# Patient Record
Sex: FEMALE | Race: WHITE | NOT HISPANIC OR LATINO | Employment: FULL TIME | URBAN - METROPOLITAN AREA
[De-identification: names, ages, dates, MRNs, and addresses within clinical notes are randomized per-mention and may not be internally consistent; named-entity substitution may affect disease eponyms.]

---

## 2018-01-26 ENCOUNTER — TRANSFERRED RECORDS (OUTPATIENT)
Dept: HEALTH INFORMATION MANAGEMENT | Facility: CLINIC | Age: 53
End: 2018-01-26

## 2018-12-12 ENCOUNTER — OFFICE VISIT (OUTPATIENT)
Dept: FAMILY MEDICINE | Facility: CLINIC | Age: 53
End: 2018-12-12
Payer: OTHER GOVERNMENT

## 2018-12-12 VITALS
OXYGEN SATURATION: 98 % | HEART RATE: 86 BPM | WEIGHT: 196.2 LBS | DIASTOLIC BLOOD PRESSURE: 82 MMHG | HEIGHT: 67 IN | SYSTOLIC BLOOD PRESSURE: 118 MMHG | TEMPERATURE: 98.5 F | BODY MASS INDEX: 30.79 KG/M2

## 2018-12-12 DIAGNOSIS — G25.81 RESTLESS LEGS SYNDROME: ICD-10-CM

## 2018-12-12 DIAGNOSIS — M54.41 ACUTE RIGHT-SIDED LOW BACK PAIN WITH RIGHT-SIDED SCIATICA: Primary | ICD-10-CM

## 2018-12-12 DIAGNOSIS — R60.0 LOWER EXTREMITY EDEMA: ICD-10-CM

## 2018-12-12 DIAGNOSIS — M85.89 OSTEOPENIA OF MULTIPLE SITES: ICD-10-CM

## 2018-12-12 PROCEDURE — 99203 OFFICE O/P NEW LOW 30 MIN: CPT | Performed by: FAMILY MEDICINE

## 2018-12-12 RX ORDER — TRAMADOL HYDROCHLORIDE 50 MG/1
50 TABLET ORAL EVERY 6 HOURS PRN
COMMUNITY
End: 2018-12-12

## 2018-12-12 RX ORDER — ROPINIROLE 0.5 MG/1
0.5 TABLET, FILM COATED ORAL 2 TIMES DAILY PRN
Qty: 180 TABLET | Refills: 5 | COMMUNITY
Start: 2018-12-12 | End: 2019-08-02

## 2018-12-12 RX ORDER — FUROSEMIDE 20 MG
20 TABLET ORAL DAILY PRN
Qty: 60 TABLET | Refills: 3 | COMMUNITY
Start: 2018-12-12 | End: 2020-07-07

## 2018-12-12 RX ORDER — TRAMADOL HYDROCHLORIDE 50 MG/1
50 TABLET ORAL EVERY 6 HOURS PRN
Qty: 120 TABLET | Refills: 1 | Status: SHIPPED | OUTPATIENT
Start: 2018-12-27 | End: 2019-03-07

## 2018-12-12 RX ORDER — FAMOTIDINE 20 MG
1 TABLET ORAL DAILY
COMMUNITY
End: 2021-10-29

## 2018-12-12 RX ORDER — ALENDRONATE SODIUM 35 MG/1
35 TABLET ORAL
COMMUNITY
End: 2019-08-02

## 2018-12-12 RX ORDER — CYCLOBENZAPRINE HCL 5 MG
5 TABLET ORAL 2 TIMES DAILY PRN
Qty: 40 TABLET | Refills: 1 | Status: SHIPPED | OUTPATIENT
Start: 2018-12-12 | End: 2019-02-13

## 2018-12-12 ASSESSMENT — MIFFLIN-ST. JEOR: SCORE: 1518.62
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
## 2018-12-12 NOTE — PATIENT INSTRUCTIONS
"Physical therapy will call you to schedule.  If this is not improving in 2-4 weeks they let me know and then I'll order MRI at that point.      Thank you for choosing [facility].  You may be receiving a survey in the mail from [employer] regarding your visit today.  Please take a few minutes to complete and return the survey to let us know how we are doing.      If you have questions or concerns, please contact us via [employer] or you can contact your care team at [phone].    Our Clinic hours are:  Monday 6:40 am  to 7:00 pm  Tuesday -Friday 6:40 am to 5:00 pm    The [place] outpatient lab hours are:  Monday - Friday 6:10 am to 4:45 pm  Saturdays 7:00 am to 11:00 am  Appointments are required, call [phone]    If you have clinical questions after hours or would like to schedule an appointment,  call the clinic at [phone].  Patient Education       * Sciatica    Sciatica (\"Lumbar Radiculopathy\") causes a pain that spreads from the lower back down into the buttock, hip and leg. Sometimes leg pain can occur without any back pain. Sciatica is due to irritation or pressure on a spinal nerve as it comes out of the spinal canal. This is most often due to pressure on the nerve from a nearby spinal disk (the cartilage cushion between each spinal bone). Other causes include spinal stenosis (narrowing of the spinal canal) and spasm of the piriformis muscle (a muscle in the buttocks that the sciatic nerve passes through).  Sciatica may begin after a sudden twisting/bending force (such as in a car accident), or sometimes after a simple awkward movement. In either case, muscle spasm is commonly present and can add to the pain.  The diagnosis of sciatica is made from the symptoms and physical exam. Unless you had a physical injury (such as a car accident or fall), X-rays are usually not ordered for the initial evaluation of sciatica because the nerves and disks cannot be seen on an X-ray. Most sciatica (80-90%) " gets better with time.  What can I do about my low back pain?  There are three main things you can do to ease low back pain and help it go away.    Stay active! Use positions, movements and exercises. Too much rest can make your symptoms worse.    Use heat or cold packs.    Take medicine as directed.  Using positions, movements and exercises  Research tells us that moving your joints and muscles can help you recover from back pain. Such activity should be simple and gentle.  Use walking to help relieve your discomfort. Try taking a short walk every 3 to 4 hours during the day. Walk for a few minutes inside your home or take longer walks outside, on a treadmill or at a mall. Slowly increase the amount of time you walk. Expect discomfort when you begin, but it should lessen as your back starts to recover.  Finding a position that is comfortable  When your back pain is new, you may find that certain positions will ease your pain. Gently try each of the following positions until you find one that eases your pain. Once you find a position of comfort, use it as often as you like while you recover. Return to your daily routine as soon as possible.     Lie on your back with your legs bent. You can do this by placing a pillow under your knees or lie on the floor and rest your lower legs on the seat of a chair.    Lie on your side with your knees bent and place a pillow between your knees.    Lie on your stomach over pillows.  Using heat or cold packs  Try cold packs or gentle heat to ease your pain. Use whichever gives you the most relief. Apply the cold pack or heat for 15 minutes at a time, as often as needed.  Taking medicine  If taking over-the-counter medicine:    Take ibuprofen (Advil, Motrin) 600 mg. three times a day as needed for pain.  OR    Take Aleve (naproxen sodium) 220 to 440 mg. two times a day as needed for pain.  If your doctor prescribed medicine, follow the instructions. Stop taking the medicine as soon  as you can.  When should I call my doctor?  Your back pain should improve over the first couple of weeks. As it improves, you should be able to return to your normal activities. But call your doctor if:    You have a sudden change in your ability to control? your bladder or bowels.    You begin to feel tingling in your groin or legs.    The pain spreads down your leg and into your foot.    Your toes, feet or leg muscles begin to feel weak.    You feel generally unwell or sick.    Your pain gets worse.    7270-4938 [employer]. [address] [place] [address]. All rights reserved. This information is not intended as a substitute for professional medical care. Always follow your healthcare professional's instructions.  This information has been modified by your health care provider with permission from the publisher.

## 2019-01-02 ENCOUNTER — HOSPITAL ENCOUNTER (OUTPATIENT)
Dept: PHYSICAL THERAPY | Facility: CLINIC | Age: 54
Setting detail: THERAPIES SERIES
End: 2019-01-02
Attending: FAMILY MEDICINE
Payer: OTHER GOVERNMENT

## 2019-01-02 DIAGNOSIS — M54.41 ACUTE RIGHT-SIDED LOW BACK PAIN WITH RIGHT-SIDED SCIATICA: ICD-10-CM

## 2019-01-02 PROCEDURE — 97014 ELECTRIC STIMULATION THERAPY: CPT | Mod: GP | Performed by: PHYSICAL THERAPIST

## 2019-01-02 PROCEDURE — 97162 PT EVAL MOD COMPLEX 30 MIN: CPT | Mod: GP | Performed by: PHYSICAL THERAPIST

## 2019-01-02 PROCEDURE — 97110 THERAPEUTIC EXERCISES: CPT | Mod: GP | Performed by: PHYSICAL THERAPIST
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
## 2019-01-10 ENCOUNTER — TELEPHONE (OUTPATIENT)
Dept: FAMILY MEDICINE | Facility: CLINIC | Age: 54
End: 2019-01-10

## 2019-01-10 DIAGNOSIS — Z12.31 ENCOUNTER FOR SCREENING MAMMOGRAM FOR BREAST CANCER: Primary | ICD-10-CM

## 2019-01-10 NOTE — TELEPHONE ENCOUNTER
Panel Management Review      Patient has the following on her problem list: None      Composite cancer screening  Chart review shows that this patient is due/due soon for the following Pap Smear, Mammogram and Colonoscopy  Summary:    Patient is due/failing the following:   COLONOSCOPY, MAMMOGRAM, PAP and PHYSICAL    Action needed:   Patient needs office visit for Physical/ Pap. and Patient needs referral/order: Mammogram and Colonoscopy.    Type of outreach:    Phone, left message for patient to call back.     Questions for provider review:    None                                                                                                                                    Stacy Woods MA       Chart routed to Care Team .

## 2019-01-11 NOTE — TELEPHONE ENCOUNTER
Pt is going to wait for her work schedule to schedule her physical. She will hold off on the colonoscopy but would like an order for a mammogram put in. Please let her know when she can schedule this test.

## 2019-01-17 NOTE — TELEPHONE ENCOUNTER
Patient returned the call, advised her the mammogram had been ordered and the diagnostics phone number given.

## 2019-02-13 ENCOUNTER — TELEPHONE (OUTPATIENT)
Dept: FAMILY MEDICINE | Facility: CLINIC | Age: 54
End: 2019-02-13

## 2019-02-13 DIAGNOSIS — M54.41 ACUTE RIGHT-SIDED LOW BACK PAIN WITH RIGHT-SIDED SCIATICA: Primary | ICD-10-CM

## 2019-02-13 DIAGNOSIS — F40.240 CLAUSTROPHOBIA: Primary | ICD-10-CM

## 2019-02-13 DIAGNOSIS — M54.41 ACUTE RIGHT-SIDED LOW BACK PAIN WITH RIGHT-SIDED SCIATICA: ICD-10-CM

## 2019-02-13 RX ORDER — DIAZEPAM 5 MG
5-10 TABLET ORAL
Qty: 2 TABLET | Refills: 0 | Status: SHIPPED | OUTPATIENT
Start: 2019-02-13 | End: 2019-05-15

## 2019-02-13 RX ORDER — CYCLOBENZAPRINE HCL 5 MG
TABLET ORAL
Qty: 40 TABLET | Refills: 0 | Status: SHIPPED | OUTPATIENT
Start: 2019-02-13 | End: 2019-03-07

## 2019-02-13 NOTE — TELEPHONE ENCOUNTER
Covering for PCP:  MRI ordered- she can schedule  Further recommendations pending results.  Thanks.    Sean Gabriel MD

## 2019-02-13 NOTE — TELEPHONE ENCOUNTER
Patient reports:  MRI scheduled 2/22/19  Imaging dept had asked patient to get a RX for MRI for claustrophobia, as they have no open MRI units available in the  system..    Routing to provider.    Benita STEWART Rn

## 2019-02-13 NOTE — TELEPHONE ENCOUNTER
Patient notified and understands plan.  Pharmacy also notified and state they will process the med. Sheyla WEAVER RN

## 2019-02-13 NOTE — TELEPHONE ENCOUNTER
Do not take Valium, Flexeril and Tramadol at the same time. I don't recommend taking Tramadol for 4-6 hrs after, or before Valium since elimination half life for Tramadol is about 5 hrs.     Marcia Laura APRN CNP

## 2019-02-13 NOTE — TELEPHONE ENCOUNTER
Dr. Chu,    Patient states back pain is getting worse.    Per LOV notes 12/12/2018: If this is not improving in 2-4 weeks they let me know and then I'll order MRI at that point.    Any other recommendations?    Thanks,  Sheyla WEAVER RN

## 2019-02-13 NOTE — TELEPHONE ENCOUNTER
Reason for call:  Patient reporting a symptom    Symptom or request: Lower back on the right manly in the middle her legs hurt so bad especially in the morning she has to hang on this to keep herself steady when she gets out of bed. The pain is unbearable, lasting through the day. Th patient stated she fell on the ice two weeks ago. Patient started PT on  1/2/19 only went the one time.  Patient can not afford the co-pays to go twice a week. So PT gave her a print out of exercises to do she does in the morning before getting out of bed. Patient it stating that her Ankylosing Spondylosis is what is getting worse.    Duration (how long have symptoms been present): has had this for years getting worse.    Have you been treated for this before? Yes      Phone Number patient can be reached at:  Home number on file [phone] (home)    Best Time:  any    Can we leave a detailed message on this number:  YES    Call taken on 2/13/2019 at 10:48 AM by Love Farnsworth

## 2019-02-13 NOTE — TELEPHONE ENCOUNTER
Marcia,    Can you clarify...  Patient had a refill today for Flexeril  She asked for medication for claustrophobia  Should she refrain from taking Flexeril before and after MRI since she is going to use Valium?  Pharmacy and patient wanted clarification    Routing to provider.    Benita STEWART Rn

## 2019-02-13 NOTE — TELEPHONE ENCOUNTER
Patient notified of prescription sent to pharmacy by covering provider  Patient verbalized understanding.    Benita STEWART Rn

## 2019-02-13 NOTE — TELEPHONE ENCOUNTER
Patient reports that she has 3 tablets of Flexeril left  LOV 12/12/19, patient to return in 3 months  Patient is requesting refill to get her to 3/12/19  Please advise    Routing to provider.    Benita STEWART Rn

## 2019-02-13 NOTE — TELEPHONE ENCOUNTER
Reason for Call:  Pharmacy called wanting to make sure that the DrNaheed Knows that she is on diazepam and tramadol pharmacy needs a call back to make a note for insurance purposes that the Doctor is aware.    Detailed comments: [phone]    Phone Number Patient can be reached at: Home number on file [phone] (home)    Best Time: any    Can we leave a detailed message on this number? YES    Call taken on 2/13/2019 at 2:14 PM by Love Farnsworth

## 2019-02-13 NOTE — TELEPHONE ENCOUNTER
Covering for PCP:    Signed prescription for Valium, take 1-2 tablets 30 minutes before MRI. No refills.    ROSALINDA Chakraborty CNP

## 2019-02-15 ENCOUNTER — HOSPITAL ENCOUNTER (OUTPATIENT)
Dept: MAMMOGRAPHY | Facility: CLINIC | Age: 54
Discharge: HOME | End: 2019-02-15
Attending: FAMILY MEDICINE | Admitting: FAMILY MEDICINE
Payer: OTHER GOVERNMENT

## 2019-02-15 DIAGNOSIS — Z12.31 ENCOUNTER FOR SCREENING MAMMOGRAM FOR BREAST CANCER: ICD-10-CM

## 2019-02-15 PROCEDURE — 77067 SCR MAMMO BI INCL CAD: CPT

## 2019-02-22 ENCOUNTER — HOSPITAL ENCOUNTER (OUTPATIENT)
Dept: MRI IMAGING | Facility: CLINIC | Age: 54
Discharge: HOME | End: 2019-02-22
Attending: INTERNAL MEDICINE | Admitting: INTERNAL MEDICINE
Payer: OTHER GOVERNMENT

## 2019-02-22 DIAGNOSIS — M54.41 ACUTE RIGHT-SIDED LOW BACK PAIN WITH RIGHT-SIDED SCIATICA: ICD-10-CM

## 2019-02-22 PROCEDURE — 72148 MRI LUMBAR SPINE W/O DYE: CPT

## 2019-02-25 ENCOUNTER — MYC MEDICAL ADVICE (OUTPATIENT)
Dept: FAMILY MEDICINE | Facility: CLINIC | Age: 54
End: 2019-02-25

## 2019-02-25 DIAGNOSIS — M54.41 BILATERAL LOW BACK PAIN WITH RIGHT-SIDED SCIATICA, UNSPECIFIED CHRONICITY: Primary | ICD-10-CM

## 2019-02-26 RX ORDER — CELECOXIB 100 MG/1
100 CAPSULE ORAL 2 TIMES DAILY
Qty: 60 CAPSULE | Refills: 3 | Status: SHIPPED | OUTPATIENT
Start: 2019-02-26 | End: 2019-11-13

## 2019-03-07 ENCOUNTER — MYC REFILL (OUTPATIENT)
Dept: FAMILY MEDICINE | Facility: CLINIC | Age: 54
End: 2019-03-07

## 2019-03-07 DIAGNOSIS — M54.41 ACUTE RIGHT-SIDED LOW BACK PAIN WITH RIGHT-SIDED SCIATICA: ICD-10-CM

## 2019-03-07 NOTE — TELEPHONE ENCOUNTER
Tramadol      Last Written Prescription Date:  12/27/18  Last Fill Quantity: 120,   # refills: 1  Last Office Visit: 12/12/18 with Kimberley  Future Office visit:       Routing refill request to provider for review/approval because:  Drug not on the [facility], [facility] or [facility] refill protocol or controlled substance

## 2019-03-08 RX ORDER — TRAMADOL HYDROCHLORIDE 50 MG/1
50 TABLET ORAL EVERY 6 HOURS PRN
Qty: 120 TABLET | Refills: 1 | Status: SHIPPED | OUTPATIENT
Start: 2019-03-08 | End: 2019-05-15

## 2019-03-08 NOTE — TELEPHONE ENCOUNTER
Refill printed.  I didn't realize that this is likely what she wanted refilled on her MyChart from 2/25/19.    Refill printed, will need clinic appt for next refill.    Thanks,  Quoc Chu MD

## 2019-03-09 ENCOUNTER — HEALTH MAINTENANCE LETTER (OUTPATIENT)
Age: 54
End: 2019-03-09

## 2019-03-13 ENCOUNTER — TRANSFERRED RECORDS (OUTPATIENT)
Dept: HEALTH INFORMATION MANAGEMENT | Facility: CLINIC | Age: 54
End: 2019-03-13

## 2019-03-14 ENCOUNTER — TELEPHONE (OUTPATIENT)
Dept: PALLIATIVE MEDICINE | Facility: CLINIC | Age: 54
End: 2019-03-14

## 2019-03-14 NOTE — TELEPHONE ENCOUNTER
Faxed 3-. Received 3-.    Incoming faxes from [facility] (Dr. Robbie Liz) for a right L5-S1 transforaminal YESSY for right lumbar radiculopathy.   [facility]'s phone: [phone]   [facility]'s fax: [phone]  Order lists site preference and billing info. Routing to scheduling coordinators.  Sofía Pageland  Patient Representative  [facility]

## 2019-03-14 NOTE — TELEPHONE ENCOUNTER
Pre-screening Questions for Radiology Injections:    Injection to be done at which interventional clinic site? [facility]    Instruct patient to arrive as directed prior to the scheduled appointment time:    [place] minutes before      [place]: 30 minutes before; if IV needed 1 hour before     Procedure ordered by Agusto    Procedure ordered? Lumbar Epidural Steroid Injection    What insurance would patient like us to bill for this procedure?       Worker's comp or MVA (motor vehicle accident) -Any injection DO NOT SCHEDULE and route to Tanisha Bryant.      [employer] insurance - For SI joint injections, DO NOT SCHEDULE and route Tanisha Hurtado.       [employer] - Any injection besides hip/shoulder/knee joint DO NOT SCHEDULE and route to Tanisha Hurtado. She will obtain PA and call pt back to schedule procedure or notify pt of denial.        [employer]-Route to Tanisha for review        IF SCHEDULING IN [place] AND NEEDS A PA, IT IS OKAY TO SCHEDULE. [place] HANDLES THEIR OWN PA'S AFTER THE PATIENT IS SCHEDULED. PLEASE SCHEDULE AT LEAST 1 WEEK OUT SO A PA CAN BE OBTAINED.      Any chance of pregnancy? NO   If YES, do NOT schedule and route to RN pool    Is an  needed? No     Patient has a drive home? (mandatory) YES: ok    Is patient taking any blood thinners (plavix, coumadin, jantoven, warfarin, heparin, pradaxa or dabigatran )? No   If hold needed, do NOT schedule, route to RN pool     Is patient taking any aspirin products (includes Excedrin and Fiorinal)? No     If more than 325mg/day do NOT schedule; route to RN pool     For CERVICAL procedures, hold all aspirin products for 6 days.     Tell pt that if aspirin product is not held for 6 days, the procedure WILL BE cancelled.      Does the patient have a bleeding or clotting disorder? No     If YES, okay to schedule AND route to RN nurse pool    For any patients with platelet count <100, must be forwarded to provider    Is patient diabetic?   No  If YES, have them bring their glucometer.    Does patient have an active infection or treated for one within the past week? No     Is patient currently taking any antibiotics?  No     For patients on chronic, preventative, or prophylactic antibiotics, procedures may be scheduled.     For patients on antibiotics for active or recent infection:    Deng Gregroy Burton, [place]-antibiotic course must have been completed for 4 days    Is patient currently taking any steroid medications? (i.e. Prednisone, Medrol)  No     For patients on steroid medications:    Deng Gregory Burton, [place]-steroid course must have been completed for 4 days    Reviewed with patient:  If you are started on any steroids or antibiotics between now and your appointment, you must contact us because the procedure may need to be cancelled.  Yes    Is patient actively being treated for cancer or immunocompromised? No  If YES, do NOT schedule and route to RN pool     Are you able to get on and off an exam table with minimal or no assistance? Yes  If NO, do NOT schedule and route to RN pool    Are you able to roll over and lay on your stomach with minimal or no assistance? Yes  If NO, do NOT schedule and route to RN pool     Any allergies to contrast dye, iodine, shellfish, or numbing and steroid medications? No  If YES, route to RN pool AND add allergy information to appointment notes    Allergies: Patient has no known allergies.      Has the patient had a flu shot or any other vaccinations within 7 days before or after the procedure.  No     Does patient have an MRI/CT?  YES: MRI  (SI joint, hip injections, lumbar sympathetic blocks, and stellate ganglion blocks do not require an MRI)    Was the MRI done w/in the last 3 years?  Yes    Was MRI done at [facility]? Yes      If not, where was it done? N/A       If MRI was not done at [facility], [facility] or [facility] do NOT schedule and route to nursing.  If pt has an  imaging disc, the injection may be scheduled but pt has to bring disc to appt. If they show up w/out disc the injection cannot be done    Reminders (please tell patient if applicable):       Instructed pt to arrive 30 minutes early for IV start if this is for a cervical procedure, ALL sympathetic (stellate ganglion, hypogastric, or lumbar sympathetic block) and all sedation procedures (RFA, spinal cord stimulation trials).  Not Applicable   -IVs are not routinely placed for Dr. Lou cervical cases   -Dr. Rubio: IVs for cervical ESIs and cervical TBDs (not CMBBs/facet inj)      If NPO for sedation, informed patient that it is okay to take medications with sips of water (except if they are to hold blood thinners).  Not Applicable   *DO take blood pressure medication if it is prescribed*      If this is for a cervical YESSY, informed patient that aspirin needs to be held for 6 days.   Not Applicable      For all patients not having spinal cord stimulator (SCS) trials or radiofrequency ablations (RFAs), informed patient:    IV sedation is not provided for this procedure.  If you feel that an oral anti-anxiety medication is needed, you can discuss this further with your referring provider or primary care provider.  The Pain Clinic provider will discuss specifics of what the procedure includes at your appointment.  Most procedures last 10-20 minutes.  We use numbing medications to help with any discomfort during the procedure.  Not Applicable      Do not schedule procedures requiring IV placement in the first appointment of the day or first appointment after lunch. Do NOT schedule at 0745, 0815 or 1245.       For patients 85 or older we recommend having an adult stay w/ them for the remainder of the day.       Does the patient have any questions?  NO  Christy Clement  [facility]

## 2019-03-29 ENCOUNTER — HOSPITAL ENCOUNTER (OUTPATIENT)
Dept: RADIOLOGY | Facility: CLINIC | Age: 54
Discharge: HOME | End: 2019-03-29
Attending: ANESTHESIOLOGY | Admitting: ANESTHESIOLOGY
Payer: OTHER GOVERNMENT

## 2019-03-29 ENCOUNTER — RADIOLOGY INJECTION OFFICE VISIT (OUTPATIENT)
Dept: PALLIATIVE MEDICINE | Facility: CLINIC | Age: 54
End: 2019-03-29
Payer: OTHER GOVERNMENT

## 2019-03-29 VITALS — RESPIRATION RATE: 16 BRPM | DIASTOLIC BLOOD PRESSURE: 89 MMHG | SYSTOLIC BLOOD PRESSURE: 139 MMHG | HEART RATE: 67 BPM

## 2019-03-29 DIAGNOSIS — M54.41 CHRONIC BILATERAL LOW BACK PAIN WITH RIGHT-SIDED SCIATICA: ICD-10-CM

## 2019-03-29 DIAGNOSIS — G89.29 CHRONIC BILATERAL LOW BACK PAIN WITH RIGHT-SIDED SCIATICA: ICD-10-CM

## 2019-03-29 DIAGNOSIS — M51.26 LUMBAR DISC HERNIATION: ICD-10-CM

## 2019-03-29 DIAGNOSIS — M51.369 DDD (DEGENERATIVE DISC DISEASE), LUMBAR: ICD-10-CM

## 2019-03-29 DIAGNOSIS — M54.16 LUMBAR RADICULOPATHY: Primary | ICD-10-CM

## 2019-03-29 PROCEDURE — 25000128 H RX IP 250 OP 636: Performed by: ANESTHESIOLOGY

## 2019-03-29 PROCEDURE — 64483 NJX AA&/STRD TFRM EPI L/S 1: CPT | Mod: RT | Performed by: ANESTHESIOLOGY

## 2019-03-29 PROCEDURE — 27210202 XR LUMBAR SACRAL TRANSFORAMINAL INJ RIGHT: Mod: TC

## 2019-03-29 RX ORDER — IOPAMIDOL 612 MG/ML
15 INJECTION, SOLUTION INTRATHECAL ONCE
Status: COMPLETED | OUTPATIENT
Start: 2019-03-29 | End: 2019-03-29

## 2019-03-29 RX ORDER — BUPIVACAINE HYDROCHLORIDE 5 MG/ML
10 INJECTION, SOLUTION PERINEURAL ONCE
Status: COMPLETED | OUTPATIENT
Start: 2019-03-29 | End: 2019-03-29

## 2019-03-29 RX ORDER — DEXAMETHASONE SODIUM PHOSPHATE 10 MG/ML
10 INJECTION, SOLUTION INTRAMUSCULAR; INTRAVENOUS ONCE
Status: COMPLETED | OUTPATIENT
Start: 2019-03-29 | End: 2019-03-29

## 2019-03-29 RX ORDER — METHYLPREDNISOLONE ACETATE 40 MG/ML
40 INJECTION, SUSPENSION INTRA-ARTICULAR; INTRALESIONAL; INTRAMUSCULAR; SOFT TISSUE ONCE
Status: COMPLETED | OUTPATIENT
Start: 2019-03-29 | End: 2019-03-29

## 2019-03-29 RX ORDER — LIDOCAINE HYDROCHLORIDE 10 MG/ML
5 INJECTION, SOLUTION EPIDURAL; INFILTRATION; INTRACAUDAL; PERINEURAL ONCE
Status: COMPLETED | OUTPATIENT
Start: 2019-03-29 | End: 2019-03-29

## 2019-03-29 RX ADMIN — LIDOCAINE HYDROCHLORIDE 1.5 ML: 10 INJECTION, SOLUTION EPIDURAL; INFILTRATION; INTRACAUDAL; PERINEURAL at 08:58

## 2019-03-29 RX ADMIN — METHYLPREDNISOLONE ACETATE 40 MG: 40 INJECTION, SUSPENSION INTRA-ARTICULAR; INTRALESIONAL; INTRAMUSCULAR; SOFT TISSUE at 08:58

## 2019-03-29 RX ADMIN — DEXAMETHASONE SODIUM PHOSPHATE 5 MG: 10 INJECTION, SOLUTION INTRAMUSCULAR; INTRAVENOUS at 08:58

## 2019-03-29 RX ADMIN — BUPIVACAINE HYDROCHLORIDE 1 ML: 5 INJECTION, SOLUTION EPIDURAL; INTRACAUDAL at 08:58

## 2019-03-29 RX ADMIN — IOPAMIDOL 1 ML: 612 INJECTION, SOLUTION INTRATHECAL at 08:58

## 2019-03-29 ASSESSMENT — PAIN SCALES - GENERAL
PAINLEVEL: SEVERE PAIN (6)
PAINLEVEL: EXTREME PAIN (9)

## 2019-03-29 NOTE — IP AVS SNAPSHOT
MRN:[id number]                      After Visit Summary   3/29/2019    Diana Salvador    MRN: [id number]           Visit Information        Provider Department      3/29/2019  8:45 AM BIN [facility]           Review of your medicines      UNREVIEWED medicines. Ask your doctor about these medicines       Dose / Directions   alendronate 35 MG tablet  Commonly known as:  FOSAMAX      Dose:  35 mg  Take 35 mg by mouth every 7 days  Refills:  0     celecoxib 100 MG capsule  Commonly known as:  celeBREX  Used for:  Bilateral low back pain with right-sided sciatica, unspecified chronicity      Dose:  100 mg  Take 1 capsule (100 mg) by mouth 2 times daily  Quantity:  60 capsule  Refills:  3     cyclobenzaprine 5 MG tablet  Commonly known as:  FLEXERIL  Used for:  Acute right-sided low back pain with right-sided sciatica      Dose:  5 mg  Take 1 tablet (5 mg) by mouth 3 times daily as needed for muscle spasms  Quantity:  40 tablet  Refills:  0     diazepam 5 MG tablet  Commonly known as:  VALIUM  Used for:  Claustrophobia      Dose:  5-10 mg  Take 1-2 tablets (5-10 mg) by mouth once as needed for anxiety  Quantity:  2 tablet  Refills:  0     furosemide 20 MG tablet  Commonly known as:  LASIX  Used for:  Lower extremity edema      Dose:  20 mg  Take 1 tablet (20 mg) by mouth daily as needed (swelling)  Quantity:  60 tablet  Refills:  3     rOPINIRole 0.5 MG tablet  Commonly known as:  REQUIP  Used for:  Restless legs syndrome      Dose:  0.5 mg  Take 1 tablet (0.5 mg) by mouth 2 times daily as needed (restless legs)  Quantity:  180 tablet  Refills:  5     traMADol 50 MG tablet  Commonly known as:  ULTRAM  Used for:  Acute right-sided low back pain with right-sided sciatica      Dose:  50 mg  Take 1 tablet (50 mg) by mouth every 6 hours as needed for severe pain  Quantity:  120 tablet  Refills:  1     UNKNOWN TO PATIENT  Indication:  ropinerole      2 times daily  Refills:  0      Vitamin D (Cholecalciferol) 1000 units Caps      Refills:  0              Protect others around you: Learn how to safely use, store and throw away your medicines at www.disposemymeds.org.       Follow-ups after your visit       Your next 10 appointments already scheduled    Mar 29, 2019  8:45 AM CDT  Radiology Injections with Flakito Ray MD  [facility] [place] ([facility]) [address]  [address]  [place] [facility] [place] [facility] [place] [id number] [phone]   Mar 29, 2019  8:45 AM CDT  XR LUMBAR EPIDURAL INJECTION with [place]  [facility] ([facility]) [address] [address] [phone]   How do I prepare for my exam? (Food and drink instructions) No Food and Drink Restrictions.  How do I prepare for my exam? (Other instructions) * If you take Coumadin (or any other blood thinners) you may need to stop taking them up to 5 days prior to the exam. Talk to your doctor before stopping. * If you take Plavix, Ticlid, Pletal or Persantine, please ask your doctor if you should stop these medicines. You may need extra tests on the morning of your scan. * If you take Xarelto (Rivaroxaban), you may need to stop taking it 24 hours before treatment. Talk to your doctor before stopping this medicine.  What should I wear: Wear comfortable clothes.  How long does the exam take: Injections take about 30 to 45 minutes. Most people spend up to 2 hours in the clinic or hospital.  What should I bring: Please bring any scans or X-rays taken at other hospitals, if similar tests were done. Also bring a list of your medicines, including vitamins, minerals and over-the-counter drugs. It is safest to leave personal items at home. You will need a  : Plan to have someone drive you home afterward.  What do I need to tell my doctor: Tell your doctor in advance: * If you are allergic to X-ray dye (contrast fluid). * If you may be  pregnant.  What should I do after the exam: You may have slight cramping during this exam. The cramps should go away afterward. You may have some spotting after the exam.  What is this test: A spinal shot is done in or near the spine. You may receive steroid medicine (to reduce swelling) or contrast fluid (dye that makes the area show up more clearly on X-rays). A nerve root shot is a shot into the nerve near the spine. It may reduce inflammation near the nerve root or spinal cord and reduce pain in the arm or leg.  Who should I call with questions: If you have any questions, please call the Imaging Department where you will have your exam. Directions, parking instructions, and other information is available on our website, [email or web address].      Care Instructions       Further instructions from your care team       [facility]   Procedure Discharge Instructions    Today you saw:    Dr. Flakito Ray     You had an:  Lumbar transforaminal Epidural steroid injection       Medications used:  Lidocaine   Bupivacaine   Dexamethasone Depo-medrol  IsoVue            Be cautious when walking. Numbness and/or weakness in the lower extremities may occur for up to 6-8 hours after the procedure due to effect of the local anesthetic    Do not drive for 6 hours. The effect of the local anesthetic could slow your reflexes.     You may resume your regular activities after 24 hours    Avoid strenuous activity for the first 24 hours    You may shower, however avoid swimming, tub baths or hot tubs for 24 hours following your procedure    You may have a mild to moderate increase in pain for several days following the injection.    It may take up to 14 days for the steroid medication to start working although you may feel the effect as early as a few days after the procedure.       You may use ice packs for 10-15 minutes, 3 to 4 times a day at the injection site for comfort    Do not use heat to painful  areas for 6 to 8 hours. This will give the local anesthetic time to wear off and prevent you from accidentally burning your skin.     You may use anti-inflammatory medications (such as Ibuprofen or Aleve or Advil) or Tylenol for pain control if necessary    If you were fasting, you may resume your normal diet and medications after the procedure    Possible side effects of steroids that you may experience include flushing, elevated blood pressure, increased appetite, mild headaches and restlessness.  All of these symptoms will get better with time.    If you experience any of the following, call the Pain Clinic during work hours at [phone] or the Provider Line after hours at [phone]:  -Fever over 100 degree F  -Swelling, bleeding, redness, drainage, warmth at the injection site  -Progressive weakness or numbness in your legs or arms  -Loss of bowel or bladder function  -Unusual headache that is not relieved by Tylenol or other pain reliever  -Unusual new onset of pain that is not improving        Additional Information About Your Visit       [facility] Information    [facility] gives you secure access to your electronic health record. If you see a primary care provider, you can also send messages to your care team and make appointments. If you have questions, please call your primary care clinic.  If you do not have a primary care provider, please call [phone] and they will assist you.       Care EveryWhere ID    This is your Care EveryWhere ID. This could be used by other organizations to access your [place] medical records  [id number]        Primary Care Provider Office Phone # Fax #    Quoc Chu [phone]      Equal Access to Services    [facility] : Jose Paiz, waaxda luqadaha, qaybta kaalmada sandy, du ramirez. So [facility] [phone].    ATENCIÓN: Si habla español, tiene a steele disposición servicios gratuitos de  asistencia lingüística. Harvey al [phone].    We comply with applicable federal civil rights laws and [place] laws. We do not discriminate on the basis of race, color, national origin, age, disability, sex, sexual orientation, or gender identity.           Thank you!    Thank you for choosing [facility] for your care. Our goal is always to provide you with excellent care. Hearing back from our patients is one way we can continue to improve our services. Please take a few minutes to complete the written survey that you may receive in the mail after you visit with us. Thank you!            Medication List      ASK your doctor about these medications          Morning Afternoon Evening Bedtime As Needed    alendronate 35 MG tablet  Also known as:  FOSAMAX  INSTRUCTIONS:  Take 35 mg by mouth every 7 days                     celecoxib 100 MG capsule  Also known as:  celeBREX  INSTRUCTIONS:  Take 1 capsule (100 mg) by mouth 2 times daily                     cyclobenzaprine 5 MG tablet  Also known as:  FLEXERIL  INSTRUCTIONS:  Take 1 tablet (5 mg) by mouth 3 times daily as needed for muscle spasms                     diazepam 5 MG tablet  Also known as:  VALIUM  INSTRUCTIONS:  Take 1-2 tablets (5-10 mg) by mouth once as needed for anxiety                     furosemide 20 MG tablet  Also known as:  LASIX  INSTRUCTIONS:  Take 1 tablet (20 mg) by mouth daily as needed (swelling)                     rOPINIRole 0.5 MG tablet  Also known as:  REQUIP  INSTRUCTIONS:  Take 1 tablet (0.5 mg) by mouth 2 times daily as needed (restless legs)  Doctor's comments:  Patient will call for fill.                     traMADol 50 MG tablet  Also known as:  ULTRAM  INSTRUCTIONS:  Take 1 tablet (50 mg) by mouth every 6 hours as needed for severe pain                     UNKNOWN TO PATIENT  INSTRUCTIONS:  2 times daily  Reason for med:  ropinerole                     Vitamin D (Cholecalciferol) 1000 units Caps

## 2019-03-29 NOTE — DISCHARGE INSTRUCTIONS
[facility]   Procedure Discharge Instructions    Today you saw:    Dr. Flakito Ray     You had an:  Lumbar transforaminal Epidural steroid injection       Medications used:  Lidocaine   Bupivacaine   Dexamethasone Depo-medrol  IsoVue            Be cautious when walking. Numbness and/or weakness in the lower extremities may occur for up to 6-8 hours after the procedure due to effect of the local anesthetic    Do not drive for 6 hours. The effect of the local anesthetic could slow your reflexes.     You may resume your regular activities after 24 hours    Avoid strenuous activity for the first 24 hours    You may shower, however avoid swimming, tub baths or hot tubs for 24 hours following your procedure    You may have a mild to moderate increase in pain for several days following the injection.    It may take up to 14 days for the steroid medication to start working although you may feel the effect as early as a few days after the procedure.       You may use ice packs for 10-15 minutes, 3 to 4 times a day at the injection site for comfort    Do not use heat to painful areas for 6 to 8 hours. This will give the local anesthetic time to wear off and prevent you from accidentally burning your skin.     You may use anti-inflammatory medications (such as Ibuprofen or Aleve or Advil) or Tylenol for pain control if necessary    If you were fasting, you may resume your normal diet and medications after the procedure    Possible side effects of steroids that you may experience include flushing, elevated blood pressure, increased appetite, mild headaches and restlessness.  All of these symptoms will get better with time.    If you experience any of the following, call the Pain Clinic during work hours at [phone] or the Provider Line after hours at [phone]:  -Fever over 100 degree F  -Swelling, bleeding, redness, drainage, warmth at the injection site  -Progressive weakness or numbness in  your legs or arms  -Loss of bowel or bladder function  -Unusual headache that is not relieved by Tylenol or other pain reliever  -Unusual new onset of pain that is not improving

## 2019-03-29 NOTE — PROGRESS NOTES
Pre procedure Diagnosis: lumbar radiculopathy, lumbar degenerative disc disease   Post procedure Diagnosis: Same  Procedure performed: lumbar transforaminal epidural steroid injection at L5-S1 on the right, (patient has a partially lumbarized S1 on the right) fluoroscopically guided, contrast controlled  Anesthesia: none  Complications: none  Operators: Flakito Ray MD     Indications:   Diana Salvador is a 53 year old female was sent by Dr. Robbie Liz for lumbar epidural steroid injection.  They have a history of chronic lower back pain with pain that travels into the buttock and down the back of the right thigh and anterolateral leg on the right.  Exam shows antalgic gait, lumbar tenderness, and positive SLR and they have tried conservative treatment including physical therapy, medications.    MRI was done on 2/22/19 which showed   FINDINGS: Sagittal images demonstrate normal posterior alignment. Five  lumbar-type vertebral bodies are presumed. There is some mild disc  space narrowing at L4-L5. Bone marrow signal intensity is normal. The  conus medullaris and cauda equina nerve roots are normal.     L1-L2: Normal.     L2-L3: There is a small left posterolateral disc protrusion causing  some mild to moderate left-sided foraminal stenosis. There is also  mild facet hypertrophy and some broad-based disc bulging but no  significant central canal stenosis.     L3-L4: Broad-based disc bulging with a small left posterolateral disc  protrusion is present along with some facet hypertrophy. There is mild  left-sided foraminal stenosis but no central canal stenosis.     L4-L5: Posterior osteophyte formation, broad-based disc bulge or disc  protrusion and moderate to severe facet and ligamentum flavum  hypertrophy is present causing moderate central canal stenosis,  moderate right-sided foraminal stenosis and mild to moderate  left-sided foraminal stenosis.     L5-S1: Mild to moderate facet hypertrophy is present.  There is no  stenosis. L5 appears partially sacralized.     Paraspinal soft tissues: Unremarkable as visualized.                                                                    IMPRESSION:  1. L4-L5 degenerative disc and facet disease with secondary moderate  central canal stenosis, moderate right-sided foraminal stenosis, and  mild to moderate left-sided foraminal stenosis.  2. Small left posterolateral L2-L3 disc protrusion with secondary mild  to moderate left-sided foraminal stenosis.  3. Small left posterolateral L3-L4 disc protrusion with secondary mild  left-sided foraminal stenosis.     ROE COHEN MD    Options/alternatives, benefits and risks were discussed with the patient including bleeding, infection, tissue trauma, numbness, weakness, paralysis, spinal cord injury, radiation exposure, headache and reaction to medications. Questions were answered to her satisfaction and she agrees to proceed. Voluntary informed consent was obtained and signed.     Vitals were reviewed: Yes  /87   Pulse 76   Resp 16   Allergies were reviewed:  Yes   Medications were reviewed:  Yes   Pre-procedure pain score: 9/10 RLE and 7/10 LBP    Procedure:  After getting informed consent, patient was brought into the procedure suite and was placed in a prone position on the procedure table.   A Pause for the Cause was performed.  Patient was prepped and draped in sterile fashion.     After identifying the right L5-S1 neuroforamen, the C-arm was rotated to a right lateral oblique angle.  A total of 1.5 ml of Lidocaine 1% was used to anesthetize the skin and the needle track at a skin entry site coaxial with the fluoroscopy beam, and overriding the superior aspect of the neuroforamen.  A 25 gauge 5 inch spinal needle was advanced under intermittent fluoroscopy until it entered the foramen superiorly.    The needle position was then inspected from anteroposterior and lateral views, and the needle adjusted appropriately.  A  total of 1 ml of Isovue-300 was injected, confirming appropriate position, with spread into the nerve root sheath and the epidural space, with no intravascular uptake. 14 ml was wasted    Then, after repeated negative aspiration, 2.5 ml of a combination of Depomedrol 40 mg, Decadron 5 mg, 0.5% bupivacaine 1 ml was injected and the needle was flushed with lidocaine and removed.    During the procedure, there was a paresthesia described as a pressure sensation with instillation of medication.  Hemostasis was achieved, the area was cleaned, and bandaids were placed when appropriate.  The patient tolerated the procedure well, and was taken to the recovery room.    Images were saved to PACS.    Post-procedure pain score: 1/10 RLE, 6/10 LBP  Follow-up includes:   -f/u phone call in one week  -f/u with referring provider    Flakito Ray MD  [facility]

## 2019-03-29 NOTE — IP AVS SNAPSHOT
[facility]  [address]  [facility] [address]  Phone:  [phone]  Fax:  [phone]                                    After Visit Summary   3/29/2019    Diana Salvador    MRN: [id number]           After Visit Summary Signature Page    I have received my discharge instructions, and my questions have been answered. I have discussed any challenges I see with this plan with the nurse or doctor.    ..........................................................................................................................................  Patient/Patient Representative Signature      ..........................................................................................................................................  Patient Representative Print Name and Relationship to Patient    ..................................................               ................................................  Date                                   Time    ..........................................................................................................................................  Reviewed by Signature/Title    ...................................................              ..............................................  Date                                               Time          22EPIC Rev 08/18

## 2019-04-05 ENCOUNTER — TELEPHONE (OUTPATIENT)
Dept: PALLIATIVE MEDICINE | Facility: CLINIC | Age: 54
End: 2019-04-05

## 2019-04-05 NOTE — TELEPHONE ENCOUNTER
Patient had a lumbar transforaminal epidural steroid injection at L5-S1 on the right on 3/29/19.  Called patient for an update.      Left message that we were calling for an update about how s/he was doing after the injection.  LM that if s/he has any problems or questions to call the clinic at [phone].

## 2019-04-12 NOTE — PROGRESS NOTES
Outpatient Physical Therapy Discharge Note     Patient: Diana Salvador  : 1965    Beginning/End Dates of Reporting Period:  19 to 19 when initially evaluated.  Discharge written on 2019.     Referring Provider: Quoc Chu Diagnosis: Acute R sided LBP w/ Sciatica      Client Self Report: P R buttock down R leg posteriorly to calf. Weakness in R leg. P Scale: 3-10/10.     Objective Measurements:  Objective Measure: Alma   Details: 6    Objective Measure: CELY    Details: 38  Objective Measure: LB ROM   Details: All movements 50% w/ LBP reproduced w/ R>L SBing, EXt, Leg P reproduced w/ Flexion.  TALIA increased LBP, RFIS increased leg pain.     Objective Measure: MMT:   Details: R Hip Flex 4, R Knee Flex 5-, R Knee Ext 4+, R DF 4+,      Outcome Measures (most recent score):  Alma STarT Sub-Score (Q5-9): 3  Alma STarT Total Score (all 9): 6  Oswestry Score (%): 38 %    Goals:  Goal Identifier 1   Goal Description STG: Pt will be able to lift her granddaughter to play, etc.    Target Date 19   Date Met      Progress:     Goal Identifier 2   Goal Description STG: Pt will be able to sleep at least 5-6 hours/night    Target Date 19   Date Met      Progress:     Goal Identifier 3   Goal Description STG: pt will be able to stand for more than 30' to do HH chores, cooking, etc.    Target Date 19   Date Met      Progress:     Goal Identifier 4   Goal Description STG: Pt will note no pain w/ descending stairs.    Target Date 19   Date Met      Progress:     Goal Identifier 5   Goal Description LTG: Pt will be independent w/HEP and self cares to manage LBP.    Target Date 02/15/19   Date Met      Progress:     Progress Toward Goals:   Not assessed this period.    Plan:  Discharge from therapy.    Discharge:    Reason for Discharge: Patient chooses to discontinue therapy.  Patient has failed to schedule further appointments.    Equipment Issued:      Discharge Plan:  Patient to continue home program.  Pt to follow up w/MD as appropriate.   Sammie Garcias, PT, [facility] (#[id number])  [facility]           [phone]  Fax          [phone]  Appt #      [phone]

## 2019-04-12 NOTE — ADDENDUM NOTE
Encounter addended by: Sammie Garcias, PT on: 4/12/2019 9:47 AM   Actions taken: Sign clinical note, Episode resolved

## 2019-05-15 ENCOUNTER — OFFICE VISIT (OUTPATIENT)
Dept: FAMILY MEDICINE | Facility: CLINIC | Age: 54
End: 2019-05-15
Payer: OTHER GOVERNMENT

## 2019-05-15 VITALS
HEART RATE: 88 BPM | TEMPERATURE: 98.6 F | BODY MASS INDEX: 29.76 KG/M2 | OXYGEN SATURATION: 98 % | HEIGHT: 67 IN | DIASTOLIC BLOOD PRESSURE: 84 MMHG | WEIGHT: 189.6 LBS | SYSTOLIC BLOOD PRESSURE: 140 MMHG | RESPIRATION RATE: 16 BRPM

## 2019-05-15 DIAGNOSIS — M54.41 ACUTE RIGHT-SIDED LOW BACK PAIN WITH RIGHT-SIDED SCIATICA: Primary | ICD-10-CM

## 2019-05-15 PROCEDURE — 99214 OFFICE O/P EST MOD 30 MIN: CPT | Performed by: FAMILY MEDICINE

## 2019-05-15 RX ORDER — CYCLOBENZAPRINE HCL 5 MG
5 TABLET ORAL 3 TIMES DAILY PRN
Qty: 40 TABLET | Refills: 1 | Status: SHIPPED | OUTPATIENT
Start: 2019-05-15 | End: 2019-08-04

## 2019-05-15 RX ORDER — TRAMADOL HYDROCHLORIDE 50 MG/1
50 TABLET ORAL EVERY 6 HOURS PRN
Qty: 120 TABLET | Refills: 2 | Status: SHIPPED | OUTPATIENT
Start: 2019-05-15 | End: 2019-08-02

## 2019-05-15 ASSESSMENT — MIFFLIN-ST. JEOR: SCORE: 1479.71

## 2019-05-15 NOTE — PROGRESS NOTES
SUBJECTIVE:   Diana Salvador is a 54 year old female who presents to clinic today for the following   health issues:    Back Pain       Duration: x 7 years, worse in the last few months with this radiation down the leg.        Specific cause: none    Description:   Location of pain: low back right. Patient states when bends over she can hear a cracking noise.  Character of pain: sharp and constant  Pain radiation:radiates into the right buttocks, radiates into the right leg and radiates into the right foot  New numbness or weakness in legs, not attributed to pain:  YES- weakness.    Intensity: Currently 3/10, walking in to the room 8/10    History:   Pain interferes with job: YES- sometimes  History of back problems: previous degenerative joint disease of the lumbar spine and scoliosis.  Any previous MRI or X-rays: Yes--at [facility].  Date 3/2019  Sees a specialist for back pain:  Yes, did once for injection but did not have comprehensive appt.  Therapies tried without relief: Back injection 3/2019    Alleviating factors:   Improved by: home Physical therapy nightly, stretches, Flexeril (at night), and tramadol (able to use at work, no sedation and this is enough to keep functional at work), celebrex 100mg BID without much improvement noticed and IBP if needed    Precipitating factors:  Worsened by: Walking and getting out of bed, anything too long.        Accompanying Signs & Symptoms:  Risk of Fracture:  None  Risk of Cauda Equina:  None  Risk of Infection:  None  Risk of Cancer:  None  Risk of Ankylosing Spondylitis:  Onset at age <35, male, AND morning back stiffness. no        Additional history: as documented    Reviewed  and updated as needed this visit by clinical staff         Reviewed and updated as needed this visit by Provider         BP Readings from Last 3 Encounters:   05/15/19 140/84   03/29/19 139/89   12/12/18 118/82    Wt Readings from Last 3 Encounters:   05/15/19 86 kg (189 lb 9.6  "oz)   12/12/18 89 kg (196 lb 3.2 oz)                    ROS:  Constitutional, HEENT, cardiovascular, pulmonary, gi and gu systems are negative, except as otherwise noted.    OBJECTIVE:     /84   Pulse 88   Temp 98.6  F (37  C) (Tympanic)   Resp 16   Ht 1.689 m (5' 6.5\")   Wt 86 kg (189 lb 9.6 oz)   SpO2 98%   BMI 30.14 kg/m    Body mass index is 30.14 kg/m .  GENERAL: healthy, alert and no distress  PSYCH: mentation appears normal, affect normal/bright and tearful    Diagnostic Test Results:  none     ASSESSMENT/PLAN:       Diana was seen today for back pain.    Diagnoses and all orders for this visit:    Acute right-sided low back pain with right-sided sciatica: facet joint arthritis discussed today.  She did benefit from the numbing part of the most recent injection only for one day of relief, so may benefit from RFA/MBB.  -discussed voltaren, trial that.  Discussed TENS, but patient reacts to adhesive.  -continue tramadol to keep functional at work.  -flexeril is not as helpful, but refill for those rare times when back pain keep up at night.  -     PAIN MANAGEMENT REFERRAL  -     diclofenac (VOLTAREN) 1 % topical gel; Place 2 g onto the skin 4 times daily  -     traMADol (ULTRAM) 50 MG tablet; Take 1 tablet (50 mg) by mouth every 6 hours as needed for severe pain  -     cyclobenzaprine (FLEXERIL) 5 MG tablet; Take 1 tablet (5 mg) by mouth 3 times daily as needed for muscle spasms        Patient Instructions   Try adding the voltaren gel, I sent this to your pharmacy.  Apply to back up to 4 times a day.    Referral to pain management to see Dr. Estela Ray for comprehensive visit. Next step might be Radiofrequency ablation (RFA) or MBB (medial branch block) or other medications.    If flexeril is not doing anything then don't continue this.          Quoc Chu MD  [facility] [place]      "

## 2019-05-15 NOTE — PATIENT INSTRUCTIONS
Try adding the voltaren gel, I sent this to your pharmacy.  Apply to back up to 4 times a day.    Referral to pain management to see Dr. Estela Ray for comprehensive visit. Next step might be Radiofrequency ablation (RFA) or MBB (medial branch block) or other medications.    If flexeril is not doing anything then don't continue this.

## 2019-05-16 ENCOUNTER — TELEPHONE (OUTPATIENT)
Dept: PALLIATIVE MEDICINE | Facility: CLINIC | Age: 54
End: 2019-05-16

## 2019-08-02 ENCOUNTER — OFFICE VISIT (OUTPATIENT)
Dept: FAMILY MEDICINE | Facility: CLINIC | Age: 54
End: 2019-08-02
Payer: OTHER GOVERNMENT

## 2019-08-02 VITALS
DIASTOLIC BLOOD PRESSURE: 88 MMHG | OXYGEN SATURATION: 98 % | BODY MASS INDEX: 28.25 KG/M2 | WEIGHT: 180 LBS | SYSTOLIC BLOOD PRESSURE: 132 MMHG | HEART RATE: 85 BPM | RESPIRATION RATE: 18 BRPM | TEMPERATURE: 98.9 F | HEIGHT: 67 IN

## 2019-08-02 DIAGNOSIS — M81.0 OSTEOPOROSIS, UNSPECIFIED OSTEOPOROSIS TYPE, UNSPECIFIED PATHOLOGICAL FRACTURE PRESENCE: ICD-10-CM

## 2019-08-02 DIAGNOSIS — Z23 NEED FOR VACCINATION: ICD-10-CM

## 2019-08-02 DIAGNOSIS — M62.81 MUSCLE WEAKNESS OF RIGHT UPPER EXTREMITY: ICD-10-CM

## 2019-08-02 DIAGNOSIS — M25.511 CHRONIC RIGHT SHOULDER PAIN: Primary | ICD-10-CM

## 2019-08-02 DIAGNOSIS — M54.41 ACUTE RIGHT-SIDED LOW BACK PAIN WITH RIGHT-SIDED SCIATICA: ICD-10-CM

## 2019-08-02 DIAGNOSIS — Z12.11 SPECIAL SCREENING FOR MALIGNANT NEOPLASMS, COLON: ICD-10-CM

## 2019-08-02 DIAGNOSIS — G89.29 CHRONIC RIGHT SHOULDER PAIN: Primary | ICD-10-CM

## 2019-08-02 DIAGNOSIS — G25.81 RESTLESS LEGS SYNDROME: ICD-10-CM

## 2019-08-02 PROCEDURE — 90714 TD VACC NO PRESV 7 YRS+ IM: CPT | Performed by: NURSE PRACTITIONER

## 2019-08-02 PROCEDURE — 99214 OFFICE O/P EST MOD 30 MIN: CPT | Mod: 25 | Performed by: NURSE PRACTITIONER

## 2019-08-02 PROCEDURE — 90471 IMMUNIZATION ADMIN: CPT | Performed by: NURSE PRACTITIONER

## 2019-08-02 RX ORDER — TRAMADOL HYDROCHLORIDE 50 MG/1
50 TABLET ORAL EVERY 6 HOURS PRN
Qty: 112 TABLET | Refills: 0 | Status: SHIPPED | OUTPATIENT
Start: 2019-08-07 | End: 2019-08-02

## 2019-08-02 RX ORDER — ALENDRONATE SODIUM 35 MG/1
35 TABLET ORAL
Qty: 4 TABLET | Refills: 5 | Status: SHIPPED | OUTPATIENT
Start: 2019-08-02 | End: 2020-04-15

## 2019-08-02 RX ORDER — ROPINIROLE 0.5 MG/1
0.5 TABLET, FILM COATED ORAL 2 TIMES DAILY PRN
Qty: 180 TABLET | Refills: 5 | Status: SHIPPED | OUTPATIENT
Start: 2019-08-02 | End: 2020-07-07

## 2019-08-02 RX ORDER — TRAMADOL HYDROCHLORIDE 50 MG/1
50 TABLET ORAL EVERY 6 HOURS PRN
Qty: 120 TABLET | Refills: 0 | Status: SHIPPED | OUTPATIENT
Start: 2019-08-07 | End: 2019-08-05

## 2019-08-02 ASSESSMENT — MIFFLIN-ST. JEOR: SCORE: 1436.16

## 2019-08-02 NOTE — PATIENT INSTRUCTIONS
Schedule shoulder MRI    The [place] legislature changed the law about how doctors and pharmacies prescribe and fill opiate pain medications. We were notified about this law change the end of June. Starting July 1, no prescription for an opiate or narcotic pain reliever may be initially dispensed more than 30 days after the date on which the prescription was issued.  This means if I see you on June 1, and I write for a prescription to be filled Aug 1, this will not be filled by pharmacy.    Here is an updated [facility] policy  1. At the current visit, we will write for a 28 day supply of the medicine.  2. We will write for a 2nd prescription for 30 day supply.  You HAVE to fill this 2nd prescription within 30 days of when it was written, or it is void  3. Return to clinic prior to next fill - you will need clinic visit every 2 months, instead of every 3 months.      The [place] State Legislature is the one who passed the law.  This law did not come from [facility], [facility], [facility] or any other medical organization.  We are required to follow the law.

## 2019-08-02 NOTE — PROGRESS NOTES
"Subjective     Diana Salvador is a 54 year old female who presents to clinic today for the following health issues:    Chief Complaint   Patient presents with     Musculoskeletal Problem     Refill Request     tramadol, requip, fosamax, voltaren gel        HPI   Musculoskeletal problem/pain      Duration: Since April    Description  Location: right shoulder     Intensity:  Moderate to severe     Accompanying signs and symptoms: radiation of pain down her arm to her hands and up her neck. R arm weakness, unable to raise her R arm above 45 degree due to pain and weakness     History  Previous similar problem: no   Previous evaluation:  Had surgery on her right shoulder 12 years ago     Precipitating or alleviating factors:  Trauma or overuse: NO   Aggravating factors include: overuse, lifting her arm up     Therapies tried and outcome: Celebrex, tramadol, voltaren gel       Reviewed and updated as needed this visit by Provider         Review of Systems   ROS COMP: Constitutional, HEENT, cardiovascular, pulmonary, gi and gu systems are negative, except as otherwise noted.      Objective    /88 (BP Location: Left arm, Patient Position: Sitting, Cuff Size: Adult Regular)   Pulse 85   Temp 98.9  F (37.2  C) (Tympanic)   Resp 18   Ht 1.689 m (5' 6.5\")   Wt 81.6 kg (180 lb)   SpO2 98%   BMI 28.62 kg/m    Body mass index is 28.62 kg/m .  Physical Exam   GENERAL: healthy, alert and no distress  MS: RIGHT SHOULDER  Inspection: no swelling, bruising, discoloration, or obvious deformity or asymmetry  Tender: anterior capsule, proximal humerus, supraspinatus , infraspinatus, upper trapezius muscle and rhomboids  Non-tender: SC joint, proximal-mid clavicle, mid-distal clavicle and AC joint  Range of Motion  Active:limited due to pain.  Passive: limited due to pain.  Strength: rotator cuff strength weakness  SKIN: no suspicious lesions or rashes  PSYCH: mentation appears normal, affect normal/bright    Diagnostic Test " Results:  Labs reviewed in Epic        Assessment & Plan     1. Chronic right shoulder pain  I did review [facility] medication monitoring program and there were no significant red flags.   -refill provided, follow up with PCP for future refills  - traMADol (ULTRAM) 50 MG tablet; Take 1 tablet (50 mg) by mouth every 6 hours as needed for severe pain Maximum 4 tablets per day  Dispense: 120 tablet; Refill: 0  - MR Shoulder Right w/o Contrast; Future    2. Muscle weakness of right upper extremity  -possible rotator cuff injury?  -MRI ordered, follow up with ortho  -states pain is well controlled with current medications     3. Need for vaccination    - TD PRSERV FREE >=7 YRS ADS IM [53473]  - 1st  Administration  [[id number]]    4. Special screening for malignant neoplasms, colon  -due for colonoscopy, this is ordered  - GASTROENTEROLOGY ADULT REF PROCEDURE ONLY    5. Acute right-sided low back pain with right-sided sciatica  - traMADol (ULTRAM) 50 MG tablet; Take 1 tablet (50 mg) by mouth every 6 hours as needed for severe pain Maximum 4 tablets per day  Dispense: 120 tablet; Refill: 0  - diclofenac (VOLTAREN) 1 % topical gel; Place 2 g onto the skin 4 times daily  Dispense: 100 g; Refill: 1    6. Osteoporosis, unspecified osteoporosis type, unspecified pathological fracture presence    - alendronate (FOSAMAX) 35 MG tablet; Take 1 tablet (35 mg) by mouth every 7 days  Dispense: 4 tablet; Refill: 5    7. Restless legs syndrome    - rOPINIRole (REQUIP) 0.5 MG tablet; Take 1 tablet (0.5 mg) by mouth 2 times daily as needed (restless legs)  Dispense: 180 tablet; Refill: 5    Work on weight loss  Regular exercise  See Patient Instructions    Return in about 2 months (around 10/2/2019), or with primary care provider, for Chronic Pain.    ROSALINDA Chakraborty [facility] [place]

## 2019-08-05 ENCOUNTER — APPOINTMENT (OUTPATIENT)
Dept: CT IMAGING | Facility: CLINIC | Age: 54
End: 2019-08-05
Attending: STUDENT IN AN ORGANIZED HEALTH CARE EDUCATION/TRAINING PROGRAM
Payer: OTHER GOVERNMENT

## 2019-08-05 ENCOUNTER — HOSPITAL ENCOUNTER (EMERGENCY)
Facility: CLINIC | Age: 54
Discharge: HOME | End: 2019-08-05
Attending: STUDENT IN AN ORGANIZED HEALTH CARE EDUCATION/TRAINING PROGRAM | Admitting: STUDENT IN AN ORGANIZED HEALTH CARE EDUCATION/TRAINING PROGRAM
Payer: OTHER GOVERNMENT

## 2019-08-05 VITALS
RESPIRATION RATE: 18 BRPM | HEART RATE: 71 BPM | WEIGHT: 180 LBS | BODY MASS INDEX: 27.28 KG/M2 | DIASTOLIC BLOOD PRESSURE: 89 MMHG | TEMPERATURE: 99 F | SYSTOLIC BLOOD PRESSURE: 127 MMHG | OXYGEN SATURATION: 97 % | HEIGHT: 68 IN

## 2019-08-05 DIAGNOSIS — R10.13 ABDOMINAL PAIN, EPIGASTRIC: ICD-10-CM

## 2019-08-05 LAB
ALBUMIN SERPL-MCNC: 3.8 G/DL (ref 3.4–5)
ALBUMIN UR-MCNC: NEGATIVE MG/DL
ALP SERPL-CCNC: 93 U/L (ref 40–150)
ALT SERPL W P-5'-P-CCNC: 14 U/L (ref 0–50)
ANION GAP SERPL CALCULATED.3IONS-SCNC: 4 MMOL/L (ref 3–14)
APPEARANCE UR: CLEAR
AST SERPL W P-5'-P-CCNC: 12 U/L (ref 0–45)
BASOPHILS # BLD AUTO: 0 10E9/L (ref 0–0.2)
BASOPHILS NFR BLD AUTO: 0.3 %
BILIRUB SERPL-MCNC: 0.4 MG/DL (ref 0.2–1.3)
BILIRUB UR QL STRIP: NEGATIVE
BUN SERPL-MCNC: 7 MG/DL (ref 7–30)
CALCIUM SERPL-MCNC: 9.4 MG/DL (ref 8.5–10.1)
CHLORIDE SERPL-SCNC: 104 MMOL/L (ref 94–109)
CO2 SERPL-SCNC: 33 MMOL/L (ref 20–32)
COLOR UR AUTO: NORMAL
CREAT SERPL-MCNC: 0.7 MG/DL (ref 0.52–1.04)
DIFFERENTIAL METHOD BLD: ABNORMAL
EOSINOPHIL # BLD AUTO: 0.1 10E9/L (ref 0–0.7)
EOSINOPHIL NFR BLD AUTO: 0.9 %
ERYTHROCYTE [DISTWIDTH] IN BLOOD BY AUTOMATED COUNT: 13.1 % (ref 10–15)
GFR SERPL CREATININE-BSD FRML MDRD: >90 ML/MIN/{1.73_M2}
GLUCOSE SERPL-MCNC: 98 MG/DL (ref 70–99)
GLUCOSE UR STRIP-MCNC: NEGATIVE MG/DL
HCG SERPL QL: NEGATIVE
HCT VFR BLD AUTO: 45.8 % (ref 35–47)
HGB BLD-MCNC: 14.3 G/DL (ref 11.7–15.7)
HGB UR QL STRIP: NEGATIVE
IMM GRANULOCYTES # BLD: 0 10E9/L (ref 0–0.4)
IMM GRANULOCYTES NFR BLD: 0.1 %
KETONES UR STRIP-MCNC: NEGATIVE MG/DL
LEUKOCYTE ESTERASE UR QL STRIP: NEGATIVE
LIPASE SERPL-CCNC: 64 U/L (ref 73–393)
LYMPHOCYTES # BLD AUTO: 2.7 10E9/L (ref 0.8–5.3)
LYMPHOCYTES NFR BLD AUTO: 31.1 %
MCH RBC QN AUTO: 28.9 PG (ref 26.5–33)
MCHC RBC AUTO-ENTMCNC: 31.2 G/DL (ref 31.5–36.5)
MCV RBC AUTO: 93 FL (ref 78–100)
MONOCYTES # BLD AUTO: 0.6 10E9/L (ref 0–1.3)
MONOCYTES NFR BLD AUTO: 6.3 %
NEUTROPHILS # BLD AUTO: 5.3 10E9/L (ref 1.6–8.3)
NEUTROPHILS NFR BLD AUTO: 61.3 %
NITRATE UR QL: NEGATIVE
NRBC # BLD AUTO: 0 10*3/UL
NRBC BLD AUTO-RTO: 0 /100
PH UR STRIP: 7 PH (ref 5–7)
PLATELET # BLD AUTO: 350 10E9/L (ref 150–450)
POTASSIUM SERPL-SCNC: 3.5 MMOL/L (ref 3.4–5.3)
PROT SERPL-MCNC: 7.7 G/DL (ref 6.8–8.8)
RBC # BLD AUTO: 4.95 10E12/L (ref 3.8–5.2)
SODIUM SERPL-SCNC: 141 MMOL/L (ref 133–144)
SOURCE: NORMAL
SP GR UR STRIP: 1 (ref 1–1.03)
UROBILINOGEN UR STRIP-MCNC: 0 MG/DL (ref 0–2)
WBC # BLD AUTO: 8.7 10E9/L (ref 4–11)

## 2019-08-05 PROCEDURE — 96375 TX/PRO/DX INJ NEW DRUG ADDON: CPT | Performed by: STUDENT IN AN ORGANIZED HEALTH CARE EDUCATION/TRAINING PROGRAM

## 2019-08-05 PROCEDURE — 84703 CHORIONIC GONADOTROPIN ASSAY: CPT | Performed by: STUDENT IN AN ORGANIZED HEALTH CARE EDUCATION/TRAINING PROGRAM

## 2019-08-05 PROCEDURE — 99285 EMERGENCY DEPT VISIT HI MDM: CPT | Mod: 25 | Performed by: STUDENT IN AN ORGANIZED HEALTH CARE EDUCATION/TRAINING PROGRAM

## 2019-08-05 PROCEDURE — 25800030 ZZH RX IP 258 OP 636: Performed by: STUDENT IN AN ORGANIZED HEALTH CARE EDUCATION/TRAINING PROGRAM

## 2019-08-05 PROCEDURE — 96361 HYDRATE IV INFUSION ADD-ON: CPT | Performed by: STUDENT IN AN ORGANIZED HEALTH CARE EDUCATION/TRAINING PROGRAM

## 2019-08-05 PROCEDURE — 96374 THER/PROPH/DIAG INJ IV PUSH: CPT | Mod: 59 | Performed by: STUDENT IN AN ORGANIZED HEALTH CARE EDUCATION/TRAINING PROGRAM

## 2019-08-05 PROCEDURE — 81003 URINALYSIS AUTO W/O SCOPE: CPT | Performed by: STUDENT IN AN ORGANIZED HEALTH CARE EDUCATION/TRAINING PROGRAM

## 2019-08-05 PROCEDURE — 25000132 ZZH RX MED GY IP 250 OP 250 PS 637: Performed by: STUDENT IN AN ORGANIZED HEALTH CARE EDUCATION/TRAINING PROGRAM

## 2019-08-05 PROCEDURE — 85025 COMPLETE CBC W/AUTO DIFF WBC: CPT | Performed by: STUDENT IN AN ORGANIZED HEALTH CARE EDUCATION/TRAINING PROGRAM

## 2019-08-05 PROCEDURE — 25000125 ZZHC RX 250: Performed by: STUDENT IN AN ORGANIZED HEALTH CARE EDUCATION/TRAINING PROGRAM

## 2019-08-05 PROCEDURE — 74177 CT ABD & PELVIS W/CONTRAST: CPT

## 2019-08-05 PROCEDURE — 25000128 H RX IP 250 OP 636: Performed by: STUDENT IN AN ORGANIZED HEALTH CARE EDUCATION/TRAINING PROGRAM

## 2019-08-05 PROCEDURE — 80053 COMPREHEN METABOLIC PANEL: CPT | Performed by: STUDENT IN AN ORGANIZED HEALTH CARE EDUCATION/TRAINING PROGRAM

## 2019-08-05 PROCEDURE — 83690 ASSAY OF LIPASE: CPT | Performed by: STUDENT IN AN ORGANIZED HEALTH CARE EDUCATION/TRAINING PROGRAM

## 2019-08-05 PROCEDURE — 93010 ELECTROCARDIOGRAM REPORT: CPT | Mod: Z6 | Performed by: STUDENT IN AN ORGANIZED HEALTH CARE EDUCATION/TRAINING PROGRAM

## 2019-08-05 PROCEDURE — 93005 ELECTROCARDIOGRAM TRACING: CPT | Performed by: STUDENT IN AN ORGANIZED HEALTH CARE EDUCATION/TRAINING PROGRAM

## 2019-08-05 RX ORDER — IOPAMIDOL 755 MG/ML
89 INJECTION, SOLUTION INTRAVASCULAR ONCE
Status: COMPLETED | OUTPATIENT
Start: 2019-08-05 | End: 2019-08-05

## 2019-08-05 RX ORDER — SUCRALFATE 1 G/1
1 TABLET ORAL 4 TIMES DAILY PRN
Qty: 30 TABLET | Refills: 0 | Status: SHIPPED | OUTPATIENT
Start: 2019-08-05 | End: 2019-11-13

## 2019-08-05 RX ORDER — TRAMADOL HYDROCHLORIDE 50 MG/1
50 TABLET ORAL EVERY 6 HOURS PRN
Qty: 10 TABLET | Refills: 0 | Status: SHIPPED | OUTPATIENT
Start: 2019-08-05 | End: 2019-11-13

## 2019-08-05 RX ORDER — KETOROLAC TROMETHAMINE 15 MG/ML
10 INJECTION, SOLUTION INTRAMUSCULAR; INTRAVENOUS ONCE
Status: COMPLETED | OUTPATIENT
Start: 2019-08-05 | End: 2019-08-05

## 2019-08-05 RX ORDER — ONDANSETRON 2 MG/ML
4 INJECTION INTRAMUSCULAR; INTRAVENOUS ONCE
Status: COMPLETED | OUTPATIENT
Start: 2019-08-05 | End: 2019-08-05

## 2019-08-05 RX ADMIN — ONDANSETRON 4 MG: 2 INJECTION INTRAMUSCULAR; INTRAVENOUS at 20:12

## 2019-08-05 RX ADMIN — LIDOCAINE HYDROCHLORIDE 30 ML: 20 SOLUTION ORAL; TOPICAL at 23:28

## 2019-08-05 RX ADMIN — KETOROLAC TROMETHAMINE 10 MG: 15 INJECTION, SOLUTION INTRAMUSCULAR; INTRAVENOUS at 20:08

## 2019-08-05 RX ADMIN — SODIUM CHLORIDE 62 ML: 9 INJECTION, SOLUTION INTRAVENOUS at 21:41

## 2019-08-05 RX ADMIN — IOPAMIDOL 89 ML: 755 INJECTION, SOLUTION INTRAVENOUS at 21:41

## 2019-08-05 RX ADMIN — SODIUM CHLORIDE, POTASSIUM CHLORIDE, SODIUM LACTATE AND CALCIUM CHLORIDE 1000 ML: 600; 310; 30; 20 INJECTION, SOLUTION INTRAVENOUS at 20:12

## 2019-08-05 ASSESSMENT — MIFFLIN-ST. JEOR: SCORE: 1459.97

## 2019-08-05 NOTE — ED AVS SNAPSHOT
[facility]  [address] [address]  Phone:  [phone]  Fax:  [phone]                                    Diana Salvador   MRN: [id number]    Department:  [facility]   Date of Visit:  8/5/2019           After Visit Summary Signature Page    I have received my discharge instructions, and my questions have been answered. I have discussed any challenges I see with this plan with the nurse or doctor.    ..........................................................................................................................................  Patient/Patient Representative Signature      ..........................................................................................................................................  Patient Representative Print Name and Relationship to Patient    ..................................................               ................................................  Date                                   Time    ..........................................................................................................................................  Reviewed by Signature/Title    ...................................................              ..............................................  Date                                               Time          22EPIC Rev 08/18

## 2019-08-06 NOTE — ED NOTES
Pt reports intermittent upper mid abdominal pain.  Hx of honey and gastric bypass.  Pt denies urinary or bowel difficulty.   Pt reports pain radiates through to back and is concerned about heart attack as pt lives at home alone.  Provider in room with pt at this time.  Pt does not feel like this is heartburn.   No relief with tums.

## 2019-08-06 NOTE — ED NOTES
Medication  Helped relieve pain in epigastric pain   Patient discharged home with medications and instructions

## 2019-08-06 NOTE — ED PROVIDER NOTES
"  History     Chief Complaint   Patient presents with     Abdominal Pain     epigastric 3 days/ radiates straight through to the back     HPI  Diana Salvador is a 54 year old female with past medical history which includes osteopenia and past surgical history including cholecystectomy and gastric bypass who presents for evaluation of 3 days of intermittent abdominal pain.  Patient describes severe achy epigastric pains radiating to her central back this past few days, but is unable to correlate with any particular activity or food ingestion as a trigger for her symptoms.  The duration of pain varies from minutes to hour and is associated with nausea and vomiting.  She denies fever, chills, chest pain, shortness of breath, lower abdominal pain, constipation or diarrhea.  Symptoms differ from history of \"acid reflux\" and she has attempted to take Tums without relief.    Allergies:  No Known Allergies    Problem List:    Patient Active Problem List    Diagnosis Date Noted     Osteopenia of multiple sites 2018     Priority: Medium     on fosamax 35mg daily since        Restless legs syndrome 2018     Priority: Medium     Lower extremity edema 2018     Priority: Medium     Sciatica of right side 2015     Priority: Medium     Overview:   Chronic. Takes Norco as needed.           Past Medical History:    No past medical history on file.    Past Surgical History:    Past Surgical History:   Procedure Laterality Date     ARTHROSCOPY SHOULDER ROTATOR CUFF REPAIR Right      ARTHROSCOPY SHOULDER ROTATOR CUFF REPAIR Left       SECTION       CHOLECYSTECTOMY       DAVINCI BYPASS GASTRIC ROBERT-EN-Y       HYSTERECTOMY SUPRACERVICAL       right ulnar nerve repositioning surgery         Family History:    Family History   Problem Relation Age of Onset     Heart Failure Mother        Social History:  Marital Status:   [5]  Social History     Tobacco Use     Smoking status: Former Smoker     " "Smokeless tobacco: Never Used   Substance Use Topics     Alcohol use: Yes     Comment: occ     Drug use: No        Medications:      alendronate (FOSAMAX) 35 MG tablet   cyclobenzaprine (FLEXERIL) 5 MG tablet   diclofenac (VOLTAREN) 1 % topical gel   omeprazole (PRILOSEC) 20 MG DR capsule   rOPINIRole (REQUIP) 0.5 MG tablet   sucralfate (CARAFATE) 1 GM tablet   traMADol (ULTRAM) 50 MG tablet   celecoxib (CELEBREX) 100 MG capsule   furosemide (LASIX) 20 MG tablet   Vitamin D, Cholecalciferol, 1000 units CAPS         Review of Systems  Constitutional:  Negative for fever or chills.  Cardiovascular:  Negative for chest pain.  Respiratory:  Negative for cough or shortness of breath.  Gastrointestinal: Positive for intermittent epigastric abdominal pain radiating to the central back.  Positive for nausea vomiting.  Negative for diarrhea or blood with bowel movements.  Genitourinary:  Negative for dysuria.  Musculoskeletal: Positive for central back pain radiating from abdomen.  Negative for recent fall or injury.    All others reviewed and are negative.      Physical Exam   BP: (!) 164/103  Pulse: 75  Heart Rate: 68  Temp: 99  F (37.2  C)  Resp: 18  Height: 172.7 cm (5' 8\")  Weight: 81.6 kg (180 lb)  SpO2: 100 %      Physical Exam  Constitutional:  Well developed, well nourished.  Appears nontoxic uncomfortable resting on the gurney.  HENT:  Normocephalic and atraumatic.  Symmetric in appearance.  Eyes:  Conjunctivae are normal.  Neck:  Neck supple.  Cardiovascular:  No cyanosis.  RRR.  No audible murmurs noted.   Respiratory:  Effort normal without sign of respiratory distress.  CTAB without diminished regions.   Gastrointestinal:  Soft nondistended abdomen.  Epigastric tenderness with guarding.  No rigidity or rebound tenderness.  Negative Chen's sign.  Negative McBurney's point.  No palpable pulsatile mass.   Musculoskeletal:  Moves extremities spontaneously.  Neurological:  Patient is alert.  Skin:  Skin is warm " and dry.  Psychiatric:  Normal mood and affect.      ED Course        Procedures                 EKG Interpretation:      Interpreted by: Scooby Rushing  Time reviewed: Upon completion    Symptoms at time of EKG: Asymptomatic   Rhythm: Sinus  Rate: Normal  Axis: Normal    Conduction: None atypical   ST Segments/ T Waves: No pathologic ST-elevations or T-wave abnormalities.  Q Waves: None  Comparison to prior: None available    Clinical Impression: No sign of ischemia         Critical Care time:  none               Results for orders placed or performed during the hospital encounter of 08/05/19 (from the past 24 hour(s))   CBC with platelets differential   Result Value Ref Range    WBC 8.7 4.0 - 11.0 10e9/L    RBC Count 4.95 3.8 - 5.2 10e12/L    Hemoglobin 14.3 11.7 - 15.7 g/dL    Hematocrit 45.8 35.0 - 47.0 %    MCV 93 78 - 100 fl    MCH 28.9 26.5 - 33.0 pg    MCHC 31.2 (L) 31.5 - 36.5 g/dL    RDW 13.1 10.0 - 15.0 %    Platelet Count 350 150 - 450 10e9/L    Diff Method Automated Method     % Neutrophils 61.3 %    % Lymphocytes 31.1 %    % Monocytes 6.3 %    % Eosinophils 0.9 %    % Basophils 0.3 %    % Immature Granulocytes 0.1 %    Nucleated RBCs 0 0 /100    Absolute Neutrophil 5.3 1.6 - 8.3 10e9/L    Absolute Lymphocytes 2.7 0.8 - 5.3 10e9/L    Absolute Monocytes 0.6 0.0 - 1.3 10e9/L    Absolute Eosinophils 0.1 0.0 - 0.7 10e9/L    Absolute Basophils 0.0 0.0 - 0.2 10e9/L    Abs Immature Granulocytes 0.0 0 - 0.4 10e9/L    Absolute Nucleated RBC 0.0    Comprehensive metabolic panel   Result Value Ref Range    Sodium 141 133 - 144 mmol/L    Potassium 3.5 3.4 - 5.3 mmol/L    Chloride 104 94 - 109 mmol/L    Carbon Dioxide 33 (H) 20 - 32 mmol/L    Anion Gap 4 3 - 14 mmol/L    Glucose 98 70 - 99 mg/dL    Urea Nitrogen 7 7 - 30 mg/dL    Creatinine 0.70 0.52 - 1.04 mg/dL    GFR Estimate >90 >60 mL/min/[1.73_m2]    GFR Estimate If Black >90 >60 mL/min/[1.73_m2]    Calcium 9.4 8.5 - 10.1 mg/dL    Bilirubin Total 0.4 0.2 - 1.3  mg/dL    Albumin 3.8 3.4 - 5.0 g/dL    Protein Total 7.7 6.8 - 8.8 g/dL    Alkaline Phosphatase 93 40 - 150 U/L    ALT 14 0 - 50 U/L    AST 12 0 - 45 U/L   Lipase   Result Value Ref Range    Lipase 64 (L) 73 - 393 U/L   HCG qualitative pregnancy (blood)   Result Value Ref Range    HCG Qualitative Serum Negative NEG^Negative   UA reflex to Microscopic   Result Value Ref Range    Color Urine Straw     Appearance Urine Clear     Glucose Urine Negative NEG^Negative mg/dL    Bilirubin Urine Negative NEG^Negative    Ketones Urine Negative NEG^Negative mg/dL    Specific Gravity Urine 1.005 1.003 - 1.035    Blood Urine Negative NEG^Negative    pH Urine 7.0 5.0 - 7.0 pH    Protein Albumin Urine Negative NEG^Negative mg/dL    Urobilinogen mg/dL 0.0 0.0 - 2.0 mg/dL    Nitrite Urine Negative NEG^Negative    Leukocyte Esterase Urine Negative NEG^Negative    Source Midstream Urine    CT Abdomen Pelvis w Contrast    Narrative    CT ABDOMEN PELVIS W CONTRAST 8/5/2019 9:54 PM     HISTORY: Epigastric abdominal pain radiating to back, PSH  cholecystectomy and gastric bypass    CONTRAST DOSE:  89mL Isovue-370    Radiation dose for this scan was reduced using automated exposure  control, adjustment of the mA and/or kV according to patient size, or  iterative reconstruction technique.    FINDINGS:  Cholecystectomy surgical clips and gastric sutures are  noted. The liver, spleen, adrenal glands, kidneys, and pancreas appear  within normal limits. There is no evidence of bowel obstruction. No  pericolonic inflammatory stranding. No free peritoneal fluid or air.  Pelvic contents appear within normal limits.      Impression    IMPRESSION: No CT evidence of an acute inflammatory process in the  abdomen or pelvis.    MIGEL ROY MD       Medications   lactated ringers BOLUS 1,000 mL (0 mLs Intravenous Stopped 8/5/19 2123)   ondansetron (ZOFRAN) injection 4 mg (4 mg Intravenous Given 8/5/19 2012)   ketorolac (TORADOL) injection 10 mg (10 mg  Intravenous Given 8/5/19 2008)   iopamidol (ISOVUE-370) solution 89 mL (89 mLs Intravenous Given 8/5/19 2141)   sodium chloride 0.9 % bag 500mL for CT scan flush use (62 mLs As instructed Given 8/5/19 2141)   iohexol (OMNIPAQUE) solution 50 mL (50 mLs Oral Given 8/5/19 2042)   lidocaine HCl (XYLOCAINE) 2 % 15 mL, alum & mag hydroxide-simethicone (MYLANTA ES/MAALOX  ES) 15 mL GI Cocktail (30 mLs Oral Given 8/5/19 2328)       Assessments & Plan (with Medical Decision Making)   Diana Salvador is a 54 year old female who presents to the department for evaluation of intermittent epigastric abdominal pain over the past few days.  Atypical presentation for etiology such as volvulus, SBO, mesenteric ischemia, pyelonephritis, urolithiasis, and inflammatory bowel disease.  EKG morphology without ischemia and troponin within reference range.  Afebrile and CBC without leukocytosis.  Lipase and hepatic enzymes also within reference range, low suspicion for common bile duct obstruction.  CT scan of abdomen/pelvis with oral and IV contrast was ordered due to her surgical history.  Imaging was independently reviewed and without sign of pancreatitis or colitis, radiologist reads no CT evidence of an acute inflammatory process.  Due to the intermittent nature of symptoms, could potentially be functional etiology or PUD.  Recommend PPI and Carafate while planning to schedule follow-up with general surgery clinic for reevaluation and consideration for further diagnostic testing such as endoscopy.          Disclaimer:  This note consists of symbols derived from keyboarding, dictation, and/or voice recognition software.  As a result, there may be errors in the script that have gone undetected.  Please consider this when interpreting information found in the chart.        I have reviewed the nursing notes.    I have reviewed the findings, diagnosis, plan and need for follow up with the patient.         Medication List      Started     omeprazole 20 MG DR capsule  Commonly known as:  priLOSEC  20 mg, Oral, DAILY     sucralfate 1 GM tablet  Commonly known as:  CARAFATE  1 g, Oral, 4 TIMES DAILY PRN        Modified    traMADol 50 MG tablet  Commonly known as:  ULTRAM  50 mg, Oral, EVERY 6 HOURS PRN  What changed:  additional instructions            Final diagnoses:   Abdominal pain, epigastric       8/5/2019   [facility] EM[facility]     Scooby Rushing DO  08/06/19 0048

## 2019-09-30 DIAGNOSIS — M54.41 ACUTE RIGHT-SIDED LOW BACK PAIN WITH RIGHT-SIDED SCIATICA: Primary | ICD-10-CM

## 2019-10-01 NOTE — TELEPHONE ENCOUNTER
Requested Prescriptions   Pending Prescriptions Disp Refills     traMADol (ULTRAM) 50 MG tablet [Pharmacy Med Name: TRAMADOL 50MG TABLETS] 120 tablet 0     Sig: TAKE 1 TABLET BY MOUTH EVERY 6 HOURS AS NEEDED FOR SEVERE PAIN. MAX OF 4 TABLETS PER DAY   Last Written Prescription Date:  8/8/19 ended  Last Fill Quantity: 10 tab,  # refills: 0   Last office visit: 8/2/2019 with prescribing provider:  MIRTA Laura   Future Office Visit:        There is no refill protocol information for this order

## 2019-10-02 RX ORDER — TRAMADOL HYDROCHLORIDE 50 MG/1
TABLET ORAL
Qty: 120 TABLET | Refills: 0 | OUTPATIENT
Start: 2019-10-02

## 2019-10-02 RX ORDER — TRAMADOL HYDROCHLORIDE 50 MG/1
50 TABLET ORAL EVERY 6 HOURS PRN
Qty: 10 TABLET | Refills: 0 | OUTPATIENT
Start: 2019-10-02

## 2019-10-02 NOTE — TELEPHONE ENCOUNTER
Message left for patient to check the pharmacy.  Needs appt for refill.  .Advised to call with any questions or concerns.  Juli HAHN RN

## 2019-10-02 NOTE — TELEPHONE ENCOUNTER
Routing refill request to provider for review/approval because:  Drug not on the [facility] refill protocol     Last prescribed by Marcia Laura for chronic right shoulder pain and acute right-sided low back pain with sciatica.    Destiny Kim RN

## 2019-10-02 NOTE — TELEPHONE ENCOUNTER
Dr. Chu managing Tramadol. I saw patient once for refill,  follow up with PCP for future refills.      ROSALINDA Chakraborty CNP

## 2019-11-04 NOTE — TELEPHONE ENCOUNTER
Requested Prescriptions   Pending Prescriptions Disp Refills     traMADol (ULTRAM) 50 MG tablet [Pharmacy Med Name: TRAMADOL 50MG TABLETS] 112 tablet 0     Sig: TAKE 1 TABLET BY MOUTH EVERY 6 HOURS AS NEEDED FOR SEVERE PAIN. MAX 4 TABLETS DAILY   Last Written Prescription Date:  8/8/19 ended  Last Fill Quantity: 10 tab,  # refills: 0   Last office visit: 8/2/2019 with prescribing provider:  MIRTA Laura   Future Office Visit:        There is no refill protocol information for this order

## 2019-11-05 RX ORDER — TRAMADOL HYDROCHLORIDE 50 MG/1
TABLET ORAL
Qty: 112 TABLET | Refills: 0 | OUTPATIENT
Start: 2019-11-05

## 2019-11-05 NOTE — TELEPHONE ENCOUNTER
Routing refill request to provider for review/approval because:  Drug not on the [facility] refill protocol   Last sent by   Scooby Rushing DO [facility]     LOV 08/02/19 with Marcia Laura and 05/15/19 with Dr. Chu.     VERO LuxN, RN

## 2019-11-05 NOTE — TELEPHONE ENCOUNTER
Patient is contacted and told of the need for an office visit for refill of controlled substance Tramadol. Juli HAHN RN

## 2019-11-06 ENCOUNTER — TELEPHONE (OUTPATIENT)
Dept: FAMILY MEDICINE | Facility: CLINIC | Age: 54
End: 2019-11-06

## 2019-11-06 NOTE — TELEPHONE ENCOUNTER
Panel Management Review      Patient has the following on her problem list: None      Composite cancer screening  Chart review shows that this patient is due/due soon for the following Pap Smear, Colonoscopy and Fecal Colorectal (FIT)  Summary:    Patient is due/failing the following:   COLONOSCOPY, FIT, PAP and PHYSICAL    Action needed:   Patient needs office visit for Physical with pap smear. and patient needs to schedule a colonoscopy.    Type of outreach:    Sent letter.    Questions for provider review:    None                                                                                                                                    Stacy Woods MA

## 2019-11-06 NOTE — LETTER
[facility]  [address]  [facility] [place] [place] [address]  Phone: [phone]  November 6, 2019      Diana Salvador  [address] [place] [id number]      Dear Diana,    We care about your health and have reviewed your health plan including your medical conditions, medications, and lab results.  Based on this review, it is recommended that you follow up regarding the following health topic(s):  -Colon Cancer Screening  -Cervical Cancer Screening  -Wellness (Physical) Visit     We recommend you take the following action(s):  -schedule a COLONOSCOPY to look for colon cancer (due every 10 years or 5 years in higher risk situations.)  Colonoscopies can prevent 90-95% of colon cancer deaths.  Problem lesions can be removed before they ever become cancer.  If you do not wish to do a colonoscopy or cannot afford to do one at this time, there is another option called a Fecal Immunochemical Occult Blood Test (FIT) a take home stool sample kit.  It does not replace the colonoscopy for colorectal cancer screening, but it can detect hidden bleeding in the lower colon.  It does need to be repeated every year and if a positive result is obtained, you would be referred for a colonoscopy.  If you have completed either one of these tests at another facility, please have the records sent to our clinic for our records.  -schedule a PAP SMEAR EXAM which is due.  Please disregard this reminder if you have had this exam elsewhere within the last year.  It would be helpful for us to have a copy of your recent pap smear report to update your records.     Please call us at the [facility] [phone] (or use [email or web address]) to address the above recommendations.     Thank you for trusting [facility] and we appreciate the opportunity to serve you.  We look forward to supporting your healthcare needs in the future.    Healthy Regards,    Your Health Care Team  [facility]

## 2019-11-13 ENCOUNTER — OFFICE VISIT (OUTPATIENT)
Dept: FAMILY MEDICINE | Facility: CLINIC | Age: 54
End: 2019-11-13
Payer: OTHER GOVERNMENT

## 2019-11-13 VITALS
DIASTOLIC BLOOD PRESSURE: 70 MMHG | SYSTOLIC BLOOD PRESSURE: 122 MMHG | TEMPERATURE: 98.5 F | OXYGEN SATURATION: 99 % | WEIGHT: 191 LBS | BODY MASS INDEX: 29.04 KG/M2 | HEART RATE: 80 BPM | RESPIRATION RATE: 16 BRPM

## 2019-11-13 DIAGNOSIS — M54.41 ACUTE RIGHT-SIDED LOW BACK PAIN WITH RIGHT-SIDED SCIATICA: Primary | ICD-10-CM

## 2019-11-13 DIAGNOSIS — F11.90 CHRONIC, CONTINUOUS USE OF OPIOIDS: ICD-10-CM

## 2019-11-13 PROCEDURE — 99213 OFFICE O/P EST LOW 20 MIN: CPT | Performed by: FAMILY MEDICINE

## 2019-11-13 RX ORDER — CYCLOBENZAPRINE HCL 5 MG
5 TABLET ORAL 2 TIMES DAILY PRN
Qty: 40 TABLET | Refills: 3 | Status: SHIPPED | OUTPATIENT
Start: 2019-11-13 | End: 2020-03-18

## 2019-11-13 RX ORDER — TRAMADOL HYDROCHLORIDE 50 MG/1
50 TABLET ORAL EVERY 6 HOURS PRN
Qty: 120 TABLET | Refills: 0 | Status: SHIPPED | OUTPATIENT
Start: 2019-12-11 | End: 2020-01-08

## 2019-11-13 RX ORDER — TRAMADOL HYDROCHLORIDE 50 MG/1
50 TABLET ORAL EVERY 6 HOURS PRN
Qty: 112 TABLET | Refills: 0 | Status: SHIPPED | OUTPATIENT
Start: 2019-11-13 | End: 2020-01-08

## 2019-11-13 NOTE — PATIENT INSTRUCTIONS
Tramadol fill today for 28 days #112  Fill the second prescription 12/11 or 12/12 for the 30 day prescription.    After December to again refer to pain clinic and plan MRI too.

## 2019-11-13 NOTE — LETTER
[facility]  11/13/19    Patient: Diana Salvador  YOB: 1965  Medical Record Number: [id number]                                                                  Opioid / Opioid Plus Controlled Substance Agreement    I understand that my care provider has prescribed an opioid (narcotic) controlled substance to help manage my condition(s). I am taking this medicine to help me function or work. I know this is strong medicine, and that it can cause serious side effects. Opioid medicine can be sedating, addicting and may cause a dependency on the drug. They can affect my ability to drive or think, and cause depression. They need to be taken exactly as prescribed. Combining opioids with certain medicines or chemicals (such as cocaine, sedatives and tranquilizers, sleeping pills, meth) can be dangerous or even fatal. Also, if I stop opioids suddenly, I may have severe withdrawal symptoms. Last, I understand that opioids do not work for all types of pain nor for all patients. If not helpful, I may be asked to stop them.        The risks, benefits, and side effects of these medicine(s) were explained to me. I agree that:    1. I will take part in other treatments as advised by my care team. This may be psychiatry or counseling, physical therapy, behavioral therapy, group treatment or a referral to a pain clinic. I will reduce or stop my medicine when my care team tells me to do so.  2. I will take my medicines as prescribed. I will not change the dose or schedule unless my care team tells me to. There will be no refills if I  run out early.   I may be contactedwithout warning and asked to complete a urine drug test or pill count at any time.   3. I will keep all my appointments, and understand this is part of the monitoring of opioids. My care team may require an office visit for EVERY opioid/controlled substance refill. If I miss appointments or don t follow instructions, my care team  may stop my medicine.  4. I will not ask other providers to prescribe controlled substances, and I will not accept controlled substances from other people. If I need another prescribed controlled substance for a new reason, I will tell my care team within 1 business day.  5. I will use one pharmacy to fill all of my controlled substance prescriptions, and it is up to me to make sure that I do not run out of my medicines on weekends or holidays. If my care team is willing to refill my opioid prescription without a visit, I must request refills only during office hours, refills may take up to 3 days to process, and it may take up to 5 to 7 days for my medicine to be mailed and ready at my pharmacy. Prescriptions will not be mailed anywhere except my pharmacy.        [id number]  Rev 12/18         Registration to scan to EHR                             Page 1 of 2               Controlled Substance Agreement Opioid        [facility]  11/13/19  Patient: Diana Salvador  YOB: 1965  Medical Record Number: [id number]                                                                  6. I am responsible for my prescriptions. If the medicine/prescription is lost or stolen, it will not be replaced. I also agree not to share controlled substance medicines with anyone.  7. I agree to not use ANY illegal or recreational drugs. This includes marijuana, cocaine, bath salts or other drugs. I agree not to use alcohol unless my care team says I may.          I agree to give urine samples whenever asked. If I don t give a urine sample, the care team may stop my medicine.    8. If I enroll in the [place] Medical Marijuana program, I will tell my care team. I will also sign an agreement to share my medical records with my care team.   9. I will bring in my list of medicines (or my medicine bottles) each time I come to the clinic.   10. I will tell my care team right away if I become pregnant or have a  new medical problem treated outside of my regular clinic.  11. I understand that this medicine can affect my thinking and judgment. It may be unsafe for me to drive, use machinery and do dangerous tasks. I will not do any of these things until I know how the medicine affects me. If my dose changes, I will wait to see how it affects me. I will contact my care team if I have concerns about medicine side effects.    I understand that if I do not follow any of the conditions above, my prescriptions or treatment may be stopped.      I agree that my provider, clinic care team, and pharmacy may work with any city, state or federal law enforcement agency that investigates the misuse, sale, or other diversion of my controlled medicine. I will allow my provider to discuss my care with or share a copy of this agreement with any other treating provider, pharmacy or emergency room where I receive care. I agree to give up (waive) any right of privacy or confidentiality with respect to these consents.     I have read this agreement and have asked questions about anything I did not understand.      ________________________________________________________________________  Patient signature - Date/Time -  Diana Salvador                                      ________________________________________________________________________  Witness signature                                                            ________________________________________________________________________  Provider signature - Quoc Chu MD      [id number]  Rev 12/18         Registration to scan to EHR                         Page 2 of 2                   Controlled Substance Agreement Opioid           Page 1 of 2  Opioid Pain Medicines (also known as Narcotics)  What You Need to Know    What are opioids?   Opioids are pain medicines that must be prescribed by a doctor.  They are also known as narcotics.    Examples are:     morphine (MS Contin,  Chasidy)    oxycodone (Oxycontin)    oxycodone and acetaminophen (Percocet)    hydrocodone and acetaminophen (Vicodin, Norco)     fentanyl patch (Duragesic)     hydromorphone (Dilaudid)     methadone     What do opioids do well?   Opioids are best for short-term pain after a surgery or injury. They also work well for cancer pain. Unlike other pain medicines, they do not cause liver or kidney failure or ulcers. They may help some people with long-lasting (chronic) pain.     What do opioids NOT do well?   Opioids never get rid of pain entirely, and they do not work well for most patients with chronic pain. Opioids do not reduce swelling, one of the causes of pain. They also don t work well for nerve pain.                           For informational purposes only.  Not to replace the advice of your care provider.  Copyright [facility]. All right reserved. [employer] [id number] 02/18.      Page 2 of 2    Risks and side effects   Talk to your doctor before you start or decide to keep taking one of these medicines. Side effects include:    Lowering your breathing rate enough to cause death    Overdose, including death, especially if taking higher than prescribed doses    Long-term opioid use    Worse depression symptoms; less pleasure in things you usually enjoy    Feeling tired or sluggish    Slower thoughts or cloudy thinking    Being more sensitive to pain over time; pain is harder to control    Trouble sleeping or restless sleep    Changes in hormone levels (for example, less testosterone)    Changes in sex drive or ability to have sex    Constipation    Unsafe driving    Itching and sweating    Feeling dizzy    Nausea, vomiting and dry mouth    What else should I know about opioids?  When someone takes opioids for too long or too often, they become dependent. This means that if you stop or reduce the medicine too quickly, you will have withdrawal symptoms.    Dependence is not the same as addiction.  Addiction is when people keep using a substance that harms their body, their mind or their relations with others. If you have a history of drug or alcohol abuse, taking opioids can cause a relapse.    Over time, opioids don t work as well. Most people will need higher and higher doses. The higher the dose, the more serious the side effects. We don t know the long-term effects of opioids.      Prescribed opioids aren't the best way to manage chronic pain    Other ways to manage pain include:      Ibuprofen or acetaminophen.  You should always try this first.      Treat health problems that may be causing pain.      acupuncture or massage, deep breathing, meditation, visual imagery, aromatherapy.      Use heat or ice at the pain site      Physical therapy and exercise      Stop smoking      See a counselor or therapist                                                  People who have used opioids for a long time may have a lower quality of life, worse depression, higher levels of pain and more visits to doctors.    Never share your opioids with others. Be sure to store opioids in a secure place, locked if possible.Young children can easily swallow them and overdose.     You can overdose on opioids.  Signs of overdose include decrease or loss of consciousness, slowed breathing, trouble waking and blue lips.  If someone is worried about overdose, they should call 911.    If you are at risk for overdose, you may get naloxone (Narcan, a medicine that reverses the effects of opioids.  If you overdose, a friend or family member can give you Narcan while waiting for the ambulance.  They need to know the signs of overdose and how to give Narcan.    While you're taking opioids:    Don't use alcohol or street drugs. Taking them together can cause death.    Don't take any of these medicines unless your doctor says its okay.  Taking these with opioids can cause death.    Benzodiazepines (such as lorazepam         or  diazepam)    Muscle relaxers (such as cyclobenzaprine)    sleeping pills    other opioids    Safe disposal of opioids  Find your area drug take-back program, your pharmacy mail-back program, buy a special disposal bag (such as Deterra) from your pharmacy or flush them down the toilet.  Use the guidelines at:  www.fda.gov/drugs/resourcesforyou

## 2019-11-13 NOTE — PROGRESS NOTES
Subjective     Diana Salvador is a 54 year old female who presents to clinic today for the following health issues:    HPI   Chronic Pain Follow-Up       Type / Location of Pain: Sciatic right side. Pain radiates all the way through the calf musle and sometimes the calf tightens up.  Analgesia/pain control:       Recent changes:  worse      Overall control: Tolerable with discomfort.   Activity level/function:      Daily activities:  Able to do moderate activities    Work:  Works full time  Adverse effects:  No  Adherance    Taking medication as directed?  Yes    Participating in other treatments: not right now. Patient did have injection about a year ago. The injection did not help at all.  Pain interferes with job: YES- sometimes Working at [email or web address] and this new job able to get up and move and hours are more flexible.  History of back problems: previous degenerative joint disease of the lumbar spine and scoliosis.  Any previous MRI or X-rays: Yes--at [facility].  Date 3/2019  Sees a specialist for back pain:  Yes, did once for injection but did not have comprehensive appt.  Therapies tried without relief: Back injection 3/2019    Alleviating factors:   Improved by: Voltaren gel very helpful, home Physical therapy nightly, stretches, Flexeril (at night), and tramadol 50mg 2-3 times a day (last Rx 8/5/19 for #120) (able to use at work, no sedation and this is enough to keep functional at work), celebrex 100mg BID without much improvement noticed and IBP if needed  At last job insurance didn't cover pain clinic and this network and now new job hoping insurance will cover pain clinic eligible as of Dec 1st.  Risk Factors:    Sleep:  Poor    Mood/anxiety:  controlled    Recent family or social stressors:  none noted    Other aggravating factors: prolonged standing and in the morning after she gets out of bed. Its hard after being stiff for a long time.  No flowsheet data found.  No flowsheet data  found.  Encounter-Level CSA:    There are no encounter-level csa.     Patient-Level CSA:    There are no patient-level csa.         How many servings of fruits and vegetables do you eat daily?  4 or more    On average, how many sweetened beverages do you drink each day (soda, juice, sweet tea, etc)?   5  How many days per week do you miss taking your medication? 1    What makes it hard for you to take your medications?  remembering to take        BP Readings from Last 3 Encounters:   11/13/19 122/70   08/05/19 127/89   08/02/19 132/88    Wt Readings from Last 3 Encounters:   11/13/19 86.6 kg (191 lb)   08/05/19 81.6 kg (180 lb)   08/02/19 81.6 kg (180 lb)                      Reviewed and updated as needed this visit by Provider         Review of Systems   ROS COMP: Constitutional, HEENT, cardiovascular, pulmonary, gi and gu systems are negative, except as otherwise noted.      Objective    /70   Pulse 80   Temp 98.5  F (36.9  C) (Tympanic)   Resp 16   Wt 86.6 kg (191 lb)   SpO2 99%   BMI 29.04 kg/m    Body mass index is 29.04 kg/m .  Physical Exam   GENERAL: healthy, alert and no distress  PSYCH: mentation appears normal, affect normal/bright    Diagnostic Test Results:  Labs reviewed in Epic    MR LUMBAR SPINE WITHOUT CONTRAST 2/22/2019 10:42 AM      HISTORY: More than six weeks of conservative therapy, persistent  symptoms. Acute right-sided low back pain with right-sided sciatica.     TECHNIQUE: Multiplanar multisequence images were obtained through the  cervical spine without contrast.     FINDINGS: Sagittal images demonstrate normal posterior alignment. Five  lumbar-type vertebral bodies are presumed. There is some mild disc  space narrowing at L4-L5. Bone marrow signal intensity is normal. The  conus medullaris and cauda equina nerve roots are normal.     L1-L2: Normal.     L2-L3: There is a small left posterolateral disc protrusion causing  some mild to moderate left-sided foraminal stenosis.  There is also  mild facet hypertrophy and some broad-based disc bulging but no  significant central canal stenosis.     L3-L4: Broad-based disc bulging with a small left posterolateral disc  protrusion is present along with some facet hypertrophy. There is mild  left-sided foraminal stenosis but no central canal stenosis.     L4-L5: Posterior osteophyte formation, broad-based disc bulge or disc  protrusion and moderate to severe facet and ligamentum flavum  hypertrophy is present causing moderate central canal stenosis,  moderate right-sided foraminal stenosis and mild to moderate  left-sided foraminal stenosis.     L5-S1: Mild to moderate facet hypertrophy is present. There is no  stenosis. L5 appears partially sacralized.     Paraspinal soft tissues: Unremarkable as visualized.                                                                      IMPRESSION:  1. L4-L5 degenerative disc and facet disease with secondary moderate  central canal stenosis, moderate right-sided foraminal stenosis, and  mild to moderate left-sided foraminal stenosis.  2. Small left posterolateral L2-L3 disc protrusion with secondary mild  to moderate left-sided foraminal stenosis.  3. Small left posterolateral L3-L4 disc protrusion with secondary mild  left-sided foraminal stenosis.     ROE COHEN MD    Assessment & Lety Ortiz was seen today for recheck medication.    Diagnoses and all orders for this visit:    Acute right-sided low back pain with right-sided sciatica: stable, refill  -     cyclobenzaprine (FLEXERIL) 5 MG tablet; Take 1 tablet (5 mg) by mouth 2 times daily as needed for muscle spasms  -     traMADol (ULTRAM) 50 MG tablet; Take 1 tablet (50 mg) by mouth every 6 hours as needed for severe pain #112 to last 28 days  -     diclofenac (VOLTAREN) 1 % topical gel; Place 2 g onto the skin 4 times daily  -     traMADol (ULTRAM) 50 MG tablet; Take 1 tablet (50 mg) by mouth every 6 hours as needed for severe pain #120 to last  "30 days (this will likely last her longer about 3 months total    Chronic, continuous use of opioids         BMI:   Estimated body mass index is 29.04 kg/m  as calculated from the following:    Height as of 8/5/19: 1.727 m (5' 8\").    Weight as of this encounter: 86.6 kg (191 lb).   Weight management plan: Discussed healthy diet and exercise guidelines        Patient Instructions   Tramadol fill today for 28 days #112  Fill the second prescription 12/11 or 12/12 for the 30 day prescription.    After December to again refer to pain clinic and plan MRI too.          Return in about 3 months (around 2/13/2020).    Quoc Chu MD  [facility]      "

## 2020-01-08 ENCOUNTER — TELEPHONE (OUTPATIENT)
Dept: FAMILY MEDICINE | Facility: CLINIC | Age: 55
End: 2020-01-08

## 2020-01-08 DIAGNOSIS — M54.41 ACUTE RIGHT-SIDED LOW BACK PAIN WITH RIGHT-SIDED SCIATICA: ICD-10-CM

## 2020-01-08 DIAGNOSIS — F11.90 CHRONIC, CONTINUOUS USE OF OPIOIDS: ICD-10-CM

## 2020-01-08 RX ORDER — TRAMADOL HYDROCHLORIDE 50 MG/1
TABLET ORAL
Qty: 120 TABLET | Refills: 0 | Status: SHIPPED | OUTPATIENT
Start: 2020-02-05 | End: 2020-03-18

## 2020-01-08 RX ORDER — TRAMADOL HYDROCHLORIDE 50 MG/1
50 TABLET ORAL EVERY 6 HOURS PRN
Qty: 112 TABLET | Refills: 0 | Status: SHIPPED | OUTPATIENT
Start: 2020-01-08 | End: 2020-03-18

## 2020-01-08 NOTE — TELEPHONE ENCOUNTER
I sent refill for 28 days then again to fill the next prescription in by 2/5/2020 for 30 days.  Schedule clinic appt beginning of March.  Thanks,  Quoc Chu MD

## 2020-01-08 NOTE — TELEPHONE ENCOUNTER
Routing refill request to provider for review/approval because:  Drug not on the [facility] refill protocol     OV notes 11-13-19:  Patient Instructions   Tramadol fill today for 28 days #112  Fill the second prescription 12/11 or 12/12 for the 30 day prescription.  After December to again refer to pain clinic and plan MRI too.  Return in about 3 months (around 2/13/2020).         Requested Prescriptions   Pending Prescriptions Disp Refills     traMADol (ULTRAM) 50 MG tablet [Pharmacy Med Name: TRAMADOL 50MG TABLETS] 112 tablet      Sig: TAKE 1 TABLET(50 MG) BY MOUTH EVERY 6 HOURS AS NEEDED FOR SEVERE PAIN       There is no refill protocol information for this order        Last Written Prescription Date:  12-11-19  Last Fill Quantity: 120,  # refills: 0   Last office visit: 11/13/2019 with prescribing provider:  Dr. Chu   Future Office Visit:

## 2020-01-09 NOTE — TELEPHONE ENCOUNTER
Central Prior Authorization Team   Phone: [phone]    Prior Authorization Not Needed per Insurance    Medication: tramadol  Insurance Company: [employer] [place] - Phone [phone] Fax [phone]  Expected CoPay:      Pharmacy Filling the Rx: [id number] - [place], [place] - [address] AT [address]  Pharmacy Notified: Yes  Patient Notified: Yes    Patient needed to fill initial 7 day supply. Next fill of #120 tablets will go through insurance.

## 2020-01-09 NOTE — TELEPHONE ENCOUNTER
Prior Authorization Retail Medication Request    Medication/Dose: tramadol  ICD code (if different than what is on RX):    Previously Tried and Failed:    Rationale:  Patient has used since 2018    Insurance Name:  [phone]  Insurance ID:  [id number]      Pharmacy Information (if different than what is on RX)  Name:    Phone:

## 2020-03-11 ENCOUNTER — HEALTH MAINTENANCE LETTER (OUTPATIENT)
Age: 55
End: 2020-03-11

## 2020-03-16 DIAGNOSIS — M54.41 ACUTE RIGHT-SIDED LOW BACK PAIN WITH RIGHT-SIDED SCIATICA: ICD-10-CM

## 2020-03-16 DIAGNOSIS — F11.90 CHRONIC, CONTINUOUS USE OF OPIOIDS: ICD-10-CM

## 2020-03-17 NOTE — TELEPHONE ENCOUNTER
Routing refill request to provider for review/approval because:  Drug not on the [facility] refill protocol     Jaja Baumann RN

## 2020-03-17 NOTE — TELEPHONE ENCOUNTER
Requested Prescriptions   Pending Prescriptions Disp Refills     traMADol (ULTRAM) 50 MG tablet [Pharmacy Med Name: TRAMADOL 50MG TABLETS] 112 tablet      Sig: TAKE 1 TABLET(50 MG) BY MOUTH EVERY 6 HOURS AS NEEDED FOR SEVERE PAIN       There is no refill protocol information for this order        Last Written Prescription Date:  2/5/20  Last Fill Quantity: 120,  # refills: 0   Last office visit: 11/13/2019 with prescribing provider:  Kimberley   Future Office Visit:

## 2020-03-18 RX ORDER — TRAMADOL HYDROCHLORIDE 50 MG/1
TABLET ORAL
Qty: 120 TABLET | Refills: 0 | Status: SHIPPED | OUTPATIENT
Start: 2020-04-15 | End: 2020-05-04

## 2020-03-18 RX ORDER — TRAMADOL HYDROCHLORIDE 50 MG/1
50 TABLET ORAL EVERY 6 HOURS PRN
Qty: 112 TABLET | Refills: 0 | Status: SHIPPED | OUTPATIENT
Start: 2020-03-18 | End: 2020-04-29

## 2020-03-18 NOTE — TELEPHONE ENCOUNTER
Sent refills for 28 days then 30 days to last 8 weeks.  Patient should be seen in clinic after COVID 19 passes in 3-4 months.  Thank you,  Quoc Chu MD

## 2020-04-03 ENCOUNTER — MYC MEDICAL ADVICE (OUTPATIENT)
Dept: FAMILY MEDICINE | Facility: CLINIC | Age: 55
End: 2020-04-03

## 2020-04-03 ENCOUNTER — OFFICE VISIT (OUTPATIENT)
Dept: FAMILY MEDICINE | Facility: CLINIC | Age: 55
End: 2020-04-03
Payer: COMMERCIAL

## 2020-04-03 ENCOUNTER — ANCILLARY PROCEDURE (OUTPATIENT)
Dept: GENERAL RADIOLOGY | Facility: CLINIC | Age: 55
End: 2020-04-03
Attending: INTERNAL MEDICINE
Payer: COMMERCIAL

## 2020-04-03 VITALS
OXYGEN SATURATION: 98 % | RESPIRATION RATE: 16 BRPM | WEIGHT: 197 LBS | SYSTOLIC BLOOD PRESSURE: 144 MMHG | BODY MASS INDEX: 30.92 KG/M2 | HEART RATE: 84 BPM | HEIGHT: 67 IN | TEMPERATURE: 98 F | DIASTOLIC BLOOD PRESSURE: 96 MMHG

## 2020-04-03 DIAGNOSIS — F41.8 SITUATIONAL ANXIETY: ICD-10-CM

## 2020-04-03 DIAGNOSIS — S92.514A CLOSED NONDISPLACED FRACTURE OF PROXIMAL PHALANX OF LESSER TOE OF RIGHT FOOT, INITIAL ENCOUNTER: Primary | ICD-10-CM

## 2020-04-03 PROCEDURE — 99214 OFFICE O/P EST MOD 30 MIN: CPT | Performed by: INTERNAL MEDICINE

## 2020-04-03 PROCEDURE — 73630 X-RAY EXAM OF FOOT: CPT | Mod: RT

## 2020-04-03 RX ORDER — LORAZEPAM 0.5 MG/1
0.5 TABLET ORAL DAILY PRN
Qty: 20 TABLET | Refills: 0 | Status: SHIPPED | OUTPATIENT
Start: 2020-04-03 | End: 2020-05-05

## 2020-04-03 ASSESSMENT — ANXIETY QUESTIONNAIRES
GAD7 TOTAL SCORE: 19
IF YOU CHECKED OFF ANY PROBLEMS ON THIS QUESTIONNAIRE, HOW DIFFICULT HAVE THESE PROBLEMS MADE IT FOR YOU TO DO YOUR WORK, TAKE CARE OF THINGS AT HOME, OR GET ALONG WITH OTHER PEOPLE: SOMEWHAT DIFFICULT
1. FEELING NERVOUS, ANXIOUS, OR ON EDGE: NEARLY EVERY DAY
6. BECOMING EASILY ANNOYED OR IRRITABLE: NEARLY EVERY DAY
3. WORRYING TOO MUCH ABOUT DIFFERENT THINGS: NEARLY EVERY DAY
5. BEING SO RESTLESS THAT IT IS HARD TO SIT STILL: SEVERAL DAYS
2. NOT BEING ABLE TO STOP OR CONTROL WORRYING: NEARLY EVERY DAY
7. FEELING AFRAID AS IF SOMETHING AWFUL MIGHT HAPPEN: NEARLY EVERY DAY

## 2020-04-03 ASSESSMENT — MIFFLIN-ST. JEOR: SCORE: 1513.28

## 2020-04-03 ASSESSMENT — PATIENT HEALTH QUESTIONNAIRE - PHQ9
5. POOR APPETITE OR OVEREATING: NEARLY EVERY DAY
SUM OF ALL RESPONSES TO PHQ QUESTIONS 1-9: 7

## 2020-04-03 NOTE — PROGRESS NOTES
Subjective     Diana Salvador is a 54 year old female who presents to clinic today for the following health issues:    HPI   Joint Pain    Onset: 20    Description:   Location: right lateral foot  Character: Sharp and Dull ache    Intensity: moderate- severe     Progression of Symptoms: worse    Accompanying Signs & Symptoms:  Other symptoms: numbness, weakness of foot, warmth, swelling, redness and discoloration of outside of foot   Swelling getting worse through the day    History:   Previous similar pain: no       Precipitating factors:   Trauma or overuse: YES, fell down a couple of stairs with immediate pain    Alleviating factors:  Improved by: heat, ice and Tramadol  Can't take many NSAIDs due to gastric bypass  Tylenol doesn't work for her    Therapies Tried and outcome: nothing helps      Situational anxiety:    She was previously on Ativan prn for anxiety when her   in  and Lexapro in .  She had weight gain on the Lexapro.  She's been on tramadol for many year.      SHADE-7 SCORE 4/3/2020   Total Score 19       PHQ 4/3/2020   PHQ-9 Total Score 7   Q9: Thoughts of better off dead/self-harm past 2 weeks Not at all           Patient Active Problem List   Diagnosis     Osteopenia of multiple sites     Restless legs syndrome     Lower extremity edema     Sciatica of right side     Chronic, continuous use of opioids     Past Surgical History:   Procedure Laterality Date     ARTHROSCOPY SHOULDER ROTATOR CUFF REPAIR Right      ARTHROSCOPY SHOULDER ROTATOR CUFF REPAIR Left       SECTION       CHOLECYSTECTOMY       DAVINCI BYPASS GASTRIC ROBERT-EN-Y       HYSTERECTOMY SUPRACERVICAL       right ulnar nerve repositioning surgery         Social History     Tobacco Use     Smoking status: Former Smoker     Smokeless tobacco: Never Used   Substance Use Topics     Alcohol use: Yes     Comment: occ     Family History   Problem Relation Age of Onset     Heart Failure Mother          Current  "Outpatient Medications   Medication Sig Dispense Refill     alendronate (FOSAMAX) 35 MG tablet Take 1 tablet (35 mg) by mouth every 7 days 4 tablet 5     cyclobenzaprine (FLEXERIL) 5 MG tablet TAKE 1 TABLET(5 MG) BY MOUTH TWICE DAILY AS NEEDED FOR MUSCLE SPASMS 40 tablet 3     diclofenac (VOLTAREN) 1 % topical gel Place 2 g onto the skin 4 times daily 100 g 3     furosemide (LASIX) 20 MG tablet Take 1 tablet (20 mg) by mouth daily as needed (swelling) 60 tablet 3     LORazepam (ATIVAN) 0.5 MG tablet Take 1 tablet (0.5 mg) by mouth daily as needed for anxiety 20 tablet 0     rOPINIRole (REQUIP) 0.5 MG tablet Take 1 tablet (0.5 mg) by mouth 2 times daily as needed (restless legs) 180 tablet 5     [START ON 4/15/2020] traMADol (ULTRAM) 50 MG tablet TAKE 1 TABLET(50 MG) BY MOUTH EVERY 6 HOURS AS NEEDED FOR SEVERE PAIN 120 tablet 0     traMADol (ULTRAM) 50 MG tablet Take 1 tablet (50 mg) by mouth every 6 hours as needed for severe pain 112 tablet 0     omeprazole (PRILOSEC) 20 MG DR capsule        Vitamin D, Cholecalciferol, 1000 units CAPS Take 1 capsule by mouth daily        No Known Allergies    Reviewed and updated as needed this visit by Provider         Review of Systems   ROS COMP: Constitutional, MSKsystems are negative, except as otherwise noted.      Objective    BP (!) 144/96   Pulse 84   Temp 98  F (36.7  C) (Tympanic)   Resp 16   Ht 1.689 m (5' 6.5\")   Wt 89.4 kg (197 lb)   SpO2 98%   BMI 31.32 kg/m    Body mass index is 31.32 kg/m .  Physical Exam   GENERAL: healthy and alert  MS: mild bruising in lateral foot and 5th toe with pain to palpation mostly along the 5th and 4th metatarsals and MCP joints, milder pain along 3rd metatarsal  PSYCH: mentation appears normal, tearful and anxious    Diagnostic Test Results:  Labs reviewed in Epic  Results for orders placed or performed in visit on 04/03/20 (from the past 24 hour(s))   XR Foot Right G/E 3 Views    Narrative    XR FOOT RT G/E 3 VW 4/3/2020 4:04 " PM    HISTORY: Fall down stairs. Lateral foot pain.    COMPARISON: None.      Impression    IMPRESSION: 3 views of the right foot show a transverse nondisplaced  and non angulated fracture involving the proximal metaphysis of the  fifth proximal phalanx.     HEBERT MCFADDEN MD           Assessment & Plan     1. Closed nondisplaced fracture of proximal phalanx of lesser toe of right foot, initial encounter    X-ray showed fracture of the 5th proximal phalanx of the right foot not involving the joint.  Advised treatment with buddy taping and firm soled shoe.  Post-op shoe was provided.  Home care discussed.  Probably okay to take an occasional NSAID for this but advised her to not take this often due to prior gastric bypass.      - XR Foot Right G/E 3 Views    2. Situational anxiety    She is very stressed with the current COVID-19 pandemic going on.  She would like to have a telephone visit with our clinic therapist, so she was given info for scheduling.  She has taken Lexapro (had weight gain) and lorazepam in the past for anxiety and she would like to restart lorazepam at this time.  Advised her of addictive potential and potential interaction with tramadol and not to take the two together.  She declined prescription for Narcan.  Advised her to use lorazepam sparingly; we do not want her to take this frequently long term.  If anxiety continues to be an ongoing issue, would recommend starting daily medication.     - LORazepam (ATIVAN) 0.5 MG tablet; Take 1 tablet (0.5 mg) by mouth daily as needed for anxiety  Dispense: 20 tablet; Refill: 0     Sean Gabriel MD  [facility]

## 2020-04-03 NOTE — TELEPHONE ENCOUNTER
Patient calls and asks if should be seen, foot is throbbing, swollen, discolored. Advised clinic appointment, scheduled today with Dr. Medel 4/3/20.

## 2020-04-03 NOTE — PATIENT INSTRUCTIONS
Patient Education     Closed Toe Fracture  Your toe is broken (fractured). This causes local pain, swelling, and sometimes bruising. This injury usually takes about 4 to 6 weeks to heal, but can sometimes take longer. Toe injuries are often treated by taping the injured toe to the next one (buddy taping). Or a hard shoe, splint or cast may be used. This protects the injured toe and holds it in position.  If the toenail has been severely injured, it may fall off in 1 to 2 weeks. It takes up to 12 months for a new toenail to grow back.  Home care  Follow these guidelines when caring for yourself at home:    You may be given a cast shoe to wear to keep your toe from moving. If not, you can use a sandal or any shoe that doesn t put pressure on the injured toe until the swelling and pain go away. If using a sandal, be careful not to strike your foot against anything. Another injury could make the fracture worse. If you were given crutches, don t put full weight on the injured foot until you can do so without pain, or as directed by your healthcare provider.    Keep your foot elevated to reduce pain and swelling. When sleeping, put a pillow under the injured leg. When sitting, support the injured leg so it is above your heart. This is very important during the first 2 days (48 hours).    Put an ice pack on the injured area. Do this for 20 minutes every 1 to 2 hours the first day for pain relief. You can make an ice pack by wrapping a plastic bag of ice cubes in a thin towel. As the ice melts, be careful that any cloth or paper tape doesn t get wet. Continue using the ice pack 3 to 4 times a day for the next 2 days. Then use the ice pack as needed to ease pain and swelling.    If buddy tape was used and it becomes wet or dirty, change it. You may replace it with paper, plastic, or cloth tape. Cloth tape and paper tapes must be kept dry.    You may use acetaminophen or ibuprofen to control pain, unless another pain  medicine was prescribed. If you have chronic liver or kidney disease, talk with your healthcare provider before using these medicines. Also talk with your provider if you ve had a stomach ulcer or gastrointestinal bleeding.    You may return to sports or physical education activities after 4 weeks when you can run without pain, or as directed by your healthcare provider.  Follow-up care  Follow up with your healthcare provider in 1 week, or as advised. This is to make sure the bone is healing the way it should.  X-rays may be taken. You will be told of any new findings that may affect your care.  When to seek medical advice  Call your healthcare provider right away if any of these occur:    Pain or swelling gets worse    The cast/splint cracks    The cast and padding get wet and stays wet more than 24 hours    Bad odor from the cast/splint or wound fluid stains the cast    Tightness or pressure under the cast/splint gets worse    Toe becomes cold, blue, numb, or tingly    You can t move the toe    Signs of infection: fever, redness, warmth, swelling, or drainage from the wound or cast    Fever of 100.4 F (38 C) or higher, or chills as directed by your healthcare provider  Date Last Reviewed: 2/1/2017 2000-2019 [employer]. [address] [place] [address], [place] [address]. All rights reserved. This information is not intended as a substitute for professional medical care. Always follow your healthcare professional's instructions.

## 2020-04-03 NOTE — PATIENT INSTRUCTIONS
Patient Education     Understanding Ankle Sprain    The ankle is the joint where the leg and foot meet. Bones are held in place by connective tissue called ligaments. When ankle ligaments are stretched to the point of pain and injury, it is called an ankle sprain. A sprain can tear the ligaments. These tears can be very small but still cause pain. Ankle sprains can be mild or severe.  What causes an ankle sprain?  A sprain may occur when you twist your ankle or bend it too far. This can happen when you stumble or fall. Things that can make an ankle sprain more likely include:    Having had an ankle sprain before    Playing sports that involve running and jumping. Or playing contact sports such as football or hockey.    Wearing shoes that don t support your feet and ankles well    Having ankles with poor strength and flexibility  Symptoms of an ankle sprain  Symptoms may include:    Pain or soreness in the ankle    Swelling    Redness or bruising    Not being able to walk or put weight on the affected foot    Reduced range of motion in the ankle    A popping or tearing feeling at the time the sprain occurs    An abnormal or dislocated look to the ankle    Instability or too much range of motion in the ankle  Treatment for an ankle sprain  Treatment focuses on reducing pain and swelling, and avoiding further injury. Treatments may include:    Resting the ankle. Avoid putting weight on it. This may mean using crutches until the sprain heals.    Prescription or over-the-counter pain medicines. These help reduce swelling and pain.    Cold packs. These help reduce pain and swelling.    Raising your ankle above your heart. This helps reduce swelling.    Wrapping the ankle with an elastic bandage or ankle brace. This helps reduce swelling and gives some support to the ankle. In rare cases, you may need a cast or boot.    Stretching and other exercises. These improve flexibility and strength.    Heat packs. These may be  recommended before doing ankle exercises.  Possible complications of an ankle sprain  An ankle that has been weakened by a sprain can be more likely to have repeated sprains afterward. Doing exercises to strengthen your ankle and improve balance can reduce your risk for repeated sprains. Other possible complications are long-term (chronic) pain or an ankle that remains unstable.  When to call your healthcare provider  Call your healthcare provider right away if you have any of these:    Fever of 100.4 F (38 C) or higher, or as directed    Pain, numbness, discoloration, or coldness in the foot or toes    Pain that gets worse    Symptoms that don t get better, or get worse    New symptoms   Date Last Reviewed: 3/10/2016    6398-0550 [employer]. [address] [place] [address], [place] [address]. All rights reserved. This information is not intended as a substitute for professional medical care. Always follow your healthcare professional's instructions.

## 2020-04-04 ASSESSMENT — ANXIETY QUESTIONNAIRES: GAD7 TOTAL SCORE: 19

## 2020-04-08 PROBLEM — F41.8 SITUATIONAL ANXIETY: Status: ACTIVE | Noted: 2020-04-08

## 2020-04-29 ENCOUNTER — VIRTUAL VISIT (OUTPATIENT)
Dept: FAMILY MEDICINE | Facility: CLINIC | Age: 55
End: 2020-04-29
Payer: COMMERCIAL

## 2020-04-29 DIAGNOSIS — R23.9 SKIN CHANGE: Primary | ICD-10-CM

## 2020-04-29 PROCEDURE — 99213 OFFICE O/P EST LOW 20 MIN: CPT | Mod: 95 | Performed by: FAMILY MEDICINE

## 2020-04-29 NOTE — PROGRESS NOTES
"Diana Salvador is a 54 year old female who is being evaluated via a billable video visit.      The patient has been notified of following:     \"This video visit will be conducted via a call between you and your physician/provider. We have found that certain health care needs can be provided without the need for an in-person physical exam.  This service lets us provide the care you need with a video conversation.  If a prescription is necessary we can send it directly to your pharmacy.  If lab work is needed we can place an order for that and you can then stop by our lab to have the test done at a later time.    Video visits are billed at different rates depending on your insurance coverage.  Please reach out to your insurance provider with any questions.    If during the course of the call the physician/provider feels a video visit is not appropriate, you will not be charged for this service.\"    Patient has given verbal consent for Video visit? Yes    How would you like to obtain your AVS? Darshan    Patient would like the video invitation sent by: Text to cell phone: [phone]    Will anyone else be joining your video visit? No      Subjective     Diana Salvador is a 54 year old female who presents to clinic today for the following health issues:    HPI  Concern - Spot on face  Onset: x 3 months ago    Description:   Spot is located on left cheek bone under eye. Patient states it started out like a freckles and now it is about the size of a quarter. Patient states the spot also changes color. It is not raised or feel any different from the rest of her skin on her face. No itching and no pain.     As a kid did not use sun screen  Maternal grandfather and grandmother had skin cancer.    Has had some blistering sunburns in the past.    Video Start Time: 7:42 AM            Reviewed and updated as needed this visit by Provider         Review of Systems   ROS COMP: CONSTITUTIONAL: NEGATIVE for fever, chills, change " "in weight  ENT/MOUTH: NEGATIVE for ear, mouth and throat problems  RESP: NEGATIVE for significant cough or SOB  CV: NEGATIVE for chest pain, palpitations or peripheral edema      Objective    There were no vitals taken for this visit.  Estimated body mass index is 31.32 kg/m  as calculated from the following:    Height as of 4/3/20: 1.689 m (5' 6.5\").    Weight as of 4/3/20: 89.4 kg (197 lb).  Physical Exam     GENERAL: healthy, alert and no distress  EYES: Eyes grossly normal to inspection, conjunctivae and sclerae normal  RESP: no audible wheeze, cough, or visible cyanosis.  No visible retractions or increased work of breathing.  Able to speak fully in complete sentences.  SKIN: Left side of face under cheek bone is a 2cm area of darker tan and pink skin, not raised, no roughness or flaking. Coloration is not symmetric but shape is round, no bleeding or ulceration.  NEURO: Cranial nerves grossly intact, mentation intact and speech normal  PSYCH: mentation appears normal, affect normal/bright, judgement and insight intact, normal speech and appearance well-groomed      Diagnostic Test Results:  Labs reviewed in Epic        Assessment & Plan     Diana was seen today for light/dark spots.    Diagnoses and all orders for this visit:    Skin change: rule out skin cancer, possible lentigo, but rather quick time of onset and enlarged quickly, so does need to see derm for closer look to rule out skin cancer.  Not AK or SK.  -     DERMATOLOGY REFERRAL           Patient Instructions   Schedule with dermatology to rule out skin cancer.      No follow-ups on file.    Quoc Chu MD  [facility]      Video-Visit Details    Type of service:  Video Visit    Video End Time:7:50 AM    Originating Location (pt. Location): Home    Distant Location (provider location):  [facility]     Mode of Communication:  Video Conference via [employer]    No follow-ups on file.       Quoc Chu" MD

## 2020-05-03 DIAGNOSIS — F41.8 SITUATIONAL ANXIETY: ICD-10-CM

## 2020-05-03 DIAGNOSIS — F11.90 CHRONIC, CONTINUOUS USE OF OPIOIDS: ICD-10-CM

## 2020-05-03 DIAGNOSIS — M54.41 ACUTE RIGHT-SIDED LOW BACK PAIN WITH RIGHT-SIDED SCIATICA: ICD-10-CM

## 2020-05-04 RX ORDER — TRAMADOL HYDROCHLORIDE 50 MG/1
TABLET ORAL
Qty: 120 TABLET | Refills: 0 | Status: SHIPPED | OUTPATIENT
Start: 2020-05-04 | End: 2020-06-01

## 2020-05-04 NOTE — TELEPHONE ENCOUNTER
Routing refill request to provider for review/approval because:  Drug not on the [facility] refill protocol     Rosio TREVIZO RN, BSN

## 2020-05-05 RX ORDER — LORAZEPAM 0.5 MG/1
0.5 TABLET ORAL DAILY PRN
Qty: 20 TABLET | Refills: 0 | Status: SHIPPED | OUTPATIENT
Start: 2020-05-05 | End: 2020-06-30

## 2020-05-11 ENCOUNTER — OFFICE VISIT (OUTPATIENT)
Dept: DERMATOLOGY | Facility: CLINIC | Age: 55
End: 2020-05-11
Attending: FAMILY MEDICINE
Payer: COMMERCIAL

## 2020-05-11 ENCOUNTER — TELEPHONE (OUTPATIENT)
Dept: DERMATOLOGY | Facility: CLINIC | Age: 55
End: 2020-05-11

## 2020-05-11 VITALS — DIASTOLIC BLOOD PRESSURE: 90 MMHG | OXYGEN SATURATION: 100 % | HEART RATE: 78 BPM | SYSTOLIC BLOOD PRESSURE: 130 MMHG

## 2020-05-11 DIAGNOSIS — D18.01 ANGIOMA OF SKIN: ICD-10-CM

## 2020-05-11 DIAGNOSIS — L57.0 AK (ACTINIC KERATOSIS): ICD-10-CM

## 2020-05-11 DIAGNOSIS — L81.4 LENTIGO: Primary | ICD-10-CM

## 2020-05-11 DIAGNOSIS — L82.1 SEBORRHEIC KERATOSIS: ICD-10-CM

## 2020-05-11 PROCEDURE — 11102 TANGNTL BX SKIN SINGLE LES: CPT | Performed by: DERMATOLOGY

## 2020-05-11 PROCEDURE — 99203 OFFICE O/P NEW LOW 30 MIN: CPT | Mod: 25 | Performed by: DERMATOLOGY

## 2020-05-11 PROCEDURE — 88331 PATH CONSLTJ SURG 1 BLK 1SPC: CPT | Performed by: DERMATOLOGY

## 2020-05-11 NOTE — TELEPHONE ENCOUNTER
----- Message from Jayy Catsellano MD sent at 5/11/2020 11:21 AM CDT -----  L cheek actinic keratosis cryo

## 2020-05-11 NOTE — PROGRESS NOTES
Diana Salvador , a 55 year old year old female patient, I was asked to see by Dr. Chu for spot on left cheek.  Patient states this has been present for a while.  Patient reports the following symptoms:  growing .  Patient reports the following previous treatments none.  Patient reports the following modifying factors none.  Associated symptoms: none.  She also notes spot on right temple.  .  Patient has no other skin complaints today.  Remainder of the HPI, Meds, PMH, Allergies, FH, and SH was reviewed in chart.    History reviewed. No pertinent past medical history.    Past Surgical History:   Procedure Laterality Date     ARTHROSCOPY SHOULDER ROTATOR CUFF REPAIR Right      ARTHROSCOPY SHOULDER ROTATOR CUFF REPAIR Left       SECTION       CHOLECYSTECTOMY       DAVINCI BYPASS GASTRIC ROBERT-EN-Y       HYSTERECTOMY SUPRACERVICAL       right ulnar nerve repositioning surgery          Family History   Problem Relation Age of Onset     Heart Failure Mother        Social History     Socioeconomic History     Marital status:      Spouse name: Not on file     Number of children: Not on file     Years of education: Not on file     Highest education level: Not on file   Occupational History     Not on file   Social Needs     Financial resource strain: Not on file     Food insecurity     Worry: Not on file     Inability: Not on file     Transportation needs     Medical: Not on file     Non-medical: Not on file   Tobacco Use     Smoking status: Former Smoker     Smokeless tobacco: Never Used   Substance and Sexual Activity     Alcohol use: Yes     Comment: occ     Drug use: No     Sexual activity: Yes     Partners: Male   Lifestyle     Physical activity     Days per week: Not on file     Minutes per session: Not on file     Stress: Not on file   Relationships     Social connections     Talks on phone: Not on file     Gets together: Not on file     Attends [employer] service: Not on file     Active member of  club or organization: Not on file     Attends meetings of clubs or organizations: Not on file     Relationship status: Not on file     Intimate partner violence     Fear of current or ex partner: Not on file     Emotionally abused: Not on file     Physically abused: Not on file     Forced sexual activity: Not on file   Other Topics Concern     Not on file   Social History Narrative     Not on file       Outpatient Encounter Medications as of 5/11/2020   Medication Sig Dispense Refill     alendronate (FOSAMAX) 35 MG tablet TAKE 1 TABLET BY MOUTH EVERY 7 DAYS 4 tablet 5     cyclobenzaprine (FLEXERIL) 5 MG tablet TAKE 1 TABLET(5 MG) BY MOUTH TWICE DAILY AS NEEDED FOR MUSCLE SPASMS 40 tablet 3     diclofenac (VOLTAREN) 1 % topical gel Place 2 g onto the skin 4 times daily 100 g 3     furosemide (LASIX) 20 MG tablet Take 1 tablet (20 mg) by mouth daily as needed (swelling) 60 tablet 3     LORazepam (ATIVAN) 0.5 MG tablet Take 1 tablet (0.5 mg) by mouth daily as needed for anxiety (needs appt for further refills) 20 tablet 0     omeprazole (PRILOSEC) 20 MG DR capsule As needed       rOPINIRole (REQUIP) 0.5 MG tablet Take 1 tablet (0.5 mg) by mouth 2 times daily as needed (restless legs) 180 tablet 5     traMADol (ULTRAM) 50 MG tablet TAKE 1 TABLET(50 MG) BY MOUTH EVERY 6 HOURS AS NEEDED FOR SEVERE PAIN 120 tablet 0     Vitamin D, Cholecalciferol, 1000 units CAPS Take 1 capsule by mouth daily        VITAMIN K PO Take by mouth as needed Takes Vitamin K when she takes the Lasix       No facility-administered encounter medications on file as of 5/11/2020.              Review Of Systems  Skin: As above  Eyes: negative  Ears/Nose/Throat: negative  Respiratory: No shortness of breath, dyspnea on exertion, cough, or hemoptysis  Cardiovascular: negative  Gastrointestinal: negative  Genitourinary: negative  Musculoskeletal: negative  Neurologic: negative  Psychiatric: negative  Hematologic/Lymphatic/Immunologic:  negative  Endocrine: negative      O:   NAD, WDWN, Alert & Oriented, Mood & Affect wnl, Vitals stable   Here today alone   BP (!) 130/90   Pulse 78   SpO2 100%    General appearance chadwick ii   Vitals stable   Alert, oriented and in no acute distress         L mid cheek 2.5cm red scaly plaque   R temple stuck on papule      Stuck on papules and brown macules on face     Red papules on neck      The remainder of expanded problem focused exam was normal; the following areas were examined:  scalp/hair, conjunctiva/lids, face, neck, lips hands       Eyes: Conjunctivae/lids:Normal     ENT: Lips, buccal mucosa, tongue: normal    MSK:Normal    Cardiovascular: peripheral edema none    Pulm: Breathing Normal    Neuro/Psych: Orientation:Normal; Mood/Affect:Normal      MICRO:   L cheek:There is hyperkeratosis and focal parakeratosis of the epidermis, overlying atypical keratinocytes,  by areas of orthokeratosis, there are scattered basal atypical keratinocytes: with varying degrees of overlying loss of maturation, hyperchromatism, pleomorphism, increased and abnormal mitoses, dyskeratosis.  The dermis shows a variable superficial perivascular and lichenoid chronic inflammatory infiltrate.   A/P:  1. L cheek r/o basal cell carcinoma   TANGENTIAL BIOPSY IN HOUSE:  After consent, anesthesia with LEC and prep, tangential excision performed and dx above confirmed with frozen section histology.  No complications and routine wound care.      I have personally reviewed all specimens and/or slides and used them with my medical judgement to determine or confirm the final diagnosis.     Patient told result actinic keratosis and keratosis .    2. Seborrheic keratosis, lentigo, angioma   BENIGN LESIONS DISCUSSED WITH PATIENT:  I discussed the specifics of tumor, prognosis, and genetics of benign lesions.  I explained that treatment of these lesions would be purely cosmetic and not medically neccessary.  I discussed with patient  different removal options including excision, cautery and /or laser.      Nature and genetics of benign skin lesions dicussed with patient.  Signs and Symptoms of skin cancer discussed with patient.  Patient encouraged to perform monthly skin exams.  UV precautions reviewed with patient.    Skin care regimen reviewed with patient: Eliminate harsh soaps, i.e. Dial, zest, irsih spring; Mild soaps such as Cetaphil or Dove sensitive skin, avoid hot or cold showers, aggressive use of emollients including vanicream, cetaphil or cerave discussed with patient.    Risks of non-melanoma skin cancer discussed with patient   Return to clinic for actinic keratosis

## 2020-05-11 NOTE — PATIENT INSTRUCTIONS
Wound Care Instructions     FOR SUPERFICIAL WOUNDS     [facility] [phone]    [facility] [phone]                       AFTER 24 HOURS YOU SHOULD REMOVE THE BANDAGE AND BEGIN DAILY DRESSING CHANGES AS FOLLOWS:     1) Remove Dressing.     2) Clean and dry the area with tap water using a Q-tip or sterile gauze pad.     3) Apply Vaseline, Aquaphor, Polysporin ointment or Bacitracin ointment over entire wound.  Do NOT use Neosporin ointment.     4) Cover the wound with a band-aid, or a sterile non-stick gauze pad and micropore paper tape      REPEAT THESE INSTRUCTIONS AT LEAST ONCE A DAY UNTIL THE WOUND HAS COMPLETELY HEALED.    It is an old wives tale that a wound heals better when it is exposed to air and allowed to dry out. The wound will heal faster with a better cosmetic result if it is kept moist with ointment and covered with a bandage.    **Do not let the wound dry out.**      Supplies Needed:      *Cotton tipped applicators (Q-tips)    *Polysporin Ointment or Bacitracin Ointment (NOT NEOSPORIN)    *Band-aids or non-stick gauze pads and micropore paper tape.      PATIENT INFORMATION:    During the healing process you will notice a number of changes. All wounds develop a small halo of redness surrounding the wound.  This means healing is occurring. Severe itching with extensive redness usually indicates sensitivity to the ointment or bandage tape used to dress the wound.  You should call our office if this develops.      Swelling  and/or discoloration around your surgical site is common, particularly when performed around the eye.    All wounds normally drain.  The larger the wound the more drainage there will be.  After 7-10 days, you will notice the wound beginning to shrink and new skin will begin to grow.  The wound is healed when you can see skin has formed over the entire area.  A healed wound has a healthy, shiny look to the surface and is red to dark pink in color  to normalize.  Wounds may take approximately 4-6 weeks to heal.  Larger wounds may take 6-8 weeks.  After the wound is healed you may discontinue dressing changes.    You may experience a sensation of tightness as your wound heals. This is normal and will gradually subside.    Your healed wound may be sensitive to temperature changes. This sensitivity improves with time, but if you re having a lot of discomfort, try to avoid temperature extremes.    Patients frequently experience itching after their wound appears to have healed because of the continue healing under the skin.  Plain Vaseline will help relieve the itching.        POSSIBLE COMPLICATIONS    BLEEDIN. Leave the bandage in place.  2. Use tightly rolled up gauze or a cloth to apply direct pressure over the bandage for 30  minutes.  3. Reapply pressure for an additional 30 minutes if necessary  4. Use additional gauze and tape to maintain pressure once the bleeding has stopped.

## 2020-05-11 NOTE — LETTER
2020         RE: Diana Salvador  [address] [place] [address]        Dear Colleague,    Thank you for referring your patient, Diana Salvador, to the [facility]. Please see a copy of my visit note below.    Diana Salvador , a 55 year old year old female patient, I was asked to see by Dr. Chu for spot on left cheek.  Patient states this has been present for a while.  Patient reports the following symptoms:  growing .  Patient reports the following previous treatments none.  Patient reports the following modifying factors none.  Associated symptoms: none.  She also notes spot on right temple.  .  Patient has no other skin complaints today.  Remainder of the HPI, Meds, PMH, Allergies, FH, and SH was reviewed in chart.    History reviewed. No pertinent past medical history.    Past Surgical History:   Procedure Laterality Date     ARTHROSCOPY SHOULDER ROTATOR CUFF REPAIR Right      ARTHROSCOPY SHOULDER ROTATOR CUFF REPAIR Left       SECTION       CHOLECYSTECTOMY       DAVINCI BYPASS GASTRIC ROBERT-EN-Y       HYSTERECTOMY SUPRACERVICAL       right ulnar nerve repositioning surgery          Family History   Problem Relation Age of Onset     Heart Failure Mother        Social History     Socioeconomic History     Marital status:      Spouse name: Not on file     Number of children: Not on file     Years of education: Not on file     Highest education level: Not on file   Occupational History     Not on file   Social Needs     Financial resource strain: Not on file     Food insecurity     Worry: Not on file     Inability: Not on file     Transportation needs     Medical: Not on file     Non-medical: Not on file   Tobacco Use     Smoking status: Former Smoker     Smokeless tobacco: Never Used   Substance and Sexual Activity     Alcohol use: Yes     Comment: occ     Drug use: No     Sexual activity: Yes     Partners: Male   Lifestyle     Physical activity     Days per week: Not  on file     Minutes per session: Not on file     Stress: Not on file   Relationships     Social connections     Talks on phone: Not on file     Gets together: Not on file     Attends Orthodoxy service: Not on file     Active member of club or organization: Not on file     Attends meetings of clubs or organizations: Not on file     Relationship status: Not on file     Intimate partner violence     Fear of current or ex partner: Not on file     Emotionally abused: Not on file     Physically abused: Not on file     Forced sexual activity: Not on file   Other Topics Concern     Not on file   Social History Narrative     Not on file       Outpatient Encounter Medications as of 5/11/2020   Medication Sig Dispense Refill     alendronate (FOSAMAX) 35 MG tablet TAKE 1 TABLET BY MOUTH EVERY 7 DAYS 4 tablet 5     cyclobenzaprine (FLEXERIL) 5 MG tablet TAKE 1 TABLET(5 MG) BY MOUTH TWICE DAILY AS NEEDED FOR MUSCLE SPASMS 40 tablet 3     diclofenac (VOLTAREN) 1 % topical gel Place 2 g onto the skin 4 times daily 100 g 3     furosemide (LASIX) 20 MG tablet Take 1 tablet (20 mg) by mouth daily as needed (swelling) 60 tablet 3     LORazepam (ATIVAN) 0.5 MG tablet Take 1 tablet (0.5 mg) by mouth daily as needed for anxiety (needs appt for further refills) 20 tablet 0     omeprazole (PRILOSEC) 20 MG DR capsule As needed       rOPINIRole (REQUIP) 0.5 MG tablet Take 1 tablet (0.5 mg) by mouth 2 times daily as needed (restless legs) 180 tablet 5     traMADol (ULTRAM) 50 MG tablet TAKE 1 TABLET(50 MG) BY MOUTH EVERY 6 HOURS AS NEEDED FOR SEVERE PAIN 120 tablet 0     Vitamin D, Cholecalciferol, 1000 units CAPS Take 1 capsule by mouth daily        VITAMIN K PO Take by mouth as needed Takes Vitamin K when she takes the Lasix       No facility-administered encounter medications on file as of 5/11/2020.              Review Of Systems  Skin: As above  Eyes: negative  Ears/Nose/Throat: negative  Respiratory: No shortness of breath, dyspnea on  exertion, cough, or hemoptysis  Cardiovascular: negative  Gastrointestinal: negative  Genitourinary: negative  Musculoskeletal: negative  Neurologic: negative  Psychiatric: negative  Hematologic/Lymphatic/Immunologic: negative  Endocrine: negative      O:   NAD, WDWN, Alert & Oriented, Mood & Affect wnl, Vitals stable   Here today alone   BP (!) 130/90   Pulse 78   SpO2 100%    General appearance chadwick ii   Vitals stable   Alert, oriented and in no acute distress         L mid cheek 2.5cm red scaly plaque   R temple stuck on papule      Stuck on papules and brown macules on face     Red papules on neck      The remainder of expanded problem focused exam was normal; the following areas were examined:  scalp/hair, conjunctiva/lids, face, neck, lips hands       Eyes: Conjunctivae/lids:Normal     ENT: Lips, buccal mucosa, tongue: normal    MSK:Normal    Cardiovascular: peripheral edema none    Pulm: Breathing Normal    Neuro/Psych: Orientation:Normal; Mood/Affect:Normal      MICRO:   L cheek:There is hyperkeratosis and focal parakeratosis of the epidermis, overlying atypical keratinocytes,  by areas of orthokeratosis, there are scattered basal atypical keratinocytes: with varying degrees of overlying loss of maturation, hyperchromatism, pleomorphism, increased and abnormal mitoses, dyskeratosis.  The dermis shows a variable superficial perivascular and lichenoid chronic inflammatory infiltrate.   A/P:  1. L cheek r/o basal cell carcinoma   TANGENTIAL BIOPSY IN HOUSE:  After consent, anesthesia with LEC and prep, tangential excision performed and dx above confirmed with frozen section histology.  No complications and routine wound care.      I have personally reviewed all specimens and/or slides and used them with my medical judgement to determine or confirm the final diagnosis.     Patient told result actinic keratosis and keratosis .    2. Seborrheic keratosis, lentigo, angioma   BENIGN LESIONS DISCUSSED WITH  PATIENT:  I discussed the specifics of tumor, prognosis, and genetics of benign lesions.  I explained that treatment of these lesions would be purely cosmetic and not medically neccessary.  I discussed with patient different removal options including excision, cautery and /or laser.      Nature and genetics of benign skin lesions dicussed with patient.  Signs and Symptoms of skin cancer discussed with patient.  Patient encouraged to perform monthly skin exams.  UV precautions reviewed with patient.    Skin care regimen reviewed with patient: Eliminate harsh soaps, i.e. Dial, zest, irsih spring; Mild soaps such as Cetaphil or Dove sensitive skin, avoid hot or cold showers, aggressive use of emollients including vanicream, cetaphil or cerave discussed with patient.    Risks of non-melanoma skin cancer discussed with patient   Return to clinic for actinic keratosis     Again, thank you for allowing me to participate in the care of your patient.        Sincerely,        Jayy Castellano MD

## 2020-05-11 NOTE — TELEPHONE ENCOUNTER
Patient notified. Patient verbalized understanding. Scheduled for follow up. Olinda Chamberlain RN

## 2020-05-18 ENCOUNTER — OFFICE VISIT (OUTPATIENT)
Dept: DERMATOLOGY | Facility: CLINIC | Age: 55
End: 2020-05-18
Payer: COMMERCIAL

## 2020-05-18 DIAGNOSIS — L57.0 AK (ACTINIC KERATOSIS): Primary | ICD-10-CM

## 2020-05-18 PROCEDURE — 17000 DESTRUCT PREMALG LESION: CPT | Performed by: DERMATOLOGY

## 2020-05-18 NOTE — PROGRESS NOTES
Diana Salvador is a 55 year old year old female patient here today for evaluation and managment of actinic keratosis on left cheek. Patient has no other skin complaints today.  Remainder of the HPI, Meds, PMH, Allergies, FH, and SH was reviewed in chart.    No past medical history on file.    Past Surgical History:   Procedure Laterality Date     ARTHROSCOPY SHOULDER ROTATOR CUFF REPAIR Right      ARTHROSCOPY SHOULDER ROTATOR CUFF REPAIR Left       SECTION       CHOLECYSTECTOMY       DAVINCI BYPASS GASTRIC ROBERT-EN-Y       HYSTERECTOMY SUPRACERVICAL       right ulnar nerve repositioning surgery          Family History   Problem Relation Age of Onset     Heart Failure Mother        Social History     Socioeconomic History     Marital status:      Spouse name: Not on file     Number of children: Not on file     Years of education: Not on file     Highest education level: Not on file   Occupational History     Not on file   Social Needs     Financial resource strain: Not on file     Food insecurity     Worry: Not on file     Inability: Not on file     Transportation needs     Medical: Not on file     Non-medical: Not on file   Tobacco Use     Smoking status: Former Smoker     Smokeless tobacco: Never Used   Substance and Sexual Activity     Alcohol use: Yes     Comment: occ     Drug use: No     Sexual activity: Yes     Partners: Male   Lifestyle     Physical activity     Days per week: Not on file     Minutes per session: Not on file     Stress: Not on file   Relationships     Social connections     Talks on phone: Not on file     Gets together: Not on file     Attends Judaism service: Not on file     Active member of club or organization: Not on file     Attends meetings of clubs or organizations: Not on file     Relationship status: Not on file     Intimate partner violence     Fear of current or ex partner: Not on file     Emotionally abused: Not on file     Physically abused: Not on file      Forced sexual activity: Not on file   Other Topics Concern     Not on file   Social History Narrative     Not on file       Outpatient Encounter Medications as of 5/18/2020   Medication Sig Dispense Refill     alendronate (FOSAMAX) 35 MG tablet TAKE 1 TABLET BY MOUTH EVERY 7 DAYS 4 tablet 5     cyclobenzaprine (FLEXERIL) 5 MG tablet TAKE 1 TABLET(5 MG) BY MOUTH TWICE DAILY AS NEEDED FOR MUSCLE SPASMS 40 tablet 3     diclofenac (VOLTAREN) 1 % topical gel Place 2 g onto the skin 4 times daily 100 g 3     furosemide (LASIX) 20 MG tablet Take 1 tablet (20 mg) by mouth daily as needed (swelling) 60 tablet 3     LORazepam (ATIVAN) 0.5 MG tablet Take 1 tablet (0.5 mg) by mouth daily as needed for anxiety (needs appt for further refills) 20 tablet 0     omeprazole (PRILOSEC) 20 MG DR capsule As needed       rOPINIRole (REQUIP) 0.5 MG tablet Take 1 tablet (0.5 mg) by mouth 2 times daily as needed (restless legs) 180 tablet 5     traMADol (ULTRAM) 50 MG tablet TAKE 1 TABLET(50 MG) BY MOUTH EVERY 6 HOURS AS NEEDED FOR SEVERE PAIN 120 tablet 0     Vitamin D, Cholecalciferol, 1000 units CAPS Take 1 capsule by mouth daily        VITAMIN K PO Take by mouth as needed Takes Vitamin K when she takes the Lasix       No facility-administered encounter medications on file as of 5/18/2020.              Review Of Systems  Skin: As above  Eyes: negative  Ears/Nose/Throat: negative  Respiratory: No shortness of breath, dyspnea on exertion, cough, or hemoptysis  Cardiovascular: negative  Gastrointestinal: negative  Genitourinary: negative  Musculoskeletal: negative  Neurologic: negative  Psychiatric: negative  Hematologic/Lymphatic/Immunologic: negative  Endocrine: negative      O:   NAD, WDWN, Alert & Oriented, Mood & Affect wnl, Vitals stable   Here today alone   There were no vitals taken for this visit.   General appearance normal   Vitals stable   Alert, oriented and in no acute distress     L cheek gritty plaque          A/P:  1.  Actinic keratosis   Pathophysiology discussed with pateint   Efudex and cryo discussed with patient   LN2:  Treated with LN2 for 5s for 1-2 cycles. Warned risks of blistering, pain, pigment change, scarring, and incomplete resolution.  Advised patient to return if lesions do not completely resolve.  Wound care sheet given.  UV precautions reviewed with patient.  Return to clinic 6 months

## 2020-05-18 NOTE — PATIENT INSTRUCTIONS
WOUND CARE INSTRUCTIONS  FOR CRYOSURGERY        This area treated with liquid nitrogen should form a blister (areas treated may or may not blister-skin may just turn dark and slough off). You DO NOT need to bandage the area UNLESS a blister forms and breaks (which may be a few days). When the blister breaks, begin daily dressing changes as follows:    1) Clean and dry the area with tap water using clean Q-tip or sterile gauze pad.    2) Apply Aquafor, Vaseline, Polysporin ointment or Bacitracin ointment over entire wound.  Do NOT use Neosporin ointment.    3) Cover the wound with a band-aid or sterile non-stick gauze pad and micropore paper tape.      REPEAT THESE INSTRUCTIONS AT LEAST ONCE A DAY UNTIL THE WOUND HAS COMPLETELY HEALED.      It is an old wives tale that a wound heals better when it is exposed to air and allowed to dry out. The wound will heal faster with a better cosmetic result if it is kept moist with ointment and covered with a bandage.  Do not let the wound dry out.      IMPORTANT INFORMATION ON REVERSE SIDE    Supplies Needed:     *Cotton tipped applicators (Q-tips)   *Polysporin ointment or Bacitracin ointment (NOT NEOSPORIN)   *Band-aids, or non stick gauze pads and micropore paper tape        PATIENT INFORMATION    During the healing process you will notice a number of changes. All wounds develop a small halo of redness surrounding the wound.  This means healing is occurring. Severe itching with extensive redness usually indicates sensitivity to the ointment or bandage tape used to dress the wound.  You should call our office if this develops.      Swelling and/or discoloration around your surgical site is common, particularly when performed around the eye.    All wounds normally drain.  The larger the wound the more drainage there will be.  After 7-10 days, you will notice the wound beginning to shrink and new skin will begin to grow.  The wound is healed when you can see skin has  formed over the entire area.  A healed wound has a healthy, shiny look to the surface and is red to dark pink in color to normalize.  Wounds may take approximately 4-6 weeks to heal.  Larger wounds may take 6-8 weeks.  After the wound is healed you may discontinue dressing changes.    You may experience a sensation of tightness as your wound heals. This is normal and will gradually subside.    Your healed wound may be sensitive to temperature changes. This sensitivity improves with time, but if you re having a lot of discomfort, try to avoid temperature extremes.    Patients frequently experience itching after their wound appears to have healed because of the continue healing under the skin.  Plain Vaseline will help relieve the itching.

## 2020-05-18 NOTE — LETTER
2020         RE: Diana Salvador  [address] [place] [id number]        Dear Colleague,    Thank you for referring your patient, Diana Salvador, to the [facility]. Please see a copy of my visit note below.    Diana Salvador is a 55 year old year old female patient here today for evaluation and managment of actinic keratosis on left cheek. Patient has no other skin complaints today.  Remainder of the HPI, Meds, PMH, Allergies, FH, and SH was reviewed in chart.    No past medical history on file.    Past Surgical History:   Procedure Laterality Date     ARTHROSCOPY SHOULDER ROTATOR CUFF REPAIR Right      ARTHROSCOPY SHOULDER ROTATOR CUFF REPAIR Left       SECTION       CHOLECYSTECTOMY       DAVINCI BYPASS GASTRIC ROBERT-EN-Y       HYSTERECTOMY SUPRACERVICAL       right ulnar nerve repositioning surgery          Family History   Problem Relation Age of Onset     Heart Failure Mother        Social History     Socioeconomic History     Marital status:      Spouse name: Not on file     Number of children: Not on file     Years of education: Not on file     Highest education level: Not on file   Occupational History     Not on file   Social Needs     Financial resource strain: Not on file     Food insecurity     Worry: Not on file     Inability: Not on file     Transportation needs     Medical: Not on file     Non-medical: Not on file   Tobacco Use     Smoking status: Former Smoker     Smokeless tobacco: Never Used   Substance and Sexual Activity     Alcohol use: Yes     Comment: occ     Drug use: No     Sexual activity: Yes     Partners: Male   Lifestyle     Physical activity     Days per week: Not on file     Minutes per session: Not on file     Stress: Not on file   Relationships     Social connections     Talks on phone: Not on file     Gets together: Not on file     Attends Sikhism service: Not on file     Active member of club or organization: Not on file     Attends  meetings of clubs or organizations: Not on file     Relationship status: Not on file     Intimate partner violence     Fear of current or ex partner: Not on file     Emotionally abused: Not on file     Physically abused: Not on file     Forced sexual activity: Not on file   Other Topics Concern     Not on file   Social History Narrative     Not on file       Outpatient Encounter Medications as of 5/18/2020   Medication Sig Dispense Refill     alendronate (FOSAMAX) 35 MG tablet TAKE 1 TABLET BY MOUTH EVERY 7 DAYS 4 tablet 5     cyclobenzaprine (FLEXERIL) 5 MG tablet TAKE 1 TABLET(5 MG) BY MOUTH TWICE DAILY AS NEEDED FOR MUSCLE SPASMS 40 tablet 3     diclofenac (VOLTAREN) 1 % topical gel Place 2 g onto the skin 4 times daily 100 g 3     furosemide (LASIX) 20 MG tablet Take 1 tablet (20 mg) by mouth daily as needed (swelling) 60 tablet 3     LORazepam (ATIVAN) 0.5 MG tablet Take 1 tablet (0.5 mg) by mouth daily as needed for anxiety (needs appt for further refills) 20 tablet 0     omeprazole (PRILOSEC) 20 MG DR capsule As needed       rOPINIRole (REQUIP) 0.5 MG tablet Take 1 tablet (0.5 mg) by mouth 2 times daily as needed (restless legs) 180 tablet 5     traMADol (ULTRAM) 50 MG tablet TAKE 1 TABLET(50 MG) BY MOUTH EVERY 6 HOURS AS NEEDED FOR SEVERE PAIN 120 tablet 0     Vitamin D, Cholecalciferol, 1000 units CAPS Take 1 capsule by mouth daily        VITAMIN K PO Take by mouth as needed Takes Vitamin K when she takes the Lasix       No facility-administered encounter medications on file as of 5/18/2020.              Review Of Systems  Skin: As above  Eyes: negative  Ears/Nose/Throat: negative  Respiratory: No shortness of breath, dyspnea on exertion, cough, or hemoptysis  Cardiovascular: negative  Gastrointestinal: negative  Genitourinary: negative  Musculoskeletal: negative  Neurologic: negative  Psychiatric: negative  Hematologic/Lymphatic/Immunologic: negative  Endocrine: negative      O:   NAD, WDWN, Alert &  Oriented, Mood & Affect wnl, Vitals stable   Here today alone   There were no vitals taken for this visit.   General appearance normal   Vitals stable   Alert, oriented and in no acute distress     L cheek gritty plaque          A/P:  1. Actinic keratosis   Pathophysiology discussed with pateint   Efudex and cryo discussed with patient   LN2:  Treated with LN2 for 5s for 1-2 cycles. Warned risks of blistering, pain, pigment change, scarring, and incomplete resolution.  Advised patient to return if lesions do not completely resolve.  Wound care sheet given.  UV precautions reviewed with patient.  Return to clinic 6 months      Again, thank you for allowing me to participate in the care of your patient.        Sincerely,        Jayy Castellano MD

## 2020-06-08 ENCOUNTER — E-VISIT (OUTPATIENT)
Dept: FAMILY MEDICINE | Facility: CLINIC | Age: 55
End: 2020-06-08
Payer: COMMERCIAL

## 2020-06-08 DIAGNOSIS — G47.00 INSOMNIA, UNSPECIFIED TYPE: Primary | ICD-10-CM

## 2020-06-08 PROCEDURE — 99421 OL DIG E/M SVC 5-10 MIN: CPT | Performed by: FAMILY MEDICINE

## 2020-06-08 NOTE — PATIENT INSTRUCTIONS
Patient Education     Treating Insomnia     Learning to relax before bedtime can improve your sleep.   Good sleeping habits are a key part of treatment. If needed, some medicines may help you sleep better at first. Making healthy lifestyle changes and learning to relax can improve your sleep. Treating insomnia takes commitment. But trust that your efforts will pay off. Be sure to talk with your healthcare provider before taking any medicine.  Healthy lifestyle  Your lifestyle affects your health and your sleep. Here are some healthy habits:    Keep a regular sleep schedule. Go to bed and get up at the same time each day.    Exercise regularly. It may help you reduce stress. Avoid strenuous exercise for 2 to 4 hours before bedtime.    Avoid or limit naps, especially in the late afternoon.    Use your bed only for sleep and sex.    Don t spend too much time in bed trying to fall asleep. If you can t fall asleep, get up and do something (no electronics) until you become tired and drowsy.    Avoid or limit caffeine and nicotine for up to 6 hours before bedtime. They can keep you awake at night.     Also avoid alcohol for at least 4 to 6 hours before bedtime. It may help you fall asleep at first. But you will have more awakenings during the night. And your sleep will not be restful.  Before bedtime  To sleep better every night, try these tips:    Have a bedtime routine to let your body and mind know when it s time to sleep.    Think of going to bed as relaxing and enjoyable. Sleep will come sooner.    If your worries don t let you sleep, write them down in a diary. Then close it, and go to bed.    Make sure the room is not too hot or too cold. If it s not dark enough, an eye mask can help. If it s noisy, try using earplugs.    Don't eat a large meal just before bedtime.    Remove noises, bright lights, TVs, cell phones, and computers from your sleeping environment.    Use a comfortable mattress and pillow.  Learn to  relax  Stress, anxiety, and body tension may keep you awake at night. To unwind before bedtime, try a warm bath, meditation, or yoga. Also try the following:    Deep breathing. Sit or lie back in a chair. Take a slow, deep breath. Hold it for 5 counts. Then breathe out slowly through your mouth. Keep doing this until you feel relaxed.    Progressive muscle relaxation. Tense and then relax the muscles in your body as you breathe deeply. Start with your feet and work up your body to your neck and face.  Cognitive Behavioral Therapy (CBT)  CBT is the most effective treatment for long-term insomnia. It tries to address the underlying causes of your sleep problems, including your habits and how you think about sleep.  Individual Therapy  Duglas Aguilar PsyD, RADHA  Insomnia   [facility]: [phone]    [facility]: [phone]  [facility]  Visit www.Man.org or call [phone] to find a clinic close to you.  Suggested resources  The resources below may help you relax. This list is for information only. [facility] is not responsible for the quality of services or the actions of any person or organization. There may be a fee to use some of these resources.  Insomnia treatment books  Julia Mind by Rochelle Molina and Comfort Grover (2013)  Overcoming Insomnia by Miki Pimentel and Rochelle Molina (2008)  Quiet Your Mind and Get to Sleep: Solutions to Insomnia for Those with Depression, Anxiety or Chronic Pain by Rochelle Molina and Comfort Grover (2009)  No More Sleepless Nights by Boris Sarabia and Rosy Stark (1996)  Say Julia to Insomnia by Valentín Pollock (2009)  The Insomnia Workbook by Mary Orantes and Rodrigo Iglesias (2009)  The Insomnia Answer by Gerald Brenner and Johnathan Alston (2006)  Stress management and relaxation books  The Relaxation and Stress Reduction Workbook by Aline Borja,  Pearl Fang and Baldemar Liriano (2008)  Stress Management Workbook: Techniques and Self-Assessment Procedures by Danae Khoury and David Xiao (1997)  A Mindfulness-Based Stress Reduction Workbook by Jose A Kingsley and Christine Neff (2010)  The Complete Stress Management Workbook by Pacheco Marques, Sawyer Jeffrey and Darius Mendez (1996)  Online programs  www.SHUTi.me (pronounced shut eye). There is a fee for this program.   www.sleepIO.com (pronounced sleep ee oh). There is a fee for this program.  Other online resources  Deep breathing and progressive muscle relaxation (PMR):    http://www.Sandag  Meditation:    www.EyeVerifyranICVRx    www.SnaptguidedMetaplacemeditationMetaplacesite.com  Guided imagery:    http://www.Sandag    http://[email or web address].[email or web address]  (click on  Resource Library,  then choose  Meditation, Relaxation, Guided Imagery )  Apps for your mobile device:    Autogenic Training Progressive Muscle Relaxation by [employer] Shelli    Calm: Meditation and Sleep Stories by Calm.com, Inc.    MePIN / [employer] Timer - Meditation Leroy by "[employer]ob[employer] Inc.".  This is not a prescription and these resources are optional. You must pay for any costs when using these resources. Please ask your insurance carrier if you can be reimbursed for these resources. If so, you are responsible for sending the needed details to your insurance carrier. These resources may also be tax deductible as medical expenses. Check with your .  Date Last Reviewed: 5/18/2018  Modifications clinically reviewed by Duglas Aguilar PsyD, RADHA, [facility], Director of the Insomnia and Behavioral Sleep Health Program, [facility].    3655-7380 [employer]. [address] [place] [address], [place] [address]. All rights reserved. This information is not intended as a substitute for professional medical care. Always follow your healthcare professional's instructions. This information has been modified by  your health care provider with permission from the publisher.

## 2020-06-29 DIAGNOSIS — F41.8 SITUATIONAL ANXIETY: ICD-10-CM

## 2020-06-30 RX ORDER — LORAZEPAM 0.5 MG/1
TABLET ORAL
Qty: 20 TABLET | Refills: 0 | Status: SHIPPED | OUTPATIENT
Start: 2020-06-30 | End: 2020-07-07

## 2020-06-30 NOTE — TELEPHONE ENCOUNTER
I sent a month.  Please have patient schedule appt phone or video or clinic when needing next refill.  Thank you,  Quoc Chu MD

## 2020-07-07 ENCOUNTER — VIRTUAL VISIT (OUTPATIENT)
Dept: FAMILY MEDICINE | Facility: CLINIC | Age: 55
End: 2020-07-07
Payer: COMMERCIAL

## 2020-07-07 DIAGNOSIS — G25.81 RESTLESS LEGS SYNDROME: ICD-10-CM

## 2020-07-07 DIAGNOSIS — B00.1 COLD SORE: Primary | ICD-10-CM

## 2020-07-07 DIAGNOSIS — F11.90 CHRONIC, CONTINUOUS USE OF OPIOIDS: ICD-10-CM

## 2020-07-07 DIAGNOSIS — M54.41 ACUTE RIGHT-SIDED LOW BACK PAIN WITH RIGHT-SIDED SCIATICA: ICD-10-CM

## 2020-07-07 DIAGNOSIS — F41.8 SITUATIONAL ANXIETY: ICD-10-CM

## 2020-07-07 DIAGNOSIS — R60.0 LOWER EXTREMITY EDEMA: ICD-10-CM

## 2020-07-07 PROCEDURE — 99214 OFFICE O/P EST MOD 30 MIN: CPT | Mod: 95 | Performed by: FAMILY MEDICINE

## 2020-07-07 RX ORDER — VALACYCLOVIR HYDROCHLORIDE 1 G/1
2000 TABLET, FILM COATED ORAL 2 TIMES DAILY
Qty: 16 TABLET | Refills: 3 | Status: SHIPPED | OUTPATIENT
Start: 2020-07-07 | End: 2020-10-15

## 2020-07-07 RX ORDER — LORAZEPAM 0.5 MG/1
0.5 TABLET ORAL
Qty: 20 TABLET | Refills: 3 | Status: SHIPPED | OUTPATIENT
Start: 2020-07-28 | End: 2020-10-16

## 2020-07-07 RX ORDER — TRAMADOL HYDROCHLORIDE 50 MG/1
TABLET ORAL
Qty: 120 TABLET | Refills: 2 | Status: SHIPPED | OUTPATIENT
Start: 2020-07-28 | End: 2020-10-16

## 2020-07-07 RX ORDER — CYCLOBENZAPRINE HCL 5 MG
5 TABLET ORAL 2 TIMES DAILY PRN
Qty: 40 TABLET | Refills: 3 | Status: SHIPPED | OUTPATIENT
Start: 2020-07-07 | End: 2020-10-16

## 2020-07-07 RX ORDER — ROPINIROLE 0.5 MG/1
0.5 TABLET, FILM COATED ORAL 2 TIMES DAILY PRN
Qty: 180 TABLET | Refills: 5 | Status: SHIPPED | OUTPATIENT
Start: 2020-07-07 | End: 2021-07-16

## 2020-07-07 RX ORDER — POTASSIUM CHLORIDE 1500 MG/1
20 TABLET, EXTENDED RELEASE ORAL
Status: ON HOLD | COMMUNITY
Start: 2020-07-07 | End: 2022-06-28

## 2020-07-07 RX ORDER — FUROSEMIDE 20 MG
20 TABLET ORAL DAILY PRN
Qty: 60 TABLET | Refills: 3 | Status: SHIPPED | OUTPATIENT
Start: 2020-07-07 | End: 2021-02-23

## 2020-07-07 ASSESSMENT — ANXIETY QUESTIONNAIRES
IF YOU CHECKED OFF ANY PROBLEMS ON THIS QUESTIONNAIRE, HOW DIFFICULT HAVE THESE PROBLEMS MADE IT FOR YOU TO DO YOUR WORK, TAKE CARE OF THINGS AT HOME, OR GET ALONG WITH OTHER PEOPLE: SOMEWHAT DIFFICULT
6. BECOMING EASILY ANNOYED OR IRRITABLE: SEVERAL DAYS
3. WORRYING TOO MUCH ABOUT DIFFERENT THINGS: MORE THAN HALF THE DAYS
GAD7 TOTAL SCORE: 15
7. FEELING AFRAID AS IF SOMETHING AWFUL MIGHT HAPPEN: MORE THAN HALF THE DAYS
5. BEING SO RESTLESS THAT IT IS HARD TO SIT STILL: SEVERAL DAYS
1. FEELING NERVOUS, ANXIOUS, OR ON EDGE: NEARLY EVERY DAY
2. NOT BEING ABLE TO STOP OR CONTROL WORRYING: NEARLY EVERY DAY

## 2020-07-07 ASSESSMENT — PATIENT HEALTH QUESTIONNAIRE - PHQ9
5. POOR APPETITE OR OVEREATING: NEARLY EVERY DAY
SUM OF ALL RESPONSES TO PHQ QUESTIONS 1-9: 11

## 2020-07-07 NOTE — PROGRESS NOTES
"Diana Salvador is a 55 year old female who is being evaluated via a billable telephone visit.      The patient has been notified of following:     \"This telephone visit will be conducted via a call between you and your physician/provider. We have found that certain health care needs can be provided without the need for a physical exam.  This service lets us provide the care you need with a short phone conversation.  If a prescription is necessary we can send it directly to your pharmacy.  If lab work is needed we can place an order for that and you can then stop by our lab to have the test done at a later time.    Telephone visits are billed at different rates depending on your insurance coverage. During this emergency period, for some insurers they may be billed the same as an in-person visit.  Please reach out to your insurance provider with any questions.    If during the course of the call the physician/provider feels a telephone visit is not appropriate, you will not be charged for this service.\"    Patient has given verbal consent for Telephone visit?  Yes    What phone number would you like to be contacted at? [phone]    How would you like to obtain your AVS? Danielehart    Subjective     Diana Salvador is a 55 year old female who presents via phone visit today for the following health issues:    HPI  Anxiety Follow-Up    How are you doing with your anxiety since your last visit?  Stable    Are you having other symptoms that might be associated with anxiety? No    Have you had a significant life event? OTHER: Family     Are you feeling depressed? No- just stressed    Do you have any concerns with your use of alcohol or other drugs? No     Insomnia:Lorazepam if needed before bedtime if mind won't shut off especially during the work week, not on weekends.     Did switch routine, turning off screen 1 hr before bed and then reading before bed.    Walking more.    Social History     Tobacco Use     Smoking " status: Former Smoker     Smokeless tobacco: Never Used   Substance Use Topics     Alcohol use: Yes     Comment: occ     Drug use: No     SHADE-7 SCORE 4/3/2020   Total Score 19     PHQ 4/3/2020   PHQ-9 Total Score 7   Q9: Thoughts of better off dead/self-harm past 2 weeks Not at all     Last PHQ-9 7/7/2020   1.  Little interest or pleasure in doing things 1   2.  Feeling down, depressed, or hopeless 0   3.  Trouble falling or staying asleep, or sleeping too much 3   4.  Feeling tired or having little energy 3   5.  Poor appetite or overeating 1   6.  Feeling bad about yourself 0   7.  Trouble concentrating 3   8.  Moving slowly or restless 0   Q9: Thoughts of better off dead/self-harm past 2 weeks 0   PHQ-9 Total Score 11   Difficulty at work, home, or with people Somewhat difficult     SHADE-7  7/7/2020   1. Feeling nervous, anxious, or on edge 3   2. Not being able to stop or control worrying 3   3. Worrying too much about different things 2   4. Trouble relaxing 3   5. Being so restless that it is hard to sit still 1   6. Becoming easily annoyed or irritable 1   7. Feeling afraid, as if something awful might happen 2   SHADE-7 Total Score 15   If you checked any problems, how difficult have they made it for you to do your work, take care of things at home, or get along with other people? Somewhat difficult       Chronic Pain Follow-Up    Where in your body do you have pain? Sciatic right side. Patient is slightly getting worse but patient states she uses her Voltaren gel and Tramadol and try's to stay active. Inactivity seems to be worse for her pain.  How has your pain affected your ability to work? Works full time  Which of these pain treatments have you tried since your last clinic visit? Other: Patient states she did a guided injection but that did not work.  How well are you sleeping? Improving  How has your mood been since your last visit? About the same  Have you had a significant life event? Other: Family  mental health and other son will be living with her.  Other aggravating factors: prolonged standing and laying down.  Taking medication as directed? 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  History of back problems: previous degenerative joint disease of the lumbar spine and scoliosis.  Any previous MRI or X-rays: Yes--at [facility].  Date 3/2019  Sees a specialist for back pain:  Yes, did once for injection but did not have comprehensive appt.  Therapies tried without relief: Back injection 3/2019    Alleviating factors:   Improved by: Voltaren gel very helpful, home Physical therapy nightly, stretches, Flexeril (at night), and tramadol 50mg 3-4 times a day (last Rx 6/1/2020 for #120) (able to use at work, no sedation and this is enough to keep functional at work), celebrex 100mg BID without much improvement noticed and IBP if needed  At last job insurance didn't cover pain clinic and this network and now new job hoping insurance will cover pain clinic eligible as of Dec 1st.  Risk Factors:    Sleep:  Poor    Mood/anxiety:  controlled    Recent family or social stressors:  none noted    Other aggravating factors: prolonged standing and in the morning after she gets out of bed. Its hard after being stiff for a long time.    PHQ-9 SCORE 4/3/2020   PHQ-9 Total Score 7     SHADE-7 SCORE 4/3/2020   Total Score 19     No flowsheet data found.  Encounter-Level CSA:    There are no encounter-level csa.     Patient-Level CSA:    Controlled Substance Agreement - Opioid - Scan on 11/17/2019  3:57 PM         Medication Followup of Lasix    Taking Medication as prescribed: yes    Side Effects:  None  Medication Helping Symptoms:  Yes      How many servings of fruits and vegetables do you eat daily?  2-3    On average, how many sweetened beverages do you drink each day (Examples: soda, juice, sweet tea, etc.  Do NOT  count diet or artificially sweetened beverages)?   1-2    How many days per week do you exercise enough to make your heart beat faster? 4    How many minutes a day do you exercise enough to make your heart beat faster? 20 - 29    How many days per week do you miss taking your medication? 0         Cold sore: in the past former doctor would sent acyclovir to take at first sign of cold sore and now just starting to feel a cold sore coming on.    BP Readings from Last 3 Encounters:   05/11/20 (!) 130/90   04/03/20 (!) 144/96   11/13/19 122/70    Wt Readings from Last 3 Encounters:   04/03/20 89.4 kg (197 lb)   11/13/19 86.6 kg (191 lb)   08/05/19 81.6 kg (180 lb)                    Reviewed and updated as needed this visit by Provider         Review of Systems   Constitutional, HEENT, cardiovascular, pulmonary, gi and gu systems are negative, except as otherwise noted.       Objective   Reported vitals:  There were no vitals taken for this visit.   healthy, alert and no distress  PSYCH: Alert and oriented times 3; coherent speech, normal   rate and volume, able to articulate logical thoughts, able   to abstract reason, no tangential thoughts, no hallucinations   or delusions  Her affect is normal  RESP: No cough, no audible wheezing, able to talk in full sentences  Remainder of exam unable to be completed due to telephone visits    Diagnostic Test Results:  Labs reviewed in Epic        Assessment/Plan:  1. Acute right-sided low back pain with right-sided sciatica  Stable, refill  - cyclobenzaprine (FLEXERIL) 5 MG tablet; Take 1 tablet (5 mg) by mouth 2 times daily as needed for muscle spasms  Dispense: 40 tablet; Refill: 3  - traMADol (ULTRAM) 50 MG tablet; TAKE 1 TABLET(50 MG) BY MOUTH EVERY 6 HOURS AS NEEDED FOR SEVERE PAIN  Dispense: 120 tablet; Refill: 2    2. Lower extremity edema  Stable, refill  - furosemide (LASIX) 20 MG tablet; Take 1 tablet (20 mg) by mouth daily as needed (swelling)  Dispense: 60 tablet;  Refill: 3    3. Situational anxiety  Stable, using mostly at night on work days, has changed some habits too, encouraged CBT again.  - LORazepam (ATIVAN) 0.5 MG tablet; Take 1 tablet (0.5 mg) by mouth nightly as needed for anxiety  Dispense: 20 tablet; Refill: 3    4. Chronic, continuous use of opioids  Stable, refill  - traMADol (ULTRAM) 50 MG tablet; TAKE 1 TABLET(50 MG) BY MOUTH EVERY 6 HOURS AS NEEDED FOR SEVERE PAIN  Dispense: 120 tablet; Refill: 2    5. Restless legs syndrome  Stable refill  - rOPINIRole (REQUIP) 0.5 MG tablet; Take 1 tablet (0.5 mg) by mouth 2 times daily as needed (restless legs)  Dispense: 180 tablet; Refill: 5    6. Cold sore  Send prescription to take at first sign of cold sore  - valACYclovir (VALTREX) 1000 mg tablet; Take 2 tablets (2,000 mg) by mouth 2 times daily for 1 day Repeat as needed for cold sore.  Dispense: 16 tablet; Refill: 3    No follow-ups on file.      Phone call duration:  16 minutes    Quoc Chu MD

## 2020-07-08 ASSESSMENT — ANXIETY QUESTIONNAIRES: GAD7 TOTAL SCORE: 15

## 2020-08-19 ENCOUNTER — TELEPHONE (OUTPATIENT)
Dept: FAMILY MEDICINE | Facility: CLINIC | Age: 55
End: 2020-08-19

## 2020-08-19 NOTE — TELEPHONE ENCOUNTER
Panel Management Review      Patient has the following on her problem list: None      Composite cancer screening  Chart review shows that this patient is due/due soon for the following Pap Smear and Colonoscopy  Summary:    Patient is due/failing the following:   COLONOSCOPY, PAP and PHYSICAL    Action needed:   Patient needs office visit for Physical with Pap smear. and Patient needs referral/order: Colonoscopy    Type of outreach:    None, routed to provider for review.    Questions for provider review:    It says in patients chart that she had a Hysterectomy Supracervical. Does patient still need a Pap smear?                                                                                                                                    Stacy Woods MA       Chart routed to Provider .

## 2020-08-19 NOTE — TELEPHONE ENCOUNTER
Yes, she does need a pap.   Supracervical hysterectomy means they left took out things above the cervix, so the cervix is still left.  Thank you,  Quoc Chu MD

## 2020-09-01 NOTE — TELEPHONE ENCOUNTER
Doutor Recomenda message has not been viewed.  Patient called and appt scheduled for 9/15/20 for physical.

## 2020-09-11 NOTE — PATIENT INSTRUCTIONS
Orthopedics will call you to schedule, for appt to determine the cause of the arm numbness and plan treatment.  Keep up the shoulder exercises.    Preventive Health Recommendations  Female Ages 50 - 64    Yearly exam: See your health care provider every year in order to  o Review health changes.   o Discuss preventive care.    o Review your medicines if your doctor has prescribed any.      Get a Pap test every three years (unless you have an abnormal result and your provider advises testing more often).    If you get Pap tests with HPV test, you only need to test every 5 years, unless you have an abnormal result.     You do not need a Pap test if your uterus was removed (hysterectomy) and you have not had cancer.    You should be tested each year for STDs (sexually transmitted diseases) if you're at risk.     Have a mammogram every 1 to 2 years.    Have a colonoscopy at age 50, or have a yearly FIT test (stool test). These exams screen for colon cancer.      Have a cholesterol test every 5 years, or more often if advised.    Have a diabetes test (fasting glucose) every three years. If you are at risk for diabetes, you should have this test more often.     If you are at risk for osteoporosis (brittle bone disease), think about having a bone density scan (DEXA).    Shots: Get a flu shot each year. Get a tetanus shot every 10 years.    Nutrition:     Eat at least 5 servings of fruits and vegetables each day.    Eat whole-grain bread, whole-wheat pasta and brown rice instead of white grains and rice.    Get adequate Calcium and Vitamin D.     Lifestyle    Exercise at least 150 minutes a week (30 minutes a day, 5 days a week). This will help you control your weight and prevent disease.    Limit alcohol to one drink per day.    No smoking.     Wear sunscreen to prevent skin cancer.     See your dentist every six months for an exam and cleaning.    See your eye doctor every 1 to 2 years.

## 2020-09-11 NOTE — PROGRESS NOTES
SUBJECTIVE:   CC: Diana Salvador is an 55 year old woman who presents for preventive health visit.      Healthy Habits:    Do you get at least three servings of calcium containing foods daily (dairy, green leafy vegetables, etc.)? no    Amount of exercise or daily activities, outside of work: 3 day(s) per week    Problems taking medications regularly No    Medication side effects: No    Have you had an eye exam in the past two years? yes    Do you see a dentist twice per year? no    Do you have sleep apnea, excessive snoring or daytime drowsiness? Yes- daytime drowsiness. Patient does not sleep good a night.    2 months of right hand thumb, pointer, middle finger numbness up the entire arm to the back of the neck with numb feeling and weakness with the numbness, worse in the morning, happens throughout the day, can feel it coming on and will shake it to make it fully come on.  Shoulder since April 2019 decreased RANGE OF MOTION.  Surgery 10 years ago with the arthroscopic clean out.      Today's PHQ-2 Score:   PHQ-2 ( 1999 Pf[employer]) 4/3/2020 8/2/2019   Q1: Little interest or pleasure in doing things 0 0   Q2: Feeling down, depressed or hopeless 0 0   PHQ-2 Score 0 0       Abuse: Current or Past(Physical, Sexual or Emotional)- No  Do you feel safe in your environment? Yes    Have you ever done Advance Care Planning? (For example, a Health Directive, POLST, or a discussion with a medical provider or your loved ones about your wishes): No, advance care planning information given to patient to review.  Advanced care planning was discussed at today's visit.    Social History     Tobacco Use     Smoking status: Former Smoker     Smokeless tobacco: Never Used   Substance Use Topics     Alcohol use: Yes     Comment: occ     If you drink alcohol do you typically have >3 drinks per day or >7 drinks per week? No                     Reviewed orders with patient.  Reviewed health maintenance and updated orders accordingly -  "Yes  BP Readings from Last 3 Encounters:   09/15/20 132/84   05/11/20 (!) 130/90   04/03/20 (!) 144/96    Wt Readings from Last 3 Encounters:   09/15/20 92.4 kg (203 lb 9.6 oz)   04/03/20 89.4 kg (197 lb)   11/13/19 86.6 kg (191 lb)                    Mammogram Screening: Patient over age 50, mutual decision to screen reflected in health maintenance.    Pertinent mammograms are reviewed under the imaging tab.  History of abnormal Pap smear: NO - age 30-65 PAP every 5 years with negative HPV co-testing recommended     Reviewed and updated as needed this visit by clinical staff         Reviewed and updated as needed this visit by Provider            ROS:  CONSTITUTIONAL: NEGATIVE for fever, chills, change in weight  INTEGUMENTARY/SKIN: NEGATIVE for worrisome rashes, moles or lesions  EYES: NEGATIVE for vision changes or irritation  ENT: NEGATIVE for ear, mouth and throat problems  RESP: NEGATIVE for significant cough or SOB  BREAST: NEGATIVE for masses, tenderness or discharge  CV: NEGATIVE for chest pain, palpitations or peripheral edema  GI: NEGATIVE for nausea, abdominal pain, heartburn, or change in bowel habits  : leaking with stress and urgency. Often having increasing urgency. NEGATIVE for unusual urinary or vaginal symptoms. No vaginal bleeding.  MUSCULOSKELETAL: NEGATIVE for significant arthralgias or myalgia  NEURO: NEGATIVE for weakness, dizziness or paresthesias  PSYCHIATRIC: NEGATIVE for changes in mood or affect     OBJECTIVE:   /84   Pulse 88   Temp 98.8  F (37.1  C) (Tympanic)   Resp 16   Ht 1.695 m (5' 6.75\")   Wt 92.4 kg (203 lb 9.6 oz)   SpO2 98%   BMI 32.13 kg/m    EXAM:  GENERAL APPEARANCE: healthy, alert and no distress  EYES: Eyes grossly normal to inspection, PERRL and conjunctivae and sclerae normal  HENT: ear canals and TM's normal, nose and mouth without ulcers or lesions, oropharynx clear and oral mucous membranes moist  NECK: no adenopathy, no asymmetry, masses, or scars and " thyroid normal to palpation  RESP: lungs clear to auscultation - no rales, rhonchi or wheezes  BREAST: normal without masses, tenderness or nipple discharge and no palpable axillary masses or adenopathy  CV: regular rate and rhythm, normal S1 S2, no S3 or S4, no murmur, click or rub, no peripheral edema and peripheral pulses strong  ABDOMEN: soft, nontender, no hepatosplenomegaly, no masses and bowel sounds normal   (female): normal female external genitalia, normal urethral meatus, vaginal mucosal atrophy noted, normal cervix without masses or abnormal discharge  MS: Right shoulder abduction to 90 degrees only.  Normal current strength in hand. no musculoskeletal defects are noted and gait is age appropriate without ataxia  SKIN: no suspicious lesions or rashes  NEURO: Normal strength and tone, sensory exam grossly normal, mentation intact and speech normal  PSYCH: mentation appears normal and affect normal/bright    Diagnostic Test Results:  Labs reviewed in Epic    ASSESSMENT/PLAN:   Diana was seen today for physical.    Diagnoses and all orders for this visit:    Routine general medical examination at a health care facility    Numbness and tingling of right arm: likely radial neuropathy from the shoulder issues.  -     Orthopedic & Spine  Referral; Future    Cervical cancer screening  -     Pap imaged thin layer screen with HPV - recommended age 30 - 65  -     HPV High Risk Types DNA Cervical    Colon cancer screening  -     Fecal colorectal cancer screen (FIT); Future    Acute right-sided low back pain with right-sided sciatica:   -     diclofenac (VOLTAREN) 1 % topical gel; Place 2 g onto the skin 4 times daily    History of Orestes-en-Y gastric bypass: check labs, has been years.  -     Comprehensive metabolic panel (BMP + Alb, Alk Phos, ALT, AST, Total. Bili, TP); Future  -     Vitamin B12; Future  -     Vitamin D Deficiency; Future  -     Hemoglobin A1c; Future  -     Ferritin; Future  -     Lipid  "panel reflex to direct LDL Fasting; Future  -     Parathyroid Hormone Intact; Future  -     Vitamin B1 plasma; Future  -     Vitamin A; Future  -     Zinc; Future  -     Copper level; Future  -     Hemoglobin; Future  -     Iron and iron binding capacity; Future    Other orders  -     C RIV4 (FLUBLOK) VACCINE RECOMBINANT DNA PRSRV ANTIBIO FREE, IM [[id number]]        COUNSELING:   Reviewed preventive health counseling, as reflected in patient instructions       Regular exercise       Healthy diet/nutrition       Vision screening    Estimated body mass index is 31.32 kg/m  as calculated from the following:    Height as of 4/3/20: 1.689 m (5' 6.5\").    Weight as of 4/3/20: 89.4 kg (197 lb).    Weight management plan: Discussed healthy diet and exercise guidelines    She reports that she has quit smoking. She has never used smokeless tobacco.      Counseling Resources:  ATP IV Guidelines  Pooled Cohorts Equation Calculator  Breast Cancer Risk Calculator  BRCA-Related Cancer Risk Assessment: FHS-7 Tool  FRAX Risk Assessment  ICSI Preventive Guidelines  Dietary Guidelines for Americans, 2010  USDA's MyPlate  ASA Prophylaxis  Lung CA Screening    Quoc Chu MD  [facility]  "

## 2020-09-15 ENCOUNTER — OFFICE VISIT (OUTPATIENT)
Dept: FAMILY MEDICINE | Facility: CLINIC | Age: 55
End: 2020-09-15
Payer: COMMERCIAL

## 2020-09-15 VITALS
HEART RATE: 88 BPM | HEIGHT: 67 IN | SYSTOLIC BLOOD PRESSURE: 132 MMHG | TEMPERATURE: 98.8 F | RESPIRATION RATE: 16 BRPM | OXYGEN SATURATION: 98 % | DIASTOLIC BLOOD PRESSURE: 84 MMHG | BODY MASS INDEX: 31.96 KG/M2 | WEIGHT: 203.6 LBS

## 2020-09-15 DIAGNOSIS — Z12.11 COLON CANCER SCREENING: ICD-10-CM

## 2020-09-15 DIAGNOSIS — M54.41 ACUTE RIGHT-SIDED LOW BACK PAIN WITH RIGHT-SIDED SCIATICA: ICD-10-CM

## 2020-09-15 DIAGNOSIS — Z00.00 ROUTINE GENERAL MEDICAL EXAMINATION AT A HEALTH CARE FACILITY: Primary | ICD-10-CM

## 2020-09-15 DIAGNOSIS — R20.2 NUMBNESS AND TINGLING OF RIGHT ARM: ICD-10-CM

## 2020-09-15 DIAGNOSIS — Z12.4 CERVICAL CANCER SCREENING: ICD-10-CM

## 2020-09-15 DIAGNOSIS — R20.0 NUMBNESS AND TINGLING OF RIGHT ARM: ICD-10-CM

## 2020-09-15 DIAGNOSIS — Z98.84 HISTORY OF ROUX-EN-Y GASTRIC BYPASS: ICD-10-CM

## 2020-09-15 PROCEDURE — 99396 PREV VISIT EST AGE 40-64: CPT | Mod: 25 | Performed by: FAMILY MEDICINE

## 2020-09-15 PROCEDURE — G0145 SCR C/V CYTO,THINLAYER,RESCR: HCPCS | Performed by: FAMILY MEDICINE

## 2020-09-15 PROCEDURE — 90471 IMMUNIZATION ADMIN: CPT | Performed by: FAMILY MEDICINE

## 2020-09-15 PROCEDURE — 90682 RIV4 VACC RECOMBINANT DNA IM: CPT | Performed by: FAMILY MEDICINE

## 2020-09-15 PROCEDURE — 87624 HPV HI-RISK TYP POOLED RSLT: CPT | Performed by: FAMILY MEDICINE

## 2020-09-15 PROCEDURE — 99213 OFFICE O/P EST LOW 20 MIN: CPT | Mod: 25 | Performed by: FAMILY MEDICINE

## 2020-09-15 ASSESSMENT — MIFFLIN-ST. JEOR: SCORE: 1542.18

## 2020-09-18 ENCOUNTER — OFFICE VISIT (OUTPATIENT)
Dept: ORTHOPEDICS | Facility: CLINIC | Age: 55
End: 2020-09-18
Payer: COMMERCIAL

## 2020-09-18 ENCOUNTER — ANCILLARY PROCEDURE (OUTPATIENT)
Dept: GENERAL RADIOLOGY | Facility: CLINIC | Age: 55
End: 2020-09-18
Attending: PEDIATRICS
Payer: COMMERCIAL

## 2020-09-18 VITALS
HEIGHT: 66 IN | DIASTOLIC BLOOD PRESSURE: 78 MMHG | BODY MASS INDEX: 33.27 KG/M2 | SYSTOLIC BLOOD PRESSURE: 124 MMHG | WEIGHT: 207 LBS

## 2020-09-18 DIAGNOSIS — M54.2 NECK PAIN: ICD-10-CM

## 2020-09-18 DIAGNOSIS — R20.0 NUMBNESS AND TINGLING OF RIGHT ARM: ICD-10-CM

## 2020-09-18 DIAGNOSIS — M54.12 CERVICAL RADICULOPATHY: ICD-10-CM

## 2020-09-18 DIAGNOSIS — R20.2 NUMBNESS AND TINGLING OF RIGHT ARM: ICD-10-CM

## 2020-09-18 DIAGNOSIS — R20.0 NUMBNESS AND TINGLING OF LEFT UPPER EXTREMITY: ICD-10-CM

## 2020-09-18 DIAGNOSIS — R20.2 NUMBNESS AND TINGLING OF LEFT UPPER EXTREMITY: ICD-10-CM

## 2020-09-18 LAB
COPATH REPORT: NORMAL
PAP: NORMAL

## 2020-09-18 PROCEDURE — 99244 OFF/OP CNSLTJ NEW/EST MOD 40: CPT | Performed by: PEDIATRICS

## 2020-09-18 PROCEDURE — 72040 X-RAY EXAM NECK SPINE 2-3 VW: CPT

## 2020-09-18 ASSESSMENT — MIFFLIN-ST. JEOR: SCORE: 1545.7

## 2020-09-18 NOTE — PROGRESS NOTES
Sports Medicine Clinic Visit    PCP: Quoc Chu    Diana KIET Salvador is a 55 year old female who is seen  in consultation at the request of  Quoc Chu M.D. presenting with cervical pain and numbness and tingling in both arms (right worse than left).    Injury: She reports bilateral arm numbness and tingling for 2 months - right is worse. She reports chronic neck pain, minimal radiating pain though.  Has had pain for a year - worse in the last 7-8 weeks.  - History of right sided ulnar neuropathy s/p surgery    Location of Pain: bilateral arms and hands  Duration of Pain: 1 year(s)  Rating of Pain at worst: 8/10  Rating of Pain Currently: 6/10  Symptoms are better with: Other medications: Volteran Gel and Rest  Symptoms are worse with: sleeping, dressing, and computer work  Additional Features:   Positive: paresthesias, numbness and weakness   Negative: swelling, bruising, popping, grinding, catching, locking and instability  Other evaluation and/or treatments so far consists of: Nothing  Prior History of related problems: bilateral shoulder surgery 9-10 years    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    Patient's current problem list, past medical and surgical history, and family history were reviewed.    Patient Active Problem List   Diagnosis     Osteopenia of multiple sites     Restless legs syndrome     Lower extremity edema     Sciatica of right side     Chronic, continuous use of opioids     Situational anxiety     No past medical history on file.  Past Surgical History:   Procedure Laterality Date     ARTHROSCOPY SHOULDER ROTATOR CUFF REPAIR Right      ARTHROSCOPY SHOULDER ROTATOR CUFF REPAIR Left       SECTION       CHOLECYSTECTOMY    "    DAVINCI BYPASS GASTRIC ROBERT-EN-Y  2001     HYSTERECTOMY SUPRACERVICAL       right ulnar nerve repositioning surgery       Family History   Problem Relation Age of Onset     Heart Failure Mother        Objective  /78   Ht 1.676 m (5' 6\")   Wt 93.9 kg (207 lb)   BMI 33.41 kg/m      GENERAL APPEARANCE: healthy, alert and no distress   GAIT: NORMAL  SKIN: no suspicious lesions or rashes  HEENT: Sclera clear, anicteric  CV: good peripheral pulses  RESP: Breathing not labored  NEURO: Normal strength and tone, mentation intact and speech normal  PSYCH:  mentation appears normal and affect normal/bright    Cervical Spine Exam    Inspection:       No visible deformity        normal lordotic curvature maintained    Posture:      rounded shoulders and upper back    Tender:      midline       paraspinal muscles    Non-Tender:      remainder of cervical spine area    Range of Motion:       Full active and passive ROM forward flexion, extension, lateral rotation, lateral flexion.    Painful Motions:       Extension - pain in neck only    Strength:     C4 (shoulder shrug)  symmetric 5/5       C5 (shoulder abduction) symmetric 5/5       C6 (elbow flexion) symmetric 5/5       C7 (elbow extension) symmetric 5/5       C8 (finger abduction, thumb flexion) symmetric 5/5    Reflexes:      C5 (biceps) symmetric normal       C6 (supinator) symmetric normal       C7 (triceps) symmetric normal    Sensation:     grossly intact througout bilateral upper extremities    Special Tests:      neg (-) Spurling    Skin:     well perfused       capillary refill brisk    Lymphatics:      no edema noted in the upper extremities     Bilateral Wrist and Hand exam    Inspection:       No swelling, bruising or deformity bilateral    Tender:       Mild volar pain    Non Tender:       Remainder of the Wrist and Hand bilateral    ROM:       Full and symmetric active and passive range of motion of the forearm, wrist and digits " bilateral    Strength:       5/5 strength in the muscles of the hand, wrist and forearm bilateral    Special Tests:        Negative (-) Tinel's test bilateral and       positive (+) Phalen's test bilateral    Neurovascular:       2+ radial pulses bilaterally with brisk capillary refill and      normal sensation to light touch in the radial, median and ulnar nerve distributions    Radiology  I ordered, visualized and reviewed these images with the patient  Xr Cervical Spine 2/3 Vws  Result Date: 9/18/2020  XR CERVICAL SPINE TWO-THREE VIEWS  9/18/2020 4:08 PM HISTORY: Numbness and tingling of right arm. Cervical radiculopathy. COMPARISON: None.   IMPRESSION: Straightening of the normal cervical lordosis which may be positional. Anterior-posterior alignment of the spine is within normal limits. No loss of vertebral body height or prevertebral soft tissue swelling. Mild degenerative endplate changes and loss of intervertebral disc space in the cervical spine, particularly at C4-C5, C5-C6, and C7-T1. BUD BAXTER MD    Assessment:  1. Numbness and tingling of right arm    2. Neck pain    3. Numbness and tingling of left upper extremity      We discussed these other possible diagnosis: cervical radiculopathy, carpal tunnel    Plan:  - Today's Plan of Care:  Referral for EMG  Rehab: Physical Therapy: [facility] - [phone]    -We also discussed other future treatment options:  Cervical MRI    Follow Up: In clinic with Dr. Latham after EMG (wait at least 1-2 days)    Concerning signs and symptoms were reviewed.  The patient expressed understanding of this management plan and all questions were answered at this time.    Thanks for the opportunity to participate in the care of this patient, I will keep you updated on their progress.    CC: Quoc Latham MD CA  Primary Care Sports Medicine  [facility]

## 2020-09-18 NOTE — RESULT ENCOUNTER NOTE
These results were discussed during office visit.    Samra Latham MD, CAQ  Primary Care Sports Medicine  [facility]

## 2020-09-18 NOTE — LETTER
9/18/2020         RE: Diana Salvador  [address] [place] [id number]        Dear Colleague,    Thank you for referring your patient, Diana Salvador, to the [facility]. Please see a copy of my visit note below.    Sports Medicine Clinic Visit    PCP: Quoc Chu    Diana Salvador is a 55 year old female who is seen  in consultation at the request of  Quoc Chu M.D. presenting with cervical pain and numbness and tingling in both arms (right worse than left).    Injury: She reports bilateral arm numbness and tingling for 2 months - right is worse. She reports chronic neck pain, minimal radiating pain though.  Has had pain for a year - worse in the last 7-8 weeks.  - History of right sided ulnar neuropathy s/p surgery    Location of Pain: bilateral arms and hands  Duration of Pain: 1 year(s)  Rating of Pain at worst: 8/10  Rating of Pain Currently: 6/10  Symptoms are better with: Other medications: Volteran Gel and Rest  Symptoms are worse with: sleeping, dressing, and computer work  Additional Features:   Positive: paresthesias, numbness and weakness   Negative: swelling, bruising, popping, grinding, catching, locking and instability  Other evaluation and/or treatments so far consists of: Nothing  Prior History of related problems: bilateral shoulder surgery 9-10 years    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    Patient's current problem list, past medical and surgical history, and family history were reviewed.    Patient Active Problem List   Diagnosis     Osteopenia of multiple sites     Restless legs syndrome     Lower extremity edema     Sciatica of right side     Chronic, continuous use of opioids      "Situational anxiety     No past medical history on file.  Past Surgical History:   Procedure Laterality Date     ARTHROSCOPY SHOULDER ROTATOR CUFF REPAIR Right      ARTHROSCOPY SHOULDER ROTATOR CUFF REPAIR Left       SECTION       CHOLECYSTECTOMY       DAVINCI BYPASS GASTRIC ROBERT-EN-Y       HYSTERECTOMY SUPRACERVICAL       right ulnar nerve repositioning surgery       Family History   Problem Relation Age of Onset     Heart Failure Mother        Objective  /78   Ht 1.676 m (5' 6\")   Wt 93.9 kg (207 lb)   BMI 33.41 kg/m      GENERAL APPEARANCE: healthy, alert and no distress   GAIT: NORMAL  SKIN: no suspicious lesions or rashes  HEENT: Sclera clear, anicteric  CV: good peripheral pulses  RESP: Breathing not labored  NEURO: Normal strength and tone, mentation intact and speech normal  PSYCH:  mentation appears normal and affect normal/bright    Cervical Spine Exam    Inspection:       No visible deformity        normal lordotic curvature maintained    Posture:      rounded shoulders and upper back    Tender:      midline       paraspinal muscles    Non-Tender:      remainder of cervical spine area    Range of Motion:       Full active and passive ROM forward flexion, extension, lateral rotation, lateral flexion.    Painful Motions:       Extension - pain in neck only    Strength:     C4 (shoulder shrug)  symmetric 5/5       C5 (shoulder abduction) symmetric 5/5       C6 (elbow flexion) symmetric 5/5       C7 (elbow extension) symmetric 5/5       C8 (finger abduction, thumb flexion) symmetric 5/5    Reflexes:      C5 (biceps) symmetric normal       C6 (supinator) symmetric normal       C7 (triceps) symmetric normal    Sensation:     grossly intact througout bilateral upper extremities    Special Tests:      neg (-) Spurling    Skin:     well perfused       capillary refill brisk    Lymphatics:      no edema noted in the upper extremities     Bilateral Wrist and Hand exam    Inspection:       No " swelling, bruising or deformity bilateral    Tender:       Mild volar pain    Non Tender:       Remainder of the Wrist and Hand bilateral    ROM:       Full and symmetric active and passive range of motion of the forearm, wrist and digits bilateral    Strength:       5/5 strength in the muscles of the hand, wrist and forearm bilateral    Special Tests:        Negative (-) Tinel's test bilateral and       positive (+) Phalen's test bilateral    Neurovascular:       2+ radial pulses bilaterally with brisk capillary refill and      normal sensation to light touch in the radial, median and ulnar nerve distributions    Radiology  I ordered, visualized and reviewed these images with the patient  Xr Cervical Spine 2/3 Vws  Result Date: 9/18/2020  XR CERVICAL SPINE TWO-THREE VIEWS  9/18/2020 4:08 PM HISTORY: Numbness and tingling of right arm. Cervical radiculopathy. COMPARISON: None.   IMPRESSION: Straightening of the normal cervical lordosis which may be positional. Anterior-posterior alignment of the spine is within normal limits. No loss of vertebral body height or prevertebral soft tissue swelling. Mild degenerative endplate changes and loss of intervertebral disc space in the cervical spine, particularly at C4-C5, C5-C6, and C7-T1. BUD BAXTER MD    Assessment:  1. Numbness and tingling of right arm    2. Neck pain    3. Numbness and tingling of left upper extremity      We discussed these other possible diagnosis: cervical radiculopathy, carpal tunnel    Plan:  - Today's Plan of Care:  Referral for EMG  Rehab: Physical Therapy: [facility] - [phone]    -We also discussed other future treatment options:  Cervical MRI    Follow Up: In clinic with Dr. Latham after EMG (wait at least 1-2 days)    Concerning signs and symptoms were reviewed.  The patient expressed understanding of this management plan and all questions were answered at this time.    Thanks for the opportunity to participate in the care  of this patient, I will keep you updated on their progress.    CC: Quoc Latham MD CA  Primary Care Sports Medicine  [facility]    Again, thank you for allowing me to participate in the care of your patient.        Sincerely,        Samra Latham MD

## 2020-09-18 NOTE — PATIENT INSTRUCTIONS
We discussed these other possible diagnosis: cervical radiculopathy, carpal tunnel    Plan:  - Today's Plan of Care:  Referral for EMG  [phone]    -We also discussed other future treatment options:  Cervical MRI    Follow Up: In clinic with Dr. Latham after EMG (wait at least 1-2 days)    If you have any further questions for your physician or physician s care team you can call [phone] and use option 3 to leave a voice message. Calls received during business hours will be returned same day.

## 2020-09-22 ENCOUNTER — PATIENT OUTREACH (OUTPATIENT)
Dept: FAMILY MEDICINE | Facility: CLINIC | Age: 55
End: 2020-09-22

## 2020-09-22 DIAGNOSIS — R87.810 CERVICAL HIGH RISK HPV (HUMAN PAPILLOMAVIRUS) TEST POSITIVE: ICD-10-CM

## 2020-09-22 LAB
FINAL DIAGNOSIS: ABNORMAL
HPV HR 12 DNA CVX QL NAA+PROBE: POSITIVE
HPV16 DNA SPEC QL NAA+PROBE: NEGATIVE
HPV18 DNA SPEC QL NAA+PROBE: NEGATIVE
SPECIMEN DESCRIPTION: ABNORMAL
SPECIMEN SOURCE CVX/VAG CYTO: ABNORMAL

## 2020-09-28 ENCOUNTER — HOSPITAL ENCOUNTER (OUTPATIENT)
Dept: PHYSICAL THERAPY | Facility: CLINIC | Age: 55
Setting detail: THERAPIES SERIES
End: 2020-09-28
Attending: PEDIATRICS
Payer: COMMERCIAL

## 2020-09-28 DIAGNOSIS — R20.0 NUMBNESS AND TINGLING OF RIGHT ARM: ICD-10-CM

## 2020-09-28 DIAGNOSIS — R20.2 NUMBNESS AND TINGLING OF RIGHT ARM: ICD-10-CM

## 2020-09-28 DIAGNOSIS — M54.2 NECK PAIN: ICD-10-CM

## 2020-09-28 DIAGNOSIS — R20.0 NUMBNESS AND TINGLING OF LEFT UPPER EXTREMITY: ICD-10-CM

## 2020-09-28 DIAGNOSIS — R20.2 NUMBNESS AND TINGLING OF LEFT UPPER EXTREMITY: ICD-10-CM

## 2020-09-28 PROCEDURE — 97110 THERAPEUTIC EXERCISES: CPT | Mod: GP | Performed by: PHYSICAL THERAPIST

## 2020-09-28 PROCEDURE — 97161 PT EVAL LOW COMPLEX 20 MIN: CPT | Mod: GP | Performed by: PHYSICAL THERAPIST

## 2020-09-28 PROCEDURE — 97162 PT EVAL MOD COMPLEX 30 MIN: CPT | Mod: GP | Performed by: PHYSICAL THERAPIST

## 2020-09-29 NOTE — PROGRESS NOTES
"   09/28/20 1500   General Information   Type of Visit Initial OP Ortho PT Evaluation   Start of Care Date 09/28/20   Referring Physician Dr. Latham   Patient/Family Goals Statement pain free; figure out the problem, be able to sleep better   Orders Evaluate and Treat   Date of Order 09/18/20   Medical Diagnosis Numbness and tingling of right arm; neck pain; numbness and tingling of left upper extremity   Surgical/Medical history reviewed Yes   Precautions/Limitations no known precautions/limitations   General Information Comments right ulnar nerve repositioning; bilateral GH SAD   Body Part(s)   Body Part(s) Cervical Spine   Presentation and Etiology   Pertinent history of current problem (include personal factors and/or comorbidities that impact the POC) Pt reports: she wakes up and the right arm is asleep, into the first 3 digits.  When she moves certain ways the left arm feels symptoms into the first 3 fingers.     Impairments A. Pain;D. Decreased ROM;E. Decreased flexibility;F. Decreased strength and endurance;J. Burning;K. Numbness;L. Tingling   Functional Limitations perform activities of daily living;perform desired leisure / sports activities;perform required work activities   Symptom Location polo digits numb 1-3; at times electrical \"shock\" down anterior arm and forearm; mild left lateral C4-5 neck pain   How/Where did it occur From insidious onset   Onset date of current episode/exacerbation 07/29/20   Chronicity New   Pain rating (0-10 point scale) Best (/10);Worst (/10)   Best (/10) 3   Worst (/10) 10   Pain quality A. Sharp;B. Dull;D. Burning;E. Shooting   Frequency of pain/symptoms A. Constant   Pain/symptoms are: The same all the time   Pain/symptoms exacerbated by C. Lifting;D. Carrying;E. Rest;G. Certain positions;H. Overhead reach;J. ADL;K. Home tasks;L. Work tasks   Pain/symptoms eased by E. Changing positions;I. OTC medication(s)   Progression of symptoms since onset: Unchanged   Current / " "Previous Interventions   Diagnostic Tests: X-ray   X-ray Results Results   X-ray results Mild degenerative endplate changes and loss of intervertebral disc space in the cervical spine, particularly at C4-C5, C5-C6, and C7-T1.   Prior Level of Function   Prior Level of Function-Mobility Independent   Prior Level of Function-ADLs Independent   Current Level of Function   Current Community Support Family/friend caregiver   Patient role/employment history A. Employed   Employment Comments desk work, mousing, writing (right handed)   Living environment [place]   Fall Risk Screen   Fall screen completed by PT   Have you fallen 2 or more times in the past year? No   Have you fallen and had an injury in the past year? No   Is patient a fall risk? No   Abuse Screen (yes response referral indicated)   Feels Unsafe at Home or Work/School no   Feels Threatened by Someone no   Does Anyone Try to Keep You From Having Contact with Others or Doing Things Outside Your Home? no   Physical Signs of Abuse Present no   Cervical Spine   Cervical Flexion ROM 50 deg left levator scap tightness   Cervical Extension ROM 30 deg   Cervical Right Side Bending ROM 10 deg   Cervical Left Side Bending ROM 15 deg   Cervical Right Rotation ROM 70 deg   Cervical Left Rotation ROM 75 deg \"feels easier\"   Cervical/Thoracic/Shoulder ROM Comments right wdkbkcy=075 with pain; left llujtql=292 deg with pain; right ER=45; left ER=65 deg   Shoulder ER (C5, C6) Strength 4/5 right lateral shoulder pain   Shoulder/Wrist/Hand Strength Comments Myotomes 5/5   Cervical Distraction Test (+) pain reduction   UE Neural Tension UE Neural Tension Tests   Thoracic Outlet Syndrome TOS Tests   ULTT I (Median and Anterior Interosseous) Positive   Kevinon's (scalene outlet) (-)   Damaris's (costoclavicular outlet) (+)   Rich's (pec minor outlet) (+)   Planned Therapy Interventions   Planned Therapy Interventions ADL retraining;balance training;gait training;joint " mobilization;manual therapy;motor coordination training;neuromuscular re-education;ROM;strengthening;stretching   Planned Modality Interventions   Planned Modality Interventions Cryotherapy;Traction   Clinical Impression   Criteria for Skilled Therapeutic Interventions Met yes, treatment indicated   PT Diagnosis Thoracic outlet syndrome with median nerve tension; underlying cervical DDD.  Additionally right GH ROM loss and rotator cuff weakness   Influenced by the following impairments pain; weakness; ROM loss; impaired posture; UE weakness   Functional limitations due to impairments difficulty with ADL's, driving, sleeping   Clinical Presentation Stable/Uncomplicated   Clinical Presentation Rationale (+) motivation; previous success with PT  (-) chronicity of sx; multiple body parts   Clinical Decision Making (Complexity) Moderate complexity   Therapy Frequency 1 time/week   Predicted Duration of Therapy Intervention (days/wks) 10 weeks   Risk & Benefits of therapy have been explained Yes   Patient, Family & other staff in agreement with plan of care Yes   Clinical Impression Comments Aide arrives today with multiple diagnosis resulting in pain and impaired sleep; she will benefit from skilled PT to address the above impairments and functional limitations.   Education Assessment   Preferred Learning Style Listening;Demonstration;Pictures/video   Barriers to Learning No barriers   ORTHO GOALS   PT Ortho Eval Goals 1;2;3   Ortho Goal 1   Goal Description Patient will have polo cervical AROM rotation >=60 deg for safe driving.   Target Date 11/23/20   Ortho Goal 2   Goal Description Patient will be able to sleep for >=6 hour without waking due to arm pain.   Target Date 11/23/20   Ortho Goal 3   Goal Description Patient will be independent with her HEP to reduce future occurrence of pain and disability.   Target Date 10/19/20   Total Evaluation Time   PT Rekha, Moderate Complexity Minutes (46756) 25       Tatum Gould  PT, DTP, SCS  Doctor of Physical Therapy #[id number]  [facility]  [phone]  [email or web address]

## 2020-10-05 ENCOUNTER — HOSPITAL ENCOUNTER (OUTPATIENT)
Dept: PHYSICAL THERAPY | Facility: CLINIC | Age: 55
Setting detail: THERAPIES SERIES
End: 2020-10-05
Attending: PEDIATRICS
Payer: COMMERCIAL

## 2020-10-05 PROCEDURE — 97110 THERAPEUTIC EXERCISES: CPT | Mod: GP | Performed by: PHYSICAL THERAPIST

## 2020-10-05 PROCEDURE — 97140 MANUAL THERAPY 1/> REGIONS: CPT | Mod: GP | Performed by: PHYSICAL THERAPIST

## 2020-10-12 ENCOUNTER — HOSPITAL ENCOUNTER (OUTPATIENT)
Dept: PHYSICAL THERAPY | Facility: CLINIC | Age: 55
Setting detail: THERAPIES SERIES
End: 2020-10-12
Attending: PEDIATRICS
Payer: COMMERCIAL

## 2020-10-12 PROCEDURE — 97140 MANUAL THERAPY 1/> REGIONS: CPT | Mod: GP | Performed by: PHYSICAL THERAPIST

## 2020-10-12 PROCEDURE — 97110 THERAPEUTIC EXERCISES: CPT | Mod: GP | Performed by: PHYSICAL THERAPIST

## 2020-10-13 ENCOUNTER — TRANSFERRED RECORDS (OUTPATIENT)
Dept: HEALTH INFORMATION MANAGEMENT | Facility: CLINIC | Age: 55
End: 2020-10-13

## 2020-10-14 ENCOUNTER — TELEPHONE (OUTPATIENT)
Dept: ORTHOPEDICS | Facility: CLINIC | Age: 55
End: 2020-10-14

## 2020-10-14 NOTE — TELEPHONE ENCOUNTER
Please call patient with EMG results:    In Summary:  - Mild right median neuropathy at the wrist, no left median neuropathy    I Recommend:  - Clinic follow up visit to discuss results, current symptoms after a course of physical therapy and next steps in treatment    Samra Latham MD

## 2020-10-14 NOTE — TELEPHONE ENCOUNTER
Discuss EMG results and recommendations. Patient scheduled a follow up appointment on 10/23. She had no further questions at this time    Bill Thomas ATC, LAT

## 2020-10-15 DIAGNOSIS — F11.90 CHRONIC, CONTINUOUS USE OF OPIOIDS: ICD-10-CM

## 2020-10-15 DIAGNOSIS — M54.41 ACUTE RIGHT-SIDED LOW BACK PAIN WITH RIGHT-SIDED SCIATICA: ICD-10-CM

## 2020-10-15 NOTE — TELEPHONE ENCOUNTER
Tramadol      Last Written Prescription Date:  7/28/20  Last Fill Quantity: 120,   # refills: 2  Last Office Visit: 9/15/20  Future Office visit:    Next 5 appointments (look out 90 days)    Oct 23, 2020  2:20 PM  Return Visit with Samra Latham MD  [facility] ([facility]) [address] [address]  [phone]           Routing refill request to provider for review/approval because:  Drug not on the [facility], [facility] or [facility] refill protocol or controlled substance

## 2020-10-16 ENCOUNTER — MYC REFILL (OUTPATIENT)
Dept: FAMILY MEDICINE | Facility: CLINIC | Age: 55
End: 2020-10-16

## 2020-10-16 ENCOUNTER — MYC MEDICAL ADVICE (OUTPATIENT)
Dept: FAMILY MEDICINE | Facility: CLINIC | Age: 55
End: 2020-10-16

## 2020-10-16 DIAGNOSIS — B00.1 COLD SORE: ICD-10-CM

## 2020-10-16 DIAGNOSIS — R60.0 LOWER EXTREMITY EDEMA: ICD-10-CM

## 2020-10-16 DIAGNOSIS — F11.90 CHRONIC, CONTINUOUS USE OF OPIOIDS: ICD-10-CM

## 2020-10-16 DIAGNOSIS — F41.8 SITUATIONAL ANXIETY: ICD-10-CM

## 2020-10-16 DIAGNOSIS — M54.41 ACUTE RIGHT-SIDED LOW BACK PAIN WITH RIGHT-SIDED SCIATICA: ICD-10-CM

## 2020-10-16 RX ORDER — CYCLOBENZAPRINE HCL 5 MG
5 TABLET ORAL 2 TIMES DAILY PRN
Qty: 40 TABLET | Refills: 0 | Status: SHIPPED | OUTPATIENT
Start: 2020-10-16 | End: 2020-10-21

## 2020-10-16 RX ORDER — LORAZEPAM 0.5 MG/1
0.5 TABLET ORAL
Qty: 20 TABLET | Refills: 0 | Status: SHIPPED | OUTPATIENT
Start: 2020-10-16 | End: 2020-11-06

## 2020-10-16 RX ORDER — TRAMADOL HYDROCHLORIDE 50 MG/1
TABLET ORAL
Qty: 120 TABLET | OUTPATIENT
Start: 2020-10-16

## 2020-10-16 RX ORDER — FUROSEMIDE 20 MG
20 TABLET ORAL DAILY PRN
Qty: 60 TABLET | Refills: 3 | Status: CANCELLED | OUTPATIENT
Start: 2020-10-16

## 2020-10-16 RX ORDER — TRAMADOL HYDROCHLORIDE 50 MG/1
TABLET ORAL
Qty: 120 TABLET | Refills: 0 | Status: SHIPPED | OUTPATIENT
Start: 2020-10-16 | End: 2020-11-10

## 2020-10-16 RX ORDER — VALACYCLOVIR HYDROCHLORIDE 1 G/1
2000 TABLET, FILM COATED ORAL 2 TIMES DAILY
Qty: 16 TABLET | Refills: 3 | Status: CANCELLED | OUTPATIENT
Start: 2020-10-16

## 2020-10-16 NOTE — TELEPHONE ENCOUNTER
Routing refill request to provider for review/approval because:  Drug not on the [facility] [place]       Requested Prescriptions   Pending Prescriptions Disp Refills     cyclobenzaprine (FLEXERIL) 5 MG tablet 40 tablet 3     Sig: Take 1 tablet (5 mg) by mouth 2 times daily as needed for muscle spasms       There is no refill protocol information for this order              LORazepam (ATIVAN) 0.5 MG tablet 20 tablet 3     Sig: Take 1 tablet (0.5 mg) by mouth nightly as needed for anxiety       There is no refill protocol information for this order        traMADol (ULTRAM) 50 MG tablet 120 tablet 2     Sig: TAKE 1 TABLET(50 MG) BY MOUTH EVERY 6 HOURS AS NEEDED FOR SEVERE PAIN       There is no refill protocol information for this order        Nay STEWART RN BSN

## 2020-10-19 ENCOUNTER — TELEPHONE (OUTPATIENT)
Dept: FAMILY MEDICINE | Facility: CLINIC | Age: 55
End: 2020-10-19

## 2020-10-19 ENCOUNTER — HOSPITAL ENCOUNTER (OUTPATIENT)
Dept: PHYSICAL THERAPY | Facility: CLINIC | Age: 55
Setting detail: THERAPIES SERIES
End: 2020-10-19
Attending: PEDIATRICS
Payer: COMMERCIAL

## 2020-10-19 PROCEDURE — 97140 MANUAL THERAPY 1/> REGIONS: CPT | Mod: GP | Performed by: PHYSICAL THERAPIST

## 2020-10-19 PROCEDURE — 97110 THERAPEUTIC EXERCISES: CPT | Mod: GP | Performed by: PHYSICAL THERAPIST

## 2020-10-21 DIAGNOSIS — M54.41 ACUTE RIGHT-SIDED LOW BACK PAIN WITH RIGHT-SIDED SCIATICA: ICD-10-CM

## 2020-10-21 RX ORDER — CYCLOBENZAPRINE HCL 5 MG
TABLET ORAL
Qty: 40 TABLET | Refills: 5 | Status: SHIPPED | OUTPATIENT
Start: 2020-10-21 | End: 2021-02-23

## 2020-10-21 NOTE — TELEPHONE ENCOUNTER
Dr. Chu,    Routing refill request to provider for review/approval because:  Drug not on the [facility] refill protocol Juli HAHN RN

## 2020-10-21 NOTE — PROGRESS NOTES
Sports Medicine Clinic Visit - Interim History October 21, 2020    PCP: Quoc Chu    Diana KIET Salvador is a 55 year old female who is seen in f/u up for    Numbness and tingling of right arm  Neck pain  Numbness and tingling of left upper extremity.    Since last visit on 9/18/20, patient has had an EMG and would like to go over it. Has not see any improvement, symptoms have stayed the same. Noted that she has been doing the exercises she was given from PT. Some shoulder pain as well.    Initial History 9/18/2020 She reports bilateral arm numbness and tingling for 2 months - right is worse. She reports chronic neck pain, minimal radiating pain though.  Has had pain for a year - worse in the last 7-8 weeks.  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    Patient's current problem list, past medical and surgical history, and family history were reviewed.    Patient Active Problem List   Diagnosis     Osteopenia of multiple sites     Restless legs syndrome     Lower extremity edema     Sciatica of right side     Chronic, continuous use of opioids     Situational anxiety     Cervical high risk HPV (human papillomavirus) test positive     Past Medical History:   Diagnosis Date     Cervical high risk HPV (human papillomavirus) test positive 03/15/2020    See problem list     Past Surgical History:   Procedure Laterality Date      "ARTHROSCOPY SHOULDER ROTATOR CUFF REPAIR Right      ARTHROSCOPY SHOULDER ROTATOR CUFF REPAIR Left       SECTION       CHOLECYSTECTOMY       DAVINCI BYPASS GASTRIC ROBERT-EN-Y       HYSTERECTOMY SUPRACERVICAL       right ulnar nerve repositioning surgery       Family History   Problem Relation Age of Onset     Heart Failure Mother        Objective  /86   Ht 1.676 m (5' 6\")   Wt 93.9 kg (207 lb)   BMI 33.41 kg/m      GENERAL APPEARANCE: healthy, alert and no distress   GAIT: NORMAL  SKIN: no suspicious lesions or rashes  HEENT: Sclera clear, anicteric  CV: good peripheral pulses  RESP: Breathing not labored  NEURO: Normal strength and tone, mentation intact and speech normal  PSYCH:  mentation appears normal and affect normal/bright     Cervical Spine Exam     Inspection:       No visible deformity        normal lordotic curvature maintained     Posture:      rounded shoulders and upper back     Tender:      midline       paraspinal muscles     Non-Tender:      remainder of cervical spine area     Range of Motion:       Full active and passive ROM forward flexion, extension, lateral rotation, lateral flexion.     Painful Motions:       Extension - pain in neck only     Strength:     C4 (shoulder shrug)  symmetric 5/5       C5 (shoulder abduction) symmetric 5/5       C6 (elbow flexion) symmetric 5/5       C7 (elbow extension) symmetric 5/5       C8 (finger abduction, thumb flexion) symmetric 5/5     Reflexes:      C5 (biceps) symmetric normal       C6 (supinator) symmetric normal       C7 (triceps) symmetric normal     Sensation:     grossly intact througout bilateral upper extremities     Special Tests:      neg (-) Spurling     Skin:     well perfused       capillary refill brisk     Lymphatics:      no edema noted in the upper extremities     Bilateral Shoulder exam    Inspection and Posture:       normal    Skin:        no visible deformities    Tender:        subacromial space right    Non " Tender:       remainder of shoulder bilateral    ROM:        Full active and passive ROM with flexion, extension, abduction, internal and external rotation right       asymmetric scapular motion    Painful motions:       end range flexion and elevation right    Strength:        abduction 5/5 bilateral       flexion 5/5 bilateral       internal rotation 5/5 bilateral       external rotation 5/5 bilateral    Impingement testing:       neg (-) Neer right       positive (+) Frances right       positive (+) O'evelia right    Sensation:        normal sensation over shoulder and upper extremity      Bilateral Wrist and Hand exam     Inspection:       No swelling, bruising or deformity bilateral     Tender:       Mild volar pain     Non Tender:       Remainder of the Wrist and Hand bilateral     ROM:       Full and symmetric active and passive range of motion of the forearm, wrist and digits bilateral     Strength:       5/5 strength in the muscles of the hand, wrist and forearm bilateral     Special Tests:        Negative (-) Tinel's test bilateral and       positive (+) Phalen's test bilateral     Neurovascular:       2+ radial pulses bilaterally with brisk capillary refill and      normal sensation to light touch in the radial, median and ulnar nerve distributions       Radiology  I ordered, visualized and reviewed these images with the patient  EMG: mild median neuropathy at the wrist    Assessment:  1. Numbness and tingling of right arm    2. Neck pain    3. Numbness and tingling of left upper extremity    4. Chronic right shoulder pain    5. Right carpal tunnel syndrome      We discussed these other possible diagnosis: rotator cuff tear or tendinosis, cervical radiculopathy  Symptoms possibly multi-factorial.  Discussed next step of imaging.  Discussed options for treatment of carpal tunnel.  Follow up after imaging to discuss results.  Continue physical therapy.    Plan:  - Today's Plan of Care:  MRI of the Cervical  Spine -Call [phone] to schedule MRI  - also obtain Shoulder MRI    -We also discussed other future treatment options:  Wrist - Hand Therapy, Bracing, Injection, Surgical Referral    Follow Up: In clinic with Dr. Latham after MRI (wait at least 1-2 days)    Concerning signs and symptoms were reviewed.  The patient expressed understanding of this management plan and all questions were answered at this time.    Samra Latham MD CA  Primary Care Sports Medicine  [facility]

## 2020-10-21 NOTE — TELEPHONE ENCOUNTER
Requested Prescriptions   Pending Prescriptions Disp Refills     cyclobenzaprine (FLEXERIL) 5 MG tablet [Pharmacy Med Name: CYCLOBENZAPRINE 5MG TABLETS] 40 tablet 0     Sig: TAKE 1 TABLET(5 MG) BY MOUTH TWICE DAILY AS NEEDED FOR MUSCLE SPASMS       There is no refill protocol information for this order

## 2020-10-23 ENCOUNTER — OFFICE VISIT (OUTPATIENT)
Dept: ORTHOPEDICS | Facility: CLINIC | Age: 55
End: 2020-10-23
Payer: COMMERCIAL

## 2020-10-23 VITALS
WEIGHT: 207 LBS | SYSTOLIC BLOOD PRESSURE: 132 MMHG | DIASTOLIC BLOOD PRESSURE: 86 MMHG | HEIGHT: 66 IN | BODY MASS INDEX: 33.27 KG/M2

## 2020-10-23 DIAGNOSIS — G89.29 CHRONIC RIGHT SHOULDER PAIN: ICD-10-CM

## 2020-10-23 DIAGNOSIS — G56.01 RIGHT CARPAL TUNNEL SYNDROME: ICD-10-CM

## 2020-10-23 DIAGNOSIS — R20.0 NUMBNESS AND TINGLING OF LEFT UPPER EXTREMITY: ICD-10-CM

## 2020-10-23 DIAGNOSIS — R20.2 NUMBNESS AND TINGLING OF RIGHT ARM: Primary | ICD-10-CM

## 2020-10-23 DIAGNOSIS — R20.0 NUMBNESS AND TINGLING OF RIGHT ARM: Primary | ICD-10-CM

## 2020-10-23 DIAGNOSIS — R20.2 NUMBNESS AND TINGLING OF LEFT UPPER EXTREMITY: ICD-10-CM

## 2020-10-23 DIAGNOSIS — M54.2 NECK PAIN: ICD-10-CM

## 2020-10-23 DIAGNOSIS — M25.511 CHRONIC RIGHT SHOULDER PAIN: ICD-10-CM

## 2020-10-23 PROCEDURE — 99214 OFFICE O/P EST MOD 30 MIN: CPT | Performed by: PEDIATRICS

## 2020-10-23 ASSESSMENT — MIFFLIN-ST. JEOR: SCORE: 1545.7

## 2020-10-23 NOTE — LETTER
10/23/2020         RE: Diana Salvador  [address] [place] [id number]        Dear Colleague,    Thank you for referring your patient, Diana Salvador, to the [facility] [place]. Please see a copy of my visit note below.    Sports Medicine Clinic Visit - Interim History October 21, 2020    PCP: Quoc Chu    Diana Salvador is a 55 year old female who is seen in f/u up for    Numbness and tingling of right arm  Neck pain  Numbness and tingling of left upper extremity.    Since last visit on 9/18/20, patient has had an EMG and would like to go over it. Has not see any improvement, symptoms have stayed the same. Noted that she has been doing the exercises she was given from PT. Some shoulder pain as well.    Initial History 9/18/2020 She reports bilateral arm numbness and tingling for 2 months - right is worse. She reports chronic neck pain, minimal radiating pain though.  Has had pain for a year - worse in the last 7-8 weeks.  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    Patient's current problem list, past medical and surgical history, and family history were reviewed.    Patient Active Problem List   Diagnosis     Osteopenia of multiple sites     Restless legs syndrome     Lower extremity edema     Sciatica of right side     Chronic, continuous use of opioids     Situational anxiety      "Cervical high risk HPV (human papillomavirus) test positive     Past Medical History:   Diagnosis Date     Cervical high risk HPV (human papillomavirus) test positive 03/15/2020    See problem list     Past Surgical History:   Procedure Laterality Date     ARTHROSCOPY SHOULDER ROTATOR CUFF REPAIR Right      ARTHROSCOPY SHOULDER ROTATOR CUFF REPAIR Left       SECTION       CHOLECYSTECTOMY       DAVINCI BYPASS GASTRIC ROBERT-EN-Y       HYSTERECTOMY SUPRACERVICAL       right ulnar nerve repositioning surgery       Family History   Problem Relation Age of Onset     Heart Failure Mother        Objective  /86   Ht 1.676 m (5' 6\")   Wt 93.9 kg (207 lb)   BMI 33.41 kg/m      GENERAL APPEARANCE: healthy, alert and no distress   GAIT: NORMAL  SKIN: no suspicious lesions or rashes  HEENT: Sclera clear, anicteric  CV: good peripheral pulses  RESP: Breathing not labored  NEURO: Normal strength and tone, mentation intact and speech normal  PSYCH:  mentation appears normal and affect normal/bright     Cervical Spine Exam     Inspection:       No visible deformity        normal lordotic curvature maintained     Posture:      rounded shoulders and upper back     Tender:      midline       paraspinal muscles     Non-Tender:      remainder of cervical spine area     Range of Motion:       Full active and passive ROM forward flexion, extension, lateral rotation, lateral flexion.     Painful Motions:       Extension - pain in neck only     Strength:     C4 (shoulder shrug)  symmetric 5/5       C5 (shoulder abduction) symmetric 5/5       C6 (elbow flexion) symmetric 5/5       C7 (elbow extension) symmetric 5/5       C8 (finger abduction, thumb flexion) symmetric 5/5     Reflexes:      C5 (biceps) symmetric normal       C6 (supinator) symmetric normal       C7 (triceps) symmetric normal     Sensation:     grossly intact througout bilateral upper extremities     Special Tests:      neg (-) Spurling     Skin:     well " perfused       capillary refill brisk     Lymphatics:      no edema noted in the upper extremities     Bilateral Shoulder exam    Inspection and Posture:       normal    Skin:        no visible deformities    Tender:        subacromial space right    Non Tender:       remainder of shoulder bilateral    ROM:        Full active and passive ROM with flexion, extension, abduction, internal and external rotation right       asymmetric scapular motion    Painful motions:       end range flexion and elevation right    Strength:        abduction 5/5 bilateral       flexion 5/5 bilateral       internal rotation 5/5 bilateral       external rotation 5/5 bilateral    Impingement testing:       neg (-) Neer right       positive (+) Frances right       positive (+) O'evelia right    Sensation:        normal sensation over shoulder and upper extremity      Bilateral Wrist and Hand exam     Inspection:       No swelling, bruising or deformity bilateral     Tender:       Mild volar pain     Non Tender:       Remainder of the Wrist and Hand bilateral     ROM:       Full and symmetric active and passive range of motion of the forearm, wrist and digits bilateral     Strength:       5/5 strength in the muscles of the hand, wrist and forearm bilateral     Special Tests:        Negative (-) Tinel's test bilateral and       positive (+) Phalen's test bilateral     Neurovascular:       2+ radial pulses bilaterally with brisk capillary refill and      normal sensation to light touch in the radial, median and ulnar nerve distributions       Radiology  I ordered, visualized and reviewed these images with the patient  EMG: mild median neuropathy at the wrist    Assessment:  1. Numbness and tingling of right arm    2. Neck pain    3. Numbness and tingling of left upper extremity    4. Chronic right shoulder pain    5. Right carpal tunnel syndrome      We discussed these other possible diagnosis: rotator cuff tear or tendinosis, cervical  radiculopathy  Symptoms possibly multi-factorial.  Discussed next step of imaging.  Discussed options for treatment of carpal tunnel.  Follow up after imaging to discuss results.  Continue physical therapy.    Plan:  - Today's Plan of Care:  MRI of the Cervical Spine -Call [phone] to schedule MRI  - also obtain Shoulder MRI    -We also discussed other future treatment options:  Wrist - Hand Therapy, Bracing, Injection, Surgical Referral    Follow Up: In clinic with Dr. Latham after MRI (wait at least 1-2 days)    Concerning signs and symptoms were reviewed.  The patient expressed understanding of this management plan and all questions were answered at this time.    Samra Latham MD CAQ  Primary Care Sports Medicine  [facility]      Again, thank you for allowing me to participate in the care of your patient.        Sincerely,        Samra Latham MD

## 2020-10-23 NOTE — PATIENT INSTRUCTIONS
We discussed these other possible diagnosis: rotator cuff tear or tendinosis, cervical radiculopathy    Plan:  - Today's Plan of Care:  MRI of the Cervical Spine -Call [phone] to schedule MRI  - also obtain Shoulder MRI    -We also discussed other future treatment options:  Wrist - Hand Therapy, Bracing, Injection, Surgical Referral    Follow Up: In clinic with Dr. Latham after MRI (wait at least 1-2 days)    If you have any further questions for your physician or physician s care team you can call [phone] and use option 3 to leave a voice message. Calls received during business hours will be returned same day.

## 2020-11-01 DIAGNOSIS — F41.8 SITUATIONAL ANXIETY: ICD-10-CM

## 2020-11-02 NOTE — TELEPHONE ENCOUNTER
Requested Prescriptions   Pending Prescriptions Disp Refills     LORazepam (ATIVAN) 0.5 MG tablet [Pharmacy Med Name: LORAZEPAM 0.5MG TABLETS] 20 tablet      Sig: TAKE 1 TABLET(0.5 MG) BY MOUTH EVERY NIGHT AS NEEDED FOR ANXIETY       There is no refill protocol information for this order

## 2020-11-06 ENCOUNTER — MYC REFILL (OUTPATIENT)
Dept: FAMILY MEDICINE | Facility: CLINIC | Age: 55
End: 2020-11-06

## 2020-11-06 DIAGNOSIS — M54.41 ACUTE RIGHT-SIDED LOW BACK PAIN WITH RIGHT-SIDED SCIATICA: ICD-10-CM

## 2020-11-06 RX ORDER — LORAZEPAM 0.5 MG/1
TABLET ORAL
Qty: 20 TABLET | Refills: 3 | Status: SHIPPED | OUTPATIENT
Start: 2020-11-06 | End: 2021-02-23

## 2020-11-10 ENCOUNTER — MYC REFILL (OUTPATIENT)
Dept: FAMILY MEDICINE | Facility: CLINIC | Age: 55
End: 2020-11-10

## 2020-11-10 DIAGNOSIS — M54.41 ACUTE RIGHT-SIDED LOW BACK PAIN WITH RIGHT-SIDED SCIATICA: ICD-10-CM

## 2020-11-10 DIAGNOSIS — F11.90 CHRONIC, CONTINUOUS USE OF OPIOIDS: ICD-10-CM

## 2020-11-11 ENCOUNTER — HOSPITAL ENCOUNTER (OUTPATIENT)
Dept: MRI IMAGING | Facility: CLINIC | Age: 55
Discharge: HOME | End: 2020-11-11
Attending: PEDIATRICS | Admitting: PEDIATRICS
Payer: COMMERCIAL

## 2020-11-11 DIAGNOSIS — R20.2 NUMBNESS AND TINGLING OF LEFT UPPER EXTREMITY: ICD-10-CM

## 2020-11-11 DIAGNOSIS — R20.2 NUMBNESS AND TINGLING OF RIGHT ARM: ICD-10-CM

## 2020-11-11 DIAGNOSIS — R20.0 NUMBNESS AND TINGLING OF LEFT UPPER EXTREMITY: ICD-10-CM

## 2020-11-11 DIAGNOSIS — R20.0 NUMBNESS AND TINGLING OF RIGHT ARM: ICD-10-CM

## 2020-11-11 DIAGNOSIS — M54.2 NECK PAIN: ICD-10-CM

## 2020-11-11 PROCEDURE — 72141 MRI NECK SPINE W/O DYE: CPT

## 2020-11-12 ENCOUNTER — MYC REFILL (OUTPATIENT)
Dept: FAMILY MEDICINE | Facility: CLINIC | Age: 55
End: 2020-11-12

## 2020-11-12 ENCOUNTER — MYC MEDICAL ADVICE (OUTPATIENT)
Dept: ORTHOPEDICS | Facility: CLINIC | Age: 55
End: 2020-11-12

## 2020-11-12 DIAGNOSIS — F11.90 CHRONIC, CONTINUOUS USE OF OPIOIDS: ICD-10-CM

## 2020-11-12 DIAGNOSIS — R20.2 NUMBNESS AND TINGLING OF RIGHT ARM: ICD-10-CM

## 2020-11-12 DIAGNOSIS — M54.41 ACUTE RIGHT-SIDED LOW BACK PAIN WITH RIGHT-SIDED SCIATICA: ICD-10-CM

## 2020-11-12 DIAGNOSIS — M54.2 NECK PAIN: Primary | ICD-10-CM

## 2020-11-12 DIAGNOSIS — R20.0 NUMBNESS AND TINGLING OF RIGHT ARM: ICD-10-CM

## 2020-11-12 RX ORDER — TRAMADOL HYDROCHLORIDE 50 MG/1
TABLET ORAL
Qty: 120 TABLET | Refills: 0 | Status: SHIPPED | OUTPATIENT
Start: 2020-11-12 | End: 2021-01-03

## 2020-11-12 RX ORDER — TRAMADOL HYDROCHLORIDE 50 MG/1
TABLET ORAL
Qty: 120 TABLET | Refills: 0 | Status: CANCELLED | OUTPATIENT
Start: 2020-11-12

## 2020-11-12 NOTE — TELEPHONE ENCOUNTER
Patient is going to discuss medications with her PCP and based on what she learns from her PCP she will follow up with Dr Latham to discuss adding the oral steroid or gabapentin.    Bill Thomas, JANAK, LAT

## 2020-11-12 NOTE — TELEPHONE ENCOUNTER
Would probably recommend follow up with PCP to discuss medication management prior to Spine appointment.  Could consider short oral steroid burst or gabapentin, however, gabapentin can react with other pain medications so would need to discuss current medications.  Samra Latham MD

## 2020-11-16 RX ORDER — TRAMADOL HYDROCHLORIDE 50 MG/1
TABLET ORAL
Qty: 120 TABLET | Refills: 0 | OUTPATIENT
Start: 2020-11-16

## 2020-11-20 ENCOUNTER — TELEPHONE (OUTPATIENT)
Dept: FAMILY MEDICINE | Facility: CLINIC | Age: 55
End: 2020-11-20

## 2020-11-20 ENCOUNTER — E-VISIT (OUTPATIENT)
Dept: FAMILY MEDICINE | Facility: CLINIC | Age: 55
End: 2020-11-20
Payer: COMMERCIAL

## 2020-11-20 DIAGNOSIS — Z20.822 SUSPECTED COVID-19 VIRUS INFECTION: Primary | ICD-10-CM

## 2020-11-20 PROCEDURE — 99421 OL DIG E/M SVC 5-10 MIN: CPT | Performed by: FAMILY MEDICINE

## 2020-11-20 NOTE — TELEPHONE ENCOUNTER
Reason for call:  Same Day Appointment   Requested Provider: Sivakumar Chu    PCP: @PCPANNE MARIE@    Reason for visit: Intense ear pain    Duration of symptoms: Since 2 am today    Have you been treated for this in the past? No    Additional comments: Patient is hoping to be worked into the schedule of Dr. Chu of possible today 11/20/20 as soon as possible.       Phone number to reach patient:  Home number on file [phone] (home)    Best Time:  Asap    Can we leave a detailed message on this number?  YES    Travel screening: Not Applicable

## 2020-11-20 NOTE — TELEPHONE ENCOUNTER
S-(situation): left ear pain, and white patch of back of throat    B-(background): started Tuesday with sore throat, this morning at 2 am the ear pain.    A-(assessment): left ear pain, feels clogged up and can not hear out of it, temp is 97.6, has yellow productive cough, overall feels tired due to ear pain keeping her up, has sore throat, is taking tylenol but not helping. Patient works from home.      R-(recommendations): No appointments today, will send for second triage advise as per protocol patient should be seen. Dr. Chu patient would like to see you, can you work in?    ANGELLA Garrison

## 2020-11-20 NOTE — TELEPHONE ENCOUNTER
Provider E-Visit time total (minutes): 6    Modified [facility] Criteria for Strep  One point for each  History of fever  Tonsillar exudate  Tender anterior cervical adenopathy  Absence of cough  Age <15     Age >44 subtract 1 point   Guidelines for management  -1, 0 or 1 points - No antibiotic or throat culture necessary (Risk of strep. infection <10%)   2 or 3 points - Should receive a throat culture and treat with an antibiotic if culture is positive (Risk of strep. infection 32% if 3 criteria, 15% if 2)   4 or 5 points - Treat empirically with an antibiotic (Risk of strep. infection 56%)

## 2020-11-20 NOTE — TELEPHONE ENCOUNTER
Not me, unless something opens up.  OK to see other clinician for same day appt.  Could do [facility] or virtual Evisit for illness. With symptoms she does need COVID testing maybe strep too depending on her symptoms. Ears often get backed up from cold viruses, not usually infected this soon after symptoms start. So doesn't need to be seen in clinic.  Thank you,  Quoc Chu MD

## 2020-11-20 NOTE — PATIENT INSTRUCTIONS
Dear Diana Salvador,    Your symptoms show that you may have coronavirus (COVID-19). This illness can cause fever, cough and trouble breathing. Many people get a mild case and get better on their own. Some people can get very sick.    Will I be tested for COVID-19?  We would like to test you for Covid-19 virus. I have placed orders for this test.   To schedule: go to your [facility] home page and scroll down to the section that says  You have an appointment that needs to be scheduled  and click the large green button that says  Schedule Now  and follow the steps to find the next available openings.    If you are unable to complete these [facility] scheduling steps, please call [phone] to schedule your testing.     When it's time for your COVID test:  Stay at least 6 feet away from others. (If someone will drive you to your test, stay in the backseat, as far away from the  as you can.)  Cover your mouth and nose with a mask, tissue or washcloth.  Go straight to the testing site. Don't make any stops on the way there or back.    Starting now:     Do not go to work. Follow your usual processes for taking time away from work.  o If you receive a negative COVID-19 test result and were NOT exposed to someone with a known positive COVID-19 test, you can return to work once you're free of fever for 24 hours without fever-reducing medication and your symptoms are improving or resolved.  o If you receive a positive COVID-19 test result, return to work should be at least 10 days from symptom onset (20 days if people have immune compromise) and people should be fever-free for 24 hours without medications, and the respiratory symptoms should be improved significantly before returning to work or school  o If you were exposed to someone who has tested positive for COVID-19, you can return to work 14 days after your last contact with the positive individual, provided you do not have symptoms at all during that  "time.    During this time, don't leave the house except for testing or medical care.  o Stay in your own room, even for meals. Use your own bathroom if you can.  o Stay away from others in your home. No hugging, kissing or shaking hands. No visitors.  o Don't go to work, school or anywhere else.    Clean \"high touch\" surfaces often (doorknobs, counters, handles, etc.). Use a household cleaning spray or wipes. You'll find a full list of  on the EPA website: www.epa.gov/pesticide-registration/list-n-disinfectants-use-against-sars-cov-2.    Cover your mouth and nose with a mask, tissue or washcloth to avoid spreading germs.    Wash your hands and face often. Use soap and water.    People in these groups are at risk for severe illness due to COVID-19:  o People 65 years and older  o People who live in a nursing home or long-term care facility  o People with chronic disease (lung, heart, cancer, diabetes, kidney, liver, immunologic)  o People who have a weakened immune system, including those who:  - Are in cancer treatment  - Take medicine that weakens the immune system, such as corticosteroids  - Had a bone marrow or organ transplant  - Have an immune deficiency  - Have poorly controlled HIV or AIDS  - Are obese (body mass index of 40 or higher)  - Smoke regularly      Caregivers should wear gloves while washing dishes, handling laundry and cleaning bedrooms and bathrooms.    Use caution when washing and drying laundry: Don't shake dirty laundry, and use the warmest water setting that you can.    For more tips, go to www.cdc.gov/coronavirus/2019-ncov/downloads/10Things.pdf.    Sign up for [employer]. We know it's scary to hear that you might have COVID-19. We want to track your symptoms to make sure you're okay over the next 2 weeks. Please look for an email from [employer]--this is a free, online program that we'll use to keep in touch. To sign up, follow the link in the email you will receive. Learn more " at [email or web address]    How can I take care of myself?    Get lots of rest. Drink extra fluids (unless a doctor has told you not to)    Take Tylenol (acetaminophen) for fever or pain. If you have liver or kidney problems, ask your family doctor if it's okay to take Tylenol.  Adults can take either:    650 mg (two 325 mg pills) every 4 to 6 hours, or     1,000 mg (two 500 mg pills) every 8 hours as needed.    Note: Don't take more than 3,000 mg in one day. Acetaminophen is found in many medicines (both prescribed and over-the-counter medicines). Read all labels to be sure you don't take too much.  For children, check the Tylenol bottle for the right dose. The dose is based on the child's age or weight.    If you have other health problems (like cancer, heart failure, an organ transplant or severe kidney disease): Call your specialty clinic if you don't feel better in the next 2 days.    Know when to call 911. Emergency warning signs include:  Trouble breathing or shortness of breath  Pain or pressure in the chest that doesn't go away  Feeling confused like you haven't felt before, or not being able to wake up  Bluish-colored lips or face    Where can I get more information?    [facility] - About COVID-19: [email or web address]  CDC - What to Do If You're Sick: www.cdc.gov/coronavirus/2019-ncov/about/steps-when-sick.html

## 2020-11-21 ENCOUNTER — AMBULATORY - HEALTHEAST (OUTPATIENT)
Dept: FAMILY MEDICINE | Facility: CLINIC | Age: 55
End: 2020-11-21

## 2020-11-21 DIAGNOSIS — Z20.822 SUSPECTED COVID-19 VIRUS INFECTION: ICD-10-CM

## 2020-11-23 ENCOUNTER — COMMUNICATION - HEALTHEAST (OUTPATIENT)
Dept: SCHEDULING | Facility: CLINIC | Age: 55
End: 2020-11-23

## 2020-11-25 ENCOUNTER — MYC MEDICAL ADVICE (OUTPATIENT)
Dept: FAMILY MEDICINE | Facility: CLINIC | Age: 55
End: 2020-11-25

## 2021-01-06 PROBLEM — F11.90 CHRONIC, CONTINUOUS USE OF OPIOIDS: Status: ACTIVE | Noted: 2019-11-13

## 2021-02-02 ENCOUNTER — TELEPHONE (OUTPATIENT)
Dept: FAMILY MEDICINE | Facility: CLINIC | Age: 56
End: 2021-02-02

## 2021-02-02 DIAGNOSIS — F11.90 CHRONIC, CONTINUOUS USE OF OPIOIDS: ICD-10-CM

## 2021-02-02 DIAGNOSIS — M54.41 ACUTE RIGHT-SIDED LOW BACK PAIN WITH RIGHT-SIDED SCIATICA: ICD-10-CM

## 2021-02-03 NOTE — TELEPHONE ENCOUNTER
Requested Prescriptions   Pending Prescriptions Disp Refills     traMADol (ULTRAM) 50 MG tablet [Pharmacy Med Name: TRAMADOL 50MG TABLETS] 120 tablet      Sig: TAKE 1 TABLET(50 MG) BY MOUTH EVERY 6 HOURS AS NEEDED FOR SEVERE PAIN       There is no refill protocol information for this order

## 2021-02-04 RX ORDER — TRAMADOL HYDROCHLORIDE 50 MG/1
TABLET ORAL
Qty: 120 TABLET | Refills: 0 | Status: SHIPPED | OUTPATIENT
Start: 2021-02-04 | End: 2021-02-23

## 2021-02-04 NOTE — TELEPHONE ENCOUNTER
I'll send one refill.  Notify patient she will need a phone appt or clinic appt.  Thank you,  Quoc Chu MD

## 2021-02-23 ENCOUNTER — VIRTUAL VISIT (OUTPATIENT)
Dept: FAMILY MEDICINE | Facility: CLINIC | Age: 56
End: 2021-02-23
Payer: COMMERCIAL

## 2021-02-23 DIAGNOSIS — M81.0 OSTEOPOROSIS, UNSPECIFIED OSTEOPOROSIS TYPE, UNSPECIFIED PATHOLOGICAL FRACTURE PRESENCE: ICD-10-CM

## 2021-02-23 DIAGNOSIS — M54.41 ACUTE RIGHT-SIDED LOW BACK PAIN WITH RIGHT-SIDED SCIATICA: ICD-10-CM

## 2021-02-23 DIAGNOSIS — F41.8 SITUATIONAL ANXIETY: ICD-10-CM

## 2021-02-23 DIAGNOSIS — R60.0 LOWER EXTREMITY EDEMA: ICD-10-CM

## 2021-02-23 DIAGNOSIS — F11.90 CHRONIC, CONTINUOUS USE OF OPIOIDS: ICD-10-CM

## 2021-02-23 PROCEDURE — 99214 OFFICE O/P EST MOD 30 MIN: CPT | Mod: 95 | Performed by: FAMILY MEDICINE

## 2021-02-23 RX ORDER — TRAMADOL HYDROCHLORIDE 50 MG/1
50 TABLET ORAL EVERY 6 HOURS PRN
Qty: 120 TABLET | Refills: 0 | Status: SHIPPED | OUTPATIENT
Start: 2021-04-30 | End: 2021-05-18

## 2021-02-23 RX ORDER — TRAMADOL HYDROCHLORIDE 50 MG/1
50 TABLET ORAL EVERY 6 HOURS PRN
Qty: 120 TABLET | Refills: 0 | Status: SHIPPED | OUTPATIENT
Start: 2021-03-31 | End: 2021-09-08

## 2021-02-23 RX ORDER — CYCLOBENZAPRINE HCL 5 MG
5 TABLET ORAL 2 TIMES DAILY PRN
Qty: 120 TABLET | Refills: 3 | Status: SHIPPED | OUTPATIENT
Start: 2021-02-23 | End: 2021-09-08

## 2021-02-23 RX ORDER — FUROSEMIDE 20 MG
20 TABLET ORAL DAILY PRN
Qty: 60 TABLET | Refills: 5 | Status: SHIPPED | OUTPATIENT
Start: 2021-02-23 | End: 2021-09-08

## 2021-02-23 RX ORDER — LORAZEPAM 0.5 MG/1
TABLET ORAL
Qty: 20 TABLET | Refills: 1 | Status: SHIPPED | OUTPATIENT
Start: 2021-02-23 | End: 2021-04-23

## 2021-02-23 RX ORDER — ALENDRONATE SODIUM 35 MG/1
35 TABLET ORAL
Qty: 12 TABLET | Refills: 3 | Status: SHIPPED | OUTPATIENT
Start: 2021-02-23 | End: 2022-02-14

## 2021-02-23 RX ORDER — TRAMADOL HYDROCHLORIDE 50 MG/1
50 TABLET ORAL EVERY 6 HOURS PRN
Qty: 120 TABLET | Refills: 0 | Status: SHIPPED | OUTPATIENT
Start: 2021-03-01 | End: 2021-09-08

## 2021-02-23 ASSESSMENT — ANXIETY QUESTIONNAIRES
6. BECOMING EASILY ANNOYED OR IRRITABLE: NOT AT ALL
5. BEING SO RESTLESS THAT IT IS HARD TO SIT STILL: NOT AT ALL
3. WORRYING TOO MUCH ABOUT DIFFERENT THINGS: MORE THAN HALF THE DAYS
7. FEELING AFRAID AS IF SOMETHING AWFUL MIGHT HAPPEN: MORE THAN HALF THE DAYS
1. FEELING NERVOUS, ANXIOUS, OR ON EDGE: SEVERAL DAYS
IF YOU CHECKED OFF ANY PROBLEMS ON THIS QUESTIONNAIRE, HOW DIFFICULT HAVE THESE PROBLEMS MADE IT FOR YOU TO DO YOUR WORK, TAKE CARE OF THINGS AT HOME, OR GET ALONG WITH OTHER PEOPLE: SOMEWHAT DIFFICULT
GAD7 TOTAL SCORE: 11
2. NOT BEING ABLE TO STOP OR CONTROL WORRYING: NEARLY EVERY DAY

## 2021-02-23 ASSESSMENT — PATIENT HEALTH QUESTIONNAIRE - PHQ9
SUM OF ALL RESPONSES TO PHQ QUESTIONS 1-9: 14
5. POOR APPETITE OR OVEREATING: NEARLY EVERY DAY

## 2021-02-23 NOTE — PATIENT INSTRUCTIONS
You will call Dr. Liz's office for next appt.    Continue tramadol 4 times a day and rare use of motrin.    Continue rare occasional use of ativan.    Melatonin 5-10mg max.    Thank you for your questions about the COVID Vaccine!  It is very important that when your time comes that you should be ready and get your vaccine.    Please visit our webpage for the most up to date information on vaccine availability through  [employer].  [email or web address]    Why should I get a COVID-19 vaccine?  While we have made some progress in the fight against COVID-19 with public health measures like masking and social distancing, widespread vaccination is the only way that we can stop the pandemic. Not only does getting the vaccine protect you against COVID-19, it also reduces the chances that you will spread it to others, including your family and friends    Can I get COVID-19 from the vaccine? Does it use a live virus?  No, it is not possible to get COVID-19 from the vaccine. The Pfizer and Moderna vaccines use only genetic material from the virus while other vaccines being studied use inactivated versions of the virus. None of these can cause COVID-19.     Is it safe to receive a COVID-19 vaccine? How effective are they?  Two COVID-19 vaccines produced by Pfizer/Heysan and Moderna are both safe and more than 94 percent effective, according to the results of two large clinical trials. Both the Pfizer/BioNTech and Moderna vaccines have already received U.S. Food and Drug Administration (FDA) emergency use authorization, allowing them to be used in the [place].    How do the Pfizer/BioNTech and Moderna vaccines work and how many doses do I need?  The Pfizer/BioNTech and Moderna vaccines do not use live or weakened versions of the coronavirus causing COVID-19. Instead, these vaccines have genetic material called mRNA or  messenger RNA  that is taken from the virus. Both vaccines come  in two doses. Once you receive both doses of the vaccine, it will likely take several weeks for your body to develop immunity.    What are the side effects?  During the clinical trials, only mild to moderate flu-like side effects which are part of the immune response were reported, including a headache, fatigue, chills, fever, and muscle and joint soreness. Side effects are more likely to occur after the second dose. Most of these symptoms ended three days after the vaccine, or earlier.     If I had COVID-19 should I get the vaccine?  Yes. People who have tested positive for COVID-19 produce antibodies that offer some protection against the virus, but we don t know enough yet about antibody protection and how long it may last, so we recommend that everyone get the vaccine.      Do I have to continue wearing a mask after I get the vaccine?  Yes. Everyone should wear face masks, wash their hands frequently, practice social distancing, and take other safety steps until more people have received the vaccine, the number of COVID-19 cases nationwide is no longer at pandemic levels, and we understand more about how long these vaccines will protect us.
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
## 2021-02-24 ASSESSMENT — ANXIETY QUESTIONNAIRES: GAD7 TOTAL SCORE: 11

## 2021-03-24 DIAGNOSIS — R60.0 LOWER EXTREMITY EDEMA: ICD-10-CM

## 2021-03-24 RX ORDER — FUROSEMIDE 20 MG
TABLET ORAL
Qty: 60 TABLET | Refills: 5 | OUTPATIENT
Start: 2021-03-24

## 2021-04-07 ENCOUNTER — TRANSFERRED RECORDS (OUTPATIENT)
Dept: HEALTH INFORMATION MANAGEMENT | Facility: CLINIC | Age: 56
End: 2021-04-07

## 2021-04-23 DIAGNOSIS — F41.8 SITUATIONAL ANXIETY: ICD-10-CM

## 2021-04-23 RX ORDER — LORAZEPAM 0.5 MG/1
TABLET ORAL
Qty: 20 TABLET | Refills: 5 | Status: SHIPPED | OUTPATIENT
Start: 2021-04-23 | End: 2021-09-02

## 2021-04-25 ENCOUNTER — HEALTH MAINTENANCE LETTER (OUTPATIENT)
Age: 56
End: 2021-04-25

## 2021-05-03 ENCOUNTER — HOSPITAL ENCOUNTER (OUTPATIENT)
Dept: MRI IMAGING | Facility: CLINIC | Age: 56
Discharge: HOME | End: 2021-05-03
Attending: ORTHOPAEDIC SURGERY | Admitting: ORTHOPAEDIC SURGERY
Payer: COMMERCIAL

## 2021-05-03 DIAGNOSIS — M54.16 RIGHT LUMBAR RADICULOPATHY: ICD-10-CM

## 2021-05-03 PROCEDURE — 72148 MRI LUMBAR SPINE W/O DYE: CPT

## 2021-05-20 NOTE — PROGRESS NOTES
Outpatient Physical Therapy Discharge Note     Patient: Diana Salvador  : 1965    Beginning/End Dates of Reporting Period:  10/19/20 to 2021    Referring Provider: Dr. Latham    Therapy Diagnosis: cervical pain with right UE dermatomal sx     Client Self Report: EMG revealed some median neuropathy, some carpal tunnel syndrome.  Can tell PT is helping; next appt with Dr. Latham is Friday.  Hoping no cortisone injection.    Objective Measurements:  Objective Measure: posture  Details: improved - less forward shoulder, less thoracic kyphosis  Objective Measure: MMT  Details: right GH ER = 4/5 with lateral shoulder pain; left=5/5 pain free  Objective Measure: Median N tension test  Details: + polo             Goals:  Goal Identifier     Goal Description Patient will have polo cervical AROM rotation >=60 deg for safe driving.   Target Date 20   Date Met      Progress:     Goal Identifier     Goal Description Patient will be able to sleep for >=6 hour without waking due to arm pain.   Target Date 20   Date Met      Progress:     Goal Identifier     Goal Description Patient will be independent with her HEP to reduce future occurrence of pain and disability.   Target Date 10/19/20   Date Met      Progress:       Progress Toward Goals:   Progress this reporting period: Pt attended 4 PT sessions to address her neck pain and right UE symptoms.  She made fair progress and followed up with MD to further address EMG findings      Plan:  Discharge from therapy.    Discharge:    Reason for Discharge: Patient has failed to schedule further appointments.    Equipment Issued: theraband    Discharge Plan: Patient to continue home program.      Tatum Gould, PT, DTP, SCS  Doctor of Physical Therapy #[id number]  [facility]  [phone]  [email or web address]

## 2021-07-12 DIAGNOSIS — G25.81 RESTLESS LEGS SYNDROME: ICD-10-CM

## 2021-07-15 ENCOUNTER — MYC REFILL (OUTPATIENT)
Dept: FAMILY MEDICINE | Facility: CLINIC | Age: 56
End: 2021-07-15

## 2021-07-15 DIAGNOSIS — M54.41 ACUTE RIGHT-SIDED LOW BACK PAIN WITH RIGHT-SIDED SCIATICA: ICD-10-CM

## 2021-07-15 DIAGNOSIS — F11.90 CHRONIC, CONTINUOUS USE OF OPIOIDS: ICD-10-CM

## 2021-07-15 NOTE — TELEPHONE ENCOUNTER
"Requested Prescriptions   Pending Prescriptions Disp Refills     rOPINIRole (REQUIP) 0.5 MG tablet [Pharmacy Med Name: ROPINIROLE 0.5MG TABLETS] 180 tablet 5     Sig: TAKE 1 TABLET BY MOUTH TWICE DAILY AS NEEDED FOR RESTLESS LEGS       Antiparkinson's Agents Protocol Failed - 7/12/2021  3:45 AM        Failed - CBC on record in past 12 months     Recent Labs   Lab Test 08/05/19 2003   WBC 8.7   RBC 4.95   HGB 14.3   HCT 45.8                    Failed - ALT on record in past 12 months         Recent Labs   Lab Test 08/05/19 2003   ALT 14             Failed - Serum Creatinine on file in past 12 months     Recent Labs   Lab Test 08/05/19 2003   CR 0.70       Ok to refill medication if creatinine is low          Passed - Blood pressure under 140/90 in past 12 months     BP Readings from Last 3 Encounters:   10/23/20 132/86   09/18/20 124/78   09/15/20 132/84                 Passed - Medication is active on med list        Passed - Patient is age 18 or older        Passed - No active pregnancy on record        Passed - No positive pregnancy test in the past 12 months        Passed - Recent (6 mo) or future (30 days) visit within the authorizing provider's specialty     Patient had office visit in the last 6 months or has a visit in the next 30 days with authorizing provider or within the authorizing provider's specialty.  See \"Patient Info\" tab in inbasket, or \"Choose Columns\" in Meds & Orders section of the refill encounter.                 "

## 2021-07-16 RX ORDER — TRAMADOL HYDROCHLORIDE 50 MG/1
50 TABLET ORAL EVERY 6 HOURS PRN
Qty: 120 TABLET | Refills: 0 | Status: SHIPPED | OUTPATIENT
Start: 2021-07-16 | End: 2021-08-13

## 2021-07-16 RX ORDER — ROPINIROLE 0.5 MG/1
TABLET, FILM COATED ORAL
Qty: 180 TABLET | Refills: 0 | Status: SHIPPED | OUTPATIENT
Start: 2021-07-16 | End: 2021-09-08

## 2021-07-16 NOTE — TELEPHONE ENCOUNTER
Routing refill request to provider for review/approval because: Drug not on the [facility] refill protocol, CSA espired  Last Virtual Visit 2/23/21: Return in about 3 months (around 5/23/2021).    Rosio TREVIZO RN, BSN

## 2021-08-13 DIAGNOSIS — M54.41 ACUTE RIGHT-SIDED LOW BACK PAIN WITH RIGHT-SIDED SCIATICA: ICD-10-CM

## 2021-08-13 DIAGNOSIS — F11.90 CHRONIC, CONTINUOUS USE OF OPIOIDS: ICD-10-CM

## 2021-08-13 RX ORDER — TRAMADOL HYDROCHLORIDE 50 MG/1
TABLET ORAL
Qty: 120 TABLET | Refills: 0 | Status: SHIPPED | OUTPATIENT
Start: 2021-08-13 | End: 2021-09-08

## 2021-08-13 NOTE — TELEPHONE ENCOUNTER
Routing refill request to provider for review/approval because:  Drug not on the [facility] refill protocol       Hanna Contreras RN

## 2021-08-17 ENCOUNTER — TELEPHONE (OUTPATIENT)
Dept: FAMILY MEDICINE | Facility: CLINIC | Age: 56
End: 2021-08-17

## 2021-08-17 NOTE — TELEPHONE ENCOUNTER
Dr. Chu,    Routing refill request to provider for review/approval because:  Drug not on the [facility] refill protocol Tramadol.     Looks like this was just refilled on 8/13/21. duplicate request. Juli HAHN RN

## 2021-09-01 DIAGNOSIS — F41.8 SITUATIONAL ANXIETY: ICD-10-CM

## 2021-09-02 RX ORDER — LORAZEPAM 0.5 MG/1
TABLET ORAL
Qty: 20 TABLET | Refills: 0 | Status: SHIPPED | OUTPATIENT
Start: 2021-09-02 | End: 2021-09-08

## 2021-09-02 NOTE — TELEPHONE ENCOUNTER
Routing refill request to provider for review/approval because:  Drug not on the [facility] refill protocol     Carine Ward RN

## 2021-09-07 NOTE — PROGRESS NOTES
SUBJECTIVE:   CC: Diana Salvador is an 56 year old woman who presents for preventive health visit.     Patient has been advised of split billing requirements and indicates understanding: Yes  Healthy Habits:     Getting at least 3 servings of Calcium per day:  NO    Bi-annual eye exam:  Yes    Dental care twice a year:  NO    Sleep apnea or symptoms of sleep apnea:  Daytime drowsiness    Diet:  Regular (no restrictions)    Frequency of exercise:  2-3 days/week    Duration of exercise:  30-45 minutes    Taking medications regularly:  No    Barriers to taking medications:  Problems remembering to take them    Medication side effects:  None    PHQ-2 Total Score: 2    Additional concerns today:  Yes    Insomnia    Onset: ongoing getting worse. Seems to be worse since she is back in the office working.    Description:   Time to fall asleep (sleep latency): 30 minutes  Middle of night awakening:  YES- can fall asleep somewhat fast but is waking up through the night  Early morning awakening:  YES    Progression of Symptoms:  worsening    Accompanying Signs & Symptoms:  Daytime sleepiness/napping: YES- tries not to nap.  Excessive snoring/apnea: no  Restless legs: YES  Frequent urination: no  Chronic pain:  YES    History:  Prior Insomnia: YES    Precipitating factors:   New stressful situation: YES  Caffeine intake: YES- one cup of coffee a day and one soda a day.  OTC decongestants: no  Any new medications: no. Has had dental work so she has been on antibiotics off and on.    Alleviating factors:  Self medicating (alcohol, etc.):  no    Therapies Tried and outcome: Melatonin extended release- did not seem to help.      Chronic Pain Follow-Up    Where in your body do you have pain? Low back, right side sciatica  How has your pain affected your ability to work? Pain does not limit ability to work   What type of work do you or did you do?   Which of these pain treatments have you tried since your last clinic visit?  Activity or exercise, Heat, Massage and Other: Voltaren gel and Tramadol  How well are you sleeping? Poor  How has your mood been since your last visit? Slightly worse  Have you had a significant life event? Other: lots of life events  Other aggravating factors: prolonged sitting, prolonged standing and or any prolonged position.  Taking medication as directed? Yes  Tramadol 1 pill 50mg every 6-8 hours.    PHQ-9 SCORE 4/3/2020 2020 2021   PHQ-9 Total Score 7 11 14     SHADE-7 SCORE 4/3/2020 2020 2021   Total Score 19 15 11     No flowsheet data found.  Encounter-Level CSA:    There are no encounter-level csa.     Patient-Level CSA:    Controlled Substance Agreement - Opioid - Scan on 2019  3:57 PM         Today's PHQ-2 Score:   PHQ-2 (  [employer]) 2021   Q1: Little interest or pleasure in doing things 1   Q2: Feeling down, depressed or hopeless 1   PHQ-2 Score 2   Q1: Little interest or pleasure in doing things Several days   Q2: Feeling down, depressed or hopeless Several days   PHQ-2 Score 2     Anxiety and Depression: worsening, lots of hard circumstances.  PHQ 2021   PHQ-9 Total Score 13   Q9: Thoughts of better off dead/self-harm past 2 weeks Not at all     SHADE-7 SCORE 2020   Total Score 15 11 20           Abuse: Current or Past (Physical, Sexual or Emotional) - No  Do you feel safe in your environment? Yes      Social History     Tobacco Use     Smoking status: Former Smoker     Start date: 1982     Quit date: 1984     Years since quittin.7     Smokeless tobacco: Never Used   Substance Use Topics     Alcohol use: Yes     Comment: occ     If you drink alcohol do you typically have >3 drinks per day or >7 drinks per week? No    No flowsheet data found.No flowsheet data found.    Reviewed orders with patient.  Reviewed health maintenance and updated orders accordingly - Yes  BP Readings from Last 3 Encounters:   21 126/80   10/23/20 132/86    09/18/20 124/78    Wt Readings from Last 3 Encounters:   09/08/21 91.5 kg (201 lb 12.8 oz)   10/23/20 93.9 kg (207 lb)   09/18/20 93.9 kg (207 lb)                    Breast Cancer Screening:  Any new diagnosis of family breast, ovarian, or bowel cancer? No    FHS-7: No flowsheet data found.    Mammogram Screening: Recommended mammography every 1-2 years with patient discussion and risk factor consideration  Pertinent mammograms are reviewed under the imaging tab.    History of abnormal Pap smear:   Last 3 Pap and HPV Results:   PAP / HPV Latest Ref Rng & Units 9/15/2020   PAP (Historical) - NIL   HPV16 NEG:Negative Negative   HPV18 NEG:Negative Negative   HRHPV NEG:Negative Positive(A)     PAP / HPV Latest Ref Rng & Units 9/15/2020   PAP (Historical) - NIL   HPV16 NEG:Negative Negative   HPV18 NEG:Negative Negative   HRHPV NEG:Negative Positive(A)     Reviewed and updated as needed this visit by clinical staff                 Reviewed and updated as needed this visit by Provider                    Review of Systems   Constitutional: Negative for chills and fever.   HENT: Negative for congestion, ear pain, hearing loss and sore throat.    Eyes: Negative for pain and visual disturbance.   Respiratory: Negative for cough and shortness of breath.    Cardiovascular: Positive for peripheral edema. Negative for chest pain and palpitations.   Gastrointestinal: Positive for heartburn. Negative for abdominal pain, constipation, diarrhea, hematochezia and nausea.   Breasts:  Negative for tenderness, breast mass and discharge.   Genitourinary: Negative for dysuria, frequency, genital sores, hematuria, pelvic pain, urgency, vaginal bleeding and vaginal discharge.   Musculoskeletal: Positive for arthralgias, joint swelling and myalgias.   Skin: Negative for rash.   Neurological: Negative for dizziness, weakness, headaches and paresthesias.   Psychiatric/Behavioral: Positive for mood changes. The patient is nervous/anxious.   "    CONSTITUTIONAL: NEGATIVE for fever, chills, change in weight  INTEGUMENTARY/SKIN: NEGATIVE for worrisome rashes, moles or lesions  EYES: NEGATIVE for vision changes or irritation  ENT: NEGATIVE for ear, mouth and throat problems  RESP: NEGATIVE for significant cough or SOB  BREAST: NEGATIVE for masses, tenderness or discharge  CV: NEGATIVE for chest pain, palpitations or peripheral edema  GI: NEGATIVE for nausea, abdominal pain, heartburn, or change in bowel habits  : NEGATIVE for unusual urinary or vaginal symptoms. No vaginal bleeding.  MUSCULOSKELETAL: NEGATIVE for significant arthralgias or myalgia  NEURO: NEGATIVE for weakness, dizziness or paresthesias  PSYCHIATRIC: NEGATIVE for changes in mood or affect      OBJECTIVE:   /80   Pulse 87   Temp 98.5  F (36.9  C) (Tympanic)   Resp 16   Ht 1.715 m (5' 7.5\")   Wt 91.5 kg (201 lb 12.8 oz)   SpO2 99%   BMI 31.14 kg/m    Physical Exam  GENERAL APPEARANCE: healthy, alert and no distress  EYES: Eyes grossly normal to inspection, PERRL and conjunctivae and sclerae normal  HENT: ear canals and TM's normal, nose and mouth without ulcers or lesions, oropharynx clear and oral mucous membranes moist  NECK: no adenopathy, no asymmetry, masses, or scars and thyroid normal to palpation  RESP: lungs clear to auscultation - no rales, rhonchi or wheezes  BREAST: normal without masses, tenderness or nipple discharge and no palpable axillary masses or adenopathy  CV: regular rate and rhythm, normal S1 S2, no S3 or S4, no murmur, click or rub, no peripheral edema and peripheral pulses strong  ABDOMEN: soft, nontender, no hepatosplenomegaly, no masses and bowel sounds normal   (female): normal female external genitalia, normal urethral meatus, vaginal mucosal atrophy noted, normal partial cervix without masses or abnormal discharge  MS: no musculoskeletal defects are noted and gait is age appropriate without ataxia  SKIN: no suspicious lesions or rashes  NEURO: " Normal strength and tone, sensory exam grossly normal, mentation intact and speech normal  PSYCH: mentation appears normal and affect normal/bright    Diagnostic Test Results:  Labs reviewed in Epic    ASSESSMENT/PLAN:   Diana was seen today for physical and imm/inj.    Diagnoses and all orders for this visit:    Routine general medical examination at a health care facility  -     PAP screen with HPV - recommended age 30 - 65 years    Osteoporosis, unspecified osteoporosis type, unspecified pathological fracture presence  Trouble taking foxamax 35mg weekly, having jaw surgery, so plan to stay off this medication for a few months with healing after surgery.    Acute right-sided low back pain with right-sided sciatica; stable, refill  -     cyclobenzaprine (FLEXERIL) 5 MG tablet; Take 1 tablet (5 mg) by mouth 2 times daily as needed for muscle spasms  -     traMADol (ULTRAM) 50 MG tablet; Take 1 tablet (50 mg) by mouth every 6 hours as needed for severe pain #120 for one month  -     traMADol (ULTRAM) 50 MG tablet; Take 1 tablet (50 mg) by mouth every 6 hours as needed for severe pain  -     traMADol (ULTRAM) 50 MG tablet; Take 1 tablet (50 mg) by mouth every 6 hours as needed for severe pain    Situational anxiety: worsening  Plan to start daily preventative, wellbutrin  -discussed risks, benefits, and side effects of this new medication. Patient understands and is in agreement.  Refilled ativan, but discussed this is not the best long term solution.  Plan therapy.  -     LORazepam (ATIVAN) 0.5 MG tablet; Take 1 tablet (0.5 mg) by mouth daily as needed for anxiety  -     buPROPion (WELLBUTRIN XL) 150 MG 24 hr tablet; Take 1 tablet (150 mg) by mouth every morning    Restless legs syndrome: stable, refill  -     rOPINIRole (REQUIP) 1 MG tablet; Take 1 tablet (1 mg) by mouth At Bedtime    Chronic, continuous use of opioids: due to gastric bypass can't take NSAIDs, ok to use tylenol if needed as sub for tramadol or in  addition too.  -     traMADol (ULTRAM) 50 MG tablet; Take 1 tablet (50 mg) by mouth every 6 hours as needed for severe pain  -     traMADol (ULTRAM) 50 MG tablet; Take 1 tablet (50 mg) by mouth every 6 hours as needed for severe pain  -     traMADol (ULTRAM) 50 MG tablet; Take 1 tablet (50 mg) by mouth every 6 hours as needed for severe pain    Cold sore: stable, refill-     valACYclovir (VALTREX) 1000 mg tablet; Take 1 tablet (1,000 mg) by mouth 2 times daily Repeat as needed for cold sore.    Lower extremity edema  -     furosemide (LASIX) 20 MG tablet; Take 1 tablet (20 mg) by mouth daily as needed (swelling)    Insomnia, unspecified type: worsening, plan to trial doxepin  -     doxepin (SILENOR) 3 MG tablet; Take 1 tablet (3 mg) by mouth nightly as needed for sleep    Colon cancer screening  -     Fecal colorectal cancer screen (FIT); Future    Cervical high risk HPV (human papillomavirus) test positive  -     PAP screen with HPV - recommended age 30 - 65 years    History of Orestes-en-Y gastric bypass: plan labs.  -     Comprehensive metabolic panel (BMP + Alb, Alk Phos, ALT, AST, Total. Bili, TP); Future  -     Lipid panel reflex to direct LDL Non-fasting; Future  -     Hemoglobin; Future  -     Iron and iron binding capacity; Future  -     Ferritin; Future  -     Vitamin A; Future  -     Vitamin D Deficiency; Future  -     Vitamin B12; Future  -     Vitamin B1 whole blood; Future  -     Copper level; Future  -     Zinc; Future  -     Comprehensive metabolic panel (BMP + Alb, Alk Phos, ALT, AST, Total. Bili, TP)  -     Lipid panel reflex to direct LDL Non-fasting  -     Hemoglobin  -     Iron and iron binding capacity  -     Ferritin  -     Vitamin A  -     Vitamin D Deficiency  -     Vitamin B12  -     Vitamin B1 whole blood  -     Copper level  -     Zinc    Need for prophylactic vaccination and inoculation against influenza  -     INFLUENZA QUAD, RECOMBINANT, P-FREE (RIV4) (FLUBLOK)        Patient has been  "advised of split billing requirements and indicates understanding: Yes  COUNSELING:  Reviewed preventive health counseling, as reflected in patient instructions       Regular exercise       Healthy diet/nutrition       Vision screening    Estimated body mass index is 31.14 kg/m  as calculated from the following:    Height as of this encounter: 1.715 m (5' 7.5\").    Weight as of this encounter: 91.5 kg (201 lb 12.8 oz).    Weight management plan: Discussed healthy diet and exercise guidelines    She reports that she quit smoking about 37 years ago. She started smoking about 39 years ago. She has never used smokeless tobacco.      Counseling Resources:  ATP IV Guidelines  Pooled Cohorts Equation Calculator  Breast Cancer Risk Calculator  BRCA-Related Cancer Risk Assessment: FHS-7 Tool  FRAX Risk Assessment  ICSI Preventive Guidelines  Dietary Guidelines for Americans, 2010  USDA's MyPlate  ASA Prophylaxis  Lung CA Screening    Quoc Chu MD  [facility]  "

## 2021-09-07 NOTE — PATIENT INSTRUCTIONS
1. Flu shot  2. FIT test  3. To lab    Start wellbutrin 150mg XL one pill morning for mood.  I would highly recommend therapy  We recheck in 4 weeks.    Prescription for sleep called doxepin/silenor we'll see if covered by insurance, let me know if too high cost and we'll try something else like very low dose trazodone.    Preventive Health Recommendations  Female Ages 50 - 64    Yearly exam: See your health care provider every year in order to  o Review health changes.   o Discuss preventive care.    o Review your medicines if your doctor has prescribed any.      Get a Pap test every three years (unless you have an abnormal result and your provider advises testing more often).    If you get Pap tests with HPV test, you only need to test every 5 years, unless you have an abnormal result.     You do not need a Pap test if your uterus was removed (hysterectomy) and you have not had cancer.    You should be tested each year for STDs (sexually transmitted diseases) if you're at risk.     Have a mammogram every 1 to 2 years.    Have a colonoscopy at age 50, or have a yearly FIT test (stool test). These exams screen for colon cancer.      Have a cholesterol test every 5 years, or more often if advised.    Have a diabetes test (fasting glucose) every three years. If you are at risk for diabetes, you should have this test more often.     If you are at risk for osteoporosis (brittle bone disease), think about having a bone density scan (DEXA).    Shots: Get a flu shot each year. Get a tetanus shot every 10 years.    Nutrition:     Eat at least 5 servings of fruits and vegetables each day.    Eat whole-grain bread, whole-wheat pasta and brown rice instead of white grains and rice.    Get adequate Calcium and Vitamin D.     Lifestyle    Exercise at least 150 minutes a week (30 minutes a day, 5 days a week). This will help you control your weight and prevent disease.    Limit alcohol to one drink per day.    No smoking.      Wear sunscreen to prevent skin cancer.     See your dentist every six months for an exam and cleaning.    See your eye doctor every 1 to 2 years.    Schedule therapy with Elvira Ozuna at the  or through the schedule line [phone].  First visit is 60 minutes. Check with insurance about coverage.  She sees people short term for therapy and then refers you on to a permanent therapist if needed.      Patient Education     Treating Insomnia     Learning to relax before bedtime can improve your sleep.   Good sleeping habits are a key part of treatment. If needed, some medicines may help you sleep better at first. Making healthy lifestyle changes and learning to relax can improve your sleep. Treating insomnia takes commitment. But trust that your efforts will pay off. Be sure to talk with your healthcare provider before taking any medicine.  Healthy lifestyle  Your lifestyle affects your health and your sleep. Here are some healthy habits:    Keep a regular sleep schedule. Go to bed and get up at the same time each day.    Exercise regularly. It may help you reduce stress. Avoid strenuous exercise for 2 to 4 hours before bedtime.    Avoid or limit naps, especially in the late afternoon.    Use your bed only for sleep and sex.    Don t spend too much time in bed trying to fall asleep. If you can t fall asleep, get up and do something (no electronics) until you become tired and drowsy.    Avoid or limit caffeine and nicotine for up to 6 hours before bedtime. They can keep you awake at night.     Also avoid alcohol for at least 4 to 6 hours before bedtime. It may help you fall asleep at first. But you will have more awakenings during the night. And your sleep will not be restful.  Before bedtime  To sleep better every night, try these tips:    Have a bedtime routine to let your body and mind know when it s time to sleep.    Think of going to bed as relaxing and enjoyable. Sleep will come sooner.    If your  worries don t let you sleep, write them down in a diary. Then close it, and go to bed.    Make sure the room is not too hot or too cold. If it s not dark enough, an eye mask can help. If it s noisy, try using earplugs.    Don't eat a large meal just before bedtime.    Remove noises, bright lights, TVs, cell phones, and computers from your sleeping environment.    Use a comfortable mattress and pillow.  Learn to relax  Stress, anxiety, and body tension may keep you awake at night. To unwind before bedtime, try a warm bath, meditation, or yoga. Also try the following:    Deep breathing. Sit or lie back in a chair. Take a slow, deep breath. Hold it for 5 counts. Then breathe out slowly through your mouth. Keep doing this until you feel relaxed.    Progressive muscle relaxation. Tense and then relax the muscles in your body as you breathe deeply. Start with your feet and work up your body to your neck and face.  Cognitive Behavioral Therapy (CBT)  CBT is the most effective treatment for long-term insomnia. It tries to address the underlying causes of your sleep problems, including your habits and how you think about sleep.  Individual Therapy  Duglas Aguilar PsyD, RADHA  Insomnia   [facility]    [facility]: [phone]    [facility]: [phone]  [facility]  Visit [email or web address] or call [phone] to find a clinic close to you.  Suggested resources  The resources below may help you relax. This list is for information only. [facility] is not responsible for the quality of services or the actions of any person or organization. There may be a fee to use some of these resources.  Insomnia treatment books  Julia Mind by Rochelle Molina and Comfort Grover (2013)  Overcoming Insomnia by Miki Pimentel and Rochelle Molina (2008)  Quiet Your Mind and Get to Sleep: Solutions to Insomnia for Those with Depression, Anxiety or  Chronic Pain by Rochelle Molina and Comfort Grover (2009)  No More Sleepless Nights by Boris Sarabia and Rosy Stark (1996)  Say Julia to Insomnia by Valentín Pollock (2009)  The Insomnia Workbook by Mary Orantes and Rodrigo Iglesias (2009)  The Insomnia Answer by Gerald Brenner and Johnathan Alston (2006)  Stress management and relaxation books  The Relaxation and Stress Reduction Workbook by Aline Borja, Pearl Fang and Baldemar Liriano (2008)  Stress Management Workbook: Techniques and Self-Assessment Procedures by Danae Khoury and David Xiao (1997)  A Mindfulness-Based Stress Reduction Workbook by Jose A Kingsley and Christine Neff (2010)  The Complete Stress Management Workbook by Pacheco Marques, Sawyer Jeffrey and Darius Mendez (1996)  Online programs  www.SHUTi.me (pronounced shut eye). There is a fee for this program.   www.sleepIO.com (pronounced sleep ee oh). There is a fee for this program.  Other online resources  Deep breathing and progressive muscle relaxation (PMR):    http://www.[employer] Sports.ADOMIC (formerly YieldMetrics)  Meditation:    www.[employer].ADOMIC (formerly [employer]ield[employer])    www.Fixed - Parking TicketsguidedXAPPmediameditationXAPPmediasite.com  Guided imagery:    http://www.Big Health    http://[employer].ADOMIC (formerly [employer])  (click on  Resource Library,  then choose  Meditation, Relaxation, Guided Imagery )  Apps for your mobile device:    Autogenic Training Progressive Muscle Relaxation by [employer]    Calm: Meditation and Sleep Stories by [employer]    Insight Timer - Meditation Leroy by [employer]  This is not a prescription and these resources are optional. You must pay for any costs when using these resources. Please ask your insurance carrier if you can be reimbursed for these resources. If so, you are responsible for sending the needed details to your insurance carrier. These resources may also be tax deductible as medical expenses. Check with your .  Date Last Reviewed: 5/18/2018  Clinically reviewed by  Duglas Aguilar PsyD, RADHA, [facility], Director of the Insomnia and Behavioral Sleep Health Program, [facility].  For informational purposes only. Not to replace the advice of your health care provider.  Copyright   2018 [facility]. All rights reserved.

## 2021-09-08 ENCOUNTER — OFFICE VISIT (OUTPATIENT)
Dept: FAMILY MEDICINE | Facility: CLINIC | Age: 56
End: 2021-09-08
Payer: COMMERCIAL

## 2021-09-08 VITALS
SYSTOLIC BLOOD PRESSURE: 126 MMHG | RESPIRATION RATE: 16 BRPM | BODY MASS INDEX: 30.58 KG/M2 | HEIGHT: 68 IN | WEIGHT: 201.8 LBS | TEMPERATURE: 98.5 F | DIASTOLIC BLOOD PRESSURE: 80 MMHG | OXYGEN SATURATION: 99 % | HEART RATE: 87 BPM

## 2021-09-08 DIAGNOSIS — R87.810 CERVICAL HIGH RISK HPV (HUMAN PAPILLOMAVIRUS) TEST POSITIVE: ICD-10-CM

## 2021-09-08 DIAGNOSIS — F11.90 CHRONIC, CONTINUOUS USE OF OPIOIDS: ICD-10-CM

## 2021-09-08 DIAGNOSIS — B00.1 COLD SORE: ICD-10-CM

## 2021-09-08 DIAGNOSIS — M54.41 ACUTE RIGHT-SIDED LOW BACK PAIN WITH RIGHT-SIDED SCIATICA: ICD-10-CM

## 2021-09-08 DIAGNOSIS — E55.9 VITAMIN D DEFICIENCY: ICD-10-CM

## 2021-09-08 DIAGNOSIS — G25.81 RESTLESS LEGS SYNDROME: ICD-10-CM

## 2021-09-08 DIAGNOSIS — Z00.00 ROUTINE GENERAL MEDICAL EXAMINATION AT A HEALTH CARE FACILITY: Primary | ICD-10-CM

## 2021-09-08 DIAGNOSIS — F41.8 SITUATIONAL ANXIETY: ICD-10-CM

## 2021-09-08 DIAGNOSIS — Z23 NEED FOR PROPHYLACTIC VACCINATION AND INOCULATION AGAINST INFLUENZA: ICD-10-CM

## 2021-09-08 DIAGNOSIS — Z98.84 HISTORY OF ROUX-EN-Y GASTRIC BYPASS: ICD-10-CM

## 2021-09-08 DIAGNOSIS — R60.0 LOWER EXTREMITY EDEMA: ICD-10-CM

## 2021-09-08 DIAGNOSIS — Z12.11 COLON CANCER SCREENING: ICD-10-CM

## 2021-09-08 DIAGNOSIS — M81.0 OSTEOPOROSIS, UNSPECIFIED OSTEOPOROSIS TYPE, UNSPECIFIED PATHOLOGICAL FRACTURE PRESENCE: ICD-10-CM

## 2021-09-08 DIAGNOSIS — G47.00 INSOMNIA, UNSPECIFIED TYPE: ICD-10-CM

## 2021-09-08 LAB
ALBUMIN SERPL-MCNC: 3.4 G/DL (ref 3.4–5)
ALP SERPL-CCNC: 94 U/L (ref 40–150)
ALT SERPL W P-5'-P-CCNC: 15 U/L (ref 0–50)
ANION GAP SERPL CALCULATED.3IONS-SCNC: 5 MMOL/L (ref 3–14)
AST SERPL W P-5'-P-CCNC: 15 U/L (ref 0–45)
BILIRUB SERPL-MCNC: 0.3 MG/DL (ref 0.2–1.3)
BUN SERPL-MCNC: 7 MG/DL (ref 7–30)
CALCIUM SERPL-MCNC: 8.3 MG/DL (ref 8.5–10.1)
CHLORIDE BLD-SCNC: 109 MMOL/L (ref 94–109)
CHOLEST SERPL-MCNC: 211 MG/DL
CO2 SERPL-SCNC: 28 MMOL/L (ref 20–32)
CREAT SERPL-MCNC: 0.69 MG/DL (ref 0.52–1.04)
DEPRECATED CALCIDIOL+CALCIFEROL SERPL-MC: 22 UG/L (ref 20–75)
FERRITIN SERPL-MCNC: 12 NG/ML (ref 8–252)
GFR SERPL CREATININE-BSD FRML MDRD: >90 ML/MIN/1.73M2
GLUCOSE BLD-MCNC: 103 MG/DL (ref 70–99)
HDLC SERPL-MCNC: 61 MG/DL
HGB BLD-MCNC: 13.7 G/DL (ref 11.7–15.7)
IRON SATN MFR SERPL: 14 % (ref 15–46)
IRON SERPL-MCNC: 49 UG/DL (ref 35–180)
LDLC SERPL CALC-MCNC: 117 MG/DL
NONHDLC SERPL-MCNC: 150 MG/DL
POTASSIUM BLD-SCNC: 3.9 MMOL/L (ref 3.4–5.3)
PROT SERPL-MCNC: 7.2 G/DL (ref 6.8–8.8)
SODIUM SERPL-SCNC: 142 MMOL/L (ref 133–144)
TIBC SERPL-MCNC: 349 UG/DL (ref 240–430)
TRIGL SERPL-MCNC: 163 MG/DL
VIT B12 SERPL-MCNC: 184 PG/ML (ref 193–986)

## 2021-09-08 PROCEDURE — 99396 PREV VISIT EST AGE 40-64: CPT | Mod: 25 | Performed by: FAMILY MEDICINE

## 2021-09-08 PROCEDURE — 82728 ASSAY OF FERRITIN: CPT | Performed by: FAMILY MEDICINE

## 2021-09-08 PROCEDURE — 90471 IMMUNIZATION ADMIN: CPT | Performed by: FAMILY MEDICINE

## 2021-09-08 PROCEDURE — 36415 COLL VENOUS BLD VENIPUNCTURE: CPT | Performed by: FAMILY MEDICINE

## 2021-09-08 PROCEDURE — G0145 SCR C/V CYTO,THINLAYER,RESCR: HCPCS | Performed by: FAMILY MEDICINE

## 2021-09-08 PROCEDURE — 99213 OFFICE O/P EST LOW 20 MIN: CPT | Mod: 25 | Performed by: FAMILY MEDICINE

## 2021-09-08 PROCEDURE — 80061 LIPID PANEL: CPT | Performed by: FAMILY MEDICINE

## 2021-09-08 PROCEDURE — 82525 ASSAY OF COPPER: CPT | Mod: 90 | Performed by: FAMILY MEDICINE

## 2021-09-08 PROCEDURE — 80053 COMPREHEN METABOLIC PANEL: CPT | Performed by: FAMILY MEDICINE

## 2021-09-08 PROCEDURE — 84630 ASSAY OF ZINC: CPT | Mod: 90 | Performed by: FAMILY MEDICINE

## 2021-09-08 PROCEDURE — 83550 IRON BINDING TEST: CPT | Performed by: FAMILY MEDICINE

## 2021-09-08 PROCEDURE — 84425 ASSAY OF VITAMIN B-1: CPT | Mod: 90 | Performed by: FAMILY MEDICINE

## 2021-09-08 PROCEDURE — 87624 HPV HI-RISK TYP POOLED RSLT: CPT | Performed by: FAMILY MEDICINE

## 2021-09-08 PROCEDURE — 82607 VITAMIN B-12: CPT | Performed by: FAMILY MEDICINE

## 2021-09-08 PROCEDURE — 82306 VITAMIN D 25 HYDROXY: CPT | Performed by: FAMILY MEDICINE

## 2021-09-08 PROCEDURE — 84590 ASSAY OF VITAMIN A: CPT | Mod: 90 | Performed by: FAMILY MEDICINE

## 2021-09-08 PROCEDURE — 90682 RIV4 VACC RECOMBINANT DNA IM: CPT | Performed by: FAMILY MEDICINE

## 2021-09-08 PROCEDURE — 99000 SPECIMEN HANDLING OFFICE-LAB: CPT | Performed by: FAMILY MEDICINE

## 2021-09-08 PROCEDURE — 85018 HEMOGLOBIN: CPT | Performed by: FAMILY MEDICINE

## 2021-09-08 RX ORDER — ALENDRONATE SODIUM 35 MG/1
35 TABLET ORAL
Qty: 12 TABLET | Refills: 3 | Status: CANCELLED | OUTPATIENT
Start: 2021-09-08

## 2021-09-08 RX ORDER — DOXEPIN 3 MG/1
3 TABLET, FILM COATED ORAL
Qty: 30 TABLET | Refills: 1 | Status: SHIPPED | OUTPATIENT
Start: 2021-09-08 | End: 2021-11-01

## 2021-09-08 RX ORDER — TRAMADOL HYDROCHLORIDE 50 MG/1
50 TABLET ORAL EVERY 6 HOURS PRN
Qty: 120 TABLET | Refills: 0 | Status: SHIPPED | OUTPATIENT
Start: 2021-11-07 | End: 2022-01-26

## 2021-09-08 RX ORDER — BUPROPION HYDROCHLORIDE 150 MG/1
150 TABLET ORAL EVERY MORNING
Qty: 30 TABLET | Refills: 1 | Status: SHIPPED | OUTPATIENT
Start: 2021-09-08 | End: 2021-11-01

## 2021-09-08 RX ORDER — ROPINIROLE 1 MG/1
1 TABLET, FILM COATED ORAL AT BEDTIME
Qty: 90 TABLET | Refills: 3 | Status: SHIPPED | OUTPATIENT
Start: 2021-09-08 | End: 2022-06-28

## 2021-09-08 RX ORDER — LORAZEPAM 0.5 MG/1
0.5 TABLET ORAL DAILY PRN
Qty: 30 TABLET | Refills: 1 | Status: SHIPPED | OUTPATIENT
Start: 2021-09-08 | End: 2022-01-26

## 2021-09-08 RX ORDER — TRAMADOL HYDROCHLORIDE 50 MG/1
50 TABLET ORAL EVERY 6 HOURS PRN
Qty: 120 TABLET | Refills: 0 | Status: SHIPPED | OUTPATIENT
Start: 2021-10-07 | End: 2022-01-11

## 2021-09-08 RX ORDER — CYCLOBENZAPRINE HCL 5 MG
5 TABLET ORAL 2 TIMES DAILY PRN
Qty: 120 TABLET | Refills: 3 | Status: SHIPPED | OUTPATIENT
Start: 2021-09-08 | End: 2021-11-01

## 2021-09-08 RX ORDER — TRAMADOL HYDROCHLORIDE 50 MG/1
50 TABLET ORAL EVERY 6 HOURS PRN
Qty: 120 TABLET | Refills: 0 | Status: SHIPPED | OUTPATIENT
Start: 2021-09-08 | End: 2021-12-06

## 2021-09-08 RX ORDER — FUROSEMIDE 20 MG
20 TABLET ORAL DAILY PRN
Qty: 60 TABLET | Refills: 5 | Status: SHIPPED | OUTPATIENT
Start: 2021-09-08 | End: 2022-12-28

## 2021-09-08 RX ORDER — VALACYCLOVIR HYDROCHLORIDE 1 G/1
1000 TABLET, FILM COATED ORAL 2 TIMES DAILY
Qty: 16 TABLET | Refills: 3 | Status: SHIPPED | OUTPATIENT
Start: 2021-09-08 | End: 2022-04-28

## 2021-09-08 ASSESSMENT — PATIENT HEALTH QUESTIONNAIRE - PHQ9
5. POOR APPETITE OR OVEREATING: NEARLY EVERY DAY
SUM OF ALL RESPONSES TO PHQ QUESTIONS 1-9: 13

## 2021-09-08 ASSESSMENT — ANXIETY QUESTIONNAIRES
6. BECOMING EASILY ANNOYED OR IRRITABLE: NEARLY EVERY DAY
2. NOT BEING ABLE TO STOP OR CONTROL WORRYING: NEARLY EVERY DAY
3. WORRYING TOO MUCH ABOUT DIFFERENT THINGS: NEARLY EVERY DAY
GAD7 TOTAL SCORE: 20
IF YOU CHECKED OFF ANY PROBLEMS ON THIS QUESTIONNAIRE, HOW DIFFICULT HAVE THESE PROBLEMS MADE IT FOR YOU TO DO YOUR WORK, TAKE CARE OF THINGS AT HOME, OR GET ALONG WITH OTHER PEOPLE: EXTREMELY DIFFICULT
1. FEELING NERVOUS, ANXIOUS, OR ON EDGE: NEARLY EVERY DAY
5. BEING SO RESTLESS THAT IT IS HARD TO SIT STILL: MORE THAN HALF THE DAYS
7. FEELING AFRAID AS IF SOMETHING AWFUL MIGHT HAPPEN: NEARLY EVERY DAY

## 2021-09-08 ASSESSMENT — ENCOUNTER SYMPTOMS
WEAKNESS: 0
HEADACHES: 0
CHILLS: 0
NAUSEA: 0
MYALGIAS: 1
SORE THROAT: 0
DIARRHEA: 0
BREAST MASS: 0
DIZZINESS: 0
HEMATURIA: 0
PARESTHESIAS: 0
JOINT SWELLING: 1
FREQUENCY: 0
ARTHRALGIAS: 1
SHORTNESS OF BREATH: 0
HEARTBURN: 1
COUGH: 0
HEMATOCHEZIA: 0
EYE PAIN: 0
FEVER: 0
CONSTIPATION: 0
NERVOUS/ANXIOUS: 1
ABDOMINAL PAIN: 0
PALPITATIONS: 0
DYSURIA: 0

## 2021-09-08 ASSESSMENT — MIFFLIN-ST. JEOR: SCORE: 1540.92

## 2021-09-08 NOTE — LETTER
September 9, 2021      Diana Salvador  [address] [place] [id number]        Dear ,    We are writing to inform you of your test results.    Hemoglobin is normal. Iron and ferritin (iron stores) all normal.   B12 is low, so you do need the daily b12 supplement or the monthly B12 injections to keep this up again, let me know which.   Normal electrolyte, liver, and kidney tests.   Glucose was 103.   Calcium was just slightly.   Cholesterol was just a little high, not too high to need medication.   To improve your cholesterol exercise 30 minutes per day five days a week, increase eating fresh or frozen fruits and vegetables at least 5 per day, increase eating lean meats like fish, and avoid fried foods.     Resulted Orders   Comprehensive metabolic panel (BMP + Alb, Alk Phos, ALT, AST, Total. Bili, TP)   Result Value Ref Range    Sodium 142 133 - 144 mmol/L    Potassium 3.9 3.4 - 5.3 mmol/L    Chloride 109 94 - 109 mmol/L    Carbon Dioxide (CO2) 28 20 - 32 mmol/L    Anion Gap 5 3 - 14 mmol/L    Urea Nitrogen 7 7 - 30 mg/dL    Creatinine 0.69 0.52 - 1.04 mg/dL    Calcium 8.3 (L) 8.5 - 10.1 mg/dL    Glucose 103 (H) 70 - 99 mg/dL    Alkaline Phosphatase 94 40 - 150 U/L    AST 15 0 - 45 U/L    ALT 15 0 - 50 U/L    Protein Total 7.2 6.8 - 8.8 g/dL    Albumin 3.4 3.4 - 5.0 g/dL    Bilirubin Total 0.3 0.2 - 1.3 mg/dL    GFR Estimate >90 >60 mL/min/1.73m2      Comment:      As of July 11, 2021, eGFR is calculated by the CKD-EPI creatinine equation, without race adjustment. eGFR can be influenced by muscle mass, exercise, and diet. The reported eGFR is an estimation only and is only applicable if the renal function is stable.   Lipid panel reflex to direct LDL Non-fasting   Result Value Ref Range    Cholesterol 211 (H) <200 mg/dL      Comment:      Age 0-19 years  Desirable: <170 mg/dL  Borderline high:  170-199 mg/dl  High:            >199 mg/dl    Age 20 years and older  Desirable: <200 mg/dL    Triglycerides  163 (H) <150 mg/dL      Comment:      0-9 years:  Normal:    Less than 75 mg/dL  Borderline high:  75-99 mg/dL  High:             Greater than or equal to 100 mg/dL    0-19 years:  Normal:    Less than 90 mg/dL  Borderline high:   mg/dL  High:             Greater than or equal to 130 mg/dL    20 years and older:  Normal:    Less than 150 mg/dL  Borderline high:  150-199 mg/dL  High:             200-499 mg/dL  Very high:   Greater than or equal to 500 mg/dL    Direct Measure HDL 61 >=50 mg/dL      Comment:      0-19 years:       Greater than or equal to 45 mg/dL   Low: Less than 40 mg/dL   Borderline low: 40-44 mg/dL     20 years and older:   Female: Greater than or equal to 50 mg/dL   Male:   Greater than or equal to 40 mg/dL         LDL Cholesterol Calculated 117 (H) <=100 mg/dL      Comment:      Age 0-19 years:  Desirable: 0-110 mg/dL   Borderline high: 110-129 mg/dL   High: >= 130 mg/dL    Age 20 years and older:  Desirable: <100mg/dL  Above desirable: 100-129 mg/dL   Borderline high: 130-159 mg/dL   High: 160-189 mg/dL   Very high: >= 190 mg/dL    Non HDL Cholesterol 150 (H) <130 mg/dL      Comment:      0-19 years:  Desirable:          Less than 120 mg/dL  Borderline high:   120-144 mg/dL  High:                   Greater than or equal to 145 mg/dL    20 years and older:  Desirable:          130 mg/dL  Above Desirable: 130-159 mg/dL  Borderline high:   160-189 mg/dL  High:               190-219 mg/dL  Very high:     Greater than or equal to 220 mg/dL   Hemoglobin   Result Value Ref Range    Hemoglobin 13.7 11.7 - 15.7 g/dL   Iron and iron binding capacity   Result Value Ref Range    Iron 49 35 - 180 ug/dL    Iron Binding Capacity 349 240 - 430 ug/dL    Iron Sat Index 14 (L) 15 - 46 %   Ferritin   Result Value Ref Range    Ferritin 12 8 - 252 ng/mL   Vitamin B12   Result Value Ref Range    Vitamin B12 184 (L) 193 - 986 pg/mL       If you have any questions or concerns, please call the clinic at the number  listed above.       Sincerely,      Quoc Chu MD

## 2021-09-09 ASSESSMENT — ANXIETY QUESTIONNAIRES: GAD7 TOTAL SCORE: 20

## 2021-09-10 LAB
COPPER SERPL-MCNC: 125.9 UG/DL
ZINC SERPL-MCNC: 67.1 UG/DL

## 2021-09-10 RX ORDER — CHOLECALCIFEROL (VITAMIN D3) 50 MCG
1 TABLET ORAL DAILY
Qty: 90 TABLET | Refills: 3 | Status: SHIPPED | OUTPATIENT
Start: 2021-09-10 | End: 2022-11-30

## 2021-09-11 LAB
ANNOTATION COMMENT IMP: NORMAL
RETINYL PALMITATE SERPL-MCNC: <0.02 MG/L
VIT A SERPL-MCNC: 0.36 MG/L
VIT B1 PYROPHOSHATE BLD-SCNC: 137 NMOL/L

## 2021-09-12 ENCOUNTER — APPOINTMENT (OUTPATIENT)
Dept: URGENT CARE | Facility: CLINIC | Age: 56
End: 2021-09-12
Payer: OTHER GOVERNMENT

## 2021-09-13 LAB
BKR LAB AP GYN ADEQUACY: NORMAL
BKR LAB AP GYN INTERPRETATION: NORMAL
BKR LAB AP HPV REFLEX: NORMAL
BKR LAB AP LMP: NORMAL
BKR LAB AP PREVIOUS ABNL DX: NORMAL
BKR LAB AP PREVIOUS ABNORMAL: NORMAL
PATH REPORT.COMMENTS IMP SPEC: NORMAL
PATH REPORT.RELEVANT HX SPEC: NORMAL

## 2021-09-15 ENCOUNTER — PATIENT OUTREACH (OUTPATIENT)
Dept: FAMILY MEDICINE | Facility: CLINIC | Age: 56
End: 2021-09-15

## 2021-09-15 LAB
HUMAN PAPILLOMA VIRUS 16 DNA: NEGATIVE
HUMAN PAPILLOMA VIRUS 18 DNA: NEGATIVE
HUMAN PAPILLOMA VIRUS FINAL DIAGNOSIS: NORMAL
HUMAN PAPILLOMA VIRUS OTHER HR: NEGATIVE

## 2021-10-12 DIAGNOSIS — M54.41 ACUTE RIGHT-SIDED LOW BACK PAIN WITH RIGHT-SIDED SCIATICA: ICD-10-CM

## 2021-10-12 RX ORDER — CYCLOBENZAPRINE HCL 5 MG
TABLET ORAL
Qty: 120 TABLET | Refills: 3 | OUTPATIENT
Start: 2021-10-12

## 2021-10-16 ENCOUNTER — VIRTUAL VISIT (OUTPATIENT)
Dept: URGENT CARE | Facility: CLINIC | Age: 56
End: 2021-10-16
Payer: COMMERCIAL

## 2021-10-16 DIAGNOSIS — Z20.822 EXPOSURE TO 2019 NOVEL CORONAVIRUS: Primary | ICD-10-CM

## 2021-10-16 PROCEDURE — 99212 OFFICE O/P EST SF 10 MIN: CPT | Mod: 95 | Performed by: PHYSICIAN ASSISTANT

## 2021-10-16 NOTE — PROGRESS NOTES
Aide is a 56 year old who is being evaluated via a billable video visit.      Video Start Time: 8:14 AM    Assessment & Plan     Exposure to 2019 novel coronavirus  - Asymptomatic COVID-19 Virus (Coronavirus) by PCR; Future      Treasure Glover PA-C  Virtual Urgent Care  [facility] VIRTUAL URGENT CARE    Subjective   Aide is a 56 year old who presents for the following health issues : covid exposure    HPI - Patient was exposed to Covid through her son. Last exposure was 6 days ago. Her work is requesting she get tested before return to in person work. She is not having any symptoms.     Review of Systems   Constitutional, HEENT, cardiovascular, pulmonary, gi and gu systems are negative, except as otherwise noted.      Objective           Vitals:  No vitals were obtained today due to virtual visit.    Physical Exam   GENERAL: Healthy, alert and no distress  EYES: Eyes grossly normal to inspection.  No discharge or erythema, or obvious scleral/conjunctival abnormalities.  RESP: No audible wheeze, cough, or visible cyanosis.  No visible retractions or increased work of breathing.    SKIN: Visible skin clear. No significant rash, abnormal pigmentation or lesions.  NEURO: Cranial nerves grossly intact.  Mentation and speech appropriate for age.  PSYCH: Mentation appears normal, affect normal/bright, judgement and insight intact, normal speech and appearance well-groomed.      Video-Visit Details    Type of service:  Video Visit    Video End Time:8:16 AM    Originating Location (pt. Location): Home    Distant Location (provider location):  [facility] URGENT CARE     Platform used for Video Visit: [employer]

## 2021-10-17 ENCOUNTER — LAB (OUTPATIENT)
Dept: FAMILY MEDICINE | Facility: CLINIC | Age: 56
End: 2021-10-17
Attending: FAMILY MEDICINE
Payer: COMMERCIAL

## 2021-10-17 DIAGNOSIS — Z20.822 SUSPECTED COVID-19 VIRUS INFECTION: ICD-10-CM

## 2021-10-17 PROCEDURE — U0003 INFECTIOUS AGENT DETECTION BY NUCLEIC ACID (DNA OR RNA); SEVERE ACUTE RESPIRATORY SYNDROME CORONAVIRUS 2 (SARS-COV-2) (CORONAVIRUS DISEASE [COVID-19]), AMPLIFIED PROBE TECHNIQUE, MAKING USE OF HIGH THROUGHPUT TECHNOLOGIES AS DESCRIBED BY CMS-2020-01-R: HCPCS

## 2021-10-17 PROCEDURE — U0005 INFEC AGEN DETEC AMPLI PROBE: HCPCS

## 2021-10-17 PROCEDURE — 99207 PR NO CHARGE LOS: CPT

## 2021-10-18 LAB — SARS-COV-2 RNA RESP QL NAA+PROBE: NEGATIVE

## 2021-10-24 ENCOUNTER — HOSPITAL ENCOUNTER (EMERGENCY)
Facility: CLINIC | Age: 56
Discharge: HOME | End: 2021-10-24
Attending: EMERGENCY MEDICINE | Admitting: EMERGENCY MEDICINE
Payer: COMMERCIAL

## 2021-10-24 ENCOUNTER — APPOINTMENT (OUTPATIENT)
Dept: CT IMAGING | Facility: CLINIC | Age: 56
End: 2021-10-24
Attending: EMERGENCY MEDICINE
Payer: COMMERCIAL

## 2021-10-24 ENCOUNTER — APPOINTMENT (OUTPATIENT)
Dept: GENERAL RADIOLOGY | Facility: CLINIC | Age: 56
End: 2021-10-24
Attending: EMERGENCY MEDICINE
Payer: COMMERCIAL

## 2021-10-24 VITALS
TEMPERATURE: 98 F | RESPIRATION RATE: 18 BRPM | BODY MASS INDEX: 30.86 KG/M2 | SYSTOLIC BLOOD PRESSURE: 142 MMHG | OXYGEN SATURATION: 96 % | WEIGHT: 200 LBS | DIASTOLIC BLOOD PRESSURE: 80 MMHG | HEART RATE: 82 BPM

## 2021-10-24 DIAGNOSIS — M25.561 ACUTE PAIN OF RIGHT KNEE: ICD-10-CM

## 2021-10-24 DIAGNOSIS — S02.5XXA CLOSED FRACTURE OF TOOTH, INITIAL ENCOUNTER: ICD-10-CM
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
## 2021-10-24 DIAGNOSIS — S00.81XA ABRASION OF FACE, INITIAL ENCOUNTER: ICD-10-CM

## 2021-10-24 DIAGNOSIS — M25.532 LEFT WRIST PAIN: ICD-10-CM

## 2021-10-24 PROCEDURE — 250N000011 HC RX IP 250 OP 636: Performed by: EMERGENCY MEDICINE

## 2021-10-24 PROCEDURE — 96372 THER/PROPH/DIAG INJ SC/IM: CPT | Performed by: EMERGENCY MEDICINE

## 2021-10-24 PROCEDURE — 70486 CT MAXILLOFACIAL W/O DYE: CPT

## 2021-10-24 PROCEDURE — 99285 EMERGENCY DEPT VISIT HI MDM: CPT | Mod: 25 | Performed by: EMERGENCY MEDICINE

## 2021-10-24 PROCEDURE — 99285 EMERGENCY DEPT VISIT HI MDM: CPT | Performed by: EMERGENCY MEDICINE

## 2021-10-24 PROCEDURE — 73562 X-RAY EXAM OF KNEE 3: CPT | Mod: RT

## 2021-10-24 PROCEDURE — 73110 X-RAY EXAM OF WRIST: CPT | Mod: LT

## 2021-10-24 RX ORDER — OXYCODONE HYDROCHLORIDE 5 MG/1
5 TABLET ORAL EVERY 6 HOURS PRN
Qty: 6 TABLET | Refills: 0 | Status: SHIPPED | OUTPATIENT
Start: 2021-10-24 | End: 2021-10-25

## 2021-10-24 RX ADMIN — HYDROMORPHONE HYDROCHLORIDE 2 MG: 1 INJECTION, SOLUTION INTRAMUSCULAR; INTRAVENOUS; SUBCUTANEOUS at 21:25

## 2021-10-24 ASSESSMENT — ENCOUNTER SYMPTOMS
FEVER: 0
ABDOMINAL PAIN: 0
SHORTNESS OF BREATH: 0
WOUND: 1

## 2021-10-25 ENCOUNTER — E-VISIT (OUTPATIENT)
Dept: URGENT CARE | Facility: CLINIC | Age: 56
End: 2021-10-25
Payer: COMMERCIAL

## 2021-10-25 ENCOUNTER — TELEPHONE (OUTPATIENT)
Dept: FAMILY MEDICINE | Facility: CLINIC | Age: 56
End: 2021-10-25
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
## 2021-10-25 DIAGNOSIS — S02.5XXD CLOSED FRACTURE OF TOOTH WITH ROUTINE HEALING, SUBSEQUENT ENCOUNTER: Primary | ICD-10-CM

## 2021-10-25 PROCEDURE — 99421 OL DIG E/M SVC 5-10 MIN: CPT

## 2021-10-25 RX ORDER — OXYCODONE HYDROCHLORIDE 5 MG/1
5 TABLET ORAL EVERY 6 HOURS PRN
Qty: 28 TABLET | Refills: 0 | Status: SHIPPED | OUTPATIENT
Start: 2021-10-25 | End: 2021-10-29

## 2021-10-25 NOTE — PATIENT INSTRUCTIONS
Thank you for choosing us for your care. I have placed an order for a prescription so that you can start treatment. View your full visit summary for details by clicking on the link below. Your pharmacist will able to address any questions you may have about the medication.     If you're not feeling better within 5-7 days, please schedule an appointment.  You can schedule an appointment right here in [facility], or call [phone]  If the visit is for the same symptoms as your eVisit, we'll refund the cost of your eVisit if seen within seven days.

## 2021-10-25 NOTE — TELEPHONE ENCOUNTER
Pt was seen in ED yesterday.  She tripped at [employer] last night and knocked out his front teeth.  Advised e-visit to address.    Destiny Kim RN

## 2021-10-25 NOTE — ED PROVIDER NOTES
History     Chief Complaint   Patient presents with     Fall     HPI  Diana Salvador is a 56 year old female who is presenting to the emergency department with significant other for concerns regarding fall onto face.  [place] [employer] [place], and subsequently did not see a tubular sandbag in the street.  Tripped over this, falling flat onto her face, landing onto an outstretched bilateral arms.  Injured the left wrist, right knee, in addition to face.  Injury occurred approximately 2 hours prior to ED arrival.  No loss of consciousness.  Not on any blood thinning medications.  Patient concerned about multiple teeth which are now knocked out.  Multiple abrasions to the lower face.  Also with abrasions to the right knee.  Difficulty walking secondary to pain.  Also with pain of the left wrist, moderate to severe in nature.  No chest pain.  No bony injury elsewhere.    Allergies:  No Known Allergies    Problem List:    Patient Active Problem List    Diagnosis Date Noted     Situational anxiety 04/08/2020     Priority: Medium     Cervical high risk HPV (human papillomavirus) test positive 03/15/2020     Priority: Medium     3/14/13 NIL pap.  9/15/20 NIL pap, + HR HPV (not 16 or 18). Plan cotest in 1 year due by 9/15/21.  9/8/21 NIL Pap, Neg HPV. Plan cotest in 1 year.            Chronic, continuous use of opioids 11/13/2019     Priority: Medium     Patient is followed by Quoc Chu MD for ongoing prescription of pain medication.  All refills should be approved by this provider only at face-to-face appointments - not by phone request.    Medication(s): Tramadol 50mg.   Maximum quantity per month: 120  Clinic visit frequency required: Q 3 months      Controlled substance agreement:  Encounter-Level CSA:    There are no encounter-level csa.     Patient-Level CSA:    There are no patient-level csa.       Pain Clinic evaluation in the past: Yes       Date/Location:  Had seen pain clinic in  [place] many years ago.    DIRE Total Score(s):  No flowsheet data found.    Last [facility] website verification:  done on 2021   [email or web address]         Osteopenia of multiple sites 2018     Priority: Medium     on fosamax 35mg daily since        Restless legs syndrome 2018     Priority: Medium     Lower extremity edema 2018     Priority: Medium     Sciatica of right side 2015     Priority: Medium     Overview:   Chronic. Takes Norco as needed.           Past Medical History:    Past Medical History:   Diagnosis Date     Cervical high risk HPV (human papillomavirus) test positive 03/15/2020       Past Surgical History:    Past Surgical History:   Procedure Laterality Date     ARTHROSCOPY SHOULDER ROTATOR CUFF REPAIR Right      ARTHROSCOPY SHOULDER ROTATOR CUFF REPAIR Left       SECTION       CHOLECYSTECTOMY       DAVINCI BYPASS GASTRIC ROBERT-EN-Y       HYSTERECTOMY SUPRACERVICAL       right ulnar nerve repositioning surgery         Family History:    Family History   Problem Relation Age of Onset     Heart Failure Mother        Social History:  Marital Status:   [5]  Social History     Tobacco Use     Smoking status: Former Smoker     Start date: 1982     Quit date: 1984     Years since quittin.8     Smokeless tobacco: Never Used   Vaping Use     Vaping Use: Never used   Substance Use Topics     Alcohol use: Yes     Comment: occ     Drug use: No        Medications:    oxyCODONE (ROXICODONE) 5 MG tablet  alendronate (FOSAMAX) 35 MG tablet  buPROPion (WELLBUTRIN XL) 150 MG 24 hr tablet  cyclobenzaprine (FLEXERIL) 5 MG tablet  diclofenac (VOLTAREN) 1 % topical gel  doxepin (SILENOR) 3 MG tablet  furosemide (LASIX) 20 MG tablet  LORazepam (ATIVAN) 0.5 MG tablet  omeprazole (PRILOSEC) 20 MG DR capsule  potassium chloride ER (K-TAB) 20 MEQ CR tablet  rOPINIRole (REQUIP) 1 MG tablet  traMADol (ULTRAM) 50 MG tablet  traMADol (ULTRAM) 50 MG  tablet  [START ON 11/7/2021] traMADol (ULTRAM) 50 MG tablet  valACYclovir (VALTREX) 1000 mg tablet  Vitamin D, Cholecalciferol, 1000 units CAPS  vitamin D3 (CHOLECALCIFEROL) 50 mcg (2000 units) tablet          Review of Systems   Constitutional: Negative for fever.   Respiratory: Negative for shortness of breath.    Cardiovascular: Negative for chest pain.   Gastrointestinal: Negative for abdominal pain.   Musculoskeletal:        See HPI   Skin: Positive for wound.   All other systems reviewed and are negative.      Physical Exam   BP: (!) 152/85  Pulse: 85  Temp: 98  F (36.7  C)  Resp: 18  Weight: 90.7 kg (200 lb)  SpO2: 96 %      Physical Exam  BP (!) 152/85   Pulse 85   Temp 98  F (36.7  C)   Resp 18   Wt 90.7 kg (200 lb)   SpO2 96%   BMI 30.86 kg/m    General: alert, interactive, in no apparent distress  Head: Abrasions to the chin, in addition to upper lip.  Also multiple teeth fractures which are present of the oropharynx, especially of teeth 4 through 13.  Nose: no rhinorrhea or epistaxis  Ears: no external auditory canal discharge or bleeding.    Eyes: Sclera nonicteric. Conjunctiva noninjected.    Neck: supple, no palp LAD  Lungs: CTAB  CV: RRR, S1/S2; peripheral pulses palpable and symmetric  Abdomen: soft, nt, nd, no guarding or rebound. Positive bowel sounds  Extremities: Diffuse tenderness of the left wrist.  Also with abrasion to the right knee/patella.  Diffuse tenderness of the right knee.  Normal distal pulses in all extremities.  Normal distal capillary refill in all extremities.  Skin: no rash or diaphoresis  Neuro: sensation intact to light touch in UE and LEs bilaterally;       ED Course        Procedures              Critical Care time:  none               Results for orders placed or performed during the hospital encounter of 10/24/21 (from the past 24 hour(s))   CT Facial Bones without Contrast    Narrative    EXAM: CT FACIAL BONES WITHOUT CONTRAST  LOCATION: [facility]  DATE/TIME: 10/24/2021 9:35 PM    INDICATION: Facial trauma; fell flat on face on road, multiple dental fractures; maxillary and mandibular pain.  COMPARISON: None.  TECHNIQUE: Routine CT Maxillofacial without IV contrast. Multiplanar reformats. Dose reduction techniques were used.     FINDINGS:  OSSEOUS STRUCTURES/SOFT TISSUES: Mild soft tissue swelling in the midline perimandibular soft tissues and inferior to the mandibular symphysis. Mild soft tissue swelling in the supraorbital soft tissues bilaterally. No soft tissue hematoma. No underlying   acute fracture. No acute maxillofacial or mandibular fracture or malalignment elsewhere. Multiple chipped maxillary and mandibular teeth. No periapical abscess. The temporomandibular joints remain normally aligned. The bony orbits are intact. Rightward   deviation of the intact bony nasal septum with small right nasal septal bone spur.    ORBITAL CONTENTS: No acute abnormality.    SINUSES: Mild mucosal thickening in the inferior right maxillary sinus.    VISUALIZED INTRACRANIAL CONTENTS: No acute abnormality.         Impression    IMPRESSION:   1.  Mild soft tissue swelling in the midline perimandibular soft tissues and inferior to the mandibular symphysis, as well as in the bilateral supraorbital soft tissues. No underlying acute fracture at any site.  2.  No acute maxillofacial or mandibular fracture or malalignment elsewhere.  3.  Multiple chipped maxillary and mandibular teeth.  4.  The temporomandibular joints remain normally aligned.     XR Wrist Left G/E 3 Views    Narrative    EXAM: XR WRIST LEFT G/E 3 VIEWS  LOCATION: [facility]  DATE/TIME: 10/24/2021 9:43 PM    INDICATION: fall with radial pain  COMPARISON: None.      Impression    IMPRESSION: No fracture or dislocation. Moderate degenerative changes at the first CMC joint.   XR Knee Right 3 Views    Narrative    EXAM: XR KNEE RIGHT 3 VIEWS  LOCATION: [facility]  DATE/TIME: 10/24/2021 9:46 PM    INDICATION: fall with pain.  COMPARISON: None.      Impression    IMPRESSION: Normal joint spaces and alignment. No fracture or joint effusion.       Medications   HYDROmorphone (DILAUDID) injection 2 mg (2 mg Intramuscular Given 10/24/21 2125)       Assessments & Plan (with Medical Decision Making)  56 year old female presenting to the emergency department with concerns regarding abrasions to the right knee, in addition to the face.  Also with severe face pain, left wrist, in addition to right knee pain.  Patient tripped over tubular sandbag approximately 2 hours prior to ED arrival.  Injured the above locations.  CT of the face will be performed, in addition to x-rays of the left wrist, and right knee.  X-ray images personally reviewed in addition to radiology interpretation, and show no evidence of acute fracture of the left wrist, or right knee.    Patient given 2 mg intramuscular Dilaudid.  Wounds cleansed during ED course.    Patient will be discharged home.  Recommended follow-up with emergent dentist tomorrow.  Patient reassured of the findings. Recommended Ace wrap for the left wrist. Weightbearing as tolerated of the right lower extremity. Bacitracin can be applied to the wounds.  6 tablets oxycodone prescribed for pain. Patient does take tramadol for her back pain at home. Instructed otherwise on Tylenol and ibuprofen.     I have reviewed the nursing notes.    I have reviewed the findings, diagnosis, plan and need for follow up with the patient.       New Prescriptions    OXYCODONE (ROXICODONE) 5 MG TABLET    Take 1 tablet (5 mg) by mouth every 6 hours as needed for severe pain       Final diagnoses:   Fall, initial encounter   Abrasion of face, initial encounter   Closed fracture of tooth, initial encounter   Acute pain of right knee   Left wrist pain       10/24/2021   [facility] EMERGENCY DEPT     Isac, Aditya Montes MD  10/24/21  [id number]     [place]

## 2021-10-25 NOTE — DISCHARGE INSTRUCTIONS
Follow up with dentist.   Follow up in clinic as needed.   Be seen if new/worsening symptoms.

## 2021-10-25 NOTE — TELEPHONE ENCOUNTER
Reason for Call:  Medication or medication refill:oxycodone  Was given pills in ER   Fell and knocked her front teeth out. Is going to be needing more pill please    Do you use a [facility]?  Name of the pharmacy and phone number for the current request:  [facility]    Name of the medication requested: OXYCODONE     Can we leave a detailed message on this number? YES    Phone number patient can be reached at: Home number on file [phone] (home)    Best Time:any    Call taken on 10/25/2021 at 9:04 AM by Hayley Browning

## 2021-10-25 NOTE — ED TRIAGE NOTES
Left wrist, right leg, and facial pain.  Patient has abrasions on face and loss of teeth.  Patient tripped over a sandbag and fell forward.  No loss of consciousness.  Patient not on blood thinners.

## 2021-10-26 ENCOUNTER — PATIENT OUTREACH (OUTPATIENT)
Dept: FAMILY MEDICINE | Facility: CLINIC | Age: 56
End: 2021-10-26

## 2021-10-27 NOTE — TELEPHONE ENCOUNTER
"  ED for acute condition Discharge Protocol    \"Hi, my name is Juli Sparks RN, a registered nurse, and I am calling from [facility].  I am calling to follow up and see how things are going for you after your recent emergency visit.\"    Tell me how you are doing now that you are home?\" doing better.  Seeing a trauma dentist.      Discharge Instructions    \"Let's review your discharge instructions.  What is/are the follow-up recommendations?  Pt. Response: if not better in 5-7 days    \"Has an appointment with your primary care provider been scheduled?\"  Needs for left hand and wrist pain    Medications    \"Tell me what changed about your medicines when you discharged?\" pain meds and antibiotic for teeth        \"What questions do you have about your medications?\"   None        Call Summary    \"What questions or concerns do you have about your recent visit and your follow-up care?\"     none    \"If you have questions or things don't continue to improve, we encourage you contact us through the main clinic number (give number).  Even if the clinic is not open, triage nurses are available 24/7 to help you.     We would like you to know that our clinic has extended hours (provide information).  We also have urgent care (provide details on closest location and hours/contact info)\"    \"Thank you for your time and take care!\"                "

## 2021-10-29 ENCOUNTER — OFFICE VISIT (OUTPATIENT)
Dept: FAMILY MEDICINE | Facility: CLINIC | Age: 56
End: 2021-10-29
Payer: COMMERCIAL

## 2021-10-29 ENCOUNTER — ANCILLARY PROCEDURE (OUTPATIENT)
Dept: GENERAL RADIOLOGY | Facility: CLINIC | Age: 56
End: 2021-10-29
Attending: NURSE PRACTITIONER
Payer: COMMERCIAL

## 2021-10-29 VITALS
DIASTOLIC BLOOD PRESSURE: 78 MMHG | RESPIRATION RATE: 16 BRPM | OXYGEN SATURATION: 100 % | BODY MASS INDEX: 30.98 KG/M2 | WEIGHT: 204.4 LBS | HEIGHT: 68 IN | HEART RATE: 82 BPM | SYSTOLIC BLOOD PRESSURE: 128 MMHG | TEMPERATURE: 98.6 F

## 2021-10-29 DIAGNOSIS — S69.92XA WRIST INJURY, LEFT, INITIAL ENCOUNTER: Primary | ICD-10-CM
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
## 2021-10-29 DIAGNOSIS — S69.92XA WRIST INJURY, LEFT, INITIAL ENCOUNTER: ICD-10-CM

## 2021-10-29 DIAGNOSIS — S02.5XXD CLOSED FRACTURE OF TOOTH WITH ROUTINE HEALING, SUBSEQUENT ENCOUNTER: ICD-10-CM

## 2021-10-29 PROCEDURE — 73110 X-RAY EXAM OF WRIST: CPT | Mod: LT | Performed by: RADIOLOGY

## 2021-10-29 PROCEDURE — 99214 OFFICE O/P EST MOD 30 MIN: CPT | Performed by: NURSE PRACTITIONER

## 2021-10-29 RX ORDER — AMOXICILLIN 500 MG/1
500 CAPSULE ORAL 4 TIMES DAILY
COMMUNITY
Start: 2021-07-28 | End: 2022-01-26

## 2021-10-29 RX ORDER — OXYCODONE HYDROCHLORIDE 5 MG/1
5 TABLET ORAL EVERY 6 HOURS PRN
Qty: 24 TABLET | Refills: 0 | Status: SHIPPED | OUTPATIENT
Start: 2021-10-29 | End: 2022-01-26

## 2021-10-29 ASSESSMENT — MIFFLIN-ST. JEOR: SCORE: 1552.71

## 2021-10-29 ASSESSMENT — PAIN SCALES - GENERAL: PAINLEVEL: EXTREME PAIN (8)

## 2021-10-29 NOTE — PATIENT INSTRUCTIONS
This refill should be expected to last for 6 days.  I would recommend start weaning down on Oxycodone over the next 3-4 days to taking every 8 hours.    Follow-up with Dr. Chu to recheck wrist and dental symptoms next week and for follow up on pain medication.    Lakeshia Can, JUAN        Patient Education     Muscle Strain in the Extremities  A muscle strain is a stretching and tearing of muscle fibers. This causes pain, especially when you move that muscle. There may also be some swelling and bruising.  Home care    Keep the hurt area raised above heart level to reduce pain and swelling. This is especially important during the first 48 hours.    Apply an ice pack over the injured area for 15 to 20 minutes every 3 to 6 hours. You should do this for the first 24 to 48 hours. You can make an ice pack by filling a plastic bag that seals at the top with ice cubes and then wrapping it with a thin towel. Be careful not to injure your skin with the ice treatments. Ice should never be applied directly to skin. Continue the use of ice packs for relief of pain and swelling as needed. After 48 hours, apply heat (warm shower or warm bath) for 15 to 20 minutes several times a day, or alternate ice and heat.    You may use over-the-counter pain medicine to control pain, unless another medicine was prescribed. If you have chronic liver or kidney disease or ever had a stomach ulcer or gastrointestinal bleeding, talk with your healthcare provider before using these medicines.    For leg strains: If crutches have been recommended, don t put full weight on the hurt leg until you can do so without pain. You can return to sports when you are able to hop and run on the injured leg without pain.  Follow-up care  Follow up with your healthcare provider, or as advised.  When to seek medical advice  Call your healthcare provider right away if any of these occur:    The toes of the injured leg become swollen, cold, blue, numb, or  tingly    Pain or swelling increases  Jose Alejandro last reviewed this educational content on 5/1/2018 2000-2021 [employer]. All rights reserved. This information is not intended as a substitute for professional medical care. Always follow your healthcare professional's instructions.

## 2021-10-29 NOTE — PROGRESS NOTES
Assessment & Plan     Wrist injury, left, initial encounter  Rechecked xray - negative.  ? Scaphoid fracture - monitor this.  Follow-up with Ortho.  Splint given today due to pain.  Oxycodone refilled - discussed weaning.  The risks, benefits and treatment options of prescribed medications or other treatments have been discussed with the patient. The patient verbalized their understanding and should call or follow up if no improvement or if they develop further problems.    - XR Wrist Left G/E 3 Views  - oxyCODONE (ROXICODONE) 5 MG tablet  Dispense: 24 tablet; Refill: 0  - Orthopedic  Referral    Closed fracture of tooth with routine healing, subsequent encounter   Follow-up with Dentist - on Amoxicillin for infection.    - oxyCODONE (ROXICODONE) 5 MG tablet  Dispense: 24 tablet; Refill: 0    Fall, subsequent encounter     - oxyCODONE (ROXICODONE) 5 MG tablet  Dispense: 24 tablet; Refill: 0       follow up with PCP next week for recheck and medications refill if needed.     See Patient Instructions    Return in about 1 week (around 11/5/2021) for Recheck symptoms, Medication Follow up.    Lakeshia Can NP  [facility] FORTINO Noble is a 56 year old who presents for the following health issues  accompanied by her granddaughter.    HPI       Hospital Follow-up Visit:    Hospital/Nursing Home/IP Rehab Facility: [facility]  Date of Admission: 10/24/2021  Date of Discharge: 10/24/2021  Reason(s) for Admission:     Fall    Abrasion of face    Closed fracture of tooth    Acute pain of right knee    Left wrist pain      Was your hospitalization related to COVID-19? No   Problems taking medications regularly:  None  Medication changes since discharge: trauma dentist prescribed amoxicillin  Problems adhering to non-medication therapy:  None    Summary of hospitalization:  [facility] discharge summary reviewed  Diagnostic Tests/Treatments  "reviewed.  Follow up needed: none  Other Healthcare Providers Involved in Patient s Care:         None  Update since discharge: fluctuating course.  Post Discharge Medication Reconciliation: discharge medications reconciled, continue medications without change.  Plan of care communicated with patient     On 10/24 -  Tripped over this, falling flat onto her face, landing onto an outstretched bilateral arms.  Injured the left wrist, right knee, in addition to face  Presented to ED same day due to multiple injuries. This occurred while out downtown on the way to concert [place] [employer].  CT done in ED and xrays all negative. Ace wrap given and advised follow up with PCP and dentists.  Has seen dentist and given nerve block and to follow up for further dental repair and tooth removal.       Taking Oxycodone 5 mg every 6 hours as needed for pain.  Reports helps with pain.  Last dose was at bedtime at 10 pm.        Review of Systems   Constitutional, HEENT, cardiovascular, pulmonary, GI, , musculoskeletal, neuro, skin, endocrine and psych systems are negative, except as otherwise noted.      Objective    /78 (BP Location: Right arm, Patient Position: Sitting, Cuff Size: Adult Large)   Pulse 82   Temp 98.6  F (37  C) (Tympanic)   Resp 16   Ht 1.715 m (5' 7.5\")   Wt 92.7 kg (204 lb 6.4 oz)   LMP  (LMP Unknown)   SpO2 100%   Breastfeeding No   BMI 31.54 kg/m    Body mass index is 31.54 kg/m .  Physical Exam   GENERAL: alert and no distress  HENT: normal cephalic/atraumatic, ear canals and TM's normal, oral mucous membranes moist  Abrasions to the chin, in addition to upper lip.  Also multiple teeth fractures which are present of the oropharynx  MS: Left wrist with diffuse tenderness but > radial distal area on palpation.  Bruising present radial.  Limited ROM throughout left wrist due to pain/swelling.  + CMS.  Abrasions right knee, ROM passive normal.  + pp bilateral. Normal sensory exam UE and " LE.  SKIN: no suspicious lesions or rashes  NEURO: Normal strength and tone, mentation intact and speech normal  PSYCH: mentation appears normal, affect normal/bright    10/24/2021 Left wrist xray.  EXAM: XR WRIST LEFT G/E 3 VIEWS  LOCATION: [facility]  DATE/TIME: 10/24/2021 9:43 PM     INDICATION: fall with radial pain  COMPARISON: None.                                                                      IMPRESSION: No fracture or dislocation. Moderate degenerative changes at the first CMC joint.  10/24/2021:  CT facial bones:    IMPRESSION:   1.  Mild soft tissue swelling in the midline perimandibular soft tissues and inferior to the mandibular symphysis, as well as in the bilateral supraorbital soft tissues. No underlying acute fracture at any site.  2.  No acute maxillofacial or mandibular fracture or malalignment elsewhere.  3.  Multiple chipped maxillary and mandibular teeth.  4.  The temporomandibular joints remain normally aligned.

## 2021-11-05 ENCOUNTER — OFFICE VISIT (OUTPATIENT)
Dept: ORTHOPEDICS | Facility: CLINIC | Age: 56
End: 2021-11-05
Payer: COMMERCIAL

## 2021-11-05 ENCOUNTER — ANCILLARY PROCEDURE (OUTPATIENT)
Dept: GENERAL RADIOLOGY | Facility: CLINIC | Age: 56
End: 2021-11-05
Attending: PEDIATRICS
Payer: COMMERCIAL

## 2021-11-05 VITALS
HEIGHT: 67 IN | BODY MASS INDEX: 32.02 KG/M2 | SYSTOLIC BLOOD PRESSURE: 139 MMHG | DIASTOLIC BLOOD PRESSURE: 107 MMHG | WEIGHT: 204 LBS

## 2021-11-05 DIAGNOSIS — S69.92XA WRIST INJURY, LEFT, INITIAL ENCOUNTER: ICD-10-CM

## 2021-11-05 DIAGNOSIS — M18.12 ARTHRITIS OF CARPOMETACARPAL (CMC) JOINT OF LEFT THUMB: Primary | ICD-10-CM

## 2021-11-05 PROCEDURE — 73110 X-RAY EXAM OF WRIST: CPT | Mod: LT | Performed by: RADIOLOGY

## 2021-11-05 PROCEDURE — 99203 OFFICE O/P NEW LOW 30 MIN: CPT | Performed by: PEDIATRICS

## 2021-11-05 ASSESSMENT — MIFFLIN-ST. JEOR: SCORE: 1542.97

## 2021-11-05 NOTE — PROGRESS NOTES
ASSESSMENT & PLAN    Diana was seen today for pain.    Diagnoses and all orders for this visit:    Arthritis of carpometacarpal (CMC) joint of left thumb    Wrist injury, left, initial encounter  -     Orthopedic  Referral  -     XR Wrist Left G/E 3 Views; Future      This issue is acute and Unchanged.    We discussed these other possible diagnosis: CMC arthritis, concern for scaphoid fracture  Discussed splinting and close follow up v. MRI imaging. Will follow closely next week with repeat x-rays and exam.    Plan:  - Today's Plan of Care:  Thumb spica brace  Continue with relative rest and activity modification, Ice, Compression, and Elevation.  Can apply ice 10-15 minutes 3-4 times per day as needed. OTC medications as needed.    -We also discussed other future treatment options:  MRI left wrist    Follow Up: 1 week with repeat x-rays    Concerning signs and symptoms were reviewed.  The patient expressed understanding of this management plan and all questions were answered at this time.    Samra Latham MD [facility]  Sports Medicine Physician  [facility]      -----  Chief Complaint   Patient presents with     Left Wrist - Pain       SUBJECTIVE  Diana Salvador is a/an 56 year old female who is seen in consultation at the request of  Lakeshia Can N.P. for evaluation of left wrist injury.     The patient is seen by themselves.    Onset: 1.5 week(s) ago. Patient describes injury as falling   Location of Pain: left wrist   Worsened by: using the hand   Better with: bracing helps a lot, icing, ibuprofen  Treatments tried: rest/activity avoidance, ice, ibuprofen, other medications: Oxycodone/Acetaminophen (Percocet), previous imaging (xray 10/29/21) and casting/splinting/bracing  Associated symptoms: swelling, tingling, weakness of wrist and feeling of instability    Orthopedic/Surgical history: YES - patient fell over a construction barrier and fell on concrete sidewalk  Social  "History/Occupation: working at a desk     No family history pertinent to patient's problem today.    REVIEW OF SYSTEMS:  Review of Systems  Skin: no bruising, no swelling  Musculoskeletal: as above  Neurologic: no numbness, paresthesias  Remainder of review of systems is negative including constitutional, CV, pulmonary, GI, except as noted in HPI or medical history.    OBJECTIVE:  BP (!) 139/107   Ht 1.702 m (5' 7\")   Wt 92.5 kg (204 lb)   LMP  (LMP Unknown)   BMI 31.95 kg/m     General: healthy, alert and in no distress  HEENT: no scleral icterus or conjunctival erythema  Skin: no suspicious lesions or rash. No jaundice.  CV: distal perfusion intact  Resp: normal respiratory effort without conversational dyspnea   Psych: normal mood and affect  Gait: normal steady gait with appropriate coordination and balance  Neuro: Normal light sensory exam of upper extremity    Bilateral Wrist and Hand exam  Inspection:       Swelling and bruising left radial wrist    Tender:       CMC joint left thumb, anatomic snuff box    Non Tender:       Remainder of the Wrist and Hand left    ROM:       Decreased active and passive ROM of the wrist with flexion and extension left    Strength:       Slightly decreased strength left wrist    Neurovascular:       2+ radial pulses bilaterally with brisk capillary refill and      normal sensation to light touch in the radial, median and ulnar nerve distributions    RADIOLOGY:  I independently ordered, visualized and reviewed these images with the patient  4 XR views of left wrist reviewed: no acute bony abnormality, mild CMC joint degenerative change  - will follow official read    Review of the result(s) of each unique test - XR       "

## 2021-11-05 NOTE — LETTER
11/5/2021         RE: Diana Salvador  [address] [place] [id number]        Dear Colleague,    Thank you for referring your patient, Diana Salvador, to the [facility] [place]. Please see a copy of my visit note below.    ASSESSMENT & PLAN    Diana was seen today for pain.    Diagnoses and all orders for this visit:    Arthritis of carpometacarpal (CMC) joint of left thumb    Wrist injury, left, initial encounter  -     Orthopedic  Referral  -     XR Wrist Left G/E 3 Views; Future      This issue is acute and Unchanged.    We discussed these other possible diagnosis: CMC arthritis, concern for scaphoid fracture  Discussed splinting and close follow up v. MRI imaging. Will follow closely next week with repeat x-rays and exam.    Plan:  - Today's Plan of Care:  Thumb spica brace  Continue with relative rest and activity modification, Ice, Compression, and Elevation.  Can apply ice 10-15 minutes 3-4 times per day as needed. OTC medications as needed.    -We also discussed other future treatment options:  MRI left wrist    Follow Up: 1 week with repeat x-rays    Concerning signs and symptoms were reviewed.  The patient expressed understanding of this management plan and all questions were answered at this time.    Samra Latham MD [facility]  Sports Medicine Physician  [facility]      -----  Chief Complaint   Patient presents with     Left Wrist - Pain       SUBJECTIVE  Diana Salvador is a/an 56 year old female who is seen in consultation at the request of  Lakeshia Can N.P. for evaluation of left wrist injury.     The patient is seen by themselves.    Onset: 1.5 week(s) ago. Patient describes injury as falling   Location of Pain: left wrist   Worsened by: using the hand   Better with: bracing helps a lot, icing, ibuprofen  Treatments tried: rest/activity avoidance, ice, ibuprofen, other medications: Oxycodone/Acetaminophen (Percocet), previous imaging  "(xray 10/29/21) and casting/splinting/bracing  Associated symptoms: swelling, tingling, weakness of wrist and feeling of instability    Orthopedic/Surgical history: YES - patient fell over a construction barrier and fell on concrete sidewalk  Social History/Occupation: working at a desk     No family history pertinent to patient's problem today.    REVIEW OF SYSTEMS:  Review of Systems  Skin: no bruising, no swelling  Musculoskeletal: as above  Neurologic: no numbness, paresthesias  Remainder of review of systems is negative including constitutional, CV, pulmonary, GI, except as noted in HPI or medical history.    OBJECTIVE:  BP (!) 139/107   Ht 1.702 m (5' 7\")   Wt 92.5 kg (204 lb)   LMP  (LMP Unknown)   BMI 31.95 kg/m     General: healthy, alert and in no distress  HEENT: no scleral icterus or conjunctival erythema  Skin: no suspicious lesions or rash. No jaundice.  CV: distal perfusion intact  Resp: normal respiratory effort without conversational dyspnea   Psych: normal mood and affect  Gait: normal steady gait with appropriate coordination and balance  Neuro: Normal light sensory exam of upper extremity    Bilateral Wrist and Hand exam  Inspection:       Swelling and bruising left radial wrist    Tender:       CMC joint left thumb, anatomic snuff box    Non Tender:       Remainder of the Wrist and Hand left    ROM:       Decreased active and passive ROM of the wrist with flexion and extension left    Strength:       Slightly decreased strength left wrist    Neurovascular:       2+ radial pulses bilaterally with brisk capillary refill and      normal sensation to light touch in the radial, median and ulnar nerve distributions    RADIOLOGY:  I independently ordered, visualized and reviewed these images with the patient  4 XR views of left wrist reviewed: no acute bony abnormality, mild CMC joint degenerative change  - will follow official read    Review of the result(s) of each unique test - XR     "       Again, thank you for allowing me to participate in the care of your patient.        Sincerely,        Samra Latham MD

## 2021-11-05 NOTE — PATIENT INSTRUCTIONS
We discussed these other possible diagnosis: CMC arthritis, concern for scaphoid fracture    Plan:  - Today's Plan of Care:  Thumb spica brace  Continue with relative rest and activity modification, Ice, Compression, and Elevation.  Can apply ice 10-15 minutes 3-4 times per day as needed. OTC medications as needed.    -We also discussed other future treatment options:  MRI left wrist    Follow Up: 1 week with repeat x-rays    If you have any further questions for your physician or physician s care team you can call [phone] and use option 3 to leave a voice message. Calls received during business hours will be returned same day.

## 2021-12-01 ENCOUNTER — TELEPHONE (OUTPATIENT)
Dept: FAMILY MEDICINE | Facility: CLINIC | Age: 56
End: 2021-12-01
Payer: OTHER GOVERNMENT

## 2021-12-01 NOTE — LETTER
[facility]  [address] [facility] [address]  Phone: [phone]  December 1, 2021      iDana Salvador  [address] [place] [id number]      Dear Diana,    We care about your health and have reviewed your health plan including your medical conditions, medications, and lab results.  Based on this review, it is recommended that you follow up regarding the following health topic(s):  -Breast Cancer Screening  -Colon Cancer Screening    We recommend you take the following action(s):  -schedule a MAMMOGRAM which is due. Please disregard this reminder if you have had this exam elsewhere within the last 1-2 years please let us know so we can update your records.  -schedule a COLONOSCOPY to look for colon cancer (due every 10 years or 5 years in higher risk situations.)  Colonoscopies can prevent 90-95% of colon cancer deaths.  Problem lesions can be removed before they ever become cancer.  If you do not wish to do a colonoscopy or cannot afford to do one at this time, there is another option called a Fecal Immunochemical Occult Blood Test (FIT) a take home stool sample kit.  It does not replace the colonoscopy for colorectal cancer screening, but it can detect hidden bleeding in the lower colon.  It does need to be repeated every year and if a positive result is obtained, you would be referred for a colonoscopy.  If you have completed either one of these tests at another facility, please have the records sent to our clinic for our records.  - To schedule Mammogram please call [phone].  - Please check expiration date on fit kit.    Please call us at the [facility] [phone] (or use [employer]) to address the above recommendations.     Thank you for trusting [facility] and we appreciate the opportunity to serve you.  We look forward to supporting your healthcare needs in the future.    Healthy Regards,    Your Health Care Team  ROSEMARIE  [facility]

## 2021-12-01 NOTE — TELEPHONE ENCOUNTER
Panel Management Review      Patient has the following on her problem list: None      Composite cancer screening  Chart review shows that this patient is due/due soon for the following Mammogram and Fecal Colorectal (FIT)  Summary:    Patient is due/failing the following:   FIT and MAMMOGRAM    Action needed:   Patient needs to schedule a mammogram. Patient needs to complete Fit test that was given in September 2021.     Type of outreach:    Sent letter.    Questions for provider review:    None                                                                                                                                    Stacy Woods MA

## 2021-12-06 ENCOUNTER — MYC REFILL (OUTPATIENT)
Dept: FAMILY MEDICINE | Facility: CLINIC | Age: 56
End: 2021-12-06
Payer: OTHER GOVERNMENT

## 2021-12-06 DIAGNOSIS — M54.41 ACUTE RIGHT-SIDED LOW BACK PAIN WITH RIGHT-SIDED SCIATICA: ICD-10-CM

## 2021-12-06 DIAGNOSIS — F11.90 CHRONIC, CONTINUOUS USE OF OPIOIDS: ICD-10-CM

## 2021-12-07 RX ORDER — TRAMADOL HYDROCHLORIDE 50 MG/1
50 TABLET ORAL EVERY 6 HOURS PRN
Qty: 120 TABLET | Refills: 0 | Status: SHIPPED | OUTPATIENT
Start: 2021-12-07 | End: 2022-01-04

## 2021-12-07 NOTE — TELEPHONE ENCOUNTER
Sent 30 days.  Notify patient will need virtual or clinic appt for refills. Schedule this now please as appt are booking out a month.  Thank you,  Quoc Chu MD

## 2022-01-03 DIAGNOSIS — F41.8 SITUATIONAL ANXIETY: ICD-10-CM

## 2022-01-03 DIAGNOSIS — M54.41 ACUTE RIGHT-SIDED LOW BACK PAIN WITH RIGHT-SIDED SCIATICA: ICD-10-CM

## 2022-01-03 DIAGNOSIS — F11.90 CHRONIC, CONTINUOUS USE OF OPIOIDS: ICD-10-CM

## 2022-01-04 ENCOUNTER — MYC REFILL (OUTPATIENT)
Dept: FAMILY MEDICINE | Facility: CLINIC | Age: 57
End: 2022-01-04
Payer: OTHER GOVERNMENT

## 2022-01-04 DIAGNOSIS — M54.41 ACUTE RIGHT-SIDED LOW BACK PAIN WITH RIGHT-SIDED SCIATICA: ICD-10-CM

## 2022-01-04 DIAGNOSIS — F11.90 CHRONIC, CONTINUOUS USE OF OPIOIDS: ICD-10-CM

## 2022-01-04 RX ORDER — LORAZEPAM 0.5 MG/1
TABLET ORAL
Qty: 30 TABLET | OUTPATIENT
Start: 2022-01-04

## 2022-01-04 RX ORDER — TRAMADOL HYDROCHLORIDE 50 MG/1
50 TABLET ORAL EVERY 6 HOURS PRN
Qty: 120 TABLET | Refills: 0 | OUTPATIENT
Start: 2022-01-04

## 2022-01-04 RX ORDER — TRAMADOL HYDROCHLORIDE 50 MG/1
TABLET ORAL
Qty: 120 TABLET | OUTPATIENT
Start: 2022-01-04

## 2022-01-04 NOTE — TELEPHONE ENCOUNTER
Pending Prescriptions:                       Disp   Refills    LORazepam (ATIVAN) 0.5 MG tablet [Pharmac*30 tab*             Sig: TAKE 1 TABLET(0.5 MG) BY MOUTH DAILY AS NEEDED           FOR ANXIETY    traMADol (ULTRAM) 50 MG tablet [Pharmacy *120 ta*             Sig: TAKE 1 TABLET(50 MG) BY MOUTH EVERY 6 HOURS AS           NEEDED FOR SEVERE PAIN    Routing refill request to provider for review/approval because:  Drug not on the [facility] refill protocol     Niles Langston RN

## 2022-01-04 NOTE — TELEPHONE ENCOUNTER
Pending Prescriptions:                       Disp   Refills    traMADol (ULTRAM) 50 MG tablet            120 ta*0            Sig: Take 1 tablet (50 mg) by mouth every 6 hours as           needed for severe pain    Routing refill request to provider for review/approval because:  Drug not on the [facility] refill protocol     Niles Langston RN

## 2022-01-05 RX ORDER — TRAMADOL HYDROCHLORIDE 50 MG/1
50 TABLET ORAL EVERY 6 HOURS PRN
Qty: 120 TABLET | Refills: 0 | Status: SHIPPED | OUTPATIENT
Start: 2022-01-05 | End: 2022-01-26

## 2022-01-09 DIAGNOSIS — M54.41 ACUTE RIGHT-SIDED LOW BACK PAIN WITH RIGHT-SIDED SCIATICA: ICD-10-CM

## 2022-01-09 DIAGNOSIS — F11.90 CHRONIC, CONTINUOUS USE OF OPIOIDS: ICD-10-CM

## 2022-01-11 RX ORDER — TRAMADOL HYDROCHLORIDE 50 MG/1
TABLET ORAL
Qty: 120 TABLET | Refills: 0 | Status: SHIPPED | OUTPATIENT
Start: 2022-02-04 | End: 2022-01-26

## 2022-01-26 ENCOUNTER — VIRTUAL VISIT (OUTPATIENT)
Dept: FAMILY MEDICINE | Facility: CLINIC | Age: 57
End: 2022-01-26
Payer: COMMERCIAL

## 2022-01-26 DIAGNOSIS — F11.90 CHRONIC, CONTINUOUS USE OF OPIOIDS: ICD-10-CM

## 2022-01-26 DIAGNOSIS — N63.10 LUMP OF RIGHT BREAST: ICD-10-CM

## 2022-01-26 DIAGNOSIS — F40.10 SOCIAL ANXIETY DISORDER: ICD-10-CM

## 2022-01-26 DIAGNOSIS — F41.8 SITUATIONAL ANXIETY: Primary | ICD-10-CM

## 2022-01-26 DIAGNOSIS — E53.8 VITAMIN B12 DEFICIENCY (NON ANEMIC): ICD-10-CM

## 2022-01-26 DIAGNOSIS — M54.41 ACUTE RIGHT-SIDED LOW BACK PAIN WITH RIGHT-SIDED SCIATICA: ICD-10-CM

## 2022-01-26 DIAGNOSIS — K08.89 PAIN, DENTAL: ICD-10-CM

## 2022-01-26 PROCEDURE — 99214 OFFICE O/P EST MOD 30 MIN: CPT | Mod: 95 | Performed by: FAMILY MEDICINE

## 2022-01-26 RX ORDER — TRAMADOL HYDROCHLORIDE 50 MG/1
50 TABLET ORAL EVERY 6 HOURS PRN
Qty: 120 TABLET | Refills: 0 | Status: SHIPPED | OUTPATIENT
Start: 2022-04-06 | End: 2022-05-04

## 2022-01-26 RX ORDER — LORAZEPAM 0.5 MG/1
0.5 TABLET ORAL DAILY PRN
Qty: 30 TABLET | Refills: 2 | Status: SHIPPED | OUTPATIENT
Start: 2022-01-26 | End: 2022-03-22

## 2022-01-26 RX ORDER — TRAMADOL HYDROCHLORIDE 50 MG/1
50 TABLET ORAL EVERY 6 HOURS PRN
Qty: 120 TABLET | Refills: 0 | Status: SHIPPED | OUTPATIENT
Start: 2022-02-07 | End: 2022-03-03

## 2022-01-26 RX ORDER — CYANOCOBALAMIN 1000 UG/ML
1 INJECTION, SOLUTION INTRAMUSCULAR; SUBCUTANEOUS
Qty: 3 ML | Refills: 3 | Status: SHIPPED | OUTPATIENT
Start: 2022-01-26 | End: 2023-05-19

## 2022-01-26 RX ORDER — TRAMADOL HYDROCHLORIDE 50 MG/1
50 TABLET ORAL EVERY 6 HOURS PRN
Qty: 120 TABLET | Refills: 0 | Status: SHIPPED | OUTPATIENT
Start: 2022-03-09 | End: 2022-03-22

## 2022-01-26 ASSESSMENT — PATIENT HEALTH QUESTIONNAIRE - PHQ9
5. POOR APPETITE OR OVEREATING: NEARLY EVERY DAY
SUM OF ALL RESPONSES TO PHQ QUESTIONS 1-9: 17

## 2022-01-26 ASSESSMENT — ANXIETY QUESTIONNAIRES
2. NOT BEING ABLE TO STOP OR CONTROL WORRYING: NEARLY EVERY DAY
1. FEELING NERVOUS, ANXIOUS, OR ON EDGE: NEARLY EVERY DAY
5. BEING SO RESTLESS THAT IT IS HARD TO SIT STILL: NEARLY EVERY DAY
3. WORRYING TOO MUCH ABOUT DIFFERENT THINGS: NOT AT ALL
7. FEELING AFRAID AS IF SOMETHING AWFUL MIGHT HAPPEN: NEARLY EVERY DAY
6. BECOMING EASILY ANNOYED OR IRRITABLE: NOT AT ALL
IF YOU CHECKED OFF ANY PROBLEMS ON THIS QUESTIONNAIRE, HOW DIFFICULT HAVE THESE PROBLEMS MADE IT FOR YOU TO DO YOUR WORK, TAKE CARE OF THINGS AT HOME, OR GET ALONG WITH OTHER PEOPLE: VERY DIFFICULT
GAD7 TOTAL SCORE: 15

## 2022-01-26 NOTE — PATIENT INSTRUCTIONS
You get to schedule the mammogram diagnostic and ultrasound [phone]    I sent refill for 3 months for the tramadol 50mg up to 4 times a day.     Vitmain B12 injections, if you want to give it to yourself administer in the big thigh muscle or if someone else gives it to you it can be in the deltoid muscle.

## 2022-01-26 NOTE — PROGRESS NOTES
Aide is a 56 year old who is being evaluated via a billable video visit.      How would you like to obtain your AVS? MyChart  If the video visit is dropped, the invitation should be resent by: Text to cell phone: [phone]  Will anyone else be joining your video visit? No    Video Start Time: 5:11 PM    Assessment & Plan     Situational anxiety  Worsened with injury and breaking teeth and dental visits and cost  Plan therapy, ativan if needed and for dental work.  - LORazepam (ATIVAN) 0.5 MG tablet; Take 1 tablet (0.5 mg) by mouth daily as needed for anxiety  - Adult Mental Health  Referral; Future    Acute right-sided low back pain with right-sided sciatica  Stable, refill for 3 months  - traMADol (ULTRAM) 50 MG tablet; Take 1 tablet (50 mg) by mouth every 6 hours as needed for severe pain  - traMADol (ULTRAM) 50 MG tablet; Take 1 tablet (50 mg) by mouth every 6 hours as needed for severe pain  - traMADol (ULTRAM) 50 MG tablet; Take 1 tablet (50 mg) by mouth every 6 hours as needed for severe pain    Chronic, continuous use of opioids  - traMADol (ULTRAM) 50 MG tablet; Take 1 tablet (50 mg) by mouth every 6 hours as needed for severe pain  - traMADol (ULTRAM) 50 MG tablet; Take 1 tablet (50 mg) by mouth every 6 hours as needed for severe pain  - traMADol (ULTRAM) 50 MG tablet; Take 1 tablet (50 mg) by mouth every 6 hours as needed for severe pain    Lump of right breast  New issues, plan diagnostic mammo and US.  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  - cyanocobalamin (CYANOCOBALAMIN) 1000 MCG/ML injection; Inject 1 mL (1,000 mcg) into the muscle every 30 days    Pain, dental  Use tramadol and tylenol.  Ok to get pain medications after surgery from dentist.  Stop fosamax for dental surgery/bone graft.    Social anxiety disorder  Not well controlled  - Adult Mental Health   Referral; Future       Depression Screening Follow Up    PHQ 1/26/2022   PHQ-9 Total Score 17   Q9: Thoughts of better off dead/self-harm past 2 weeks Not at all     Last PHQ-9 1/26/2022   1.  Little interest or pleasure in doing things 3   2.  Feeling down, depressed, or hopeless 3   3.  Trouble falling or staying asleep, or sleeping too much 3   4.  Feeling tired or having little energy 1   5.  Poor appetite or overeating 1   6.  Feeling bad about yourself 3   7.  Trouble concentrating 3   8.  Moving slowly or restless 0   Q9: Thoughts of better off dead/self-harm past 2 weeks 0   PHQ-9 Total Score 17   Difficulty at work, home, or with people Very difficult       Follow Up Actions Taken  Crisis resource information provided in After Visit Summary  Mental Health Referral placed     Patient Instructions   You get to schedule the mammogram diagnostic and ultrasound [phone]    I sent refill for 3 months for the tramadol 50mg up to 4 times a day.     Vitmain B12 injections, if you want to give it to yourself administer in the big thigh muscle or if someone else gives it to you it can be in the deltoid muscle.          Return in about 3 months (around 4/26/2022).    Quoc Chu MD  [facility]    Stephie Noble is a 56 year old who presents for the following health issues     HPI     Anxiety Follow-Up    How are you doing with your anxiety since your last visit? Stable, but high dental anxiety    Are you having other symptoms that might be associated with anxiety? No    Have you had a significant life event? OTHER: Dental work and in pain. Wednesday     Are you feeling depressed? Yes:  mild    Do you have any concerns with your use of alcohol or other drugs? No  Lorazepam 0.5mg at bedtime if can't turn off mind about every night, out for a month  Therapy: none  Social History     Tobacco Use     Smoking status: Former Smoker     Start date: 1/1/1982     Quit date: 1/1/1984      Years since quittin.0     Smokeless tobacco: Never Used   Vaping Use     Vaping Use: Never used   Substance Use Topics     Alcohol use: Yes     Comment: occ     Drug use: No     SHADE-7 SCORE 2020   Total Score 15 11 20     PHQ 2020   PHQ-9 Total Score 11 14 13   Q9: Thoughts of better off dead/self-harm past 2 weeks Not at all Not at all Not at all     Last PHQ-9 2022   1.  Little interest or pleasure in doing things 3   2.  Feeling down, depressed, or hopeless 3   3.  Trouble falling or staying asleep, or sleeping too much 3   4.  Feeling tired or having little energy 1   5.  Poor appetite or overeating 1   6.  Feeling bad about yourself 3   7.  Trouble concentrating 3   8.  Moving slowly or restless 0   Q9: Thoughts of better off dead/self-harm past 2 weeks 0   PHQ-9 Total Score 17   Difficulty at work, home, or with people Very difficult     SHADE-7  2022   1. Feeling nervous, anxious, or on edge 3   2. Not being able to stop or control worrying 3   3. Worrying too much about different things 0   4. Trouble relaxing 3   5. Being so restless that it is hard to sit still 3   6. Becoming easily annoyed or irritable 0   7. Feeling afraid, as if something awful might happen 3   SHADE-7 Total Score 15   If you checked any problems, how difficult have they made it for you to do your work, take care of things at home, or get along with other people? Very difficult       Chronic/Recurring Back Pain Follow Up      Where is your back pain located? (Select all that apply) low back bilateral    How would you describe your back pain?  sharp and shooting    Where does your back pain spread? the right buttock, the right  thigh and the right  knee    Since your last clinic visit for back pain, how has your pain changed? always present, but gets better and worse    Does your back pain interfere with your job? YES- sometimes    Since your last visit, have you tried any new  treatment? Yes -  invertable table.  IBP rarely due to history gastric bypass      Review of Systems   Constitutional, HEENT, cardiovascular, pulmonary, gi and gu systems are negative, except as otherwise noted.      Objective           Vitals:  No vitals were obtained today due to virtual visit.    Physical Exam   GENERAL: Healthy, alert and no distress  EYES: Eyes grossly normal to inspection.  No discharge or erythema, or obvious scleral/conjunctival abnormalities.  EENT: multiple broken teeth  RESP: No audible wheeze, cough, or visible cyanosis.  No visible retractions or increased work of breathing.    SKIN: Visible skin clear. No significant rash, abnormal pigmentation or lesions.  NEURO: Cranial nerves grossly intact.  Mentation and speech appropriate for age.  PSYCH: very tearful. Mentation appears normal, affect anxious, judgement and insight intact, normal speech with broken teeth and appearance well-groomed.            Video-Visit Details    Type of service:  Video Visit    Video End Time:5:36 PM    Originating Location (pt. Location): Home    Distant Location (provider location):  [facility]     Platform used for Video Visit: [employer]

## 2022-01-27 ASSESSMENT — ANXIETY QUESTIONNAIRES: GAD7 TOTAL SCORE: 15

## 2022-02-18 ENCOUNTER — HOSPITAL ENCOUNTER (OUTPATIENT)
Dept: ULTRASOUND IMAGING | Facility: CLINIC | Age: 57
End: 2022-02-18
Attending: FAMILY MEDICINE
Payer: COMMERCIAL

## 2022-02-18 ENCOUNTER — HOSPITAL ENCOUNTER (OUTPATIENT)
Dept: MAMMOGRAPHY | Facility: CLINIC | Age: 57
End: 2022-02-18
Attending: FAMILY MEDICINE
Payer: COMMERCIAL

## 2022-02-18 DIAGNOSIS — N63.10 LUMP OF RIGHT BREAST: ICD-10-CM

## 2022-02-18 DIAGNOSIS — N63.14 LUMP IN LOWER INNER QUADRANT OF RIGHT BREAST: ICD-10-CM

## 2022-02-18 PROCEDURE — 76642 ULTRASOUND BREAST LIMITED: CPT | Mod: RT

## 2022-02-18 PROCEDURE — 77062 BREAST TOMOSYNTHESIS BI: CPT

## 2022-02-21 ENCOUNTER — MYC MEDICAL ADVICE (OUTPATIENT)
Dept: FAMILY MEDICINE | Facility: CLINIC | Age: 57
End: 2022-02-21
Payer: OTHER GOVERNMENT

## 2022-02-21 DIAGNOSIS — G89.18 ACUTE POST-OPERATIVE PAIN: Primary | ICD-10-CM

## 2022-02-22 ENCOUNTER — HOSPITAL ENCOUNTER (OUTPATIENT)
Dept: MAMMOGRAPHY | Facility: CLINIC | Age: 57
End: 2022-02-22
Attending: FAMILY MEDICINE
Payer: COMMERCIAL

## 2022-02-22 ENCOUNTER — HOSPITAL ENCOUNTER (OUTPATIENT)
Dept: ULTRASOUND IMAGING | Facility: CLINIC | Age: 57
End: 2022-02-22
Attending: FAMILY MEDICINE
Payer: COMMERCIAL

## 2022-02-22 VITALS — DIASTOLIC BLOOD PRESSURE: 81 MMHG | SYSTOLIC BLOOD PRESSURE: 122 MMHG

## 2022-02-22 DIAGNOSIS — N63.14 LUMP IN LOWER INNER QUADRANT OF RIGHT BREAST: ICD-10-CM

## 2022-02-22 PROCEDURE — 88377 M/PHMTRC ALYS ISHQUANT/SEMIQ: CPT | Mod: 26 | Performed by: MEDICAL GENETICS

## 2022-02-22 PROCEDURE — 999N000065 MA POST PROCEDURE RIGHT

## 2022-02-22 PROCEDURE — 88305 TISSUE EXAM BY PATHOLOGIST: CPT | Mod: 26 | Performed by: PATHOLOGY

## 2022-02-22 PROCEDURE — 88342 IMHCHEM/IMCYTCHM 1ST ANTB: CPT | Mod: 26 | Performed by: PATHOLOGY

## 2022-02-22 PROCEDURE — 250N000009 HC RX 250: Performed by: RADIOLOGY

## 2022-02-22 PROCEDURE — 88305 TISSUE EXAM BY PATHOLOGIST: CPT | Mod: TC | Performed by: FAMILY MEDICINE

## 2022-02-22 PROCEDURE — 88377 M/PHMTRC ALYS ISHQUANT/SEMIQ: CPT | Performed by: FAMILY MEDICINE

## 2022-02-22 PROCEDURE — 88360 TUMOR IMMUNOHISTOCHEM/MANUAL: CPT | Mod: 26 | Performed by: PATHOLOGY

## 2022-02-22 PROCEDURE — 88341 IMHCHEM/IMCYTCHM EA ADD ANTB: CPT | Mod: 26 | Performed by: PATHOLOGY

## 2022-02-22 PROCEDURE — 272N000431 US BREAST BIOPSY CORE NEEDLE RIGHT

## 2022-02-22 RX ADMIN — LIDOCAINE HYDROCHLORIDE 4 ML: 10 INJECTION, SOLUTION EPIDURAL; INFILTRATION; INTRACAUDAL; PERINEURAL at 09:51

## 2022-02-22 NOTE — DISCHARGE INSTRUCTIONS
Breast Biopsy Care Instructions      Site Care:    You may have some discomfort in the breast and may see some bruising at the needle site.    You will be given an ice pack which should be kept in place over the dressing until bedtime tonight.Always keep a gauze pad between your skin and the ice pack.    Wearing your bra continuously for 24 hours post biopsy will give optimal support to the biopsy site.    Activity:       You may go back to your normal routine.     No heavy lifting for 24 hours.    Diet and Medications:       You may go back to your regular diet.     Tylenol is the best choice to relieve your discomfort, the first 24 hours. If you have no bleeding on the first day, Ibuprofen (such as Advil, or Motrin), may be taken on the second day after for pain.     Do not take aspirin for 72 hours following your biopsy.    Questions:     If you have any questions about your procedure performed in Ultrasound, you may contact us at [phone].If you have any questions about your procedure performed in Mammography, you may contact us at [phone].    Results:       The pathology report on your biopsy is usually available within 72 hours. The Radiologist or your personal physician will call you with the results.     You will receive information about any further follow up from your physician.    Call your doctor if you have:       More than slight bleeding or significant pain, or experience swelling of the breast.     If your physician is unavailable, please call the Nurse Advice Line at [phone], or [facility] Emergency Department at [phone].      Patient:______________________________  Time:_________  Date:_____________    Instructor:____________________________   Time:_________  Date:_____________

## 2022-02-23 RX ORDER — HYDROCODONE BITARTRATE AND ACETAMINOPHEN 5; 325 MG/1; MG/1
1-2 TABLET ORAL EVERY 6 HOURS PRN
Qty: 28 TABLET | Refills: 0 | Status: SHIPPED | OUTPATIENT
Start: 2022-02-23 | End: 2022-02-28

## 2022-02-24 ENCOUNTER — PATIENT OUTREACH (OUTPATIENT)
Dept: ONCOLOGY | Facility: CLINIC | Age: 57
End: 2022-02-24
Payer: OTHER GOVERNMENT

## 2022-02-24 DIAGNOSIS — Z17.1 MALIGNANT NEOPLASM OF UPPER-INNER QUADRANT OF RIGHT BREAST IN FEMALE, ESTROGEN RECEPTOR NEGATIVE (H): Primary | ICD-10-CM

## 2022-02-24 DIAGNOSIS — C50.211 MALIGNANT NEOPLASM OF UPPER-INNER QUADRANT OF RIGHT BREAST IN FEMALE, ESTROGEN RECEPTOR NEGATIVE (H): Primary | ICD-10-CM

## 2022-02-24 LAB
PATH REPORT.COMMENTS IMP SPEC: ABNORMAL
PATH REPORT.COMMENTS IMP SPEC: YES
PATH REPORT.FINAL DX SPEC: ABNORMAL
PATH REPORT.GROSS SPEC: ABNORMAL
PATH REPORT.MICROSCOPIC SPEC OTHER STN: ABNORMAL
PATH REPORT.RELEVANT HX SPEC: ABNORMAL
PATHOLOGY SYNOPTIC REPORT: ABNORMAL
PHOTO IMAGE: ABNORMAL

## 2022-02-24 NOTE — PROGRESS NOTES
New Patient Oncology Nurse Navigator Note     Referring provider: Quoc Chu MD      Referring Clinic/Organization: in  BREAST IMAGING     Referred to (specialty:) Medical Oncology and Cancer Surgery     Date Referral Received: February 24, 2022     Evaluation for:  Breast cancer     Clinical History (per Nurse review of records provided):      Aide presented with a palpable abnormality in the right breast and underwent a bilateral diagnostic mammogram and right breast ultrasound on 2/18/22.  In the right breast at the site of the palpable abnormality there is a focal mass with some associated microcalcifications. On ultrasound, at the site of the palpable abnormality in the right breast 2:00 position approximately 6 cm from the nipple there is a bilobed hypoechoic mass with some shadowing and some internal blood flow as well as some echogenic foci likely representing calcifications. This measures 2.4 x 1.1 x 1.6 cm. Tissue diagnosis is recommended. No right axillary adenopathy.    2/22/22 - Right breast, lower inner quadrant, core biopsy  -Invasive ductal carcinoma, Anderson grade 3  -Negative for estrogen and progesterone receptor  -HER2 by FISH pending     Records Location: See Bookmarked material     DID SHE HAVE ANY ADDITIONAL BREAST IMAGING  OTHER THAN 2022, 2019, 2011 OUTSIDE OUR SYSTEM

## 2022-02-24 NOTE — PROGRESS NOTES
Malignant Path:  Pathology report reviewed with our breast radiologist Dr. Antonio Ribeiro, who confirmed the recent breast imaging is concordant with the final surgical pathology results below.    I phoned Ms. Diana Salvador, confirmed her full name, date of birth, and notified patient of Ultrasound Guided Right Breast Biopsy results showing Invasive Ductal Carcinoma.  Patient was about to go in on a dental procedure, so I informed patient I will reach out to her again on 2/28/22 to discuss the results.    Patient states no problems with biopsy site.  Recommended follow up is Surgical/Medical Oncology.  I will place order for referral to Surgical/Medical Oncology to [facility] and a Cancer Care Team  will be reaching out to patient to assist in getting her scheduled for consults.    Questions were answered and she has my phone number if she has further questions.  Patient verbalized understanding and agrees with the plan of care.  Ordering provider- Quoc Chu M.D. has been notified of the results, recommendations for follow up, and scheduled surgical consultation.  I will forward this note, along with the pathology results.   Татьяна Arellano RN, BSN  Breast Care Nurse Coordinator  [facility] [place] [place]  [phone]        Diana Salvador [id number]  F, 1965  Surgical Pathology Report (Final result) [id number]  Authorizing Provider: Quoc Chu MD Ordering Provider: Quoc Chu MD  Ordering Location: [facility]  Imaging  Collected: 02/22/2022 10:20 AM  Pathologist: Annabel Perry MD Received: 02/22/2022 10:55 AM  .  Specimens  A Breast, Right  .  .  Final Diagnosis  Right breast, lower inner quadrant, core biopsy  -Invasive ductal carcinoma, Philip grade 3  -Negative for estrogen and progesterone receptor (see biomarker report below)  Electronically signed by Annabel Perry MD  on 2/24/2022 at 11:53 AM

## 2022-02-25 LAB — INTERPRETATION: NORMAL

## 2022-02-25 NOTE — PROGRESS NOTES
Telephoned and spoke with Aide to assist in scheduling cancer surgery and medical oncology consultations for her new diagnosis of breast cancer.      She just had oral surgery yesterday.  This was for repair after a fall in October where her front teeth were damaged. Per CT of facial bones on 10/24/21, there were multiple chipped maxillary and mandibular teeth.  Aide was quite tearful about this and the cancer diagnosis.  Emotional support and encouragement offered with therapeutic listening.     She is very anxious about the time she might need off from her work.  She asked appropriate questions about what to expect and these were answered to the best of writer's ability and scope.   She is aware appointments are on hold for her to see medical oncology and cancer surgery in [place] late next week.  The roles of each provider were offered.  We agreed to speak again on Monday when she has had time to recover a bit more from oral surgery.  Contact information provided and calls welcomed. Radha Graham RN

## 2022-02-28 ENCOUNTER — TELEPHONE (OUTPATIENT)
Dept: MAMMOGRAPHY | Facility: CLINIC | Age: 57
End: 2022-02-28
Payer: OTHER GOVERNMENT

## 2022-02-28 ENCOUNTER — MYC MEDICAL ADVICE (OUTPATIENT)
Dept: FAMILY MEDICINE | Facility: CLINIC | Age: 57
End: 2022-02-28
Payer: OTHER GOVERNMENT

## 2022-02-28 NOTE — TELEPHONE ENCOUNTER
Call placed to patient this morning to discuss her new diagnosis of Right Breast Invasive Ductal Carcinoma.  We discussed the pathology results in detail and I explained what the next steps are.      Informed patient that referrals for Surgical Oncology and Medical Oncology have been placed, and our Cancer Care Nurse Liason- Radha Graham has placed holds on appointments, but patient needs to call back this morning to confirm these appointments will work for her.    Patient verbalized understanding of this, states she has Radha's phone number and will call Radha back right after she hangs up with me.    I answered patients questions completely to her satisfaction and gave patient my contact information and advised she call me back with any questions or concerns.  Patient verbalizes understanding and agrees with the plan of care.  Татьяна Arellano RN BSN  Breast Nurse Care Coordinator  [facility] [place]  [facility] [place]  [phone]

## 2022-02-28 NOTE — PROGRESS NOTES
RECORDS STATUS - BREAST    RECORDS REQUESTED FROM: EPIC   DATE REQUESTED: 3/4/2022   NOTES DETAILS STATUS   OFFICE NOTE from referring provider Complete Epic  Quoc Chu MD   OFFICE NOTE from surgeon Complete See Breast Biopsy in EPIC   OFFICE NOTE from radiation oncologist     DISCHARGE REPORT from the ER     OPERATIVE REPORT Complete See Breast Biopsy in EPIC   MEDICATION LIST Complete [facility]   CLINICAL TRIAL TREATMENTS TO DATE     LABS     REQUEST BLOCKS FOR ALL BREAST CANCER PTS     PATHOLOGY REPORTS  (Tissue diagnosis, Stage, ER/MN percentage positive and intensity of staining, HER2 IHC, FISH, and all biopsies from breast and any distant metastasis)                 Complete 2/22/2022   Right breast, lower inner quadrant, core biopsy  -Invasive ductal carcinoma, [place] grade 3  -Negative for estrogen and progesterone receptor (see biomarker report below)   GENONOMIC TESTING     TYPE:   (Next Generation Sequencing, including Foundation One testing, and Oncotype score) Complete Her 2 Linda Fish 2/22/2022    IMAGING (NEED IMAGES & REPORT)     CT SCANS     MRI     MAMMO Complete 2/22/2022 more in PACS   ULTRASOUND Complete US Breast Biopsy 2/22/2022    US Breast Right 2/18/2022   PET     BONE SCAN     BRAIN MRI       Action    Action Taken 2/28/2022 11:35AM ÁLVARO     I called pt Aide - all records are internal.

## 2022-02-28 NOTE — PROGRESS NOTES
As agreed upon Friday, telephoned and spoke with Aide to follow up on scheduling medical oncology and cancer surgery consultations.  Call transferred to New Patient Scheduling for completion.

## 2022-03-03 ENCOUNTER — OFFICE VISIT (OUTPATIENT)
Dept: SURGERY | Facility: CLINIC | Age: 57
End: 2022-03-03
Attending: FAMILY MEDICINE
Payer: COMMERCIAL

## 2022-03-03 VITALS
SYSTOLIC BLOOD PRESSURE: 130 MMHG | RESPIRATION RATE: 18 BRPM | OXYGEN SATURATION: 98 % | DIASTOLIC BLOOD PRESSURE: 85 MMHG | HEART RATE: 83 BPM

## 2022-03-03 DIAGNOSIS — Z17.1 MALIGNANT NEOPLASM OF UPPER-INNER QUADRANT OF RIGHT BREAST IN FEMALE, ESTROGEN RECEPTOR NEGATIVE (H): ICD-10-CM

## 2022-03-03 DIAGNOSIS — C50.211 MALIGNANT NEOPLASM OF UPPER-INNER QUADRANT OF RIGHT BREAST IN FEMALE, ESTROGEN RECEPTOR NEGATIVE (H): ICD-10-CM

## 2022-03-03 LAB
INTERPRETATION: NORMAL
INTERPRETATION: NORMAL
SIGNIFICANT RESULTS: NORMAL
SIGNIFICANT RESULTS: NORMAL
SPECIMEN DESCRIPTION: NORMAL
SPECIMEN DESCRIPTION: NORMAL
TEST DETAILS, MDL: NORMAL
TEST DETAILS, MDL: NORMAL

## 2022-03-03 PROCEDURE — 36415 COLL VENOUS BLD VENIPUNCTURE: CPT | Performed by: SURGERY

## 2022-03-03 PROCEDURE — 99204 OFFICE O/P NEW MOD 45 MIN: CPT | Performed by: SURGERY

## 2022-03-03 NOTE — PROGRESS NOTES
Assessment:    Diana Salvador is seen in consultation for right breast cancer, at the request of Quoc Chu MD.    Diana Salvador is a 56 year old female with Right breast invasive ductal carcinoma, grade 3, ER -, [place] -, [id number] - measuring 2.4 cm at the 2:00 and 6 cm from the nipple.    Plan:  Lumpectomy, mastectomy, bilateral mastectomies, sentinel lymph node biopsy with possible axillary node dissection and reconstructive options have been offered and discussed at length.  I feel the cosmetic outcome with lumpectomy would be acceptable.    Patient has a T2 tumor, triple negative.  We discussed and she made informed consent to proceed with genetic testing.      She is seeing oncology tomorrow.  Given size and triple negative, likely neoadjuvant chemotherapy is recommended.      Discussed port placement in brief.  Discussed surgical interventions.  She desires bilateral mastectomy tentatively.  We discussed options of reconstruction and she wishes to have consultation with plastic surgery.      Will follow-up after oncology visit.    HPI:  Diana Salvador is a 56 year old female with a Right breast mass.     Duration:  1 month  Location:  upper inner quadrant    Change in size:  No     Skin rashes, dimpling or nipple changes:  none  Nipple discharge:  none  Breast pain:   No  Perform self breast exams: Yes  Previous breast biopsies: No  Previous cyst aspiration: No  Previous other breast surgery: No    Hormonal history:  Menarche age 12, Nancy-menopausal, No HRT, Yes fertility treatment--Provera with second child.  2 children, 1st at age 19.    Family History:  Family history of breast cancer: Yes - Maternal aunt  Family history of ovarian cancer:  No  Family history of colon cancer: Yes - Maternal grandmother  Family history of prostate cancer: No    Imaging:   All imaging studies reviewed by me.    Recent Results (from the past 744 hour(s))   MA Diagnostic Bilateral w/Reymundo    Narrative    MAMMOGRAM  DIAGNOSTIC BILATERAL WITH TOMOSYNTHESIS;  ULTRASOUND BREAST RIGHT LIMITED 1-3 QUADRANTS   2/18/2022 12:43 PM    HISTORY:  Palpable abnormality in the right breast.    COMPARISON:  2/15/2019 and 10/2/2014.    BREAST DENSITY: Scattered fibroglandular densities.    FINDINGS:    Bilateral diagnostic mammogram:  Right breast: At the site of the palpable abnormality there is a focal  mass with some associated microcalcifications. Ultrasound will be  obtained.  Left breast: No mammographic evidence for malignancy.    Right breast ultrasound: At the site of the palpable abnormality in  the right breast 2:00 position approximately 6 cm from the nipple  there is a bilobed hypoechoic mass with some shadowing and some  internal blood flow as well as some echogenic foci likely representing  calcifications. This measures 2.4 x 1.1 x 1.6 cm. Tissue diagnosis is  recommended. No right axillary adenopathy.      Impression    IMPRESSION:  1. The palpable abnormality in the right breast corresponds to a  bilobed hypoechoic mass. Recommend core biopsy.  2. No mammographic evidence for malignancy in the left breast.    BI-RADS CATEGORY: 4 - Suspicious.    RECOMMENDED FOLLOW-UP: Core biopsy of the right breast.     ANGELLA HUERTA MD         SYSTEM ID:  [id number]   US Breast Right Limited 1-3 Quadrants    Narrative    MAMMOGRAM DIAGNOSTIC BILATERAL WITH TOMOSYNTHESIS;  ULTRASOUND BREAST RIGHT LIMITED 1-3 QUADRANTS   2/18/2022 12:43 PM    HISTORY:  Palpable abnormality in the right breast.    COMPARISON:  2/15/2019 and 10/2/2014.    BREAST DENSITY: Scattered fibroglandular densities.    FINDINGS:    Bilateral diagnostic mammogram:  Right breast: At the site of the palpable abnormality there is a focal  mass with some associated microcalcifications. Ultrasound will be  obtained.  Left breast: No mammographic evidence for malignancy.    Right breast ultrasound: At the site of the palpable abnormality in  the right breast 2:00 position  approximately 6 cm from the nipple  there is a bilobed hypoechoic mass with some shadowing and some  internal blood flow as well as some echogenic foci likely representing  calcifications. This measures 2.4 x 1.1 x 1.6 cm. Tissue diagnosis is  recommended. No right axillary adenopathy.      Impression    IMPRESSION:  1. The palpable abnormality in the right breast corresponds to a  bilobed hypoechoic mass. Recommend core biopsy.  2. No mammographic evidence for malignancy in the left breast.    BI-RADS CATEGORY: 4 - Suspicious.    RECOMMENDED FOLLOW-UP: Core biopsy of the right breast.     ANGELLA HUERTA MD         SYSTEM ID:  [id number]   MA Post Procedure Right    Addendum: 2/24/2022    SAMMY MARIE  ACCESSION #[id number], [id number]    ADDENDUM:     Pathology: Malignant. Invasive ductal carcinoma. Please see pathology  report for details.    Radiology: Pathology is concordant with imaging.    CHANDLER GAUTHIER M.D.  Date of Addendum: 2/24/2022    CHANDLER GAUTHIER MD         SYSTEM ID:  [id number]      Narrative    ULTRASOUND-GUIDED RIGHT BREAST CORE BIOPSY;   CLIP PLACEMENT;   POSTPROCEDURE DIGITAL MAMMOGRAM RIGHT BREAST February 22, 2022 10:43  AM    INDICATION FOR PROCEDURE: Irregular hypoechoic mass in the right  breast.    PROCEDURE: Approximately 5 mL lidocaine without epinephrine was  infiltrated for local anesthetic and a 13-gauge trocar was introduced  via lateral approach. The needle tip was placed adjacent to the  lesion. A series of four samples were obtained with a 14-gauge  core-cutting needle. A clip was then deployed to lia the lesion.  There was less than 5 cc of blood loss.    Postbiopsy unilateral digital mammogram of the right breast showed the  clip to be at the expected biopsy site. The patient tolerated the  procedure without difficulty and there was no significant pain or  immediate complication at the end of the procedure.       Impression    IMPRESSION: Successful right breast  ultrasound-guided core biopsy and  clip placement.  Final pathology is pending.      ANGELLA HUERTA MD         SYSTEM ID:  [id number]   US Breast Biopsy Core Needle Right    Addendum: 2/24/2022    SAMMY MARIE  ACCESSION #[id number], [id number]    ADDENDUM:     Pathology: Malignant. Invasive ductal carcinoma. Please see pathology  report for details.    Radiology: Pathology is concordant with imaging.    CHANDLER GAUTHIER M.D.  Date of Addendum: 2/24/2022    CHANDLER GAUTHIER MD         SYSTEM ID:  [id number]      Narrative    ULTRASOUND-GUIDED RIGHT BREAST CORE BIOPSY;   CLIP PLACEMENT;   POSTPROCEDURE DIGITAL MAMMOGRAM RIGHT BREAST February 22, 2022 10:43  AM    INDICATION FOR PROCEDURE: Irregular hypoechoic mass in the right  breast.    PROCEDURE: Approximately 5 mL lidocaine without epinephrine was  infiltrated for local anesthetic and a 13-gauge trocar was introduced  via lateral approach. The needle tip was placed adjacent to the  lesion. A series of four samples were obtained with a 14-gauge  core-cutting needle. A clip was then deployed to lia the lesion.  There was less than 5 cc of blood loss.    Postbiopsy unilateral digital mammogram of the right breast showed the  clip to be at the expected biopsy site. The patient tolerated the  procedure without difficulty and there was no significant pain or  immediate complication at the end of the procedure.       Impression    IMPRESSION: Successful right breast ultrasound-guided core biopsy and  clip placement.  Final pathology is pending.      ANGELLA HUERTA MD         SYSTEM ID:  [id number]       Percutaneous core needle biopsy:   Final Diagnosis   Right breast, lower inner quadrant, core biopsy  -Invasive ductal carcinoma, [place] grade 3  -Negative for estrogen and progesterone receptor (see biomarker report below)     Past Medical History:   has a past medical history of Cervical high risk HPV (human papillomavirus) test positive (03/15/2020).    Past  Surgical History:  Past Surgical History:   Procedure Laterality Date     ARTHROSCOPY SHOULDER ROTATOR CUFF REPAIR Right      ARTHROSCOPY SHOULDER ROTATOR CUFF REPAIR Left       SECTION       CHOLECYSTECTOMY       DAVINCI BYPASS GASTRIC ROBERT-EN-Y       HYSTERECTOMY SUPRACERVICAL       right ulnar nerve repositioning surgery          Social History:  Social History     Socioeconomic History     Marital status:      Spouse name: Not on file     Number of children: Not on file     Years of education: Not on file     Highest education level: Not on file   Occupational History     Not on file   Tobacco Use     Smoking status: Former Smoker     Start date: 1982     Quit date: 1984     Years since quittin.1     Smokeless tobacco: Never Used   Vaping Use     Vaping Use: Never used   Substance and Sexual Activity     Alcohol use: Yes     Comment: occ     Drug use: No     Sexual activity: Yes     Partners: Male   Other Topics Concern     Not on file   Social History Narrative     Not on file     Social Determinants of Health     Financial Resource Strain: Not on file   Food Insecurity: Not on file   Transportation Needs: Not on file   Physical Activity: Not on file   Stress: Not on file   Social Connections: Not on file   Intimate Partner Violence: Not on file   Housing Stability: Not on file        ROS:  The 10 point review of systems is negative other than noted in the HPI and above.    PE:  Vitals: /85 (BP Location: Right arm, Patient Position: Chair, Cuff Size: Adult Large)   Pulse 83   Resp 18   LMP  (LMP Unknown)   SpO2 98%   General appearance: well-nourished, sitting comfortably, no apparent distress  HEENT:  Head normocephalic and atraumatic, pupils equal and round, conjunctivae clear, mucous membranes moist, external ears and nose normal  Neck: Supple without thyromegaly or lymphadenopathy  Lungs: Respirations unlabored  Lymphatic: No cervical, or supraclavicular  lymphadenopathy  Extremities: Without edema  Musculoskeletal:  Normal station and gait  Neurologic: alert, speech is clear, moves all extremities with good strength  Psychiatric: Mood and affect are appropriate  Skin: Without lesions or rashes  Breast: Examined with Dianne Mckee MA   Symmetrical with no skin or nipple changes.     Contour is normal.   Parenchyma is soft.   Masses- right, upper inner quadrant - 2 cm diameter   Ecchymosis- none   Incisional scar- none    Lymph:       No supraclavicular/infraclavicular adenopathy.   Axillary adenopathy: none      This note was created using voice recognition software. Undetected word substitutions or other errors may have occurred.     Time spent with the patient with greater that 50% of the time in discussion was 55 minutes.     Baldemar Marina, DO      Please route or send letter to:  Primary Care Provider (PCP)

## 2022-03-03 NOTE — NURSING NOTE
"Chief Complaint   Patient presents with     Consult     Right breast cancer        Initial /85 (BP Location: Right arm, Patient Position: Chair, Cuff Size: Adult Large)   Pulse 83   Resp 18   LMP  (LMP Unknown)   SpO2 98%  Estimated body mass index is 31.95 kg/m  as calculated from the following:    Height as of 11/5/21: 1.702 m (5' 7\").    Weight as of 11/5/21: 92.5 kg (204 lb).  BP completed using cuff size: large   Medications and allergies reviewed.      Dianne HOUSTON CMA     "

## 2022-03-03 NOTE — LETTER
3/3/2022         RE: Diana Salvador  [address] [place] [id number]        Dear Colleague,    Thank you for referring your patient, Diana Salvador, to the [facility]. Please see a copy of my visit note below.    Assessment:    Diana Salvador is seen in consultation for right breast cancer, at the request of Quoc Chu MD.    Diana Salvador is a 56 year old female with Right breast invasive ductal carcinoma, grade 3, ER -, [place] -, [id number] - measuring 2.4 cm at the 2:00 and 6 cm from the nipple.    Plan:  Lumpectomy, mastectomy, bilateral mastectomies, sentinel lymph node biopsy with possible axillary node dissection and reconstructive options have been offered and discussed at length.  I feel the cosmetic outcome with lumpectomy would be acceptable.    Patient has a T2 tumor, triple negative.  We discussed and she made informed consent to proceed with genetic testing.      She is seeing oncology tomorrow.  Given size and triple negative, likely neoadjuvant chemotherapy is recommended.      Discussed port placement in brief.  Discussed surgical interventions.  She desires bilateral mastectomy tentatively.  We discussed options of reconstruction and she wishes to have consultation with plastic surgery.      Will follow-up after oncology visit.    HPI:  Diana Salvador is a 56 year old female with a Right breast mass.     Duration:  1 month  Location:  upper inner quadrant    Change in size:  No     Skin rashes, dimpling or nipple changes:  none  Nipple discharge:  none  Breast pain:   No  Perform self breast exams: Yes  Previous breast biopsies: No  Previous cyst aspiration: No  Previous other breast surgery: No    Hormonal history:  Menarche age 12, Nancy-menopausal, No HRT, Yes fertility treatment--Provera with second child.  2 children, 1st at age 19.    Family History:  Family history of breast cancer: Yes - Maternal aunt  Family history of ovarian cancer:  No  Family history of  colon cancer: Yes - Maternal grandmother  Family history of prostate cancer: No    Imaging:   All imaging studies reviewed by me.    Recent Results (from the past 744 hour(s))   MA Diagnostic Bilateral w/Reymundo    Narrative    MAMMOGRAM DIAGNOSTIC BILATERAL WITH TOMOSYNTHESIS;  ULTRASOUND BREAST RIGHT LIMITED 1-3 QUADRANTS   2/18/2022 12:43 PM    HISTORY:  Palpable abnormality in the right breast.    COMPARISON:  2/15/2019 and 10/2/2014.    BREAST DENSITY: Scattered fibroglandular densities.    FINDINGS:    Bilateral diagnostic mammogram:  Right breast: At the site of the palpable abnormality there is a focal  mass with some associated microcalcifications. Ultrasound will be  obtained.  Left breast: No mammographic evidence for malignancy.    Right breast ultrasound: At the site of the palpable abnormality in  the right breast 2:00 position approximately 6 cm from the nipple  there is a bilobed hypoechoic mass with some shadowing and some  internal blood flow as well as some echogenic foci likely representing  calcifications. This measures 2.4 x 1.1 x 1.6 cm. Tissue diagnosis is  recommended. No right axillary adenopathy.      Impression    IMPRESSION:  1. The palpable abnormality in the right breast corresponds to a  bilobed hypoechoic mass. Recommend core biopsy.  2. No mammographic evidence for malignancy in the left breast.    BI-RADS CATEGORY: 4 - Suspicious.    RECOMMENDED FOLLOW-UP: Core biopsy of the right breast.     ANGELLA HUERTA MD         SYSTEM ID:  [id number]   US Breast Right Limited 1-3 Quadrants    Narrative    MAMMOGRAM DIAGNOSTIC BILATERAL WITH TOMOSYNTHESIS;  ULTRASOUND BREAST RIGHT LIMITED 1-3 QUADRANTS   2/18/2022 12:43 PM    HISTORY:  Palpable abnormality in the right breast.    COMPARISON:  2/15/2019 and 10/2/2014.    BREAST DENSITY: Scattered fibroglandular densities.    FINDINGS:    Bilateral diagnostic mammogram:  Right breast: At the site of the palpable abnormality there is a  focal  mass with some associated microcalcifications. Ultrasound will be  obtained.  Left breast: No mammographic evidence for malignancy.    Right breast ultrasound: At the site of the palpable abnormality in  the right breast 2:00 position approximately 6 cm from the nipple  there is a bilobed hypoechoic mass with some shadowing and some  internal blood flow as well as some echogenic foci likely representing  calcifications. This measures 2.4 x 1.1 x 1.6 cm. Tissue diagnosis is  recommended. No right axillary adenopathy.      Impression    IMPRESSION:  1. The palpable abnormality in the right breast corresponds to a  bilobed hypoechoic mass. Recommend core biopsy.  2. No mammographic evidence for malignancy in the left breast.    BI-RADS CATEGORY: 4 - Suspicious.    RECOMMENDED FOLLOW-UP: Core biopsy of the right breast.     ANGELLA HUERTA MD         SYSTEM ID:  [id number]   MA Post Procedure Right    Addendum: 2/24/2022    SAMMY MARIE  ACCESSION #[id number], [id number]    ADDENDUM:     Pathology: Malignant. Invasive ductal carcinoma. Please see pathology  report for details.    Radiology: Pathology is concordant with imaging.    CHANDLER GAUTHIER M.D.  Date of Addendum: 2/24/2022    CHANDLER GAUTHIER MD         SYSTEM ID:  [id number]      Narrative    ULTRASOUND-GUIDED RIGHT BREAST CORE BIOPSY;   CLIP PLACEMENT;   POSTPROCEDURE DIGITAL MAMMOGRAM RIGHT BREAST February 22, 2022 10:43  AM    INDICATION FOR PROCEDURE: Irregular hypoechoic mass in the right  breast.    PROCEDURE: Approximately 5 mL lidocaine without epinephrine was  infiltrated for local anesthetic and a 13-gauge trocar was introduced  via lateral approach. The needle tip was placed adjacent to the  lesion. A series of four samples were obtained with a 14-gauge  core-cutting needle. A clip was then deployed to lia the lesion.  There was less than 5 cc of blood loss.    Postbiopsy unilateral digital mammogram of the right breast showed the  clip to be  at the expected biopsy site. The patient tolerated the  procedure without difficulty and there was no significant pain or  immediate complication at the end of the procedure.       Impression    IMPRESSION: Successful right breast ultrasound-guided core biopsy and  clip placement.  Final pathology is pending.      ANGELLA HUERTA MD         SYSTEM ID:  [id number]   US Breast Biopsy Core Needle Right    Addendum: 2/24/2022    SAMMY MARIE  ACCESSION #[id number], [id number]    ADDENDUM:     Pathology: Malignant. Invasive ductal carcinoma. Please see pathology  report for details.    Radiology: Pathology is concordant with imaging.    CHANDLER GAUTHIER M.D.  Date of Addendum: 2/24/2022    CHANDLER GAUTHIER MD         SYSTEM ID:  [id number]      Narrative    ULTRASOUND-GUIDED RIGHT BREAST CORE BIOPSY;   CLIP PLACEMENT;   POSTPROCEDURE DIGITAL MAMMOGRAM RIGHT BREAST February 22, 2022 10:43  AM    INDICATION FOR PROCEDURE: Irregular hypoechoic mass in the right  breast.    PROCEDURE: Approximately 5 mL lidocaine without epinephrine was  infiltrated for local anesthetic and a 13-gauge trocar was introduced  via lateral approach. The needle tip was placed adjacent to the  lesion. A series of four samples were obtained with a 14-gauge  core-cutting needle. A clip was then deployed to lia the lesion.  There was less than 5 cc of blood loss.    Postbiopsy unilateral digital mammogram of the right breast showed the  clip to be at the expected biopsy site. The patient tolerated the  procedure without difficulty and there was no significant pain or  immediate complication at the end of the procedure.       Impression    IMPRESSION: Successful right breast ultrasound-guided core biopsy and  clip placement.  Final pathology is pending.      ANGELLA HUERTA MD         SYSTEM ID:  [id number]       Percutaneous core needle biopsy:   Final Diagnosis   Right breast, lower inner quadrant, core biopsy  -Invasive ductal carcinoma,  [place] grade 3  -Negative for estrogen and progesterone receptor (see biomarker report below)     Past Medical History:   has a past medical history of Cervical high risk HPV (human papillomavirus) test positive (03/15/2020).    Past Surgical History:  Past Surgical History:   Procedure Laterality Date     ARTHROSCOPY SHOULDER ROTATOR CUFF REPAIR Right      ARTHROSCOPY SHOULDER ROTATOR CUFF REPAIR Left       SECTION       CHOLECYSTECTOMY       DAVINCI BYPASS GASTRIC ROBERT-EN-Y       HYSTERECTOMY SUPRACERVICAL       right ulnar nerve repositioning surgery          Social History:  Social History     Socioeconomic History     Marital status:      Spouse name: Not on file     Number of children: Not on file     Years of education: Not on file     Highest education level: Not on file   Occupational History     Not on file   Tobacco Use     Smoking status: Former Smoker     Start date: 1982     Quit date: 1984     Years since quittin.1     Smokeless tobacco: Never Used   Vaping Use     Vaping Use: Never used   Substance and Sexual Activity     Alcohol use: Yes     Comment: occ     Drug use: No     Sexual activity: Yes     Partners: Male   Other Topics Concern     Not on file   Social History Narrative     Not on file     Social Determinants of Health     Financial Resource Strain: Not on file   Food Insecurity: Not on file   Transportation Needs: Not on file   Physical Activity: Not on file   Stress: Not on file   Social Connections: Not on file   Intimate Partner Violence: Not on file   Housing Stability: Not on file        ROS:  The 10 point review of systems is negative other than noted in the HPI and above.    PE:  Vitals: /85 (BP Location: Right arm, Patient Position: Chair, Cuff Size: Adult Large)   Pulse 83   Resp 18   LMP  (LMP Unknown)   SpO2 98%   General appearance: well-nourished, sitting comfortably, no apparent distress  HEENT:  Head normocephalic and atraumatic,  pupils equal and round, conjunctivae clear, mucous membranes moist, external ears and nose normal  Neck: Supple without thyromegaly or lymphadenopathy  Lungs: Respirations unlabored  Lymphatic: No cervical, or supraclavicular lymphadenopathy  Extremities: Without edema  Musculoskeletal:  Normal station and gait  Neurologic: alert, speech is clear, moves all extremities with good strength  Psychiatric: Mood and affect are appropriate  Skin: Without lesions or rashes  Breast: Examined with Dianne Mckee MA   Symmetrical with no skin or nipple changes.     Contour is normal.   Parenchyma is soft.   Masses- right, upper inner quadrant - 2 cm diameter   Ecchymosis- none   Incisional scar- none    Lymph:       No supraclavicular/infraclavicular adenopathy.   Axillary adenopathy: none      This note was created using voice recognition software. Undetected word substitutions or other errors may have occurred.     Time spent with the patient with greater that 50% of the time in discussion was 55 minutes.     Baldemar Marina, DO      Please route or send letter to:  Primary Care Provider (PCP)                Again, thank you for allowing me to participate in the care of your patient.        Sincerely,        Baldemar Marina, DO

## 2022-03-03 NOTE — PATIENT INSTRUCTIONS
Per physician instructions.    If you have questions or concerns on any instructions given to you by your provider today or if you need to schedule an appointment, you can reach us at [phone].  Listen to the menu for the Specialty Clinic option.      Thank you!

## 2022-03-03 NOTE — H&P (VIEW-ONLY)
Assessment:    Diana Salvador is seen in consultation for right breast cancer, at the request of Quoc Chu MD.    Diana Salvador is a 56 year old female with Right breast invasive ductal carcinoma, grade 3, ER -, VA -, Gve4sin - measuring 2.4 cm at the 2:00 and 6 cm from the nipple.    Plan:  Lumpectomy, mastectomy, bilateral mastectomies, sentinel lymph node biopsy with possible axillary node dissection and reconstructive options have been offered and discussed at length.  I feel the cosmetic outcome with lumpectomy would be acceptable.    Patient has a T2 tumor, triple negative.  We discussed and she made informed consent to proceed with genetic testing.      She is seeing oncology tomorrow.  Given size and triple negative, likely neoadjuvant chemotherapy is recommended.      Discussed port placement in brief.  Discussed surgical interventions.  She desires bilateral mastectomy tentatively.  We discussed options of reconstruction and she wishes to have consultation with plastic surgery.      Will follow-up after oncology visit.    HPI:  Diana Salvador is a 56 year old female with a Right breast mass.     Duration:  1 month  Location:  upper inner quadrant    Change in size:  No     Skin rashes, dimpling or nipple changes:  none  Nipple discharge:  none  Breast pain:   No  Perform self breast exams: Yes  Previous breast biopsies: No  Previous cyst aspiration: No  Previous other breast surgery: No    Hormonal history:  Menarche age 12, Nancy-menopausal, No HRT, Yes fertility treatment--Provera with second child.  2 children, 1st at age 19.    Family History:  Family history of breast cancer: Yes - Maternal aunt  Family history of ovarian cancer:  No  Family history of colon cancer: Yes - Maternal grandmother  Family history of prostate cancer: No    Imaging:   All imaging studies reviewed by me.    Recent Results (from the past 744 hour(s))   MA Diagnostic Bilateral w/Reymundo    Narrative    MAMMOGRAM  DIAGNOSTIC BILATERAL WITH TOMOSYNTHESIS;  ULTRASOUND BREAST RIGHT LIMITED 1-3 QUADRANTS   2/18/2022 12:43 PM    HISTORY:  Palpable abnormality in the right breast.    COMPARISON:  2/15/2019 and 10/2/2014.    BREAST DENSITY: Scattered fibroglandular densities.    FINDINGS:    Bilateral diagnostic mammogram:  Right breast: At the site of the palpable abnormality there is a focal  mass with some associated microcalcifications. Ultrasound will be  obtained.  Left breast: No mammographic evidence for malignancy.    Right breast ultrasound: At the site of the palpable abnormality in  the right breast 2:00 position approximately 6 cm from the nipple  there is a bilobed hypoechoic mass with some shadowing and some  internal blood flow as well as some echogenic foci likely representing  calcifications. This measures 2.4 x 1.1 x 1.6 cm. Tissue diagnosis is  recommended. No right axillary adenopathy.      Impression    IMPRESSION:  1. The palpable abnormality in the right breast corresponds to a  bilobed hypoechoic mass. Recommend core biopsy.  2. No mammographic evidence for malignancy in the left breast.    BI-RADS CATEGORY: 4 - Suspicious.    RECOMMENDED FOLLOW-UP: Core biopsy of the right breast.     ANGELLA HUERTA MD         SYSTEM ID:  [id number]   US Breast Right Limited 1-3 Quadrants    Narrative    MAMMOGRAM DIAGNOSTIC BILATERAL WITH TOMOSYNTHESIS;  ULTRASOUND BREAST RIGHT LIMITED 1-3 QUADRANTS   2/18/2022 12:43 PM    HISTORY:  Palpable abnormality in the right breast.    COMPARISON:  2/15/2019 and 10/2/2014.    BREAST DENSITY: Scattered fibroglandular densities.    FINDINGS:    Bilateral diagnostic mammogram:  Right breast: At the site of the palpable abnormality there is a focal  mass with some associated microcalcifications. Ultrasound will be  obtained.  Left breast: No mammographic evidence for malignancy.    Right breast ultrasound: At the site of the palpable abnormality in  the right breast 2:00 position  approximately 6 cm from the nipple  there is a bilobed hypoechoic mass with some shadowing and some  internal blood flow as well as some echogenic foci likely representing  calcifications. This measures 2.4 x 1.1 x 1.6 cm. Tissue diagnosis is  recommended. No right axillary adenopathy.      Impression    IMPRESSION:  1. The palpable abnormality in the right breast corresponds to a  bilobed hypoechoic mass. Recommend core biopsy.  2. No mammographic evidence for malignancy in the left breast.    BI-RADS CATEGORY: 4 - Suspicious.    RECOMMENDED FOLLOW-UP: Core biopsy of the right breast.     ANGELLA HUERTA MD         SYSTEM ID:  [id number]   MA Post Procedure Right    Addendum: 2/24/2022    SAMMY MARIE  ACCESSION #[id number], [id number]    ADDENDUM:     Pathology: Malignant. Invasive ductal carcinoma. Please see pathology  report for details.    Radiology: Pathology is concordant with imaging.    CHANDLER GAUTHIER M.D.  Date of Addendum: 2/24/2022    CHANDLER GAUTHIER MD         SYSTEM ID:  [id number]      Narrative    ULTRASOUND-GUIDED RIGHT BREAST CORE BIOPSY;   CLIP PLACEMENT;   POSTPROCEDURE DIGITAL MAMMOGRAM RIGHT BREAST February 22, 2022 10:43  AM    INDICATION FOR PROCEDURE: Irregular hypoechoic mass in the right  breast.    PROCEDURE: Approximately 5 mL lidocaine without epinephrine was  infiltrated for local anesthetic and a 13-gauge trocar was introduced  via lateral approach. The needle tip was placed adjacent to the  lesion. A series of four samples were obtained with a 14-gauge  core-cutting needle. A clip was then deployed to lia the lesion.  There was less than 5 cc of blood loss.    Postbiopsy unilateral digital mammogram of the right breast showed the  clip to be at the expected biopsy site. The patient tolerated the  procedure without difficulty and there was no significant pain or  immediate complication at the end of the procedure.       Impression    IMPRESSION: Successful right breast  ultrasound-guided core biopsy and  clip placement.  Final pathology is pending.      ANGELLA HUERTA MD         SYSTEM ID:  [id number]   US Breast Biopsy Core Needle Right    Addendum: 2/24/2022    SAMMY MARIE  ACCESSION #[id number], [id number]    ADDENDUM:     Pathology: Malignant. Invasive ductal carcinoma. Please see pathology  report for details.    Radiology: Pathology is concordant with imaging.    CHANDLER GAUTHIER M.D.  Date of Addendum: 2/24/2022    CHANDLER GAUTHIER MD         SYSTEM ID:  [id number]      Narrative    ULTRASOUND-GUIDED RIGHT BREAST CORE BIOPSY;   CLIP PLACEMENT;   POSTPROCEDURE DIGITAL MAMMOGRAM RIGHT BREAST February 22, 2022 10:43  AM    INDICATION FOR PROCEDURE: Irregular hypoechoic mass in the right  breast.    PROCEDURE: Approximately 5 mL lidocaine without epinephrine was  infiltrated for local anesthetic and a 13-gauge trocar was introduced  via lateral approach. The needle tip was placed adjacent to the  lesion. A series of four samples were obtained with a 14-gauge  core-cutting needle. A clip was then deployed to lia the lesion.  There was less than 5 cc of blood loss.    Postbiopsy unilateral digital mammogram of the right breast showed the  clip to be at the expected biopsy site. The patient tolerated the  procedure without difficulty and there was no significant pain or  immediate complication at the end of the procedure.       Impression    IMPRESSION: Successful right breast ultrasound-guided core biopsy and  clip placement.  Final pathology is pending.      ANGELLA HUERTA MD         SYSTEM ID:  [id number]       Percutaneous core needle biopsy:   Final Diagnosis   Right breast, lower inner quadrant, core biopsy  -Invasive ductal carcinoma, Philip grade 3  -Negative for estrogen and progesterone receptor (see biomarker report below)     Past Medical History:   has a past medical history of Cervical high risk HPV (human papillomavirus) test positive (03/15/2020).    Past  Surgical History:  Past Surgical History:   Procedure Laterality Date     ARTHROSCOPY SHOULDER ROTATOR CUFF REPAIR Right      ARTHROSCOPY SHOULDER ROTATOR CUFF REPAIR Left       SECTION       CHOLECYSTECTOMY       DAVINCI BYPASS GASTRIC ROBERT-EN-Y       HYSTERECTOMY SUPRACERVICAL       right ulnar nerve repositioning surgery          Social History:  Social History     Socioeconomic History     Marital status:      Spouse name: Not on file     Number of children: Not on file     Years of education: Not on file     Highest education level: Not on file   Occupational History     Not on file   Tobacco Use     Smoking status: Former Smoker     Start date: 1982     Quit date: 1984     Years since quittin.1     Smokeless tobacco: Never Used   Vaping Use     Vaping Use: Never used   Substance and Sexual Activity     Alcohol use: Yes     Comment: occ     Drug use: No     Sexual activity: Yes     Partners: Male   Other Topics Concern     Not on file   Social History Narrative     Not on file     Social Determinants of Health     Financial Resource Strain: Not on file   Food Insecurity: Not on file   Transportation Needs: Not on file   Physical Activity: Not on file   Stress: Not on file   Social Connections: Not on file   Intimate Partner Violence: Not on file   Housing Stability: Not on file        ROS:  The 10 point review of systems is negative other than noted in the HPI and above.    PE:  Vitals: /85 (BP Location: Right arm, Patient Position: Chair, Cuff Size: Adult Large)   Pulse 83   Resp 18   LMP  (LMP Unknown)   SpO2 98%   General appearance: well-nourished, sitting comfortably, no apparent distress  HEENT:  Head normocephalic and atraumatic, pupils equal and round, conjunctivae clear, mucous membranes moist, external ears and nose normal  Neck: Supple without thyromegaly or lymphadenopathy  Lungs: Respirations unlabored  Lymphatic: No cervical, or supraclavicular  lymphadenopathy  Extremities: Without edema  Musculoskeletal:  Normal station and gait  Neurologic: alert, speech is clear, moves all extremities with good strength  Psychiatric: Mood and affect are appropriate  Skin: Without lesions or rashes  Breast: Examined with Dianne Mckee MA   Symmetrical with no skin or nipple changes.     Contour is normal.   Parenchyma is soft.   Masses- right, upper inner quadrant - 2 cm diameter   Ecchymosis- none   Incisional scar- none    Lymph:       No supraclavicular/infraclavicular adenopathy.   Axillary adenopathy: none      This note was created using voice recognition software. Undetected word substitutions or other errors may have occurred.     Time spent with the patient with greater that 50% of the time in discussion was 55 minutes.     Baldemar Marina, DO      Please route or send letter to:  Primary Care Provider (PCP)

## 2022-03-04 ENCOUNTER — ONCOLOGY VISIT (OUTPATIENT)
Dept: ONCOLOGY | Facility: CLINIC | Age: 57
End: 2022-03-04
Attending: INTERNAL MEDICINE
Payer: COMMERCIAL

## 2022-03-04 ENCOUNTER — PRE VISIT (OUTPATIENT)
Dept: ONCOLOGY | Facility: CLINIC | Age: 57
End: 2022-03-04

## 2022-03-04 VITALS
HEIGHT: 68 IN | TEMPERATURE: 98.4 F | WEIGHT: 216 LBS | OXYGEN SATURATION: 97 % | DIASTOLIC BLOOD PRESSURE: 81 MMHG | RESPIRATION RATE: 12 BRPM | BODY MASS INDEX: 32.74 KG/M2 | HEART RATE: 75 BPM | SYSTOLIC BLOOD PRESSURE: 126 MMHG

## 2022-03-04 DIAGNOSIS — T45.1X5A CHEMOTHERAPY-INDUCED NEUTROPENIA (H): ICD-10-CM

## 2022-03-04 DIAGNOSIS — D70.1 CHEMOTHERAPY-INDUCED NEUTROPENIA (H): ICD-10-CM

## 2022-03-04 DIAGNOSIS — Z17.1 MALIGNANT NEOPLASM OF RIGHT BREAST IN FEMALE, ESTROGEN RECEPTOR NEGATIVE, UNSPECIFIED SITE OF BREAST (H): Primary | ICD-10-CM

## 2022-03-04 DIAGNOSIS — C50.911 MALIGNANT NEOPLASM OF RIGHT BREAST IN FEMALE, ESTROGEN RECEPTOR NEGATIVE, UNSPECIFIED SITE OF BREAST (H): Primary | ICD-10-CM

## 2022-03-04 DIAGNOSIS — Z45.2 EXHAUSTED VASCULAR ACCESS: Primary | ICD-10-CM

## 2022-03-04 DIAGNOSIS — Z11.59 ENCOUNTER FOR SCREENING FOR OTHER VIRAL DISEASES: Primary | ICD-10-CM

## 2022-03-04 DIAGNOSIS — C50.211 MALIGNANT NEOPLASM OF UPPER-INNER QUADRANT OF RIGHT BREAST IN FEMALE, ESTROGEN RECEPTOR NEGATIVE (H): ICD-10-CM

## 2022-03-04 DIAGNOSIS — Z17.1 MALIGNANT NEOPLASM OF UPPER-INNER QUADRANT OF RIGHT BREAST IN FEMALE, ESTROGEN RECEPTOR NEGATIVE (H): ICD-10-CM

## 2022-03-04 DIAGNOSIS — Z51.11 ENCOUNTER FOR ANTINEOPLASTIC CHEMOTHERAPY: ICD-10-CM

## 2022-03-04 PROCEDURE — G0463 HOSPITAL OUTPT CLINIC VISIT: HCPCS

## 2022-03-04 PROCEDURE — 99205 OFFICE O/P NEW HI 60 MIN: CPT | Performed by: INTERNAL MEDICINE

## 2022-03-04 RX ORDER — DIPHENHYDRAMINE HYDROCHLORIDE 50 MG/ML
50 INJECTION INTRAMUSCULAR; INTRAVENOUS
Status: CANCELLED
Start: 2022-03-29

## 2022-03-04 RX ORDER — METHYLPREDNISOLONE SODIUM SUCCINATE 125 MG/2ML
125 INJECTION, POWDER, LYOPHILIZED, FOR SOLUTION INTRAMUSCULAR; INTRAVENOUS
Status: CANCELLED
Start: 2022-03-16

## 2022-03-04 RX ORDER — EPINEPHRINE 1 MG/ML
0.3 INJECTION, SOLUTION, CONCENTRATE INTRAVENOUS EVERY 5 MIN PRN
Status: CANCELLED | OUTPATIENT
Start: 2022-03-07

## 2022-03-04 RX ORDER — DIPHENHYDRAMINE HCL 25 MG
50 CAPSULE ORAL
Status: CANCELLED
Start: 2022-03-29

## 2022-03-04 RX ORDER — LORAZEPAM 2 MG/ML
0.5 INJECTION INTRAMUSCULAR EVERY 4 HOURS PRN
Status: CANCELLED | OUTPATIENT
Start: 2022-03-16

## 2022-03-04 RX ORDER — ALBUTEROL SULFATE 90 UG/1
1-2 AEROSOL, METERED RESPIRATORY (INHALATION)
Status: CANCELLED
Start: 2022-03-07

## 2022-03-04 RX ORDER — METHYLPREDNISOLONE SODIUM SUCCINATE 125 MG/2ML
125 INJECTION, POWDER, LYOPHILIZED, FOR SOLUTION INTRAMUSCULAR; INTRAVENOUS
Status: CANCELLED
Start: 2022-03-29

## 2022-03-04 RX ORDER — HEPARIN SODIUM,PORCINE 10 UNIT/ML
5 VIAL (ML) INTRAVENOUS
Status: CANCELLED | OUTPATIENT
Start: 2022-03-16

## 2022-03-04 RX ORDER — DIPHENHYDRAMINE HYDROCHLORIDE 50 MG/ML
50 INJECTION INTRAMUSCULAR; INTRAVENOUS
Status: CANCELLED
Start: 2022-03-16

## 2022-03-04 RX ORDER — ALBUTEROL SULFATE 0.83 MG/ML
2.5 SOLUTION RESPIRATORY (INHALATION)
Status: CANCELLED | OUTPATIENT
Start: 2022-03-16

## 2022-03-04 RX ORDER — EPINEPHRINE 1 MG/ML
0.3 INJECTION, SOLUTION, CONCENTRATE INTRAVENOUS EVERY 5 MIN PRN
Status: CANCELLED | OUTPATIENT
Start: 2022-03-29

## 2022-03-04 RX ORDER — HEPARIN SODIUM (PORCINE) LOCK FLUSH IV SOLN 100 UNIT/ML 100 UNIT/ML
5 SOLUTION INTRAVENOUS
Status: CANCELLED | OUTPATIENT
Start: 2022-03-16

## 2022-03-04 RX ORDER — NALOXONE HYDROCHLORIDE 0.4 MG/ML
0.2 INJECTION, SOLUTION INTRAMUSCULAR; INTRAVENOUS; SUBCUTANEOUS
Status: CANCELLED | OUTPATIENT
Start: 2022-03-16

## 2022-03-04 RX ORDER — EPINEPHRINE 1 MG/ML
0.3 INJECTION, SOLUTION, CONCENTRATE INTRAVENOUS EVERY 5 MIN PRN
Status: CANCELLED | OUTPATIENT
Start: 2022-03-16

## 2022-03-04 RX ORDER — HEPARIN SODIUM (PORCINE) LOCK FLUSH IV SOLN 100 UNIT/ML 100 UNIT/ML
5 SOLUTION INTRAVENOUS
Status: CANCELLED | OUTPATIENT
Start: 2022-03-29

## 2022-03-04 RX ORDER — HEPARIN SODIUM (PORCINE) LOCK FLUSH IV SOLN 100 UNIT/ML 100 UNIT/ML
5 SOLUTION INTRAVENOUS
Status: CANCELLED | OUTPATIENT
Start: 2022-03-07

## 2022-03-04 RX ORDER — DIPHENHYDRAMINE HCL 25 MG
50 CAPSULE ORAL ONCE
Status: CANCELLED
Start: 2022-03-16

## 2022-03-04 RX ORDER — METHYLPREDNISOLONE SODIUM SUCCINATE 125 MG/2ML
125 INJECTION, POWDER, LYOPHILIZED, FOR SOLUTION INTRAMUSCULAR; INTRAVENOUS
Status: CANCELLED
Start: 2022-03-07

## 2022-03-04 RX ORDER — LORAZEPAM 2 MG/ML
0.5 INJECTION INTRAMUSCULAR EVERY 4 HOURS PRN
Status: CANCELLED | OUTPATIENT
Start: 2022-03-07

## 2022-03-04 RX ORDER — MEPERIDINE HYDROCHLORIDE 25 MG/ML
25 INJECTION INTRAMUSCULAR; INTRAVENOUS; SUBCUTANEOUS EVERY 30 MIN PRN
Status: CANCELLED | OUTPATIENT
Start: 2022-03-07

## 2022-03-04 RX ORDER — HEPARIN SODIUM,PORCINE 10 UNIT/ML
5 VIAL (ML) INTRAVENOUS
Status: CANCELLED | OUTPATIENT
Start: 2022-03-07

## 2022-03-04 RX ORDER — DIPHENHYDRAMINE HCL 25 MG
50 CAPSULE ORAL ONCE
Status: CANCELLED
Start: 2022-03-07

## 2022-03-04 RX ORDER — ALBUTEROL SULFATE 90 UG/1
1-2 AEROSOL, METERED RESPIRATORY (INHALATION)
Status: CANCELLED
Start: 2022-03-16

## 2022-03-04 RX ORDER — DIPHENHYDRAMINE HYDROCHLORIDE 50 MG/ML
50 INJECTION INTRAMUSCULAR; INTRAVENOUS
Status: CANCELLED
Start: 2022-03-07

## 2022-03-04 RX ORDER — ALBUTEROL SULFATE 0.83 MG/ML
2.5 SOLUTION RESPIRATORY (INHALATION)
Status: CANCELLED | OUTPATIENT
Start: 2022-03-07

## 2022-03-04 RX ORDER — LORAZEPAM 2 MG/ML
0.5 INJECTION INTRAMUSCULAR EVERY 4 HOURS PRN
Status: CANCELLED | OUTPATIENT
Start: 2022-03-29

## 2022-03-04 RX ORDER — NALOXONE HYDROCHLORIDE 0.4 MG/ML
0.2 INJECTION, SOLUTION INTRAMUSCULAR; INTRAVENOUS; SUBCUTANEOUS
Status: CANCELLED | OUTPATIENT
Start: 2022-03-29

## 2022-03-04 RX ORDER — ALBUTEROL SULFATE 90 UG/1
1-2 AEROSOL, METERED RESPIRATORY (INHALATION)
Status: CANCELLED
Start: 2022-03-29

## 2022-03-04 RX ORDER — MEPERIDINE HYDROCHLORIDE 25 MG/ML
25 INJECTION INTRAMUSCULAR; INTRAVENOUS; SUBCUTANEOUS EVERY 30 MIN PRN
Status: CANCELLED | OUTPATIENT
Start: 2022-03-29

## 2022-03-04 RX ORDER — MEPERIDINE HYDROCHLORIDE 25 MG/ML
25 INJECTION INTRAMUSCULAR; INTRAVENOUS; SUBCUTANEOUS EVERY 30 MIN PRN
Status: CANCELLED | OUTPATIENT
Start: 2022-03-16

## 2022-03-04 RX ORDER — ALBUTEROL SULFATE 0.83 MG/ML
2.5 SOLUTION RESPIRATORY (INHALATION)
Status: CANCELLED | OUTPATIENT
Start: 2022-03-29

## 2022-03-04 RX ORDER — HEPARIN SODIUM,PORCINE 10 UNIT/ML
5 VIAL (ML) INTRAVENOUS
Status: CANCELLED | OUTPATIENT
Start: 2022-03-29

## 2022-03-04 RX ORDER — NALOXONE HYDROCHLORIDE 0.4 MG/ML
0.2 INJECTION, SOLUTION INTRAMUSCULAR; INTRAVENOUS; SUBCUTANEOUS
Status: CANCELLED | OUTPATIENT
Start: 2022-03-07

## 2022-03-04 ASSESSMENT — PAIN SCALES - GENERAL: PAINLEVEL: NO PAIN (0)

## 2022-03-04 NOTE — LETTER
"    3/4/2022         RE: Diana Salvador  [address] [place] [id number]        Dear Colleague,    Thank you for referring your patient, Diana Salvador, to the [facility]. Please see a copy of my visit note below.    Oncology Rooming Note    March 4, 2022 10:13 AM   Diana Salvador is a 56 year old female who presents for:    Chief Complaint   Patient presents with     Oncology Clinic Visit      New - Malignant neoplasm of upper-inner quadrant of right breast in female, estrogen receptor negative - Provider visit only     Initial Vitals: /81 (BP Location: Right arm, Patient Position: Sitting, Cuff Size: Adult Regular)   Pulse 75   Temp 98.4  F (36.9  C) (Oral)   Resp 12   Ht 1.715 m (5' 7.5\")   Wt 98 kg (216 lb)   LMP  (LMP Unknown)   SpO2 97%   BMI 33.33 kg/m   Estimated body mass index is 33.33 kg/m  as calculated from the following:    Height as of this encounter: 1.715 m (5' 7.5\").    Weight as of this encounter: 98 kg (216 lb). Body surface area is 2.16 meters squared.  No Pain (0) Comment: Data Unavailable   No LMP recorded (lmp unknown). Patient has had a hysterectomy.  Allergies reviewed: Yes  Medications reviewed: Yes    Medications: Medication refills not needed today.  Pharmacy name entered into [employer]:    [facility] #[id number] - [address] AT [address]    Clinical concerns: None       Emy Hollingsworth, [facility] Hematology and Oncology Outpatient Consult Note    Patient: Diana Salvador  MRN: [id number]  Date of Service: 3/4/2022      Reason for Visit    I was consulted by Dr. Marina regarding newly diagnosed right-sided breast cancer.      Assessment/Plan    Cancer Staging  Malignant neoplasm of right breast in female, estrogen receptor negative, unspecified site of breast (H)  Staging form: Breast, AJCC 8th Edition  - Clinical stage from 3/4/2022: " Stage IIB (cT2, cN0, cM0, G3, ER-, MN-, HER2-) - Signed by Ricardo Diamond MD on 3/4/2022    I recommend the following treatment for your breast cancer based on the KEYNOTE-522 trial (Shobha et al., 2020):     Pembrolizumab 200 mg D1 + Paclitaxel 80 mg/m  D1, 8, 15 + Carboplatin AUC=5 D1 q21 Days x 12 Weeks, Followed by Pembrolizumab 200 mg + Doxorubicin + Cyclophosphamide q21 Days x 12 Weeks, Followed by Surgery    Cycles 1 through 4: A cycle is every 21 days:    Pembrolizumab 200 mg flat dose IV once on day 1 of cycles 1 through 4  Paclitaxel 80 mg/m  IV once daily on days 1, 8, and 15 of cycles 1 through 4    Carboplatin AUC=5 IV once on day 1 of cycles 1 through 4  Filgrastim 5 mcg/kg subcutaneously once daily on days 16, 17, and 18 of cycles 1 through 4    Cycles 5 through 8: A cycle is every 21 days:  Pembrolizumab 200 mg flat dose IV once on day 1 of cycles 5 through 8  Doxorubicin 60 mg/m  IV once on day 1 of cycles 5 through 8  Cyclophosphamide 600 mg/m  IV once on day 1 of cycles 5 through 8  Pegfilgrastim 6 mg flat dose subcutaneously once on day 2 of cycles 5 through 8    Doxorubicin can cause myocardial damage, including acute left ventricular failure. Risk of cardiomyopathy is generally proportional to cumulative exposure the drug.  Therefore, you will need an echocardiogram completed before the doxorubicin portion of treatment and as needed afterwards     Serious immune-mediated adverse events can occur with pembrolizumab. Please report any new or unusual symptoms which may be signs of an immune-mediated adverse reaction management.  See chemotherapy teaching materials for more information.    We will plan to start the above regimen the week of March 14, 2022 if you can have your chemotherapy port placed before then.  My staff will inform your surgeon Dr. Marina's office about the need for a chemotherapy port to be placed.      I have medically cleared you today for Port-A-Cath  placement.      History  Ms. Diana Salvador is a 56 year old lady who self palpated a right breast lump and has been diagnosed with right-sided stage IIb, T2, N0, M0 triple negative breast cancer.  Her breast ultrasound report has been reviewed by me which revealed the self palpated right breast lump and normal right axillary lymph nodes.  She has not palpated any lumps or bumps anywhere else. She has met with Dr. Marina from the surgical department whose notes I have reviewed and whom I have spoken to to discuss this patient's case in detail and wishes to proceed bilateral mastectomies with him.  Dr. Marina has kindly referred her to see me for neoadjuvant systemic treatment prior to bilateral mastectomies.  I have reviewed her breast biopsy results which are summarized above, specifically ER, ND staining is negative and HER-2/roxanne is negative by FISH.  Her last CMP has been reviewed which was drawn 5 months ago and was normal except for a borderline low serum calcium level of 8.3 and borderline elevated serum glucose of 103.      Review of systems.  No fever or night sweats.  + Right breast lump lumps, no other lumps or bumps anywhere.  No unusual headaches or eyesight issues.  No dizziness.  No chest pain. No shortness of breath. No cough.  No abdominal pain. No nausea or vomiting.  No problems passing urine.  No numbness or tingling in hands or feet.        Past History  Past Medical History:   Diagnosis Date     Cervical high risk HPV (human papillomavirus) test positive 03/15/2020    See problem list     Past Surgical History:   Procedure Laterality Date     ARTHROSCOPY SHOULDER ROTATOR CUFF REPAIR Right      ARTHROSCOPY SHOULDER ROTATOR CUFF REPAIR Left       SECTION       CHOLECYSTECTOMY       DAVINCI BYPASS GASTRIC ROBERT-EN-Y       HYSTERECTOMY SUPRACERVICAL       right ulnar nerve repositioning surgery       Family History   Problem Relation Age of Onset     Heart Failure Mother      Social  History     Socioeconomic History     Marital status:      Spouse name: None     Number of children: None     Years of education: None     Highest education level: None   Occupational History     None   Tobacco Use     Smoking status: Former Smoker     Start date: 1982     Quit date: 1984     Years since quittin.1     Smokeless tobacco: Never Used   Vaping Use     Vaping Use: Never used   Substance and Sexual Activity     Alcohol use: Yes     Comment: occ     Drug use: No     Sexual activity: Yes     Partners: Male   Other Topics Concern     None   Social History Narrative     None     Social Determinants of Health     Financial Resource Strain: Not on file   Food Insecurity: Not on file   Transportation Needs: Not on file   Physical Activity: Not on file   Stress: Not on file   Social Connections: Not on file   Intimate Partner Violence: Not on file   Housing Stability: Not on file       Allergies    No Known Allergies    Current Outpatient Medications   Medication     alendronate (FOSAMAX) 35 MG tablet     buPROPion (WELLBUTRIN XL) 150 MG 24 hr tablet     cyanocobalamin (CYANOCOBALAMIN) 1000 MCG/ML injection     cyclobenzaprine (FLEXERIL) 5 MG tablet     diclofenac (VOLTAREN) 1 % topical gel     doxepin (SILENOR) 3 MG tablet     furosemide (LASIX) 20 MG tablet     LORazepam (ATIVAN) 0.5 MG tablet     omeprazole (PRILOSEC) 20 MG DR capsule     potassium chloride ER (K-TAB) 20 MEQ CR tablet     rOPINIRole (REQUIP) 1 MG tablet     [START ON 3/9/2022] traMADol (ULTRAM) 50 MG tablet     [START ON 2022] traMADol (ULTRAM) 50 MG tablet     valACYclovir (VALTREX) 1000 mg tablet     vitamin D3 (CHOLECALCIFEROL) 50 mcg (2000 units) tablet     No current facility-administered medications for this visit.         Physical Exam    ECOG Performance Status: 0    GENERAL: Alert and oriented to time place and person. Seated comfortably. In no distress.    EYES: No icterus.    COR: RRR.    LUNGS: Clear to  auscultation bilaterally      Imaging Results    MA Post Procedure Right    Addendum Date: 2/24/2022    SAMMY MARIE ACCESSION #[id number], [id number] ADDENDUM: Pathology: Malignant. Invasive ductal carcinoma. Please see pathology report for details. Radiology: Pathology is concordant with imaging. CHANDLER GAUTHIER M.D.  Date of Addendum: 2/24/2022 CHANDLER GAUTHIER MD   SYSTEM ID:  [id number]    Result Date: 2/24/2022  ULTRASOUND-GUIDED RIGHT BREAST CORE BIOPSY; CLIP PLACEMENT; POSTPROCEDURE DIGITAL MAMMOGRAM RIGHT BREAST February 22, 2022 10:43 AM INDICATION FOR PROCEDURE: Irregular hypoechoic mass in the right breast. PROCEDURE: Approximately 5 mL lidocaine without epinephrine was infiltrated for local anesthetic and a 13-gauge trocar was introduced via lateral approach. The needle tip was placed adjacent to the lesion. A series of four samples were obtained with a 14-gauge core-cutting needle. A clip was then deployed to lia the lesion. There was less than 5 cc of blood loss. Postbiopsy unilateral digital mammogram of the right breast showed the clip to be at the expected biopsy site. The patient tolerated the procedure without difficulty and there was no significant pain or immediate complication at the end of the procedure.     IMPRESSION: Successful right breast ultrasound-guided core biopsy and clip placement.  Final pathology is pending.  ANGELLA HUERTA MD   SYSTEM ID:  [id number]    US Breast Right Limited 1-3 Quadrants    Result Date: 2/18/2022  MAMMOGRAM DIAGNOSTIC BILATERAL WITH TOMOSYNTHESIS; ULTRASOUND BREAST RIGHT LIMITED 1-3 QUADRANTS   2/18/2022 12:43 PM HISTORY:  Palpable abnormality in the right breast. COMPARISON:  2/15/2019 and 10/2/2014. BREAST DENSITY: Scattered fibroglandular densities. FINDINGS:  Bilateral diagnostic mammogram: Right breast: At the site of the palpable abnormality there is a focal mass with some associated microcalcifications. Ultrasound will be obtained. Left breast: No  mammographic evidence for malignancy. Right breast ultrasound: At the site of the palpable abnormality in the right breast 2:00 position approximately 6 cm from the nipple there is a bilobed hypoechoic mass with some shadowing and some internal blood flow as well as some echogenic foci likely representing calcifications. This measures 2.4 x 1.1 x 1.6 cm. Tissue diagnosis is recommended. No right axillary adenopathy.     IMPRESSION: 1. The palpable abnormality in the right breast corresponds to a bilobed hypoechoic mass. Recommend core biopsy. 2. No mammographic evidence for malignancy in the left breast. BI-RADS CATEGORY: 4 - Suspicious. RECOMMENDED FOLLOW-UP: Core biopsy of the right breast. ANGELLA HUERTA MD   SYSTEM ID:  [id number]    US Breast Biopsy Core Needle Right    Addendum Date: 2/24/2022    SAMMY MARIE ACCESSION #[id number], [id number] ADDENDUM: Pathology: Malignant. Invasive ductal carcinoma. Please see pathology report for details. Radiology: Pathology is concordant with imaging. CHANDLER GAUTHIER M.D.  Date of Addendum: 2/24/2022 CHANDLER GAUTHIER MD   SYSTEM ID:  [id number]    Result Date: 2/24/2022  ULTRASOUND-GUIDED RIGHT BREAST CORE BIOPSY; CLIP PLACEMENT; POSTPROCEDURE DIGITAL MAMMOGRAM RIGHT BREAST February 22, 2022 10:43 AM INDICATION FOR PROCEDURE: Irregular hypoechoic mass in the right breast. PROCEDURE: Approximately 5 mL lidocaine without epinephrine was infiltrated for local anesthetic and a 13-gauge trocar was introduced via lateral approach. The needle tip was placed adjacent to the lesion. A series of four samples were obtained with a 14-gauge core-cutting needle. A clip was then deployed to lia the lesion. There was less than 5 cc of blood loss. Postbiopsy unilateral digital mammogram of the right breast showed the clip to be at the expected biopsy site. The patient tolerated the procedure without difficulty and there was no significant pain or immediate complication at the end of the  procedure.     IMPRESSION: Successful right breast ultrasound-guided core biopsy and clip placement.  Final pathology is pending.  ANGELLA HUERTA MD   SYSTEM ID:  [id number]    MA Diagnostic Bilateral w/Reymundo    Result Date: 2/18/2022  MAMMOGRAM DIAGNOSTIC BILATERAL WITH TOMOSYNTHESIS; ULTRASOUND BREAST RIGHT LIMITED 1-3 QUADRANTS   2/18/2022 12:43 PM HISTORY:  Palpable abnormality in the right breast. COMPARISON:  2/15/2019 and 10/2/2014. BREAST DENSITY: Scattered fibroglandular densities. FINDINGS:  Bilateral diagnostic mammogram: Right breast: At the site of the palpable abnormality there is a focal mass with some associated microcalcifications. Ultrasound will be obtained. Left breast: No mammographic evidence for malignancy. Right breast ultrasound: At the site of the palpable abnormality in the right breast 2:00 position approximately 6 cm from the nipple there is a bilobed hypoechoic mass with some shadowing and some internal blood flow as well as some echogenic foci likely representing calcifications. This measures 2.4 x 1.1 x 1.6 cm. Tissue diagnosis is recommended. No right axillary adenopathy.     IMPRESSION: 1. The palpable abnormality in the right breast corresponds to a bilobed hypoechoic mass. Recommend core biopsy. 2. No mammographic evidence for malignancy in the left breast. BI-RADS CATEGORY: 4 - Suspicious. RECOMMENDED FOLLOW-UP: Core biopsy of the right breast. ANGELLA HUERTA MD   SYSTEM ID:  [id number]    I spent a total of 46 minutes on the care of this patient on the day of service including face to face time and the remainder in chart review, care coordination, and documentation on the day of service.    Signed by: Ricardo Diamond MD      Patient given new patient chemotherapy booklet and also information on Carboplatin, Taxol, Keytruda, Doxorubicin and cytoxan. Will do teach when gets closer to Chemo start day. Claire Blair RN on 3/4/2022 at 11:56 AM        Again, thank you for  allowing me to participate in the care of your patient.        Sincerely,        Ricardo Diamond MD

## 2022-03-04 NOTE — PROGRESS NOTES
Patient given new patient chemotherapy booklet and also information on Carboplatin, Taxol, Keytruda, Doxorubicin and cytoxan. Will do teach when gets closer to Chemo start day. Claire Blair RN on 3/4/2022 at 11:56 AM

## 2022-03-04 NOTE — PROGRESS NOTES
"Oncology Rooming Note    March 4, 2022 10:13 AM   Diana aSlvador is a 56 year old female who presents for:    Chief Complaint   Patient presents with     Oncology Clinic Visit      New - Malignant neoplasm of upper-inner quadrant of right breast in female, estrogen receptor negative - Provider visit only     Initial Vitals: /81 (BP Location: Right arm, Patient Position: Sitting, Cuff Size: Adult Regular)   Pulse 75   Temp 98.4  F (36.9  C) (Oral)   Resp 12   Ht 1.715 m (5' 7.5\")   Wt 98 kg (216 lb)   LMP  (LMP Unknown)   SpO2 97%   BMI 33.33 kg/m   Estimated body mass index is 33.33 kg/m  as calculated from the following:    Height as of this encounter: 1.715 m (5' 7.5\").    Weight as of this encounter: 98 kg (216 lb). Body surface area is 2.16 meters squared.  No Pain (0) Comment: Data Unavailable   No LMP recorded (lmp unknown). Patient has had a hysterectomy.  Allergies reviewed: Yes  Medications reviewed: Yes    Medications: Medication refills not needed today.  Pharmacy name entered into [employer] [place]:    [facility] #[id number] - [place], [place] - [address] [facility] AT [address]    Clinical concerns: None       Emy Hollingsworth CMA            "

## 2022-03-04 NOTE — PROGRESS NOTES
[facility] Hematology and Oncology Outpatient Consult Note    Patient: Diana Salvador  MRN: [id number]  Date of Service: 3/4/2022      Reason for Visit    I was consulted by Dr. Marina regarding newly diagnosed right-sided breast cancer.      Assessment/Plan    Cancer Staging  Malignant neoplasm of right breast in female, estrogen receptor negative, unspecified site of breast (H)  Staging form: Breast, AJCC 8th Edition  - Clinical stage from 3/4/2022: Stage IIB (cT2, cN0, cM0, G3, ER-, MT-, HER2-) - Signed by Ricardo Diamond MD on 3/4/2022    I recommend the following treatment for your breast cancer based on the KEYNOTE-522 trial (Shobha et al., 2020):     Pembrolizumab 200 mg D1 + Paclitaxel 80 mg/m  D1, 8, 15 + Carboplatin AUC=5 D1 q21 Days x 12 Weeks, Followed by Pembrolizumab 200 mg + Doxorubicin + Cyclophosphamide q21 Days x 12 Weeks, Followed by Surgery    Cycles 1 through 4: A cycle is every 21 days:    Pembrolizumab 200 mg flat dose IV once on day 1 of cycles 1 through 4  Paclitaxel 80 mg/m  IV once daily on days 1, 8, and 15 of cycles 1 through 4    Carboplatin AUC=5 IV once on day 1 of cycles 1 through 4  Filgrastim 5 mcg/kg subcutaneously once daily on days 16, 17, and 18 of cycles 1 through 4    Cycles 5 through 8: A cycle is every 21 days:  Pembrolizumab 200 mg flat dose IV once on day 1 of cycles 5 through 8  Doxorubicin 60 mg/m  IV once on day 1 of cycles 5 through 8  Cyclophosphamide 600 mg/m  IV once on day 1 of cycles 5 through 8  Pegfilgrastim 6 mg flat dose subcutaneously once on day 2 of cycles 5 through 8    Doxorubicin can cause myocardial damage, including acute left ventricular failure. Risk of cardiomyopathy is generally proportional to cumulative exposure the drug.  Therefore, you will need an echocardiogram completed before the doxorubicin portion of treatment and as needed afterwards     Serious immune-mediated adverse events can occur with pembrolizumab. Please report any new  or unusual symptoms which may be signs of an immune-mediated adverse reaction management.  See chemotherapy teaching materials for more information.    We will plan to start the above regimen the week of March 14, 2022 if you can have your chemotherapy port placed before then.  My staff will inform your surgeon Dr. Marina's office about the need for a chemotherapy port to be placed.      I have medically cleared you today for Port-A-Cath placement.      History  Ms. Diana Salvador is a 56 year old lady who self palpated a right breast lump and has been diagnosed with right-sided stage IIb, T2, N0, M0 triple negative breast cancer.  Her breast ultrasound report has been reviewed by me which revealed the self palpated right breast lump and normal right axillary lymph nodes.  She has not palpated any lumps or bumps anywhere else. She has met with Dr. Marina from the surgical department whose notes I have reviewed and whom I have spoken to to discuss this patient's case in detail and wishes to proceed bilateral mastectomies with him.  Dr. Marina has kindly referred her to see me for neoadjuvant systemic treatment prior to bilateral mastectomies.  I have reviewed her breast biopsy results which are summarized above, specifically ER, CA staining is negative and HER-2/roxanne is negative by FISH.  Her last CMP has been reviewed which was drawn 5 months ago and was normal except for a borderline low serum calcium level of 8.3 and borderline elevated serum glucose of 103.      Review of systems.  No fever or night sweats.  + Right breast lump lumps, no other lumps or bumps anywhere.  No unusual headaches or eyesight issues.  No dizziness.  No chest pain. No shortness of breath. No cough.  No abdominal pain. No nausea or vomiting.  No problems passing urine.  No numbness or tingling in hands or feet.        Past History  Past Medical History:   Diagnosis Date     Cervical high risk HPV (human papillomavirus) test positive  03/15/2020    See problem list     Past Surgical History:   Procedure Laterality Date     ARTHROSCOPY SHOULDER ROTATOR CUFF REPAIR Right      ARTHROSCOPY SHOULDER ROTATOR CUFF REPAIR Left       SECTION       CHOLECYSTECTOMY       DAVINCI BYPASS GASTRIC ROBERT-EN-Y  2001     HYSTERECTOMY SUPRACERVICAL       right ulnar nerve repositioning surgery       Family History   Problem Relation Age of Onset     Heart Failure Mother      Social History     Socioeconomic History     Marital status:      Spouse name: None     Number of children: None     Years of education: None     Highest education level: None   Occupational History     None   Tobacco Use     Smoking status: Former Smoker     Start date: 1982     Quit date: 1984     Years since quittin.1     Smokeless tobacco: Never Used   Vaping Use     Vaping Use: Never used   Substance and Sexual Activity     Alcohol use: Yes     Comment: occ     Drug use: No     Sexual activity: Yes     Partners: Male   Other Topics Concern     None   Social History Narrative     None     Social Determinants of Health     Financial Resource Strain: Not on file   Food Insecurity: Not on file   Transportation Needs: Not on file   Physical Activity: Not on file   Stress: Not on file   Social Connections: Not on file   Intimate Partner Violence: Not on file   Housing Stability: Not on file       Allergies    No Known Allergies    Current Outpatient Medications   Medication     alendronate (FOSAMAX) 35 MG tablet     buPROPion (WELLBUTRIN XL) 150 MG 24 hr tablet     cyanocobalamin (CYANOCOBALAMIN) 1000 MCG/ML injection     cyclobenzaprine (FLEXERIL) 5 MG tablet     diclofenac (VOLTAREN) 1 % topical gel     doxepin (SILENOR) 3 MG tablet     furosemide (LASIX) 20 MG tablet     LORazepam (ATIVAN) 0.5 MG tablet     omeprazole (PRILOSEC) 20 MG DR capsule     potassium chloride ER (K-TAB) 20 MEQ CR tablet     rOPINIRole (REQUIP) 1 MG tablet     [START ON 3/9/2022] traMADol  (ULTRAM) 50 MG tablet     [START ON 4/6/2022] traMADol (ULTRAM) 50 MG tablet     valACYclovir (VALTREX) 1000 mg tablet     vitamin D3 (CHOLECALCIFEROL) 50 mcg (2000 units) tablet     No current facility-administered medications for this visit.         Physical Exam    ECOG Performance Status: 0    GENERAL: Alert and oriented to time place and person. Seated comfortably. In no distress.    EYES: No icterus.    COR: RRR.    LUNGS: Clear to auscultation bilaterally      Imaging Results    MA Post Procedure Right    Addendum Date: 2/24/2022    SAMMY MARIE ACCESSION #[id number], [id number] ADDENDUM: Pathology: Malignant. Invasive ductal carcinoma. Please see pathology report for details. Radiology: Pathology is concordant with imaging. CHANDLER GAUTHIER M.D.  Date of Addendum: 2/24/2022 CHANDLER GAUTHIER MD   SYSTEM ID:  [id number]    Result Date: 2/24/2022  ULTRASOUND-GUIDED RIGHT BREAST CORE BIOPSY; CLIP PLACEMENT; POSTPROCEDURE DIGITAL MAMMOGRAM RIGHT BREAST February 22, 2022 10:43 AM INDICATION FOR PROCEDURE: Irregular hypoechoic mass in the right breast. PROCEDURE: Approximately 5 mL lidocaine without epinephrine was infiltrated for local anesthetic and a 13-gauge trocar was introduced via lateral approach. The needle tip was placed adjacent to the lesion. A series of four samples were obtained with a 14-gauge core-cutting needle. A clip was then deployed to lia the lesion. There was less than 5 cc of blood loss. Postbiopsy unilateral digital mammogram of the right breast showed the clip to be at the expected biopsy site. The patient tolerated the procedure without difficulty and there was no significant pain or immediate complication at the end of the procedure.     IMPRESSION: Successful right breast ultrasound-guided core biopsy and clip placement.  Final pathology is pending.  ANGELLA HUERTA MD   SYSTEM ID:  [id number]    US Breast Right Limited 1-3 Quadrants    Result Date: 2/18/2022  MAMMOGRAM DIAGNOSTIC  BILATERAL WITH TOMOSYNTHESIS; ULTRASOUND BREAST RIGHT LIMITED 1-3 QUADRANTS   2/18/2022 12:43 PM HISTORY:  Palpable abnormality in the right breast. COMPARISON:  2/15/2019 and 10/2/2014. BREAST DENSITY: Scattered fibroglandular densities. FINDINGS:  Bilateral diagnostic mammogram: Right breast: At the site of the palpable abnormality there is a focal mass with some associated microcalcifications. Ultrasound will be obtained. Left breast: No mammographic evidence for malignancy. Right breast ultrasound: At the site of the palpable abnormality in the right breast 2:00 position approximately 6 cm from the nipple there is a bilobed hypoechoic mass with some shadowing and some internal blood flow as well as some echogenic foci likely representing calcifications. This measures 2.4 x 1.1 x 1.6 cm. Tissue diagnosis is recommended. No right axillary adenopathy.     IMPRESSION: 1. The palpable abnormality in the right breast corresponds to a bilobed hypoechoic mass. Recommend core biopsy. 2. No mammographic evidence for malignancy in the left breast. BI-RADS CATEGORY: 4 - Suspicious. RECOMMENDED FOLLOW-UP: Core biopsy of the right breast. ANGELLA HUERTA MD   SYSTEM ID:  [id number]    US Breast Biopsy Core Needle Right    Addendum Date: 2/24/2022    SAMMY MARIE ACCESSION #[id number], [id number] ADDENDUM: Pathology: Malignant. Invasive ductal carcinoma. Please see pathology report for details. Radiology: Pathology is concordant with imaging. CHANDLER GAUTHIER M.D.  Date of Addendum: 2/24/2022 CHANDLER GAUTHIER MD   SYSTEM ID:  [id number]    Result Date: 2/24/2022  ULTRASOUND-GUIDED RIGHT BREAST CORE BIOPSY; CLIP PLACEMENT; POSTPROCEDURE DIGITAL MAMMOGRAM RIGHT BREAST February 22, 2022 10:43 AM INDICATION FOR PROCEDURE: Irregular hypoechoic mass in the right breast. PROCEDURE: Approximately 5 mL lidocaine without epinephrine was infiltrated for local anesthetic and a 13-gauge trocar was introduced via lateral approach. The  needle tip was placed adjacent to the lesion. A series of four samples were obtained with a 14-gauge core-cutting needle. A clip was then deployed to lia the lesion. There was less than 5 cc of blood loss. Postbiopsy unilateral digital mammogram of the right breast showed the clip to be at the expected biopsy site. The patient tolerated the procedure without difficulty and there was no significant pain or immediate complication at the end of the procedure.     IMPRESSION: Successful right breast ultrasound-guided core biopsy and clip placement.  Final pathology is pending.  ANGELLA HUERTA MD   SYSTEM ID:  [id number]    MA Diagnostic Bilateral w/Reymundo    Result Date: 2/18/2022  MAMMOGRAM DIAGNOSTIC BILATERAL WITH TOMOSYNTHESIS; ULTRASOUND BREAST RIGHT LIMITED 1-3 QUADRANTS   2/18/2022 12:43 PM HISTORY:  Palpable abnormality in the right breast. COMPARISON:  2/15/2019 and 10/2/2014. BREAST DENSITY: Scattered fibroglandular densities. FINDINGS:  Bilateral diagnostic mammogram: Right breast: At the site of the palpable abnormality there is a focal mass with some associated microcalcifications. Ultrasound will be obtained. Left breast: No mammographic evidence for malignancy. Right breast ultrasound: At the site of the palpable abnormality in the right breast 2:00 position approximately 6 cm from the nipple there is a bilobed hypoechoic mass with some shadowing and some internal blood flow as well as some echogenic foci likely representing calcifications. This measures 2.4 x 1.1 x 1.6 cm. Tissue diagnosis is recommended. No right axillary adenopathy.     IMPRESSION: 1. The palpable abnormality in the right breast corresponds to a bilobed hypoechoic mass. Recommend core biopsy. 2. No mammographic evidence for malignancy in the left breast. BI-RADS CATEGORY: 4 - Suspicious. RECOMMENDED FOLLOW-UP: Core biopsy of the right breast. ANGELLA HURETA MD   SYSTEM ID:  [id number]    I spent a total of 46 minutes on the care  of this patient on the day of service including face to face time and the remainder in chart review, care coordination, and documentation on the day of service.    Signed by: Ricardo Diamond MD

## 2022-03-04 NOTE — H&P (VIEW-ONLY)
[facility] Hematology and Oncology Outpatient Consult Note    Patient: Diana Salvador  MRN: [id number]  Date of Service: 3/4/2022      Reason for Visit    I was consulted by Dr. Marina regarding newly diagnosed right-sided breast cancer.      Assessment/Plan    Cancer Staging  Malignant neoplasm of right breast in female, estrogen receptor negative, unspecified site of breast (H)  Staging form: Breast, AJCC 8th Edition  - Clinical stage from 3/4/2022: Stage IIB (cT2, cN0, cM0, G3, ER-, CO-, HER2-) - Signed by Ricardo Diamond MD on 3/4/2022    I recommend the following treatment for your breast cancer based on the KEYNOTE-522 trial (Shobha et al., 2020):     Pembrolizumab 200 mg D1 + Paclitaxel 80 mg/m  D1, 8, 15 + Carboplatin AUC=5 D1 q21 Days x 12 Weeks, Followed by Pembrolizumab 200 mg + Doxorubicin + Cyclophosphamide q21 Days x 12 Weeks, Followed by Surgery    Cycles 1 through 4: A cycle is every 21 days:    Pembrolizumab 200 mg flat dose IV once on day 1 of cycles 1 through 4  Paclitaxel 80 mg/m  IV once daily on days 1, 8, and 15 of cycles 1 through 4    Carboplatin AUC=5 IV once on day 1 of cycles 1 through 4  Filgrastim 5 mcg/kg subcutaneously once daily on days 16, 17, and 18 of cycles 1 through 4    Cycles 5 through 8: A cycle is every 21 days:  Pembrolizumab 200 mg flat dose IV once on day 1 of cycles 5 through 8  Doxorubicin 60 mg/m  IV once on day 1 of cycles 5 through 8  Cyclophosphamide 600 mg/m  IV once on day 1 of cycles 5 through 8  Pegfilgrastim 6 mg flat dose subcutaneously once on day 2 of cycles 5 through 8    Doxorubicin can cause myocardial damage, including acute left ventricular failure. Risk of cardiomyopathy is generally proportional to cumulative exposure the drug.  Therefore, you will need an echocardiogram completed before the doxorubicin portion of treatment and as needed afterwards     Serious immune-mediated adverse events can occur with pembrolizumab. Please report any new  or unusual symptoms which may be signs of an immune-mediated adverse reaction management.  See chemotherapy teaching materials for more information.    We will plan to start the above regimen the week of March 14, 2022 if you can have your chemotherapy port placed before then.  My staff will inform your surgeon Dr. Marina's office about the need for a chemotherapy port to be placed.      I have medically cleared you today for Port-A-Cath placement.      History  Ms. Diana Salvador is a 56 year old lady who self palpated a right breast lump and has been diagnosed with right-sided stage IIb, T2, N0, M0 triple negative breast cancer.  Her breast ultrasound report has been reviewed by me which revealed the self palpated right breast lump and normal right axillary lymph nodes.  She has not palpated any lumps or bumps anywhere else. She has met with Dr. Marina from the surgical department whose notes I have reviewed and whom I have spoken to to discuss this patient's case in detail and wishes to proceed bilateral mastectomies with him.  Dr. Marina has kindly referred her to see me for neoadjuvant systemic treatment prior to bilateral mastectomies.  I have reviewed her breast biopsy results which are summarized above, specifically ER, NM staining is negative and HER-2/roxanne is negative by FISH.  Her last CMP has been reviewed which was drawn 5 months ago and was normal except for a borderline low serum calcium level of 8.3 and borderline elevated serum glucose of 103.      Review of systems.  No fever or night sweats.  + Right breast lump lumps, no other lumps or bumps anywhere.  No unusual headaches or eyesight issues.  No dizziness.  No chest pain. No shortness of breath. No cough.  No abdominal pain. No nausea or vomiting.  No problems passing urine.  No numbness or tingling in hands or feet.        Past History  Past Medical History:   Diagnosis Date     Cervical high risk HPV (human papillomavirus) test positive  03/15/2020    See problem list     Past Surgical History:   Procedure Laterality Date     ARTHROSCOPY SHOULDER ROTATOR CUFF REPAIR Right      ARTHROSCOPY SHOULDER ROTATOR CUFF REPAIR Left       SECTION       CHOLECYSTECTOMY       DAVINCI BYPASS GASTRIC ROBERT-EN-Y  2001     HYSTERECTOMY SUPRACERVICAL       right ulnar nerve repositioning surgery       Family History   Problem Relation Age of Onset     Heart Failure Mother      Social History     Socioeconomic History     Marital status:      Spouse name: None     Number of children: None     Years of education: None     Highest education level: None   Occupational History     None   Tobacco Use     Smoking status: Former Smoker     Start date: 1982     Quit date: 1984     Years since quittin.1     Smokeless tobacco: Never Used   Vaping Use     Vaping Use: Never used   Substance and Sexual Activity     Alcohol use: Yes     Comment: occ     Drug use: No     Sexual activity: Yes     Partners: Male   Other Topics Concern     None   Social History Narrative     None     Social Determinants of Health     Financial Resource Strain: Not on file   Food Insecurity: Not on file   Transportation Needs: Not on file   Physical Activity: Not on file   Stress: Not on file   Social Connections: Not on file   Intimate Partner Violence: Not on file   Housing Stability: Not on file       Allergies    No Known Allergies    Current Outpatient Medications   Medication     alendronate (FOSAMAX) 35 MG tablet     buPROPion (WELLBUTRIN XL) 150 MG 24 hr tablet     cyanocobalamin (CYANOCOBALAMIN) 1000 MCG/ML injection     cyclobenzaprine (FLEXERIL) 5 MG tablet     diclofenac (VOLTAREN) 1 % topical gel     doxepin (SILENOR) 3 MG tablet     furosemide (LASIX) 20 MG tablet     LORazepam (ATIVAN) 0.5 MG tablet     omeprazole (PRILOSEC) 20 MG DR capsule     potassium chloride ER (K-TAB) 20 MEQ CR tablet     rOPINIRole (REQUIP) 1 MG tablet     [START ON 3/9/2022] traMADol  (ULTRAM) 50 MG tablet     [START ON 4/6/2022] traMADol (ULTRAM) 50 MG tablet     valACYclovir (VALTREX) 1000 mg tablet     vitamin D3 (CHOLECALCIFEROL) 50 mcg (2000 units) tablet     No current facility-administered medications for this visit.         Physical Exam    ECOG Performance Status: 0    GENERAL: Alert and oriented to time place and person. Seated comfortably. In no distress.    EYES: No icterus.    COR: RRR.    LUNGS: Clear to auscultation bilaterally      Imaging Results    MA Post Procedure Right    Addendum Date: 2/24/2022    SAMMY MARIE ACCESSION #[id number], [id number] ADDENDUM: Pathology: Malignant. Invasive ductal carcinoma. Please see pathology report for details. Radiology: Pathology is concordant with imaging. CHANDLER GAUTHIER M.D.  Date of Addendum: 2/24/2022 CHANDLER GAUTHIER MD   SYSTEM ID:  [id number]    Result Date: 2/24/2022  ULTRASOUND-GUIDED RIGHT BREAST CORE BIOPSY; CLIP PLACEMENT; POSTPROCEDURE DIGITAL MAMMOGRAM RIGHT BREAST February 22, 2022 10:43 AM INDICATION FOR PROCEDURE: Irregular hypoechoic mass in the right breast. PROCEDURE: Approximately 5 mL lidocaine without epinephrine was infiltrated for local anesthetic and a 13-gauge trocar was introduced via lateral approach. The needle tip was placed adjacent to the lesion. A series of four samples were obtained with a 14-gauge core-cutting needle. A clip was then deployed to lia the lesion. There was less than 5 cc of blood loss. Postbiopsy unilateral digital mammogram of the right breast showed the clip to be at the expected biopsy site. The patient tolerated the procedure without difficulty and there was no significant pain or immediate complication at the end of the procedure.     IMPRESSION: Successful right breast ultrasound-guided core biopsy and clip placement.  Final pathology is pending.  ANGELLA HUERTA MD   SYSTEM ID:  [id number]    US Breast Right Limited 1-3 Quadrants    Result Date: 2/18/2022  MAMMOGRAM DIAGNOSTIC  BILATERAL WITH TOMOSYNTHESIS; ULTRASOUND BREAST RIGHT LIMITED 1-3 QUADRANTS   2/18/2022 12:43 PM HISTORY:  Palpable abnormality in the right breast. COMPARISON:  2/15/2019 and 10/2/2014. BREAST DENSITY: Scattered fibroglandular densities. FINDINGS:  Bilateral diagnostic mammogram: Right breast: At the site of the palpable abnormality there is a focal mass with some associated microcalcifications. Ultrasound will be obtained. Left breast: No mammographic evidence for malignancy. Right breast ultrasound: At the site of the palpable abnormality in the right breast 2:00 position approximately 6 cm from the nipple there is a bilobed hypoechoic mass with some shadowing and some internal blood flow as well as some echogenic foci likely representing calcifications. This measures 2.4 x 1.1 x 1.6 cm. Tissue diagnosis is recommended. No right axillary adenopathy.     IMPRESSION: 1. The palpable abnormality in the right breast corresponds to a bilobed hypoechoic mass. Recommend core biopsy. 2. No mammographic evidence for malignancy in the left breast. BI-RADS CATEGORY: 4 - Suspicious. RECOMMENDED FOLLOW-UP: Core biopsy of the right breast. ANGELLA HUERTA MD   SYSTEM ID:  [id number]    US Breast Biopsy Core Needle Right    Addendum Date: 2/24/2022    SAMMY MARIE ACCESSION #[id number], [id number] ADDENDUM: Pathology: Malignant. Invasive ductal carcinoma. Please see pathology report for details. Radiology: Pathology is concordant with imaging. CHANDLER GAUTHIER M.D.  Date of Addendum: 2/24/2022 CHANDLER GAUTHIER MD   SYSTEM ID:  [id number]    Result Date: 2/24/2022  ULTRASOUND-GUIDED RIGHT BREAST CORE BIOPSY; CLIP PLACEMENT; POSTPROCEDURE DIGITAL MAMMOGRAM RIGHT BREAST February 22, 2022 10:43 AM INDICATION FOR PROCEDURE: Irregular hypoechoic mass in the right breast. PROCEDURE: Approximately 5 mL lidocaine without epinephrine was infiltrated for local anesthetic and a 13-gauge trocar was introduced via lateral approach. The  needle tip was placed adjacent to the lesion. A series of four samples were obtained with a 14-gauge core-cutting needle. A clip was then deployed to lia the lesion. There was less than 5 cc of blood loss. Postbiopsy unilateral digital mammogram of the right breast showed the clip to be at the expected biopsy site. The patient tolerated the procedure without difficulty and there was no significant pain or immediate complication at the end of the procedure.     IMPRESSION: Successful right breast ultrasound-guided core biopsy and clip placement.  Final pathology is pending.  ANGELLA HUERTA MD   SYSTEM ID:  [id number]    MA Diagnostic Bilateral w/Reymundo    Result Date: 2/18/2022  MAMMOGRAM DIAGNOSTIC BILATERAL WITH TOMOSYNTHESIS; ULTRASOUND BREAST RIGHT LIMITED 1-3 QUADRANTS   2/18/2022 12:43 PM HISTORY:  Palpable abnormality in the right breast. COMPARISON:  2/15/2019 and 10/2/2014. BREAST DENSITY: Scattered fibroglandular densities. FINDINGS:  Bilateral diagnostic mammogram: Right breast: At the site of the palpable abnormality there is a focal mass with some associated microcalcifications. Ultrasound will be obtained. Left breast: No mammographic evidence for malignancy. Right breast ultrasound: At the site of the palpable abnormality in the right breast 2:00 position approximately 6 cm from the nipple there is a bilobed hypoechoic mass with some shadowing and some internal blood flow as well as some echogenic foci likely representing calcifications. This measures 2.4 x 1.1 x 1.6 cm. Tissue diagnosis is recommended. No right axillary adenopathy.     IMPRESSION: 1. The palpable abnormality in the right breast corresponds to a bilobed hypoechoic mass. Recommend core biopsy. 2. No mammographic evidence for malignancy in the left breast. BI-RADS CATEGORY: 4 - Suspicious. RECOMMENDED FOLLOW-UP: Core biopsy of the right breast. ANGELLA HUERTA MD   SYSTEM ID:  [id number]    I spent a total of 46 minutes on the care  of this patient on the day of service including face to face time and the remainder in chart review, care coordination, and documentation on the day of service.    Signed by: Ricardo Diamond MD

## 2022-03-04 NOTE — PATIENT INSTRUCTIONS
I recommend the following treatment for your breast cancer based on the KEYNOTE-522 trial (Shobha et al., 2020):     Pembrolizumab 200 mg D1 + Paclitaxel 80 mg/m  D1, 8, 15 + Carboplatin AUC=5 D1 q21 Days x 12 Weeks, Followed by Pembrolizumab 200 mg + Doxorubicin + Cyclophosphamide q21 Days x 12 Weeks, Followed by Surgery    Cycles 1 through 4: A cycle is every 21 days:    Pembrolizumab 200 mg flat dose IV once on day 1 of cycles 1 through 4  Paclitaxel 80 mg/m  IV once daily on days 1, 8, and 15 of cycles 1 through 4    Carboplatin AUC=5 IV once on day 1 of cycles 1 through 4  Filgrastim 5 mcg/kg subcutaneously once daily on days 16, 17, and 18 of cycles 1 through 4    Cycles 5 through 8: A cycle is every 21 days:  Pembrolizumab 200 mg flat dose IV once on day 1 of cycles 5 through 8  Doxorubicin 60 mg/m  IV once on day 1 of cycles 5 through 8  Cyclophosphamide 600 mg/m  IV once on day 1 of cycles 5 through 8  Pegfilgrastim 6 mg flat dose subcutaneously once on day 2 of cycles 5 through 8    Doxorubicin can cause myocardial damage, including acute left ventricular failure. Risk of cardiomyopathy is generally proportional to cumulative exposure the drug.  Therefore, you will need an echocardiogram completed before the doxorubicin portion of treatment and as needed afterwards     Serious immune-mediated adverse events can occur with pembrolizumab. Please report any new or unusual symptoms which may be signs of an immune-mediated adverse reaction management.  See chemotherapy teaching materials for more information.    We will plan to start the above regimen the week of March 14, 2022 if you can have your chemotherapy port placed before then.  My staff will inform your surgeon Dr. Marina's office about the need for a chemotherapy port to be placed.      I have medically cleared you today for Port-A-Cath placement.

## 2022-03-06 ENCOUNTER — ANESTHESIA EVENT (OUTPATIENT)
Dept: SURGERY | Facility: CLINIC | Age: 57
End: 2022-03-06
Payer: COMMERCIAL

## 2022-03-07 ENCOUNTER — LAB (OUTPATIENT)
Dept: LAB | Facility: CLINIC | Age: 57
End: 2022-03-07
Attending: SURGERY
Payer: COMMERCIAL

## 2022-03-07 DIAGNOSIS — Z11.59 ENCOUNTER FOR SCREENING FOR OTHER VIRAL DISEASES: ICD-10-CM

## 2022-03-07 PROCEDURE — U0003 INFECTIOUS AGENT DETECTION BY NUCLEIC ACID (DNA OR RNA); SEVERE ACUTE RESPIRATORY SYNDROME CORONAVIRUS 2 (SARS-COV-2) (CORONAVIRUS DISEASE [COVID-19]), AMPLIFIED PROBE TECHNIQUE, MAKING USE OF HIGH THROUGHPUT TECHNOLOGIES AS DESCRIBED BY CMS-2020-01-R: HCPCS

## 2022-03-07 PROCEDURE — U0005 INFEC AGEN DETEC AMPLI PROBE: HCPCS

## 2022-03-08 ENCOUNTER — HOME INFUSION (PRE-WILLOW HOME INFUSION) (OUTPATIENT)
Dept: PHARMACY | Facility: CLINIC | Age: 57
End: 2022-03-08

## 2022-03-08 LAB — SARS-COV-2 RNA RESP QL NAA+PROBE: NEGATIVE

## 2022-03-09 ENCOUNTER — HOSPITAL ENCOUNTER (OUTPATIENT)
Facility: CLINIC | Age: 57
Discharge: HOME | End: 2022-03-09
Attending: SURGERY | Admitting: SURGERY
Payer: COMMERCIAL

## 2022-03-09 ENCOUNTER — ANESTHESIA (OUTPATIENT)
Dept: SURGERY | Facility: CLINIC | Age: 57
End: 2022-03-09
Payer: COMMERCIAL

## 2022-03-09 ENCOUNTER — APPOINTMENT (OUTPATIENT)
Dept: GENERAL RADIOLOGY | Facility: CLINIC | Age: 57
End: 2022-03-09
Attending: SURGERY
Payer: COMMERCIAL

## 2022-03-09 VITALS
OXYGEN SATURATION: 97 % | SYSTOLIC BLOOD PRESSURE: 107 MMHG | RESPIRATION RATE: 14 BRPM | BODY MASS INDEX: 32.74 KG/M2 | HEIGHT: 68 IN | TEMPERATURE: 98.6 F | DIASTOLIC BLOOD PRESSURE: 80 MMHG | HEART RATE: 63 BPM | WEIGHT: 216 LBS

## 2022-03-09 PROCEDURE — 250N000011 HC RX IP 250 OP 636: Performed by: SURGERY

## 2022-03-09 PROCEDURE — 710N000012 HC RECOVERY PHASE 2, PER MINUTE: Performed by: SURGERY

## 2022-03-09 PROCEDURE — C1788 PORT, INDWELLING, IMP: HCPCS | Performed by: SURGERY

## 2022-03-09 PROCEDURE — 250N000011 HC RX IP 250 OP 636: Performed by: NURSE ANESTHETIST, CERTIFIED REGISTERED

## 2022-03-09 PROCEDURE — 250N000009 HC RX 250: Performed by: NURSE ANESTHETIST, CERTIFIED REGISTERED

## 2022-03-09 PROCEDURE — 250N000013 HC RX MED GY IP 250 OP 250 PS 637: Performed by: NURSE ANESTHETIST, CERTIFIED REGISTERED

## 2022-03-09 PROCEDURE — 36561 INSERT TUNNELED CV CATH: CPT | Performed by: SURGERY

## 2022-03-09 PROCEDURE — 360N000082 HC SURGERY LEVEL 2 W/ FLUORO, PER MIN: Performed by: SURGERY

## 2022-03-09 PROCEDURE — 258N000003 HC RX IP 258 OP 636: Performed by: NURSE ANESTHETIST, CERTIFIED REGISTERED

## 2022-03-09 PROCEDURE — 999N000141 HC STATISTIC PRE-PROCEDURE NURSING ASSESSMENT: Performed by: SURGERY

## 2022-03-09 PROCEDURE — 272N000001 HC OR GENERAL SUPPLY STERILE: Performed by: SURGERY

## 2022-03-09 PROCEDURE — 271N000001 HC OR GENERAL SUPPLY NON-STERILE: Performed by: SURGERY

## 2022-03-09 PROCEDURE — C1894 INTRO/SHEATH, NON-LASER: HCPCS | Performed by: SURGERY

## 2022-03-09 PROCEDURE — 250N000009 HC RX 250: Performed by: SURGERY

## 2022-03-09 PROCEDURE — 77001 FLUOROGUIDE FOR VEIN DEVICE: CPT | Mod: 26 | Performed by: SURGERY

## 2022-03-09 PROCEDURE — 370N000017 HC ANESTHESIA TECHNICAL FEE, PER MIN: Performed by: SURGERY

## 2022-03-09 PROCEDURE — 999N000179 XR SURGERY CARM FLUORO LESS THAN 5 MIN W STILLS: Mod: TC

## 2022-03-09 PROCEDURE — 999N000065 XR CHEST PORT 1 VIEW

## 2022-03-09 DEVICE — CATH PORT MRI POWERPORT 8FR SL 1808000: Type: IMPLANTABLE DEVICE | Site: CHEST | Status: FUNCTIONAL

## 2022-03-09 RX ORDER — LIDOCAINE HYDROCHLORIDE 10 MG/ML
INJECTION, SOLUTION INFILTRATION; PERINEURAL PRN
Status: DISCONTINUED | OUTPATIENT
Start: 2022-03-09 | End: 2022-03-09

## 2022-03-09 RX ORDER — NALOXONE HYDROCHLORIDE 0.4 MG/ML
0.4 INJECTION, SOLUTION INTRAMUSCULAR; INTRAVENOUS; SUBCUTANEOUS
Status: DISCONTINUED | OUTPATIENT
Start: 2022-03-09 | End: 2022-03-09 | Stop reason: HOSPADM

## 2022-03-09 RX ORDER — FENTANYL CITRATE 50 UG/ML
50 INJECTION, SOLUTION INTRAMUSCULAR; INTRAVENOUS EVERY 5 MIN PRN
Status: DISCONTINUED | OUTPATIENT
Start: 2022-03-09 | End: 2022-03-09 | Stop reason: HOSPADM

## 2022-03-09 RX ORDER — DEXAMETHASONE SODIUM PHOSPHATE 4 MG/ML
INJECTION, SOLUTION INTRA-ARTICULAR; INTRALESIONAL; INTRAMUSCULAR; INTRAVENOUS; SOFT TISSUE PRN
Status: DISCONTINUED | OUTPATIENT
Start: 2022-03-09 | End: 2022-03-09

## 2022-03-09 RX ORDER — NALOXONE HYDROCHLORIDE 0.4 MG/ML
0.2 INJECTION, SOLUTION INTRAMUSCULAR; INTRAVENOUS; SUBCUTANEOUS
Status: DISCONTINUED | OUTPATIENT
Start: 2022-03-09 | End: 2022-03-09 | Stop reason: HOSPADM

## 2022-03-09 RX ORDER — PROPOFOL 10 MG/ML
INJECTION, EMULSION INTRAVENOUS CONTINUOUS PRN
Status: DISCONTINUED | OUTPATIENT
Start: 2022-03-09 | End: 2022-03-09

## 2022-03-09 RX ORDER — HEPARIN SODIUM 1000 [USP'U]/ML
INJECTION, SOLUTION INTRAVENOUS; SUBCUTANEOUS PRN
Status: DISCONTINUED | OUTPATIENT
Start: 2022-03-09 | End: 2022-03-09 | Stop reason: HOSPADM

## 2022-03-09 RX ORDER — SODIUM CHLORIDE, SODIUM LACTATE, POTASSIUM CHLORIDE, CALCIUM CHLORIDE 600; 310; 30; 20 MG/100ML; MG/100ML; MG/100ML; MG/100ML
INJECTION, SOLUTION INTRAVENOUS CONTINUOUS
Status: DISCONTINUED | OUTPATIENT
Start: 2022-03-09 | End: 2022-03-09 | Stop reason: HOSPADM

## 2022-03-09 RX ORDER — KETOROLAC TROMETHAMINE 30 MG/ML
INJECTION, SOLUTION INTRAMUSCULAR; INTRAVENOUS PRN
Status: DISCONTINUED | OUTPATIENT
Start: 2022-03-09 | End: 2022-03-09

## 2022-03-09 RX ORDER — ONDANSETRON 4 MG/1
4 TABLET, ORALLY DISINTEGRATING ORAL EVERY 30 MIN PRN
Status: DISCONTINUED | OUTPATIENT
Start: 2022-03-09 | End: 2022-03-09 | Stop reason: HOSPADM

## 2022-03-09 RX ORDER — HYDROMORPHONE HCL IN WATER/PF 6 MG/30 ML
0.4 PATIENT CONTROLLED ANALGESIA SYRINGE INTRAVENOUS EVERY 5 MIN PRN
Status: DISCONTINUED | OUTPATIENT
Start: 2022-03-09 | End: 2022-03-09 | Stop reason: HOSPADM

## 2022-03-09 RX ORDER — OXYCODONE HYDROCHLORIDE 5 MG/1
5 TABLET ORAL
Status: CANCELLED | OUTPATIENT
Start: 2022-03-09

## 2022-03-09 RX ORDER — GABAPENTIN 300 MG/1
300 CAPSULE ORAL
Status: COMPLETED | OUTPATIENT
Start: 2022-03-09 | End: 2022-03-09

## 2022-03-09 RX ORDER — ACETAMINOPHEN 325 MG/1
975 TABLET ORAL ONCE
Status: COMPLETED | OUTPATIENT
Start: 2022-03-09 | End: 2022-03-09

## 2022-03-09 RX ORDER — LIDOCAINE HYDROCHLORIDE AND EPINEPHRINE 10; 10 MG/ML; UG/ML
INJECTION, SOLUTION INFILTRATION; PERINEURAL PRN
Status: DISCONTINUED | OUTPATIENT
Start: 2022-03-09 | End: 2022-03-09 | Stop reason: HOSPADM

## 2022-03-09 RX ORDER — MEPERIDINE HYDROCHLORIDE 25 MG/ML
12.5 INJECTION INTRAMUSCULAR; INTRAVENOUS; SUBCUTANEOUS
Status: DISCONTINUED | OUTPATIENT
Start: 2022-03-09 | End: 2022-03-09 | Stop reason: HOSPADM

## 2022-03-09 RX ORDER — FENTANYL CITRATE 50 UG/ML
25 INJECTION, SOLUTION INTRAMUSCULAR; INTRAVENOUS
Status: DISCONTINUED | OUTPATIENT
Start: 2022-03-09 | End: 2022-03-09 | Stop reason: HOSPADM

## 2022-03-09 RX ORDER — ONDANSETRON 2 MG/ML
4 INJECTION INTRAMUSCULAR; INTRAVENOUS EVERY 30 MIN PRN
Status: DISCONTINUED | OUTPATIENT
Start: 2022-03-09 | End: 2022-03-09 | Stop reason: HOSPADM

## 2022-03-09 RX ORDER — ONDANSETRON 2 MG/ML
INJECTION INTRAMUSCULAR; INTRAVENOUS PRN
Status: DISCONTINUED | OUTPATIENT
Start: 2022-03-09 | End: 2022-03-09

## 2022-03-09 RX ORDER — BUPIVACAINE HYDROCHLORIDE 5 MG/ML
INJECTION, SOLUTION PERINEURAL PRN
Status: DISCONTINUED | OUTPATIENT
Start: 2022-03-09 | End: 2022-03-09 | Stop reason: HOSPADM

## 2022-03-09 RX ORDER — FENTANYL CITRATE 50 UG/ML
INJECTION, SOLUTION INTRAMUSCULAR; INTRAVENOUS PRN
Status: DISCONTINUED | OUTPATIENT
Start: 2022-03-09 | End: 2022-03-09

## 2022-03-09 RX ORDER — OXYCODONE HYDROCHLORIDE 5 MG/1
5 TABLET ORAL EVERY 4 HOURS PRN
Status: DISCONTINUED | OUTPATIENT
Start: 2022-03-09 | End: 2022-03-09 | Stop reason: HOSPADM

## 2022-03-09 RX ORDER — CEFAZOLIN SODIUM 2 G/100ML
2 INJECTION, SOLUTION INTRAVENOUS SEE ADMIN INSTRUCTIONS
Status: DISCONTINUED | OUTPATIENT
Start: 2022-03-09 | End: 2022-03-09 | Stop reason: HOSPADM

## 2022-03-09 RX ORDER — KETAMINE HYDROCHLORIDE 10 MG/ML
INJECTION, SOLUTION INTRAMUSCULAR; INTRAVENOUS PRN
Status: DISCONTINUED | OUTPATIENT
Start: 2022-03-09 | End: 2022-03-09

## 2022-03-09 RX ORDER — LIDOCAINE 40 MG/G
CREAM TOPICAL
Status: DISCONTINUED | OUTPATIENT
Start: 2022-03-09 | End: 2022-03-09 | Stop reason: HOSPADM

## 2022-03-09 RX ORDER — CEFAZOLIN SODIUM 2 G/100ML
2 INJECTION, SOLUTION INTRAVENOUS
Status: COMPLETED | OUTPATIENT
Start: 2022-03-09 | End: 2022-03-09

## 2022-03-09 RX ADMIN — MIDAZOLAM 2 MG: 1 INJECTION INTRAMUSCULAR; INTRAVENOUS at 13:56

## 2022-03-09 RX ADMIN — DEXAMETHASONE SODIUM PHOSPHATE 10 MG: 4 INJECTION, SOLUTION INTRA-ARTICULAR; INTRALESIONAL; INTRAMUSCULAR; INTRAVENOUS; SOFT TISSUE at 14:00

## 2022-03-09 RX ADMIN — ACETAMINOPHEN 975 MG: 325 TABLET, FILM COATED ORAL at 13:17

## 2022-03-09 RX ADMIN — LIDOCAINE HYDROCHLORIDE 50 MG: 10 INJECTION, SOLUTION INFILTRATION; PERINEURAL at 14:00

## 2022-03-09 RX ADMIN — KETAMINE HYDROCHLORIDE 20 MG: 10 INJECTION INTRAMUSCULAR; INTRAVENOUS at 14:17

## 2022-03-09 RX ADMIN — KETOROLAC TROMETHAMINE 30 MG: 30 INJECTION, SOLUTION INTRAMUSCULAR at 14:32

## 2022-03-09 RX ADMIN — KETAMINE HYDROCHLORIDE 20 MG: 10 INJECTION INTRAMUSCULAR; INTRAVENOUS at 14:00

## 2022-03-09 RX ADMIN — GABAPENTIN 300 MG: 300 CAPSULE ORAL at 13:17

## 2022-03-09 RX ADMIN — PROPOFOL 100 MCG/KG/MIN: 10 INJECTION, EMULSION INTRAVENOUS at 14:00

## 2022-03-09 RX ADMIN — LIDOCAINE HYDROCHLORIDE 0.1 ML: 10 INJECTION, SOLUTION EPIDURAL; INFILTRATION; INTRACAUDAL; PERINEURAL at 13:19

## 2022-03-09 RX ADMIN — FENTANYL CITRATE 100 MCG: 50 INJECTION, SOLUTION INTRAMUSCULAR; INTRAVENOUS at 14:00

## 2022-03-09 RX ADMIN — CEFAZOLIN SODIUM 2 G: 2 INJECTION, SOLUTION INTRAVENOUS at 13:54

## 2022-03-09 RX ADMIN — SODIUM CHLORIDE, POTASSIUM CHLORIDE, SODIUM LACTATE AND CALCIUM CHLORIDE: 600; 310; 30; 20 INJECTION, SOLUTION INTRAVENOUS at 13:19

## 2022-03-09 RX ADMIN — ONDANSETRON 4 MG: 2 INJECTION INTRAMUSCULAR; INTRAVENOUS at 14:24

## 2022-03-09 RX ADMIN — FENTANYL CITRATE 50 MCG: 50 INJECTION INTRAMUSCULAR; INTRAVENOUS at 14:58

## 2022-03-09 ASSESSMENT — LIFESTYLE VARIABLES: TOBACCO_USE: 1

## 2022-03-09 NOTE — DISCHARGE INSTRUCTIONS
Same Day Surgery Discharge Instructions  Special Precautions After Surgery - Adult    1. It is not unusual to feel lightheaded or faint, up to 24 hours after surgery or while taking pain medication.  If you have these symptoms; sit for a few minutes before standing and have someone assist you when getting up.  2. You should rest and relax for the next 24 hours and must have someone stay with you for at least 24 hours after your discharge.  3. DO NOT DRIVE any vehicle or operate mechanical equipment for 24 hours following the end of your surgery.  DO NOT DRIVE while taking narcotic pain medications that have been prescribed by your physician.  If you had a limb operated on, you must be able to use it fully to drive.  4. DO NOT drink alcoholic beverages for 24 hours following surgery or while taking prescription pain medication.  5. Drink clear liquids (apple juice, ginger ale, broth, 7-Up, etc.).  Progress to your regular diet as you feel able.  6. Any questions call your physician and do not make important decisions for 24 hours.    ACTIVITY  ? Up as tolerated if you are tired please rest.     INCISIONAL CARE  ? Follow physicians orders.         Medications:  ? Follow the instructions on the bottle.           __________________________________________________________________________________________________________________________________  IMPORTANT NUMBERS:    [facility] [place]:  [phone], [phone]  Same Day Surgery:  [phone], Monday - Friday until 8:30 p.m.   Nurse Advice Line: [phone]  Surgery Specialty Clinic:  [phone]             Discharge for Port Placement  Routine Postoperative Care  1) You may shower now  2)  Extra Strength Tylenol and over the counter Motrin (if you can take these medications) should be sufficient for your painis sufficient.   3) Eat a balanced diet and try to maintain good nutrition. Also drink plenty of fluids. Some of the pain medications can  cause constipation, and drinking plenty of water will help prevent this.  4) If you do get constipated, you may need a stool softener or laxative. We or your pharmacist can help you with this.  5) You may resume light activity as you feel up to it. Avoid any heavy lifting (heavier than 10 pounds), or strenuous activity (including exercise) for one week.    Incision Sites  1) After 24 hours, you may shower.  The glue will follow-off on its own  2)            After 24-48 hours you may shower, cleaning wounds with soapy water. After showering, pat the wound dry with a clean towel. Do not apply any creams or ointments                   to the incision.   3)            You should call with any signs that the wound may be infected. Signs of infection include: the skin around the wound becoming redder, swollen or feeling hot; the                 wound has green or yellow drainage or smells bad, you have fever over 101oF or increasing pain.       Follow-up  You do not require routine follow-up for your procedure.  You must have your port accessed at least once a month to be flushed.  If there are any issues with your port or you are finished with use and would like it removed, please call the office to schedule an appointment.    Remember that healing from surgery, even if that surgery seems small, takes time. If you have any questions about your procedure, your follow-up instructions or the plan of care, do not hesitate to call.  For urgent calls weekends and holidays or prior to 8 am or after 4:30 pm weekdays, call the [facility] Office ([phone]) will be forwarded to a nurse and if necessary Dr. Marina or the on call general surgeon.

## 2022-03-09 NOTE — ANESTHESIA CARE TRANSFER NOTE
Patient: Diana Salvador    Procedure: Procedure(s):  INSERTION, VASCULAR ACCESS PORT       Diagnosis: Exhausted vascular access [Z45.2]  Diagnosis Additional Information: No value filed.    Anesthesia Type:   No value filed.     Note:            Independent Airway: airway patency satisfactory and stable  Dentition: dentition unchanged  Vital Signs Stable: post-procedure vital signs reviewed and stable  Report to RN Given: handoff report given  Patient transferred to: Phase II    Handoff Report: Identifed the Patient, Identified the Reponsible Provider, Reviewed the pertinent medical history, Discussed the surgical course, Reviewed Intra-OP anesthesia mangement and issues during anesthesia, Set expectations for post-procedure period and Allowed opportunity for questions and acknowledgement of understanding      Vitals:  Vitals Value Taken Time   /80 03/09/22 1500   Temp     Pulse 63 03/09/22 1500   Resp 14 03/09/22 1500   SpO2 96 % 03/09/22 1514   Vitals shown include unvalidated device data.    Electronically Signed By: ROSALINDA Owen CRNA  March 9, 2022  5:34 PM

## 2022-03-09 NOTE — OP NOTE
Procedure Date: 03/09/22     PREOPERATIVE DIAGNOSIS: Breast cancer  POSTOPERATIVE DIAGNOSIS: Same    PROCEDURE:    1. Placement of Left Subclavian Infusion port (8 Portuguese Bard Power-Port)  2. Fluoroscopic guidance and confirmation of port placement.    SURGEON: Baldemar Marina DO    ESTIMATED BLOOD LOSS: 3 mL    INDICATIONS:Ms. Diana Salvador is a 56 year old white female who presents with a history of breast cancer. The patient is in need of an infusion port for chemotherapy access, and a discussion was held with the patient regarding indications, risks, benefits, and alternatives (including, but not limited to, risks of bleeding, infection, pneumothorax, need for revision, thrombosis, stenosis, port malposition/malfunction, cardiac arrhythmia, and the rare risk of gas embolism). She understood the information given, questions were addressed, and she wishes to proceed.     DESCRIPTION OF PROCEDURE: The patient was brought to the operating room and placed supine on the operating room table. Sedative agents were administered by the anesthesia service. The bilateral chest and neck were then prepped and draped in the usual sterile fashion. The patient was placed in Trendelenburg position. Attempt was made at right subclavian which was unsuccessful.  The left subclavian vein was percutaneously accessed after a single pass of the introducer needle. A guidewire was advanced under direct fluoroscopic guidance, and the trajectory was confirmed toward the superior vena cava/heart. Additional local anesthesia was used, and a cutdown was created below the entry point of the guidewire, and the incision was deepened to create an appropriate pocket to allow the port reservoir to comfortably reside. An introducer sheath was then passed over the guidewire however was unable to pass the first rib.  The wire and sheath were removed.  The subclavian was accessed laterally after the first pass, the guidewire passed easily, the sheath  was passed without difficulty. The infusion port catheter was then passed until the tip the catheter was in the region of the cavo-atrial junction via fluoroscopy (to a depth of 20cm). The sheath was torn away in the standard fashion.     The catheter was tunneled to the port site, manicured to an appropriate length, and secured to the infusion port reservoir and the locking collar affixed. The port was easily flushed with a heparin solution, after assuring ease of aspiration. The infusion port itself was then secured to the tissue in the base of the pocket using interrupted 0-Ethibond suture. The final catheter position was again assessed under fluoroscopy and appeared satisfactory, with no abnormal trajectory or kinks noted, and with the tip of the catheter again noted at the cavo-atrial junction. The pocket was irrigated. Hemostasis was assured. An instrument count was conducted, and included laparotomy pads, needles and sponges, and was found to be correct. The subcutaneous tissue was closed with interrupted 3-0 Vicryl. Skin edges were re-approximated using 4-0 Monocryl, and the wound was cleansed and dried prior to application of sterile glue.     The patient tolerated the procedure well. She was then transferred to the recovery room in stable condition was allowed to recover and was seated upright for a post-procedure chest x-ray.     Baldemar Marina DO on 3/9/2022 at 2:51 PM

## 2022-03-09 NOTE — ANESTHESIA CARE TRANSFER NOTE
Patient: Diana Salvador    Procedure: Procedure(s):  INSERTION, VASCULAR ACCESS PORT       Diagnosis: Exhausted vascular access [Z45.2]  Diagnosis Additional Information: No value filed.    Anesthesia Type:   No value filed.     Note:      Level of Consciousness: awake      Independent Airway: airway patency satisfactory and stable  Dentition: dentition unchanged  Vital Signs Stable: post-procedure vital signs reviewed and stable  Report to RN Given: handoff report given  Patient transferred to: Phase II    Handoff Report: Identifed the Patient, Identified the Reponsible Provider, Reviewed the pertinent medical history, Discussed the surgical course, Reviewed Intra-OP anesthesia mangement and issues during anesthesia, Set expectations for post-procedure period and Allowed opportunity for questions and acknowledgement of understanding      Vitals:  Vitals Value Taken Time   BP     Temp     Pulse     Resp     SpO2         Electronically Signed By: ROSALINDA Owen CRNA  March 9, 2022  2:48 PM

## 2022-03-09 NOTE — ANESTHESIA POSTPROCEDURE EVALUATION
Patient: Diana Salvador    Procedure: Procedure(s):  INSERTION, VASCULAR ACCESS PORT       Anesthesia Type:  No value filed.    Note:  Disposition: Outpatient   Postop Pain Control: Uneventful            Sign Out: Well controlled pain   PONV: No   Neuro/Psych: Uneventful            Sign Out: Acceptable/Baseline neuro status   Airway/Respiratory: Uneventful            Sign Out: Acceptable/Baseline resp. status   CV/Hemodynamics: Uneventful            Sign Out: Acceptable CV status; No obvious hypovolemia; No obvious fluid overload   Other NRE: NONE   DID A NON-ROUTINE EVENT OCCUR? No           Last vitals:  Vitals Value Taken Time   /80 03/09/22 1500   Temp     Pulse 63 03/09/22 1500   Resp 14 03/09/22 1500   SpO2 96 % 03/09/22 1514   Vitals shown include unvalidated device data.    Electronically Signed By: ROSALINDA Owen CRNA  March 9, 2022  5:36 PM

## 2022-03-09 NOTE — ANESTHESIA PREPROCEDURE EVALUATION
Anesthesia Pre-Procedure Evaluation    Patient: Diana Salvador   MRN: [id number] : 1965        Procedure : Procedure(s):  INSERTION, VASCULAR ACCESS PORT          Past Medical History:   Diagnosis Date     Cervical high risk HPV (human papillomavirus) test positive 03/15/2020    See problem list      Past Surgical History:   Procedure Laterality Date     ARTHROSCOPY SHOULDER ROTATOR CUFF REPAIR Right      ARTHROSCOPY SHOULDER ROTATOR CUFF REPAIR Left       SECTION       CHOLECYSTECTOMY       DAVINCI BYPASS GASTRIC ROBERT-EN-Y       HYSTERECTOMY SUPRACERVICAL       right ulnar nerve repositioning surgery        No Known Allergies   Social History     Tobacco Use     Smoking status: Former Smoker     Start date: 1982     Quit date: 1984     Years since quittin.2     Smokeless tobacco: Never Used   Substance Use Topics     Alcohol use: Yes     Comment: occ      Wt Readings from Last 1 Encounters:   22 98 kg (216 lb)        Anesthesia Evaluation   Pt has had prior anesthetic. Type: General, MAC and Regional.        ROS/MED HX  ENT/Pulmonary:     (+) tobacco use, Past use,     Neurologic:  - neg neurologic ROS     Cardiovascular:  - neg cardiovascular ROS     METS/Exercise Tolerance:     Hematologic:  - neg hematologic  ROS     Musculoskeletal:  - neg musculoskeletal ROS     GI/Hepatic:  - neg GI/hepatic ROS     Renal/Genitourinary:  - neg Renal ROS     Endo:  - neg endo ROS     Psychiatric/Substance Use:  - neg psychiatric ROS     Infectious Disease:  - neg infectious disease ROS     Malignancy:   (+) Malignancy, History of Breast.Breast CA Active status post.        Other:               OUTSIDE LABS:  CBC:   Lab Results   Component Value Date    WBC 8.7 2019    HGB 13.7 2021    HGB 14.3 2019    HCT 45.8 2019     2019     BMP:   Lab Results   Component Value Date     2021     2019    POTASSIUM 3.9 2021    POTASSIUM  3.5 08/05/2019    CHLORIDE 109 09/08/2021    CHLORIDE 104 08/05/2019    CO2 28 09/08/2021    CO2 33 (H) 08/05/2019    BUN 7 09/08/2021    BUN 7 08/05/2019    CR 0.69 09/08/2021    CR 0.70 08/05/2019     (H) 09/08/2021    GLC 98 08/05/2019     COAGS: No results found for: PTT, INR, FIBR  POC:   Lab Results   Component Value Date    HCGS Negative 08/05/2019     HEPATIC:   Lab Results   Component Value Date    ALBUMIN 3.4 09/08/2021    PROTTOTAL 7.2 09/08/2021    ALT 15 09/08/2021    AST 15 09/08/2021    ALKPHOS 94 09/08/2021    BILITOTAL 0.3 09/08/2021     OTHER:   Lab Results   Component Value Date    ANURADHA 8.3 (L) 09/08/2021    LIPASE 64 (L) 08/05/2019       Anesthesia Plan    ASA Status:  2      Anesthesia Type: MAC.              Consents    Anesthesia Plan(s) and associated risks, benefits, and realistic alternatives discussed. Questions answered and patient/representative(s) expressed understanding.     - Discussed: Risks, Benefits and Alternatives for the PROCEDURE were discussed     - Discussed with:  Patient         Postoperative Care    Pain management: IV analgesics, Oral pain medications.   PONV prophylaxis: Ondansetron (or other 5HT-3), Dexamethasone or Solumedrol     Comments:                ROSALINDA Owen CRNA

## 2022-03-09 NOTE — PROGRESS NOTES
SPIRITUAL HEALTH SERVICES  [facility]    Referral Source: Pt request    - I provided spiritual support and prayer for Aide and partner, Tutu, who was with her.  Aide was tearful and expressed how difficult all of this is for her, getting a port placed and having to face chemotherapy in the near future.    Plan:  will remain available for any ongoing support needs during LOS.      Dread Dickson M.A., [facility]  Lead   [facility]  Office: [phone]

## 2022-03-09 NOTE — INTERVAL H&P NOTE
I have reviewed the surgical (or preoperative) H&P that is linked to this encounter, and examined the patient. There are no significant changes    Patient requires port placement for chemotherapy.      Risks discussed include but are not limited to: bleeding, infection, thrombosis, stenosis, port malfunction or malposition, air embolism, pneumothorax, hemothorax, or cardiac arrythmia.

## 2022-03-10 LAB — INTERPRETATION: NORMAL

## 2022-03-11 ENCOUNTER — PATIENT OUTREACH (OUTPATIENT)
Dept: ONCOLOGY | Facility: CLINIC | Age: 57
End: 2022-03-11
Payer: OTHER GOVERNMENT

## 2022-03-11 DIAGNOSIS — T45.1X5A CHEMOTHERAPY-INDUCED NEUTROPENIA (H): ICD-10-CM

## 2022-03-11 DIAGNOSIS — D70.1 CHEMOTHERAPY-INDUCED NEUTROPENIA (H): ICD-10-CM

## 2022-03-11 DIAGNOSIS — Z17.1 MALIGNANT NEOPLASM OF RIGHT BREAST IN FEMALE, ESTROGEN RECEPTOR NEGATIVE, UNSPECIFIED SITE OF BREAST (H): ICD-10-CM

## 2022-03-11 DIAGNOSIS — C50.911 MALIGNANT NEOPLASM OF RIGHT BREAST IN FEMALE, ESTROGEN RECEPTOR NEGATIVE, UNSPECIFIED SITE OF BREAST (H): ICD-10-CM

## 2022-03-11 DIAGNOSIS — Z51.11 ENCOUNTER FOR ANTINEOPLASTIC CHEMOTHERAPY: Primary | ICD-10-CM

## 2022-03-11 RX ORDER — PROCHLORPERAZINE MALEATE 10 MG
10 TABLET ORAL EVERY 6 HOURS PRN
Qty: 30 TABLET | Refills: 2 | Status: SHIPPED | OUTPATIENT
Start: 2022-03-11 | End: 2022-06-08

## 2022-03-11 RX ORDER — GRANISETRON HYDROCHLORIDE 1 MG/1
1 TABLET, FILM COATED ORAL EVERY 12 HOURS PRN
Qty: 14 TABLET | Refills: 11 | Status: SHIPPED | OUTPATIENT
Start: 2022-03-11 | End: 2022-03-18

## 2022-03-11 NOTE — PROGRESS NOTES
Attempted to complete teach today - pt is driving back to [place] right now and would like to do this on MOnday after 10am.     PRN meds released.     Will call back.     Marilee Esquivel RN on 3/11/2022 at 2:50 PM

## 2022-03-11 NOTE — PROVIDER NOTIFICATION
Post op call for port placement completed.  Patient states she has some bruising at the site without sings of hematoma and soreness.  Patient asked if she was wearing a bra and she was not.  Advised Patient that she should wear a bra to add in support and avoid the pulling on the port pocket during the healing process.  Went over signs and symptoms of normal bruising and hematoma and when to call MD.

## 2022-03-14 ENCOUNTER — TELEPHONE (OUTPATIENT)
Dept: ONCOLOGY | Facility: CLINIC | Age: 57
End: 2022-03-14
Payer: OTHER GOVERNMENT

## 2022-03-14 RX ORDER — LIDOCAINE/PRILOCAINE 2.5 %-2.5%
CREAM (GRAM) TOPICAL
Qty: 30 G | Refills: 0 | Status: SHIPPED | OUTPATIENT
Start: 2022-03-14 | End: 2022-06-29

## 2022-03-14 NOTE — PROGRESS NOTES
"Chemotherapy Education    Patient is a 56 year old on the phone today for chemotherapy education, accompanied by self.  Pt has a cancer diagnosis of   Encounter Diagnoses   Name Primary?     Encounter for antineoplastic chemotherapy Yes     Chemotherapy-induced neutropenia (H)      Malignant neoplasm of right breast in female, estrogen receptor negative, unspecified site of breast (H)      .  Their Oncologist is Dr. Diamond, and PCP is Quoc Chu  .    Reviewed the following with the patient and their support person:    Treatment Goal: Curative    Regimen: Pembro/Taxol/Carbo    Duration: x 8 Cycles and rationale for strict adherence, specific supportive medication Compazine and side effects (including long-term and short-term) and management; skin changes/hand-foot syndrome, anemia, neutropenia, thrombocytopenia, diarrhea/constipation, hair loss syndrome, memory changes/ \"chemobrain\", mouth sores, taste changes, neuropathy, fatigue, infertility, myelosuppression, and risk of extravasation or infiltration.    Infection prevention and monitoring of lab values, what lab tests and what changes of these values meant, along with the possibility of hydration or blood product transfusion, or the need to defer or hold treatment.      General Chemotherapy Information, including ways it is excreted from the body and cleaning and containment of vomitus or other bodily fluid, use of the bathroom, sexual health and intimacy, what to do if needing to miss a treatment, when to call a provider and the need for staff to wear protective equipment.    Oral Chemotherapy No  If Yes:  Add handling and waste.      No      Importance of Central line care (port) or IV site care.    Written Information: written information including the \"My Cancer Guidebook\" including \"Getting Ready for Chemotherapy: What to Expect, Before, During, and After your Treatment\" booklet, specific drug information guides printed from [employer], " information on when to contact the provider, various programs offered at [facility], and our business card with contact information given; Oncology Clinic, RN Case Manager, and the after-hours Nurse Advise Line.  No barriers to learning identified. Patient and family verbalized understanding of all written and verbal information. All questions answered to patients satisfaction.   General Orientation to the Medical Oncology department, Infusion Services department, Huc/scheduling, bathrooms and usual flow of the treatment day provided as well as introduction to the Infusion nurses.    Bottle Pump Infuser: N/A    Other Concerns: Emla cream RX sent to pharmacy. Pt had questions abtou b12 - sent to MD.  Comments: follow up 03.22.22 with Marilu    Pt instructed to call with further questions or concerns.  Patient states understanding and is in agreement with this plan.      Marilee Esquivel RN, BSN, OCN, CBCN  Oncology/Hematology   Breast Cancer Navigator  [facility]  [phone]

## 2022-03-14 NOTE — TELEPHONE ENCOUNTER
Prior Authorization Retail Medication Request    Medication/Dose: Lidocaine-Prilocaine 2.5-2.5% cream  ICD code (if different than what is on RX):    Previously Tried and Failed:    Rationale:      Insurance Name:  [facility]   Insurance ID:  [id number]       Pharmacy Information (if different than what is on RX)  Name:    Phone:      Thanks,   Nikki Levine  Certified Pharmacy Technician  [facility]  [phone]

## 2022-03-15 ENCOUNTER — LAB (OUTPATIENT)
Dept: INFUSION THERAPY | Facility: CLINIC | Age: 57
End: 2022-03-15
Attending: INTERNAL MEDICINE
Payer: COMMERCIAL

## 2022-03-15 ENCOUNTER — OFFICE VISIT (OUTPATIENT)
Dept: SPIRITUAL SERVICES | Facility: CLINIC | Age: 57
End: 2022-03-15

## 2022-03-15 ENCOUNTER — APPOINTMENT (OUTPATIENT)
Dept: LAB | Facility: CLINIC | Age: 57
End: 2022-03-15
Payer: COMMERCIAL

## 2022-03-15 ENCOUNTER — OFFICE VISIT (OUTPATIENT)
Dept: INFUSION THERAPY | Facility: CLINIC | Age: 57
End: 2022-03-15

## 2022-03-15 VITALS
HEART RATE: 75 BPM | RESPIRATION RATE: 18 BRPM | BODY MASS INDEX: 32.84 KG/M2 | SYSTOLIC BLOOD PRESSURE: 152 MMHG | TEMPERATURE: 98.5 F | WEIGHT: 212.8 LBS | DIASTOLIC BLOOD PRESSURE: 88 MMHG

## 2022-03-15 VITALS
TEMPERATURE: 98.8 F | DIASTOLIC BLOOD PRESSURE: 80 MMHG | HEART RATE: 58 BPM | SYSTOLIC BLOOD PRESSURE: 123 MMHG | RESPIRATION RATE: 18 BRPM

## 2022-03-15 DIAGNOSIS — T45.1X5A CHEMOTHERAPY-INDUCED NEUTROPENIA (H): ICD-10-CM

## 2022-03-15 DIAGNOSIS — D70.1 CHEMOTHERAPY-INDUCED NEUTROPENIA (H): ICD-10-CM

## 2022-03-15 DIAGNOSIS — Z17.1 MALIGNANT NEOPLASM OF RIGHT BREAST IN FEMALE, ESTROGEN RECEPTOR NEGATIVE, UNSPECIFIED SITE OF BREAST (H): ICD-10-CM

## 2022-03-15 DIAGNOSIS — C50.911 MALIGNANT NEOPLASM OF RIGHT BREAST IN FEMALE, ESTROGEN RECEPTOR NEGATIVE, UNSPECIFIED SITE OF BREAST (H): ICD-10-CM

## 2022-03-15 DIAGNOSIS — Z51.11 ENCOUNTER FOR ANTINEOPLASTIC CHEMOTHERAPY: Primary | ICD-10-CM

## 2022-03-15 LAB
ALBUMIN SERPL-MCNC: 3.3 G/DL (ref 3.4–5)
ALP SERPL-CCNC: 84 U/L (ref 40–150)
ALT SERPL W P-5'-P-CCNC: 16 U/L (ref 0–50)
ANION GAP SERPL CALCULATED.3IONS-SCNC: 4 MMOL/L (ref 3–14)
AST SERPL W P-5'-P-CCNC: 9 U/L (ref 0–45)
BASOPHILS # BLD AUTO: 0 10E3/UL (ref 0–0.2)
BASOPHILS NFR BLD AUTO: 1 %
BILIRUB SERPL-MCNC: 0.4 MG/DL (ref 0.2–1.3)
BUN SERPL-MCNC: 11 MG/DL (ref 7–30)
CALCIUM SERPL-MCNC: 8.8 MG/DL (ref 8.5–10.1)
CANCER AG27-29 SERPL-ACNC: <5 U/ML (ref 0–39)
CEA SERPL-MCNC: 3.4 UG/L (ref 0–2.5)
CHLORIDE BLD-SCNC: 110 MMOL/L (ref 94–109)
CO2 SERPL-SCNC: 28 MMOL/L (ref 20–32)
CREAT SERPL-MCNC: 0.64 MG/DL (ref 0.52–1.04)
EOSINOPHIL # BLD AUTO: 0.2 10E3/UL (ref 0–0.7)
EOSINOPHIL NFR BLD AUTO: 3 %
ERYTHROCYTE [DISTWIDTH] IN BLOOD BY AUTOMATED COUNT: 14.2 % (ref 10–15)
GFR SERPL CREATININE-BSD FRML MDRD: >90 ML/MIN/1.73M2
GLUCOSE BLD-MCNC: 111 MG/DL (ref 70–99)
HCT VFR BLD AUTO: 40.9 % (ref 35–47)
HGB BLD-MCNC: 12.9 G/DL (ref 11.7–15.7)
IMM GRANULOCYTES # BLD: 0 10E3/UL
IMM GRANULOCYTES NFR BLD: 0 %
LYMPHOCYTES # BLD AUTO: 2.7 10E3/UL (ref 0.8–5.3)
LYMPHOCYTES NFR BLD AUTO: 43 %
MCH RBC QN AUTO: 28.4 PG (ref 26.5–33)
MCHC RBC AUTO-ENTMCNC: 31.5 G/DL (ref 31.5–36.5)
MCV RBC AUTO: 90 FL (ref 78–100)
MONOCYTES # BLD AUTO: 0.5 10E3/UL (ref 0–1.3)
MONOCYTES NFR BLD AUTO: 7 %
NEUTROPHILS # BLD AUTO: 3 10E3/UL (ref 1.6–8.3)
NEUTROPHILS NFR BLD AUTO: 46 %
NRBC # BLD AUTO: 0 10E3/UL
NRBC BLD AUTO-RTO: 0 /100
PLATELET # BLD AUTO: 359 10E3/UL (ref 150–450)
POTASSIUM BLD-SCNC: 3.5 MMOL/L (ref 3.4–5.3)
PROT SERPL-MCNC: 6.9 G/DL (ref 6.8–8.8)
RBC # BLD AUTO: 4.55 10E6/UL (ref 3.8–5.2)
SODIUM SERPL-SCNC: 142 MMOL/L (ref 133–144)
TSH SERPL DL<=0.005 MIU/L-ACNC: 0.64 MU/L (ref 0.4–4)
WBC # BLD AUTO: 6.3 10E3/UL (ref 4–11)

## 2022-03-15 PROCEDURE — 250N000011 HC RX IP 250 OP 636: Performed by: INTERNAL MEDICINE

## 2022-03-15 PROCEDURE — 80053 COMPREHEN METABOLIC PANEL: CPT | Performed by: INTERNAL MEDICINE

## 2022-03-15 PROCEDURE — 82378 CARCINOEMBRYONIC ANTIGEN: CPT | Performed by: INTERNAL MEDICINE

## 2022-03-15 PROCEDURE — 96413 CHEMO IV INFUSION 1 HR: CPT

## 2022-03-15 PROCEDURE — 86300 IMMUNOASSAY TUMOR CA 15-3: CPT | Performed by: INTERNAL MEDICINE

## 2022-03-15 PROCEDURE — 85025 COMPLETE CBC W/AUTO DIFF WBC: CPT | Performed by: INTERNAL MEDICINE

## 2022-03-15 PROCEDURE — 36415 COLL VENOUS BLD VENIPUNCTURE: CPT | Performed by: INTERNAL MEDICINE

## 2022-03-15 PROCEDURE — 96367 TX/PROPH/DG ADDL SEQ IV INF: CPT

## 2022-03-15 PROCEDURE — 258N000003 HC RX IP 258 OP 636: Performed by: INTERNAL MEDICINE

## 2022-03-15 PROCEDURE — 84443 ASSAY THYROID STIM HORMONE: CPT | Performed by: INTERNAL MEDICINE

## 2022-03-15 PROCEDURE — 96417 CHEMO IV INFUS EACH ADDL SEQ: CPT

## 2022-03-15 PROCEDURE — 96375 TX/PRO/DX INJ NEW DRUG ADDON: CPT

## 2022-03-15 RX ORDER — HEPARIN SODIUM (PORCINE) LOCK FLUSH IV SOLN 100 UNIT/ML 100 UNIT/ML
5 SOLUTION INTRAVENOUS
Status: DISCONTINUED | OUTPATIENT
Start: 2022-03-15 | End: 2022-03-15 | Stop reason: HOSPADM

## 2022-03-15 RX ADMIN — DEXAMETHASONE SODIUM PHOSPHATE: 10 INJECTION, SOLUTION INTRAMUSCULAR; INTRAVENOUS at 10:58

## 2022-03-15 RX ADMIN — DIPHENHYDRAMINE HYDROCHLORIDE 50 MG: 50 INJECTION, SOLUTION INTRAMUSCULAR; INTRAVENOUS at 11:39

## 2022-03-15 RX ADMIN — SODIUM CHLORIDE 250 ML: 9 INJECTION, SOLUTION INTRAVENOUS at 10:30

## 2022-03-15 RX ADMIN — PACLITAXEL 173 MG: 6 INJECTION, SOLUTION INTRAVENOUS at 12:24

## 2022-03-15 RX ADMIN — Medication 5 ML: at 14:04

## 2022-03-15 RX ADMIN — FAMOTIDINE 20 MG: 20 INJECTION, SOLUTION INTRAVENOUS at 10:28

## 2022-03-15 RX ADMIN — SODIUM CHLORIDE 200 MG: 9 INJECTION, SOLUTION INTRAVENOUS at 11:53

## 2022-03-15 RX ADMIN — CARBOPLATIN 600 MG: 10 INJECTION, SOLUTION INTRAVENOUS at 13:29

## 2022-03-15 RX ADMIN — SODIUM CHLORIDE 150 MG: 9 INJECTION, SOLUTION INTRAVENOUS at 11:15

## 2022-03-15 NOTE — PROGRESS NOTES
"SPIRITUAL HEALTH SERVICES Progress Note  [place] [facility]    Introductory visit with Dianaarti Salvador to offer ongoing emotional/spiritual support during cancer care.    Illness Narrative: Recent diagnosis of breast cancer    Distress: Aide stated that honestly she was scared but at the same time she has alana and a great support system.    Coping: Aide's father flew in from [place] yesterday to stay with her for the next two weeks as she goes through this first round of chemotherapy.  She also has a son and a daughter who live here with two grandchildren (ages 6 and 8 mos)    Meaning-Making: Aide's family, plus her job with [employer], were all mentioned in this initial visit.  Aide values prayer and commented how she was on the prayer list at the [facility] - \"it doesn't get any higher than that\" was her comment.  She welcomed prayer today which I provided.    Plan: I will continue to provide support during Aide's times of chemo as requested and schedules allow.    Dread Dickson M.A., [facility]  Staff   [facility]  Office [phone]  Cell [phone]  Pager [phone]    "

## 2022-03-15 NOTE — PROGRESS NOTES
First time accessing patient's port today. Her family picked up EMLA cream and patient requested that be used prior to accessing. EMLA cream applied, and labs drawn peripherally by .  Farida Romero RN

## 2022-03-15 NOTE — PROGRESS NOTES
Infusion Nursing Note:  Diana Salvador presents today for Keytruda/Taxol/Carbo C1D1.    Patient seen by provider today: No   present during visit today: Not Applicable.    Note: Per , insurance has approved chemo; however, still waiting on neulasta.  First time getting chemotherapy today. Oriented to infusion center. Chemotherapy teaching, side effects, and schedule reviewed with patient. General and individual chemotherapy side effects reviewed with patient including fatigue, immunosuppression, alopecia, nausea/vomiting, and constipation/diarrhea. Pt instructed to call care coordinator, triage (or MD on call if after hours/weekends) with chills/temp >=100.5, questions/concerns. Pt stated understanding of plan.    Intravenous Access:  Implanted Port.    Treatment Conditions:  Lab Results   Component Value Date    HGB 12.9 03/15/2022    WBC 6.3 03/15/2022    ANEU 5.3 08/05/2019    ANEUTAUTO 3.0 03/15/2022     03/15/2022      Lab Results   Component Value Date     03/15/2022    POTASSIUM 3.5 03/15/2022    CR 0.64 03/15/2022    ANURADHA 8.8 03/15/2022    BILITOTAL 0.4 03/15/2022    ALBUMIN 3.3 (L) 03/15/2022    ALT 16 03/15/2022    AST 9 03/15/2022   Results reviewed, labs MET treatment parameters, ok to proceed with treatment.    Post Infusion Assessment:  Patient tolerated infusion without incident.  Blood return noted pre and post infusion.  Site patent and intact, free from redness, edema or discomfort.  No evidence of extravasations.  Access discontinued per protocol.     Discharge Plan:   Discharge instructions reviewed with: Patient.  Patient and/or family verbalized understanding of discharge instructions and all questions answered.  AVS to patient via [employer].  Patient will return 3/22/2022 for next appointment.   Patient discharged in stable condition accompanied by: self.  Departure Mode: Ambulatory.    Farida Romero RN

## 2022-03-16 ENCOUNTER — PATIENT OUTREACH (OUTPATIENT)
Dept: ONCOLOGY | Facility: CLINIC | Age: 57
End: 2022-03-16
Payer: OTHER GOVERNMENT

## 2022-03-16 DIAGNOSIS — K21.9 ESOPHAGEAL REFLUX: Primary | ICD-10-CM

## 2022-03-16 RX ORDER — SUCRALFATE 1 G/1
1 TABLET ORAL 4 TIMES DAILY PRN
Qty: 30 TABLET | Refills: 0 | Status: SHIPPED | OUTPATIENT
Start: 2022-03-16 | End: 2022-05-23

## 2022-03-16 NOTE — PROGRESS NOTES
This patient started chemo yesterday. Patient called, She is doing ok, except for having some abdominal discomfort and acid reflux. She has some mild nausea but is relieved with Compazine. No diarrhea or constipation. She states she takes over the counter Zantac but is not relieving symptoms. She states back in 2019 she was given carafate in the ER for this and took this today with relief of symptoms but only has 2 tablets left and would like a refill. Refill request for carafate approved by Dr. Diamond. Script sent to patients pharmacy. Claire Blair RN on 3/16/2022 at 12:19 PM

## 2022-03-22 ENCOUNTER — ONCOLOGY VISIT (OUTPATIENT)
Dept: ONCOLOGY | Facility: CLINIC | Age: 57
End: 2022-03-22
Attending: NURSE PRACTITIONER
Payer: COMMERCIAL

## 2022-03-22 ENCOUNTER — LAB (OUTPATIENT)
Dept: INFUSION THERAPY | Facility: CLINIC | Age: 57
End: 2022-03-22
Attending: INTERNAL MEDICINE
Payer: COMMERCIAL

## 2022-03-22 ENCOUNTER — PATIENT OUTREACH (OUTPATIENT)
Dept: ONCOLOGY | Facility: CLINIC | Age: 57
End: 2022-03-22

## 2022-03-22 VITALS
HEART RATE: 77 BPM | WEIGHT: 210.9 LBS | HEIGHT: 67 IN | SYSTOLIC BLOOD PRESSURE: 132 MMHG | TEMPERATURE: 98.6 F | DIASTOLIC BLOOD PRESSURE: 74 MMHG | BODY MASS INDEX: 33.1 KG/M2 | RESPIRATION RATE: 18 BRPM | OXYGEN SATURATION: 99 %

## 2022-03-22 VITALS — DIASTOLIC BLOOD PRESSURE: 89 MMHG | HEART RATE: 76 BPM | SYSTOLIC BLOOD PRESSURE: 143 MMHG

## 2022-03-22 DIAGNOSIS — C50.911 MALIGNANT NEOPLASM OF RIGHT BREAST IN FEMALE, ESTROGEN RECEPTOR NEGATIVE, UNSPECIFIED SITE OF BREAST (H): ICD-10-CM

## 2022-03-22 DIAGNOSIS — K59.00 CONSTIPATION, UNSPECIFIED CONSTIPATION TYPE: ICD-10-CM

## 2022-03-22 DIAGNOSIS — Z51.11 ENCOUNTER FOR ANTINEOPLASTIC CHEMOTHERAPY: Primary | ICD-10-CM

## 2022-03-22 DIAGNOSIS — Z17.1 MALIGNANT NEOPLASM OF RIGHT BREAST IN FEMALE, ESTROGEN RECEPTOR NEGATIVE, UNSPECIFIED SITE OF BREAST (H): ICD-10-CM

## 2022-03-22 DIAGNOSIS — F41.9 ANXIETY: ICD-10-CM

## 2022-03-22 DIAGNOSIS — D70.1 CHEMOTHERAPY-INDUCED NEUTROPENIA (H): ICD-10-CM

## 2022-03-22 DIAGNOSIS — Z17.1 MALIGNANT NEOPLASM OF RIGHT BREAST IN FEMALE, ESTROGEN RECEPTOR NEGATIVE, UNSPECIFIED SITE OF BREAST (H): Primary | ICD-10-CM

## 2022-03-22 DIAGNOSIS — C50.911 MALIGNANT NEOPLASM OF RIGHT BREAST IN FEMALE, ESTROGEN RECEPTOR NEGATIVE, UNSPECIFIED SITE OF BREAST (H): Primary | ICD-10-CM

## 2022-03-22 DIAGNOSIS — T45.1X5A CHEMOTHERAPY-INDUCED NEUTROPENIA (H): ICD-10-CM

## 2022-03-22 DIAGNOSIS — F41.8 SITUATIONAL ANXIETY: ICD-10-CM

## 2022-03-22 LAB
BASOPHILS # BLD AUTO: 0 10E3/UL (ref 0–0.2)
BASOPHILS NFR BLD AUTO: 1 %
CREAT SERPL-MCNC: 0.62 MG/DL (ref 0.52–1.04)
EOSINOPHIL # BLD AUTO: 0.2 10E3/UL (ref 0–0.7)
EOSINOPHIL NFR BLD AUTO: 4 %
ERYTHROCYTE [DISTWIDTH] IN BLOOD BY AUTOMATED COUNT: 14 % (ref 10–15)
GFR SERPL CREATININE-BSD FRML MDRD: >90 ML/MIN/1.73M2
HCT VFR BLD AUTO: 37.8 % (ref 35–47)
HGB BLD-MCNC: 12 G/DL (ref 11.7–15.7)
IMM GRANULOCYTES # BLD: 0 10E3/UL
IMM GRANULOCYTES NFR BLD: 0 %
LYMPHOCYTES # BLD AUTO: 2.2 10E3/UL (ref 0.8–5.3)
LYMPHOCYTES NFR BLD AUTO: 39 %
MCH RBC QN AUTO: 28.7 PG (ref 26.5–33)
MCHC RBC AUTO-ENTMCNC: 31.7 G/DL (ref 31.5–36.5)
MCV RBC AUTO: 90 FL (ref 78–100)
MONOCYTES # BLD AUTO: 0.3 10E3/UL (ref 0–1.3)
MONOCYTES NFR BLD AUTO: 6 %
NEUTROPHILS # BLD AUTO: 2.8 10E3/UL (ref 1.6–8.3)
NEUTROPHILS NFR BLD AUTO: 50 %
NRBC # BLD AUTO: 0 10E3/UL
NRBC BLD AUTO-RTO: 0 /100
PLATELET # BLD AUTO: 289 10E3/UL (ref 150–450)
RBC # BLD AUTO: 4.18 10E6/UL (ref 3.8–5.2)
WBC # BLD AUTO: 5.5 10E3/UL (ref 4–11)

## 2022-03-22 PROCEDURE — 258N000003 HC RX IP 258 OP 636: Performed by: INTERNAL MEDICINE

## 2022-03-22 PROCEDURE — 96413 CHEMO IV INFUSION 1 HR: CPT

## 2022-03-22 PROCEDURE — 85004 AUTOMATED DIFF WBC COUNT: CPT | Performed by: INTERNAL MEDICINE

## 2022-03-22 PROCEDURE — 96375 TX/PRO/DX INJ NEW DRUG ADDON: CPT

## 2022-03-22 PROCEDURE — 250N000011 HC RX IP 250 OP 636: Performed by: INTERNAL MEDICINE

## 2022-03-22 PROCEDURE — G0463 HOSPITAL OUTPT CLINIC VISIT: HCPCS | Mod: 25

## 2022-03-22 PROCEDURE — 82565 ASSAY OF CREATININE: CPT | Performed by: INTERNAL MEDICINE

## 2022-03-22 PROCEDURE — 99215 OFFICE O/P EST HI 40 MIN: CPT | Performed by: NURSE PRACTITIONER

## 2022-03-22 RX ORDER — HEPARIN SODIUM (PORCINE) LOCK FLUSH IV SOLN 100 UNIT/ML 100 UNIT/ML
5 SOLUTION INTRAVENOUS
Status: DISCONTINUED | OUTPATIENT
Start: 2022-03-22 | End: 2022-03-22 | Stop reason: HOSPADM

## 2022-03-22 RX ORDER — LORAZEPAM 0.5 MG/1
.5-1 TABLET ORAL EVERY 8 HOURS PRN
Qty: 60 TABLET | Refills: 3 | Status: SHIPPED | OUTPATIENT
Start: 2022-03-22 | End: 2022-09-23

## 2022-03-22 RX ORDER — GRANISETRON HYDROCHLORIDE 1 MG/1
1 TABLET, FILM COATED ORAL EVERY 12 HOURS PRN
COMMUNITY
End: 2022-04-27

## 2022-03-22 RX ORDER — CITALOPRAM HYDROBROMIDE 20 MG/1
20 TABLET ORAL DAILY
Qty: 30 TABLET | Refills: 1 | Status: SHIPPED | OUTPATIENT
Start: 2022-03-22 | End: 2022-05-23

## 2022-03-22 RX ADMIN — FAMOTIDINE 20 MG: 20 INJECTION, SOLUTION INTRAVENOUS at 12:08

## 2022-03-22 RX ADMIN — Medication 5 ML: at 14:23

## 2022-03-22 RX ADMIN — DEXAMETHASONE SODIUM PHOSPHATE: 10 INJECTION, SOLUTION INTRAMUSCULAR; INTRAVENOUS at 12:34

## 2022-03-22 RX ADMIN — PACLITAXEL 173 MG: 6 INJECTION, SOLUTION INTRAVENOUS at 12:51

## 2022-03-22 RX ADMIN — SODIUM CHLORIDE 250 ML: 9 INJECTION, SOLUTION INTRAVENOUS at 12:07

## 2022-03-22 ASSESSMENT — PAIN SCALES - GENERAL: PAINLEVEL: NO PAIN (0)

## 2022-03-22 NOTE — LETTER
3/22/2022         RE: Diana Salvador  [address] [place] [address]        Dear Colleague,    Thank you for referring your patient, Diana Salvador, to the [facility] [place]. Please see a copy of my visit note below.    [facility] Hematology and Oncology Outpatient Progress Note ([place])    Patient: Diana Salvador  MRN: [id number]  Date of Service: 03/22/2022        Reason for Visit    1. Stage IIB triple negative breast cancer  2. On neoadjuvant chemo    Primary Oncologist: Dr. Diamond    Assessment/Plan  1.   Stage IIB triple negative breast cancer  Aide is tolerating her first cycle of pembrolizumab, carboplatin and taxol fair. She had a lot of fatigue and nausea for 24 hr on day 2-3 last week, improved now. Going into weekly Taxol alone days 8 and 15, this should be a bit more tolerable.     Labwork adequate.    Breast mass is clinically palpable to follow response to treatment.    Plan:  -Proceed with day 8 taxol today  -Return for day 15 taxol next week  -Neupogen is ordered for days 16-18 each cycle. She is planning [facility] to self-inject at home. Once this is verified, she can cancel the infusion appts to recieve in clinic. She's self-administered subcutaneous injections in the past so feels comfortable, but will ask nursing to provide education on this.  -Return in 2 weeks ahead of cycle 2 with Dr. Diamond  -Overall plan is 4 cycles of this, followed by 4 cycles of AC + Pembro; then bilateral mastectomies with Dr. Marina    2.  Constipation  Due to antiemetics, less active and changes in diet on chemo.     Plan:   -Senna-S 1-2 once to twice daily  -Mirilax as needed, in addition    3.  Left nare sore    Plan:  -Vaseline or antibiotic ointment a few times/day    4.  Anxiety, secondary to cancer diagnosis  Did not like Wellbutrin (weight gain) and stopped this. Is using lorazepam 0.5 mg once daily, on average but feels she needs more  frequently. Has good support network with family, but is feeling overwhelmed and anxious with her diagnosis.     Plan:  -Start Celexa 20 mg daily; may take 6-8 weeks for full effect. Avoid/minimize use of tramadol on this (drug:drug interaction) - she uses tramadol infrequently for chronic pain.  -Lorazepam 0.5 - 1 mg up to TID, as needed. This will be for short-term use until Celexa helping.   -Referral for Oncology psychotherapy placed.     5.  Health maintenance  Covid booster (shot 3) due. Recommend she get this, she will time the immunization late next week.   ______________________________________________________________________________    History of Present Illness/ Interval History    Ms. Diana Salvador  is a 56 year old who recently started neoadjvuant treatment with pembrolizumab, carboplatin and taxol (weekly) last week. Here ahead of cycle 1, day 8 taxol.     The day after treatment, she had significant fatigue (slept almost 24 hrs straight) and nausea with dry heaves lasting 24-48 hrs. Managed with antiemetics (both compazine and Kytril). Carafate for stomach upset worked well.  Hydrated well. Eating/drinking small amts (hx gastric bypass).   Constipation, last BM 3 days ago.   Bilateral breast rash after a hot shower last week, resolved. No recurrent rashes. Sore in left nare, bled x 1 yesterday.  No neuropathy. No dyspnea.    No fevers.    Dealing with a lot of anxiety with diagnosis. Taking ativan 0.5 mg daily, not adequate. Was prescribed Wellbutrin for depression/anxiety, stopped after 1-2 mths without benefit and felt she was gaining weight on it.    ECOG Performance    0      Oncology History/Treatment  Diagnosis/Stage:   2/2022: Stage IIB (cT2-cN0-cM0) right breast cancer (triple negative)  -self-palpated right breast lump  -diagnostic mammo + breast US: 2.4 cm R breast (2:00, lower-inner quad) lump and no axillary nodes detected  -breast biopsy: invasive ductal carcinoma, grade 3. ER neg, [place]  neg, HER2 neg via FISH  -CA 27.29 WNL (<5)    Treatment:  3/15/2022 - present: Neoadjuvant chemo (based off KEYNOTE-522) Pembrolizumab 200 mg D1 q 21 days + Carboplatin AUC=5 D1 q21 Days + weekly Paclitaxel 80 mg/m  D1, 8, 15  x 12 Weeks (with Filgrastim 5 mcg/kg subcutaneously once daily on days 16, 17, and 18 of cycles 1 through 4)  -->4 cycles planned; followed by 4 cycles (cycles 5-8) pembrolizumab + doxorubicin + cyclophosphomide every 21 days (with Neulasta support)     Planning bilateral mastectomies with Dr. Marina following neoadjuvant treatment      Physical Exam    GENERAL: Alert and oriented to time place and person. Seated comfortably. Alone. Tearful at times.  HEAD: Atraumatic and normocephalic. No alopecia.  EYES: MELISSA, EOMI. No erythema. No icterus.  ORAL CAVITY: Moist. No mucosal lesion or tonsillar enlargement.  NOSE: left internal nare, crusted sore.   BREASTS: Right breast lump (2:00, near nipple) 2.5 cm palpated.  LYMPH NODES: No palpable supraclavicular, cervical, axillary lymphadenopathy.  CHEST: clear to auscultation bilaterally. Resonant to percussion throughout bilaterally. Symmetrical breath movements bilaterally.  CVS: S1 and S2 are heard. Regular rate and rhythm. No murmur or gallop or rub heard.  ABDOMEN: Soft. Not tender. Not distended. No palpable hepatomegaly or splenomegaly. No other mass palpable. Bowel sounds present.  EXTREMITIES: Warm. No peripheral edema.  SKIN: no rash, or bruising or purpura.   NEURO: No gross deficit noted. Non-antalgic gait.      Lab Results    Recent Results (from the past 168 hour(s))   Comprehensive metabolic panel   Result Value Ref Range    Sodium 142 133 - 144 mmol/L    Potassium 3.5 3.4 - 5.3 mmol/L    Chloride 110 (H) 94 - 109 mmol/L    Carbon Dioxide (CO2) 28 20 - 32 mmol/L    Anion Gap 4 3 - 14 mmol/L    Urea Nitrogen 11 7 - 30 mg/dL    Creatinine 0.64 0.52 - 1.04 mg/dL    Calcium 8.8 8.5 - 10.1 mg/dL    Glucose 111 (H) 70 - 99 mg/dL     Alkaline Phosphatase 84 40 - 150 U/L    AST 9 0 - 45 U/L    ALT 16 0 - 50 U/L    Protein Total 6.9 6.8 - 8.8 g/dL    Albumin 3.3 (L) 3.4 - 5.0 g/dL    Bilirubin Total 0.4 0.2 - 1.3 mg/dL    GFR Estimate >90 >60 mL/min/1.73m2   CA 27.29 Breast tumor marker   Result Value Ref Range    CA 27-29 <5 0 - 39 U/mL   CEA   Result Value Ref Range    CEA 3.4 (H) 0.0 - 2.5 ug/L   TSH with free T4 reflex   Result Value Ref Range    TSH 0.64 0.40 - 4.00 mU/L   CBC with platelets and differential   Result Value Ref Range    WBC Count 6.3 4.0 - 11.0 10e3/uL    RBC Count 4.55 3.80 - 5.20 10e6/uL    Hemoglobin 12.9 11.7 - 15.7 g/dL    Hematocrit 40.9 35.0 - 47.0 %    MCV 90 78 - 100 fL    MCH 28.4 26.5 - 33.0 pg    MCHC 31.5 31.5 - 36.5 g/dL    RDW 14.2 10.0 - 15.0 %    Platelet Count 359 150 - 450 10e3/uL    % Neutrophils 46 %    % Lymphocytes 43 %    % Monocytes 7 %    % Eosinophils 3 %    % Basophils 1 %    % Immature Granulocytes 0 %    NRBCs per 100 WBC 0 <1 /100    Absolute Neutrophils 3.0 1.6 - 8.3 10e3/uL    Absolute Lymphocytes 2.7 0.8 - 5.3 10e3/uL    Absolute Monocytes 0.5 0.0 - 1.3 10e3/uL    Absolute Eosinophils 0.2 0.0 - 0.7 10e3/uL    Absolute Basophils 0.0 0.0 - 0.2 10e3/uL    Absolute Immature Granulocytes 0.0 <=0.4 10e3/uL    Absolute NRBCs 0.0 10e3/uL       Imaging    MA Post Procedure Right    Addendum Date: 2/24/2022    SAMMY MARIE ACCESSION #[id number], [id number] ADDENDUM: Pathology: Malignant. Invasive ductal carcinoma. Please see pathology report for details. Radiology: Pathology is concordant with imaging. CHANDLER GAUTHIER M.D.  Date of Addendum: 2/24/2022 CHANDLER GAUTHIER MD   SYSTEM ID:  [id number]    Result Date: 2/24/2022  ULTRASOUND-GUIDED RIGHT BREAST CORE BIOPSY; CLIP PLACEMENT; POSTPROCEDURE DIGITAL MAMMOGRAM RIGHT BREAST February 22, 2022 10:43 AM INDICATION FOR PROCEDURE: Irregular hypoechoic mass in the right breast. PROCEDURE: Approximately 5 mL lidocaine without epinephrine was infiltrated for  local anesthetic and a 13-gauge trocar was introduced via lateral approach. The needle tip was placed adjacent to the lesion. A series of four samples were obtained with a 14-gauge core-cutting needle. A clip was then deployed to lia the lesion. There was less than 5 cc of blood loss. Postbiopsy unilateral digital mammogram of the right breast showed the clip to be at the expected biopsy site. The patient tolerated the procedure without difficulty and there was no significant pain or immediate complication at the end of the procedure.     IMPRESSION: Successful right breast ultrasound-guided core biopsy and clip placement.  Final pathology is pending.  ANGELLA HUERTA MD   SYSTEM ID:  [id number]    US Breast Biopsy Core Needle Right    Addendum Date: 2/24/2022    SAMMY MARIE ACCESSION #[id number], [id number] ADDENDUM: Pathology: Malignant. Invasive ductal carcinoma. Please see pathology report for details. Radiology: Pathology is concordant with imaging. CHANDLER GAUTHIER M.D.  Date of Addendum: 2/24/2022 CHANDLER GAUTHIER MD   SYSTEM ID:  [id number]    Result Date: 2/24/2022  ULTRASOUND-GUIDED RIGHT BREAST CORE BIOPSY; CLIP PLACEMENT; POSTPROCEDURE DIGITAL MAMMOGRAM RIGHT BREAST February 22, 2022 10:43 AM INDICATION FOR PROCEDURE: Irregular hypoechoic mass in the right breast. PROCEDURE: Approximately 5 mL lidocaine without epinephrine was infiltrated for local anesthetic and a 13-gauge trocar was introduced via lateral approach. The needle tip was placed adjacent to the lesion. A series of four samples were obtained with a 14-gauge core-cutting needle. A clip was then deployed to lia the lesion. There was less than 5 cc of blood loss. Postbiopsy unilateral digital mammogram of the right breast showed the clip to be at the expected biopsy site. The patient tolerated the procedure without difficulty and there was no significant pain or immediate complication at the end of the procedure.     IMPRESSION: Successful  right breast ultrasound-guided core biopsy and clip placement.  Final pathology is pending.  ANGELLA HUERTA MD   SYSTEM ID:  [id number]    XR Chest Port 1 View    Result Date: 3/9/2022  XR CHEST PORT 1 VIEW 3/9/2022 3:05 PM HISTORY: Status post left subclavian port placement. COMPARISON: None.     IMPRESSION: Port catheter with the tip at the cavoatrial junction. No pneumothorax or pleural effusion. Minimal left basilar streaky atelectasis. The right lung is clear. The heart size is normal. PIETER NG MD   SYSTEM ID:  [id number]    XR Surgery RADHA Fluoro L/T 5 Min w Stills    Result Date: 3/9/2022  This exam was marked as non-reportable because it will not be read by a radiologist or a [place] non-radiologist provider.       Billing  Total time: 50 min; including review of EMR, reports, diagnostics and ordering/coordination of care    Signed by: aMrilu Vergara NP        Again, thank you for allowing me to participate in the care of your patient.        Sincerely,        Marilu Vergara NP

## 2022-03-22 NOTE — PROGRESS NOTES
[facility] Hematology and Oncology Outpatient Progress Note ([place])    Patient: Diana Salvador  MRN: [id number]  Date of Service: 03/22/2022        Reason for Visit    1. Stage IIB triple negative breast cancer  2. On neoadjuvant chemo    Primary Oncologist: Dr. Diamond    Assessment/Plan  1.   Stage IIB triple negative breast cancer  Aide is tolerating her first cycle of pembrolizumab, carboplatin and taxol fair. She had a lot of fatigue and nausea for 24 hr on day 2-3 last week, improved now. Going into weekly Taxol alone days 8 and 15, this should be a bit more tolerable.     Labwork adequate.    Breast mass is clinically palpable to follow response to treatment.    Plan:  -Proceed with day 8 taxol today  -Return for day 15 taxol next week  -Neupogen is ordered for days 16-18 each cycle. She is planning [facility] to self-inject at home. Once this is verified, she can cancel the infusion appts to recieve in clinic. She's self-administered subcutaneous injections in the past so feels comfortable, but will ask nursing to provide education on this.  -Return in 2 weeks ahead of cycle 2 with Dr. Diamond  -Overall plan is 4 cycles of this, followed by 4 cycles of AC + Pembro; then bilateral mastectomies with Dr. Marina    2.  Constipation  Due to antiemetics, less active and changes in diet on chemo.     Plan:   -Senna-S 1-2 once to twice daily  -Mirilax as needed, in addition    3.  Left nare sore    Plan:  -Vaseline or antibiotic ointment a few times/day    4.  Anxiety, secondary to cancer diagnosis  Did not like Wellbutrin (weight gain) and stopped this. Is using lorazepam 0.5 mg once daily, on average but feels she needs more frequently. Has good support network with family, but is feeling overwhelmed and anxious with her diagnosis.     Plan:  -Start Celexa 20 mg daily; may take 6-8 weeks for full effect. Avoid/minimize use of tramadol on this (drug:drug interaction) - she  uses tramadol infrequently for chronic pain.  -Lorazepam 0.5 - 1 mg up to TID, as needed. This will be for short-term use until Celexa helping.   -Referral for Oncology psychotherapy placed.     5.  Health maintenance  Covid booster (shot 3) due. Recommend she get this, she will time the immunization late next week.   ______________________________________________________________________________    History of Present Illness/ Interval History    Ms. Diana Salvador  is a 56 year old who recently started neoadjvuant treatment with pembrolizumab, carboplatin and taxol (weekly) last week. Here ahead of cycle 1, day 8 taxol.     The day after treatment, she had significant fatigue (slept almost 24 hrs straight) and nausea with dry heaves lasting 24-48 hrs. Managed with antiemetics (both compazine and Kytril). Carafate for stomach upset worked well.  Hydrated well. Eating/drinking small amts (hx gastric bypass).   Constipation, last BM 3 days ago.   Bilateral breast rash after a hot shower last week, resolved. No recurrent rashes. Sore in left nare, bled x 1 yesterday.  No neuropathy. No dyspnea.    No fevers.    Dealing with a lot of anxiety with diagnosis. Taking ativan 0.5 mg daily, not adequate. Was prescribed Wellbutrin for depression/anxiety, stopped after 1-2 mths without benefit and felt she was gaining weight on it.    ECOG Performance    0      Oncology History/Treatment  Diagnosis/Stage:   2/2022: Stage IIB (cT2-cN0-cM0) right breast cancer (triple negative)  -self-palpated right breast lump  -diagnostic mammo + breast US: 2.4 cm R breast (2:00, lower-inner quad) lump and no axillary nodes detected  -breast biopsy: invasive ductal carcinoma, grade 3. ER neg, [place] neg, HER2 neg via FISH  -CA 27.29 WNL (<5)    Treatment:  3/15/2022 - present: Neoadjuvant chemo (based off KEYNOTE-522) Pembrolizumab 200 mg D1 q 21 days + Carboplatin AUC=5 D1 q21 Days + weekly Paclitaxel 80 mg/m  D1, 8, 15  x 12 Weeks (with Filgrastim  5 mcg/kg subcutaneously once daily on days 16, 17, and 18 of cycles 1 through 4)  -->4 cycles planned; followed by 4 cycles (cycles 5-8) pembrolizumab + doxorubicin + cyclophosphomide every 21 days (with Neulasta support)     Planning bilateral mastectomies with Dr. Marina following neoadjuvant treatment      Physical Exam    GENERAL: Alert and oriented to time place and person. Seated comfortably. Alone. Tearful at times.  HEAD: Atraumatic and normocephalic. No alopecia.  EYES: MELISSA, EOMI. No erythema. No icterus.  ORAL CAVITY: Moist. No mucosal lesion or tonsillar enlargement.  NOSE: left internal nare, crusted sore.   BREASTS: Right breast lump (2:00, near nipple) 2.5 cm palpated.  LYMPH NODES: No palpable supraclavicular, cervical, axillary lymphadenopathy.  CHEST: clear to auscultation bilaterally. Resonant to percussion throughout bilaterally. Symmetrical breath movements bilaterally.  CVS: S1 and S2 are heard. Regular rate and rhythm. No murmur or gallop or rub heard.  ABDOMEN: Soft. Not tender. Not distended. No palpable hepatomegaly or splenomegaly. No other mass palpable. Bowel sounds present.  EXTREMITIES: Warm. No peripheral edema.  SKIN: no rash, or bruising or purpura.   NEURO: No gross deficit noted. Non-antalgic gait.      Lab Results    Recent Results (from the past 168 hour(s))   Comprehensive metabolic panel   Result Value Ref Range    Sodium 142 133 - 144 mmol/L    Potassium 3.5 3.4 - 5.3 mmol/L    Chloride 110 (H) 94 - 109 mmol/L    Carbon Dioxide (CO2) 28 20 - 32 mmol/L    Anion Gap 4 3 - 14 mmol/L    Urea Nitrogen 11 7 - 30 mg/dL    Creatinine 0.64 0.52 - 1.04 mg/dL    Calcium 8.8 8.5 - 10.1 mg/dL    Glucose 111 (H) 70 - 99 mg/dL    Alkaline Phosphatase 84 40 - 150 U/L    AST 9 0 - 45 U/L    ALT 16 0 - 50 U/L    Protein Total 6.9 6.8 - 8.8 g/dL    Albumin 3.3 (L) 3.4 - 5.0 g/dL    Bilirubin Total 0.4 0.2 - 1.3 mg/dL    GFR Estimate >90 >60 mL/min/1.73m2   CA 27.29 Breast tumor marker    Result Value Ref Range    CA 27-29 <5 0 - 39 U/mL   CEA   Result Value Ref Range    CEA 3.4 (H) 0.0 - 2.5 ug/L   TSH with free T4 reflex   Result Value Ref Range    TSH 0.64 0.40 - 4.00 mU/L   CBC with platelets and differential   Result Value Ref Range    WBC Count 6.3 4.0 - 11.0 10e3/uL    RBC Count 4.55 3.80 - 5.20 10e6/uL    Hemoglobin 12.9 11.7 - 15.7 g/dL    Hematocrit 40.9 35.0 - 47.0 %    MCV 90 78 - 100 fL    MCH 28.4 26.5 - 33.0 pg    MCHC 31.5 31.5 - 36.5 g/dL    RDW 14.2 10.0 - 15.0 %    Platelet Count 359 150 - 450 10e3/uL    % Neutrophils 46 %    % Lymphocytes 43 %    % Monocytes 7 %    % Eosinophils 3 %    % Basophils 1 %    % Immature Granulocytes 0 %    NRBCs per 100 WBC 0 <1 /100    Absolute Neutrophils 3.0 1.6 - 8.3 10e3/uL    Absolute Lymphocytes 2.7 0.8 - 5.3 10e3/uL    Absolute Monocytes 0.5 0.0 - 1.3 10e3/uL    Absolute Eosinophils 0.2 0.0 - 0.7 10e3/uL    Absolute Basophils 0.0 0.0 - 0.2 10e3/uL    Absolute Immature Granulocytes 0.0 <=0.4 10e3/uL    Absolute NRBCs 0.0 10e3/uL       Imaging    MA Post Procedure Right    Addendum Date: 2/24/2022    SAMMY MARIE ACCESSION #[id number], [id number] ADDENDUM: Pathology: Malignant. Invasive ductal carcinoma. Please see pathology report for details. Radiology: Pathology is concordant with imaging. CHANDLER GAUTHIER M.D.  Date of Addendum: 2/24/2022 CHANDLER GAUTHIER MD   SYSTEM ID:  [id number]    Result Date: 2/24/2022  ULTRASOUND-GUIDED RIGHT BREAST CORE BIOPSY; CLIP PLACEMENT; POSTPROCEDURE DIGITAL MAMMOGRAM RIGHT BREAST February 22, 2022 10:43 AM INDICATION FOR PROCEDURE: Irregular hypoechoic mass in the right breast. PROCEDURE: Approximately 5 mL lidocaine without epinephrine was infiltrated for local anesthetic and a 13-gauge trocar was introduced via lateral approach. The needle tip was placed adjacent to the lesion. A series of four samples were obtained with a 14-gauge core-cutting needle. A clip was then deployed to lia the lesion. There was  less than 5 cc of blood loss. Postbiopsy unilateral digital mammogram of the right breast showed the clip to be at the expected biopsy site. The patient tolerated the procedure without difficulty and there was no significant pain or immediate complication at the end of the procedure.     IMPRESSION: Successful right breast ultrasound-guided core biopsy and clip placement.  Final pathology is pending.  ANGELLA HUERTA MD   SYSTEM ID:  [id number]    US Breast Biopsy Core Needle Right    Addendum Date: 2/24/2022    SAMMY MARIE ACCESSION #[id number], [id number] ADDENDUM: Pathology: Malignant. Invasive ductal carcinoma. Please see pathology report for details. Radiology: Pathology is concordant with imaging. CHANDLER GAUTHIER M.D.  Date of Addendum: 2/24/2022 CHANDLER GAUTHIER MD   SYSTEM ID:  [id number]    Result Date: 2/24/2022  ULTRASOUND-GUIDED RIGHT BREAST CORE BIOPSY; CLIP PLACEMENT; POSTPROCEDURE DIGITAL MAMMOGRAM RIGHT BREAST February 22, 2022 10:43 AM INDICATION FOR PROCEDURE: Irregular hypoechoic mass in the right breast. PROCEDURE: Approximately 5 mL lidocaine without epinephrine was infiltrated for local anesthetic and a 13-gauge trocar was introduced via lateral approach. The needle tip was placed adjacent to the lesion. A series of four samples were obtained with a 14-gauge core-cutting needle. A clip was then deployed to lia the lesion. There was less than 5 cc of blood loss. Postbiopsy unilateral digital mammogram of the right breast showed the clip to be at the expected biopsy site. The patient tolerated the procedure without difficulty and there was no significant pain or immediate complication at the end of the procedure.     IMPRESSION: Successful right breast ultrasound-guided core biopsy and clip placement.  Final pathology is pending.  ANGELLA HUERTA MD   SYSTEM ID:  [id number]    XR Chest Port 1 View    Result Date: 3/9/2022  XR CHEST PORT 1 VIEW 3/9/2022 3:05 PM HISTORY: Status post left  subclavian port placement. COMPARISON: None.     IMPRESSION: Port catheter with the tip at the cavoatrial junction. No pneumothorax or pleural effusion. Minimal left basilar streaky atelectasis. The right lung is clear. The heart size is normal. PIETER NG MD   SYSTEM ID:  [id number]    XR Surgery RADHA Fluoro L/T 5 Min w Stills    Result Date: 3/9/2022  This exam was marked as non-reportable because it will not be read by a radiologist or a [place] non-radiologist provider.       Billing  Total time: 50 min; including review of EMR, reports, diagnostics and ordering/coordination of care    Signed by: Marilu Vergara NP

## 2022-03-22 NOTE — PATIENT INSTRUCTIONS
Constipation: senna-s 1-2 tabs once or twice daily. If no results, add Miralax as needed    Anxiety: Celexa daily (may take 6-8 weeks for full effect) - Rx sent. Can take 0.5-1 mg Ativan as needed up to 3 times/daily for short term.     Onc Psychotherapy referral    Pt expects [facility]upogen injections [facility] to give at home. Once she verifies this - we can cancel Neupogen appts here next week. Ensure she has teaching on administration  -will need days 16, 17, 18 each cycle (cycles 1-4)    Proceed with days 8 + 15 taxol today    Covid booster late next week    Keep appt with Dr. Diamond 4/6 before start of cycle 2

## 2022-03-22 NOTE — PROGRESS NOTES
Infusion Nursing Note:  Diana Salvador presents today for C1D8 Taxol.    Patient seen by provider today: Yes: Marilu Vergara NP   present during visit today: Not Applicable.    Note: N/A.      Intravenous Access:  Implanted Port.    Treatment Conditions:  Results reviewed, labs MET treatment parameters, ok to proceed with treatment.      Post Infusion Assessment:  Patient tolerated infusion without incident.  Blood return noted pre and post infusion.  Site patent and intact, free from redness, edema or discomfort.  No evidence of extravasations.  Access discontinued per protocol.       Discharge Plan:   Patient discharged in stable condition accompanied by: self.  Departure Mode: Ambulatory.  Pt returns in 1 week for next tx.      Leonila Dunlap RN

## 2022-03-23 ENCOUNTER — LAB (OUTPATIENT)
Dept: LAB | Facility: CLINIC | Age: 57
End: 2022-03-23
Payer: COMMERCIAL

## 2022-03-23 DIAGNOSIS — C50.211 MALIGNANT NEOPLASM OF UPPER-INNER QUADRANT OF RIGHT BREAST IN FEMALE, ESTROGEN RECEPTOR NEGATIVE (H): Primary | ICD-10-CM

## 2022-03-23 DIAGNOSIS — Z17.1 MALIGNANT NEOPLASM OF UPPER-INNER QUADRANT OF RIGHT BREAST IN FEMALE, ESTROGEN RECEPTOR NEGATIVE (H): Primary | ICD-10-CM

## 2022-03-23 PROCEDURE — 81162 BRCA1&2 GEN FULL SEQ DUP/DEL: CPT | Performed by: SURGERY

## 2022-03-23 PROCEDURE — G0452 MOLECULAR PATHOLOGY INTERPR: HCPCS | Mod: 26 | Performed by: PATHOLOGY

## 2022-03-24 ENCOUNTER — PATIENT OUTREACH (OUTPATIENT)
Dept: ONCOLOGY | Facility: CLINIC | Age: 57
End: 2022-03-24
Payer: OTHER GOVERNMENT

## 2022-03-24 NOTE — RESULT ENCOUNTER NOTE
Sent result message to patient via [employer] regarding negative BRCA results.Claire Blair RN on 3/24/2022 at 3:46 PM

## 2022-03-26 DIAGNOSIS — C50.211 MALIGNANT NEOPLASM OF UPPER-INNER QUADRANT OF RIGHT BREAST IN FEMALE, ESTROGEN RECEPTOR NEGATIVE (H): ICD-10-CM

## 2022-03-26 DIAGNOSIS — T45.1X5A CHEMOTHERAPY-INDUCED NEUTROPENIA (H): ICD-10-CM

## 2022-03-26 DIAGNOSIS — Z17.1 MALIGNANT NEOPLASM OF UPPER-INNER QUADRANT OF RIGHT BREAST IN FEMALE, ESTROGEN RECEPTOR NEGATIVE (H): ICD-10-CM

## 2022-03-26 DIAGNOSIS — D70.1 CHEMOTHERAPY-INDUCED NEUTROPENIA (H): ICD-10-CM

## 2022-03-26 DIAGNOSIS — Z51.11 ENCOUNTER FOR ANTINEOPLASTIC CHEMOTHERAPY: Primary | ICD-10-CM

## 2022-03-29 ENCOUNTER — INFUSION THERAPY VISIT (OUTPATIENT)
Dept: INFUSION THERAPY | Facility: CLINIC | Age: 57
End: 2022-03-29
Attending: INTERNAL MEDICINE
Payer: COMMERCIAL

## 2022-03-29 VITALS — DIASTOLIC BLOOD PRESSURE: 87 MMHG | SYSTOLIC BLOOD PRESSURE: 152 MMHG | HEART RATE: 76 BPM | RESPIRATION RATE: 16 BRPM

## 2022-03-29 VITALS
RESPIRATION RATE: 16 BRPM | WEIGHT: 210 LBS | SYSTOLIC BLOOD PRESSURE: 134 MMHG | TEMPERATURE: 97 F | HEART RATE: 84 BPM | DIASTOLIC BLOOD PRESSURE: 85 MMHG | BODY MASS INDEX: 32.89 KG/M2

## 2022-03-29 DIAGNOSIS — Z17.1 MALIGNANT NEOPLASM OF UPPER-INNER QUADRANT OF RIGHT BREAST IN FEMALE, ESTROGEN RECEPTOR NEGATIVE (H): ICD-10-CM

## 2022-03-29 DIAGNOSIS — T45.1X5A CHEMOTHERAPY-INDUCED NEUTROPENIA (H): ICD-10-CM

## 2022-03-29 DIAGNOSIS — Z51.11 ENCOUNTER FOR ANTINEOPLASTIC CHEMOTHERAPY: ICD-10-CM

## 2022-03-29 DIAGNOSIS — C50.211 MALIGNANT NEOPLASM OF UPPER-INNER QUADRANT OF RIGHT BREAST IN FEMALE, ESTROGEN RECEPTOR NEGATIVE (H): ICD-10-CM

## 2022-03-29 DIAGNOSIS — D70.1 CHEMOTHERAPY-INDUCED NEUTROPENIA (H): ICD-10-CM

## 2022-03-29 DIAGNOSIS — Z51.11 ENCOUNTER FOR ANTINEOPLASTIC CHEMOTHERAPY: Primary | ICD-10-CM

## 2022-03-29 DIAGNOSIS — D70.1 CHEMOTHERAPY-INDUCED NEUTROPENIA (H): Primary | ICD-10-CM

## 2022-03-29 DIAGNOSIS — T45.1X5A CHEMOTHERAPY-INDUCED NEUTROPENIA (H): Primary | ICD-10-CM

## 2022-03-29 LAB
BASOPHILS # BLD AUTO: 0 10E3/UL (ref 0–0.2)
BASOPHILS NFR BLD AUTO: 1 %
CREAT SERPL-MCNC: 0.56 MG/DL (ref 0.52–1.04)
EOSINOPHIL # BLD AUTO: 0.1 10E3/UL (ref 0–0.7)
EOSINOPHIL NFR BLD AUTO: 3 %
ERYTHROCYTE [DISTWIDTH] IN BLOOD BY AUTOMATED COUNT: 14.1 % (ref 10–15)
GFR SERPL CREATININE-BSD FRML MDRD: >90 ML/MIN/1.73M2
HCT VFR BLD AUTO: 38.4 % (ref 35–47)
HGB BLD-MCNC: 12.3 G/DL (ref 11.7–15.7)
IMM GRANULOCYTES # BLD: 0 10E3/UL
IMM GRANULOCYTES NFR BLD: 0 %
LYMPHOCYTES # BLD AUTO: 1.7 10E3/UL (ref 0.8–5.3)
LYMPHOCYTES NFR BLD AUTO: 47 %
MCH RBC QN AUTO: 28.8 PG (ref 26.5–33)
MCHC RBC AUTO-ENTMCNC: 32 G/DL (ref 31.5–36.5)
MCV RBC AUTO: 90 FL (ref 78–100)
MONOCYTES # BLD AUTO: 0.4 10E3/UL (ref 0–1.3)
MONOCYTES NFR BLD AUTO: 11 %
NEUTROPHILS # BLD AUTO: 1.3 10E3/UL (ref 1.6–8.3)
NEUTROPHILS NFR BLD AUTO: 38 %
NRBC # BLD AUTO: 0 10E3/UL
NRBC BLD AUTO-RTO: 0 /100
PLATELET # BLD AUTO: 314 10E3/UL (ref 150–450)
RBC # BLD AUTO: 4.27 10E6/UL (ref 3.8–5.2)
WBC # BLD AUTO: 3.5 10E3/UL (ref 4–11)

## 2022-03-29 PROCEDURE — 250N000011 HC RX IP 250 OP 636: Performed by: INTERNAL MEDICINE

## 2022-03-29 PROCEDURE — 85025 COMPLETE CBC W/AUTO DIFF WBC: CPT | Performed by: NURSE PRACTITIONER

## 2022-03-29 PROCEDURE — 96413 CHEMO IV INFUSION 1 HR: CPT

## 2022-03-29 PROCEDURE — 96375 TX/PRO/DX INJ NEW DRUG ADDON: CPT

## 2022-03-29 PROCEDURE — 258N000003 HC RX IP 258 OP 636: Performed by: INTERNAL MEDICINE

## 2022-03-29 PROCEDURE — 82565 ASSAY OF CREATININE: CPT | Performed by: NURSE PRACTITIONER

## 2022-03-29 RX ORDER — HEPARIN SODIUM (PORCINE) LOCK FLUSH IV SOLN 100 UNIT/ML 100 UNIT/ML
5 SOLUTION INTRAVENOUS
Status: DISCONTINUED | OUTPATIENT
Start: 2022-03-29 | End: 2022-03-29 | Stop reason: HOSPADM

## 2022-03-29 RX ADMIN — DEXAMETHASONE SODIUM PHOSPHATE: 10 INJECTION, SOLUTION INTRAMUSCULAR; INTRAVENOUS at 14:05

## 2022-03-29 RX ADMIN — FAMOTIDINE 20 MG: 20 INJECTION, SOLUTION INTRAVENOUS at 13:40

## 2022-03-29 RX ADMIN — SODIUM CHLORIDE 250 ML: 9 INJECTION, SOLUTION INTRAVENOUS at 13:40

## 2022-03-29 RX ADMIN — PACLITAXEL 173 MG: 6 INJECTION, SOLUTION INTRAVENOUS at 14:19

## 2022-03-29 RX ADMIN — Medication 5 ML: at 15:26

## 2022-03-29 NOTE — PROGRESS NOTES
Infusion Nursing Note:  Diana Salvador presents today for C1 D15 Taxol.    Patient seen by provider today: No   present during visit today: Not Applicable.    Note: Contacted Marilu Vergara NP for ANC 1.3 She's approved treatment today. RN clarified that pt would be doing Neupogen infection at home.  Pt reports having an itchy rash on her hands that is relieved with Eucerin cream. Pt was instructed to contact Dr. Diamond if rash worsens.    Intravenous Access:  Implanted Port.    Treatment Conditions:  Lab Results   Component Value Date    HGB 12.3 03/29/2022    WBC 3.5 (L) 03/29/2022    ANEU 5.3 08/05/2019    ANEUTAUTO 1.3 (L) 03/29/2022     03/29/2022      Results reviewed, labs did NOT meet treatment parameters: ANC 1.3. Marilu HERNANDEZ approved treatment today.      Post Infusion Assessment:  Patient tolerated infusion without incident.  Blood return noted pre and post infusion.  Site patent and intact, free from redness, edema or discomfort.  No evidence of extravasations.  Access discontinued per protocol.       Discharge Plan:   Discharge instructions reviewed with: Patient.  Patient and/or family verbalized understanding of discharge instructions and all questions answered.  Patient discharged in stable condition accompanied by: self.  Departure Mode: Ambulatory.      Elvira Reardon RN

## 2022-03-30 ENCOUNTER — INFUSION THERAPY VISIT (OUTPATIENT)
Dept: INFUSION THERAPY | Facility: CLINIC | Age: 57
End: 2022-03-30
Attending: INTERNAL MEDICINE
Payer: COMMERCIAL

## 2022-03-30 ENCOUNTER — VIRTUAL VISIT (OUTPATIENT)
Dept: ONCOLOGY | Facility: CLINIC | Age: 57
End: 2022-03-30
Attending: NURSE PRACTITIONER
Payer: COMMERCIAL

## 2022-03-30 ENCOUNTER — TELEPHONE (OUTPATIENT)
Dept: SURGERY | Facility: CLINIC | Age: 57
End: 2022-03-30

## 2022-03-30 VITALS — DIASTOLIC BLOOD PRESSURE: 85 MMHG | HEART RATE: 91 BPM | TEMPERATURE: 98.6 F | SYSTOLIC BLOOD PRESSURE: 131 MMHG

## 2022-03-30 DIAGNOSIS — C50.211 MALIGNANT NEOPLASM OF UPPER-INNER QUADRANT OF RIGHT BREAST IN FEMALE, ESTROGEN RECEPTOR NEGATIVE (H): ICD-10-CM

## 2022-03-30 DIAGNOSIS — T45.1X5A CHEMOTHERAPY-INDUCED NEUTROPENIA (H): ICD-10-CM

## 2022-03-30 DIAGNOSIS — F41.9 ANXIETY: ICD-10-CM

## 2022-03-30 DIAGNOSIS — Z17.1 MALIGNANT NEOPLASM OF UPPER-INNER QUADRANT OF RIGHT BREAST IN FEMALE, ESTROGEN RECEPTOR NEGATIVE (H): ICD-10-CM

## 2022-03-30 DIAGNOSIS — F43.22 ADJUSTMENT DISORDER WITH ANXIOUS MOOD: Primary | ICD-10-CM

## 2022-03-30 DIAGNOSIS — D70.1 CHEMOTHERAPY-INDUCED NEUTROPENIA (H): ICD-10-CM

## 2022-03-30 DIAGNOSIS — Z51.11 ENCOUNTER FOR ANTINEOPLASTIC CHEMOTHERAPY: Primary | ICD-10-CM

## 2022-03-30 PROCEDURE — 96372 THER/PROPH/DIAG INJ SC/IM: CPT | Performed by: INTERNAL MEDICINE

## 2022-03-30 PROCEDURE — 90832 PSYTX W PT 30 MINUTES: CPT | Mod: 95 | Performed by: PSYCHOLOGIST

## 2022-03-30 PROCEDURE — 250N000011 HC RX IP 250 OP 636: Performed by: INTERNAL MEDICINE

## 2022-03-30 RX ADMIN — FILGRASTIM 480 MCG: 480 INJECTION, SOLUTION INTRAVENOUS; SUBCUTANEOUS at 14:23

## 2022-03-30 NOTE — TELEPHONE ENCOUNTER
PREVISIT INFORMATION                                                    Diana Salvador scheduled for future visit at [facility] specialty clinics.    Patient is scheduled to see  on 04/01/2022  Reason for visit: consult breast reconstruction   Referring provider   Has patient seen previous specialist? No  Medical Records:  Available in chart.  Patient was previously seen at a [facility] or [facility] facility.    REVIEW                                                      New patient packet mailed to patient: No  Medication reconciliation complete: No      Current Outpatient Medications   Medication Sig Dispense Refill     alendronate (FOSAMAX) 35 MG tablet TAKE 1 TABLET(35 MG) BY MOUTH EVERY 7 DAYS 12 tablet 3     citalopram (CELEXA) 20 MG tablet Take 1 tablet (20 mg) by mouth daily 30 tablet 1     cyanocobalamin (CYANOCOBALAMIN) 1000 MCG/ML injection Inject 1 mL (1,000 mcg) into the muscle every 30 days 3 mL 3     cyclobenzaprine (FLEXERIL) 5 MG tablet TAKE 1 TABLET(5 MG) BY MOUTH TWICE DAILY AS NEEDED FOR MUSCLE SPASMS 40 tablet 3     diclofenac (VOLTAREN) 1 % topical gel Place 2 g onto the skin 4 times daily 100 g 3     doxepin (SILENOR) 3 MG tablet TAKE 1 TABLET(3 MG) BY MOUTH EVERY NIGHT AS NEEDED FOR SLEEP 90 tablet 0     filgrastim (NEUPOGEN) 300 MCG/ML injection Inject 1.6 mLs (480 mcg) Subcutaneous daily Give on days 16, 17, and 18. Start 24 hours after chemo. 4.8 mL 3     furosemide (LASIX) 20 MG tablet Take 1 tablet (20 mg) by mouth daily as needed (swelling) 60 tablet 5     granisetron (KYTRIL) 1 MG tablet Take 1 mg by mouth every 12 hours as needed for nausea       lidocaine-prilocaine (EMLA) 2.5-2.5 % external cream Apply thick layer to port site and cover with clear dressing or saran wrap 30-60 mins prior to appt. 30 g 0     LORazepam (ATIVAN) 0.5 MG tablet Take 1-2 tablets (0.5-1 mg) by mouth every 8 hours as needed for anxiety 60 tablet 3      omeprazole (PRILOSEC) 20 MG DR capsule As needed       potassium chloride ER (K-TAB) 20 MEQ CR tablet Take 20 mEq by mouth        prochlorperazine (COMPAZINE) 10 MG tablet Take 1 tablet (10 mg) by mouth every 6 hours as needed (Nausea/Vomiting) 30 tablet 2     rOPINIRole (REQUIP) 1 MG tablet Take 1 tablet (1 mg) by mouth At Bedtime 90 tablet 3     sucralfate (CARAFATE) 1 GM tablet Take 1 tablet (1 g) by mouth 4 times daily as needed (Abdominal discomfort) 30 tablet 0     [START ON 4/6/2022] traMADol (ULTRAM) 50 MG tablet Take 1 tablet (50 mg) by mouth every 6 hours as needed for severe pain 120 tablet 0     valACYclovir (VALTREX) 1000 mg tablet Take 1 tablet (1,000 mg) by mouth 2 times daily Repeat as needed for cold sore. 16 tablet 3     vitamin D3 (CHOLECALCIFEROL) 50 mcg (2000 units) tablet Take 1 tablet (50 mcg) by mouth daily 90 tablet 3       Allergies: Patient has no known allergies.    Breast resource links forwarded to pt via navabi..Rochelle Nichole RN      PLAN/FOLLOW-UP NEEDED                                                      Previsit review complete.  Patient will see provider at future scheduled appointment.     Patient Reminders Given:  Please, make sure you bring an updated list of your medications.   If you are having a procedure, please, present 15 minutes early.  If you need to cancel or reschedule,please call [phone].    Rochelle Nichole RN

## 2022-03-30 NOTE — PROGRESS NOTES
Infusion Nursing Note:  Diana Salvador presents today for Neupogen .    Patient seen by provider today: No   present during visit today: Not Applicable.    Note: With the next cycle patient is going to do Neupogen at home. Demonstrated today how to give a subcutaneous injection into abdomen. Pt states her son is an RN and he will give her the injections. She is familiar with injections as well as she gives herself B-12 injections.     Intravenous Access:  No Intravenous access/labs at this visit.    Treatment Conditions:  Not Applicable.      Post Infusion Assessment:  Patient tolerated injection without incident.  Site patent and intact, free from redness, edema or discomfort.     Discharge Plan:   Patient discharged in stable condition accompanied by: self.  Departure Mode: Ambulatory.      Johny Lovelace RN

## 2022-03-30 NOTE — PROGRESS NOTES
[facility] Oncology- Psychotherapy                                    Progress Note    Patient Name: Diana Salvador  Date: 3/30/22         Service Type: Individual      Session Start Time: 3:00  Session End Time: 3:30     Session Length: 30 minutes    Session #: 1    Attendees: Client attended alone    Service Modality:  Video Visit:      Provider verified identity through the following two step process.  Patient provided:  Patient photo    Telemedicine Visit: The patient's condition can be safely assessed and treated via synchronous audio and visual telemedicine encounter.      Reason for Telemedicine Visit: Services only offered telehealth    Originating Site (Patient Location): Patient's home    Distant Site (Provider Location): Provider Remote Setting- Home Office    Consent:  The patient/guardian has verbally consented to: the potential risks and benefits of telemedicine (video visit) versus in person care; bill my insurance or make self-payment for services provided; and responsibility for payment of non-covered services.     Patient would like the video invitation sent by:  My Chart    Mode of Communication:  Video Conference via [employer]    As the provider I attest to compliance with applicable laws and regulations related to telemedicine.    DATA  Interactive Complexity: No  Crisis: No      Current / Ongoing Stressors and Concerns:   Anxiety, worry, stress, feeling overwhelmed, loneliness, sadness, grief, cancer     Treatment Objective(s) Addressed in This Session:   identify coping strategies to more effectively address stressors     Intervention:   Motivational Interviewing: to help her focus on what she can control to reduce anxious thoughts       ASSESSMENT: Current Emotional / Mental Status (status of significant symptoms):   Risk status (Self / Other harm or suicidal ideation)   Patient denies current fears or concerns for personal safety.   Patient denies current or recent suicidal  ideation or behaviors.   Patient denies current or recent homicidal ideation or behaviors.   Patient denies current or recent self injurious behavior or ideation.   Patient denies other safety concerns.   Patient reports there has been no change in risk factors since their last session.     Patient reports there has been no change in protective factors since their last session.     Recommended that patient call 911 or go to the local ED should there be a change in any of these risk factors.     Appearance:   Appropriate    Eye Contact:   Good    Psychomotor Behavior: Normal    Attitude:   Cooperative    Orientation:   All   Speech    Rate / Production: Normal     Volume:  Normal    Mood:    Normal   Affect:    Appropriate    Thought Content:  Clear    Thought Form:  Coherent  Logical    Insight:    Good      Medication Review:   No current psychiatric medications prescribed     Medication Compliance:   NA     Changes in Health Issues:   None reported     Chemical Use Review:   Substance Use: Chemical use reviewed, no active concerns identified      Tobacco Use: No current tobacco use.      Diagnosis:  1. Adjustment disorder with anxious mood    2. Anxiety        PLAN: (Patient Tasks / Therapist Tasks / Other)  Cognitively focus on aspects of her life she can control to reduce anxious thinking    Mgaan Russ, PsyD                                                         ______________________________________________________________________    Individual Treatment Plan    Patient's Name: Diana Salvador  YOB: 1965    Date of Creation: 3/30/22  Date Treatment Plan Last Reviewed/Revised:     DSM5 Diagnoses: 300.09 (F41.8) Other Specified Anxiety Disorder   Psychosocial / Contextual Factors: cancer, worry, stress  Anticipated number of session for this episode of care: 5  Anticipation frequency of session: Every other week  Anticipated Duration of each session: 16-37 minutes  Treatment plan will be  reviewed in 90 days or when goals have been changed.       MeasurableTreatment Goal(s) related to diagnosis / functional impairment(s)  Goal 1: Patient will reduce the frequency of anxiety thinking    I will know I've met my goal when feel in more control of my anxiousness.        Magan Russ PsyD  March 30, 2022

## 2022-03-31 ENCOUNTER — INFUSION THERAPY VISIT (OUTPATIENT)
Dept: INFUSION THERAPY | Facility: CLINIC | Age: 57
End: 2022-03-31
Attending: INTERNAL MEDICINE
Payer: COMMERCIAL

## 2022-03-31 VITALS — HEART RATE: 72 BPM | DIASTOLIC BLOOD PRESSURE: 62 MMHG | SYSTOLIC BLOOD PRESSURE: 110 MMHG | RESPIRATION RATE: 16 BRPM

## 2022-03-31 DIAGNOSIS — D70.1 CHEMOTHERAPY-INDUCED NEUTROPENIA (H): ICD-10-CM

## 2022-03-31 DIAGNOSIS — T45.1X5A CHEMOTHERAPY-INDUCED NEUTROPENIA (H): ICD-10-CM

## 2022-03-31 DIAGNOSIS — Z51.11 ENCOUNTER FOR ANTINEOPLASTIC CHEMOTHERAPY: Primary | ICD-10-CM

## 2022-03-31 DIAGNOSIS — C50.211 MALIGNANT NEOPLASM OF UPPER-INNER QUADRANT OF RIGHT BREAST IN FEMALE, ESTROGEN RECEPTOR NEGATIVE (H): ICD-10-CM

## 2022-03-31 DIAGNOSIS — Z17.1 MALIGNANT NEOPLASM OF UPPER-INNER QUADRANT OF RIGHT BREAST IN FEMALE, ESTROGEN RECEPTOR NEGATIVE (H): ICD-10-CM

## 2022-03-31 PROCEDURE — 250N000011 HC RX IP 250 OP 636: Performed by: INTERNAL MEDICINE

## 2022-03-31 PROCEDURE — 96372 THER/PROPH/DIAG INJ SC/IM: CPT | Performed by: INTERNAL MEDICINE

## 2022-03-31 RX ADMIN — FILGRASTIM 480 MCG: 480 INJECTION, SOLUTION INTRAVENOUS; SUBCUTANEOUS at 13:51

## 2022-03-31 NOTE — PATIENT INSTRUCTIONS
Take Claritin (Over the counter) for Neupogen related bone pain starting today and for the next 3 days.

## 2022-03-31 NOTE — PROGRESS NOTES
Infusion Nursing Note:  Diana SANTA Elizabethluis presents today for Neupogen.    Patient seen by provider today: No   present during visit today: Not Applicable.    Note: Pt has an itchy rash on her hands and L side of abd. Pt encouraged to contact Dr. Diamond via [employer] and report her rash and attach a photo.       Intravenous Access:  N/A.    Treatment Conditions:  Not Applicable.      Post Infusion Assessment:  Patient tolerated injection without incident.       Discharge Plan:   Discharge instructions reviewed with: Patient.  Patient and/or family verbalized understanding of discharge instructions and all questions answered.  Copy of AVS reviewed with patient and/or family.   Patient discharged in stable condition accompanied by: self.  Departure Mode: Ambulatory.      Elvira Reardon RN

## 2022-04-01 ENCOUNTER — INFUSION THERAPY VISIT (OUTPATIENT)
Dept: INFUSION THERAPY | Facility: CLINIC | Age: 57
End: 2022-04-01
Attending: INTERNAL MEDICINE
Payer: COMMERCIAL

## 2022-04-01 ENCOUNTER — OFFICE VISIT (OUTPATIENT)
Dept: SURGERY | Facility: CLINIC | Age: 57
End: 2022-04-01
Payer: COMMERCIAL

## 2022-04-01 VITALS — TEMPERATURE: 99.9 F | DIASTOLIC BLOOD PRESSURE: 76 MMHG | SYSTOLIC BLOOD PRESSURE: 132 MMHG

## 2022-04-01 DIAGNOSIS — C50.211 MALIGNANT NEOPLASM OF UPPER-INNER QUADRANT OF RIGHT BREAST IN FEMALE, ESTROGEN RECEPTOR NEGATIVE (H): ICD-10-CM

## 2022-04-01 DIAGNOSIS — D70.1 CHEMOTHERAPY-INDUCED NEUTROPENIA (H): ICD-10-CM

## 2022-04-01 DIAGNOSIS — Z17.1 MALIGNANT NEOPLASM OF UPPER-INNER QUADRANT OF RIGHT BREAST IN FEMALE, ESTROGEN RECEPTOR NEGATIVE (H): ICD-10-CM

## 2022-04-01 DIAGNOSIS — T45.1X5A CHEMOTHERAPY-INDUCED NEUTROPENIA (H): ICD-10-CM

## 2022-04-01 DIAGNOSIS — Z51.11 ENCOUNTER FOR ANTINEOPLASTIC CHEMOTHERAPY: Primary | ICD-10-CM

## 2022-04-01 PROCEDURE — 250N000011 HC RX IP 250 OP 636: Performed by: INTERNAL MEDICINE

## 2022-04-01 PROCEDURE — 99204 OFFICE O/P NEW MOD 45 MIN: CPT | Performed by: PLASTIC SURGERY

## 2022-04-01 PROCEDURE — 96372 THER/PROPH/DIAG INJ SC/IM: CPT | Performed by: INTERNAL MEDICINE

## 2022-04-01 RX ORDER — LORATADINE 10 MG/1
10 TABLET ORAL DAILY
COMMUNITY
End: 2022-04-27

## 2022-04-01 RX ADMIN — FILGRASTIM 480 MCG: 480 INJECTION, SOLUTION INTRAVENOUS; SUBCUTANEOUS at 13:15

## 2022-04-01 NOTE — LETTER
2022         RE: Diana Salvador  [address] [place] [id number]        Dear Colleague,    Thank you for referring your patient, Diana Salvador, to the [facility]. Please see a copy of my visit note below.    CONSULT NOTE    REFERRING PROVIDER:  Baldemar Marina MD    PRESENTING COMPLAINT:  Consultation for breast reconstruction.    HISTORY OF PRESENTING COMPLAINT:  Ms. Salvador is 56 years old.  She has been diagnosed with right-sided breast cancer, and is undergoing a bilateral mastectomy and is interested in reconstruction.  She is undergoing neoadjuvant chemotherapy right now that will end sometime this summer.  The chance of radiation therapy is not zero, but low.  She wears a D cup bra and she would like to be around the same size.    PAST MEDICAL HISTORY:  Osteoarthritis, restless leg syndrome.    PAST SURGICAL HISTORY:  , laparoscopic cholecystectomy, vaginal hysterectomy and laparoscopic gastric bypass.    MEDICATIONS:  Ropinirole.    ALLERGIES:  Nil.    SOCIAL HISTORY:  Does not smoke, socially drinks.  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    REVIEW OF SYSTEMS:  Denies chest pain, shortness of breath, MI, CVA, DVT and PE.    PHYSICAL EXAMINATION:  Vital signs stable.  She is afebrile, in no obvious distress.  She is 5 feet 7 inches, 210 pounds, BMI 33 kg/m2.  On examination of her breasts, she has grade 2 on 3 ptosis.  She has laparoscopic scars on the abdomen and a  scar.  No hernias.  She has enough tissue to give her the size she wants.  No scars on the back.    ASSESSMENT AND PLAN:  Based on above findings, a diagnosis of right breast cancer, undergoing bilateral mastectomy, interested in breast reconstruction was made.  I had a jared detailed discussion with the patient in the presence of my nurse, Kim Bermudez, who was present from beginning to end.  Discussed with the patient and her  the concept behind breast reconstruction, the  elective nature of reconstruction, staged nature of reconstruction, implant-based versus autologous reconstruction, and the pros and cons of each.  I explained to the patient that the logistics of doing this would include potentially doing the first stage with an expander placement in [place].  The second stage, if she undergoes autologous reconstruction, which is the way she is leaning towards, would be done at the [place], and the third stage, symmetry enhancement and touchup surgeries would be done at [facility].  She understood the plan and agreed.  Overview of the surgeries were given to her.  Overall risks, benefits, and alternatives were given to her.  The plan is to see her back in the middle to end of summer to plan this accordingly.  She was happy with the visit.  I look forward to helping her out.  All questions answered.  She was happy with the visit.    Total time spent with chart review, the visit itself and post-visit paperwork was 45 minutes.    ROSEMARIE Nieto MD      cc:  Baldemar Marina, [facility]  [address] [place], [address]          Again, thank you for allowing me to participate in the care of your patient.        Sincerely,        ROSEMARIE Nieto MD

## 2022-04-01 NOTE — PROGRESS NOTES
Infusion Nursing Note:  Diana Salvador presents today for Neupogen injection.    Patient seen by provider today: No   present during visit today: Not Applicable.    Note: Patient had slight temperature today, 99.9. Advised her to continue to check her temperature at home and if she develops a fever of 100.4 or greater to call the nurse triage line. She verbalized understanding and will check her temperature at home.       Intravenous Access:  No Intravenous access/labs at this visit.    Treatment Conditions:  Not Applicable.      Post Infusion Assessment:  Patient tolerated injection without incident.  Site patent and intact, free from redness, edema or discomfort.  Access discontinued per protocol.       Discharge Plan:   Copy of AVS reviewed with patient and/or family.  Patient will return 4/5/22 for next appointment.  Patient discharged in stable condition accompanied by: self.  Departure Mode: Ambulatory.      Adrianna De Guzman RN

## 2022-04-01 NOTE — NURSING NOTE
Diana Salvador's goals for this visit include:   Chief Complaint   Patient presents with     Consult     Breast reconstruction       She requests these members of her care team be copied on today's visit information: yes    PCP: Quoc Chu    Referring Provider:  Baldemar Marina,   [address] [place] [address]    LMP  (LMP Unknown)     Do you need any medication refills at today's visit? No    Brea Bermudez LPN

## 2022-04-01 NOTE — PROGRESS NOTES
CONSULT NOTE    REFERRING PROVIDER:  Baldemar Marina MD    PRESENTING COMPLAINT:  Consultation for breast reconstruction.    HISTORY OF PRESENTING COMPLAINT:  Ms. Salvador is 56 years old.  She has been diagnosed with right-sided breast cancer, and is undergoing a bilateral mastectomy and is interested in reconstruction.  She is undergoing neoadjuvant chemotherapy right now that will end sometime this summer.  The chance of radiation therapy is not zero, but low.  She wears a D cup bra and she would like to be around the same size.    PAST MEDICAL HISTORY:  Osteoarthritis, restless leg syndrome.    PAST SURGICAL HISTORY:  , laparoscopic cholecystectomy, vaginal hysterectomy and laparoscopic gastric bypass.    MEDICATIONS:  Ropinirole.    ALLERGIES:  Nil.    SOCIAL HISTORY:  Does not smoke, socially drinks.  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    REVIEW OF SYSTEMS:  Denies chest pain, shortness of breath, MI, CVA, DVT and PE.    PHYSICAL EXAMINATION:  Vital signs stable.  She is afebrile, in no obvious distress.  She is 5 feet 7 inches, 210 pounds, BMI 33 kg/m2.  On examination of her breasts, she has grade 2 on 3 ptosis.  She has laparoscopic scars on the abdomen and a  scar.  No hernias.  She has enough tissue to give her the size she wants.  No scars on the back.    ASSESSMENT AND PLAN:  Based on above findings, a diagnosis of right breast cancer, undergoing bilateral mastectomy, interested in breast reconstruction was made.  I had a jared detailed discussion with the patient in the presence of my nurse, Kim Bermudez, who was present from beginning to end.  Discussed with the patient and her  the concept behind breast reconstruction, the elective nature of reconstruction, staged nature of reconstruction, implant-based versus autologous reconstruction, and the pros and cons of each.  I explained to the patient that the logistics of doing this would include potentially doing the first  stage with an expander placement in [place].  The second stage, if she undergoes autologous reconstruction, which is the way she is leaning towards, would be done at the [place], and the third stage, symmetry enhancement and touchup surgeries would be done at [facility].  She understood the plan and agreed.  Overview of the surgeries were given to her.  Overall risks, benefits, and alternatives were given to her.  The plan is to see her back in the middle to end of summer to plan this accordingly.  She was happy with the visit.  I look forward to helping her out.  All questions answered.  She was happy with the visit.    Total time spent with chart review, the visit itself and post-visit paperwork was 45 minutes.    ROSEMARIE Nieto MD      cc:  Baldemar Marina, [facility]  [address] [place], [place], [address]

## 2022-04-05 ENCOUNTER — LAB (OUTPATIENT)
Dept: INFUSION THERAPY | Facility: CLINIC | Age: 57
End: 2022-04-05
Attending: INTERNAL MEDICINE
Payer: COMMERCIAL

## 2022-04-05 DIAGNOSIS — T45.1X5A CHEMOTHERAPY-INDUCED NEUTROPENIA (H): ICD-10-CM

## 2022-04-05 DIAGNOSIS — Z51.11 ENCOUNTER FOR ANTINEOPLASTIC CHEMOTHERAPY: Primary | ICD-10-CM

## 2022-04-05 DIAGNOSIS — C50.211 MALIGNANT NEOPLASM OF UPPER-INNER QUADRANT OF RIGHT BREAST IN FEMALE, ESTROGEN RECEPTOR NEGATIVE (H): ICD-10-CM

## 2022-04-05 DIAGNOSIS — Z17.1 MALIGNANT NEOPLASM OF UPPER-INNER QUADRANT OF RIGHT BREAST IN FEMALE, ESTROGEN RECEPTOR NEGATIVE (H): ICD-10-CM

## 2022-04-05 DIAGNOSIS — D70.1 CHEMOTHERAPY-INDUCED NEUTROPENIA (H): ICD-10-CM

## 2022-04-05 LAB
ALBUMIN SERPL-MCNC: 2.8 G/DL (ref 3.4–5)
ALP SERPL-CCNC: 91 U/L (ref 40–150)
ALT SERPL W P-5'-P-CCNC: 19 U/L (ref 0–50)
ANION GAP SERPL CALCULATED.3IONS-SCNC: 6 MMOL/L (ref 3–14)
AST SERPL W P-5'-P-CCNC: 10 U/L (ref 0–45)
BASOPHILS # BLD MANUAL: 0.1 10E3/UL (ref 0–0.2)
BASOPHILS NFR BLD MANUAL: 1 %
BILIRUB SERPL-MCNC: 0.2 MG/DL (ref 0.2–1.3)
BUN SERPL-MCNC: 11 MG/DL (ref 7–30)
CALCIUM SERPL-MCNC: 8.6 MG/DL (ref 8.5–10.1)
CANCER AG27-29 SERPL-ACNC: 7 U/ML (ref 0–39)
CEA SERPL-MCNC: 3 UG/L (ref 0–2.5)
CHLORIDE BLD-SCNC: 107 MMOL/L (ref 94–109)
CO2 SERPL-SCNC: 29 MMOL/L (ref 20–32)
CREAT SERPL-MCNC: 0.66 MG/DL (ref 0.52–1.04)
EOSINOPHIL # BLD MANUAL: 0.1 10E3/UL (ref 0–0.7)
EOSINOPHIL NFR BLD MANUAL: 2 %
ERYTHROCYTE [DISTWIDTH] IN BLOOD BY AUTOMATED COUNT: 14.5 % (ref 10–15)
GFR SERPL CREATININE-BSD FRML MDRD: >90 ML/MIN/1.73M2
GLUCOSE BLD-MCNC: 93 MG/DL (ref 70–99)
HCT VFR BLD AUTO: 36.4 % (ref 35–47)
HGB BLD-MCNC: 11.4 G/DL (ref 11.7–15.7)
LYMPHOCYTES # BLD MANUAL: 2.5 10E3/UL (ref 0.8–5.3)
LYMPHOCYTES NFR BLD MANUAL: 45 %
MCH RBC QN AUTO: 28.7 PG (ref 26.5–33)
MCHC RBC AUTO-ENTMCNC: 31.3 G/DL (ref 31.5–36.5)
MCV RBC AUTO: 92 FL (ref 78–100)
METAMYELOCYTES # BLD MANUAL: 0.1 10E3/UL
METAMYELOCYTES NFR BLD MANUAL: 2 %
MONOCYTES # BLD MANUAL: 0.6 10E3/UL (ref 0–1.3)
MONOCYTES NFR BLD MANUAL: 11 %
MYELOCYTES # BLD MANUAL: 0.1 10E3/UL
MYELOCYTES NFR BLD MANUAL: 2 %
NEUTROPHILS # BLD MANUAL: 2 10E3/UL (ref 1.6–8.3)
NEUTROPHILS NFR BLD MANUAL: 37 %
PLAT MORPH BLD: ABNORMAL
PLATELET # BLD AUTO: 232 10E3/UL (ref 150–450)
POTASSIUM BLD-SCNC: 3.7 MMOL/L (ref 3.4–5.3)
PROT SERPL-MCNC: 6.2 G/DL (ref 6.8–8.8)
RBC # BLD AUTO: 3.97 10E6/UL (ref 3.8–5.2)
RBC MORPH BLD: ABNORMAL
SODIUM SERPL-SCNC: 142 MMOL/L (ref 133–144)
TSH SERPL DL<=0.005 MIU/L-ACNC: 0.74 MU/L (ref 0.4–4)
VARIANT LYMPHS BLD QL SMEAR: PRESENT
WBC # BLD AUTO: 5.5 10E3/UL (ref 4–11)

## 2022-04-05 PROCEDURE — 250N000011 HC RX IP 250 OP 636: Performed by: INTERNAL MEDICINE

## 2022-04-05 PROCEDURE — 85027 COMPLETE CBC AUTOMATED: CPT | Performed by: INTERNAL MEDICINE

## 2022-04-05 PROCEDURE — 86300 IMMUNOASSAY TUMOR CA 15-3: CPT | Performed by: INTERNAL MEDICINE

## 2022-04-05 PROCEDURE — 36591 DRAW BLOOD OFF VENOUS DEVICE: CPT

## 2022-04-05 PROCEDURE — 80053 COMPREHEN METABOLIC PANEL: CPT | Performed by: INTERNAL MEDICINE

## 2022-04-05 PROCEDURE — 84443 ASSAY THYROID STIM HORMONE: CPT | Performed by: INTERNAL MEDICINE

## 2022-04-05 PROCEDURE — 82378 CARCINOEMBRYONIC ANTIGEN: CPT | Performed by: INTERNAL MEDICINE

## 2022-04-05 RX ORDER — HEPARIN SODIUM (PORCINE) LOCK FLUSH IV SOLN 100 UNIT/ML 100 UNIT/ML
5 SOLUTION INTRAVENOUS ONCE
Status: COMPLETED | OUTPATIENT
Start: 2022-04-05 | End: 2022-04-05

## 2022-04-05 RX ADMIN — Medication 5 ML: at 11:47

## 2022-04-06 ENCOUNTER — ONCOLOGY VISIT (OUTPATIENT)
Dept: ONCOLOGY | Facility: CLINIC | Age: 57
End: 2022-04-06
Attending: INTERNAL MEDICINE
Payer: COMMERCIAL

## 2022-04-06 ENCOUNTER — INFUSION THERAPY VISIT (OUTPATIENT)
Dept: INFUSION THERAPY | Facility: CLINIC | Age: 57
End: 2022-04-06
Attending: INTERNAL MEDICINE
Payer: COMMERCIAL

## 2022-04-06 VITALS — DIASTOLIC BLOOD PRESSURE: 65 MMHG | HEART RATE: 73 BPM | SYSTOLIC BLOOD PRESSURE: 107 MMHG

## 2022-04-06 VITALS
TEMPERATURE: 97.4 F | HEART RATE: 73 BPM | BODY MASS INDEX: 33.2 KG/M2 | SYSTOLIC BLOOD PRESSURE: 109 MMHG | DIASTOLIC BLOOD PRESSURE: 73 MMHG | OXYGEN SATURATION: 99 % | RESPIRATION RATE: 12 BRPM | WEIGHT: 212 LBS

## 2022-04-06 DIAGNOSIS — T45.1X5A CHEMOTHERAPY-INDUCED NEUTROPENIA (H): ICD-10-CM

## 2022-04-06 DIAGNOSIS — D70.1 CHEMOTHERAPY-INDUCED NEUTROPENIA (H): ICD-10-CM

## 2022-04-06 DIAGNOSIS — T45.1X5A CHEMOTHERAPY-INDUCED NAUSEA: ICD-10-CM

## 2022-04-06 DIAGNOSIS — Z17.1 MALIGNANT NEOPLASM OF UPPER-INNER QUADRANT OF RIGHT BREAST IN FEMALE, ESTROGEN RECEPTOR NEGATIVE (H): ICD-10-CM

## 2022-04-06 DIAGNOSIS — C50.211 MALIGNANT NEOPLASM OF UPPER-INNER QUADRANT OF RIGHT BREAST IN FEMALE, ESTROGEN RECEPTOR NEGATIVE (H): Primary | ICD-10-CM

## 2022-04-06 DIAGNOSIS — R11.0 CHEMOTHERAPY-INDUCED NAUSEA: ICD-10-CM

## 2022-04-06 DIAGNOSIS — Z17.1 MALIGNANT NEOPLASM OF UPPER-INNER QUADRANT OF RIGHT BREAST IN FEMALE, ESTROGEN RECEPTOR NEGATIVE (H): Primary | ICD-10-CM

## 2022-04-06 DIAGNOSIS — Z51.11 ENCOUNTER FOR ANTINEOPLASTIC CHEMOTHERAPY: Primary | ICD-10-CM

## 2022-04-06 DIAGNOSIS — C50.211 MALIGNANT NEOPLASM OF UPPER-INNER QUADRANT OF RIGHT BREAST IN FEMALE, ESTROGEN RECEPTOR NEGATIVE (H): ICD-10-CM

## 2022-04-06 DIAGNOSIS — Z51.11 ENCOUNTER FOR ANTINEOPLASTIC CHEMOTHERAPY: ICD-10-CM

## 2022-04-06 PROCEDURE — 96413 CHEMO IV INFUSION 1 HR: CPT

## 2022-04-06 PROCEDURE — 96417 CHEMO IV INFUS EACH ADDL SEQ: CPT

## 2022-04-06 PROCEDURE — 99215 OFFICE O/P EST HI 40 MIN: CPT | Performed by: INTERNAL MEDICINE

## 2022-04-06 PROCEDURE — G0463 HOSPITAL OUTPT CLINIC VISIT: HCPCS | Mod: 25

## 2022-04-06 PROCEDURE — 258N000003 HC RX IP 258 OP 636: Performed by: INTERNAL MEDICINE

## 2022-04-06 PROCEDURE — 96375 TX/PRO/DX INJ NEW DRUG ADDON: CPT

## 2022-04-06 PROCEDURE — 250N000011 HC RX IP 250 OP 636: Performed by: INTERNAL MEDICINE

## 2022-04-06 PROCEDURE — 96367 TX/PROPH/DG ADDL SEQ IV INF: CPT

## 2022-04-06 RX ORDER — ALBUTEROL SULFATE 90 UG/1
1-2 AEROSOL, METERED RESPIRATORY (INHALATION)
Status: CANCELLED
Start: 2022-04-27

## 2022-04-06 RX ORDER — DIPHENHYDRAMINE HCL 25 MG
50 CAPSULE ORAL
Status: CANCELLED
Start: 2022-05-11

## 2022-04-06 RX ORDER — LORAZEPAM 2 MG/ML
0.5 INJECTION INTRAMUSCULAR EVERY 4 HOURS PRN
Status: CANCELLED | OUTPATIENT
Start: 2022-05-04

## 2022-04-06 RX ORDER — NALOXONE HYDROCHLORIDE 0.4 MG/ML
0.2 INJECTION, SOLUTION INTRAMUSCULAR; INTRAVENOUS; SUBCUTANEOUS
Status: CANCELLED | OUTPATIENT
Start: 2022-04-27

## 2022-04-06 RX ORDER — DIPHENHYDRAMINE HCL 25 MG
50 CAPSULE ORAL
Status: CANCELLED
Start: 2022-04-06

## 2022-04-06 RX ORDER — EPINEPHRINE 1 MG/ML
0.3 INJECTION, SOLUTION, CONCENTRATE INTRAVENOUS EVERY 5 MIN PRN
Status: CANCELLED | OUTPATIENT
Start: 2022-04-13

## 2022-04-06 RX ORDER — DIPHENHYDRAMINE HYDROCHLORIDE 50 MG/ML
50 INJECTION INTRAMUSCULAR; INTRAVENOUS
Status: CANCELLED
Start: 2022-04-13

## 2022-04-06 RX ORDER — ALBUTEROL SULFATE 0.83 MG/ML
2.5 SOLUTION RESPIRATORY (INHALATION)
Status: CANCELLED | OUTPATIENT
Start: 2022-04-20

## 2022-04-06 RX ORDER — ALBUTEROL SULFATE 90 UG/1
1-2 AEROSOL, METERED RESPIRATORY (INHALATION)
Status: CANCELLED
Start: 2022-05-11

## 2022-04-06 RX ORDER — NALOXONE HYDROCHLORIDE 0.4 MG/ML
0.2 INJECTION, SOLUTION INTRAMUSCULAR; INTRAVENOUS; SUBCUTANEOUS
Status: CANCELLED | OUTPATIENT
Start: 2022-05-04

## 2022-04-06 RX ORDER — LORAZEPAM 2 MG/ML
0.5 INJECTION INTRAMUSCULAR EVERY 4 HOURS PRN
Status: CANCELLED | OUTPATIENT
Start: 2022-04-06

## 2022-04-06 RX ORDER — METHYLPREDNISOLONE SODIUM SUCCINATE 125 MG/2ML
125 INJECTION, POWDER, LYOPHILIZED, FOR SOLUTION INTRAMUSCULAR; INTRAVENOUS
Status: CANCELLED
Start: 2022-04-20

## 2022-04-06 RX ORDER — OLANZAPINE 10 MG/1
10 TABLET ORAL AT BEDTIME
Qty: 7 TABLET | Refills: 5 | Status: SHIPPED | OUTPATIENT
Start: 2022-04-06 | End: 2022-06-08

## 2022-04-06 RX ORDER — LORAZEPAM 2 MG/ML
0.5 INJECTION INTRAMUSCULAR EVERY 4 HOURS PRN
Status: CANCELLED | OUTPATIENT
Start: 2022-04-13

## 2022-04-06 RX ORDER — EPINEPHRINE 1 MG/ML
0.3 INJECTION, SOLUTION, CONCENTRATE INTRAVENOUS EVERY 5 MIN PRN
Status: CANCELLED | OUTPATIENT
Start: 2022-04-06

## 2022-04-06 RX ORDER — HEPARIN SODIUM (PORCINE) LOCK FLUSH IV SOLN 100 UNIT/ML 100 UNIT/ML
5 SOLUTION INTRAVENOUS
Status: CANCELLED | OUTPATIENT
Start: 2022-05-11

## 2022-04-06 RX ORDER — DIPHENHYDRAMINE HYDROCHLORIDE 50 MG/ML
50 INJECTION INTRAMUSCULAR; INTRAVENOUS
Status: CANCELLED
Start: 2022-04-20

## 2022-04-06 RX ORDER — DIPHENHYDRAMINE HCL 25 MG
50 CAPSULE ORAL
Status: CANCELLED
Start: 2022-05-04

## 2022-04-06 RX ORDER — DIPHENHYDRAMINE HYDROCHLORIDE 50 MG/ML
50 INJECTION INTRAMUSCULAR; INTRAVENOUS
Status: CANCELLED
Start: 2022-04-06

## 2022-04-06 RX ORDER — LORAZEPAM 2 MG/ML
0.5 INJECTION INTRAMUSCULAR EVERY 4 HOURS PRN
Status: CANCELLED | OUTPATIENT
Start: 2022-04-27

## 2022-04-06 RX ORDER — LORAZEPAM 2 MG/ML
0.5 INJECTION INTRAMUSCULAR EVERY 4 HOURS PRN
Status: CANCELLED | OUTPATIENT
Start: 2022-04-20

## 2022-04-06 RX ORDER — DIPHENHYDRAMINE HCL 25 MG
50 CAPSULE ORAL
Status: CANCELLED
Start: 2022-04-20

## 2022-04-06 RX ORDER — ALBUTEROL SULFATE 0.83 MG/ML
2.5 SOLUTION RESPIRATORY (INHALATION)
Status: CANCELLED | OUTPATIENT
Start: 2022-04-13

## 2022-04-06 RX ORDER — ALBUTEROL SULFATE 0.83 MG/ML
2.5 SOLUTION RESPIRATORY (INHALATION)
Status: CANCELLED | OUTPATIENT
Start: 2022-05-04

## 2022-04-06 RX ORDER — METHYLPREDNISOLONE SODIUM SUCCINATE 125 MG/2ML
125 INJECTION, POWDER, LYOPHILIZED, FOR SOLUTION INTRAMUSCULAR; INTRAVENOUS
Status: CANCELLED
Start: 2022-04-13

## 2022-04-06 RX ORDER — METHYLPREDNISOLONE SODIUM SUCCINATE 125 MG/2ML
125 INJECTION, POWDER, LYOPHILIZED, FOR SOLUTION INTRAMUSCULAR; INTRAVENOUS
Status: CANCELLED
Start: 2022-04-06

## 2022-04-06 RX ORDER — MEPERIDINE HYDROCHLORIDE 25 MG/ML
25 INJECTION INTRAMUSCULAR; INTRAVENOUS; SUBCUTANEOUS EVERY 30 MIN PRN
Status: CANCELLED | OUTPATIENT
Start: 2022-05-04

## 2022-04-06 RX ORDER — MEPERIDINE HYDROCHLORIDE 25 MG/ML
25 INJECTION INTRAMUSCULAR; INTRAVENOUS; SUBCUTANEOUS EVERY 30 MIN PRN
Status: CANCELLED | OUTPATIENT
Start: 2022-04-13

## 2022-04-06 RX ORDER — NALOXONE HYDROCHLORIDE 0.4 MG/ML
0.2 INJECTION, SOLUTION INTRAMUSCULAR; INTRAVENOUS; SUBCUTANEOUS
Status: CANCELLED | OUTPATIENT
Start: 2022-04-06

## 2022-04-06 RX ORDER — DIPHENHYDRAMINE HCL 25 MG
50 CAPSULE ORAL
Status: CANCELLED
Start: 2022-04-27

## 2022-04-06 RX ORDER — ALBUTEROL SULFATE 0.83 MG/ML
2.5 SOLUTION RESPIRATORY (INHALATION)
Status: CANCELLED | OUTPATIENT
Start: 2022-05-11

## 2022-04-06 RX ORDER — METHYLPREDNISOLONE SODIUM SUCCINATE 125 MG/2ML
125 INJECTION, POWDER, LYOPHILIZED, FOR SOLUTION INTRAMUSCULAR; INTRAVENOUS
Status: CANCELLED
Start: 2022-05-04

## 2022-04-06 RX ORDER — NALOXONE HYDROCHLORIDE 0.4 MG/ML
0.2 INJECTION, SOLUTION INTRAMUSCULAR; INTRAVENOUS; SUBCUTANEOUS
Status: CANCELLED | OUTPATIENT
Start: 2022-04-13

## 2022-04-06 RX ORDER — DIPHENHYDRAMINE HYDROCHLORIDE 50 MG/ML
50 INJECTION INTRAMUSCULAR; INTRAVENOUS
Status: CANCELLED
Start: 2022-05-11

## 2022-04-06 RX ORDER — HEPARIN SODIUM (PORCINE) LOCK FLUSH IV SOLN 100 UNIT/ML 100 UNIT/ML
5 SOLUTION INTRAVENOUS
Status: CANCELLED | OUTPATIENT
Start: 2022-04-27

## 2022-04-06 RX ORDER — HEPARIN SODIUM (PORCINE) LOCK FLUSH IV SOLN 100 UNIT/ML 100 UNIT/ML
5 SOLUTION INTRAVENOUS
Status: CANCELLED | OUTPATIENT
Start: 2022-04-13

## 2022-04-06 RX ORDER — ALBUTEROL SULFATE 90 UG/1
1-2 AEROSOL, METERED RESPIRATORY (INHALATION)
Status: CANCELLED
Start: 2022-04-13

## 2022-04-06 RX ORDER — ALBUTEROL SULFATE 90 UG/1
1-2 AEROSOL, METERED RESPIRATORY (INHALATION)
Status: CANCELLED
Start: 2022-04-06

## 2022-04-06 RX ORDER — METHYLPREDNISOLONE SODIUM SUCCINATE 125 MG/2ML
125 INJECTION, POWDER, LYOPHILIZED, FOR SOLUTION INTRAMUSCULAR; INTRAVENOUS
Status: CANCELLED
Start: 2022-04-27

## 2022-04-06 RX ORDER — HEPARIN SODIUM (PORCINE) LOCK FLUSH IV SOLN 100 UNIT/ML 100 UNIT/ML
5 SOLUTION INTRAVENOUS
Status: CANCELLED | OUTPATIENT
Start: 2022-04-20

## 2022-04-06 RX ORDER — EPINEPHRINE 1 MG/ML
0.3 INJECTION, SOLUTION, CONCENTRATE INTRAVENOUS EVERY 5 MIN PRN
Status: CANCELLED | OUTPATIENT
Start: 2022-05-11

## 2022-04-06 RX ORDER — LORAZEPAM 2 MG/ML
0.5 INJECTION INTRAMUSCULAR EVERY 4 HOURS PRN
Status: CANCELLED | OUTPATIENT
Start: 2022-05-11

## 2022-04-06 RX ORDER — ALBUTEROL SULFATE 90 UG/1
1-2 AEROSOL, METERED RESPIRATORY (INHALATION)
Status: CANCELLED
Start: 2022-05-04

## 2022-04-06 RX ORDER — EPINEPHRINE 1 MG/ML
0.3 INJECTION, SOLUTION, CONCENTRATE INTRAVENOUS EVERY 5 MIN PRN
Status: CANCELLED | OUTPATIENT
Start: 2022-04-20

## 2022-04-06 RX ORDER — MEPERIDINE HYDROCHLORIDE 25 MG/ML
25 INJECTION INTRAMUSCULAR; INTRAVENOUS; SUBCUTANEOUS EVERY 30 MIN PRN
Status: CANCELLED | OUTPATIENT
Start: 2022-04-20

## 2022-04-06 RX ORDER — EPINEPHRINE 1 MG/ML
0.3 INJECTION, SOLUTION, CONCENTRATE INTRAVENOUS EVERY 5 MIN PRN
Status: CANCELLED | OUTPATIENT
Start: 2022-05-04

## 2022-04-06 RX ORDER — ALBUTEROL SULFATE 0.83 MG/ML
2.5 SOLUTION RESPIRATORY (INHALATION)
Status: CANCELLED | OUTPATIENT
Start: 2022-04-27

## 2022-04-06 RX ORDER — HEPARIN SODIUM (PORCINE) LOCK FLUSH IV SOLN 100 UNIT/ML 100 UNIT/ML
5 SOLUTION INTRAVENOUS
Status: DISCONTINUED | OUTPATIENT
Start: 2022-04-06 | End: 2022-04-06 | Stop reason: HOSPADM

## 2022-04-06 RX ORDER — MEPERIDINE HYDROCHLORIDE 25 MG/ML
25 INJECTION INTRAMUSCULAR; INTRAVENOUS; SUBCUTANEOUS EVERY 30 MIN PRN
Status: CANCELLED | OUTPATIENT
Start: 2022-04-27

## 2022-04-06 RX ORDER — HEPARIN SODIUM (PORCINE) LOCK FLUSH IV SOLN 100 UNIT/ML 100 UNIT/ML
5 SOLUTION INTRAVENOUS
Status: CANCELLED | OUTPATIENT
Start: 2022-05-04

## 2022-04-06 RX ORDER — NALOXONE HYDROCHLORIDE 0.4 MG/ML
0.2 INJECTION, SOLUTION INTRAMUSCULAR; INTRAVENOUS; SUBCUTANEOUS
Status: CANCELLED | OUTPATIENT
Start: 2022-05-11

## 2022-04-06 RX ORDER — DIPHENHYDRAMINE HCL 25 MG
50 CAPSULE ORAL
Status: CANCELLED
Start: 2022-04-13

## 2022-04-06 RX ORDER — MEPERIDINE HYDROCHLORIDE 25 MG/ML
25 INJECTION INTRAMUSCULAR; INTRAVENOUS; SUBCUTANEOUS EVERY 30 MIN PRN
Status: CANCELLED | OUTPATIENT
Start: 2022-05-11

## 2022-04-06 RX ORDER — DIPHENHYDRAMINE HYDROCHLORIDE 50 MG/ML
50 INJECTION INTRAMUSCULAR; INTRAVENOUS
Status: CANCELLED
Start: 2022-05-04

## 2022-04-06 RX ORDER — ALBUTEROL SULFATE 90 UG/1
1-2 AEROSOL, METERED RESPIRATORY (INHALATION)
Status: CANCELLED
Start: 2022-04-20

## 2022-04-06 RX ORDER — METHYLPREDNISOLONE SODIUM SUCCINATE 125 MG/2ML
125 INJECTION, POWDER, LYOPHILIZED, FOR SOLUTION INTRAMUSCULAR; INTRAVENOUS
Status: CANCELLED
Start: 2022-05-11

## 2022-04-06 RX ORDER — MEPERIDINE HYDROCHLORIDE 25 MG/ML
25 INJECTION INTRAMUSCULAR; INTRAVENOUS; SUBCUTANEOUS EVERY 30 MIN PRN
Status: CANCELLED | OUTPATIENT
Start: 2022-04-06

## 2022-04-06 RX ORDER — ALBUTEROL SULFATE 0.83 MG/ML
2.5 SOLUTION RESPIRATORY (INHALATION)
Status: CANCELLED | OUTPATIENT
Start: 2022-04-06

## 2022-04-06 RX ORDER — NALOXONE HYDROCHLORIDE 0.4 MG/ML
0.2 INJECTION, SOLUTION INTRAMUSCULAR; INTRAVENOUS; SUBCUTANEOUS
Status: CANCELLED | OUTPATIENT
Start: 2022-04-20

## 2022-04-06 RX ORDER — DIPHENHYDRAMINE HYDROCHLORIDE 50 MG/ML
50 INJECTION INTRAMUSCULAR; INTRAVENOUS
Status: CANCELLED
Start: 2022-04-27

## 2022-04-06 RX ORDER — EPINEPHRINE 1 MG/ML
0.3 INJECTION, SOLUTION, CONCENTRATE INTRAVENOUS EVERY 5 MIN PRN
Status: CANCELLED | OUTPATIENT
Start: 2022-04-27

## 2022-04-06 RX ADMIN — FOSAPREPITANT 150 MG: 150 INJECTION, POWDER, LYOPHILIZED, FOR SOLUTION INTRAVENOUS at 11:26

## 2022-04-06 RX ADMIN — SODIUM CHLORIDE 250 ML: 9 INJECTION, SOLUTION INTRAVENOUS at 10:42

## 2022-04-06 RX ADMIN — SODIUM CHLORIDE 200 MG: 9 INJECTION, SOLUTION INTRAVENOUS at 11:49

## 2022-04-06 RX ADMIN — FAMOTIDINE 20 MG: 20 INJECTION, SOLUTION INTRAVENOUS at 10:57

## 2022-04-06 RX ADMIN — Medication 5 ML: at 14:11

## 2022-04-06 RX ADMIN — CARBOPLATIN 600 MG: 10 INJECTION, SOLUTION INTRAVENOUS at 13:37

## 2022-04-06 RX ADMIN — DEXAMETHASONE SODIUM PHOSPHATE: 10 INJECTION, SOLUTION INTRAMUSCULAR; INTRAVENOUS at 10:42

## 2022-04-06 RX ADMIN — PACLITAXEL 173 MG: 6 INJECTION, SOLUTION INTRAVENOUS at 12:32

## 2022-04-06 ASSESSMENT — PAIN SCALES - GENERAL: PAINLEVEL: SEVERE PAIN (6)

## 2022-04-06 NOTE — PATIENT INSTRUCTIONS
I have recommended to continue chemotherapy as laboratory studies and overall clinical tolerance/toxicity profile are adequate for continued neoadjuvant chemotherapy.  We will commence cycle #2 today.  We will administer the following drugs in the infusion clinic today: Pembrolizumab 200 mg, paclitaxel 80 mg per metered squared, and carboplatin AUC 5.  Please self inject filgrastim as prescribed during your current chemotherapy cycle.    Please schedule your next appointment in our clinic with Marilu Vergara NP in 3 weeks.    Also schedule you next appointment for an office visit with me in 6 weeks.    I started you on olanzapine today to improve your control of chemotherapy-induced nausea.  You can take this medication for 7 days starting the night before he received chemotherapy with carboplatin and/or doxorubicin.    You may take 2 extra strength Tylenol 2-3 times daily for the management of your joint pain.  If you are unable to control your joint pain or any other symptoms please contact my office.    Today, we discussed your genetic testing which was completed recently and did not show any harmful BRCA1/2 mutations. Bilateral prophylactic mastectomies have been associated with an improvement in overall survival in patients with harmful BRCA 1/2 mutations but are not thought to be of any survival benefit in patients who are negative for those mutations.

## 2022-04-06 NOTE — LETTER
"    4/6/2022         RE: Diana Salvador  [address] [place] [id number]        Dear Colleague,    Thank you for referring your patient, Diana Salvador, to the [facility]. Please see a copy of my visit note below.    Oncology Rooming Note    April 6, 2022 9:37 AM   Diana Salvador is a 56 year old female who presents for:    Chief Complaint   Patient presents with     Oncology Clinic Visit     Malignant neoplasm of upper-inner quadrant of right breast in female, estrogen receptor negative - Provider and infusion     Initial Vitals: /73 (BP Location: Right arm, Patient Position: Left side, Cuff Size: Adult Regular)   Pulse 73   Temp 97.4  F (36.3  C) (Tympanic)   Resp 12   Wt 96.2 kg (212 lb)   LMP  (LMP Unknown)   SpO2 99%   BMI 33.20 kg/m   Estimated body mass index is 33.2 kg/m  as calculated from the following:    Height as of 3/22/22: 1.702 m (5' 7\").    Weight as of this encounter: 96.2 kg (212 lb). Body surface area is 2.13 meters squared.  Severe Pain (6) Comment: Data Unavailable   No LMP recorded (lmp unknown). Patient has had a hysterectomy.  Allergies reviewed: Yes  Medications reviewed: Yes    Medications: Medication refills not needed today.  Pharmacy name entered into [employer]:    [id number] - [place] [id number] [facility] AT [address] AND [facility]    Clinical concerns: Patient is having a lot of joint pain.      Emy Hollingsworth CMA              The patient is being seen for a toxicity assessment while neoadjuvant chemotherapy for breast cancer -high risk drug therapy requiring intensive monitoring for toxicity.  Her main side effect has been persistent nausea.  Fortunately, she has had no emesis.  She takes Kytril as needed once a day and Compazine as needed.  She has also developed diffuse polyarthropathy which is usually the worst today after chemotherapy including her weekly paclitaxel " infusions.  She had a rash shortly after receiving her first chemotherapy infusion which involved her trunk and went away on its own.  She has not had any fevers or chills.  1. Malignant neoplasm of upper-inner quadrant of right breast in female, estrogen receptor negative (triple-neg., germline test neg. for BRCA 1/2)    2. Encounter for antineoplastic chemotherapy    3. Chemotherapy-induced nausea      PHYSICAL EXAMINATION:    General: Todays' vital signs reviewed in the EMR.    ECOG PS is 1  HEENT: No scleral icterus  Cardiovascular: Cor RRR  Respiratory: Lungs clear to auscultation bilaterally  Skin: Left subclavian port site is clean without irritation or discharge.    ASSESSMENT/PLAN:    1. Malignant neoplasm of upper-inner quadrant of right breast in female, estrogen receptor negative (triple-neg., germline test neg. for BRCA 1/2)    2. Encounter for antineoplastic chemotherapy    3. Chemotherapy-induced nausea        Labs from yesterday reviewed (TSH, CBC, and CMP):    Lab on 04/05/2022   Component Date Value Ref Range Status     Sodium 04/05/2022 142  133 - 144 mmol/L Final     Potassium 04/05/2022 3.7  3.4 - 5.3 mmol/L Final     Chloride 04/05/2022 107  94 - 109 mmol/L Final     Carbon Dioxide (CO2) 04/05/2022 29  20 - 32 mmol/L Final     Anion Gap 04/05/2022 6  3 - 14 mmol/L Final     Urea Nitrogen 04/05/2022 11  7 - 30 mg/dL Final     Creatinine 04/05/2022 0.66  0.52 - 1.04 mg/dL Final     Calcium 04/05/2022 8.6  8.5 - 10.1 mg/dL Final     Glucose 04/05/2022 93  70 - 99 mg/dL Final     Alkaline Phosphatase 04/05/2022 91  40 - 150 U/L Final     AST 04/05/2022 10  0 - 45 U/L Final     ALT 04/05/2022 19  0 - 50 U/L Final     Protein Total 04/05/2022 6.2 (A) 6.8 - 8.8 g/dL Final     Albumin 04/05/2022 2.8 (A) 3.4 - 5.0 g/dL Final     Bilirubin Total 04/05/2022 0.2  0.2 - 1.3 mg/dL Final     GFR Estimate 04/05/2022 >90  >60 mL/min/1.73m2 Final    Effective December 21, 2021 eGFRcr in adults is calculated  using the 2021 CKD-EPI creatinine equation which includes age and gender (Issac et al., [facility], DOI: 10.1056/SUGFkb7321679)     CA 27-29 04/05/2022 7  0 - 39 U/mL Final     CEA 04/05/2022 3.0 (A) 0.0 - 2.5 ug/L Final    Smoking may cause CEA results to be elevated.     TSH 04/05/2022 0.74  0.40 - 4.00 mU/L Final     WBC Count 04/05/2022 5.5  4.0 - 11.0 10e3/uL Final     RBC Count 04/05/2022 3.97  3.80 - 5.20 10e6/uL Final     Hemoglobin 04/05/2022 11.4 (A) 11.7 - 15.7 g/dL Final     Hematocrit 04/05/2022 36.4  35.0 - 47.0 % Final     MCV 04/05/2022 92  78 - 100 fL Final     MCH 04/05/2022 28.7  26.5 - 33.0 pg Final     MCHC 04/05/2022 31.3 (A) 31.5 - 36.5 g/dL Final     RDW 04/05/2022 14.5  10.0 - 15.0 % Final     Platelet Count 04/05/2022 232  150 - 450 10e3/uL Final     % Neutrophils 04/05/2022 37  % Final     % Lymphocytes 04/05/2022 45  % Final     % Monocytes 04/05/2022 11  % Final     % Eosinophils 04/05/2022 2  % Final     % Basophils 04/05/2022 1  % Final     % Metamyelocytes 04/05/2022 2  % Final     % Myelocytes 04/05/2022 2  % Final     Absolute Neutrophils 04/05/2022 2.0  1.6 - 8.3 10e3/uL Final     Absolute Lymphocytes 04/05/2022 2.5  0.8 - 5.3 10e3/uL Final     Absolute Monocytes 04/05/2022 0.6  0.0 - 1.3 10e3/uL Final     Absolute Eosinophils 04/05/2022 0.1  0.0 - 0.7 10e3/uL Final     Absolute Basophils 04/05/2022 0.1  0.0 - 0.2 10e3/uL Final     Absolute Metamyelocytes 04/05/2022 0.1 (A) <=0.0 10e3/uL Final     Absolute Myelocytes 04/05/2022 0.1 (A) <=0.0 10e3/uL Final     RBC Morphology 04/05/2022 Confirmed RBC Indices   Final     Platelet Assessment 04/05/2022 Automated Count Confirmed. Platelet morphology is normal.  Automated Count Confirmed. Platelet morphology is normal. Final     Reactive Lymphocytes 04/05/2022 Present (A) None Seen Final     I have recommended to continue chemotherapy as laboratory studies and overall clinical tolerance/toxicity profile are adequate for continued neoadjuvant  chemotherapy.  We will commence cycle #2 today.  We will administer the following drugs in the infusion clinic today: Pembrolizumab 200 mg, paclitaxel 80 mg per metered squared, and carboplatin AUC 5.  Please self inject filgrastim as prescribed during your current chemotherapy cycle.    Please schedule your next appointment in our clinic with Marilu Vergara NP in 3 weeks.    Also schedule you next appointment for an office visit with me in 6 weeks.    I started you on olanzapine today to improve your control of chemotherapy-induced nausea.  You can take this medication for 7 days starting the night before he received chemotherapy with carboplatin and/or doxorubicin.    You may take 2 extra strength Tylenol 2-3 times daily for the management of your joint pain.  If you are unable to control your joint pain or any other symptoms please contact my office.    We discussed your genetic testing which was completed recently and did not show any harmful BRCA1/2 mutations.  Bilateral prophylactic mastectomies have been associated with an improvement in overall survival in patients with harmful BRCA 1/2 mutations but not thought to be of any survival benefit in patients who are negative for those mutations.      I spent a total of 42 minutes on the care of this patient on the day of service including face to face time and the remainder in chart review, care coordination, and documentation on the day of service.                  Again, thank you for allowing me to participate in the care of your patient.        Sincerely,        Ricardo Diamond MD

## 2022-04-06 NOTE — PROGRESS NOTES
The patient is being seen for a toxicity assessment while neoadjuvant chemotherapy for breast cancer -high risk drug therapy requiring intensive monitoring for toxicity.  Her main side effect has been persistent nausea.  Fortunately, she has had no emesis.  She takes Kytril as needed once a day and Compazine as needed.  She has also developed diffuse polyarthropathy which is usually the worst today after chemotherapy including her weekly paclitaxel infusions.  She had a rash shortly after receiving her first chemotherapy infusion which involved her trunk and went away on its own.  She has not had any fevers or chills.  1. Malignant neoplasm of upper-inner quadrant of right breast in female, estrogen receptor negative (triple-neg., germline test neg. for BRCA 1/2)    2. Encounter for antineoplastic chemotherapy    3. Chemotherapy-induced nausea      PHYSICAL EXAMINATION:    General: Todays' vital signs reviewed in the EMR.    ECOG PS is 1  HEENT: No scleral icterus  Cardiovascular: Cor RRR  Respiratory: Lungs clear to auscultation bilaterally  Skin: Left subclavian port site is clean without irritation or discharge.    ASSESSMENT/PLAN:    1. Malignant neoplasm of upper-inner quadrant of right breast in female, estrogen receptor negative (triple-neg., germline test neg. for BRCA 1/2)    2. Encounter for antineoplastic chemotherapy    3. Chemotherapy-induced nausea        Labs from yesterday reviewed (TSH, CBC, and CMP):    Lab on 04/05/2022   Component Date Value Ref Range Status     Sodium 04/05/2022 142  133 - 144 mmol/L Final     Potassium 04/05/2022 3.7  3.4 - 5.3 mmol/L Final     Chloride 04/05/2022 107  94 - 109 mmol/L Final     Carbon Dioxide (CO2) 04/05/2022 29  20 - 32 mmol/L Final     Anion Gap 04/05/2022 6  3 - 14 mmol/L Final     Urea Nitrogen 04/05/2022 11  7 - 30 mg/dL Final     Creatinine 04/05/2022 0.66  0.52 - 1.04 mg/dL Final     Calcium 04/05/2022 8.6  8.5 - 10.1 mg/dL Final     Glucose 04/05/2022  93  70 - 99 mg/dL Final     Alkaline Phosphatase 04/05/2022 91  40 - 150 U/L Final     AST 04/05/2022 10  0 - 45 U/L Final     ALT 04/05/2022 19  0 - 50 U/L Final     Protein Total 04/05/2022 6.2 (A) 6.8 - 8.8 g/dL Final     Albumin 04/05/2022 2.8 (A) 3.4 - 5.0 g/dL Final     Bilirubin Total 04/05/2022 0.2  0.2 - 1.3 mg/dL Final     GFR Estimate 04/05/2022 >90  >60 mL/min/1.73m2 Final    Effective December 21, 2021 eGFRcr in adults is calculated using the 2021 CKD-EPI creatinine equation which includes age and gender (Issac et al., [place], DOI: 10.1056/YQGYof2665954)     CA 27-29 04/05/2022 7  0 - 39 U/mL Final     CEA 04/05/2022 3.0 (A) 0.0 - 2.5 ug/L Final    Smoking may cause CEA results to be elevated.     TSH 04/05/2022 0.74  0.40 - 4.00 mU/L Final     WBC Count 04/05/2022 5.5  4.0 - 11.0 10e3/uL Final     RBC Count 04/05/2022 3.97  3.80 - 5.20 10e6/uL Final     Hemoglobin 04/05/2022 11.4 (A) 11.7 - 15.7 g/dL Final     Hematocrit 04/05/2022 36.4  35.0 - 47.0 % Final     MCV 04/05/2022 92  78 - 100 fL Final     MCH 04/05/2022 28.7  26.5 - 33.0 pg Final     MCHC 04/05/2022 31.3 (A) 31.5 - 36.5 g/dL Final     RDW 04/05/2022 14.5  10.0 - 15.0 % Final     Platelet Count 04/05/2022 232  150 - 450 10e3/uL Final     % Neutrophils 04/05/2022 37  % Final     % Lymphocytes 04/05/2022 45  % Final     % Monocytes 04/05/2022 11  % Final     % Eosinophils 04/05/2022 2  % Final     % Basophils 04/05/2022 1  % Final     % Metamyelocytes 04/05/2022 2  % Final     % Myelocytes 04/05/2022 2  % Final     Absolute Neutrophils 04/05/2022 2.0  1.6 - 8.3 10e3/uL Final     Absolute Lymphocytes 04/05/2022 2.5  0.8 - 5.3 10e3/uL Final     Absolute Monocytes 04/05/2022 0.6  0.0 - 1.3 10e3/uL Final     Absolute Eosinophils 04/05/2022 0.1  0.0 - 0.7 10e3/uL Final     Absolute Basophils 04/05/2022 0.1  0.0 - 0.2 10e3/uL Final     Absolute Metamyelocytes 04/05/2022 0.1 (A) <=0.0 10e3/uL Final     Absolute Myelocytes 04/05/2022 0.1 (A) <=0.0  10e3/uL Final     RBC Morphology 04/05/2022 Confirmed RBC Indices   Final     Platelet Assessment 04/05/2022 Automated Count Confirmed. Platelet morphology is normal.  Automated Count Confirmed. Platelet morphology is normal. Final     Reactive Lymphocytes 04/05/2022 Present (A) None Seen Final     I have recommended to continue chemotherapy as laboratory studies and overall clinical tolerance/toxicity profile are adequate for continued neoadjuvant chemotherapy.  We will commence cycle #2 today.  We will administer the following drugs in the infusion clinic today: Pembrolizumab 200 mg, paclitaxel 80 mg per metered squared, and carboplatin AUC 5.  Please self inject filgrastim as prescribed during your current chemotherapy cycle.    Please schedule your next appointment in our clinic with Marilu Vergara NP in 3 weeks.    Also schedule you next appointment for an office visit with me in 6 weeks.    I started you on olanzapine today to improve your control of chemotherapy-induced nausea.  You can take this medication for 7 days starting the night before he received chemotherapy with carboplatin and/or doxorubicin.    You may take 2 extra strength Tylenol 2-3 times daily for the management of your joint pain.  If you are unable to control your joint pain or any other symptoms please contact my office.    We discussed your genetic testing which was completed recently and did not show any harmful BRCA1/2 mutations.  Bilateral prophylactic mastectomies have been associated with an improvement in overall survival in patients with harmful BRCA 1/2 mutations but not thought to be of any survival benefit in patients who are negative for those mutations.      I spent a total of 42 minutes on the care of this patient on the day of service including face to face time and the remainder in chart review, care coordination, and documentation on the day of service.

## 2022-04-06 NOTE — PROGRESS NOTES
"Oncology Rooming Note    April 6, 2022 9:37 AM   Diana Salvador is a 56 year old female who presents for:    Chief Complaint   Patient presents with     Oncology Clinic Visit     Malignant neoplasm of upper-inner quadrant of right breast in female, estrogen receptor negative - Provider and infusion     Initial Vitals: /73 (BP Location: Right arm, Patient Position: Left side, Cuff Size: Adult Regular)   Pulse 73   Temp 97.4  F (36.3  C) (Tympanic)   Resp 12   Wt 96.2 kg (212 lb)   LMP  (LMP Unknown)   SpO2 99%   BMI 33.20 kg/m   Estimated body mass index is 33.2 kg/m  as calculated from the following:    Height as of 3/22/22: 1.702 m (5' 7\").    Weight as of this encounter: 96.2 kg (212 lb). Body surface area is 2.13 meters squared.  Severe Pain (6) Comment: Data Unavailable   No LMP recorded (lmp unknown). Patient has had a hysterectomy.  Allergies reviewed: Yes  Medications reviewed: Yes    Medications: Medication refills not needed today.  Pharmacy name entered into [employer]:    [facility] #[id number] - [place], [place] - [address] AT [address]    Clinical concerns: Patient is having a lot of joint pain.      Emy Hollingsworth CMA            "

## 2022-04-06 NOTE — PROGRESS NOTES
Infusion Nursing Note:  Diana KIET Elizabethluis presents today for C2D1 Keytruda, Taxol, Carboplatin.    Patient seen by provider today: Yes: Dr. Diamond   present during visit today: Not Applicable.    Note: N/A.      Intravenous Access:  Implanted Port.    Treatment Conditions:  Results reviewed, labs MET treatment parameters, ok to proceed with treatment.      Post Infusion Assessment:  Patient tolerated infusion without incident.  Blood return noted pre and post infusion.  Site patent and intact, free from redness, edema or discomfort.  No evidence of extravasations.  Access discontinued per protocol.       Discharge Plan:   Patient discharged in stable condition accompanied by: self.  Departure Mode: Ambulatory.      Leonila Dunlap RN

## 2022-04-13 ENCOUNTER — LAB (OUTPATIENT)
Dept: INFUSION THERAPY | Facility: CLINIC | Age: 57
End: 2022-04-13
Attending: INTERNAL MEDICINE
Payer: COMMERCIAL

## 2022-04-13 VITALS — DIASTOLIC BLOOD PRESSURE: 77 MMHG | SYSTOLIC BLOOD PRESSURE: 117 MMHG | HEART RATE: 66 BPM | TEMPERATURE: 98.5 F

## 2022-04-13 DIAGNOSIS — Z51.11 ENCOUNTER FOR ANTINEOPLASTIC CHEMOTHERAPY: Primary | ICD-10-CM

## 2022-04-13 DIAGNOSIS — T45.1X5A CHEMOTHERAPY-INDUCED NEUTROPENIA (H): ICD-10-CM

## 2022-04-13 DIAGNOSIS — D70.1 CHEMOTHERAPY-INDUCED NEUTROPENIA (H): ICD-10-CM

## 2022-04-13 DIAGNOSIS — Z17.1 MALIGNANT NEOPLASM OF UPPER-INNER QUADRANT OF RIGHT BREAST IN FEMALE, ESTROGEN RECEPTOR NEGATIVE (H): ICD-10-CM

## 2022-04-13 DIAGNOSIS — Z51.11 ENCOUNTER FOR ANTINEOPLASTIC CHEMOTHERAPY: ICD-10-CM

## 2022-04-13 DIAGNOSIS — C50.211 MALIGNANT NEOPLASM OF UPPER-INNER QUADRANT OF RIGHT BREAST IN FEMALE, ESTROGEN RECEPTOR NEGATIVE (H): ICD-10-CM

## 2022-04-13 LAB
BASOPHILS # BLD AUTO: 0 10E3/UL (ref 0–0.2)
BASOPHILS NFR BLD AUTO: 1 %
CREAT SERPL-MCNC: 0.51 MG/DL (ref 0.52–1.04)
EOSINOPHIL # BLD AUTO: 0.1 10E3/UL (ref 0–0.7)
EOSINOPHIL NFR BLD AUTO: 2 %
ERYTHROCYTE [DISTWIDTH] IN BLOOD BY AUTOMATED COUNT: 15.1 % (ref 10–15)
GFR SERPL CREATININE-BSD FRML MDRD: >90 ML/MIN/1.73M2
HCT VFR BLD AUTO: 35.3 % (ref 35–47)
HGB BLD-MCNC: 11.3 G/DL (ref 11.7–15.7)
IMM GRANULOCYTES # BLD: 0 10E3/UL
IMM GRANULOCYTES NFR BLD: 0 %
LYMPHOCYTES # BLD AUTO: 1.7 10E3/UL (ref 0.8–5.3)
LYMPHOCYTES NFR BLD AUTO: 31 %
MCH RBC QN AUTO: 28.9 PG (ref 26.5–33)
MCHC RBC AUTO-ENTMCNC: 32 G/DL (ref 31.5–36.5)
MCV RBC AUTO: 90 FL (ref 78–100)
MONOCYTES # BLD AUTO: 0.3 10E3/UL (ref 0–1.3)
MONOCYTES NFR BLD AUTO: 6 %
NEUTROPHILS # BLD AUTO: 3.3 10E3/UL (ref 1.6–8.3)
NEUTROPHILS NFR BLD AUTO: 60 %
NRBC # BLD AUTO: 0 10E3/UL
NRBC BLD AUTO-RTO: 0 /100
PLATELET # BLD AUTO: 256 10E3/UL (ref 150–450)
RBC # BLD AUTO: 3.91 10E6/UL (ref 3.8–5.2)
WBC # BLD AUTO: 5.4 10E3/UL (ref 4–11)

## 2022-04-13 PROCEDURE — 85025 COMPLETE CBC W/AUTO DIFF WBC: CPT | Performed by: INTERNAL MEDICINE

## 2022-04-13 PROCEDURE — 250N000011 HC RX IP 250 OP 636: Performed by: INTERNAL MEDICINE

## 2022-04-13 PROCEDURE — 258N000003 HC RX IP 258 OP 636: Performed by: INTERNAL MEDICINE

## 2022-04-13 PROCEDURE — 82565 ASSAY OF CREATININE: CPT | Performed by: INTERNAL MEDICINE

## 2022-04-13 PROCEDURE — 96413 CHEMO IV INFUSION 1 HR: CPT

## 2022-04-13 PROCEDURE — 96375 TX/PRO/DX INJ NEW DRUG ADDON: CPT

## 2022-04-13 RX ORDER — HEPARIN SODIUM (PORCINE) LOCK FLUSH IV SOLN 100 UNIT/ML 100 UNIT/ML
5 SOLUTION INTRAVENOUS
Status: DISCONTINUED | OUTPATIENT
Start: 2022-04-13 | End: 2022-04-13 | Stop reason: HOSPADM

## 2022-04-13 RX ADMIN — PACLITAXEL 173 MG: 6 INJECTION, SOLUTION INTRAVENOUS at 13:49

## 2022-04-13 RX ADMIN — DEXAMETHASONE SODIUM PHOSPHATE: 10 INJECTION, SOLUTION INTRAMUSCULAR; INTRAVENOUS at 13:38

## 2022-04-13 RX ADMIN — Medication 5 ML: at 15:04

## 2022-04-13 RX ADMIN — SODIUM CHLORIDE 250 ML: 9 INJECTION, SOLUTION INTRAVENOUS at 13:38

## 2022-04-13 NOTE — PROGRESS NOTES
Infusion Nursing Note:  Diana Salvador presents today for C2D8 Taxol.    Patient seen by provider today: No   present during visit today: Not Applicable.    Note: pt received only Zofran/Decadron as pre-meds today.      Intravenous Access:  Implanted Port.    Treatment Conditions:  Results reviewed, labs MET treatment parameters, ok to proceed with treatment.      Post Infusion Assessment:  Patient tolerated infusion without incident.  Blood return noted pre and post infusion.  Site patent and intact, free from redness, edema or discomfort.  No evidence of extravasations.  Access discontinued per protocol.       Discharge Plan:   Patient discharged in stable condition accompanied by: self.  Departure Mode: Ambulatory.  Pt scheduled for next tx 4/19/22.      Leonila Dunlap RN

## 2022-04-19 ENCOUNTER — LAB (OUTPATIENT)
Dept: INFUSION THERAPY | Facility: CLINIC | Age: 57
End: 2022-04-19
Attending: INTERNAL MEDICINE
Payer: COMMERCIAL

## 2022-04-19 ENCOUNTER — ONCOLOGY VISIT (OUTPATIENT)
Dept: ONCOLOGY | Facility: CLINIC | Age: 57
End: 2022-04-19
Attending: INTERNAL MEDICINE
Payer: COMMERCIAL

## 2022-04-19 VITALS — BODY MASS INDEX: 32.48 KG/M2 | WEIGHT: 207.4 LBS

## 2022-04-19 VITALS — HEART RATE: 76 BPM | DIASTOLIC BLOOD PRESSURE: 86 MMHG | TEMPERATURE: 98.8 F | SYSTOLIC BLOOD PRESSURE: 130 MMHG

## 2022-04-19 DIAGNOSIS — D70.1 CHEMOTHERAPY-INDUCED NEUTROPENIA (H): ICD-10-CM

## 2022-04-19 DIAGNOSIS — Z17.1 MALIGNANT NEOPLASM OF UPPER-INNER QUADRANT OF RIGHT BREAST IN FEMALE, ESTROGEN RECEPTOR NEGATIVE (H): ICD-10-CM

## 2022-04-19 DIAGNOSIS — C50.211 MALIGNANT NEOPLASM OF UPPER-INNER QUADRANT OF RIGHT BREAST IN FEMALE, ESTROGEN RECEPTOR NEGATIVE (H): ICD-10-CM

## 2022-04-19 DIAGNOSIS — T45.1X5A CHEMOTHERAPY-INDUCED NEUTROPENIA (H): Primary | ICD-10-CM

## 2022-04-19 DIAGNOSIS — R21 RASH: Primary | ICD-10-CM

## 2022-04-19 DIAGNOSIS — M25.50 ARTHRALGIA, UNSPECIFIED JOINT: ICD-10-CM

## 2022-04-19 DIAGNOSIS — Z51.11 ENCOUNTER FOR ANTINEOPLASTIC CHEMOTHERAPY: ICD-10-CM

## 2022-04-19 DIAGNOSIS — Z51.11 ENCOUNTER FOR ANTINEOPLASTIC CHEMOTHERAPY: Primary | ICD-10-CM

## 2022-04-19 DIAGNOSIS — T45.1X5A CHEMOTHERAPY-INDUCED NEUTROPENIA (H): ICD-10-CM

## 2022-04-19 DIAGNOSIS — D70.1 CHEMOTHERAPY-INDUCED NEUTROPENIA (H): Primary | ICD-10-CM

## 2022-04-19 LAB
BASOPHILS # BLD AUTO: 0 10E3/UL (ref 0–0.2)
BASOPHILS NFR BLD AUTO: 1 %
CREAT SERPL-MCNC: 0.57 MG/DL (ref 0.52–1.04)
EOSINOPHIL # BLD AUTO: 0 10E3/UL (ref 0–0.7)
EOSINOPHIL NFR BLD AUTO: 1 %
ERYTHROCYTE [DISTWIDTH] IN BLOOD BY AUTOMATED COUNT: 15.9 % (ref 10–15)
GFR SERPL CREATININE-BSD FRML MDRD: >90 ML/MIN/1.73M2
HCT VFR BLD AUTO: 36.3 % (ref 35–47)
HGB BLD-MCNC: 11.5 G/DL (ref 11.7–15.7)
IMM GRANULOCYTES # BLD: 0 10E3/UL
IMM GRANULOCYTES NFR BLD: 0 %
LYMPHOCYTES # BLD AUTO: 1.7 10E3/UL (ref 0.8–5.3)
LYMPHOCYTES NFR BLD AUTO: 57 %
MCH RBC QN AUTO: 29.2 PG (ref 26.5–33)
MCHC RBC AUTO-ENTMCNC: 31.7 G/DL (ref 31.5–36.5)
MCV RBC AUTO: 92 FL (ref 78–100)
MONOCYTES # BLD AUTO: 0.3 10E3/UL (ref 0–1.3)
MONOCYTES NFR BLD AUTO: 10 %
NEUTROPHILS # BLD AUTO: 0.9 10E3/UL (ref 1.6–8.3)
NEUTROPHILS NFR BLD AUTO: 31 %
NRBC # BLD AUTO: 0 10E3/UL
NRBC BLD AUTO-RTO: 0 /100
PLATELET # BLD AUTO: 361 10E3/UL (ref 150–450)
RBC # BLD AUTO: 3.94 10E6/UL (ref 3.8–5.2)
WBC # BLD AUTO: 3 10E3/UL (ref 4–11)

## 2022-04-19 PROCEDURE — 85004 AUTOMATED DIFF WBC COUNT: CPT | Performed by: INTERNAL MEDICINE

## 2022-04-19 PROCEDURE — 96367 TX/PROPH/DG ADDL SEQ IV INF: CPT

## 2022-04-19 PROCEDURE — 82565 ASSAY OF CREATININE: CPT | Performed by: INTERNAL MEDICINE

## 2022-04-19 PROCEDURE — 258N000003 HC RX IP 258 OP 636: Performed by: INTERNAL MEDICINE

## 2022-04-19 PROCEDURE — 96375 TX/PRO/DX INJ NEW DRUG ADDON: CPT

## 2022-04-19 PROCEDURE — 250N000011 HC RX IP 250 OP 636: Performed by: INTERNAL MEDICINE

## 2022-04-19 PROCEDURE — 99213 OFFICE O/P EST LOW 20 MIN: CPT | Performed by: NURSE PRACTITIONER

## 2022-04-19 PROCEDURE — G0463 HOSPITAL OUTPT CLINIC VISIT: HCPCS | Mod: 25

## 2022-04-19 PROCEDURE — 96413 CHEMO IV INFUSION 1 HR: CPT

## 2022-04-19 RX ORDER — TRIAMCINOLONE ACETONIDE 1 MG/G
CREAM TOPICAL 2 TIMES DAILY
Qty: 30 G | Refills: 1 | Status: SHIPPED | OUTPATIENT
Start: 2022-04-19 | End: 2022-05-23

## 2022-04-19 RX ORDER — HEPARIN SODIUM (PORCINE) LOCK FLUSH IV SOLN 100 UNIT/ML 100 UNIT/ML
5 SOLUTION INTRAVENOUS
Status: DISCONTINUED | OUTPATIENT
Start: 2022-04-19 | End: 2022-04-19 | Stop reason: HOSPADM

## 2022-04-19 RX ORDER — DIPHENHYDRAMINE HCL 25 MG
50 CAPSULE ORAL ONCE
Status: CANCELLED
Start: 2022-04-19

## 2022-04-19 RX ORDER — HYDROCODONE BITARTRATE AND ACETAMINOPHEN 5; 325 MG/1; MG/1
1 TABLET ORAL EVERY 6 HOURS PRN
Qty: 30 TABLET | Refills: 0 | Status: SHIPPED | OUTPATIENT
Start: 2022-04-19 | End: 2022-04-22

## 2022-04-19 RX ADMIN — DIPHENHYDRAMINE HYDROCHLORIDE 50 MG: 50 INJECTION INTRAMUSCULAR; INTRAVENOUS at 12:18

## 2022-04-19 RX ADMIN — PACLITAXEL 173 MG: 6 INJECTION, SOLUTION INTRAVENOUS at 12:51

## 2022-04-19 RX ADMIN — FAMOTIDINE 20 MG: 20 INJECTION, SOLUTION INTRAVENOUS at 12:06

## 2022-04-19 RX ADMIN — DEXAMETHASONE SODIUM PHOSPHATE: 10 INJECTION, SOLUTION INTRAMUSCULAR; INTRAVENOUS at 12:33

## 2022-04-19 RX ADMIN — SODIUM CHLORIDE 250 ML: 9 INJECTION, SOLUTION INTRAVENOUS at 12:04

## 2022-04-19 RX ADMIN — Medication 5 ML: at 14:00

## 2022-04-19 NOTE — RESULT ENCOUNTER NOTE
Please schedule the patient for a phone visit with Marilu Vergara NP today or tomorrow re.: neutropenia.

## 2022-04-19 NOTE — LETTER
4/19/2022         RE: Diana Salvador  [address] [place] [id number]        Dear Colleague,    Thank you for referring your patient, Diana Salvador, to the [facility] [place]. Please see a copy of my visit note below.    [facility] Hematology and Oncology Outpatient Progress Note ([place])    Patient: Diana Salvador  MRN: [id number]  Date of Service: 03/22/2022        Reason for Visit    1. Hand rash  2. Generalized arthralgias  3. Stage IIB triple negative breast cancer  4. On neoadjuvant chemo    Primary Oncologist: Dr. Diamond    Assessment/Plan  1.   Stage IIB triple negative breast cancer; on neoadjuvant chemo + immunotherapy  2.   Chemo-induced neutropenia (ANC 0.9), asymptomatic  3.   Chemo vs immunotherapy dermatitis  Aide is tolerating her second cycle of pembrolizumab, carboplatin and taxol fair.   ANC 0.9 today going into day 15 Taxol.     Pruritic dermatitis on dorsal hand/thumbs R>L this past week. Prior rash on trunk resolved. Appears somewhat vesicular, but given it's distrubution, bilateral presentation and non-painful it does not appear to be shingles. More likely side effect of either Pembrolizumab or Taxol.    Plan:  -Proceed with day 15 today. Will premed with Benadryl, Pepcid and Dex each week with Taxol in case related.   -She will take Neupogen days 16-18 as planned (has this at home)  -Start Claritin daily for rash and Taxol +/- Neupogen-induced arthralgias  -Triamcinolone 0.1% to affected areas BID until improved, Rx sent  -Call clinic if rash progresses and we may need to start oral steroids, but trying to avoid as this could theoretically interfere with immunotherapy effect  -Return in 1 week, ahead of cycle 3 for reassessment.  -Overall plan is 4 cycles of this, followed by 4 cycles of AC + Pembro; then bilateral mastectomies with Dr. Marina    4.   Diffuse polyarthralgias, treatment-induced  Started after initiation of this regimen, worse few days  after each chemo infusion (including weekly taxol alone). Tylenol and ibuprofen scheduled is not helping and inhibiting her from sleep.   This could be immunotherapy, Taxol and/or Neupogen related (more likely immunotherapy +/- Taxol since it occurs throughout cycle, not just around her Neupogen doses.    Plan:  -Claritin daily through rest of treatment for Taxol and/or Neupogen-induced pain   -Hydrocodone 5 mg/acetaminophen 325 mg tabs 1-2 as needed for severe pain, particularly before bed  -Acetaminophen +/- ibuprofen during day. Reviewed not to exceed 3000 mg acetaminophen/24 hrs when combining    5.   Chemo-induced nausea  She's on olanzapine x 7 days starting night before day 1 of each cycle (before carboplatin and/or doxorubicin when this is started in future)    6.  Anxiety, secondary to cancer diagnosis  Did not like Wellbutrin (weight gain) and stopped this. Is using lorazepam 0.5 mg once daily, on average but feels she needs more frequently. Has good support network with family, but is feeling overwhelmed and anxious with her diagnosis.   Started Celexa 20 mg daily a month ago; may take 6-8 weeks for full effect. Avoid/minimize use of tramadol on this (drug:drug interaction) - she uses tramadol infrequently for chronic pain.  -Lorazepam 0.5 - 1 mg up to TID, as needed. This will be for short-term use until Celexa helping.  -Referral for Oncology psychotherapy.      ______________________________________________________________________________    History of Present Illness/ Interval History    Ms. Diana Salvador  is a 56 year old on neoadjvuant treatment with pembrolizumab, carboplatin and taxol (weekly) . Was evaluated in Infusion today when presenting for cycle 2, day 15 taxol.     She has had a 4 day onset of intensely pruritic, non-painful raised rash on bilateral dorsal thumbs/hand R>L. She had a rash on trunk a few weeks ago that resolved.     She's struggled with diffuse, intense polyarthralgia (hips  especially, but also knees and shoulders) since starting treatment. Seems to flare after each dose, including weekly Taxol. Is only taking Claritin days 16-18 with her Neupogen injections. Taking Tylenol and Ibuprofen alternating/scheduled without adequate relief, especially overnight.     No fevers.     ECOG Performance    0      Oncology History/Treatment  Diagnosis/Stage:   2/2022: Stage IIB (cT2-cN0-cM0) right breast cancer (triple negative)  -self-palpated right breast lump  -diagnostic mammo + breast US: 2.4 cm R breast (2:00, lower-inner quad) lump and no axillary nodes detected  -breast biopsy: invasive ductal carcinoma, grade 3. ER neg, PA neg, HER2 neg via FISH  -CA 27.29 WNL (<5)    Treatment:  3/15/2022 - present: Neoadjuvant chemo (based off KEYNOTE-522) Pembrolizumab 200 mg D1 q 21 days + Carboplatin AUC=5 D1 q21 Days + weekly Paclitaxel 80 mg/m  D1, 8, 15  x 12 Weeks (with Filgrastim 5 mcg/kg subcutaneously once daily on days 16, 17, and 18 of cycles 1 through 4)  -->4 cycles planned; followed by 4 cycles (cycles 5-8) pembrolizumab + doxorubicin + cyclophosphomide every 21 days (with Neulasta support)     Planning bilateral mastectomies with Dr. Marina following neoadjuvant treatment      Physical Exam    GENERAL: Alert and oriented to time place and person. Seated comfortably in Infusion chair.  Alone.   HEAD: Atraumatic and normocephalic. Alopecia.  EYES: MELISSA, EOMI. No erythema. No icterus.  BREASTS: Not assessed today. Reference exam 3/22/22.  LYMPH NODES: Not assessed today.  CHEST: clear to auscultation bilaterally. Resonant to percussion throughout bilaterally. Symmetrical breath movements bilaterally.  CVS: S1 and S2 are heard. Regular rate and rhythm. No murmur or gallop or rub heard.  ABDOMEN: Soft. Not tender. Not distended. No palpable hepatomegaly or splenomegaly. No other mass palpable. Bowel sounds present.  EXTREMITIES: Warm. No peripheral edema.  SKIN: Raised papular pink lesions  right dorsal thumb/medial hand and to a lesser extent left dorsal thumb area. No other sites rash noted.  NEURO: No gross deficit noted. Non-antalgic gait.      Lab Results    Recent Results (from the past 168 hour(s))   Creatinine   Result Value Ref Range    Creatinine 0.51 (L) 0.52 - 1.04 mg/dL    GFR Estimate >90 >60 mL/min/1.73m2   CBC with platelets and differential   Result Value Ref Range    WBC Count 5.4 4.0 - 11.0 10e3/uL    RBC Count 3.91 3.80 - 5.20 10e6/uL    Hemoglobin 11.3 (L) 11.7 - 15.7 g/dL    Hematocrit 35.3 35.0 - 47.0 %    MCV 90 78 - 100 fL    MCH 28.9 26.5 - 33.0 pg    MCHC 32.0 31.5 - 36.5 g/dL    RDW 15.1 (H) 10.0 - 15.0 %    Platelet Count 256 150 - 450 10e3/uL    % Neutrophils 60 %    % Lymphocytes 31 %    % Monocytes 6 %    % Eosinophils 2 %    % Basophils 1 %    % Immature Granulocytes 0 %    NRBCs per 100 WBC 0 <1 /100    Absolute Neutrophils 3.3 1.6 - 8.3 10e3/uL    Absolute Lymphocytes 1.7 0.8 - 5.3 10e3/uL    Absolute Monocytes 0.3 0.0 - 1.3 10e3/uL    Absolute Eosinophils 0.1 0.0 - 0.7 10e3/uL    Absolute Basophils 0.0 0.0 - 0.2 10e3/uL    Absolute Immature Granulocytes 0.0 <=0.4 10e3/uL    Absolute NRBCs 0.0 10e3/uL   Creatinine   Result Value Ref Range    Creatinine 0.57 0.52 - 1.04 mg/dL    GFR Estimate >90 >60 mL/min/1.73m2   CBC with platelets and differential   Result Value Ref Range    WBC Count 3.0 (L) 4.0 - 11.0 10e3/uL    RBC Count 3.94 3.80 - 5.20 10e6/uL    Hemoglobin 11.5 (L) 11.7 - 15.7 g/dL    Hematocrit 36.3 35.0 - 47.0 %    MCV 92 78 - 100 fL    MCH 29.2 26.5 - 33.0 pg    MCHC 31.7 31.5 - 36.5 g/dL    RDW 15.9 (H) 10.0 - 15.0 %    Platelet Count 361 150 - 450 10e3/uL    % Neutrophils 31 %    % Lymphocytes 57 %    % Monocytes 10 %    % Eosinophils 1 %    % Basophils 1 %    % Immature Granulocytes 0 %    NRBCs per 100 WBC 0 <1 /100    Absolute Neutrophils 0.9 (L) 1.6 - 8.3 10e3/uL    Absolute Lymphocytes 1.7 0.8 - 5.3 10e3/uL    Absolute Monocytes 0.3 0.0 - 1.3 10e3/uL     Absolute Eosinophils 0.0 0.0 - 0.7 10e3/uL    Absolute Basophils 0.0 0.0 - 0.2 10e3/uL    Absolute Immature Granulocytes 0.0 <=0.4 10e3/uL    Absolute NRBCs 0.0 10e3/uL       Imaging    No results found.    Billing  Total time: 25 min; including review of EMR, reports, diagnostics and ordering/coordination of care    Signed by: Marilu Vergara NP        Again, thank you for allowing me to participate in the care of your patient.        Sincerely,        Marilu Vergara NP

## 2022-04-19 NOTE — PROGRESS NOTES
Infusion Nursing Note:  Diana Salvador presents today for Taxol    Patient seen by provider today: No   present during visit today: Not Applicable.    Note: Pt states she has been having pain rated 7/10 in her knees, hips and back. It is making it hard for her to sleep at night. She has a rash noted on her right hand. Pt states he does itch. It appeared 3 days ago.   Marilu Vergara notified and will see patient today.     ANC 0.9, ok per Marilu Vergara to give Taxol today. Per Marilu, give Benadryl and Pepcid as pre-meds for future treatments.     Intravenous Access:  Implanted Port.    Treatment Conditions:  Results reviewed, labs MET treatment parameters, ok to proceed with treatment per Marilu Vergara, NP. ANC 0.9    Post Infusion Assessment:  Patient tolerated infusion without incident.  Blood return noted pre and post infusion.  Site patent and intact, free from redness, edema or discomfort.  No evidence of extravasations.  Access discontinued per protocol.       Discharge Plan:   Patient discharged in stable condition accompanied by: self.  Departure Mode: Ambulatory.      Johny Lovelace RN

## 2022-04-19 NOTE — PROGRESS NOTES
[facility] Hematology and Oncology Outpatient Progress Note ([place])    Patient: Diana Salvador  MRN: [id number]  Date of Service: 03/22/2022        Reason for Visit    1. Hand rash  2. Generalized arthralgias  3. Stage IIB triple negative breast cancer  4. On neoadjuvant chemo    Primary Oncologist: Dr. Diamond    Assessment/Plan  1.   Stage IIB triple negative breast cancer; on neoadjuvant chemo + immunotherapy  2.   Chemo-induced neutropenia (ANC 0.9), asymptomatic  3.   Chemo vs immunotherapy dermatitis  Aide is tolerating her second cycle of pembrolizumab, carboplatin and taxol fair.   ANC 0.9 today going into day 15 Taxol.     Pruritic dermatitis on dorsal hand/thumbs R>L this past week. Prior rash on trunk resolved. Appears somewhat vesicular, but given it's distrubution, bilateral presentation and non-painful it does not appear to be shingles. More likely side effect of either Pembrolizumab or Taxol.    Plan:  -Proceed with day 15 today. Will premed with Benadryl, Pepcid and Dex each week with Taxol in case related.   -She will take Neupogen days 16-18 as planned (has this at home)  -Start Claritin daily for rash and Taxol +/- Neupogen-induced arthralgias  -Triamcinolone 0.1% to affected areas BID until improved, Rx sent  -Call clinic if rash progresses and we may need to start oral steroids, but trying to avoid as this could theoretically interfere with immunotherapy effect  -Return in 1 week, ahead of cycle 3 for reassessment.  -Overall plan is 4 cycles of this, followed by 4 cycles of AC + Pembro; then bilateral mastectomies with Dr. Marina    4.   Diffuse polyarthralgias, treatment-induced  Started after initiation of this regimen, worse few days after each chemo infusion (including weekly taxol alone). Tylenol and ibuprofen scheduled is not helping and inhibiting her from sleep.   This could be immunotherapy, Taxol and/or Neupogen related (more likely immunotherapy +/- Taxol since it occurs  throughout cycle, not just around her Neupogen doses.    Plan:  -Claritin daily through rest of treatment for Taxol and/or Neupogen-induced pain   -Hydrocodone 5 mg/acetaminophen 325 mg tabs 1-2 as needed for severe pain, particularly before bed  -Acetaminophen +/- ibuprofen during day. Reviewed not to exceed 3000 mg acetaminophen/24 hrs when combining    5.   Chemo-induced nausea  She's on olanzapine x 7 days starting night before day 1 of each cycle (before carboplatin and/or doxorubicin when this is started in future)    6.  Anxiety, secondary to cancer diagnosis  Did not like Wellbutrin (weight gain) and stopped this. Is using lorazepam 0.5 mg once daily, on average but feels she needs more frequently. Has good support network with family, but is feeling overwhelmed and anxious with her diagnosis.   Started Celexa 20 mg daily a month ago; may take 6-8 weeks for full effect. Avoid/minimize use of tramadol on this (drug:drug interaction) - she uses tramadol infrequently for chronic pain.  -Lorazepam 0.5 - 1 mg up to TID, as needed. This will be for short-term use until Celexa helping.  -Referral for Oncology psychotherapy.      ______________________________________________________________________________    History of Present Illness/ Interval History    Ms. Diana Salvador  is a 56 year old on neoadjvuant treatment with pembrolizumab, carboplatin and taxol (weekly) . Was evaluated in Infusion today when presenting for cycle 2, day 15 taxol.     She has had a 4 day onset of intensely pruritic, non-painful raised rash on bilateral dorsal thumbs/hand R>L. She had a rash on trunk a few weeks ago that resolved.     She's struggled with diffuse, intense polyarthralgia (hips especially, but also knees and shoulders) since starting treatment. Seems to flare after each dose, including weekly Taxol. Is only taking Claritin days 16-18 with her Neupogen injections. Taking Tylenol and Ibuprofen alternating/scheduled without  adequate relief, especially overnight.     No fevers.     ECOG Performance    0      Oncology History/Treatment  Diagnosis/Stage:   2/2022: Stage IIB (cT2-cN0-cM0) right breast cancer (triple negative)  -self-palpated right breast lump  -diagnostic mammo + breast US: 2.4 cm R breast (2:00, lower-inner quad) lump and no axillary nodes detected  -breast biopsy: invasive ductal carcinoma, grade 3. ER neg, MA neg, HER2 neg via FISH  -CA 27.29 WNL (<5)    Treatment:  3/15/2022 - present: Neoadjuvant chemo (based off KEYNOTE-522) Pembrolizumab 200 mg D1 q 21 days + Carboplatin AUC=5 D1 q21 Days + weekly Paclitaxel 80 mg/m  D1, 8, 15  x 12 Weeks (with Filgrastim 5 mcg/kg subcutaneously once daily on days 16, 17, and 18 of cycles 1 through 4)  -->4 cycles planned; followed by 4 cycles (cycles 5-8) pembrolizumab + doxorubicin + cyclophosphomide every 21 days (with Neulasta support)     Planning bilateral mastectomies with Dr. Marina following neoadjuvant treatment      Physical Exam    GENERAL: Alert and oriented to time place and person. Seated comfortably in Infusion chair.  Alone.   HEAD: Atraumatic and normocephalic. Alopecia.  EYES: MELISSA, EOMI. No erythema. No icterus.  BREASTS: Not assessed today. Reference exam 3/22/22.  LYMPH NODES: Not assessed today.  CHEST: clear to auscultation bilaterally. Resonant to percussion throughout bilaterally. Symmetrical breath movements bilaterally.  CVS: S1 and S2 are heard. Regular rate and rhythm. No murmur or gallop or rub heard.  ABDOMEN: Soft. Not tender. Not distended. No palpable hepatomegaly or splenomegaly. No other mass palpable. Bowel sounds present.  EXTREMITIES: Warm. No peripheral edema.  SKIN: Raised papular pink lesions right dorsal thumb/medial hand and to a lesser extent left dorsal thumb area. No other sites rash noted.  NEURO: No gross deficit noted. Non-antalgic gait.      Lab Results    Recent Results (from the past 168 hour(s))   Creatinine   Result Value  Ref Range    Creatinine 0.51 (L) 0.52 - 1.04 mg/dL    GFR Estimate >90 >60 mL/min/1.73m2   CBC with platelets and differential   Result Value Ref Range    WBC Count 5.4 4.0 - 11.0 10e3/uL    RBC Count 3.91 3.80 - 5.20 10e6/uL    Hemoglobin 11.3 (L) 11.7 - 15.7 g/dL    Hematocrit 35.3 35.0 - 47.0 %    MCV 90 78 - 100 fL    MCH 28.9 26.5 - 33.0 pg    MCHC 32.0 31.5 - 36.5 g/dL    RDW 15.1 (H) 10.0 - 15.0 %    Platelet Count 256 150 - 450 10e3/uL    % Neutrophils 60 %    % Lymphocytes 31 %    % Monocytes 6 %    % Eosinophils 2 %    % Basophils 1 %    % Immature Granulocytes 0 %    NRBCs per 100 WBC 0 <1 /100    Absolute Neutrophils 3.3 1.6 - 8.3 10e3/uL    Absolute Lymphocytes 1.7 0.8 - 5.3 10e3/uL    Absolute Monocytes 0.3 0.0 - 1.3 10e3/uL    Absolute Eosinophils 0.1 0.0 - 0.7 10e3/uL    Absolute Basophils 0.0 0.0 - 0.2 10e3/uL    Absolute Immature Granulocytes 0.0 <=0.4 10e3/uL    Absolute NRBCs 0.0 10e3/uL   Creatinine   Result Value Ref Range    Creatinine 0.57 0.52 - 1.04 mg/dL    GFR Estimate >90 >60 mL/min/1.73m2   CBC with platelets and differential   Result Value Ref Range    WBC Count 3.0 (L) 4.0 - 11.0 10e3/uL    RBC Count 3.94 3.80 - 5.20 10e6/uL    Hemoglobin 11.5 (L) 11.7 - 15.7 g/dL    Hematocrit 36.3 35.0 - 47.0 %    MCV 92 78 - 100 fL    MCH 29.2 26.5 - 33.0 pg    MCHC 31.7 31.5 - 36.5 g/dL    RDW 15.9 (H) 10.0 - 15.0 %    Platelet Count 361 150 - 450 10e3/uL    % Neutrophils 31 %    % Lymphocytes 57 %    % Monocytes 10 %    % Eosinophils 1 %    % Basophils 1 %    % Immature Granulocytes 0 %    NRBCs per 100 WBC 0 <1 /100    Absolute Neutrophils 0.9 (L) 1.6 - 8.3 10e3/uL    Absolute Lymphocytes 1.7 0.8 - 5.3 10e3/uL    Absolute Monocytes 0.3 0.0 - 1.3 10e3/uL    Absolute Eosinophils 0.0 0.0 - 0.7 10e3/uL    Absolute Basophils 0.0 0.0 - 0.2 10e3/uL    Absolute Immature Granulocytes 0.0 <=0.4 10e3/uL    Absolute NRBCs 0.0 10e3/uL       Imaging    No results found.    Billing  Total time: 25 min;  including review of EMR, reports, diagnostics and ordering/coordination of care    Signed by: Marilu Vergara NP

## 2022-04-26 ENCOUNTER — LAB (OUTPATIENT)
Dept: INFUSION THERAPY | Facility: CLINIC | Age: 57
End: 2022-04-26
Attending: INTERNAL MEDICINE
Payer: COMMERCIAL

## 2022-04-26 DIAGNOSIS — B00.1 COLD SORE: ICD-10-CM

## 2022-04-26 DIAGNOSIS — Z17.1 MALIGNANT NEOPLASM OF UPPER-INNER QUADRANT OF RIGHT BREAST IN FEMALE, ESTROGEN RECEPTOR NEGATIVE (H): ICD-10-CM

## 2022-04-26 DIAGNOSIS — D70.1 CHEMOTHERAPY-INDUCED NEUTROPENIA (H): ICD-10-CM

## 2022-04-26 DIAGNOSIS — Z51.11 ENCOUNTER FOR ANTINEOPLASTIC CHEMOTHERAPY: Primary | ICD-10-CM

## 2022-04-26 DIAGNOSIS — C50.211 MALIGNANT NEOPLASM OF UPPER-INNER QUADRANT OF RIGHT BREAST IN FEMALE, ESTROGEN RECEPTOR NEGATIVE (H): ICD-10-CM

## 2022-04-26 DIAGNOSIS — T45.1X5A CHEMOTHERAPY-INDUCED NEUTROPENIA (H): ICD-10-CM

## 2022-04-26 LAB
ALBUMIN SERPL-MCNC: 2.9 G/DL (ref 3.4–5)
ALP SERPL-CCNC: 91 U/L (ref 40–150)
ALT SERPL W P-5'-P-CCNC: 27 U/L (ref 0–50)
ANION GAP SERPL CALCULATED.3IONS-SCNC: 3 MMOL/L (ref 3–14)
AST SERPL W P-5'-P-CCNC: 19 U/L (ref 0–45)
BASOPHILS # BLD AUTO: 0 10E3/UL (ref 0–0.2)
BASOPHILS NFR BLD AUTO: 1 %
BILIRUB SERPL-MCNC: 0.3 MG/DL (ref 0.2–1.3)
BUN SERPL-MCNC: 8 MG/DL (ref 7–30)
CALCIUM SERPL-MCNC: 8.6 MG/DL (ref 8.5–10.1)
CHLORIDE BLD-SCNC: 109 MMOL/L (ref 94–109)
CO2 SERPL-SCNC: 29 MMOL/L (ref 20–32)
CREAT SERPL-MCNC: 0.61 MG/DL (ref 0.52–1.04)
EOSINOPHIL # BLD AUTO: 0 10E3/UL (ref 0–0.7)
EOSINOPHIL NFR BLD AUTO: 1 %
ERYTHROCYTE [DISTWIDTH] IN BLOOD BY AUTOMATED COUNT: 17.2 % (ref 10–15)
GFR SERPL CREATININE-BSD FRML MDRD: >90 ML/MIN/1.73M2
GLUCOSE BLD-MCNC: 101 MG/DL (ref 70–99)
HCT VFR BLD AUTO: 33.5 % (ref 35–47)
HGB BLD-MCNC: 10.7 G/DL (ref 11.7–15.7)
IMM GRANULOCYTES # BLD: 0.1 10E3/UL
IMM GRANULOCYTES NFR BLD: 3 %
LYMPHOCYTES # BLD AUTO: 1.8 10E3/UL (ref 0.8–5.3)
LYMPHOCYTES NFR BLD AUTO: 42 %
MCH RBC QN AUTO: 29.6 PG (ref 26.5–33)
MCHC RBC AUTO-ENTMCNC: 31.9 G/DL (ref 31.5–36.5)
MCV RBC AUTO: 93 FL (ref 78–100)
MONOCYTES # BLD AUTO: 0.7 10E3/UL (ref 0–1.3)
MONOCYTES NFR BLD AUTO: 16 %
NEUTROPHILS # BLD AUTO: 1.5 10E3/UL (ref 1.6–8.3)
NEUTROPHILS NFR BLD AUTO: 37 %
NRBC # BLD AUTO: 0 10E3/UL
NRBC BLD AUTO-RTO: 0 /100
PLATELET # BLD AUTO: 173 10E3/UL (ref 150–450)
POTASSIUM BLD-SCNC: 3.8 MMOL/L (ref 3.4–5.3)
PROT SERPL-MCNC: 6.1 G/DL (ref 6.8–8.8)
RBC # BLD AUTO: 3.61 10E6/UL (ref 3.8–5.2)
SODIUM SERPL-SCNC: 141 MMOL/L (ref 133–144)
TSH SERPL DL<=0.005 MIU/L-ACNC: 1.18 MU/L (ref 0.4–4)
WBC # BLD AUTO: 4.1 10E3/UL (ref 4–11)

## 2022-04-26 PROCEDURE — 84443 ASSAY THYROID STIM HORMONE: CPT | Performed by: INTERNAL MEDICINE

## 2022-04-26 PROCEDURE — 85025 COMPLETE CBC W/AUTO DIFF WBC: CPT | Performed by: INTERNAL MEDICINE

## 2022-04-26 PROCEDURE — 250N000011 HC RX IP 250 OP 636

## 2022-04-26 PROCEDURE — 36591 DRAW BLOOD OFF VENOUS DEVICE: CPT

## 2022-04-26 PROCEDURE — 80053 COMPREHEN METABOLIC PANEL: CPT | Performed by: INTERNAL MEDICINE

## 2022-04-26 RX ORDER — HEPARIN SODIUM (PORCINE) LOCK FLUSH IV SOLN 100 UNIT/ML 100 UNIT/ML
5 SOLUTION INTRAVENOUS ONCE
Status: COMPLETED | OUTPATIENT
Start: 2022-04-26 | End: 2022-04-26

## 2022-04-26 RX ORDER — HEPARIN SODIUM (PORCINE) LOCK FLUSH IV SOLN 100 UNIT/ML 100 UNIT/ML
SOLUTION INTRAVENOUS
Status: COMPLETED
Start: 2022-04-26 | End: 2022-04-26

## 2022-04-26 RX ADMIN — Medication 5 ML: at 11:18

## 2022-04-26 RX ADMIN — HEPARIN SODIUM (PORCINE) LOCK FLUSH IV SOLN 100 UNIT/ML 5 ML: 100 SOLUTION at 11:18

## 2022-04-27 ENCOUNTER — ONCOLOGY VISIT (OUTPATIENT)
Dept: ONCOLOGY | Facility: CLINIC | Age: 57
End: 2022-04-27
Attending: NURSE PRACTITIONER
Payer: COMMERCIAL

## 2022-04-27 ENCOUNTER — INFUSION THERAPY VISIT (OUTPATIENT)
Dept: INFUSION THERAPY | Facility: CLINIC | Age: 57
End: 2022-04-27
Attending: INTERNAL MEDICINE
Payer: COMMERCIAL

## 2022-04-27 VITALS
SYSTOLIC BLOOD PRESSURE: 116 MMHG | DIASTOLIC BLOOD PRESSURE: 75 MMHG | RESPIRATION RATE: 16 BRPM | HEART RATE: 82 BPM | WEIGHT: 214.9 LBS | OXYGEN SATURATION: 96 % | BODY MASS INDEX: 33.73 KG/M2 | TEMPERATURE: 98.1 F | HEIGHT: 67 IN

## 2022-04-27 VITALS — DIASTOLIC BLOOD PRESSURE: 78 MMHG | HEART RATE: 73 BPM | SYSTOLIC BLOOD PRESSURE: 116 MMHG

## 2022-04-27 DIAGNOSIS — T45.1X5A CHEMOTHERAPY-INDUCED NEUTROPENIA (H): ICD-10-CM

## 2022-04-27 DIAGNOSIS — M25.50 ARTHRALGIA, UNSPECIFIED JOINT: ICD-10-CM

## 2022-04-27 DIAGNOSIS — D70.1 CHEMOTHERAPY-INDUCED NEUTROPENIA (H): ICD-10-CM

## 2022-04-27 DIAGNOSIS — Z17.1 MALIGNANT NEOPLASM OF UPPER-INNER QUADRANT OF RIGHT BREAST IN FEMALE, ESTROGEN RECEPTOR NEGATIVE (H): Primary | ICD-10-CM

## 2022-04-27 DIAGNOSIS — C50.211 MALIGNANT NEOPLASM OF UPPER-INNER QUADRANT OF RIGHT BREAST IN FEMALE, ESTROGEN RECEPTOR NEGATIVE (H): Primary | ICD-10-CM

## 2022-04-27 DIAGNOSIS — Z51.11 ENCOUNTER FOR ANTINEOPLASTIC CHEMOTHERAPY: ICD-10-CM

## 2022-04-27 DIAGNOSIS — C50.211 MALIGNANT NEOPLASM OF UPPER-INNER QUADRANT OF RIGHT BREAST IN FEMALE, ESTROGEN RECEPTOR NEGATIVE (H): ICD-10-CM

## 2022-04-27 DIAGNOSIS — Z51.11 ENCOUNTER FOR ANTINEOPLASTIC CHEMOTHERAPY: Primary | ICD-10-CM

## 2022-04-27 DIAGNOSIS — Z17.1 MALIGNANT NEOPLASM OF UPPER-INNER QUADRANT OF RIGHT BREAST IN FEMALE, ESTROGEN RECEPTOR NEGATIVE (H): ICD-10-CM

## 2022-04-27 DIAGNOSIS — R21 RASH: ICD-10-CM

## 2022-04-27 DIAGNOSIS — F41.9 ANXIETY: ICD-10-CM

## 2022-04-27 PROCEDURE — 96413 CHEMO IV INFUSION 1 HR: CPT

## 2022-04-27 PROCEDURE — 250N000013 HC RX MED GY IP 250 OP 250 PS 637: Performed by: NURSE PRACTITIONER

## 2022-04-27 PROCEDURE — 250N000011 HC RX IP 250 OP 636: Performed by: INTERNAL MEDICINE

## 2022-04-27 PROCEDURE — 99215 OFFICE O/P EST HI 40 MIN: CPT | Performed by: NURSE PRACTITIONER

## 2022-04-27 PROCEDURE — G0463 HOSPITAL OUTPT CLINIC VISIT: HCPCS | Mod: 25

## 2022-04-27 PROCEDURE — 96367 TX/PROPH/DG ADDL SEQ IV INF: CPT

## 2022-04-27 PROCEDURE — 258N000003 HC RX IP 258 OP 636: Performed by: INTERNAL MEDICINE

## 2022-04-27 PROCEDURE — 96417 CHEMO IV INFUS EACH ADDL SEQ: CPT

## 2022-04-27 RX ORDER — HEPARIN SODIUM (PORCINE) LOCK FLUSH IV SOLN 100 UNIT/ML 100 UNIT/ML
5 SOLUTION INTRAVENOUS
Status: DISCONTINUED | OUTPATIENT
Start: 2022-04-27 | End: 2022-04-27 | Stop reason: HOSPADM

## 2022-04-27 RX ORDER — DIPHENHYDRAMINE HCL 25 MG
50 CAPSULE ORAL ONCE
Status: COMPLETED | OUTPATIENT
Start: 2022-04-27 | End: 2022-04-27

## 2022-04-27 RX ORDER — DIPHENHYDRAMINE HCL 25 MG
50 CAPSULE ORAL ONCE
Status: CANCELLED
Start: 2022-05-04

## 2022-04-27 RX ORDER — DIPHENHYDRAMINE HCL 25 MG
50 CAPSULE ORAL ONCE
Status: CANCELLED
Start: 2022-04-27

## 2022-04-27 RX ORDER — HYDROCODONE BITARTRATE AND ACETAMINOPHEN 5; 325 MG/1; MG/1
1 TABLET ORAL EVERY 6 HOURS PRN
Qty: 60 TABLET | Refills: 0 | Status: SHIPPED | OUTPATIENT
Start: 2022-04-27 | End: 2022-04-30

## 2022-04-27 RX ORDER — DIPHENHYDRAMINE HCL 25 MG
50 CAPSULE ORAL ONCE
Status: CANCELLED
Start: 2022-05-11

## 2022-04-27 RX ORDER — LORATADINE 10 MG/1
10 TABLET ORAL DAILY
Qty: 90 TABLET | Refills: 1 | Status: SHIPPED | OUTPATIENT
Start: 2022-04-27 | End: 2023-02-03

## 2022-04-27 RX ADMIN — PACLITAXEL 173 MG: 6 INJECTION, SOLUTION INTRAVENOUS at 13:36

## 2022-04-27 RX ADMIN — FAMOTIDINE 20 MG: 20 INJECTION, SOLUTION INTRAVENOUS at 11:56

## 2022-04-27 RX ADMIN — SODIUM CHLORIDE 250 ML: 9 INJECTION, SOLUTION INTRAVENOUS at 11:56

## 2022-04-27 RX ADMIN — DEXAMETHASONE SODIUM PHOSPHATE: 10 INJECTION, SOLUTION INTRAMUSCULAR; INTRAVENOUS at 12:19

## 2022-04-27 RX ADMIN — SODIUM CHLORIDE 200 MG: 9 INJECTION, SOLUTION INTRAVENOUS at 13:03

## 2022-04-27 RX ADMIN — CARBOPLATIN 600 MG: 10 INJECTION, SOLUTION INTRAVENOUS at 14:42

## 2022-04-27 RX ADMIN — DIPHENHYDRAMINE HCL 50 MG: 25 CAPSULE ORAL at 12:06

## 2022-04-27 RX ADMIN — FOSAPREPITANT 150 MG: 150 INJECTION, POWDER, LYOPHILIZED, FOR SOLUTION INTRAVENOUS at 12:37

## 2022-04-27 RX ADMIN — Medication 5 ML: at 15:20

## 2022-04-27 ASSESSMENT — PAIN SCALES - GENERAL: PAINLEVEL: EXTREME PAIN (8)

## 2022-04-27 NOTE — PROGRESS NOTES
"Oncology Rooming Note    April 27, 2022 11:02 AM   Diana Salvador is a 56 year old female who presents for:    Chief Complaint   Patient presents with     Oncology Clinic Visit     3 week follow w/cycle 2 for breast cancer. Review lab results. Chemotherapy to follow office visit.      Initial Vitals: /75 (BP Location: Right arm, Patient Position: Sitting, Cuff Size: Adult Large)   Pulse 82   Temp 98.1  F (36.7  C) (Oral)   Resp 16   Ht 1.708 m (5' 7.25\")   Wt 97.5 kg (214 lb 14.4 oz)   LMP  (LMP Unknown)   SpO2 96%   Breastfeeding No   BMI 33.41 kg/m   Estimated body mass index is 33.41 kg/m  as calculated from the following:    Height as of this encounter: 1.708 m (5' 7.25\").    Weight as of this encounter: 97.5 kg (214 lb 14.4 oz). Body surface area is 2.15 meters squared.  Extreme Pain (8) Comment: Data Unavailable   No LMP recorded (lmp unknown). Patient has had a hysterectomy.  Allergies reviewed: Yes  Medications reviewed: Yes    Medications: Medication refills not needed today.  Pharmacy name entered into When You Wish:    [id number] - [address] AT [address]    Clinical concerns:  3 wee k follow w/cycle 2 for breast cancer. Review lab results. Chemotherapy to follow office visit.       Tanisha Valadez, [facility]            "

## 2022-04-27 NOTE — PATIENT INSTRUCTIONS
Proceed with Cycle 3 as planned per plan  Neupogen days 16-18 (pt will self-administer)  Claritin daily for taxol/Neulasta bone pain. Hydrocodone as needed (refill sent)    Zyprexa days 1-7 each chemo cycle    Scheduled in 3 weeks for Dr. Diamond before cycle 4 - keep as is

## 2022-04-27 NOTE — LETTER
4/27/2022         RE: Diana Salvador  [address] [place] [id number]        Dear Colleague,    Thank you for referring your patient, Diana Salvador, to the [facility] [place]. Please see a copy of my visit note below.    [facility] Hematology and Oncology Outpatient Progress Note ([place])    Patient: Diana Salvador  MRN: [id number]          Reason for Visit    1. Stage IIB triple negative breast cancer  2. On neoadjuvant chemo    Primary Oncologist: Dr. Diamond    Assessment/Plan  1.   Stage IIB triple negative breast cancer; on neoadjuvant chemo + immunotherapy  2.   Chemo-induced neutropenia, asymptomatic  Aide is tolerating pembrolizumab, carboplatin and taxol fair.   Labwork shows borderline neutropenia (ANC 1.5) and cumulative anemia (10.7); otherwise unremarkable.    Clinical response in right breast tumor on exam.    Plan:  -Proceed with cycle 3 today. Will premed with Benadryl, Pepcid and Dex each week with Taxol in case rash related to this  -She will take Neupogen days 16-18 as planned , adminstered at home and this is set to be delivered  -Return in 3 weeks, ahead of cycle 4 for reassessment.  -Overall plan is 4 cycles of this, followed by 4 cycles of AC + Pembro; then bilateral mastectomies with Dr. Marina    3.   Chemo vs immunotherapy dermatitis  Pruritic dermatitis on dorsal hand/thumbs R>L a few weeks ago.  Prior rash on trunk resolved.  Likley side effect of either Pembrolizumab or Taxol. Improved on Claritin and triamcinolone as needed.     Plan:  -Claritin daily  -Triamcinolone 0.1% to affected areas as needed, take breaks from this as able    4.   Diffuse polyarthralgias, treatment-induced   Worse few days after each chemo infusion (including weekly taxol alone).  This could be immunotherapy, Taxol and/or Neupogen related (more likely immunotherapy +/- Taxol since it occurs throughout cycle, not just around her Neupogen doses). Managing with hydrocodone three  times/daily and hot baths.    Plan:  -Claritin daily through rest of treatment for Taxol and/or Neupogen-induced pain   -Hydrocodone 5 mg/acetaminophen 325 mg tabs 1-2 as needed for severe pain, particularly before bed. Refill provided.  -Acetaminophen +/- ibuprofen during day. Reviewed not to exceed 3000 mg acetaminophen/24 hrs when combining  -Warm baths/hot tub  -Encourage exercise    5.   Chemo-induced nausea  Doing well on olanzapine x 7 days starting night before day 1 of each cycle (before carboplatin and/or doxorubicin when this is started in future). Compazine or Zofran as needed.     6.  Anxiety, secondary to cancer diagnosis  Started Celexa 20 mg 4 weeks ago. Anxiety may a bit better, but still an issue. Using lorazepam 1-2 times/day. Working with a therapist.    Plan:  -Allow Celexa full 8 weeks before assessing affect. Can titrate up to 30-40 mg if needed.   -Lorazepam 0.5 - 1 mg up to TID, as needed. Has decreased use, but still needing daily. Attempt reducing use of this with time, particularly as Celexa optimized.  -Continue psychotherapy.    -Exercise    ______________________________________________________________________________    History of Present Illness/ Interval History    Ms. Diana Salvador  is a 56 year old on neoadjvuant treatment with pembrolizumab, carboplatin and taxol (weekly) . Here to start cycle 3 today.    Toxicities have included significant diffuse polyarthralgia, flaring each week after Taxol. Last week, started Claritin and Rx for hydrocodone provided. Better managed on this.    She developed a pruritic, non-painful raised rash on bilateral dorsal thumbs/hand R>L last week. Significantly improved after starting Claritin and using triamcinolone cream. No new rashes.    On day 15 last week, ANC 0.9 and she had Neuopgen days 16-18. No fevers.     Started Celexa about 4 weeks ago for anxiety with cancer diagnosis. Using lorazepam 1-2 times/day. Meeting a therapist.  This is  getting better, but still can feel emotional and overwhelmed some days.     No neuropathy.  Nausea well-controlled with the compazine and Zofran. Eating/drinking fair.    ECOG Performance    0      Oncology History/Treatment  Diagnosis/Stage:   2/2022: Stage IIB (cT2-cN0-cM0) right breast cancer (triple negative)  -self-palpated right breast lump  -diagnostic mammo + breast US: 2.4 cm R breast (2:00, lower-inner quad) lump and no axillary nodes detected  -breast biopsy: invasive ductal carcinoma, grade 3. ER neg, CO neg, HER2 neg via FISH  -CA 27.29 WNL (<5)    Treatment:  3/15/2022 - present: Neoadjuvant chemo (based off KEYNOTE-522) Pembrolizumab 200 mg D1 q 21 days + Carboplatin AUC=5 D1 q21 Days + weekly Paclitaxel 80 mg/m  D1, 8, 15  x 12 Weeks (with Filgrastim 5 mcg/kg subcutaneously once daily on days 16, 17, and 18 of cycles 1 through 4)  -->4 cycles planned; followed by 4 cycles (cycles 5-8) pembrolizumab + doxorubicin + cyclophosphomide every 21 days (with Neulasta support)     Planning bilateral mastectomies with Dr. Marina following neoadjuvant treatment      Physical Exam    GENERAL: Alert and oriented to time place and person. Seated comfortably in Infusion chair.  Alone.   HEAD: Atraumatic and normocephalic. Alopecia.  EYES: MELISSA, EOMI. No erythema. No icterus.  BREASTS: Right breast lump (2:00, near nipple) 2.0 cm (smaller) and less defined, in response to chemo.  LYMPH NODES: No axillary adenopathy.  CHEST: clear to auscultation bilaterally. Resonant to percussion throughout bilaterally. Symmetrical breath movements bilaterally.  CVS: S1 and S2 are heard. Regular rate and rhythm. No murmur or gallop or rub heard.  ABDOMEN: Soft. Not tender. Not distended. No palpable hepatomegaly or splenomegaly. No other mass palpable. Bowel sounds present.  EXTREMITIES: Warm. No peripheral edema.  SKIN: Raised papular pink lesions right dorsal thumb/medial hand and to a lesser extent left dorsal thumb area. No  other sites rash noted.  NEURO: No gross deficit noted. Non-antalgic gait.      Lab Results    Recent Results (from the past 168 hour(s))   Comprehensive metabolic panel   Result Value Ref Range    Sodium 141 133 - 144 mmol/L    Potassium 3.8 3.4 - 5.3 mmol/L    Chloride 109 94 - 109 mmol/L    Carbon Dioxide (CO2) 29 20 - 32 mmol/L    Anion Gap 3 3 - 14 mmol/L    Urea Nitrogen 8 7 - 30 mg/dL    Creatinine 0.61 0.52 - 1.04 mg/dL    Calcium 8.6 8.5 - 10.1 mg/dL    Glucose 101 (H) 70 - 99 mg/dL    Alkaline Phosphatase 91 40 - 150 U/L    AST 19 0 - 45 U/L    ALT 27 0 - 50 U/L    Protein Total 6.1 (L) 6.8 - 8.8 g/dL    Albumin 2.9 (L) 3.4 - 5.0 g/dL    Bilirubin Total 0.3 0.2 - 1.3 mg/dL    GFR Estimate >90 >60 mL/min/1.73m2   TSH with free T4 reflex   Result Value Ref Range    TSH 1.18 0.40 - 4.00 mU/L   CBC with platelets and differential   Result Value Ref Range    WBC Count 4.1 4.0 - 11.0 10e3/uL    RBC Count 3.61 (L) 3.80 - 5.20 10e6/uL    Hemoglobin 10.7 (L) 11.7 - 15.7 g/dL    Hematocrit 33.5 (L) 35.0 - 47.0 %    MCV 93 78 - 100 fL    MCH 29.6 26.5 - 33.0 pg    MCHC 31.9 31.5 - 36.5 g/dL    RDW 17.2 (H) 10.0 - 15.0 %    Platelet Count 173 150 - 450 10e3/uL    % Neutrophils 37 %    % Lymphocytes 42 %    % Monocytes 16 %    % Eosinophils 1 %    % Basophils 1 %    % Immature Granulocytes 3 %    NRBCs per 100 WBC 0 <1 /100    Absolute Neutrophils 1.5 (L) 1.6 - 8.3 10e3/uL    Absolute Lymphocytes 1.8 0.8 - 5.3 10e3/uL    Absolute Monocytes 0.7 0.0 - 1.3 10e3/uL    Absolute Eosinophils 0.0 0.0 - 0.7 10e3/uL    Absolute Basophils 0.0 0.0 - 0.2 10e3/uL    Absolute Immature Granulocytes 0.1 <=0.4 10e3/uL    Absolute NRBCs 0.0 10e3/uL       Imaging    No results found.    Billing  Total time: 40 min; including review of EMR, reports, diagnostics and ordering/coordination of care    Signed by: Marilu Vergara NP      Oncology Rooming Note    April 27, 2022 11:02 AM   Diana Salvador is a 56 year old female who presents  "for:    Chief Complaint   Patient presents with     Oncology Clinic Visit     3 week follow w/cycle 2 for breast cancer. Review lab results. Chemotherapy to follow office visit.      Initial Vitals: /75 (BP Location: Right arm, Patient Position: Sitting, Cuff Size: Adult Large)   Pulse 82   Temp 98.1  F (36.7  C) (Oral)   Resp 16   Ht 1.708 m (5' 7.25\")   Wt 97.5 kg (214 lb 14.4 oz)   LMP  (LMP Unknown)   SpO2 96%   Breastfeeding No   BMI 33.41 kg/m   Estimated body mass index is 33.41 kg/m  as calculated from the following:    Height as of this encounter: 1.708 m (5' 7.25\").    Weight as of this encounter: 97.5 kg (214 lb 14.4 oz). Body surface area is 2.15 meters squared.  Extreme Pain (8) Comment: Data Unavailable   No LMP recorded (lmp unknown). Patient has had a hysterectomy.  Allergies reviewed: Yes  Medications reviewed: Yes    Medications: Medication refills not needed today.  Pharmacy name entered into [employer]:    [facility] #[id number] - [address] AT [address] AND [facility]    Clinical concerns:  3 wee k follow w/cycle 2 for breast cancer. Review lab results. Chemotherapy to follow office visit.       Tanisha Valadez CMA                Again, thank you for allowing me to participate in the care of your patient.        Sincerely,        Marilu Vergara NP    "

## 2022-04-27 NOTE — PROGRESS NOTES
Infusion Nursing Note:  Diana Salvador presents today for C3D1 Keytruda, Carbo,Taxol  Patient seen by provider today: Yes: Marilu Vergara NP   present during visit today: Not Applicable.    Note: N/A.      Intravenous Access:  Labs drawn without difficulty.  Implanted Port.    Treatment Conditions:  Lab Results   Component Value Date    HGB 10.7 (L) 04/26/2022    WBC 4.1 04/26/2022    ANEU 2.0 04/05/2022    ANEUTAUTO 1.5 (L) 04/26/2022     04/26/2022      Lab Results   Component Value Date     04/26/2022    POTASSIUM 3.8 04/26/2022    CR 0.61 04/26/2022    ANURADHA 8.6 04/26/2022    BILITOTAL 0.3 04/26/2022    ALBUMIN 2.9 (L) 04/26/2022    ALT 27 04/26/2022    AST 19 04/26/2022     Results reviewed, labs MET treatment parameters, ok to proceed with treatment.      Post Infusion Assessment:  Patient tolerated infusion without incident.  Blood return noted pre and post infusion.  Site patent and intact, free from redness, edema or discomfort.  No evidence of extravasations.  Access discontinued per protocol.       Discharge Plan:   Patient discharged in stable condition accompanied by: self.  Departure Mode: Ambulatory.  Pt to return on 5/4/22 at 11:15 am for PAC labs followed by C3D8 Taxol.      Brandi Solis RN

## 2022-04-27 NOTE — PROGRESS NOTES
[facility] Hematology and Oncology Outpatient Progress Note ([place])    Patient: Diana Salvador  MRN: [id number]          Reason for Visit    1. Stage IIB triple negative breast cancer  2. On neoadjuvant chemo    Primary Oncologist: Dr. Diamond    Assessment/Plan  1.   Stage IIB triple negative breast cancer; on neoadjuvant chemo + immunotherapy  2.   Chemo-induced neutropenia, asymptomatic  Aide is tolerating pembrolizumab, carboplatin and taxol fair.   Labwork shows borderline neutropenia (ANC 1.5) and cumulative anemia (10.7); otherwise unremarkable.    Clinical response in right breast tumor on exam.    Plan:  -Proceed with cycle 3 today. Will premed with Benadryl, Pepcid and Dex each week with Taxol in case rash related to this  -She will take Neupogen days 16-18 as planned , adminstered at home and this is set to be delivered  -Return in 3 weeks, ahead of cycle 4 for reassessment.  -Overall plan is 4 cycles of this, followed by 4 cycles of AC + Pembro; then bilateral mastectomies with Dr. Marina    3.   Chemo vs immunotherapy dermatitis  Pruritic dermatitis on dorsal hand/thumbs R>L a few weeks ago.  Prior rash on trunk resolved.  Likley side effect of either Pembrolizumab or Taxol. Improved on Claritin and triamcinolone as needed.     Plan:  -Claritin daily  -Triamcinolone 0.1% to affected areas as needed, take breaks from this as able    4.   Diffuse polyarthralgias, treatment-induced   Worse few days after each chemo infusion (including weekly taxol alone).  This could be immunotherapy, Taxol and/or Neupogen related (more likely immunotherapy +/- Taxol since it occurs throughout cycle, not just around her Neupogen doses). Managing with hydrocodone three times/daily and hot baths.    Plan:  -Claritin daily through rest of treatment for Taxol and/or Neupogen-induced pain   -Hydrocodone 5 mg/acetaminophen 325 mg tabs 1-2 as needed for severe pain, particularly before bed. Refill  provided.  -Acetaminophen +/- ibuprofen during day. Reviewed not to exceed 3000 mg acetaminophen/24 hrs when combining  -Warm baths/hot tub  -Encourage exercise    5.   Chemo-induced nausea  Doing well on olanzapine x 7 days starting night before day 1 of each cycle (before carboplatin and/or doxorubicin when this is started in future). Compazine or Zofran as needed.     6.  Anxiety, secondary to cancer diagnosis  Started Celexa 20 mg 4 weeks ago. Anxiety may a bit better, but still an issue. Using lorazepam 1-2 times/day. Working with a therapist.    Plan:  -Allow Celexa full 8 weeks before assessing affect. Can titrate up to 30-40 mg if needed.   -Lorazepam 0.5 - 1 mg up to TID, as needed. Has decreased use, but still needing daily. Attempt reducing use of this with time, particularly as Celexa optimized.  -Continue psychotherapy.    -Exercise    ______________________________________________________________________________    History of Present Illness/ Interval History    Ms. Diana Salvador  is a 56 year old on neoadjvuant treatment with pembrolizumab, carboplatin and taxol (weekly) . Here to start cycle 3 today.    Toxicities have included significant diffuse polyarthralgia, flaring each week after Taxol. Last week, started Claritin and Rx for hydrocodone provided. Better managed on this.    She developed a pruritic, non-painful raised rash on bilateral dorsal thumbs/hand R>L last week. Significantly improved after starting Claritin and using triamcinolone cream. No new rashes.    On day 15 last week, ANC 0.9 and she had Neuopgen days 16-18. No fevers.     Started Celexa about 4 weeks ago for anxiety with cancer diagnosis. Using lorazepam 1-2 times/day. Meeting a therapist.  This is getting better, but still can feel emotional and overwhelmed some days.     No neuropathy.  Nausea well-controlled with the compazine and Zofran. Eating/drinking fair.    ECOG Performance    0      Oncology  History/Treatment  Diagnosis/Stage:   2/2022: Stage IIB (cT2-cN0-cM0) right breast cancer (triple negative)  -self-palpated right breast lump  -diagnostic mammo + breast US: 2.4 cm R breast (2:00, lower-inner quad) lump and no axillary nodes detected  -breast biopsy: invasive ductal carcinoma, grade 3. ER neg, MT neg, HER2 neg via FISH  -CA 27.29 WNL (<5)    Treatment:  3/15/2022 - present: Neoadjuvant chemo (based off KEYNOTE-522) Pembrolizumab 200 mg D1 q 21 days + Carboplatin AUC=5 D1 q21 Days + weekly Paclitaxel 80 mg/m  D1, 8, 15  x 12 Weeks (with Filgrastim 5 mcg/kg subcutaneously once daily on days 16, 17, and 18 of cycles 1 through 4)  -->4 cycles planned; followed by 4 cycles (cycles 5-8) pembrolizumab + doxorubicin + cyclophosphomide every 21 days (with Neulasta support)     Planning bilateral mastectomies with Dr. Marina following neoadjuvant treatment      Physical Exam    GENERAL: Alert and oriented to time place and person. Seated comfortably in Infusion chair.  Alone.   HEAD: Atraumatic and normocephalic. Alopecia.  EYES: MELISSA, EOMI. No erythema. No icterus.  BREASTS: Right breast lump (2:00, near nipple) 2.0 cm (smaller) and less defined, in response to chemo.  LYMPH NODES: No axillary adenopathy.  CHEST: clear to auscultation bilaterally. Resonant to percussion throughout bilaterally. Symmetrical breath movements bilaterally.  CVS: S1 and S2 are heard. Regular rate and rhythm. No murmur or gallop or rub heard.  ABDOMEN: Soft. Not tender. Not distended. No palpable hepatomegaly or splenomegaly. No other mass palpable. Bowel sounds present.  EXTREMITIES: Warm. No peripheral edema.  SKIN: Raised papular pink lesions right dorsal thumb/medial hand and to a lesser extent left dorsal thumb area. No other sites rash noted.  NEURO: No gross deficit noted. Non-antalgic gait.      Lab Results    Recent Results (from the past 168 hour(s))   Comprehensive metabolic panel   Result Value Ref Range    Sodium  141 133 - 144 mmol/L    Potassium 3.8 3.4 - 5.3 mmol/L    Chloride 109 94 - 109 mmol/L    Carbon Dioxide (CO2) 29 20 - 32 mmol/L    Anion Gap 3 3 - 14 mmol/L    Urea Nitrogen 8 7 - 30 mg/dL    Creatinine 0.61 0.52 - 1.04 mg/dL    Calcium 8.6 8.5 - 10.1 mg/dL    Glucose 101 (H) 70 - 99 mg/dL    Alkaline Phosphatase 91 40 - 150 U/L    AST 19 0 - 45 U/L    ALT 27 0 - 50 U/L    Protein Total 6.1 (L) 6.8 - 8.8 g/dL    Albumin 2.9 (L) 3.4 - 5.0 g/dL    Bilirubin Total 0.3 0.2 - 1.3 mg/dL    GFR Estimate >90 >60 mL/min/1.73m2   TSH with free T4 reflex   Result Value Ref Range    TSH 1.18 0.40 - 4.00 mU/L   CBC with platelets and differential   Result Value Ref Range    WBC Count 4.1 4.0 - 11.0 10e3/uL    RBC Count 3.61 (L) 3.80 - 5.20 10e6/uL    Hemoglobin 10.7 (L) 11.7 - 15.7 g/dL    Hematocrit 33.5 (L) 35.0 - 47.0 %    MCV 93 78 - 100 fL    MCH 29.6 26.5 - 33.0 pg    MCHC 31.9 31.5 - 36.5 g/dL    RDW 17.2 (H) 10.0 - 15.0 %    Platelet Count 173 150 - 450 10e3/uL    % Neutrophils 37 %    % Lymphocytes 42 %    % Monocytes 16 %    % Eosinophils 1 %    % Basophils 1 %    % Immature Granulocytes 3 %    NRBCs per 100 WBC 0 <1 /100    Absolute Neutrophils 1.5 (L) 1.6 - 8.3 10e3/uL    Absolute Lymphocytes 1.8 0.8 - 5.3 10e3/uL    Absolute Monocytes 0.7 0.0 - 1.3 10e3/uL    Absolute Eosinophils 0.0 0.0 - 0.7 10e3/uL    Absolute Basophils 0.0 0.0 - 0.2 10e3/uL    Absolute Immature Granulocytes 0.1 <=0.4 10e3/uL    Absolute NRBCs 0.0 10e3/uL       Imaging    No results found.    Billing  Total time: 40 min; including review of EMR, reports, diagnostics and ordering/coordination of care    Signed by: Marilu Vergara, NP

## 2022-04-28 ENCOUNTER — TELEPHONE (OUTPATIENT)
Dept: ONCOLOGY | Facility: CLINIC | Age: 57
End: 2022-04-28
Payer: OTHER GOVERNMENT

## 2022-04-28 RX ORDER — VALACYCLOVIR HYDROCHLORIDE 1 G/1
TABLET, FILM COATED ORAL
Qty: 16 TABLET | Refills: 3 | Status: SHIPPED | OUTPATIENT
Start: 2022-04-28 | End: 2023-05-19

## 2022-04-28 NOTE — TELEPHONE ENCOUNTER
Called patient and discussed new plan to do the neupogen shots at home now on days 2,3,4,9,10.11, 16.17.18 while she is getting the Carbo/taxol. Patient verbalized understanding and agreement to plan. Also called [facility] who thinks they can get them delivered to her today. Patient is aware of plan also and will start them today on Day 2.. Claire Blair RN on 4/28/2022 at 9:32 AM

## 2022-04-28 NOTE — TELEPHONE ENCOUNTER
----- Message from Ricardo Diamond MD sent at 4/27/2022  8:06 PM CDT -----  Looks her ANC is dipping a little. I changed her Neupogen Rx in the EMR to days 2, 3, 4, 9, 10, 11, 16, 17 and 18 for the remaining carbo/Taxol portion of her pepe-adjuvant chemo. Jena, please contact and update her. Thanks!

## 2022-05-03 DIAGNOSIS — F11.90 CHRONIC, CONTINUOUS USE OF OPIOIDS: ICD-10-CM

## 2022-05-03 DIAGNOSIS — M54.41 ACUTE RIGHT-SIDED LOW BACK PAIN WITH RIGHT-SIDED SCIATICA: ICD-10-CM

## 2022-05-03 NOTE — LETTER
Opioid / Opioid Plus Controlled Substance Agreement    This is an agreement between you and your provider about the safe and appropriate use of controlled substance/opioids prescribed by your care team. Controlled substances are medicines that can cause physical and mental dependence (abuse).    There are strict laws about having and using these medicines. We here at [facility] are committing to working with you in your efforts to get better. To support you in this work, we ll help you schedule regular office appointments for medicine refills. If we must cancel or change your appointment for any reason, we ll make sure you have enough medicine to last until your next appointment.     As a Provider, I will:    Listen carefully to your concerns and treat you with respect.     Recommend a treatment plan that I believe is in your best interest. This plan may involve therapies other than opioid pain medication.     Talk with you often about the possible benefits, and the risk of harm of any medicine that we prescribe for you.     Provide a plan on how to taper (discontinue or go off) using this medicine if the decision is made to stop its use.    As a Patient, I understand that opioid(s):     Are a controlled substance prescribed by my care team to help me function or work and manage my condition(s).     Are strong medicines and can cause serious side effects such as:    Drowsiness, which can seriously affect my driving ability    A lower breathing rate, enough to cause death    Harm to my thinking ability     Depression     Abuse of and addiction to this medicine    Need to be taken exactly as prescribed. Combining opioids with certain medicines or chemicals (such as illegal drugs, sedatives, sleeping pills, and benzodiazepines) can be dangerous or even fatal. If I stop opioids suddenly, I may have severe withdrawal symptoms.    Do not work for all types of pain nor for all patients. If they re not helpful, I may  be asked to stop them.        The risks, benefits and side effects of these medicine(s) were explained to me. I agree that:  1. I will take part in other treatments as advised by my care team. This may be psychiatry or counseling, physical therapy, behavioral therapy, group treatment or a referral to a specialist.     2. I will keep all my appointments. I understand that this is part of the monitoring of opioids. My care team may require an office visit for EVERY opioid/controlled substance refill. If I miss appointments or don t follow instructions, my care team may stop my medicine.    3. I will take my medicines as prescribed. I will not change the dose or schedule unless my care team tells me to. There will be no refills if I run out early.     4. I may be asked to come to the clinic and complete a urine drug test or complete a pill count at any time. If I don t give a urine sample or participate in a pill count, the care team may stop my medicine.    5. I will only receive prescriptions from this clinic for chronic pain. If I am treated by another provider for acute pain issues, I will tell them that I am taking opioid pain medication for chronic pain and that I have a treatment agreement with this provider. I will inform my [facility] care team within one business day if I am given a prescription for any pain medication by another healthcare provider. My [facility] care team can contact other providers and pharmacists about my use of any medicines.    6. It is up to me to make sure that I don t run out of my medicines on weekends or holidays. If my care team is willing to refill my opioid prescription without a visit, I must request refills only during office hours. Refills may take up to 3 business days to process. I will use one pharmacy to fill all my opioid and other controlled substance prescriptions. I will notify the clinic about any changes to my insurance or medication  availability.    7. I am responsible for my prescriptions. If the medicine/prescription is lost, stolen or destroyed, it will not be replaced. I also agree not to share controlled substance medicines with anyone.    8. I am aware I should not use any illegal or recreational drugs. I agree not to drink alcohol unless my care team says I can.       9. If I enroll in the [place] [facility] Cann[facility] program, I will tell my care team prior to my next refill.     10. I will tell my care team right away if I become pregnant, have a new medical problem treated outside of my regular clinic, or have a change in my medications.    11. I understand that this medicine can affect my thinking, judgment and reaction time. Alcohol and drugs affect the brain and body, which can affect the safety of my driving. Being under the influence of alcohol or drugs can affect my decision-making, behaviors, personal safety, and the safety of others. Driving while impaired (DWI) can occur if a person is driving, operating, or in physical control of a car, motorcycle, boat, snowmobile, ATV, motorbike, off-road vehicle, or any other motor vehicle ([place] Statute 169A.20). I understand the risk if I choose to drive or operate any vehicle or machinery.    I understand that if I do not follow any of the conditions above, my prescriptions or treatment may be stopped or changed.          Opioids  What You Need to Know    What are opioids?   Opioids are pain medicines that must be prescribed by a doctor. They are also known as narcotics.     Examples are:   1. morphine (MS Contin, Chasidy)  2. oxycodone (Oxycontin)  3. oxycodone and acetaminophen (Percocet)  4. hydrocodone and acetaminophen (Vicodin, Norco)   5. fentanyl patch (Duragesic)   6. hydromorphone (Dilaudid)   7. methadone  8. codeine (Tylenol #3)     What do opioids do well?   Opioids are best for severe short-term pain such as after a surgery or injury. They may work well for cancer pain. They may  help some people with long-lasting (chronic) pain.     What do opioids NOT do well?   Opioids never get rid of pain entirely, and they don t work well for most patients with chronic pain. Opioids don t reduce swelling, one of the causes of pain.                                    Other ways to manage chronic pain and improve function include:       Treat the health problem that may be causing pain    Anti-inflammation medicines, which reduce swelling and tenderness, such as ibuprofen (Advil, Motrin) or naproxen (Aleve)    Acetaminophen (Tylenol)    Antidepressants and anti-seizure medicines, especially for nerve pain    Topical treatments such as patches or creams    Injections or nerve blocks    Chiropractic or osteopathic treatment    Acupuncture, massage, deep breathing, meditation, visual imagery, aromatherapy    Use heat or ice at the pain site    Physical therapy     Exercise    Stop smoking    Take part in therapy       Risks and side effects     Talk to your doctor before you start or decide to keep taking opioids. Possible side effects include:      Lowering your breathing rate enough to cause death    Overdose, including death, especially if taking higher than prescribed doses    Worse depression symptoms; less pleasure in things you usually enjoy    Feeling tired or sluggish    Slower thoughts or cloudy thinking    Being more sensitive to pain over time; pain is harder to control    Trouble sleeping or restless sleep    Changes in hormone levels (for example, less testosterone)    Changes in sex drive or ability to have sex    Constipation    Unsafe driving    Itching and sweating    Dizziness    Nausea, throwing up and dry mouth    What else should I know about opioids?    Opioids may lead to dependence, tolerance, or addiction.      Dependence means that if you stop or reduce the medicine too quickly, you will have withdrawal symptoms. These include loose poop (diarrhea), jitters, flu-like symptoms,  nervousness and tremors. Dependence is not the same as addiction.                       Tolerance means needing higher doses over time to get the same effect. This may increase the chance of serious side effects.      Addiction is when people improperly use a substance that harms their body, their mind or their relations with others. Use of opiates can cause a relapse of addiction if you have a history of drug or alcohol abuse.      People who have used opioids for a long time may have a lower quality of life, worse depression, higher levels of pain and more visits to doctors.    You can overdose on opioids. Take these steps to lower your risk of overdose:    1. Recognize the signs:  Signs of overdose include decrease or loss of consciousness (blackout), slowed breathing, trouble waking up and blue lips. If someone is worried about overdose, they should call 911.    2. Talk to your doctor about Narcan (naloxone).   If you are at risk for overdose, you may be given a prescription for Narcan. This medicine very quickly reverses the effects of opioids.   If you overdose, a friend or family member can give you Narcan while waiting for the ambulance. They need to know the signs of overdose and how to give Narcan.     3. Don't use alcohol or street drugs.   Taking them with opioids can cause death.    4. Do not take any of these medicines unless your doctor says it s OK. Taking these with opioids can cause death:    Benzodiazepines, such as lorazepam (Ativan), alprazolam (Xanax) or diazepam (Valium)    Muscle relaxers, such as cyclobenzaprine (Flexeril)    Sleeping pills like zolpidem (Ambien)     Other opioids      How to keep you and other people safe while taking opioids:    1. Never share your opioids with others.  Opioid medicines are regulated by the Drug Enforcement Agency ([employer]). Selling or sharing medications is a criminal act.    2. Be sure to store opioids in a secure place, locked up if possible. Young children  can easily swallow them and overdose.    3. When you are traveling with your medicines, keep them in the original bottles. If you use a pill box, be sure you also carry a copy of your medicine list from your clinic or pharmacy.    4. Safe disposal of opioids    Most pharmacies have places to get rid of medicine, called disposal kiosks. Medicine disposal options are also available in every [facility]. Search your county and  medication disposal  to find more options. You can find more details at:  [email or web address]     I agree that my provider, clinic care team, and pharmacy may work with any city, state or federal law enforcement agency that investigates the misuse, sale, or other diversion of my controlled medicine. I will allow my provider to discuss my care with, or share a copy of, this agreement with any other treating provider, pharmacy or emergency room where I receive care.    I have read this agreement and have asked questions about anything I did not understand.    _______________________________________________________  Patient Signature - Diana Salvador _____________________                   Date     _______________________________________________________  Provider Signature - Quoc Chu MD   _____________________                   Date     _______________________________________________________  Witness Signature (required if provider not present while patient signing)   _____________________                   Date

## 2022-05-04 ENCOUNTER — INFUSION THERAPY VISIT (OUTPATIENT)
Dept: INFUSION THERAPY | Facility: CLINIC | Age: 57
End: 2022-05-04
Attending: INTERNAL MEDICINE
Payer: COMMERCIAL

## 2022-05-04 VITALS
BODY MASS INDEX: 31.93 KG/M2 | HEART RATE: 84 BPM | WEIGHT: 205.4 LBS | TEMPERATURE: 99.6 F | RESPIRATION RATE: 16 BRPM | SYSTOLIC BLOOD PRESSURE: 112 MMHG | DIASTOLIC BLOOD PRESSURE: 69 MMHG

## 2022-05-04 DIAGNOSIS — Z17.1 MALIGNANT NEOPLASM OF UPPER-INNER QUADRANT OF RIGHT BREAST IN FEMALE, ESTROGEN RECEPTOR NEGATIVE (H): ICD-10-CM

## 2022-05-04 DIAGNOSIS — Z51.11 ENCOUNTER FOR ANTINEOPLASTIC CHEMOTHERAPY: Primary | ICD-10-CM

## 2022-05-04 DIAGNOSIS — C50.211 MALIGNANT NEOPLASM OF UPPER-INNER QUADRANT OF RIGHT BREAST IN FEMALE, ESTROGEN RECEPTOR NEGATIVE (H): ICD-10-CM

## 2022-05-04 DIAGNOSIS — T45.1X5A CHEMOTHERAPY-INDUCED NEUTROPENIA (H): ICD-10-CM

## 2022-05-04 DIAGNOSIS — D70.1 CHEMOTHERAPY-INDUCED NEUTROPENIA (H): ICD-10-CM

## 2022-05-04 LAB
BASOPHILS # BLD AUTO: 0.1 10E3/UL (ref 0–0.2)
BASOPHILS NFR BLD AUTO: 1 %
CREAT SERPL-MCNC: 0.58 MG/DL (ref 0.52–1.04)
EOSINOPHIL # BLD AUTO: 0 10E3/UL (ref 0–0.7)
EOSINOPHIL NFR BLD AUTO: 0 %
ERYTHROCYTE [DISTWIDTH] IN BLOOD BY AUTOMATED COUNT: 17.3 % (ref 10–15)
GFR SERPL CREATININE-BSD FRML MDRD: >90 ML/MIN/1.73M2
HCT VFR BLD AUTO: 32.5 % (ref 35–47)
HGB BLD-MCNC: 10.7 G/DL (ref 11.7–15.7)
IMM GRANULOCYTES # BLD: 0.1 10E3/UL
IMM GRANULOCYTES NFR BLD: 1 %
LYMPHOCYTES # BLD AUTO: 2.1 10E3/UL (ref 0.8–5.3)
LYMPHOCYTES NFR BLD AUTO: 23 %
MCH RBC QN AUTO: 30.6 PG (ref 26.5–33)
MCHC RBC AUTO-ENTMCNC: 32.9 G/DL (ref 31.5–36.5)
MCV RBC AUTO: 93 FL (ref 78–100)
MONOCYTES # BLD AUTO: 0.9 10E3/UL (ref 0–1.3)
MONOCYTES NFR BLD AUTO: 10 %
NEUTROPHILS # BLD AUTO: 6 10E3/UL (ref 1.6–8.3)
NEUTROPHILS NFR BLD AUTO: 65 %
NRBC # BLD AUTO: 0 10E3/UL
NRBC BLD AUTO-RTO: 0 /100
PLATELET # BLD AUTO: 251 10E3/UL (ref 150–450)
RBC # BLD AUTO: 3.5 10E6/UL (ref 3.8–5.2)
WBC # BLD AUTO: 9.1 10E3/UL (ref 4–11)

## 2022-05-04 PROCEDURE — 258N000003 HC RX IP 258 OP 636: Performed by: INTERNAL MEDICINE

## 2022-05-04 PROCEDURE — 250N000011 HC RX IP 250 OP 636: Performed by: INTERNAL MEDICINE

## 2022-05-04 PROCEDURE — 250N000013 HC RX MED GY IP 250 OP 250 PS 637: Performed by: NURSE PRACTITIONER

## 2022-05-04 PROCEDURE — 85025 COMPLETE CBC W/AUTO DIFF WBC: CPT | Performed by: INTERNAL MEDICINE

## 2022-05-04 PROCEDURE — 96413 CHEMO IV INFUSION 1 HR: CPT

## 2022-05-04 PROCEDURE — 82565 ASSAY OF CREATININE: CPT | Performed by: INTERNAL MEDICINE

## 2022-05-04 PROCEDURE — 96375 TX/PRO/DX INJ NEW DRUG ADDON: CPT

## 2022-05-04 PROCEDURE — 96367 TX/PROPH/DG ADDL SEQ IV INF: CPT

## 2022-05-04 PROCEDURE — 250N000013 HC RX MED GY IP 250 OP 250 PS 637: Performed by: INTERNAL MEDICINE

## 2022-05-04 RX ORDER — TRAMADOL HYDROCHLORIDE 50 MG/1
TABLET ORAL
Qty: 120 TABLET | Refills: 0 | Status: SHIPPED | OUTPATIENT
Start: 2022-07-03 | End: 2022-05-18

## 2022-05-04 RX ORDER — DIPHENHYDRAMINE HCL 25 MG
50 CAPSULE ORAL ONCE
Status: COMPLETED | OUTPATIENT
Start: 2022-05-04 | End: 2022-05-04

## 2022-05-04 RX ORDER — TRAMADOL HYDROCHLORIDE 50 MG/1
50 TABLET ORAL EVERY 6 HOURS PRN
Qty: 120 TABLET | Refills: 0 | Status: SHIPPED | OUTPATIENT
Start: 2022-06-03 | End: 2022-05-18

## 2022-05-04 RX ORDER — ACETAMINOPHEN 325 MG/1
975 TABLET ORAL ONCE
Status: COMPLETED | OUTPATIENT
Start: 2022-05-04 | End: 2022-05-04

## 2022-05-04 RX ORDER — HEPARIN SODIUM (PORCINE) LOCK FLUSH IV SOLN 100 UNIT/ML 100 UNIT/ML
5 SOLUTION INTRAVENOUS
Status: DISCONTINUED | OUTPATIENT
Start: 2022-05-04 | End: 2022-05-04 | Stop reason: HOSPADM

## 2022-05-04 RX ORDER — TRAMADOL HYDROCHLORIDE 50 MG/1
50 TABLET ORAL EVERY 6 HOURS PRN
Qty: 120 TABLET | Refills: 0 | Status: SHIPPED | OUTPATIENT
Start: 2022-05-04 | End: 2022-05-18

## 2022-05-04 RX ADMIN — DEXAMETHASONE SODIUM PHOSPHATE: 10 INJECTION, SOLUTION INTRAMUSCULAR; INTRAVENOUS at 12:22

## 2022-05-04 RX ADMIN — FAMOTIDINE 20 MG: 20 INJECTION, SOLUTION INTRAVENOUS at 12:06

## 2022-05-04 RX ADMIN — Medication 5 ML: at 13:59

## 2022-05-04 RX ADMIN — DIPHENHYDRAMINE HCL 50 MG: 25 CAPSULE ORAL at 12:06

## 2022-05-04 RX ADMIN — SODIUM CHLORIDE 250 ML: 9 INJECTION, SOLUTION INTRAVENOUS at 12:05

## 2022-05-04 RX ADMIN — ACETAMINOPHEN 975 MG: 325 TABLET ORAL at 13:23

## 2022-05-04 RX ADMIN — PACLITAXEL 173 MG: 6 INJECTION, SOLUTION INTRAVENOUS at 12:55

## 2022-05-04 ASSESSMENT — PAIN SCALES - GENERAL: PAINLEVEL: NO PAIN (0)

## 2022-05-04 NOTE — TELEPHONE ENCOUNTER
Message given to patient with good understanding. Printed the form and placed at the  for patient to sign.    Love Cote PSC on 5/4/2022 at 11:32 AM

## 2022-05-04 NOTE — TELEPHONE ENCOUNTER
Pending Prescriptions:                       Disp   Refills    traMADol (ULTRAM) 50 MG tablet [Pharmacy *120 ta*             Sig: TAKE 1 TABLET(50 MG) BY MOUTH EVERY 6 HOURS AS           NEEDED FOR SEVERE PAIN    Routing refill request to provider for review/approval because:  Drug not on the [facility] refill protocol       Niles Langston RN

## 2022-05-04 NOTE — PROGRESS NOTES
Infusion Nursing Note:  Diana aSlvador presents today for Taxol.    Patient seen by provider today: No   present during visit today: Not Applicable.    Note: Patient wished to continue with pre-meds due to pruritis in her hands secondary to treatment.      Intravenous Access:  Implanted Port.    Treatment Conditions:  Lab Results   Component Value Date    HGB 10.7 (L) 05/04/2022    WBC 9.1 05/04/2022    ANEU 2.0 04/05/2022    ANEUTAUTO 6.0 05/04/2022     05/04/2022      Results reviewed, labs MET treatment parameters, ok to proceed with treatment.  Crt 0.58.      Post Infusion Assessment:  Patient tolerated infusion without incident.  Blood return noted pre and post infusion.  Site patent and intact, free from redness, edema or discomfort.  No evidence of extravasations.  Access discontinued per protocol.       Discharge Plan:   Patient discharged in stable condition accompanied by: self.  Departure Mode: Ambulatory.      Bella Nunez RN

## 2022-05-05 ENCOUNTER — TELEPHONE (OUTPATIENT)
Dept: ONCOLOGY | Facility: CLINIC | Age: 57
End: 2022-05-05
Payer: OTHER GOVERNMENT

## 2022-05-05 NOTE — TELEPHONE ENCOUNTER
Called to let patient know that here [place] paperwork is filled out and faxed. A copy was sent to scan and the original is waiting at the  for .     Emy Hollingsworth CMA on 5/5/2022 at 3:48 PM

## 2022-05-10 ENCOUNTER — INFUSION THERAPY VISIT (OUTPATIENT)
Dept: INFUSION THERAPY | Facility: CLINIC | Age: 57
End: 2022-05-10
Attending: INTERNAL MEDICINE
Payer: COMMERCIAL

## 2022-05-10 VITALS
TEMPERATURE: 98.9 F | HEART RATE: 75 BPM | DIASTOLIC BLOOD PRESSURE: 74 MMHG | SYSTOLIC BLOOD PRESSURE: 108 MMHG | OXYGEN SATURATION: 96 %

## 2022-05-10 DIAGNOSIS — T45.1X5A CHEMOTHERAPY-INDUCED NEUTROPENIA (H): ICD-10-CM

## 2022-05-10 DIAGNOSIS — Z51.11 ENCOUNTER FOR ANTINEOPLASTIC CHEMOTHERAPY: Primary | ICD-10-CM

## 2022-05-10 DIAGNOSIS — D70.1 CHEMOTHERAPY-INDUCED NEUTROPENIA (H): ICD-10-CM

## 2022-05-10 DIAGNOSIS — C50.211 MALIGNANT NEOPLASM OF UPPER-INNER QUADRANT OF RIGHT BREAST IN FEMALE, ESTROGEN RECEPTOR NEGATIVE (H): ICD-10-CM

## 2022-05-10 DIAGNOSIS — Z17.1 MALIGNANT NEOPLASM OF UPPER-INNER QUADRANT OF RIGHT BREAST IN FEMALE, ESTROGEN RECEPTOR NEGATIVE (H): ICD-10-CM

## 2022-05-10 LAB
BASOPHILS # BLD MANUAL: 0 10E3/UL (ref 0–0.2)
BASOPHILS NFR BLD MANUAL: 0 %
CREAT SERPL-MCNC: 0.56 MG/DL (ref 0.52–1.04)
EOSINOPHIL # BLD MANUAL: 0 10E3/UL (ref 0–0.7)
EOSINOPHIL NFR BLD MANUAL: 0 %
ERYTHROCYTE [DISTWIDTH] IN BLOOD BY AUTOMATED COUNT: 18.2 % (ref 10–15)
GFR SERPL CREATININE-BSD FRML MDRD: >90 ML/MIN/1.73M2
HCT VFR BLD AUTO: 30.9 % (ref 35–47)
HGB BLD-MCNC: 10.1 G/DL (ref 11.7–15.7)
LYMPHOCYTES # BLD MANUAL: 1.7 10E3/UL (ref 0.8–5.3)
LYMPHOCYTES NFR BLD MANUAL: 23 %
MCH RBC QN AUTO: 30.6 PG (ref 26.5–33)
MCHC RBC AUTO-ENTMCNC: 32.7 G/DL (ref 31.5–36.5)
MCV RBC AUTO: 94 FL (ref 78–100)
MONOCYTES # BLD MANUAL: 0.7 10E3/UL (ref 0–1.3)
MONOCYTES NFR BLD MANUAL: 9 %
NEUTROPHILS # BLD MANUAL: 5.2 10E3/UL (ref 1.6–8.3)
NEUTROPHILS NFR BLD MANUAL: 68 %
NRBC # BLD AUTO: 0.1 10E3/UL
NRBC BLD MANUAL-RTO: 1 %
PLAT MORPH BLD: ABNORMAL
PLATELET # BLD AUTO: 300 10E3/UL (ref 150–450)
RBC # BLD AUTO: 3.3 10E6/UL (ref 3.8–5.2)
RBC MORPH BLD: ABNORMAL
WBC # BLD AUTO: 7.6 10E3/UL (ref 4–11)

## 2022-05-10 PROCEDURE — 96375 TX/PRO/DX INJ NEW DRUG ADDON: CPT

## 2022-05-10 PROCEDURE — 250N000011 HC RX IP 250 OP 636: Performed by: INTERNAL MEDICINE

## 2022-05-10 PROCEDURE — 82565 ASSAY OF CREATININE: CPT | Performed by: INTERNAL MEDICINE

## 2022-05-10 PROCEDURE — 85027 COMPLETE CBC AUTOMATED: CPT | Performed by: INTERNAL MEDICINE

## 2022-05-10 PROCEDURE — 85007 BL SMEAR W/DIFF WBC COUNT: CPT | Performed by: INTERNAL MEDICINE

## 2022-05-10 PROCEDURE — 96367 TX/PROPH/DG ADDL SEQ IV INF: CPT

## 2022-05-10 PROCEDURE — 96413 CHEMO IV INFUSION 1 HR: CPT

## 2022-05-10 PROCEDURE — 258N000003 HC RX IP 258 OP 636: Performed by: INTERNAL MEDICINE

## 2022-05-10 PROCEDURE — 250N000013 HC RX MED GY IP 250 OP 250 PS 637: Performed by: NURSE PRACTITIONER

## 2022-05-10 RX ORDER — DIPHENHYDRAMINE HCL 25 MG
50 CAPSULE ORAL ONCE
Status: COMPLETED | OUTPATIENT
Start: 2022-05-10 | End: 2022-05-10

## 2022-05-10 RX ORDER — HEPARIN SODIUM (PORCINE) LOCK FLUSH IV SOLN 100 UNIT/ML 100 UNIT/ML
500 SOLUTION INTRAVENOUS ONCE
Status: COMPLETED | OUTPATIENT
Start: 2022-05-10 | End: 2022-05-10

## 2022-05-10 RX ORDER — HEPARIN SODIUM (PORCINE) LOCK FLUSH IV SOLN 100 UNIT/ML 100 UNIT/ML
5 SOLUTION INTRAVENOUS
Status: DISCONTINUED | OUTPATIENT
Start: 2022-05-10 | End: 2022-05-10 | Stop reason: HOSPADM

## 2022-05-10 RX ADMIN — DIPHENHYDRAMINE HCL 25 MG: 25 CAPSULE ORAL at 12:49

## 2022-05-10 RX ADMIN — DEXAMETHASONE SODIUM PHOSPHATE: 10 INJECTION, SOLUTION INTRAMUSCULAR; INTRAVENOUS at 13:03

## 2022-05-10 RX ADMIN — FAMOTIDINE 20 MG: 20 INJECTION, SOLUTION INTRAVENOUS at 12:50

## 2022-05-10 RX ADMIN — PACLITAXEL 173 MG: 6 INJECTION, SOLUTION INTRAVENOUS at 13:24

## 2022-05-10 RX ADMIN — SODIUM CHLORIDE 250 ML: 9 INJECTION, SOLUTION INTRAVENOUS at 12:33

## 2022-05-10 RX ADMIN — Medication 500 UNITS: at 14:40

## 2022-05-10 NOTE — PROGRESS NOTES
Infusion Nursing Note:  Diana Salvador presents today for Taxol.    Patient seen by provider today: No   present during visit today: Not Applicable.    Note: N/A.      Intravenous Access:  Implanted Port.    Treatment Conditions:  Results reviewed, labs MET treatment parameters, ok to proceed with treatment.      Post Infusion Assessment:  Patient tolerated infusion without incident.  Good blood return noted before and after chemo administration.  Access discontinued per protocol.       Discharge Plan:   Patient discharged in stable condition accompanied by: self.  Departure Mode: Ambulatory.  Return on 5/18 for start of new chemo cycle.    Nikki Sumner RN

## 2022-05-10 NOTE — PATIENT INSTRUCTIONS
Increase protein in your diet.  You can try Boost and Ensure for this.  You can use Lasix as directed for swelling.

## 2022-05-16 ENCOUNTER — PATIENT OUTREACH (OUTPATIENT)
Dept: ONCOLOGY | Facility: CLINIC | Age: 57
End: 2022-05-16
Payer: OTHER GOVERNMENT

## 2022-05-16 DIAGNOSIS — G62.9 NEUROPATHY: Primary | ICD-10-CM

## 2022-05-16 RX ORDER — GABAPENTIN 300 MG/1
CAPSULE ORAL
Qty: 60 CAPSULE | Refills: 0 | Status: SHIPPED | OUTPATIENT
Start: 2022-05-16 | End: 2022-05-23

## 2022-05-16 RX ORDER — HYDROCODONE BITARTRATE AND ACETAMINOPHEN 5; 325 MG/1; MG/1
1 TABLET ORAL EVERY 6 HOURS PRN
Qty: 18 TABLET | Refills: 0 | Status: SHIPPED | OUTPATIENT
Start: 2022-05-16 | End: 2022-05-18

## 2022-05-16 NOTE — PROGRESS NOTES
"Pt called to report horrible pain in her fingers/nails. She reports them as feeling \"bloated\" and feels like when you get acrylic nails placed X 10. Pt is not able to sleep due to pain.    Her nails are turing purple and her fingers hurt really bad but she says she cant feel the tips. SHe is not able to open a box of raisins she wanted today. No dexterity.     Started on Friday.     Pt out of Hydrocodone as of 2am today. Pt thought this was helping a little.     No open spot or infection.      Rash she was dealing with on tops of hands is better with rx cream.     PCP has pt on furosemide but her hands are swollen.     Will update Marilu for recommendation.     Marilee Esquivel RN on 5/16/2022 at 12:09 PM    "

## 2022-05-17 ENCOUNTER — LAB (OUTPATIENT)
Dept: INFUSION THERAPY | Facility: CLINIC | Age: 57
End: 2022-05-17
Attending: INTERNAL MEDICINE
Payer: COMMERCIAL

## 2022-05-17 DIAGNOSIS — D70.1 CHEMOTHERAPY-INDUCED NEUTROPENIA (H): ICD-10-CM

## 2022-05-17 DIAGNOSIS — Z17.1 MALIGNANT NEOPLASM OF UPPER-INNER QUADRANT OF RIGHT BREAST IN FEMALE, ESTROGEN RECEPTOR NEGATIVE (H): ICD-10-CM

## 2022-05-17 DIAGNOSIS — T45.1X5A CHEMOTHERAPY-INDUCED NEUTROPENIA (H): ICD-10-CM

## 2022-05-17 DIAGNOSIS — C50.211 MALIGNANT NEOPLASM OF UPPER-INNER QUADRANT OF RIGHT BREAST IN FEMALE, ESTROGEN RECEPTOR NEGATIVE (H): ICD-10-CM

## 2022-05-17 DIAGNOSIS — Z51.11 ENCOUNTER FOR ANTINEOPLASTIC CHEMOTHERAPY: Primary | ICD-10-CM

## 2022-05-17 LAB
ALBUMIN SERPL-MCNC: 2.7 G/DL (ref 3.4–5)
ALP SERPL-CCNC: 95 U/L (ref 40–150)
ALT SERPL W P-5'-P-CCNC: 19 U/L (ref 0–50)
ANION GAP SERPL CALCULATED.3IONS-SCNC: 6 MMOL/L (ref 3–14)
AST SERPL W P-5'-P-CCNC: 14 U/L (ref 0–45)
BASOPHILS # BLD MANUAL: 0.1 10E3/UL (ref 0–0.2)
BASOPHILS NFR BLD MANUAL: 3 %
BILIRUB SERPL-MCNC: 0.2 MG/DL (ref 0.2–1.3)
BUN SERPL-MCNC: 8 MG/DL (ref 7–30)
CALCIUM SERPL-MCNC: 8.3 MG/DL (ref 8.5–10.1)
CHLORIDE BLD-SCNC: 108 MMOL/L (ref 94–109)
CO2 SERPL-SCNC: 29 MMOL/L (ref 20–32)
CREAT SERPL-MCNC: 0.54 MG/DL (ref 0.52–1.04)
EOSINOPHIL # BLD MANUAL: 0 10E3/UL (ref 0–0.7)
EOSINOPHIL NFR BLD MANUAL: 0 %
ERYTHROCYTE [DISTWIDTH] IN BLOOD BY AUTOMATED COUNT: 18.6 % (ref 10–15)
GFR SERPL CREATININE-BSD FRML MDRD: >90 ML/MIN/1.73M2
GLUCOSE BLD-MCNC: 104 MG/DL (ref 70–99)
HCT VFR BLD AUTO: 30.6 % (ref 35–47)
HGB BLD-MCNC: 9.7 G/DL (ref 11.7–15.7)
LYMPHOCYTES # BLD MANUAL: 1.4 10E3/UL (ref 0.8–5.3)
LYMPHOCYTES NFR BLD MANUAL: 45 %
MCH RBC QN AUTO: 30.2 PG (ref 26.5–33)
MCHC RBC AUTO-ENTMCNC: 31.7 G/DL (ref 31.5–36.5)
MCV RBC AUTO: 95 FL (ref 78–100)
MONOCYTES # BLD MANUAL: 0.6 10E3/UL (ref 0–1.3)
MONOCYTES NFR BLD MANUAL: 20 %
NEUTROPHILS # BLD MANUAL: 1 10E3/UL (ref 1.6–8.3)
NEUTROPHILS NFR BLD MANUAL: 32 %
PLAT MORPH BLD: ABNORMAL
PLATELET # BLD AUTO: 220 10E3/UL (ref 150–450)
POTASSIUM BLD-SCNC: 4.1 MMOL/L (ref 3.4–5.3)
PROT SERPL-MCNC: 5.9 G/DL (ref 6.8–8.8)
RBC # BLD AUTO: 3.21 10E6/UL (ref 3.8–5.2)
RBC MORPH BLD: ABNORMAL
SODIUM SERPL-SCNC: 143 MMOL/L (ref 133–144)
TSH SERPL DL<=0.005 MIU/L-ACNC: 1.09 MU/L (ref 0.4–4)
WBC # BLD AUTO: 3 10E3/UL (ref 4–11)

## 2022-05-17 PROCEDURE — 80053 COMPREHEN METABOLIC PANEL: CPT | Performed by: NURSE PRACTITIONER

## 2022-05-17 PROCEDURE — 250N000011 HC RX IP 250 OP 636: Performed by: INTERNAL MEDICINE

## 2022-05-17 PROCEDURE — 82040 ASSAY OF SERUM ALBUMIN: CPT | Performed by: NURSE PRACTITIONER

## 2022-05-17 PROCEDURE — 85027 COMPLETE CBC AUTOMATED: CPT | Performed by: NURSE PRACTITIONER

## 2022-05-17 PROCEDURE — 36593 DECLOT VASCULAR DEVICE: CPT

## 2022-05-17 PROCEDURE — 250N000011 HC RX IP 250 OP 636: Performed by: NURSE PRACTITIONER

## 2022-05-17 PROCEDURE — 85007 BL SMEAR W/DIFF WBC COUNT: CPT | Performed by: NURSE PRACTITIONER

## 2022-05-17 PROCEDURE — 84443 ASSAY THYROID STIM HORMONE: CPT | Performed by: NURSE PRACTITIONER

## 2022-05-17 RX ORDER — HEPARIN SODIUM (PORCINE) LOCK FLUSH IV SOLN 100 UNIT/ML 100 UNIT/ML
SOLUTION INTRAVENOUS
Status: DISCONTINUED
Start: 2022-05-17 | End: 2022-05-17 | Stop reason: HOSPADM

## 2022-05-17 RX ORDER — HEPARIN SODIUM (PORCINE) LOCK FLUSH IV SOLN 100 UNIT/ML 100 UNIT/ML
500 SOLUTION INTRAVENOUS ONCE
Status: COMPLETED | OUTPATIENT
Start: 2022-05-17 | End: 2022-05-17

## 2022-05-17 RX ADMIN — HEPARIN 500 UNITS: 100 SYRINGE at 11:11

## 2022-05-17 RX ADMIN — ALTEPLASE 2 MG: 2.2 INJECTION, POWDER, LYOPHILIZED, FOR SOLUTION INTRAVENOUS at 11:25

## 2022-05-17 NOTE — PROGRESS NOTES
PAC labs drawn and sent. Port accessed without difficulty. No initial blood return. Good flush noted. TPA instilled at 1138, allowed to remain in line for 30 minutes. Good blood return noted after TPA. Port access left in for chemo tomorrow.    Muna RN

## 2022-05-17 NOTE — PROGRESS NOTES
I refilled the hydrocodone   I sent Rx for gabapentin 300 mg at bedtime - do for 2-3 days and can increase to BID if needed after that. We can increase further to TID next week if needed.     Sounds like neuropathy pain, but will be assessed at her visit Wed.     If symptoms change/progress before Wed, she should call back for earlier appt             Update pt with plan above, pt verbalized understanding.     Marilee Esquivel RN on 5/17/2022 at 3:42 PM

## 2022-05-18 ENCOUNTER — INFUSION THERAPY VISIT (OUTPATIENT)
Dept: INFUSION THERAPY | Facility: CLINIC | Age: 57
End: 2022-05-18
Attending: INTERNAL MEDICINE
Payer: COMMERCIAL

## 2022-05-18 ENCOUNTER — ONCOLOGY VISIT (OUTPATIENT)
Dept: ONCOLOGY | Facility: CLINIC | Age: 57
End: 2022-05-18
Attending: INTERNAL MEDICINE
Payer: COMMERCIAL

## 2022-05-18 VITALS
SYSTOLIC BLOOD PRESSURE: 125 MMHG | BODY MASS INDEX: 32.65 KG/M2 | HEART RATE: 83 BPM | OXYGEN SATURATION: 97 % | WEIGHT: 210 LBS | DIASTOLIC BLOOD PRESSURE: 92 MMHG | RESPIRATION RATE: 12 BRPM | TEMPERATURE: 99 F

## 2022-05-18 VITALS — RESPIRATION RATE: 16 BRPM | DIASTOLIC BLOOD PRESSURE: 80 MMHG | HEART RATE: 80 BPM | SYSTOLIC BLOOD PRESSURE: 120 MMHG

## 2022-05-18 DIAGNOSIS — T45.1X5A CHEMOTHERAPY-INDUCED NEUTROPENIA (H): ICD-10-CM

## 2022-05-18 DIAGNOSIS — Z17.1 MALIGNANT NEOPLASM OF UPPER-INNER QUADRANT OF RIGHT BREAST IN FEMALE, ESTROGEN RECEPTOR NEGATIVE (H): ICD-10-CM

## 2022-05-18 DIAGNOSIS — D70.1 CHEMOTHERAPY-INDUCED NEUTROPENIA (H): ICD-10-CM

## 2022-05-18 DIAGNOSIS — Z51.11 ENCOUNTER FOR ANTINEOPLASTIC CHEMOTHERAPY: Primary | ICD-10-CM

## 2022-05-18 DIAGNOSIS — G62.9 NEUROPATHY: ICD-10-CM

## 2022-05-18 DIAGNOSIS — C50.211 MALIGNANT NEOPLASM OF UPPER-INNER QUADRANT OF RIGHT BREAST IN FEMALE, ESTROGEN RECEPTOR NEGATIVE (H): ICD-10-CM

## 2022-05-18 DIAGNOSIS — T45.1X5A CHEMOTHERAPY-INDUCED NEUROPATHY (H): ICD-10-CM

## 2022-05-18 DIAGNOSIS — C50.211 MALIGNANT NEOPLASM OF UPPER-INNER QUADRANT OF RIGHT BREAST IN FEMALE, ESTROGEN RECEPTOR NEGATIVE (H): Primary | ICD-10-CM

## 2022-05-18 DIAGNOSIS — Z17.1 MALIGNANT NEOPLASM OF UPPER-INNER QUADRANT OF RIGHT BREAST IN FEMALE, ESTROGEN RECEPTOR NEGATIVE (H): Primary | ICD-10-CM

## 2022-05-18 DIAGNOSIS — Z51.11 ENCOUNTER FOR ANTINEOPLASTIC CHEMOTHERAPY: ICD-10-CM

## 2022-05-18 DIAGNOSIS — G62.0 CHEMOTHERAPY-INDUCED NEUROPATHY (H): ICD-10-CM

## 2022-05-18 PROCEDURE — 99215 OFFICE O/P EST HI 40 MIN: CPT | Performed by: INTERNAL MEDICINE

## 2022-05-18 PROCEDURE — 258N000003 HC RX IP 258 OP 636: Performed by: INTERNAL MEDICINE

## 2022-05-18 PROCEDURE — G0463 HOSPITAL OUTPT CLINIC VISIT: HCPCS | Mod: 25

## 2022-05-18 PROCEDURE — 96413 CHEMO IV INFUSION 1 HR: CPT

## 2022-05-18 PROCEDURE — 250N000011 HC RX IP 250 OP 636: Performed by: INTERNAL MEDICINE

## 2022-05-18 RX ORDER — NALOXONE HYDROCHLORIDE 0.4 MG/ML
0.2 INJECTION, SOLUTION INTRAMUSCULAR; INTRAVENOUS; SUBCUTANEOUS
Status: CANCELLED | OUTPATIENT
Start: 2022-05-18

## 2022-05-18 RX ORDER — LORAZEPAM 2 MG/ML
0.5 INJECTION INTRAMUSCULAR EVERY 4 HOURS PRN
Status: CANCELLED | OUTPATIENT
Start: 2022-05-18

## 2022-05-18 RX ORDER — MEPERIDINE HYDROCHLORIDE 25 MG/ML
25 INJECTION INTRAMUSCULAR; INTRAVENOUS; SUBCUTANEOUS EVERY 30 MIN PRN
Status: CANCELLED | OUTPATIENT
Start: 2022-06-01

## 2022-05-18 RX ORDER — DIPHENHYDRAMINE HYDROCHLORIDE 50 MG/ML
50 INJECTION INTRAMUSCULAR; INTRAVENOUS
Status: CANCELLED
Start: 2022-05-25

## 2022-05-18 RX ORDER — ALBUTEROL SULFATE 90 UG/1
1-2 AEROSOL, METERED RESPIRATORY (INHALATION)
Status: CANCELLED
Start: 2022-05-18

## 2022-05-18 RX ORDER — ALBUTEROL SULFATE 0.83 MG/ML
2.5 SOLUTION RESPIRATORY (INHALATION)
Status: CANCELLED | OUTPATIENT
Start: 2022-06-01

## 2022-05-18 RX ORDER — ALBUTEROL SULFATE 90 UG/1
1-2 AEROSOL, METERED RESPIRATORY (INHALATION)
Status: CANCELLED
Start: 2022-05-25

## 2022-05-18 RX ORDER — NALOXONE HYDROCHLORIDE 0.4 MG/ML
0.2 INJECTION, SOLUTION INTRAMUSCULAR; INTRAVENOUS; SUBCUTANEOUS
Status: CANCELLED | OUTPATIENT
Start: 2022-05-25

## 2022-05-18 RX ORDER — HYDROCODONE BITARTRATE AND ACETAMINOPHEN 5; 325 MG/1; MG/1
1 TABLET ORAL EVERY 6 HOURS PRN
Qty: 60 TABLET | Refills: 0 | Status: SHIPPED | OUTPATIENT
Start: 2022-05-18 | End: 2022-06-08

## 2022-05-18 RX ORDER — DIPHENHYDRAMINE HCL 25 MG
50 CAPSULE ORAL ONCE
Status: CANCELLED
Start: 2022-06-01

## 2022-05-18 RX ORDER — HEPARIN SODIUM (PORCINE) LOCK FLUSH IV SOLN 100 UNIT/ML 100 UNIT/ML
5 SOLUTION INTRAVENOUS
Status: CANCELLED | OUTPATIENT
Start: 2022-05-18

## 2022-05-18 RX ORDER — METHYLPREDNISOLONE SODIUM SUCCINATE 125 MG/2ML
125 INJECTION, POWDER, LYOPHILIZED, FOR SOLUTION INTRAMUSCULAR; INTRAVENOUS
Status: CANCELLED
Start: 2022-06-01

## 2022-05-18 RX ORDER — LORAZEPAM 2 MG/ML
0.5 INJECTION INTRAMUSCULAR EVERY 4 HOURS PRN
Status: CANCELLED | OUTPATIENT
Start: 2022-05-25

## 2022-05-18 RX ORDER — MEPERIDINE HYDROCHLORIDE 25 MG/ML
25 INJECTION INTRAMUSCULAR; INTRAVENOUS; SUBCUTANEOUS EVERY 30 MIN PRN
Status: CANCELLED | OUTPATIENT
Start: 2022-05-18

## 2022-05-18 RX ORDER — EPINEPHRINE 1 MG/ML
0.3 INJECTION, SOLUTION, CONCENTRATE INTRAVENOUS EVERY 5 MIN PRN
Status: CANCELLED | OUTPATIENT
Start: 2022-06-01

## 2022-05-18 RX ORDER — ALBUTEROL SULFATE 0.83 MG/ML
2.5 SOLUTION RESPIRATORY (INHALATION)
Status: CANCELLED | OUTPATIENT
Start: 2022-05-18

## 2022-05-18 RX ORDER — ALBUTEROL SULFATE 0.83 MG/ML
2.5 SOLUTION RESPIRATORY (INHALATION)
Status: CANCELLED | OUTPATIENT
Start: 2022-05-25

## 2022-05-18 RX ORDER — LORAZEPAM 2 MG/ML
0.5 INJECTION INTRAMUSCULAR EVERY 4 HOURS PRN
Status: CANCELLED | OUTPATIENT
Start: 2022-06-01

## 2022-05-18 RX ORDER — DIPHENHYDRAMINE HYDROCHLORIDE 50 MG/ML
50 INJECTION INTRAMUSCULAR; INTRAVENOUS
Status: CANCELLED
Start: 2022-06-01

## 2022-05-18 RX ORDER — DIPHENHYDRAMINE HYDROCHLORIDE 50 MG/ML
50 INJECTION INTRAMUSCULAR; INTRAVENOUS
Status: CANCELLED
Start: 2022-05-18

## 2022-05-18 RX ORDER — EPINEPHRINE 1 MG/ML
0.3 INJECTION, SOLUTION, CONCENTRATE INTRAVENOUS EVERY 5 MIN PRN
Status: CANCELLED | OUTPATIENT
Start: 2022-05-25

## 2022-05-18 RX ORDER — HEPARIN SODIUM (PORCINE) LOCK FLUSH IV SOLN 100 UNIT/ML 100 UNIT/ML
5 SOLUTION INTRAVENOUS
Status: CANCELLED | OUTPATIENT
Start: 2022-05-25

## 2022-05-18 RX ORDER — METHYLPREDNISOLONE SODIUM SUCCINATE 125 MG/2ML
125 INJECTION, POWDER, LYOPHILIZED, FOR SOLUTION INTRAMUSCULAR; INTRAVENOUS
Status: CANCELLED
Start: 2022-05-18

## 2022-05-18 RX ORDER — EPINEPHRINE 1 MG/ML
0.3 INJECTION, SOLUTION, CONCENTRATE INTRAVENOUS EVERY 5 MIN PRN
Status: CANCELLED | OUTPATIENT
Start: 2022-05-18

## 2022-05-18 RX ORDER — DIPHENHYDRAMINE HCL 25 MG
50 CAPSULE ORAL ONCE
Status: CANCELLED
Start: 2022-05-25

## 2022-05-18 RX ORDER — METHYLPREDNISOLONE SODIUM SUCCINATE 125 MG/2ML
125 INJECTION, POWDER, LYOPHILIZED, FOR SOLUTION INTRAMUSCULAR; INTRAVENOUS
Status: CANCELLED
Start: 2022-05-25

## 2022-05-18 RX ORDER — DIPHENHYDRAMINE HCL 25 MG
50 CAPSULE ORAL ONCE
Status: CANCELLED
Start: 2022-05-18

## 2022-05-18 RX ORDER — NALOXONE HYDROCHLORIDE 0.4 MG/ML
0.2 INJECTION, SOLUTION INTRAMUSCULAR; INTRAVENOUS; SUBCUTANEOUS
Status: CANCELLED | OUTPATIENT
Start: 2022-06-01

## 2022-05-18 RX ORDER — ALBUTEROL SULFATE 90 UG/1
1-2 AEROSOL, METERED RESPIRATORY (INHALATION)
Status: CANCELLED
Start: 2022-06-01

## 2022-05-18 RX ORDER — HEPARIN SODIUM (PORCINE) LOCK FLUSH IV SOLN 100 UNIT/ML 100 UNIT/ML
5 SOLUTION INTRAVENOUS
Status: CANCELLED | OUTPATIENT
Start: 2022-06-01

## 2022-05-18 RX ORDER — MEPERIDINE HYDROCHLORIDE 25 MG/ML
25 INJECTION INTRAMUSCULAR; INTRAVENOUS; SUBCUTANEOUS EVERY 30 MIN PRN
Status: CANCELLED | OUTPATIENT
Start: 2022-05-25

## 2022-05-18 RX ORDER — HEPARIN SODIUM (PORCINE) LOCK FLUSH IV SOLN 100 UNIT/ML 100 UNIT/ML
5 SOLUTION INTRAVENOUS
Status: DISCONTINUED | OUTPATIENT
Start: 2022-05-18 | End: 2022-05-18 | Stop reason: HOSPADM

## 2022-05-18 RX ADMIN — SODIUM CHLORIDE 250 ML: 9 INJECTION, SOLUTION INTRAVENOUS at 12:09

## 2022-05-18 RX ADMIN — SODIUM CHLORIDE 200 MG: 9 INJECTION, SOLUTION INTRAVENOUS at 12:09

## 2022-05-18 RX ADMIN — HEPARIN 5 ML: 100 SYRINGE at 12:51

## 2022-05-18 ASSESSMENT — PAIN SCALES - GENERAL: PAINLEVEL: EXTREME PAIN (9)

## 2022-05-18 NOTE — PROGRESS NOTES
The patient is being seen for the following issue/s:  1. 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 for BRCA 1/2)    2. Chemotherapy-induced neutropenia (H)    3. Encounter for antineoplastic chemotherapy    4. Chemotherapy-induced neuropathy (after 3 cycles of Carbo/Taxol/Keytruda grade 3 symptoms present at start of 4th cycle and further neoadjuvant chemo discontinued)    5. 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 for BRCA 1/2)  Staging form: Breast, AJCC 8th Edition  - Clinical stage from 3/4/2022: Stage IIB (cT2, cN0, cM0, G3, ER-, IL-, HER2-) - Signed by Ricardo Diamond MD on 3/4/2022    She was recommended to receive neoadjuvant chemotherapy based on the KEYNOTE-522 trial (Shobha et al., 2020):      Cycles 1 through 4: A cycle is every 21 days:     Pembrolizumab 200 mg flat dose IV once on day 1 of cycles 1 through 4  Paclitaxel 80 mg/m  IV once daily on days 1, 8, and 15 of cycles 1 through 4     Carboplatin AUC=5 IV once on day 1 of cycles 1 through 4  Filgrastim 5 mcg/kg subcutaneously once daily on days 16, 17, and 18 of cycles 1 through 4     Cycles 5 through 8: A cycle is every 21 days:  Pembrolizumab 200 mg flat dose IV once on day 1 of cycles 5 through 8  Doxorubicin 60 mg/m  IV once on day 1 of cycles 5 through 8  Cyclophosphamide 600 mg/m  IV once on day 1 of cycles 5 through 8  Pegfilgrastim 6 mg flat dose subcutaneously once on day 2 of cycles 5 through 8    She has developed chemotherapy-induced neutropenia despite filgrastim support as evident on her CBC from yesterday (ANC was 1000).  She has not had any fevers.    She has also developed painful peripheral neuropathy in her fingernails and fingertips which makes it difficult for her to sleep at night.  She is taking hydrocodone 3 times  a day and is ramping up gabapentin.  She cannot  any objects with her fingers.    She has also had diffuse arthralgias which have been gradually worsening since she started chemotherapy.    She also appears to have developed a hypersensitivity skin reaction involving her hands with skin erythema on the dorsum of both hands.  She also has some discoloration of her nails.      PHYSICAL EXAMINATION:  BP (!) 125/92 (BP Location: Right arm, Patient Position: Sitting, Cuff Size: Adult Regular)   Pulse 83   Temp 99  F (37.2  C) (Tympanic)   Resp 12   Wt 95.3 kg (210 lb)   LMP  (LMP Unknown)   SpO2 97%   BMI 32.65 kg/m      General: Todays' vital signs reviewed in the EMR.    ECOG PS is 1: Unable to do strenuous activities, but able to carry out light housework and sedentary activities. This status basically means you can't do heavy work but can do anything else.  Cardiovascular: Cor RRR  Respiratory: Lungs clear to auscultation bilaterally  Skin: Marked skin erythema involving the dorsum of both hands.    ASSESSMENT/PLAN:    1. 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 for BRCA 1/2)    2. Chemotherapy-induced neutropenia (H)    3. Encounter for antineoplastic chemotherapy    4. Chemotherapy-induced neuropathy (after 3 cycles of Carbo/Taxol/Keytruda grade 3 symptoms present at start of 4th cycle and further neoadjuvant chemo discontinued)    5. Neuropathy        Test results from yesterday were reviewed.  CBC from yesterday was remarkable for an ANC of 1000.  CMP from yesterday was normal except for low calcium of 8.3, low albumin of 2.7, low protein of 5.9, and nonfasting glucose of 104.  TSH from yesterday was normal.    Due to development of chemotherapy-induced neutropenia despite filgrastim support and painful peripheral neuropathy I am recommending that youo receive pembrolizumab (Keytruda) only today. The  remainder of your carboplatin/paclitaxel treatments will be canceled.    I will order a right breast ultrasound and mammogram to assess your response to chemotherapy thus far.    Please note that you no longer need to self inject filgrastim (Neupogen).    You will be scheduled for follow-up with me in two weeks.    I spent a total of 49 minutes on the care of this patient on the day of service including face to face time and the remainder in chart review, care coordination, and documentation on the day of service.

## 2022-05-18 NOTE — PROGRESS NOTES
Infusion Nursing Note:  Diana Salvador presents today for Keytruda.    Patient seen by provider today: Yes: Dr. Dara eldridge assessment deferred   present during visit today: Not Applicable.    Note: N/A.      Intravenous Access:  Implanted Port.    Treatment Conditions:  Not Applicable.      Post Infusion Assessment:  Patient tolerated infusion without incident.  Blood return noted pre and post infusion.  Site patent and intact, free from redness, edema or discomfort.  No evidence of extravasations.  Access discontinued per protocol.       Discharge Plan:   Copy of AVS reviewed with patient and/or family.  Patient discharged in stable condition accompanied by: self.  Departure Mode: Ambulatory.      Adrianna De Guzman RN

## 2022-05-18 NOTE — PATIENT INSTRUCTIONS
You will receive pembrolizumab (Keytruda only today). The remainder of your carboplatin/paclitaxel treatments will be canceled.    I will order a right breast ultrasound and mammogram to assess your response to chemotherapy thus far.    You will be scheduled for follow-up with me on 6/2.

## 2022-05-18 NOTE — PROGRESS NOTES
"Oncology Rooming Note    May 18, 2022 11:05 AM   Diana Salvador is a 57 year old female who presents for:    Chief Complaint   Patient presents with     Oncology Clinic Visit     Malignant neoplasm of upper-inner quadrant of right breast in female - Provider and infusion     Initial Vitals: BP (!) 125/92 (BP Location: Right arm, Patient Position: Sitting, Cuff Size: Adult Regular)   Pulse 83   Temp 99  F (37.2  C) (Tympanic)   Resp 12   Wt 95.3 kg (210 lb)   LMP  (LMP Unknown)   SpO2 97%   BMI 32.65 kg/m   Estimated body mass index is 32.65 kg/m  as calculated from the following:    Height as of 4/27/22: 1.708 m (5' 7.25\").    Weight as of this encounter: 95.3 kg (210 lb). Body surface area is 2.13 meters squared.  Extreme Pain (9) Comment: Data Unavailable   No LMP recorded (lmp unknown). Patient has had a hysterectomy.  Allergies reviewed: Yes  Medications reviewed: Yes    Medications: Medication refills not needed today.  Pharmacy name entered into [employer]:    [id number] - [facility] [address] AT [address]    Clinical concerns:  None      Emy Hollingsworth CMA            "

## 2022-05-18 NOTE — LETTER
5/18/2022         RE: Diana Salvador  [address] [place] [id number]        Dear Colleague,    Thank you for referring your patient, Diana Salvador, to the [facility]. Please see a copy of my visit note below.    The patient is being seen for the following issue/s:  1. Malignant neoplasm of upper-inner quadrant of right breast in female, estrogen receptor negative (triple-neg., germline test neg. for BRCA 1/2)    2. Chemotherapy-induced neutropenia (H)    3. Encounter for antineoplastic chemotherapy    4. Chemotherapy-induced neuropathy (H)        Cancer Staging  Malignant neoplasm of upper-inner quadrant of right breast in female, estrogen receptor negative (triple-neg., germline test neg. for BRCA 1/2)  Staging form: Breast, AJCC 8th Edition  - Clinical stage from 3/4/2022: Stage IIB (cT2, cN0, cM0, G3, ER-, DE-, HER2-) - Signed by Ricardo Diamond MD on 3/4/2022    She was recommended to receive neoadjuvant chemotherapy based on the KEYNOTE-522 trial (Shobha et al., 2020):      Cycles 1 through 4: A cycle is every 21 days:     Pembrolizumab 200 mg flat dose IV once on day 1 of cycles 1 through 4  Paclitaxel 80 mg/m  IV once daily on days 1, 8, and 15 of cycles 1 through 4     Carboplatin AUC=5 IV once on day 1 of cycles 1 through 4  Filgrastim 5 mcg/kg subcutaneously once daily on days 16, 17, and 18 of cycles 1 through 4     Cycles 5 through 8: A cycle is every 21 days:  Pembrolizumab 200 mg flat dose IV once on day 1 of cycles 5 through 8  Doxorubicin 60 mg/m  IV once on day 1 of cycles 5 through 8  Cyclophosphamide 600 mg/m  IV once on day 1 of cycles 5 through 8  Pegfilgrastim 6 mg flat dose subcutaneously once on day 2 of cycles 5 through 8    She has developed chemotherapy-induced neutropenia despite filgrastim support as evident on her CBC from yesterday (ANC was 1000).  She has not had any fevers.    She has also developed painful peripheral neuropathy in her  fingernails and fingertips which makes it difficult for her to sleep at night.  She is taking hydrocodone 3 times a day and is ramping up gabapentin.  She cannot  any objects with her fingers.    She has also had diffuse arthralgias which have been gradually worsening since she started chemotherapy.    She also appears to have developed a hypersensitivity skin reaction involving her hands with skin erythema on the dorsum of both hands.      PHYSICAL EXAMINATION:  BP (!) 125/92 (BP Location: Right arm, Patient Position: Sitting, Cuff Size: Adult Regular)   Pulse 83   Temp 99  F (37.2  C) (Tympanic)   Resp 12   Wt 95.3 kg (210 lb)   LMP  (LMP Unknown)   SpO2 97%   BMI 32.65 kg/m      General: Todays' vital signs reviewed in the EMR.    ECOG PS is 1: Unable to do strenuous activities, but able to carry out light housework and sedentary activities. This status basically means you can't do heavy work but can do anything else.  Cardiovascular: Cor RRR  Respiratory: Lungs clear to auscultation bilaterally  Skin: Marked skin erythema involving the dorsum of both hands.    ASSESSMENT/PLAN:    1. Malignant neoplasm of upper-inner quadrant of right breast in female, estrogen receptor negative (triple-neg., germline test neg. for BRCA 1/2)    2. Chemotherapy-induced neutropenia (H)    3. Encounter for antineoplastic chemotherapy    4. Chemotherapy-induced neuropathy (H)        Test results from yesterday were reviewed.  CBC from yesterday was remarkable for an ANC of 1000.  CMP from yesterday was normal except for low calcium of 8.3, low albumin of 2.7, low protein of 5.9, and nonfasting glucose of 104.  TSH from yesterday was normal.    Due to development of chemotherapy-induced neutropenia despite filgrastim support and painful peripheral neuropathy I am recommending that youo receive pembrolizumab (Keytruda) only today. The remainder of your carboplatin/paclitaxel treatments will be canceled.    I will order a  "right breast ultrasound and mammogram to assess your response to chemotherapy thus far.    Please note that you no longer need to self inject filgrastim (Neupogen).    You will be scheduled for follow-up with me in two weeks.    I spent a total of 49 minutes on the care of this patient on the day of service including face to face time and the remainder in chart review, care coordination, and documentation on the day of service.          Oncology Rooming Note    May 18, 2022 11:05 AM   Diana Salvador is a 57 year old female who presents for:    Chief Complaint   Patient presents with     Oncology Clinic Visit     Malignant neoplasm of upper-inner quadrant of right breast in female - Provider and infusion     Initial Vitals: BP (!) 125/92 (BP Location: Right arm, Patient Position: Sitting, Cuff Size: Adult Regular)   Pulse 83   Temp 99  F (37.2  C) (Tympanic)   Resp 12   Wt 95.3 kg (210 lb)   LMP  (LMP Unknown)   SpO2 97%   BMI 32.65 kg/m   Estimated body mass index is 32.65 kg/m  as calculated from the following:    Height as of 4/27/22: 1.708 m (5' 7.25\").    Weight as of this encounter: 95.3 kg (210 lb). Body surface area is 2.13 meters squared.  Extreme Pain (9) Comment: Data Unavailable   No LMP recorded (lmp unknown). Patient has had a hysterectomy.  Allergies reviewed: Yes  Medications reviewed: Yes    Medications: Medication refills not needed today.  Pharmacy name entered into [employer]:    [id number] - [address] AT [address]    Clinical concerns:  None      Emy Hollingsworth [facility]                Again, thank you for allowing me to participate in the care of your patient.        Sincerely,        Ricardo Diamond MD    "

## 2022-05-19 DIAGNOSIS — C50.919 MALIGNANT NEOPLASM OF FEMALE BREAST, UNSPECIFIED ESTROGEN RECEPTOR STATUS, UNSPECIFIED LATERALITY, UNSPECIFIED SITE OF BREAST (H): Primary | ICD-10-CM

## 2022-05-19 PROBLEM — G62.0 CHEMOTHERAPY-INDUCED NEUROPATHY (H): Status: ACTIVE | Noted: 2022-05-19

## 2022-05-19 PROBLEM — T45.1X5A CHEMOTHERAPY-INDUCED NEUROPATHY (H): Status: ACTIVE | Noted: 2022-05-19

## 2022-05-19 RX ORDER — CEFAZOLIN SODIUM 2 G/50ML
2 SOLUTION INTRAVENOUS
Status: CANCELLED | OUTPATIENT
Start: 2022-05-19

## 2022-05-19 RX ORDER — CEFAZOLIN SODIUM 2 G/50ML
2 SOLUTION INTRAVENOUS SEE ADMIN INSTRUCTIONS
Status: CANCELLED | OUTPATIENT
Start: 2022-05-19

## 2022-05-23 ENCOUNTER — ONCOLOGY VISIT (OUTPATIENT)
Dept: ONCOLOGY | Facility: CLINIC | Age: 57
End: 2022-05-23
Attending: NURSE PRACTITIONER
Payer: COMMERCIAL

## 2022-05-23 VITALS
SYSTOLIC BLOOD PRESSURE: 126 MMHG | DIASTOLIC BLOOD PRESSURE: 80 MMHG | HEART RATE: 83 BPM | RESPIRATION RATE: 12 BRPM | OXYGEN SATURATION: 98 % | WEIGHT: 214 LBS | TEMPERATURE: 98.3 F | BODY MASS INDEX: 33.27 KG/M2

## 2022-05-23 DIAGNOSIS — Z17.1 MALIGNANT NEOPLASM OF UPPER-INNER QUADRANT OF RIGHT BREAST IN FEMALE, ESTROGEN RECEPTOR NEGATIVE (H): Primary | ICD-10-CM

## 2022-05-23 DIAGNOSIS — F41.9 ANXIETY: ICD-10-CM

## 2022-05-23 DIAGNOSIS — R10.13 ABDOMINAL PAIN, EPIGASTRIC: ICD-10-CM

## 2022-05-23 DIAGNOSIS — T45.1X5A CHEMOTHERAPY-INDUCED NEUROPATHY (H): ICD-10-CM

## 2022-05-23 DIAGNOSIS — K21.9 GASTROESOPHAGEAL REFLUX DISEASE WITHOUT ESOPHAGITIS: ICD-10-CM

## 2022-05-23 DIAGNOSIS — C50.211 MALIGNANT NEOPLASM OF UPPER-INNER QUADRANT OF RIGHT BREAST IN FEMALE, ESTROGEN RECEPTOR NEGATIVE (H): Primary | ICD-10-CM

## 2022-05-23 DIAGNOSIS — R21 RASH: ICD-10-CM

## 2022-05-23 DIAGNOSIS — G62.0 CHEMOTHERAPY-INDUCED NEUROPATHY (H): ICD-10-CM

## 2022-05-23 PROCEDURE — 99215 OFFICE O/P EST HI 40 MIN: CPT | Performed by: NURSE PRACTITIONER

## 2022-05-23 PROCEDURE — G0463 HOSPITAL OUTPT CLINIC VISIT: HCPCS

## 2022-05-23 RX ORDER — SUCRALFATE 1 G/1
1 TABLET ORAL 4 TIMES DAILY PRN
Qty: 30 TABLET | Refills: 3 | Status: SHIPPED | OUTPATIENT
Start: 2022-05-23

## 2022-05-23 RX ORDER — CITALOPRAM HYDROBROMIDE 40 MG/1
20 TABLET ORAL DAILY
Qty: 30 TABLET | Refills: 3 | Status: SHIPPED | OUTPATIENT
Start: 2022-05-23 | End: 2022-06-08

## 2022-05-23 RX ORDER — GABAPENTIN 300 MG/1
300 CAPSULE ORAL 3 TIMES DAILY
Qty: 90 CAPSULE | Refills: 3 | Status: SHIPPED | OUTPATIENT
Start: 2022-05-23 | End: 2022-06-08 | Stop reason: SINTOL

## 2022-05-23 RX ORDER — TRIAMCINOLONE ACETONIDE 1 MG/G
CREAM TOPICAL 2 TIMES DAILY
Qty: 30 G | Refills: 1 | Status: SHIPPED | OUTPATIENT
Start: 2022-05-23 | End: 2024-01-23

## 2022-05-23 NOTE — LETTER
5/23/2022         RE: Diana Salvador  [address] [place] [id number]        Dear Colleague,    Thank you for referring your patient, Diana Salvador, to the [facility] [place]. Please see a copy of my visit note below.    [facility] Hematology and Oncology Outpatient Progress Note ([place])    Patient: Diana Salvador  MRN: [id number]          Reason for Visit    1. Stage IIB triple negative breast cancer  2. On neoadjuvant chemo    Primary Oncologist: Dr. Diamond    Assessment/Plan  1.   Stage IIB triple negative breast cancer; on neoadjuvant chemo + immunotherapy  2.   Chemo-induced neutropenia  After 3 cycles, developed severe grade 3 neuropathy and delayed neutropenia despite Neupogen after day 15. Therefore, last week chemo was omitted and she got single agent pembrolizumab for cycle 4.    Ongoing rash upper extremities, may be related to the pembrolizumab; otherwise no new side effects.     Clinical response in right breast tumor on exam.    Plan:  -While the overall plan is 4 cycles of carbo/taxol+immunotherapy, then 4 cycles of AC+immunotherapy neoadjuvantly, the development of grade 3 neuropathy and delayed neutropenia did not allow for further chemo with cycle 4. She did get Pembro alone last week.   - Dr. Diamond has requested her mastectomies be moved up since we are going to be limited in further chemo given her toxicities  -She will have breast mammogram/US next week to get a better assessment of response and feasibility of resection sooner  -Dr. Marina and Gerson are working on seeing her back sooner to plan for surgery. She will follow-up with Dr. Marina tomorrow.  -If there is delay in getting the coordinated surgery scheduled in a timely fashion, we can consider another cycle of pembro +/- carboplatin in two weeks (will need to omit Taxol with her neuropathy) - she is scheduled back with Dr. Diamond 6/8 to consider this.  -Following surgery, reassess for further  adjuvant chemo    3.   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   She is only on gabapentin 300 mg twice daily with good tolerance, but no improvement. Using hydrocodone with some benefit.     Plan:  -gabapentin titration to dose that provides best relief and tolerable: 300 mg TID now x 2 days; 300-300-600 mg x 3 days; 300-600-600 mg x 3 days; max 600 mg TID  -continue hydrocodone if needed  -If not getting benefit on gabapentin, other options would be to change to Lyrica or duloxetine (is on selective serotonin reuptake inhibitor for anxiety, so would need to consider drug interactions)    4.   Chemo vs immunotherapy dermatitis  Pruritic dermatitis on dorsal hand/thumbs R>L a few weeks ago.  Prior rash on trunk resolved.  Likley side effect of either Pembrolizumab since it flared a bit again after her cycle last week. Improved on Claritin and triamcinolone as needed.     Plan:  -Claritin daily  -Triamcinolone 0.1% to affected areas as needed, take breaks from this as able. Refill provided.    5.   Diffuse polyarthralgias, treatment-induced   Likley related to Taxol since it did not flare/recur after last week's dose of Pembrolizumab. Has hydrocodone on hand, as needed.     6.   Chemo-induced nausea  When on chemo, has done well on olanzapine x 7 days starting the night before day 1 of each cycle (before carboplatin and/or doxorubicin when this is started in future). Compazine or Zofran as needed.     7.  Anxiety, secondary to cancer diagnosis  Started Celexa 20 mg 6 weeks ago, tolerating well. Anxiety may a bit better, but still an issue with multiple stressors.  Working with a therapist.    Plan:  -Titrate Celexa up to 40 mg  -Continue psychotherapy.    -Offered SW visit, she declined for now    ______________________________________________________________________________    History of Present Illness/ Interval History    Ms. Diana Salvador  is a 56 year old on neoadjvuant treatment with  pembrolizumab, carboplatin and taxol (weekly) . She completed 3 cycles of this combo, and cycle 3 was complicated by the development of grade 3 neuropathy (fingers) and neutropenia (ANC 1000) despite use of Neupogen. Therefore, last week, she got single agent Pembrolizumab alone with cycle 4 and chemo was held.     She returns today for 1-week re-evaluation of her neuropathy of fingers and toes. This persists and is still very sensitive to use/light touch. She has numbness and is having a hard time with her fine motor tasks. Her nail beds are very tender and discolored. She is up to 300 mg twice daily of gabapentin. Taking hydrocodone with some benefit.     She developed a pruritic, non-painful raised rash on bilateral dorsal thumbs/hand R>L. Significantly improved after starting Claritin and using triamcinolone cream. This flared a bit into right wrist area last week after Pembro.     No fevers.     Started Celexa about 6 weeks ago for anxiety with cancer diagnosis. Meeting a therapist.  This is getting better, but still can feel emotional and overwhelmed some days due to her multiple stressors (financial, medical).      ECOG Performance    0      Oncology History/Treatment  Diagnosis/Stage:   2/2022: Stage IIB (cT2-cN0-cM0) right breast cancer (triple negative)  -self-palpated right breast lump  -diagnostic mammo + breast US: 2.4 cm R breast (2:00, lower-inner quad) lump and no axillary nodes detected  -breast biopsy: invasive ductal carcinoma, grade 3. ER neg, TN neg, HER2 neg via FISH  -CA 27.29 WNL (<5)    Treatment:  3/15/2022 - present: Neoadjuvant chemo (based off KEYNOTE-522) Pembrolizumab 200 mg D1 q 21 days + Carboplatin AUC=5 D1 q21 Days + weekly Paclitaxel 80 mg/m  D1, 8, 15  x 12 Weeks (with Filgrastim 5 mcg/kg subcutaneously once daily on days 16, 17, and 18 of cycles 1 through 4)  -->after cycle 3, developed grade 3 neuropathy and delayed neutropenia (despite using Neupogen). Therefore, chemo held  with cycle 4 and only received Pembrolizumab.     Planning bilateral mastectomies + reconstruction with Elysia Marina + Gerson following neoadjuvant treatment      Physical Exam    GENERAL: Alert and oriented to time place and person. Seated comfortably.  Alone.   HEAD: Atraumatic and normocephalic. Alopecia.  EYES: MELISSA, EOMI. No erythema. No icterus.  BREASTS: Right breast lump (2:00, near nipple) about 2.0 cm (smaller) and less defined, in response to chemo.  LYMPH NODES: No axillary adenopathy.  CHEST: clear to auscultation bilaterally. Resonant to percussion throughout bilaterally. Symmetrical breath movements bilaterally.  CVS: S1 and S2 are heard. Regular rate and rhythm. No murmur or gallop or rub heard.  ABDOMEN: Soft. Not tender. Not distended. No palpable hepatomegaly or splenomegaly. No other mass palpable. Bowel sounds present.  EXTREMITIES: Warm. No peripheral edema.  SKIN: Raised papular pink lesions right dorsal thumb and wrist. Fingernail and great toenail beds purple in color.   NEURO: No gross deficit noted. Non-antalgic gait.      Lab Results    Recent Results (from the past 168 hour(s))   Comprehensive metabolic panel   Result Value Ref Range    Sodium 143 133 - 144 mmol/L    Potassium 4.1 3.4 - 5.3 mmol/L    Chloride 108 94 - 109 mmol/L    Carbon Dioxide (CO2) 29 20 - 32 mmol/L    Anion Gap 6 3 - 14 mmol/L    Urea Nitrogen 8 7 - 30 mg/dL    Creatinine 0.54 0.52 - 1.04 mg/dL    Calcium 8.3 (L) 8.5 - 10.1 mg/dL    Glucose 104 (H) 70 - 99 mg/dL    Alkaline Phosphatase 95 40 - 150 U/L    AST 14 0 - 45 U/L    ALT 19 0 - 50 U/L    Protein Total 5.9 (L) 6.8 - 8.8 g/dL    Albumin 2.7 (L) 3.4 - 5.0 g/dL    Bilirubin Total 0.2 0.2 - 1.3 mg/dL    GFR Estimate >90 >60 mL/min/1.73m2   TSH with free T4 reflex   Result Value Ref Range    TSH 1.09 0.40 - 4.00 mU/L   CBC with platelets and differential   Result Value Ref Range    WBC Count 3.0 (L) 4.0 - 11.0 10e3/uL    RBC Count 3.21 (L) 3.80 - 5.20 10e6/uL  "   Hemoglobin 9.7 (L) 11.7 - 15.7 g/dL    Hematocrit 30.6 (L) 35.0 - 47.0 %    MCV 95 78 - 100 fL    MCH 30.2 26.5 - 33.0 pg    MCHC 31.7 31.5 - 36.5 g/dL    RDW 18.6 (H) 10.0 - 15.0 %    Platelet Count 220 150 - 450 10e3/uL   Manual Differential   Result Value Ref Range    % Neutrophils 32 %    % Lymphocytes 45 %    % Monocytes 20 %    % Eosinophils 0 %    % Basophils 3 %    Absolute Neutrophils 1.0 (L) 1.6 - 8.3 10e3/uL    Absolute Lymphocytes 1.4 0.8 - 5.3 10e3/uL    Absolute Monocytes 0.6 0.0 - 1.3 10e3/uL    Absolute Eosinophils 0.0 0.0 - 0.7 10e3/uL    Absolute Basophils 0.1 0.0 - 0.2 10e3/uL    RBC Morphology Confirmed RBC Indices     Platelet Assessment  Automated Count Confirmed. Platelet morphology is normal.     Automated Count Confirmed. Platelet morphology is normal.       Imaging    No results found.    Billing  Total time: 40 min; including review of EMR, reports, diagnostics and ordering/coordination of care    Signed by: Mairlu Vergara NP      Oncology Rooming Note    May 23, 2022 11:33 AM   Diana Salvador is a 57 year old female who presents for:    Chief Complaint   Patient presents with     Oncology Clinic Visit     Malignant neoplasm of upper-inner quadrant of right breast in female - Provider visit only     Initial Vitals: Wt 97.1 kg (214 lb)   LMP  (LMP Unknown)   BMI 33.27 kg/m   Estimated body mass index is 33.27 kg/m  as calculated from the following:    Height as of 4/27/22: 1.708 m (5' 7.25\").    Weight as of this encounter: 97.1 kg (214 lb). Body surface area is 2.15 meters squared.  Data Unavailable Comment: Data Unavailable   No LMP recorded (lmp unknown). Patient has had a hysterectomy.  Allergies reviewed: Yes  Medications reviewed: Yes    Medications: Medication refills not needed today.  Pharmacy name entered into [employer]:    [facility] #[id number] - [place], [place] - [address] AT [address]    Clinical concerns:  " None      Emy Hollingsworth, JESSICA                Again, thank you for allowing me to participate in the care of your patient.        Sincerely,        Marilu Vergara, NP

## 2022-05-23 NOTE — PROGRESS NOTES
"Oncology Rooming Note    May 23, 2022 11:33 AM   Diana Salvador is a 57 year old female who presents for:    Chief Complaint   Patient presents with     Oncology Clinic Visit     Malignant neoplasm of upper-inner quadrant of right breast in female - Provider visit only     Initial Vitals: Wt 97.1 kg (214 lb)   LMP  (LMP Unknown)   BMI 33.27 kg/m   Estimated body mass index is 33.27 kg/m  as calculated from the following:    Height as of 4/27/22: 1.708 m (5' 7.25\").    Weight as of this encounter: 97.1 kg (214 lb). Body surface area is 2.15 meters squared.  Data Unavailable Comment: Data Unavailable   No LMP recorded (lmp unknown). Patient has had a hysterectomy.  Allergies reviewed: Yes  Medications reviewed: Yes    Medications: Medication refills not needed today.  Pharmacy name entered into [facility]:    [id number] - [place], [place] - [address] [facility] AT [address] [facility]    Clinical concerns:  None      Emy Hollingsworth CMA            "

## 2022-05-24 ENCOUNTER — PREP FOR PROCEDURE (OUTPATIENT)
Dept: SURGERY | Facility: CLINIC | Age: 57
End: 2022-05-24

## 2022-05-24 ENCOUNTER — OFFICE VISIT (OUTPATIENT)
Dept: SURGERY | Facility: CLINIC | Age: 57
End: 2022-05-24
Payer: COMMERCIAL

## 2022-05-24 VITALS
BODY MASS INDEX: 33.28 KG/M2 | SYSTOLIC BLOOD PRESSURE: 113 MMHG | DIASTOLIC BLOOD PRESSURE: 80 MMHG | TEMPERATURE: 98.4 F | HEART RATE: 86 BPM | WEIGHT: 214.07 LBS

## 2022-05-24 DIAGNOSIS — Z01.818 PRE-OP TESTING: ICD-10-CM

## 2022-05-24 DIAGNOSIS — C50.911 INFILTRATING DUCTAL CARCINOMA OF RIGHT BREAST (H): Primary | ICD-10-CM

## 2022-05-24 PROCEDURE — 99214 OFFICE O/P EST MOD 30 MIN: CPT | Performed by: SURGERY

## 2022-05-24 NOTE — NURSING NOTE
"Initial /80 (BP Location: Right arm, Patient Position: Sitting, Cuff Size: Adult Large)   Pulse 86   Temp 98.4  F (36.9  C) (Tympanic)   Wt 97.1 kg (214 lb 1.1 oz)   LMP  (LMP Unknown)   BMI 33.28 kg/m   Estimated body mass index is 33.28 kg/m  as calculated from the following:    Height as of 4/27/22: 1.708 m (5' 7.25\").    Weight as of this encounter: 97.1 kg (214 lb 1.1 oz). .    Laurel Butterfield MA    "

## 2022-05-24 NOTE — LETTER
5/24/2022         RE: Diana Salvador  [address] [place] [id number]        Dear Colleague,    Thank you for referring your patient, Diana Salvador, to the [facility]. Please see a copy of my visit note below.    Breast Cancer Follow-up Visit    Diana Salvador is a 56 year old female with Right breast invasive ductal carcinoma, grade 3, ER -, [place] -, Day0edg - measuring 2.4 cm at the 2:00 and 6 cm from the nipple.    She has grown intolerant of chemotherapy and developed neuropathy due to Taxol.  Surgical planning is proceeding.    Lumpectomy, mastectomy, bilateral mastectomies, sentinel lymph node biopsy with possible axillary node dissection and reconstructive options have been offered and discussed at length.    She wishes to proceed as planned with bilateral mastectomies with reconstruction.  She appears to have had at least partial response by exam in office today.  Imaging is pending next week and will be followed.     Will plan surgery with Dr. Nieto this next month.    We have discussed the indication, alternatives, risks and expected recovery.  Specifically, we have discussed local recurrence of cancer, risk of future second primary breast cancer, incisions, scarring, breast deformity, volume loss, postoperative infection, anesthesia, bleeding, blood transfusion, DVT, PE, postoperative restrictions and physical limitations.  We have discussed the recommended interventions and treatments for these complications.      All questions have been answered to the best of my ability.    She will need a pre-op prior to surgery.      /80 (BP Location: Right arm, Patient Position: Sitting, Cuff Size: Adult Large)   Pulse 86   Temp 98.4  F (36.9  C) (Tympanic)   Wt 97.1 kg (214 lb 1.1 oz)   LMP  (LMP Unknown)   BMI 33.28 kg/m      Examined with Laurel Butterfield MA    Right breast: upper inner quadrant, less full/defined mass.  No overlying skin changes.  No axillary  lymphadenopathy    Imaging:  All imaging studies reviewed by me.  See imaging reports below.    This note was created using voice recognition software. Undetected word substitutions or other errors may have occurred.     Time spent, greater than 50% of which was counseling and coordining care: 40 minutes.    Baldemar Marina, DO    Please route or send letter to:  Primary Care Provider (PCP)      IMAGING:  All imaging studies reviewed by me.    No results found for this or any previous visit (from the past 744 hour(s)).                Again, thank you for allowing me to participate in the care of your patient.        Sincerely,        Baldemar Marina DO

## 2022-05-25 ENCOUNTER — PREP FOR PROCEDURE (OUTPATIENT)
Dept: SURGERY | Facility: CLINIC | Age: 57
End: 2022-05-25
Payer: OTHER GOVERNMENT

## 2022-05-25 ENCOUNTER — TELEPHONE (OUTPATIENT)
Dept: SURGERY | Facility: CLINIC | Age: 57
End: 2022-05-25
Payer: OTHER GOVERNMENT

## 2022-05-25 NOTE — TELEPHONE ENCOUNTER
Talked to Aide and gave her, her surgery date and time of June 30th @ 12:30. Her covid is scheduled for June 27th @ 3:00 @ the [facility]. She was given the surgery packet at her clinic appointment. She read it over and at this time she has no questions. She will send a OLED-T message if she comes up with any questions before her surgery.    Laurel Butterfield MA

## 2022-05-26 NOTE — PROGRESS NOTES
Breast Cancer Follow-up Visit    Diana Salvador is a 56 year old female with Right breast invasive ductal carcinoma, grade 3, ER -, [place] -, [id number] - measuring 2.4 cm at the 2:00 and 6 cm from the nipple.    She has grown intolerant of chemotherapy and developed neuropathy due to Taxol.  Surgical planning is proceeding.    Lumpectomy, mastectomy, bilateral mastectomies, sentinel lymph node biopsy with possible axillary node dissection and reconstructive options have been offered and discussed at length.    She wishes to proceed as planned with bilateral mastectomies with reconstruction.  She appears to have had at least partial response by exam in office today.  Imaging is pending next week and will be followed.     Will plan surgery with Dr. Nieto this next month.    We have discussed the indication, alternatives, risks and expected recovery.  Specifically, we have discussed local recurrence of cancer, risk of future second primary breast cancer, incisions, scarring, breast deformity, volume loss, postoperative infection, anesthesia, bleeding, blood transfusion, DVT, PE, postoperative restrictions and physical limitations.  We have discussed the recommended interventions and treatments for these complications.      All questions have been answered to the best of my ability.    She will need a pre-op prior to surgery.      /80 (BP Location: Right arm, Patient Position: Sitting, Cuff Size: Adult Large)   Pulse 86   Temp 98.4  F (36.9  C) (Tympanic)   Wt 97.1 kg (214 lb 1.1 oz)   LMP  (LMP Unknown)   BMI 33.28 kg/m      Examined with Laurel Butterfield MA    Right breast: upper inner quadrant, less full/defined mass.  No overlying skin changes.  No axillary lymphadenopathy    Imaging:  All imaging studies reviewed by me.  See imaging reports below.    This note was created using voice recognition software. Undetected word substitutions or other errors may have occurred.     Time spent, greater than 50% of which  was counseling and coordining care: 40 minutes.    Baldemar Marina, DO    Please route or send letter to:  Primary Care Provider (PCP)      IMAGING:  All imaging studies reviewed by me.    No results found for this or any previous visit (from the past 744 hour(s)).

## 2022-06-01 ENCOUNTER — ANCILLARY PROCEDURE (OUTPATIENT)
Dept: MAMMOGRAPHY | Facility: CLINIC | Age: 57
End: 2022-06-01
Attending: INTERNAL MEDICINE
Payer: COMMERCIAL

## 2022-06-01 DIAGNOSIS — C50.211 MALIGNANT NEOPLASM OF UPPER-INNER QUADRANT OF RIGHT BREAST IN FEMALE, ESTROGEN RECEPTOR NEGATIVE (H): ICD-10-CM

## 2022-06-01 DIAGNOSIS — Z17.1 MALIGNANT NEOPLASM OF UPPER-INNER QUADRANT OF RIGHT BREAST IN FEMALE, ESTROGEN RECEPTOR NEGATIVE (H): ICD-10-CM

## 2022-06-01 PROCEDURE — 76642 ULTRASOUND BREAST LIMITED: CPT | Mod: RT

## 2022-06-01 PROCEDURE — 77061 BREAST TOMOSYNTHESIS UNI: CPT | Mod: RT

## 2022-06-02 ENCOUNTER — TELEPHONE (OUTPATIENT)
Dept: ONCOLOGY | Facility: CLINIC | Age: 57
End: 2022-06-02
Payer: COMMERCIAL

## 2022-06-02 DIAGNOSIS — Z51.11 ENCOUNTER FOR ANTINEOPLASTIC CHEMOTHERAPY: Primary | ICD-10-CM

## 2022-06-02 DIAGNOSIS — D70.1 CHEMOTHERAPY-INDUCED NEUTROPENIA (H): ICD-10-CM

## 2022-06-02 DIAGNOSIS — C50.211 MALIGNANT NEOPLASM OF UPPER-INNER QUADRANT OF RIGHT BREAST IN FEMALE, ESTROGEN RECEPTOR NEGATIVE (H): ICD-10-CM

## 2022-06-02 DIAGNOSIS — Z17.1 MALIGNANT NEOPLASM OF UPPER-INNER QUADRANT OF RIGHT BREAST IN FEMALE, ESTROGEN RECEPTOR NEGATIVE (H): ICD-10-CM

## 2022-06-02 DIAGNOSIS — T45.1X5A CHEMOTHERAPY-INDUCED NEUTROPENIA (H): ICD-10-CM

## 2022-06-02 RX ORDER — EPINEPHRINE 1 MG/ML
0.3 INJECTION, SOLUTION, CONCENTRATE INTRAVENOUS EVERY 5 MIN PRN
Status: CANCELLED | OUTPATIENT
Start: 2022-06-08

## 2022-06-02 RX ORDER — DIPHENHYDRAMINE HYDROCHLORIDE 50 MG/ML
50 INJECTION INTRAMUSCULAR; INTRAVENOUS
Status: CANCELLED
Start: 2022-06-29

## 2022-06-02 RX ORDER — EPINEPHRINE 1 MG/ML
0.3 INJECTION, SOLUTION, CONCENTRATE INTRAVENOUS EVERY 5 MIN PRN
Status: CANCELLED | OUTPATIENT
Start: 2022-06-29

## 2022-06-02 RX ORDER — NALOXONE HYDROCHLORIDE 0.4 MG/ML
0.2 INJECTION, SOLUTION INTRAMUSCULAR; INTRAVENOUS; SUBCUTANEOUS
Status: CANCELLED | OUTPATIENT
Start: 2022-06-08

## 2022-06-02 RX ORDER — DIPHENHYDRAMINE HYDROCHLORIDE 50 MG/ML
50 INJECTION INTRAMUSCULAR; INTRAVENOUS
Status: CANCELLED
Start: 2022-06-08

## 2022-06-02 RX ORDER — METHYLPREDNISOLONE SODIUM SUCCINATE 125 MG/2ML
125 INJECTION, POWDER, LYOPHILIZED, FOR SOLUTION INTRAMUSCULAR; INTRAVENOUS
Status: CANCELLED
Start: 2022-06-29

## 2022-06-02 RX ORDER — HEPARIN SODIUM,PORCINE 10 UNIT/ML
5 VIAL (ML) INTRAVENOUS
Status: CANCELLED | OUTPATIENT
Start: 2022-06-08

## 2022-06-02 RX ORDER — METHYLPREDNISOLONE SODIUM SUCCINATE 125 MG/2ML
125 INJECTION, POWDER, LYOPHILIZED, FOR SOLUTION INTRAMUSCULAR; INTRAVENOUS
Status: CANCELLED
Start: 2022-06-08

## 2022-06-02 RX ORDER — ALBUTEROL SULFATE 0.83 MG/ML
2.5 SOLUTION RESPIRATORY (INHALATION)
Status: CANCELLED | OUTPATIENT
Start: 2022-06-29

## 2022-06-02 RX ORDER — NALOXONE HYDROCHLORIDE 0.4 MG/ML
0.2 INJECTION, SOLUTION INTRAMUSCULAR; INTRAVENOUS; SUBCUTANEOUS
Status: CANCELLED | OUTPATIENT
Start: 2022-06-29

## 2022-06-02 RX ORDER — ALBUTEROL SULFATE 90 UG/1
1-2 AEROSOL, METERED RESPIRATORY (INHALATION)
Status: CANCELLED
Start: 2022-06-08

## 2022-06-02 RX ORDER — LORAZEPAM 2 MG/ML
0.5 INJECTION INTRAMUSCULAR EVERY 4 HOURS PRN
Status: CANCELLED | OUTPATIENT
Start: 2022-06-08

## 2022-06-02 RX ORDER — ALBUTEROL SULFATE 0.83 MG/ML
2.5 SOLUTION RESPIRATORY (INHALATION)
Status: CANCELLED | OUTPATIENT
Start: 2022-06-08

## 2022-06-02 RX ORDER — HEPARIN SODIUM (PORCINE) LOCK FLUSH IV SOLN 100 UNIT/ML 100 UNIT/ML
5 SOLUTION INTRAVENOUS
Status: CANCELLED | OUTPATIENT
Start: 2022-06-29

## 2022-06-02 RX ORDER — HEPARIN SODIUM (PORCINE) LOCK FLUSH IV SOLN 100 UNIT/ML 100 UNIT/ML
5 SOLUTION INTRAVENOUS
Status: CANCELLED | OUTPATIENT
Start: 2022-06-08

## 2022-06-02 RX ORDER — MEPERIDINE HYDROCHLORIDE 25 MG/ML
25 INJECTION INTRAMUSCULAR; INTRAVENOUS; SUBCUTANEOUS EVERY 30 MIN PRN
Status: CANCELLED | OUTPATIENT
Start: 2022-06-08

## 2022-06-02 RX ORDER — LORAZEPAM 2 MG/ML
0.5 INJECTION INTRAMUSCULAR EVERY 4 HOURS PRN
Status: CANCELLED | OUTPATIENT
Start: 2022-06-29

## 2022-06-02 RX ORDER — ALBUTEROL SULFATE 90 UG/1
1-2 AEROSOL, METERED RESPIRATORY (INHALATION)
Status: CANCELLED
Start: 2022-06-29

## 2022-06-02 RX ORDER — MEPERIDINE HYDROCHLORIDE 25 MG/ML
25 INJECTION INTRAMUSCULAR; INTRAVENOUS; SUBCUTANEOUS EVERY 30 MIN PRN
Status: CANCELLED | OUTPATIENT
Start: 2022-06-29

## 2022-06-02 RX ORDER — HEPARIN SODIUM,PORCINE 10 UNIT/ML
5 VIAL (ML) INTRAVENOUS
Status: CANCELLED | OUTPATIENT
Start: 2022-06-29

## 2022-06-02 NOTE — TELEPHONE ENCOUNTER
----- Message from Ricardo Diamond MD sent at 6/2/2022 11:46 AM CDT -----  Regarding: RE: mammo results  Great results!  I have put orders in the computer for her to receive Keytruda alone on June 8 and June 29. Okay to proceed with surgery for breast cancer on June 30. I can see her for follow-up 3 to 4 weeks after her surgery. Thanks!    ----- Message -----  From: Claire Blair, RN  Sent: 6/2/2022   9:40 AM CDT  To: Ricardo Diamond MD, Claire Blair, RN  Subject: mammo results                                    Hello,  Please review mammo and US results from yesterday. Looks Like:    IMPRESSION:   Complete resolution of mass component of biopsied malignancy with only coarse residual calcifications present at this site, adjacent to the biopsy marking clip. Continued oncologic management is advised.     ACR BI-RADS Category 6: Known Biopsy-Proven Malignancy.     Results given to the patient.      She is scheduled for Surgery on 6/30 now due to moving up surgery due to grade 3 neuropathy.  Is plan still for chemo- A/C and Keytruda next week 6/8 or just Keytruda? Let me know and I will notify the patient.   Thanks!  Jena

## 2022-06-02 NOTE — TELEPHONE ENCOUNTER
Called patient with Mammo/ US results. Per Dr Shah, patient can see Marilu next week on 6/8 prior to getting Keytruda only and to follow up on Neuropathy. . Patient verbalized understanding and agreement to plan.Claire Blair RN on 6/2/2022 at 12:17 PM

## 2022-06-07 NOTE — PROGRESS NOTES
[facility] Hematology and Oncology Outpatient Progress Note ([place])    Patient: Diana Salvador  MRN: [id number]          Reason for Visit    1. Stage IIB triple negative breast cancer  2. On neoadjuvant chemo    Primary Oncologist: Dr. Diamond    Assessment/Plan  1.   Stage IIB triple negative breast cancer; on neoadjuvant chemo + immunotherapy  2.   Chemo-induced neutropenia  After 3 cycles, developed severe grade 3 neuropathy and delayed neutropenia despite Neupogen after day 15. Therefore, chemo was omitted and she got single agent pembrolizumab starting with cycle 4.    Breast reimaging shows complete response, confirming our clinical assessment.     She's met with Dr. Marina and Elis. Given her toxicity on neoadjuvant chemo, she will be proceeding to surgery sooner than initially planned. This is scheduled for 6/30.    The rash and arthralgias are likely related to the pembrolizumab; otherwise no new side effects.   Labwork satisfactory.    Dr. Diamond recommends continuing with maintenance Pembrolizumab alone as we await her surgery.     Plan:  -Continue with next cycle Pembrolizumab today.  -Return around 6/29 for next cycle Pembrolizumab, which we will plan to proceed with if tolerating well.  -Bilateral mastectomy with reconstruction is scheduled for 6/30 with Elysia Marina and Gerson  -She will return to see Dr. Diamond 3-4 weeks after surgery to review path and decide on role for  further adjuvant chemo  -We will complete her updated STD paperwork and complete a letter supporting she can work from home 8 hrs/month    3.   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 This may be getting slightly better (she notes some days are better than others), but still remains gr 2-3. She has only been able to titrate up to max to 600 mg daily on gabapentin, secondary to nausea. Using hydrocodone with some benefit.     Plan:  -Start duloxetine 30 mg daily x 1 week, can titrate up to 60 mg daily  -Take  gabapentin 300 mg tonight, then stop  -Reviewed low risk of drug-drug interaction between selective serotonin reuptake inhibitor and duloxetine, will monitor. She will not take doxepin (she has not been taking and will not start)  -continue hydrocodone if needed    4.   Chemo vs immunotherapy dermatitis  Pruritic dermatitis on dorsal hand/thumbs R>L , intermittent  Likley side effect of Pembrolizumab since it flared a bit again after her cycle last week. Improved on Claritin and triamcinolone as needed.     Plan:  -Claritin daily  -Triamcinolone 0.1% to affected areas as needed, take breaks from this as able.     5.   Diffuse polyarthralgias, treatment-induced   Since it's persisted beyond discontinuation of Taxol, suspect more related to the Keytruda. It's manageable.     Plan:  -Stay active  -duloxetine starting for neuropathy may help with this, as well    6.  Anxiety, secondary to cancer diagnosis  Recently increased Celexa to 40 mg (from 20 mg), with good tolerance.  Working with a therapist.    Plan:  -Continue Celexa   -duloxetine for the neuropathy may also help  -Continue psychotherapy.  -Offered SW visit, she declined for now    ______________________________________________________________________________    History of Present Illness/ Interval History    Ms. Diana Salvador  is a 57 year old on neoadjvuant treatment with pembrolizumab, carboplatin and taxol (weekly) . She completed 3 cycles of this combo, and cycle 3 was complicated by the development of grade 3 neuropathy (fingers) and neutropenia (ANC 1000) despite use of Neupogen. Therefore, further chemo has been held and she got single agent Pembrolizumab alone with cycle 4.    She met with Dr. Marina and plan is to proceed with bilateral mastectomies + reconstruction sooner than initially planned due to chemo toxicity. This is planned for later this month.     Neuropathy of fingers and toes has improved slightly, in that she notes some days are a  bit better than others, but it persists. Still very sensitive to use/light touch. She has numbness and is having a hard time with her fine motor tasks (typing, writing, washing dishes). Her nail beds/fingernails are still very tender and discolored. She is on 600 mg at night of gabapentin, cannot tolerate more due to nausea after taking. Taking hydrocodone at bedtime with some benefit.     Continues having diffuse arthralgias. Improves the more active she is.     Rash on bilateral dorsal thumbs/hand R>L improved after starting Claritin and using triamcinolone cream as needed.    Increased Celexa dose 2 weeks ago for anxiety with cancer diagnosis, tolerating well. Meeting a therapist.  This is getting better, but still can feel emotional and overwhelmed some days due to her multiple stressors (financial, medical).  She has been on short term disability and will run out of medical benefits soon. 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       ECOG Performance    0-1      Oncology History/Treatment  Diagnosis/Stage:   2/2022: Stage IIB (cT2-cN0-cM0) right breast cancer (triple negative)  -self-palpated right breast lump  -diagnostic mammo + breast US: 2.4 cm R breast (2:00, lower-inner quad) lump and no axillary nodes detected  -breast biopsy: invasive ductal carcinoma, grade 3. ER neg, UT neg, HER2 neg via FISH  -CA 27.29 WNL (<5)    Treatment:  3/15/2022 - present: Neoadjuvant chemo (based off KEYNOTE-522) Pembrolizumab 200 mg D1 q 21 days + Carboplatin AUC=5 D1 q21 Days + weekly Paclitaxel 80 mg/m  D1, 8, 15  x 12 Weeks (with Filgrastim 5 mcg/kg subcutaneously once daily on days 16, 17, and 18 of cycles 1 through 4)  -->after cycle 3, developed grade 3 neuropathy and delayed neutropenia (despite using Neupogen). Therefore, chemo held with cycle 4 and only received Pembrolizumab.     Planning bilateral mastectomies + reconstruction with Drs.  Torgeson + Gerson following neoadjuvant treatment      Physical Exam    GENERAL: Alert and oriented to time place and person. Seated comfortably.  Alone.   HEAD: Atraumatic and normocephalic. Alopecia.  EYES: MELISSA, EOMI. No erythema. No icterus.  BREASTS: Not reassessed today.  LYMPH NODES: Not reassessed today.  CHEST: clear to auscultation bilaterally. Resonant to percussion throughout bilaterally. Symmetrical breath movements bilaterally.  CVS: S1 and S2 are heard. Regular rate and rhythm. No murmur or gallop or rub heard.  ABDOMEN: Soft. Not tender. Not distended. No palpable hepatomegaly or splenomegaly. No other mass palpable. Bowel sounds present.  EXTREMITIES: Warm. No peripheral edema.  SKIN: Right hand/wrist rash noted last visit resolved. Fingernail and great toenail beds brown in color.   NEURO: No gross deficit noted. Non-antalgic gait.      Lab Results    Recent Results (from the past 168 hour(s))   TSH with free T4 reflex   Result Value Ref Range    TSH 1.90 0.40 - 4.00 mU/L   Comprehensive metabolic panel   Result Value Ref Range    Sodium 143 133 - 144 mmol/L    Potassium 3.8 3.4 - 5.3 mmol/L    Chloride 109 94 - 109 mmol/L    Carbon Dioxide (CO2) 29 20 - 32 mmol/L    Anion Gap 5 3 - 14 mmol/L    Urea Nitrogen 12 7 - 30 mg/dL    Creatinine 0.71 0.52 - 1.04 mg/dL    Calcium 8.9 8.5 - 10.1 mg/dL    Glucose 106 (H) 70 - 99 mg/dL    Alkaline Phosphatase 105 40 - 150 U/L    AST 14 0 - 45 U/L    ALT 17 0 - 50 U/L    Protein Total 6.5 (L) 6.8 - 8.8 g/dL    Albumin 3.2 (L) 3.4 - 5.0 g/dL    Bilirubin Total 0.3 0.2 - 1.3 mg/dL    GFR Estimate >90 >60 mL/min/1.73m2       Imaging    US Breast Right Limited 1-3 Quadrants    Result Date: 6/1/2022  EXAM: MA DIAGNOSTIC RIGHT W JEANIE, US BREAST RIGHT LIMITED 1-3 QUADRANTS LOCATION: [facility] DATE/TIME: 6/1/2022 3:06 PM INDICATION: 57-year-old female with known right invasive ductal carcinoma, diagnosed in February 2022, presents for  follow-up to assess response to neoadjuvant chemotherapy. No current breast symptoms. COMPARISON: Prior mammogram and ultrasound 2/18/2022. MAMMOGRAPHIC FINDINGS: Right full-field digital diagnostic mammogram performed. There are scattered areas of fibroglandular density. Images evaluated with the assistance of CAD. Breast tomosynthesis was used in interpretation. The mass component of biopsy-proven malignancy has essentially resolved. There are residual coarse dystrophic calcifications at this site, with adjacent biopsy marking clip. No new suspicious findings. ULTRASOUND FINDINGS: Targeted ultrasound of the right breast at site of biopsied malignancy at 2:00 6 cm from the nipple demonstrates the biopsy marking clip and a couple adjacent echogenic foci, felt to correspond to the mammographic calcifications. No discrete residual mass is visualized. The malignancy initially measured 2.4 x 1.1 x 1.6 cm in February 2022.     IMPRESSION: Complete resolution of mass component of biopsied malignancy with only coarse residual calcifications present at this site, adjacent to the biopsy marking clip. Continued oncologic management is advised. ACR BI-RADS Category 6: Known Biopsy-Proven Malignancy. Results given to the patient.     MA Diagnostic Right w/Jeanie    Result Date: 6/1/2022  EXAM: MA DIAGNOSTIC RIGHT W JEANIE, US BREAST RIGHT LIMITED 1-3 QUADRANTS LOCATION: [facility] DATE/TIME: 6/1/2022 3:06 PM INDICATION: 57-year-old female with known right invasive ductal carcinoma, diagnosed in February 2022, presents for follow-up to assess response to neoadjuvant chemotherapy. No current breast symptoms. COMPARISON: Prior mammogram and ultrasound 2/18/2022. MAMMOGRAPHIC FINDINGS: Right full-field digital diagnostic mammogram performed. There are scattered areas of fibroglandular density. Images evaluated with the assistance of CAD. Breast tomosynthesis was used in interpretation. The mass component of  biopsy-proven malignancy has essentially resolved. There are residual coarse dystrophic calcifications at this site, with adjacent biopsy marking clip. No new suspicious findings. ULTRASOUND FINDINGS: Targeted ultrasound of the right breast at site of biopsied malignancy at 2:00 6 cm from the nipple demonstrates the biopsy marking clip and a couple adjacent echogenic foci, felt to correspond to the mammographic calcifications. No discrete residual mass is visualized. The malignancy initially measured 2.4 x 1.1 x 1.6 cm in February 2022.     IMPRESSION: Complete resolution of mass component of biopsied malignancy with only coarse residual calcifications present at this site, adjacent to the biopsy marking clip. Continued oncologic management is advised. ACR BI-RADS Category 6: Known Biopsy-Proven Malignancy. Results given to the patient.       Billing  Total time: 45 min; including review of EMR, reports, diagnostics and ordering/coordination of care    Signed by: Marilu Vergara NP

## 2022-06-08 ENCOUNTER — INFUSION THERAPY VISIT (OUTPATIENT)
Dept: INFUSION THERAPY | Facility: CLINIC | Age: 57
End: 2022-06-08
Attending: INTERNAL MEDICINE
Payer: COMMERCIAL

## 2022-06-08 ENCOUNTER — ONCOLOGY VISIT (OUTPATIENT)
Dept: ONCOLOGY | Facility: CLINIC | Age: 57
End: 2022-06-08
Attending: NURSE PRACTITIONER
Payer: COMMERCIAL

## 2022-06-08 VITALS
BODY MASS INDEX: 32.86 KG/M2 | SYSTOLIC BLOOD PRESSURE: 123 MMHG | DIASTOLIC BLOOD PRESSURE: 81 MMHG | HEART RATE: 64 BPM | WEIGHT: 211.4 LBS

## 2022-06-08 VITALS
RESPIRATION RATE: 12 BRPM | SYSTOLIC BLOOD PRESSURE: 125 MMHG | OXYGEN SATURATION: 98 % | DIASTOLIC BLOOD PRESSURE: 75 MMHG | WEIGHT: 211 LBS | HEART RATE: 78 BPM | BODY MASS INDEX: 32.8 KG/M2 | TEMPERATURE: 98.4 F

## 2022-06-08 DIAGNOSIS — Z17.1 MALIGNANT NEOPLASM OF UPPER-INNER QUADRANT OF RIGHT BREAST IN FEMALE, ESTROGEN RECEPTOR NEGATIVE (H): ICD-10-CM

## 2022-06-08 DIAGNOSIS — F41.9 ANXIETY: ICD-10-CM

## 2022-06-08 DIAGNOSIS — C50.911 MALIGNANT NEOPLASM OF RIGHT BREAST IN FEMALE, ESTROGEN RECEPTOR NEGATIVE, UNSPECIFIED SITE OF BREAST (H): Primary | ICD-10-CM

## 2022-06-08 DIAGNOSIS — Z51.11 ENCOUNTER FOR ANTINEOPLASTIC CHEMOTHERAPY: ICD-10-CM

## 2022-06-08 DIAGNOSIS — M54.41 ACUTE RIGHT-SIDED LOW BACK PAIN WITH RIGHT-SIDED SCIATICA: ICD-10-CM

## 2022-06-08 DIAGNOSIS — M25.50 ARTHRALGIA, UNSPECIFIED JOINT: ICD-10-CM

## 2022-06-08 DIAGNOSIS — C50.211 MALIGNANT NEOPLASM OF UPPER-INNER QUADRANT OF RIGHT BREAST IN FEMALE, ESTROGEN RECEPTOR NEGATIVE (H): ICD-10-CM

## 2022-06-08 DIAGNOSIS — T45.1X5A CHEMOTHERAPY-INDUCED NEUTROPENIA (H): Primary | ICD-10-CM

## 2022-06-08 DIAGNOSIS — G62.0 CHEMOTHERAPY-INDUCED NEUROPATHY (H): ICD-10-CM

## 2022-06-08 DIAGNOSIS — Z17.1 MALIGNANT NEOPLASM OF RIGHT BREAST IN FEMALE, ESTROGEN RECEPTOR NEGATIVE, UNSPECIFIED SITE OF BREAST (H): Primary | ICD-10-CM

## 2022-06-08 DIAGNOSIS — T45.1X5A CHEMOTHERAPY-INDUCED NEUROPATHY (H): ICD-10-CM

## 2022-06-08 DIAGNOSIS — D70.1 CHEMOTHERAPY-INDUCED NEUTROPENIA (H): ICD-10-CM

## 2022-06-08 DIAGNOSIS — T45.1X5A CHEMOTHERAPY-INDUCED NEUTROPENIA (H): ICD-10-CM

## 2022-06-08 DIAGNOSIS — F11.90 CHRONIC, CONTINUOUS USE OF OPIOIDS: ICD-10-CM

## 2022-06-08 DIAGNOSIS — D70.1 CHEMOTHERAPY-INDUCED NEUTROPENIA (H): Primary | ICD-10-CM

## 2022-06-08 DIAGNOSIS — Z51.11 ENCOUNTER FOR ANTINEOPLASTIC CHEMOTHERAPY: Primary | ICD-10-CM

## 2022-06-08 LAB
ALBUMIN SERPL-MCNC: 3.2 G/DL (ref 3.4–5)
ALP SERPL-CCNC: 105 U/L (ref 40–150)
ALT SERPL W P-5'-P-CCNC: 17 U/L (ref 0–50)
ANION GAP SERPL CALCULATED.3IONS-SCNC: 5 MMOL/L (ref 3–14)
AST SERPL W P-5'-P-CCNC: 14 U/L (ref 0–45)
BILIRUB SERPL-MCNC: 0.3 MG/DL (ref 0.2–1.3)
BUN SERPL-MCNC: 12 MG/DL (ref 7–30)
CALCIUM SERPL-MCNC: 8.9 MG/DL (ref 8.5–10.1)
CHLORIDE BLD-SCNC: 109 MMOL/L (ref 94–109)
CO2 SERPL-SCNC: 29 MMOL/L (ref 20–32)
CREAT SERPL-MCNC: 0.71 MG/DL (ref 0.52–1.04)
GFR SERPL CREATININE-BSD FRML MDRD: >90 ML/MIN/1.73M2
GLUCOSE BLD-MCNC: 106 MG/DL (ref 70–99)
POTASSIUM BLD-SCNC: 3.8 MMOL/L (ref 3.4–5.3)
PROT SERPL-MCNC: 6.5 G/DL (ref 6.8–8.8)
SODIUM SERPL-SCNC: 143 MMOL/L (ref 133–144)
TSH SERPL DL<=0.005 MIU/L-ACNC: 1.9 MU/L (ref 0.4–4)

## 2022-06-08 PROCEDURE — 80053 COMPREHEN METABOLIC PANEL: CPT | Performed by: INTERNAL MEDICINE

## 2022-06-08 PROCEDURE — 258N000003 HC RX IP 258 OP 636: Performed by: INTERNAL MEDICINE

## 2022-06-08 PROCEDURE — 96413 CHEMO IV INFUSION 1 HR: CPT

## 2022-06-08 PROCEDURE — 99215 OFFICE O/P EST HI 40 MIN: CPT | Performed by: NURSE PRACTITIONER

## 2022-06-08 PROCEDURE — 250N000011 HC RX IP 250 OP 636: Performed by: INTERNAL MEDICINE

## 2022-06-08 PROCEDURE — G0463 HOSPITAL OUTPT CLINIC VISIT: HCPCS | Mod: 25

## 2022-06-08 PROCEDURE — 84443 ASSAY THYROID STIM HORMONE: CPT | Performed by: INTERNAL MEDICINE

## 2022-06-08 RX ORDER — PROCHLORPERAZINE MALEATE 10 MG
10 TABLET ORAL EVERY 6 HOURS PRN
Qty: 30 TABLET | Refills: 5 | Status: SHIPPED | OUTPATIENT
Start: 2022-06-08 | End: 2023-02-03

## 2022-06-08 RX ORDER — DULOXETIN HYDROCHLORIDE 30 MG/1
CAPSULE, DELAYED RELEASE ORAL
Qty: 53 CAPSULE | Refills: 1 | Status: SHIPPED | OUTPATIENT
Start: 2022-06-08 | End: 2022-08-18

## 2022-06-08 RX ORDER — TRAMADOL HYDROCHLORIDE 50 MG/1
TABLET ORAL
Qty: 120 TABLET | OUTPATIENT
Start: 2022-06-08

## 2022-06-08 RX ORDER — PROCHLORPERAZINE MALEATE 10 MG
10 TABLET ORAL EVERY 6 HOURS PRN
Qty: 30 TABLET | Refills: 2 | Status: SHIPPED | OUTPATIENT
Start: 2022-06-08 | End: 2023-02-03

## 2022-06-08 RX ORDER — HEPARIN SODIUM (PORCINE) LOCK FLUSH IV SOLN 100 UNIT/ML 100 UNIT/ML
5 SOLUTION INTRAVENOUS
Status: DISCONTINUED | OUTPATIENT
Start: 2022-06-08 | End: 2022-06-08 | Stop reason: HOSPADM

## 2022-06-08 RX ORDER — CITALOPRAM HYDROBROMIDE 40 MG/1
40 TABLET ORAL DAILY
Qty: 30 TABLET | Refills: 3 | Status: SHIPPED | OUTPATIENT
Start: 2022-06-08 | End: 2022-07-11

## 2022-06-08 RX ORDER — HYDROCODONE BITARTRATE AND ACETAMINOPHEN 5; 325 MG/1; MG/1
1 TABLET ORAL EVERY 6 HOURS PRN
Qty: 60 TABLET | Refills: 0 | Status: SHIPPED | OUTPATIENT
Start: 2022-06-08 | End: 2022-07-12

## 2022-06-08 RX ADMIN — SODIUM CHLORIDE 250 ML: 9 INJECTION, SOLUTION INTRAVENOUS at 09:58

## 2022-06-08 RX ADMIN — SODIUM CHLORIDE 200 MG: 9 INJECTION, SOLUTION INTRAVENOUS at 09:58

## 2022-06-08 RX ADMIN — HEPARIN 5 ML: 100 SYRINGE at 10:42

## 2022-06-08 ASSESSMENT — PAIN SCALES - GENERAL: PAINLEVEL: MODERATE PAIN (5)

## 2022-06-08 NOTE — LETTER
[facility]  [address]  [facility] [address] [phone]          June 8, 2022    RE:  Diana Salvador                                                                                                                                                       [address] [place] [id number]            To whom it may concern:    Diana Salvador is under my professional care for her Right breast cancer diagnosis for which she is currently receiving treatment for.  She may continue working 8 hours a month from home at this time.  If you have any questions, please feel free to call me at [phone].      Sincerely,        Marilu Vergara, NP

## 2022-06-08 NOTE — LETTER
6/8/2022         RE: Diana Salvador  [address] [place] [id number]        Dear Colleague,    Thank you for referring your patient, Diana Salvador, to the [facility] [place]. Please see a copy of my visit note below.    [facility] Hematology and Oncology Outpatient Progress Note ([place])    Patient: Diana Salvador  MRN: [id number]          Reason for Visit    1. Stage IIB triple negative breast cancer  2. On neoadjuvant chemo    Primary Oncologist: Dr. Diamond    Assessment/Plan  1.   Stage IIB triple negative breast cancer; on neoadjuvant chemo + immunotherapy  2.   Chemo-induced neutropenia  After 3 cycles, developed severe grade 3 neuropathy and delayed neutropenia despite Neupogen after day 15. Therefore, chemo was omitted and she got single agent pembrolizumab starting with cycle 4.    Breast reimaging shows complete response, confirming our clinical assessment.     She's met with Dr. Marina and Elis. Given her toxicity on neoadjuvant chemo, she will be proceeding to surgery sooner than initially planned. This is scheduled for 6/30.    The rash and arthralgias are likely related to the pembrolizumab; otherwise no new side effects.   Labwork satisfactory.    Dr. Diamond recommends continuing with maintenance Pembrolizumab alone as we await her surgery.     Plan:  -Continue with next cycle Pembrolizumab today.  -Return around 6/29 for next cycle Pembrolizumab, which we will plan to proceed with if tolerating well.  -Bilateral mastectomy with reconstruction is scheduled for 6/30 with Elysia Marina and Gerson  -She will return to see Dr. Diamond 3-4 weeks after surgery to review path and decide on role for  further adjuvant chemo  -We will complete her updated STD paperwork and complete a letter supporting she can work from home 8 hrs/month    3.   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 This may be getting slightly better (she notes some days are better than  others), but still remains gr 2-3. She has only been able to titrate up to max to 600 mg daily on gabapentin, secondary to nausea. Using hydrocodone with some benefit.     Plan:  -Start duloxetine 30 mg daily x 1 week, can titrate up to 60 mg daily  -Take gabapentin 300 mg tonight, then stop  -Reviewed low risk of drug-drug interaction between selective serotonin reuptake inhibitor and duloxetine, will monitor. She will not take doxepin (she has not been taking and will not start)  -continue hydrocodone if needed    4.   Chemo vs immunotherapy dermatitis  Pruritic dermatitis on dorsal hand/thumbs R>L , intermittent  Likley side effect of Pembrolizumab since it flared a bit again after her cycle last week. Improved on Claritin and triamcinolone as needed.     Plan:  -Claritin daily  -Triamcinolone 0.1% to affected areas as needed, take breaks from this as able.     5.   Diffuse polyarthralgias, treatment-induced   Since it's persisted beyond discontinuation of Taxol, suspect more related to the Keytruda. It's manageable.     Plan:  -Stay active  -duloxetine starting for neuropathy may help with this, as well    6.  Anxiety, secondary to cancer diagnosis  Recently increased Celexa to 40 mg (from 20 mg), with good tolerance.  Working with a therapist.    Plan:  -Continue Celexa   -duloxetine for the neuropathy may also help  -Continue psychotherapy.  -Offered SW visit, she declined for now    ______________________________________________________________________________    History of Present Illness/ Interval History    Ms. Diana Salvador  is a 57 year old on neoadjvuant treatment with pembrolizumab, carboplatin and taxol (weekly) . She completed 3 cycles of this combo, and cycle 3 was complicated by the development of grade 3 neuropathy (fingers) and neutropenia (ANC 1000) despite use of Neupogen. Therefore, further chemo has been held and she got single agent Pembrolizumab alone with cycle 4.    She met with   Salas and plan is to proceed with bilateral mastectomies + reconstruction sooner than initially planned due to chemo toxicity. This is planned for later this month.     Neuropathy of fingers and toes has improved slightly, in that she notes some days are a bit better than others, but it persists. Still very sensitive to use/light touch. She has numbness and is having a hard time with her fine motor tasks (typing, writing, washing dishes). Her nail beds/fingernails are still very tender and discolored. She is on 600 mg at night of gabapentin, cannot tolerate more due to nausea after taking. Taking hydrocodone at bedtime with some benefit.     Continues having diffuse arthralgias. Improves the more active she is.     Rash on bilateral dorsal thumbs/hand R>L improved after starting Claritin and using triamcinolone cream as needed.    Increased Celexa dose 2 weeks ago for anxiety with cancer diagnosis, tolerating well. Meeting a therapist.  This is getting better, but still can feel emotional and overwhelmed some days due to her multiple stressors (financial, medical).  She has been on short term disability and will run out of medical benefits soon. 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       ECOG Performance    0-1      Oncology History/Treatment  Diagnosis/Stage:   2/2022: Stage IIB (cT2-cN0-cM0) right breast cancer (triple negative)  -self-palpated right breast lump  -diagnostic mammo + breast US: 2.4 cm R breast (2:00, lower-inner quad) lump and no axillary nodes detected  -breast biopsy: invasive ductal carcinoma, grade 3. ER neg, LA neg, HER2 neg via FISH  -CA 27.29 WNL (<5)    Treatment:  3/15/2022 - present: Neoadjuvant chemo (based off KEYNOTE-522) Pembrolizumab 200 mg D1 q 21 days + Carboplatin AUC=5 D1 q21 Days + weekly Paclitaxel 80 mg/m  D1, 8, 15  x 12 Weeks (with Filgrastim 5 mcg/kg subcutaneously once daily on days 16, 17, and  18 of cycles 1 through 4)  -->after cycle 3, developed grade 3 neuropathy and delayed neutropenia (despite using Neupogen). Therefore, chemo held with cycle 4 and only received Pembrolizumab.     Planning bilateral mastectomies + reconstruction with Elysia Marina + Gerson following neoadjuvant treatment      Physical Exam    GENERAL: Alert and oriented to time place and person. Seated comfortably.  Alone.   HEAD: Atraumatic and normocephalic. Alopecia.  EYES: MELISSA, EOMI. No erythema. No icterus.  BREASTS: Not reassessed today.  LYMPH NODES: Not reassessed today.  CHEST: clear to auscultation bilaterally. Resonant to percussion throughout bilaterally. Symmetrical breath movements bilaterally.  CVS: S1 and S2 are heard. Regular rate and rhythm. No murmur or gallop or rub heard.  ABDOMEN: Soft. Not tender. Not distended. No palpable hepatomegaly or splenomegaly. No other mass palpable. Bowel sounds present.  EXTREMITIES: Warm. No peripheral edema.  SKIN: Right hand/wrist rash noted last visit resolved. Fingernail and great toenail beds brown in color.   NEURO: No gross deficit noted. Non-antalgic gait.      Lab Results    Recent Results (from the past 168 hour(s))   TSH with free T4 reflex   Result Value Ref Range    TSH 1.90 0.40 - 4.00 mU/L   Comprehensive metabolic panel   Result Value Ref Range    Sodium 143 133 - 144 mmol/L    Potassium 3.8 3.4 - 5.3 mmol/L    Chloride 109 94 - 109 mmol/L    Carbon Dioxide (CO2) 29 20 - 32 mmol/L    Anion Gap 5 3 - 14 mmol/L    Urea Nitrogen 12 7 - 30 mg/dL    Creatinine 0.71 0.52 - 1.04 mg/dL    Calcium 8.9 8.5 - 10.1 mg/dL    Glucose 106 (H) 70 - 99 mg/dL    Alkaline Phosphatase 105 40 - 150 U/L    AST 14 0 - 45 U/L    ALT 17 0 - 50 U/L    Protein Total 6.5 (L) 6.8 - 8.8 g/dL    Albumin 3.2 (L) 3.4 - 5.0 g/dL    Bilirubin Total 0.3 0.2 - 1.3 mg/dL    GFR Estimate >90 >60 mL/min/1.73m2       Imaging    US Breast Right Limited 1-3 Quadrants    Result Date: 6/1/2022  EXAM: MA  DIAGNOSTIC RIGHT W JEANIE, US BREAST RIGHT LIMITED 1-3 QUADRANTS LOCATION: [facility] DATE/TIME: 6/1/2022 3:06 PM INDICATION: 57-year-old female with known right invasive ductal carcinoma, diagnosed in February 2022, presents for follow-up to assess response to neoadjuvant chemotherapy. No current breast symptoms. COMPARISON: Prior mammogram and ultrasound 2/18/2022. MAMMOGRAPHIC FINDINGS: Right full-field digital diagnostic mammogram performed. There are scattered areas of fibroglandular density. Images evaluated with the assistance of CAD. Breast tomosynthesis was used in interpretation. The mass component of biopsy-proven malignancy has essentially resolved. There are residual coarse dystrophic calcifications at this site, with adjacent biopsy marking clip. No new suspicious findings. ULTRASOUND FINDINGS: Targeted ultrasound of the right breast at site of biopsied malignancy at 2:00 6 cm from the nipple demonstrates the biopsy marking clip and a couple adjacent echogenic foci, felt to correspond to the mammographic calcifications. No discrete residual mass is visualized. The malignancy initially measured 2.4 x 1.1 x 1.6 cm in February 2022.     IMPRESSION: Complete resolution of mass component of biopsied malignancy with only coarse residual calcifications present at this site, adjacent to the biopsy marking clip. Continued oncologic management is advised. ACR BI-RADS Category 6: Known Biopsy-Proven Malignancy. Results given to the patient.     MA Diagnostic Right w/Jeanie    Result Date: 6/1/2022  EXAM: MA DIAGNOSTIC RIGHT W JEANIE, US BREAST RIGHT LIMITED 1-3 QUADRANTS LOCATION: [facility] DATE/TIME: 6/1/2022 3:06 PM INDICATION: 57-year-old female with known right invasive ductal carcinoma, diagnosed in February 2022, presents for follow-up to assess response to neoadjuvant chemotherapy. No current breast symptoms. COMPARISON: Prior mammogram and ultrasound 2/18/2022.  "MAMMOGRAPHIC FINDINGS: Right full-field digital diagnostic mammogram performed. There are scattered areas of fibroglandular density. Images evaluated with the assistance of CAD. Breast tomosynthesis was used in interpretation. The mass component of biopsy-proven malignancy has essentially resolved. There are residual coarse dystrophic calcifications at this site, with adjacent biopsy marking clip. No new suspicious findings. ULTRASOUND FINDINGS: Targeted ultrasound of the right breast at site of biopsied malignancy at 2:00 6 cm from the nipple demonstrates the biopsy marking clip and a couple adjacent echogenic foci, felt to correspond to the mammographic calcifications. No discrete residual mass is visualized. The malignancy initially measured 2.4 x 1.1 x 1.6 cm in February 2022.     IMPRESSION: Complete resolution of mass component of biopsied malignancy with only coarse residual calcifications present at this site, adjacent to the biopsy marking clip. Continued oncologic management is advised. ACR BI-RADS Category 6: Known Biopsy-Proven Malignancy. Results given to the patient.       Billing  Total time: 45 min; including review of EMR, reports, diagnostics and ordering/coordination of care    Signed by: Marilu Vergara NP      Oncology Rooming Note    June 8, 2022 8:43 AM   Diana Salvador is a 57 year old female who presents for:    Chief Complaint   Patient presents with     Oncology Clinic Visit     Malignant neoplasm of upper-inner quadrant of right breast in female, estrogen receptor negative - Labs provider and infusion     Initial Vitals: /75 (BP Location: Right arm, Patient Position: Sitting, Cuff Size: Adult Regular)   Pulse 78   Temp 98.4  F (36.9  C) (Tympanic)   Resp 12   Wt 95.7 kg (211 lb)   LMP  (LMP Unknown)   SpO2 98%   BMI 32.80 kg/m   Estimated body mass index is 32.8 kg/m  as calculated from the following:    Height as of 4/27/22: 1.708 m (5' 7.25\").    Weight as of this encounter: " 95.7 kg (211 lb). Body surface area is 2.13 meters squared.  Moderate Pain (5) Comment: Data Unavailable   No LMP recorded (lmp unknown). Patient has had a hysterectomy.  Allergies reviewed: Yes  Medications reviewed: Yes    Medications: Medication refills not needed today.  Pharmacy name entered into [employer]:    [id number] - [address] AT [address] AND [facility]    Clinical concerns:  None      Emy Hollingsworth CMA                Again, thank you for allowing me to participate in the care of your patient.        Sincerely,        Marilu Vergara NP

## 2022-06-08 NOTE — PATIENT INSTRUCTIONS
Proceed with Keytruda only today    See Marilu 6/29 before treatment.     Keep appt with Dr. Diamond 3-4 weeks after surgery 6/30 (already scheduled)    Tonight - take gabapentin 300 mg, then stop  Start Cymbalta 30 mg evening x 7 days, increase to 60 mg if tolerating (for neuropathy and bone pain)

## 2022-06-08 NOTE — PROGRESS NOTES
Infusion Nursing Note:  Diana Salvador presents today for C1D1 Keytruda.    Patient seen by provider today: Yes: Marilu Vergara NP   present during visit today: Not Applicable.    Note: N/A.      Intravenous Access:  Implanted Port.    Treatment Conditions:  Results reviewed, labs MET treatment parameters, ok to proceed with treatment.      Post Infusion Assessment:  Patient tolerated infusion without incident.  Blood return noted pre and post infusion.  Site patent and intact, free from redness, edema or discomfort.  No evidence of extravasations.  Access discontinued per protocol.       Discharge Plan:   Patient discharged in stable condition accompanied by: self.  Departure Mode: Ambulatory.      Leonila Dunlap RN

## 2022-06-08 NOTE — PROGRESS NOTES
"Oncology Rooming Note    June 8, 2022 8:43 AM   Diana Salvador is a 57 year old female who presents for:    Chief Complaint   Patient presents with     Oncology Clinic Visit     Malignant neoplasm of upper-inner quadrant of right breast in female, estrogen receptor negative - Labs provider and infusion     Initial Vitals: /75 (BP Location: Right arm, Patient Position: Sitting, Cuff Size: Adult Regular)   Pulse 78   Temp 98.4  F (36.9  C) (Tympanic)   Resp 12   Wt 95.7 kg (211 lb)   LMP  (LMP Unknown)   SpO2 98%   BMI 32.80 kg/m   Estimated body mass index is 32.8 kg/m  as calculated from the following:    Height as of 4/27/22: 1.708 m (5' 7.25\").    Weight as of this encounter: 95.7 kg (211 lb). Body surface area is 2.13 meters squared.  Moderate Pain (5) Comment: Data Unavailable   No LMP recorded (lmp unknown). Patient has had a hysterectomy.  Allergies reviewed: Yes  Medications reviewed: Yes    Medications: Medication refills not needed today.  Pharmacy name entered into [employer]:    [id number] - [place], [place] - [address] AT [address]    Clinical concerns:  None      Emy Hollingsworth CMA            "

## 2022-06-15 ENCOUNTER — OFFICE VISIT (OUTPATIENT)
Dept: FAMILY MEDICINE | Facility: CLINIC | Age: 57
End: 2022-06-15
Payer: COMMERCIAL

## 2022-06-15 VITALS
RESPIRATION RATE: 20 BRPM | SYSTOLIC BLOOD PRESSURE: 108 MMHG | TEMPERATURE: 99.3 F | OXYGEN SATURATION: 98 % | WEIGHT: 211 LBS | DIASTOLIC BLOOD PRESSURE: 80 MMHG | BODY MASS INDEX: 32.8 KG/M2 | HEART RATE: 81 BPM

## 2022-06-15 DIAGNOSIS — Z11.4 SCREENING FOR HIV (HUMAN IMMUNODEFICIENCY VIRUS): ICD-10-CM

## 2022-06-15 DIAGNOSIS — Z11.59 NEED FOR HEPATITIS C SCREENING TEST: ICD-10-CM

## 2022-06-15 DIAGNOSIS — M54.31 SCIATICA OF RIGHT SIDE: ICD-10-CM

## 2022-06-15 DIAGNOSIS — C50.211 MALIGNANT NEOPLASM OF UPPER-INNER QUADRANT OF RIGHT BREAST IN FEMALE, ESTROGEN RECEPTOR NEGATIVE (H): ICD-10-CM

## 2022-06-15 DIAGNOSIS — F11.90 CHRONIC, CONTINUOUS USE OF OPIOIDS: ICD-10-CM

## 2022-06-15 DIAGNOSIS — Z17.1 MALIGNANT NEOPLASM OF UPPER-INNER QUADRANT OF RIGHT BREAST IN FEMALE, ESTROGEN RECEPTOR NEGATIVE (H): ICD-10-CM

## 2022-06-15 DIAGNOSIS — Z01.818 PREOP GENERAL PHYSICAL EXAM: Primary | ICD-10-CM

## 2022-06-15 PROCEDURE — 99214 OFFICE O/P EST MOD 30 MIN: CPT | Performed by: FAMILY MEDICINE

## 2022-06-15 RX ORDER — TRAMADOL HYDROCHLORIDE 50 MG/1
50 TABLET ORAL EVERY 6 HOURS PRN
Qty: 120 TABLET | Refills: 0 | Status: SHIPPED | OUTPATIENT
Start: 2022-07-15 | End: 2022-09-23

## 2022-06-15 RX ORDER — TRAMADOL HYDROCHLORIDE 50 MG/1
50 TABLET ORAL EVERY 6 HOURS PRN
COMMUNITY
End: 2022-08-18

## 2022-06-15 RX ORDER — TRAMADOL HYDROCHLORIDE 50 MG/1
50 TABLET ORAL EVERY 6 HOURS PRN
Qty: 120 TABLET | Refills: 0 | Status: SHIPPED | OUTPATIENT
Start: 2022-08-14 | End: 2022-08-18

## 2022-06-15 RX ORDER — TRAMADOL HYDROCHLORIDE 50 MG/1
50 TABLET ORAL EVERY 6 HOURS PRN
Qty: 120 TABLET | Refills: 0 | Status: SHIPPED | OUTPATIENT
Start: 2022-06-15 | End: 2022-07-12

## 2022-06-15 ASSESSMENT — ANXIETY QUESTIONNAIRES
3. WORRYING TOO MUCH ABOUT DIFFERENT THINGS: MORE THAN HALF THE DAYS
GAD7 TOTAL SCORE: 14
7. FEELING AFRAID AS IF SOMETHING AWFUL MIGHT HAPPEN: NOT AT ALL
5. BEING SO RESTLESS THAT IT IS HARD TO SIT STILL: MORE THAN HALF THE DAYS
4. TROUBLE RELAXING: NEARLY EVERY DAY
6. BECOMING EASILY ANNOYED OR IRRITABLE: MORE THAN HALF THE DAYS
2. NOT BEING ABLE TO STOP OR CONTROL WORRYING: MORE THAN HALF THE DAYS
1. FEELING NERVOUS, ANXIOUS, OR ON EDGE: NEARLY EVERY DAY
GAD7 TOTAL SCORE: 14
8. IF YOU CHECKED OFF ANY PROBLEMS, HOW DIFFICULT HAVE THESE MADE IT FOR YOU TO DO YOUR WORK, TAKE CARE OF THINGS AT HOME, OR GET ALONG WITH OTHER PEOPLE?: VERY DIFFICULT
GAD7 TOTAL SCORE: 14
7. FEELING AFRAID AS IF SOMETHING AWFUL MIGHT HAPPEN: NOT AT ALL

## 2022-06-15 ASSESSMENT — PAIN SCALES - GENERAL: PAINLEVEL: MODERATE PAIN (5)

## 2022-06-15 ASSESSMENT — PATIENT HEALTH QUESTIONNAIRE - PHQ9
SUM OF ALL RESPONSES TO PHQ QUESTIONS 1-9: 14
SUM OF ALL RESPONSES TO PHQ QUESTIONS 1-9: 14
10. IF YOU CHECKED OFF ANY PROBLEMS, HOW DIFFICULT HAVE THESE PROBLEMS MADE IT FOR YOU TO DO YOUR WORK, TAKE CARE OF THINGS AT HOME, OR GET ALONG WITH OTHER PEOPLE: VERY DIFFICULT

## 2022-06-15 NOTE — PATIENT INSTRUCTIONS
Preparing for Your Surgery  Getting started  A nurse will call you to review your health history and instructions. They will give you an arrival time based on your scheduled surgery time. Please be ready to share:    Your doctor's clinic name and phone number    Your medical, surgical and anesthesia history    A list of allergies and sensitivities    A list of medicines, including herbal treatments and over-the-counter drugs    Whether the patient has a legal guardian (ask how to send us the papers in advance)  Please tell us if you're pregnant--or if there's any chance you might be pregnant. Some surgeries may injure a fetus (unborn baby), so they require a pregnancy test. Surgeries that are safe for a fetus don't always need a test, and you can choose whether to have one.   If you have a child who's having surgery, please ask for a copy of Preparing for Your Child's Surgery.    Preparing for surgery    Within 30 days of surgery: Have a pre-op exam (sometimes called an H&P, or History and Physical). This can be done at a clinic or pre-operative center.  ? If you're having a , you may not need this exam. Talk to your care team.    At your pre-op exam, talk to your care team about all medicines you take. If you need to stop any medicines before surgery, ask when to start taking them again.  ? We do this for your safety. Many medicines can make you bleed too much during surgery. Some change how well surgery (anesthesia) drugs work.    Call your insurance company to let them know you're having surgery. (If you don't have insurance, call [phone].)    Call your clinic if there's any change in your health. This includes signs of a cold or flu (sore throat, runny nose, cough, rash, fever). It also includes a scrape or scratch near the surgery site.    If you have questions on the day of surgery, call your hospital or surgery center.  COVID testing  You may need to be tested for COVID-19 before having  surgery. If so, we will give you instructions.  Eating and drinking guidelines  For your safety: Unless your surgeon tells you otherwise, follow the guidelines below.    Eat and drink as usual until 8 hours before surgery. After that, no food or milk.    Drink clear liquids until 2 hours before surgery. These are liquids you can see through, like water, Gatorade and Propel Water. You may also have black coffee and tea (no cream or milk).    Nothing by mouth within 2 hours of surgery. This includes gum, candy and breath mints.    If you drink alcohol: Stop drinking it the night before surgery.    If your care team tells you to take medicine on the morning of surgery, it's okay to take it with a sip of water.  Preventing infection    Shower or bathe the night before and morning of your surgery. Follow the instructions your clinic gave you. (If no instructions, use regular soap.)    Don't shave or clip hair near your surgery site. We'll remove the hair if needed.    Don't smoke or vape the morning of surgery. You may chew nicotine gum up to 2 hours before surgery. A nicotine patch is okay.  ? Note: Some surgeries require you to completely quit smoking and nicotine. Check with your surgeon.    Your care team will make every effort to keep you safe from infection. We will:  ? Clean our hands often with soap and water (or an alcohol-based hand rub).  ? Clean the skin at your surgery site with a special soap that kills germs.  ? Give you a special gown to keep you warm. (Cold raises the risk of infection.)  ? Wear special hair covers, masks, gowns and gloves during surgery.  ? Give antibiotic medicine, if prescribed. Not all surgeries need antibiotics.  What to bring on the day of surgery    Photo ID and insurance card    Copy of your health care directive, if you have one    Glasses and hearing aides (bring cases)  ? You can't wear contacts during surgery    Inhaler and eye drops, if you use them (tell us about these when  you arrive)    CPAP machine or breathing device, if you use them    A few personal items, if spending the night    If you have . . .  ? A pacemaker, ICD (cardiac defibrillator) or other implant: Bring the ID card.  ? An implanted stimulator: Bring the remote control.  ? A legal guardian: Bring a copy of the certified (court-stamped) guardianship papers.  Please remove any jewelry, including body piercings. Leave jewelry and other valuables at home.  If you're going home the day of surgery    You must have a responsible adult drive you home. They should stay with you overnight as well.    If you don't have someone to stay with you, and you aren't safe to go home alone, we may keep you overnight. Insurance often won't pay for this.  After surgery  If it's hard to control your pain or you need more pain medicine, please call your surgeon's office.  Questions?   If you have any questions for your care team, list them here: _________________________________________________________________________________________________________________________________________________________________________ ____________________________________ ____________________________________ ____________________________________  For informational purposes only. Not to replace the advice of your health care provider. Copyright   2003, 2019 [facility]. All rights reserved. Clinically reviewed by Luh Lee MD. [employer] 820534 - REV 07/21.    Before Your Procedure or Hospital Admission  Testing for COVID-19  Thank you for choosing [facility] for your health care needs.The COVID-19 pandemic is a very challenging time for everyone.   Our goal is to keep you and our team here at [facility] safe and healthy. We've taken many steps to make this happen. For example:    We test and screen our staff, care teams and patients for COVID-19.    Everyone at [facility] must wear a mask and stay 6 feet apart.    We are limiting  "hospital and clinic visitors.  Before you come in  You must get tested for COVID-19, even if you've been vaccinated.    If you're going home on the same day as your procedure (surgery):  ? 1 to 2 days before your procedure, take an at-home, rapid antigen test. You can buy these at many pharmacy stores. Or you can order free, at-home tests at [place]/tests. If you can't find an at-home, rapid antigen test, please schedule a PCR test with [facility] by calling [employer], or visiting [email or web address]. We can't accept tests that are more than 4 days old.  ? If your test is negative, please take a photo of the test. Show the photo to the nurse when you come in for your procedure.  ? If your test is positive, please see the \"If your test shows you have COVID-19\" section on this page.    If you're staying overnight in the hospital:  ? 4 days before your procedure, please get a   PCR test from a lab.  ? To schedule a PCR test with [facility], call [phone]. Or, visit   [email or web address].  ? If your test is negative, please ask the testing location to fax your results to us at [phone].  ? If your test is positive, please see the \"If your test shows you have COVID-19\" section below.  After your test and before your procedure, please follow these safetyguidelines:    Limit trips outside your home.    Limit the number of people you see.    Always wear a face covering outside your home.    Use social distancing. Stay 6 feet away from others whenever you can.    Wash your hands often.  If your test shows you have COVID-19  If your test is positive, please tell your surgeon's office right away. A positive test means that you have COVID-19.  We'll probably have to postpone your admission, surgery or procedure. Your care team will discuss this with you. After that, we'll let you know what to do and when you can re-schedule.  If your test shows you DON'T have " COVID-19  Even if your test is negative, you can still get COVID-19. It's rare, but sometimes the test result is wrong. You could also catch the virus after taking the test.   There's a very small chance that you could catch COVID-19 in the hospital or surgery center. [facility] has taken many steps to prevent this from happening.   Possible surgery delay   Like you, we want your surgery to happen when it's scheduled. But sometimes the hospital is so full that it's not safe for you to have your surgery. This is especially true during the pandemic. Your surgery may need to be re-scheduled at a later date. If thishappens, we will call and tell you.   Day of your surgery or procedure    Please come wearing a face covering that covers both your nose and mouth.    When you arrive, we'll ask you some questions to find out if you've had any exposures to COVID-19, or have any signs of COVID-19.    No matter where you took a test, we'll need to have the results. Please ask your testing location to fax the results to us at [phone]. If you took a rapid antigen at-home test, you can bring a photo of the results with you to your procedure.    Ask your care team if you can have visitors. All visitors must wear face coverings and will be screened for exposure to, or signs of, COVID-19.    The rules for visitors change often, depending on how much the virus is spreading. To learn more, see Visiting a Loved One in the Hospital during the COVID-19 Outbreak.  Please call your care team, hospital or surgery center if you have any questions. We thank you for your understanding and for choosing [facility] for your care.   Questions and answers  Does it matter how I get tested for COVID-19?  Yes. If the plan is for you to go home on the same day as your procedure, you can take a rapid antigen, at-home test 1 to 2 days before you come in. If your test is negative, please take a photo of your test. Show it to the nurse  when you come to the hospital. If you can't find a rapid antigen, at-home test, you can take a PCR test at a lab instead.  If the plan is for you to stay in the hospital after your surgery, you'll need to get a PCR test from a lab 4 days before your procedure. Ask the testing location to fax your results to [phone].  If we don't get your results, we may have to delay or cancel your treatment.  Do I need to get tested at [facility]?  No. However, if you need a PCR test from a lab, we recommend using an [facility] lab. We'll get the results more quickly and easily that way. To schedule a test, please call [phone]. Or, visit [email or web address].  For informational purposes only. Not to replace the advice of your health care provider. Copyright   2020 [facility]. All rights reserved. Clinically reviewed by [facility] [employer] and the [facility] COVID-19 Clinical Team. [employer] 034557 - Rev 05/26/22.    How to Take Your Medication Before Surgery  - Take all of your medications before surgery except as noted below  - STOP taking all vitamins and herbal supplements 14 days before surgery.  Ok to take lasix if needed the morning before surgery. Otherwise take all your bedtime medication the evening before surgery.

## 2022-06-15 NOTE — PROGRESS NOTES
[facility]  [address] [address]  Phone: [phone]  Primary Provider: Rivka Chen  Pre-op Performing Provider: RIVKA CHEN      PREOPERATIVE EVALUATION:  Today's date: 6/15/2022    Diana Salvador is a 57 year old female who presents for a preoperative evaluation.    Surgical Information:  Surgery/Procedure: MASTECTOMY, SIMPLE- LEFT, MASTECTOMY, SIMPLE, WITH SENTINEL LYMPH NODE DISSECTION, POSSIBLE AXILLARY DISSECTION- RIGHT, RECONSTRUCTION, BREAST, BILATERAL, WITH TISSUE EXPANDER INSERTION- BILATERAL  Surgery Location: [facility]  Surgeon: Dr. Marina and Dr. Red  Surgery Date: 6/30/2022  Time of Surgery: 12:30pm  Where patient plans to recover: Other: staying overnight for observation.  Fax number for surgical facility: Note does not need to be faxed, will be available electronically in Epic.    Type of Anesthesia Anticipated: General    Assessment & Plan     The proposed surgical procedure is considered INTERMEDIATE risk.    Preop general physical exam    Malignant neoplasm of upper-inner quadrant of right breast in female, estrogen receptor negative (triple-neg., germline test neg. for BRCA 1/2)      Chronic, continuous use of opioids  Stable, refill  - Drug Confirmation Panel Urine with Creat - lab collect; Future  - traMADol (ULTRAM) 50 MG tablet; Take 1 tablet (50 mg) by mouth every 6 hours as needed for severe pain  - traMADol (ULTRAM) 50 MG tablet; Take 1 tablet (50 mg) by mouth every 6 hours as needed for severe pain  - traMADol (ULTRAM) 50 MG tablet; Take 1 tablet (50 mg) by mouth every 6 hours as needed for severe pain    Sciatica of right side  - traMADol (ULTRAM) 50 MG tablet; Take 1 tablet (50 mg) by mouth every 6 hours as needed for severe pain  - traMADol (ULTRAM) 50 MG tablet; Take 1 tablet (50 mg) by mouth every 6 hours as needed for severe pain  - traMADol (ULTRAM) 50 MG tablet; Take 1 tablet (50 mg) by mouth every 6 hours  as needed for severe pain    Screening for HIV (human immunodeficiency virus)  - HIV Antigen Antibody Combo; Future    Need for hepatitis C screening test  - Hepatitis C Screen Reflex to HCV RNA Quant and Genotype; Future         Risks and Recommendations:  The patient has the following additional risks and recommendations for perioperative complications:  Anemia/Bleeding/Clotting:    - Anemia and does not require treatment prior to surgery. Monitor hemoglobin postoperatively    Medication Instructions:  Patient is to take all scheduled medications on the day of surgery    RECOMMENDATION:  APPROVAL GIVEN to proceed with proposed procedure, without further diagnostic evaluation.        Subjective     HPI related to upcoming procedure: breast cancer diagnosed after lump found. Done chemo and immunotherapy side effect from chemo of neuropathy, clubbing, anemia, low appetite.  Now next step is the bilateral mastectomy.      Preop Questions 6/15/2022   1. Have you ever had a heart attack or stroke? No   2. Have you ever had surgery on your heart or blood vessels, such as a stent placement, a coronary artery bypass, or surgery on an artery in your head, neck, heart, or legs? No   3. Do you have chest pain with activity? No   4. Do you have a history of  heart failure? No   5. Do you currently have a cold, bronchitis or symptoms of other infection? No   6. Do you have a cough, shortness of breath, or wheezing? No   7. Do you or anyone in your family have previous history of blood clots? UNKNOWN - no personal or family history   8. Do you or does anyone in your family have a serious bleeding problem such as prolonged bleeding following surgeries or cuts? No   9. Have you ever had problems with anemia or been told to take iron pills? UNKNOWN - yes, from chemo/immuno therapy.   10. Have you had any abnormal blood loss such as black, tarry or bloody stools, or abnormal vaginal bleeding? No   11. Have you ever had a blood  transfusion? No   12. Are you willing to have a blood transfusion if it is medically needed before, during, or after your surgery? Yes   13. Have you or any of your relatives ever had problems with anesthesia? No   14. Do you have sleep apnea, excessive snoring or daytime drowsiness? No   15. Do you have any artifical heart valves or other implanted medical devices like a pacemaker, defibrillator, or continuous glucose monitor? No   16. Do you have artificial joints? No   17. Are you allergic to latex? No   18. Is there any chance that you may be pregnant? No     Health Care Directive:  Patient does not have a Health Care Directive or Living Will: Discussed advance care planning with patient; however, patient declined at this time.    Preoperative Review of :   reviewed - controlled substances reflected in medication list.    Joint pain and sciatica so takes tramadol if needed when going somewhere, will take norco if at home. Tylenol alone not helpful. Can't take NSAIDs due to radhames en Y gastric bypass.    Status of Chronic Conditions:  ANEMIA - Patient has a recent history of mild to moderate anemia after chemo/immunotherapy, which has not been symptomatic. No treatment has been needed. Minimal appetite.    DEPRESSION - Patient has a long history of Depression of moderate severity requiring medication for control with recent symptoms being a little teary with all the stresses and cancer diagnosis. Current symptoms of depression include depressed mood.     SLEEP PROBLEM - Patient has a longstanding history of insomnia.. Patient has tried OTC medications with limited success.  Currently using silenor but trouble with getting the medication due to supply.      Review of Systems  CONSTITUTIONAL: NEGATIVE for fever, chills, change in weight  INTEGUMENTARY/SKIN: NEGATIVE for worrisome rashes, moles or lesions  EYES: NEGATIVE for vision changes or irritation  ENT/MOUTH: NEGATIVE for ear, mouth and throat  problems  RESP: NEGATIVE for significant cough or SOB  CV: has peripheral edema taking lasix if needed since chemo.  NEGATIVE for chest pain, palpitations  GI: low appetite. Nausea when treatment. Sometimes heartburn with treatment. NEGATIVE change in bowel habits  : NEGATIVE for frequency, dysuria, or hematuria  MUSCULOSKELETAL: posotive for significant arthralgias or myalgia  NEURO: neuropathy from treatment. 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 for BRCA 1/2) 03/04/2022     Priority: Medium     Check 12.22 for f/u Darabi       Situational anxiety 04/08/2020     Priority: Medium     Cervical high risk HPV (human papillomavirus) test positive 03/15/2020     Priority: Medium     3/14/13 NIL pap.  9/15/20 NIL pap, + HR HPV (not 16 or 18). Plan cotest in 1 year due by 9/15/21.  9/8/21 NIL Pap, Neg HPV. Plan cotest in 1 year.            Chronic, continuous use of opioids 11/13/2019     Priority: Medium     Patient is followed by Quoc Chu MD for ongoing prescription of pain medication.  All refills should be approved by this provider only at face-to-face appointments - not by phone request.    Medication(s): Tramadol 50mg.   Maximum quantity per month: 120  Clinic visit frequency required: Q 3 months      Controlled substance agreement:  Encounter-Level CSA:    There are no encounter-level csa.     Patient-Level CSA:     There are no patient-level csa.       Pain Clinic evaluation in the past: Yes       Date/Location:  Had seen pain clinic in [place] many years ago.    DIRE Total Score(s):  No flowsheet data found.    Last [facility] website verification:  done on 2021   [email or web address]         Osteopenia of multiple sites 2018     Priority: Medium     on fosamax 35mg daily since        Restless legs syndrome 2018     Priority: Medium     Lower extremity edema 2018     Priority: Medium     Sciatica of right side 2015     Priority: Medium     Overview:   Chronic. Takes Norco as needed.         Past Medical History:   Diagnosis Date     Cervical high risk HPV (human papillomavirus) test positive 03/15/2020    See problem list     Past Surgical History:   Procedure Laterality Date     ARTHROSCOPY SHOULDER ROTATOR CUFF REPAIR Right      ARTHROSCOPY SHOULDER ROTATOR CUFF REPAIR Left       SECTION       CHOLECYSTECTOMY       CYST REMOVAL      on chest     DAVINCI BYPASS GASTRIC ROBERT-EN-Y       HYSTERECTOMY SUPRACERVICAL       INSERT PORT VASCULAR ACCESS Left 2022    Procedure: INSERTION, VASCULAR ACCESS PORT;  Surgeon: Baldemar Marina DO;  Location: [facility] OR     right ulnar nerve repositioning surgery       Current Outpatient Medications   Medication Sig Dispense Refill     alendronate (FOSAMAX) 35 MG tablet TAKE 1 TABLET(35 MG) BY MOUTH EVERY 7 DAYS 12 tablet 3     citalopram (CELEXA) 40 MG tablet Take 1 tablet (40 mg) by mouth daily 30 tablet 3     cyclobenzaprine (FLEXERIL) 5 MG tablet TAKE 1 TABLET(5 MG) BY MOUTH TWICE DAILY AS NEEDED FOR MUSCLE SPASMS 40 tablet 3     diclofenac (VOLTAREN) 1 % topical gel Place 2 g onto the skin 4 times daily 100 g 3     DULoxetine (CYMBALTA) 30 MG capsule Take 1 capsule (30 mg) by mouth At Bedtime for 7 days, THEN 2 capsules (60 mg) At Bedtime for 23 days. 53 capsule 1     furosemide (LASIX) 20 MG tablet Take 1 tablet (20  mg) by mouth daily as needed (swelling) 60 tablet 5     HYDROcodone-acetaminophen (NORCO) 5-325 MG tablet Take 1 tablet by mouth every 6 hours as needed for pain 60 tablet 0     lidocaine-prilocaine (EMLA) 2.5-2.5 % external cream Apply thick layer to port site and cover with clear dressing or saran wrap 30-60 mins prior to appt. 30 g 0     loratadine (CLARITIN) 10 MG tablet Take 1 tablet (10 mg) by mouth daily 90 tablet 1     LORazepam (ATIVAN) 0.5 MG tablet Take 1-2 tablets (0.5-1 mg) by mouth every 8 hours as needed for anxiety 60 tablet 3     omeprazole (PRILOSEC) 20 MG DR capsule As needed       potassium chloride ER (K-TAB) 20 MEQ CR tablet Take 20 mEq by mouth        prochlorperazine (COMPAZINE) 10 MG tablet Take 1 tablet (10 mg) by mouth every 6 hours as needed (Nausea/Vomiting) 30 tablet 5     prochlorperazine (COMPAZINE) 10 MG tablet Take 1 tablet (10 mg) by mouth every 6 hours as needed (Nausea/Vomiting) 30 tablet 2     rOPINIRole (REQUIP) 1 MG tablet Take 1 tablet (1 mg) by mouth At Bedtime 90 tablet 3     sucralfate (CARAFATE) 1 GM tablet Take 1 tablet (1 g) by mouth 4 times daily as needed (Abdominal discomfort) 30 tablet 3     traMADol (ULTRAM) 50 MG tablet Take 50 mg by mouth every 6 hours as needed for severe pain       triamcinolone (KENALOG) 0.1 % external cream Apply topically 2 times daily Apply to rash until improved 30 g 1     valACYclovir (VALTREX) 1000 mg tablet TAKE 1 TABLET(1000 MG) BY MOUTH TWICE DAILY. REPEAT AS NEEDED FOR COLD SORE 16 tablet 3     vitamin D3 (CHOLECALCIFEROL) 50 mcg (2000 units) tablet Take 1 tablet (50 mcg) by mouth daily 90 tablet 3     cyanocobalamin (CYANOCOBALAMIN) 1000 MCG/ML injection Inject 1 mL (1,000 mcg) into the muscle every 30 days (Patient not taking: Reported on 6/15/2022) 3 mL 3     doxepin (SILENOR) 3 MG tablet TAKE 1 TABLET(3 MG) BY MOUTH EVERY NIGHT AS NEEDED FOR SLEEP (Patient not taking: Reported on 6/15/2022) 90 tablet 0       No Known Allergies      Social History     Tobacco Use     Smoking status: Former Smoker     Start date: 1982     Quit date: 1984     Years since quittin.4     Smokeless tobacco: Never Used   Substance Use Topics     Alcohol use: Yes     Comment: occ     History   Drug Use No         Objective     /80   Pulse 81   Temp 99.3  F (37.4  C) (Tympanic)   Resp 20   Wt 95.7 kg (211 lb)   LMP  (LMP Unknown)   SpO2 98%   BMI 32.80 kg/m      Physical Exam    GENERAL APPEARANCE: healthy, alert and no distress     EYES: EOMI, PERRL     HENT: ear canals and TM's normal and nose and mouth without ulcers or lesions     NECK: no adenopathy, no asymmetry, masses, or scars and thyroid normal to palpation     RESP: lungs clear to auscultation - no rales, rhonchi or wheezes     CV: regular rates and rhythm, normal S1 S2, no S3 or S4 and no murmur, click or rub     ABDOMEN:  soft, nontender, no HSM or masses and bowel sounds normal     MS: extremities normal- no gross deformities noted, no evidence of inflammation in joints, FROM in all extremities.     SKIN: no suspicious lesions or rashes     NEURO: Normal strength and tone, sensory exam grossly normal, mentation intact and speech normal     PSYCH: mentation appears normal. and affect normal/bright     LYMPHATICS: No cervical adenopathy    Recent Labs   Lab Test 22  0833 22  1109 05/10/22  1122   HGB  --  9.7* 10.1*   PLT  --  220 300    143  --    POTASSIUM 3.8 4.1  --    CR 0.71 0.54 0.56        Diagnostics:  No labs were ordered during this visit.  Will be doing them   No EKG required, no history of coronary heart disease, significant arrhythmia, peripheral arterial disease or other structural heart disease.    Revised Cardiac Risk Index (RCRI):  The patient has the following serious cardiovascular risks for perioperative complications:   - No serious cardiac risks = 0 points     RCRI Interpretation: 0 points: Class I (very low risk - 0.4% complication  rate)           Signed Electronically by: Quoc Chu MD  Copy of this evaluation report is provided to requesting physician.    Answers for HPI/ROS submitted by the patient on 6/15/2022  If you checked off any problems, how difficult have these problems made it for you to do your work, take care of things at home, or get along with other people?: Very difficult  PHQ9 TOTAL SCORE: 14  SHADE 7 TOTAL SCORE: 14

## 2022-06-27 ENCOUNTER — LAB (OUTPATIENT)
Dept: LAB | Facility: CLINIC | Age: 57
End: 2022-06-27
Attending: SURGERY
Payer: COMMERCIAL

## 2022-06-27 DIAGNOSIS — Z01.818 PRE-OP TESTING: ICD-10-CM

## 2022-06-27 PROCEDURE — U0005 INFEC AGEN DETEC AMPLI PROBE: HCPCS

## 2022-06-27 PROCEDURE — U0003 INFECTIOUS AGENT DETECTION BY NUCLEIC ACID (DNA OR RNA); SEVERE ACUTE RESPIRATORY SYNDROME CORONAVIRUS 2 (SARS-COV-2) (CORONAVIRUS DISEASE [COVID-19]), AMPLIFIED PROBE TECHNIQUE, MAKING USE OF HIGH THROUGHPUT TECHNOLOGIES AS DESCRIBED BY CMS-2020-01-R: HCPCS

## 2022-06-28 DIAGNOSIS — G25.81 RESTLESS LEGS SYNDROME: ICD-10-CM

## 2022-06-28 LAB — SARS-COV-2 RNA RESP QL NAA+PROBE: NEGATIVE

## 2022-06-28 RX ORDER — ROPINIROLE 1 MG/1
TABLET, FILM COATED ORAL
Qty: 90 TABLET | Refills: 0 | Status: SHIPPED | OUTPATIENT
Start: 2022-06-28 | End: 2023-04-07

## 2022-06-29 ENCOUNTER — ONCOLOGY VISIT (OUTPATIENT)
Dept: ONCOLOGY | Facility: CLINIC | Age: 57
End: 2022-06-29
Attending: FAMILY MEDICINE
Payer: COMMERCIAL

## 2022-06-29 ENCOUNTER — INFUSION THERAPY VISIT (OUTPATIENT)
Dept: INFUSION THERAPY | Facility: CLINIC | Age: 57
End: 2022-06-29
Attending: INTERNAL MEDICINE
Payer: COMMERCIAL

## 2022-06-29 ENCOUNTER — ANESTHESIA EVENT (OUTPATIENT)
Dept: SURGERY | Facility: CLINIC | Age: 57
End: 2022-06-29
Payer: COMMERCIAL

## 2022-06-29 VITALS
OXYGEN SATURATION: 100 % | BODY MASS INDEX: 32.96 KG/M2 | WEIGHT: 212 LBS | SYSTOLIC BLOOD PRESSURE: 119 MMHG | DIASTOLIC BLOOD PRESSURE: 69 MMHG | TEMPERATURE: 97.8 F | RESPIRATION RATE: 12 BRPM | HEART RATE: 75 BPM

## 2022-06-29 VITALS — DIASTOLIC BLOOD PRESSURE: 74 MMHG | HEART RATE: 64 BPM | SYSTOLIC BLOOD PRESSURE: 118 MMHG

## 2022-06-29 DIAGNOSIS — Z51.11 ENCOUNTER FOR ANTINEOPLASTIC CHEMOTHERAPY: Primary | ICD-10-CM

## 2022-06-29 DIAGNOSIS — D70.1 CHEMOTHERAPY-INDUCED NEUTROPENIA (H): ICD-10-CM

## 2022-06-29 DIAGNOSIS — G62.0 CHEMOTHERAPY-INDUCED NEUROPATHY (H): ICD-10-CM

## 2022-06-29 DIAGNOSIS — Z17.1 MALIGNANT NEOPLASM OF RIGHT BREAST IN FEMALE, ESTROGEN RECEPTOR NEGATIVE, UNSPECIFIED SITE OF BREAST (H): Primary | ICD-10-CM

## 2022-06-29 DIAGNOSIS — Z51.11 ENCOUNTER FOR ANTINEOPLASTIC CHEMOTHERAPY: ICD-10-CM

## 2022-06-29 DIAGNOSIS — T45.1X5A CHEMOTHERAPY-INDUCED NEUROPATHY (H): ICD-10-CM

## 2022-06-29 DIAGNOSIS — T45.1X5A CHEMOTHERAPY-INDUCED NEUTROPENIA (H): ICD-10-CM

## 2022-06-29 DIAGNOSIS — C50.911 MALIGNANT NEOPLASM OF RIGHT BREAST IN FEMALE, ESTROGEN RECEPTOR NEGATIVE, UNSPECIFIED SITE OF BREAST (H): Primary | ICD-10-CM

## 2022-06-29 DIAGNOSIS — Z17.1 MALIGNANT NEOPLASM OF UPPER-INNER QUADRANT OF RIGHT BREAST IN FEMALE, ESTROGEN RECEPTOR NEGATIVE (H): ICD-10-CM

## 2022-06-29 DIAGNOSIS — C50.211 MALIGNANT NEOPLASM OF UPPER-INNER QUADRANT OF RIGHT BREAST IN FEMALE, ESTROGEN RECEPTOR NEGATIVE (H): ICD-10-CM

## 2022-06-29 LAB
ALBUMIN SERPL-MCNC: 2.9 G/DL (ref 3.4–5)
ALP SERPL-CCNC: 84 U/L (ref 40–150)
ALT SERPL W P-5'-P-CCNC: 16 U/L (ref 0–50)
ANION GAP SERPL CALCULATED.3IONS-SCNC: 4 MMOL/L (ref 3–14)
AST SERPL W P-5'-P-CCNC: 13 U/L (ref 0–45)
BILIRUB SERPL-MCNC: 0.2 MG/DL (ref 0.2–1.3)
BUN SERPL-MCNC: 9 MG/DL (ref 7–30)
CALCIUM SERPL-MCNC: 8.6 MG/DL (ref 8.5–10.1)
CHLORIDE BLD-SCNC: 112 MMOL/L (ref 94–109)
CO2 SERPL-SCNC: 26 MMOL/L (ref 20–32)
CREAT SERPL-MCNC: 0.65 MG/DL (ref 0.52–1.04)
GFR SERPL CREATININE-BSD FRML MDRD: >90 ML/MIN/1.73M2
GLUCOSE BLD-MCNC: 125 MG/DL (ref 70–99)
POTASSIUM BLD-SCNC: 3.6 MMOL/L (ref 3.4–5.3)
PROT SERPL-MCNC: 6.2 G/DL (ref 6.8–8.8)
SODIUM SERPL-SCNC: 142 MMOL/L (ref 133–144)
TSH SERPL DL<=0.005 MIU/L-ACNC: 0.56 MU/L (ref 0.4–4)

## 2022-06-29 PROCEDURE — G0463 HOSPITAL OUTPT CLINIC VISIT: HCPCS | Mod: 25

## 2022-06-29 PROCEDURE — 84443 ASSAY THYROID STIM HORMONE: CPT | Performed by: INTERNAL MEDICINE

## 2022-06-29 PROCEDURE — 99214 OFFICE O/P EST MOD 30 MIN: CPT | Performed by: NURSE PRACTITIONER

## 2022-06-29 PROCEDURE — 250N000011 HC RX IP 250 OP 636: Performed by: INTERNAL MEDICINE

## 2022-06-29 PROCEDURE — 96413 CHEMO IV INFUSION 1 HR: CPT

## 2022-06-29 PROCEDURE — 258N000003 HC RX IP 258 OP 636: Performed by: INTERNAL MEDICINE

## 2022-06-29 PROCEDURE — 80053 COMPREHEN METABOLIC PANEL: CPT | Performed by: INTERNAL MEDICINE

## 2022-06-29 RX ORDER — LIDOCAINE/PRILOCAINE 2.5 %-2.5%
CREAM (GRAM) TOPICAL
Qty: 30 G | Refills: 0 | Status: SHIPPED | OUTPATIENT
Start: 2022-06-29 | End: 2022-07-11

## 2022-06-29 RX ORDER — HEPARIN SODIUM (PORCINE) LOCK FLUSH IV SOLN 100 UNIT/ML 100 UNIT/ML
5 SOLUTION INTRAVENOUS
Status: DISCONTINUED | OUTPATIENT
Start: 2022-06-29 | End: 2022-06-29 | Stop reason: HOSPADM

## 2022-06-29 RX ADMIN — SODIUM CHLORIDE 250 ML: 9 INJECTION, SOLUTION INTRAVENOUS at 11:50

## 2022-06-29 RX ADMIN — SODIUM CHLORIDE 200 MG: 9 INJECTION, SOLUTION INTRAVENOUS at 11:47

## 2022-06-29 RX ADMIN — Medication 5 ML: at 12:24

## 2022-06-29 ASSESSMENT — LIFESTYLE VARIABLES: TOBACCO_USE: 1

## 2022-06-29 NOTE — PROGRESS NOTES
[facility] Hematology and Oncology Outpatient Progress Note ([place])    Patient: Diana Salvador  MRN: [id number]  Jun 29, 2022            Reason for Visit    1. Stage IIB triple negative breast cancer  2. On neoadjuvant chemo    Primary Oncologist: Dr. Diamond    Assessment/Plan  1.   Stage IIB triple negative breast cancer; on neoadjuvant chemo + immunotherapy  2.   Chemo-induced neutropenia  After 3 cycles, developed severe grade 3 neuropathy and delayed neutropenia despite Neupogen after day 15. Therefore, chemo has been omitted and she is getting single agent pembrolizumab starting with cycle 4. Tolerating this well, no toxicity.    Breast reimaging shows complete response, confirming our clinical assessment. Repeat assessment today is stable, no evident progression.    She's met with Dr. Marina and Elis. Given her toxicity on neoadjuvant chemo, she will be proceeding to surgery sooner than initially planned. This is scheduled for tomorrow.    Labwork satisfactory.    Plan:  -Continue with next cycle Pembrolizumab today.  -Bilateral mastectomy with reconstruction is scheduled for tomorrow with Elysia Marina and Gerson  -She will return to see Dr. Diamond 3-4 weeks after surgery to review path and decide on role for  further adjuvant chemo    3.   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 This may be getting slightly better, but still remains gr 2-3. Duloxetine 60 mg is helping and better tolerated than gabapenting. Using hydrocodone with some benefit.     Plan:  -Continue duloxetine 60 mg daily   -Knows not to take doxepin while on duloxetine  -Will need to monitor for added side effects while on Celexa and duloxetine   -continue hydrocodone if needed  -reassess with more time, hopefully will see improvement. Will be considered with future chemo choices    4.   Chemo vs immunotherapy dermatitis  Pruritic dermatitis on dorsal hand/thumbs R>L , intermittent . Was not recurrent last  cycle.     Plan:  -Claritin daily  -Triamcinolone 0.1% to affected areas as needed    5.   Diffuse polyarthralgias, treatment-induced   Manageable. Chronic pain, possibly exacerbated on immunotherapy.    Plan:  -Stay active  -Duloxetine starting for neuropathy may help with this, as well    6.  Anxiety, secondary to cancer diagnosis  Recently increased Celexa to 40 mg (from 20 mg), with good tolerance.  Working with a therapist.    Plan:  -Continue Celexa 40 mg  -duloxetine for the neuropathy may also help  -Continue psychotherapy.  -She's declined SW for now, can consider anytime    ______________________________________________________________________________    History of Present Illness/ Interval History    Ms. Diana Salvador is a 57 year old on neoadjvuant treatment with pembrolizumab, carboplatin and taxol (weekly) . She completed 3 cycles of this combo, and cycle 3 was complicated by the development of grade 3 neuropathy (fingers) and neutropenia (ANC 1000) despite use of Neupogen. Therefore, further chemo has been held and she has continued on single agent Pembrolizumab alone starting with cycle 4 until her surgery. Returns for consideration of next cycle of Pembrolizumab. Her surgery is scheduled for tomorrow, 6/30.    Neuropathy of fingers and toes has improved slightly, in that she notes some days are a bit better than others, but it persists. Still very sensitive to use/light touch. She has numbness and is having a hard time with her fine motor tasks (typing, writing, washing dishes). Her nail beds/fingernails are still very tender and discolored. She did not tolerate gabapentin (nausea), so started duloxetine 60 mg 3 weeks ago. This is helping some.  Taking hydrocodone at bedtime with some benefit.     Continues having diffuse arthralgias. Improves the more active she is.     Rash on bilateral dorsal thumbs/hand R>L improved after starting Claritin and using triamcinolone cream as needed.    Increased  Celexa dose 2 weeks ago for anxiety with cancer diagnosis, tolerating well. Meeting a therapist.  This is getting better, but still can feel emotional and overwhelmed some days due to her multiple stressors (financial, medical).  She has been on short term disability and will run out of medical benefits soon. 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       ECOG Performance    0-1      Oncology History/Treatment  Diagnosis/Stage:   2/2022: Stage IIB (cT2-cN0-cM0) right breast cancer (triple negative)  -self-palpated right breast lump  -diagnostic mammo + breast US: 2.4 cm R breast (2:00, lower-inner quad) lump and no axillary nodes detected  -breast biopsy: invasive ductal carcinoma, grade 3. ER neg, TN neg, HER2 neg via FISH  -CA 27.29 WNL (<5)    Treatment:  3/15/2022 - present: Neoadjuvant chemo (based off [id number]) Pembrolizumab 200 mg D1 q 21 days + Carboplatin AUC=5 D1 q21 Days + weekly Paclitaxel 80 mg/m  D1, 8, 15  x 12 Weeks (with Filgrastim 5 mcg/kg subcutaneously once daily on days 16, 17, and 18 of cycles 1 through 4)  -->after cycle 3, developed grade 3 neuropathy and delayed neutropenia (despite using Neupogen). Therefore, chemo held with cycle 4 and only received Pembrolizumab.     Planning bilateral mastectomies + reconstruction with Elysia Marina + Gerson following neoadjuvant treatment      Physical Exam    GENERAL: Alert and oriented to time place and person. Seated comfortably.  Alone.   HEAD: Atraumatic and normocephalic. Alopecia.  EYES: MEILSSA, EOMI. No erythema. No icterus.  BREASTS: 1.5 cm medial 3:00 density residual mass noted, stable from prior exam.  LYMPH NODES: No right axillary nodes  CHEST: clear to auscultation bilaterally. Resonant to percussion throughout bilaterally. Symmetrical breath movements bilaterally.  CVS: S1 and S2 are heard. Regular rate and rhythm. No murmur or gallop or rub heard.  ABDOMEN: Soft.  Not tender. Not distended. No palpable hepatomegaly or splenomegaly. No other mass palpable. Bowel sounds present.  EXTREMITIES: Warm. No peripheral edema.  SKIN: Right hand/wrist rash noted last visit resolved. Fingernail and great toenail beds brown discoloration is improving.    NEURO: No gross deficit noted. Non-antalgic gait.      Lab Results    Recent Results (from the past 168 hour(s))   Asymptomatic COVID-19 Virus (Coronavirus) by PCR Nose    Specimen: Nose; Swab   Result Value Ref Range    SARS CoV2 PCR Negative Negative, Testing sent to reference lab. Results will be returned via unsolicited result       Imaging    US Breast Right Limited 1-3 Quadrants    Result Date: 6/1/2022  EXAM: MA DIAGNOSTIC RIGHT W JEANIE, US BREAST RIGHT LIMITED 1-3 QUADRANTS LOCATION: [facility] DATE/TIME: 6/1/2022 3:06 PM INDICATION: 57-year-old female with known right invasive ductal carcinoma, diagnosed in February 2022, presents for follow-up to assess response to neoadjuvant chemotherapy. No current breast symptoms. COMPARISON: Prior mammogram and ultrasound 2/18/2022. MAMMOGRAPHIC FINDINGS: Right full-field digital diagnostic mammogram performed. There are scattered areas of fibroglandular density. Images evaluated with the assistance of CAD. Breast tomosynthesis was used in interpretation. The mass component of biopsy-proven malignancy has essentially resolved. There are residual coarse dystrophic calcifications at this site, with adjacent biopsy marking clip. No new suspicious findings. ULTRASOUND FINDINGS: Targeted ultrasound of the right breast at site of biopsied malignancy at 2:00 6 cm from the nipple demonstrates the biopsy marking clip and a couple adjacent echogenic foci, felt to correspond to the mammographic calcifications. No discrete residual mass is visualized. The malignancy initially measured 2.4 x 1.1 x 1.6 cm in February 2022.     IMPRESSION: Complete resolution of mass component of  biopsied malignancy with only coarse residual calcifications present at this site, adjacent to the biopsy marking clip. Continued oncologic management is advised. ACR BI-RADS Category 6: Known Biopsy-Proven Malignancy. Results given to the patient.     MA Diagnostic Right w/Jeanie    Result Date: 6/1/2022  EXAM: MA DIAGNOSTIC RIGHT W JEANIE, US BREAST RIGHT LIMITED 1-3 QUADRANTS LOCATION: [facility] DATE/TIME: 6/1/2022 3:06 PM INDICATION: 57-year-old female with known right invasive ductal carcinoma, diagnosed in February 2022, presents for follow-up to assess response to neoadjuvant chemotherapy. No current breast symptoms. COMPARISON: Prior mammogram and ultrasound 2/18/2022. MAMMOGRAPHIC FINDINGS: Right full-field digital diagnostic mammogram performed. There are scattered areas of fibroglandular density. Images evaluated with the assistance of CAD. Breast tomosynthesis was used in interpretation. The mass component of biopsy-proven malignancy has essentially resolved. There are residual coarse dystrophic calcifications at this site, with adjacent biopsy marking clip. No new suspicious findings. ULTRASOUND FINDINGS: Targeted ultrasound of the right breast at site of biopsied malignancy at 2:00 6 cm from the nipple demonstrates the biopsy marking clip and a couple adjacent echogenic foci, felt to correspond to the mammographic calcifications. No discrete residual mass is visualized. The malignancy initially measured 2.4 x 1.1 x 1.6 cm in February 2022.     IMPRESSION: Complete resolution of mass component of biopsied malignancy with only coarse residual calcifications present at this site, adjacent to the biopsy marking clip. Continued oncologic management is advised. ACR BI-RADS Category 6: Known Biopsy-Proven Malignancy. Results given to the patient.       Billing  Total time: 35 min; including review of EMR, reports, diagnostics and ordering/coordination of care    Signed by: Marilu Vergara,  NP

## 2022-06-29 NOTE — LETTER
6/29/2022         RE: Diana Salvador  [address] [place] [id number]        Dear Colleague,    Thank you for referring your patient, Diana Salvador, to the [facility] [place]. Please see a copy of my visit note below.    [facility] Outpatient Progress Note ([place])    Patient: Diana Salvador  MRN: [id number]  Jun 29, 2022            Reason for Visit    1. Stage IIB triple negative breast cancer  2. On neoadjuvant chemo    Primary Oncologist: Dr. Diamond    Assessment/Plan  1.   Stage IIB triple negative breast cancer; on neoadjuvant chemo + immunotherapy  2.   Chemo-induced neutropenia  After 3 cycles, developed severe grade 3 neuropathy and delayed neutropenia despite Neupogen after day 15. Therefore, chemo has been omitted and she is getting single agent pembrolizumab starting with cycle 4. Tolerating this well, no toxicity.    Breast reimaging shows complete response, confirming our clinical assessment. Repeat assessment today is stable, no evident progression.    She's met with Dr. Marina and Elis. Given her toxicity on neoadjuvant chemo, she will be proceeding to surgery sooner than initially planned. This is scheduled for tomorrow.    Labwork satisfactory.    Plan:  -Continue with next cycle Pembrolizumab today.  -Bilateral mastectomy with reconstruction is scheduled for tomorrow with Elysia Marina and Gerson  -She will return to see Dr. Diamond 3-4 weeks after surgery to review path and decide on role for  further adjuvant chemo    3.   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 This may be getting slightly better, but still remains gr 2-3. Duloxetine 60 mg is helping and better tolerated than gabapenting. Using hydrocodone with some benefit.     Plan:  -Continue duloxetine 60 mg daily   -Knows not to take doxepin while on duloxetine  -Will need to monitor for added side effects while on Celexa and duloxetine   -continue  hydrocodone if needed  -reassess with more time, hopefully will see improvement. Will be considered with future chemo choices    4.   Chemo vs immunotherapy dermatitis  Pruritic dermatitis on dorsal hand/thumbs R>L , intermittent . Was not recurrent last cycle.     Plan:  -Claritin daily  -Triamcinolone 0.1% to affected areas as needed    5.   Diffuse polyarthralgias, treatment-induced   Manageable. Chronic pain, possibly exacerbated on immunotherapy.    Plan:  -Stay active  -Duloxetine starting for neuropathy may help with this, as well    6.  Anxiety, secondary to cancer diagnosis  Recently increased Celexa to 40 mg (from 20 mg), with good tolerance.  Working with a therapist.    Plan:  -Continue Celexa 40 mg  -duloxetine for the neuropathy may also help  -Continue psychotherapy.  -She's declined SW for now, can consider anytime    ______________________________________________________________________________    History of Present Illness/ Interval History    Ms. Diana Salvador is a 57 year old on neoadjvuant treatment with pembrolizumab, carboplatin and taxol (weekly) . She completed 3 cycles of this combo, and cycle 3 was complicated by the development of grade 3 neuropathy (fingers) and neutropenia (ANC 1000) despite use of Neupogen. Therefore, further chemo has been held and she has continued on single agent Pembrolizumab alone starting with cycle 4 until her surgery. Returns for consideration of next cycle of Pembrolizumab. Her surgery is scheduled for tomorrow, 6/30.    Neuropathy of fingers and toes has improved slightly, in that she notes some days are a bit better than others, but it persists. Still very sensitive to use/light touch. She has numbness and is having a hard time with her fine motor tasks (typing, writing, washing dishes). Her nail beds/fingernails are still very tender and discolored. She did not tolerate gabapentin (nausea), so started duloxetine 60 mg 3 weeks ago. This is helping  some.  Taking hydrocodone at bedtime with some benefit.     Continues having diffuse arthralgias. Improves the more active she is.     Rash on bilateral dorsal thumbs/hand R>L improved after starting Claritin and using triamcinolone cream as needed.    Increased Celexa dose 2 weeks ago for anxiety with cancer diagnosis, tolerating well. Meeting a therapist.  This is getting better, but still can feel emotional and overwhelmed some days due to her multiple stressors (financial, medical).  She has been on short term disability and will run out of medical benefits soon. 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       ECOG Performance    0-1      Oncology History/Treatment  Diagnosis/Stage:   2/2022: Stage IIB (cT2-cN0-cM0) right breast cancer (triple negative)  -self-palpated right breast lump  -diagnostic mammo + breast US: 2.4 cm R breast (2:00, lower-inner quad) lump and no axillary nodes detected  -breast biopsy: invasive ductal carcinoma, grade 3. ER neg, ME neg, HER2 neg via FISH  -CA 27.29 WNL (<5)    Treatment:  3/15/2022 - present: Neoadjuvant chemo (based off [id number]) Pembrolizumab 200 mg D1 q 21 days + Carboplatin AUC=5 D1 q21 Days + weekly Paclitaxel 80 mg/m  D1, 8, 15  x 12 Weeks (with Filgrastim 5 mcg/kg subcutaneously once daily on days 16, 17, and 18 of cycles 1 through 4)  -->after cycle 3, developed grade 3 neuropathy and delayed neutropenia (despite using Neupogen). Therefore, chemo held with cycle 4 and only received Pembrolizumab.     Planning bilateral mastectomies + reconstruction with Elysia Marina + Gerosn following neoadjuvant treatment      Physical Exam    GENERAL: Alert and oriented to time place and person. Seated comfortably.  Alone.   HEAD: Atraumatic and normocephalic. Alopecia.  EYES: MELISSA, EOMI. No erythema. No icterus.  BREASTS: 1.5 cm medial 3:00 density residual mass noted, stable from prior exam.  LYMPH  NODES: No right axillary nodes  CHEST: clear to auscultation bilaterally. Resonant to percussion throughout bilaterally. Symmetrical breath movements bilaterally.  CVS: S1 and S2 are heard. Regular rate and rhythm. No murmur or gallop or rub heard.  ABDOMEN: Soft. Not tender. Not distended. No palpable hepatomegaly or splenomegaly. No other mass palpable. Bowel sounds present.  EXTREMITIES: Warm. No peripheral edema.  SKIN: Right hand/wrist rash noted last visit resolved. Fingernail and great toenail beds brown discoloration is improving.    NEURO: No gross deficit noted. Non-antalgic gait.      Lab Results    Recent Results (from the past 168 hour(s))   Asymptomatic COVID-19 Virus (Coronavirus) by PCR Nose    Specimen: Nose; Swab   Result Value Ref Range    SARS CoV2 PCR Negative Negative, Testing sent to reference lab. Results will be returned via unsolicited result       Imaging    US Breast Right Limited 1-3 Quadrants    Result Date: 6/1/2022  EXAM: MA DIAGNOSTIC RIGHT W JEANIE, US BREAST RIGHT LIMITED 1-3 QUADRANTS LOCATION: [facility] DATE/TIME: 6/1/2022 3:06 PM INDICATION: 57-year-old female with known right invasive ductal carcinoma, diagnosed in February 2022, presents for follow-up to assess response to neoadjuvant chemotherapy. No current breast symptoms. COMPARISON: Prior mammogram and ultrasound 2/18/2022. MAMMOGRAPHIC FINDINGS: Right full-field digital diagnostic mammogram performed. There are scattered areas of fibroglandular density. Images evaluated with the assistance of CAD. Breast tomosynthesis was used in interpretation. The mass component of biopsy-proven malignancy has essentially resolved. There are residual coarse dystrophic calcifications at this site, with adjacent biopsy marking clip. No new suspicious findings. ULTRASOUND FINDINGS: Targeted ultrasound of the right breast at site of biopsied malignancy at 2:00 6 cm from the nipple demonstrates the biopsy marking clip  and a couple adjacent echogenic foci, felt to correspond to the mammographic calcifications. No discrete residual mass is visualized. The malignancy initially measured 2.4 x 1.1 x 1.6 cm in February 2022.     IMPRESSION: Complete resolution of mass component of biopsied malignancy with only coarse residual calcifications present at this site, adjacent to the biopsy marking clip. Continued oncologic management is advised. ACR BI-RADS Category 6: Known Biopsy-Proven Malignancy. Results given to the patient.     MA Diagnostic Right w/Jeanie    Result Date: 6/1/2022  EXAM: MA DIAGNOSTIC RIGHT W JEANIE, US BREAST RIGHT LIMITED 1-3 QUADRANTS LOCATION: [facility] DATE/TIME: 6/1/2022 3:06 PM INDICATION: 57-year-old female with known right invasive ductal carcinoma, diagnosed in February 2022, presents for follow-up to assess response to neoadjuvant chemotherapy. No current breast symptoms. COMPARISON: Prior mammogram and ultrasound 2/18/2022. MAMMOGRAPHIC FINDINGS: Right full-field digital diagnostic mammogram performed. There are scattered areas of fibroglandular density. Images evaluated with the assistance of CAD. Breast tomosynthesis was used in interpretation. The mass component of biopsy-proven malignancy has essentially resolved. There are residual coarse dystrophic calcifications at this site, with adjacent biopsy marking clip. No new suspicious findings. ULTRASOUND FINDINGS: Targeted ultrasound of the right breast at site of biopsied malignancy at 2:00 6 cm from the nipple demonstrates the biopsy marking clip and a couple adjacent echogenic foci, felt to correspond to the mammographic calcifications. No discrete residual mass is visualized. The malignancy initially measured 2.4 x 1.1 x 1.6 cm in February 2022.     IMPRESSION: Complete resolution of mass component of biopsied malignancy with only coarse residual calcifications present at this site, adjacent to the biopsy marking clip. Continued  "oncologic management is advised. ACR BI-RADS Category 6: Known Biopsy-Proven Malignancy. Results given to the patient.       Billing  Total time: 35 min; including review of EMR, reports, diagnostics and ordering/coordination of care    Signed by: Marilu Vergara NP      Oncology Rooming Note    June 29, 2022 10:54 AM   Diana Salvador is a 57 year old female who presents for:    Chief Complaint   Patient presents with     Oncology Clinic Visit     Malignant neoplasm of upper-inner quadrant of right breast in female, estrogen receptor negative - Labs provider and infusion     Initial Vitals: /69 (BP Location: Right arm, Patient Position: Sitting, Cuff Size: Adult Regular)   Pulse 75   Temp 97.8  F (36.6  C) (Tympanic)   Resp 12   Wt 96.2 kg (212 lb)   LMP  (LMP Unknown)   SpO2 100%   BMI 32.96 kg/m   Estimated body mass index is 32.96 kg/m  as calculated from the following:    Height as of 4/27/22: 1.708 m (5' 7.25\").    Weight as of this encounter: 96.2 kg (212 lb). Body surface area is 2.14 meters squared.  Data Unavailable Comment: Data Unavailable   No LMP recorded (lmp unknown). Patient has had a hysterectomy.  Allergies reviewed: Yes  Medications reviewed: Yes    Medications: Medication refills not needed today.  Pharmacy name entered into [employer]:    [id number] - [place] [id number] [facility] AT [address] AND [facility]    Clinical concerns:  None      Emy Hollingsworth [facility]                Again, thank you for allowing me to participate in the care of your patient.        Sincerely,        Marilu Vergara NP    "

## 2022-06-29 NOTE — PATIENT INSTRUCTIONS
Proceed with immunotherapy today    Surgery tomorrow    7/21 - keep appt with Dr. Diamond. Labs to be done that day prior (orders in)    Stay on duloxetine 60 mg daily

## 2022-06-29 NOTE — ANESTHESIA PREPROCEDURE EVALUATION
Anesthesia Pre-Procedure Evaluation    Patient: Diana Salvador   MRN: [id number] : 1965        Procedure : Procedure(s):  MASTECTOMY, SIMPLE  MASTECTOMY, SIMPLE, WITH SENTINEL LYMPH NODE DISSECTION, POSSIBLE AXILLARY DISSECTION  RECONSTRUCTION, BREAST, BILATERAL, WITH TISSUE EXPANDER INSERTION          Past Medical History:   Diagnosis Date     Cervical high risk HPV (human papillomavirus) test positive 03/15/2020    See problem list      Past Surgical History:   Procedure Laterality Date     ARTHROSCOPY SHOULDER ROTATOR CUFF REPAIR Right      ARTHROSCOPY SHOULDER ROTATOR CUFF REPAIR Left       SECTION       CHOLECYSTECTOMY       CYST REMOVAL      on chest     DAVINCI BYPASS GASTRIC ROBERT-EN-Y       HYSTERECTOMY SUPRACERVICAL       INSERT PORT VASCULAR ACCESS Left 2022    Procedure: INSERTION, VASCULAR ACCESS PORT;  Surgeon: Baldemar Marina DO;  Location: [place] OR     right ulnar nerve repositioning surgery        No Known Allergies   Social History     Tobacco Use     Smoking status: Former Smoker     Start date: 1982     Quit date: 1984     Years since quittin.5     Smokeless tobacco: Never Used   Substance Use Topics     Alcohol use: Yes     Comment: occ      Wt Readings from Last 1 Encounters:   06/15/22 95.7 kg (211 lb)        Anesthesia Evaluation   Pt has had prior anesthetic. Type: General, Regional and MAC.        ROS/MED HX  ENT/Pulmonary:     (+) tobacco use, Past use,     Neurologic:       Cardiovascular:     (+) -----Previous cardiac testing   Echo: Date: Results:    Stress Test: Date: Results:    ECG Reviewed: Date: 19 Results:  Sinus Rhythm   WITHIN NORMAL LIMITS  Cath: Date: Results:      METS/Exercise Tolerance:     Hematologic:       Musculoskeletal: Comment: RLS  Sciatica    (+) arthritis,     GI/Hepatic:       Renal/Genitourinary:       Endo:     (+) Obesity,     Psychiatric/Substance Use:     (+) psychiatric history anxiety H/O chronic opiod use .      Infectious Disease:       Malignancy:   (+) Malignancy, History of Breast.Breast CA Active status post Surgery and Chemo.        Other:            Physical Exam    Airway  airway exam normal      Mallampati: II   TM distance: > 3 FB   Neck ROM: full   Mouth opening: > 3 cm    Respiratory Devices and Support         Dental  no notable dental history         Cardiovascular   cardiovascular exam normal          Pulmonary   pulmonary exam normal                OUTSIDE LABS:  CBC:   Lab Results   Component Value Date    WBC 3.0 (L) 05/17/2022    WBC 7.6 05/10/2022    HGB 9.7 (L) 05/17/2022    HGB 10.1 (L) 05/10/2022    HCT 30.6 (L) 05/17/2022    HCT 30.9 (L) 05/10/2022     05/17/2022     05/10/2022     BMP:   Lab Results   Component Value Date     06/08/2022     05/17/2022    POTASSIUM 3.8 06/08/2022    POTASSIUM 4.1 05/17/2022    CHLORIDE 109 06/08/2022    CHLORIDE 108 05/17/2022    CO2 29 06/08/2022    CO2 29 05/17/2022    BUN 12 06/08/2022    BUN 8 05/17/2022    CR 0.71 06/08/2022    CR 0.54 05/17/2022     (H) 06/08/2022     (H) 05/17/2022     COAGS: No results found for: PTT, INR, FIBR  POC:   Lab Results   Component Value Date    HCGS Negative 08/05/2019     HEPATIC:   Lab Results   Component Value Date    ALBUMIN 3.2 (L) 06/08/2022    PROTTOTAL 6.5 (L) 06/08/2022    ALT 17 06/08/2022    AST 14 06/08/2022    ALKPHOS 105 06/08/2022    BILITOTAL 0.3 06/08/2022     OTHER:   Lab Results   Component Value Date    ANURADHA 8.9 06/08/2022    LIPASE 64 (L) 08/05/2019    TSH 1.90 06/08/2022       Anesthesia Plan    ASA Status:  3   NPO Status:  NPO Appropriate    Anesthesia Type: General.     - Airway: ETT   Induction: Intravenous.   Maintenance: Inhalation.        Consents    Anesthesia Plan(s) and associated risks, benefits, and realistic alternatives discussed. Questions answered and patient/representative(s) expressed understanding.    - Discussed:     - Discussed with:  Patient          Postoperative Care    Pain management: Multi-modal analgesia.   PONV prophylaxis: Ondansetron (or other 5HT-3), Dexamethasone or Solumedrol     Comments:                ROSALINDA Vargas CRNA

## 2022-06-29 NOTE — PROGRESS NOTES
"Oncology Rooming Note    June 29, 2022 10:54 AM   Diana Salvador is a 57 year old female who presents for:    Chief Complaint   Patient presents with     Oncology Clinic Visit     Malignant neoplasm of upper-inner quadrant of right breast in female, estrogen receptor negative - Labs provider and infusion     Initial Vitals: /69 (BP Location: Right arm, Patient Position: Sitting, Cuff Size: Adult Regular)   Pulse 75   Temp 97.8  F (36.6  C) (Tympanic)   Resp 12   Wt 96.2 kg (212 lb)   LMP  (LMP Unknown)   SpO2 100%   BMI 32.96 kg/m   Estimated body mass index is 32.96 kg/m  as calculated from the following:    Height as of 4/27/22: 1.708 m (5' 7.25\").    Weight as of this encounter: 96.2 kg (212 lb). Body surface area is 2.14 meters squared.  Data Unavailable Comment: Data Unavailable   No LMP recorded (lmp unknown). Patient has had a hysterectomy.  Allergies reviewed: Yes  Medications reviewed: Yes    Medications: Medication refills not needed today.  Pharmacy name entered into [employer]:    [id number] - [place], [place] - [address] AT [address]    Clinical concerns:  None      Emy Hollingsworth CMA            "

## 2022-06-29 NOTE — H&P (VIEW-ONLY)
[facility] Hematology and Oncology Outpatient Progress Note ([place])    Patient: Diana Salvador  MRN: [id number]  Jun 29, 2022            Reason for Visit    1. Stage IIB triple negative breast cancer  2. On neoadjuvant chemo    Primary Oncologist: Dr. Diamond    Assessment/Plan  1.   Stage IIB triple negative breast cancer; on neoadjuvant chemo + immunotherapy  2.   Chemo-induced neutropenia  After 3 cycles, developed severe grade 3 neuropathy and delayed neutropenia despite Neupogen after day 15. Therefore, chemo has been omitted and she is getting single agent pembrolizumab starting with cycle 4. Tolerating this well, no toxicity.    Breast reimaging shows complete response, confirming our clinical assessment. Repeat assessment today is stable, no evident progression.    She's met with Dr. Marina and Elis. Given her toxicity on neoadjuvant chemo, she will be proceeding to surgery sooner than initially planned. This is scheduled for tomorrow.    Labwork satisfactory.    Plan:  -Continue with next cycle Pembrolizumab today.  -Bilateral mastectomy with reconstruction is scheduled for tomorrow with Elysia Marina and Gerson  -She will return to see Dr. Diamond 3-4 weeks after surgery to review path and decide on role for  further adjuvant chemo    3.   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 This may be getting slightly better, but still remains gr 2-3. Duloxetine 60 mg is helping and better tolerated than gabapenting. Using hydrocodone with some benefit.     Plan:  -Continue duloxetine 60 mg daily   -Knows not to take doxepin while on duloxetine  -Will need to monitor for added side effects while on Celexa and duloxetine   -continue hydrocodone if needed  -reassess with more time, hopefully will see improvement. Will be considered with future chemo choices    4.   Chemo vs immunotherapy dermatitis  Pruritic dermatitis on dorsal hand/thumbs R>L , intermittent . Was not recurrent last  cycle.     Plan:  -Claritin daily  -Triamcinolone 0.1% to affected areas as needed    5.   Diffuse polyarthralgias, treatment-induced   Manageable. Chronic pain, possibly exacerbated on immunotherapy.    Plan:  -Stay active  -Duloxetine starting for neuropathy may help with this, as well    6.  Anxiety, secondary to cancer diagnosis  Recently increased Celexa to 40 mg (from 20 mg), with good tolerance.  Working with a therapist.    Plan:  -Continue Celexa 40 mg  -duloxetine for the neuropathy may also help  -Continue psychotherapy.  -She's declined SW for now, can consider anytime    ______________________________________________________________________________    History of Present Illness/ Interval History    Ms. Diana Salvador is a 57 year old on neoadjvuant treatment with pembrolizumab, carboplatin and taxol (weekly) . She completed 3 cycles of this combo, and cycle 3 was complicated by the development of grade 3 neuropathy (fingers) and neutropenia (ANC 1000) despite use of Neupogen. Therefore, further chemo has been held and she has continued on single agent Pembrolizumab alone starting with cycle 4 until her surgery. Returns for consideration of next cycle of Pembrolizumab. Her surgery is scheduled for tomorrow, 6/30.    Neuropathy of fingers and toes has improved slightly, in that she notes some days are a bit better than others, but it persists. Still very sensitive to use/light touch. She has numbness and is having a hard time with her fine motor tasks (typing, writing, washing dishes). Her nail beds/fingernails are still very tender and discolored. She did not tolerate gabapentin (nausea), so started duloxetine 60 mg 3 weeks ago. This is helping some.  Taking hydrocodone at bedtime with some benefit.     Continues having diffuse arthralgias. Improves the more active she is.     Rash on bilateral dorsal thumbs/hand R>L improved after starting Claritin and using triamcinolone cream as needed.    Increased  Celexa dose 2 weeks ago for anxiety with cancer diagnosis, tolerating well. Meeting a therapist.  This is getting better, but still can feel emotional and overwhelmed some days due to her multiple stressors (financial, medical).  She has been on short term disability and will run out of medical benefits soon. 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       ECOG Performance    0-1      Oncology History/Treatment  Diagnosis/Stage:   2/2022: Stage IIB (cT2-cN0-cM0) right breast cancer (triple negative)  -self-palpated right breast lump  -diagnostic mammo + breast US: 2.4 cm R breast (2:00, lower-inner quad) lump and no axillary nodes detected  -breast biopsy: invasive ductal carcinoma, grade 3. ER neg, SC neg, HER2 neg via FISH  -CA 27.29 WNL (<5)    Treatment:  3/15/2022 - present: Neoadjuvant chemo (based off [id number]) Pembrolizumab 200 mg D1 q 21 days + Carboplatin AUC=5 D1 q21 Days + weekly Paclitaxel 80 mg/m  D1, 8, 15  x 12 Weeks (with Filgrastim 5 mcg/kg subcutaneously once daily on days 16, 17, and 18 of cycles 1 through 4)  -->after cycle 3, developed grade 3 neuropathy and delayed neutropenia (despite using Neupogen). Therefore, chemo held with cycle 4 and only received Pembrolizumab.     Planning bilateral mastectomies + reconstruction with Elysia Marina + Gerson following neoadjuvant treatment      Physical Exam    GENERAL: Alert and oriented to time place and person. Seated comfortably.  Alone.   HEAD: Atraumatic and normocephalic. Alopecia.  EYES: MELISSA, EOMI. No erythema. No icterus.  BREASTS: 1.5 cm medial 3:00 density residual mass noted, stable from prior exam.  LYMPH NODES: No right axillary nodes  CHEST: clear to auscultation bilaterally. Resonant to percussion throughout bilaterally. Symmetrical breath movements bilaterally.  CVS: S1 and S2 are heard. Regular rate and rhythm. No murmur or gallop or rub heard.  ABDOMEN: Soft.  Not tender. Not distended. No palpable hepatomegaly or splenomegaly. No other mass palpable. Bowel sounds present.  EXTREMITIES: Warm. No peripheral edema.  SKIN: Right hand/wrist rash noted last visit resolved. Fingernail and great toenail beds brown discoloration is improving.    NEURO: No gross deficit noted. Non-antalgic gait.      Lab Results    Recent Results (from the past 168 hour(s))   Asymptomatic COVID-19 Virus (Coronavirus) by PCR Nose    Specimen: Nose; Swab   Result Value Ref Range    SARS CoV2 PCR Negative Negative, Testing sent to reference lab. Results will be returned via unsolicited result       Imaging    US Breast Right Limited 1-3 Quadrants    Result Date: 6/1/2022  EXAM: MA DIAGNOSTIC RIGHT W JEANIE, US BREAST RIGHT LIMITED 1-3 QUADRANTS LOCATION: [facility] DATE/TIME: 6/1/2022 3:06 PM INDICATION: 57-year-old female with known right invasive ductal carcinoma, diagnosed in February 2022, presents for follow-up to assess response to neoadjuvant chemotherapy. No current breast symptoms. COMPARISON: Prior mammogram and ultrasound 2/18/2022. MAMMOGRAPHIC FINDINGS: Right full-field digital diagnostic mammogram performed. There are scattered areas of fibroglandular density. Images evaluated with the assistance of CAD. Breast tomosynthesis was used in interpretation. The mass component of biopsy-proven malignancy has essentially resolved. There are residual coarse dystrophic calcifications at this site, with adjacent biopsy marking clip. No new suspicious findings. ULTRASOUND FINDINGS: Targeted ultrasound of the right breast at site of biopsied malignancy at 2:00 6 cm from the nipple demonstrates the biopsy marking clip and a couple adjacent echogenic foci, felt to correspond to the mammographic calcifications. No discrete residual mass is visualized. The malignancy initially measured 2.4 x 1.1 x 1.6 cm in February 2022.     IMPRESSION: Complete resolution of mass component of  biopsied malignancy with only coarse residual calcifications present at this site, adjacent to the biopsy marking clip. Continued oncologic management is advised. ACR BI-RADS Category 6: Known Biopsy-Proven Malignancy. Results given to the patient.     MA Diagnostic Right w/Jeanie    Result Date: 6/1/2022  EXAM: MA DIAGNOSTIC RIGHT W JEANIE, US BREAST RIGHT LIMITED 1-3 QUADRANTS LOCATION: [facility] DATE/TIME: 6/1/2022 3:06 PM INDICATION: 57-year-old female with known right invasive ductal carcinoma, diagnosed in February 2022, presents for follow-up to assess response to neoadjuvant chemotherapy. No current breast symptoms. COMPARISON: Prior mammogram and ultrasound 2/18/2022. MAMMOGRAPHIC FINDINGS: Right full-field digital diagnostic mammogram performed. There are scattered areas of fibroglandular density. Images evaluated with the assistance of CAD. Breast tomosynthesis was used in interpretation. The mass component of biopsy-proven malignancy has essentially resolved. There are residual coarse dystrophic calcifications at this site, with adjacent biopsy marking clip. No new suspicious findings. ULTRASOUND FINDINGS: Targeted ultrasound of the right breast at site of biopsied malignancy at 2:00 6 cm from the nipple demonstrates the biopsy marking clip and a couple adjacent echogenic foci, felt to correspond to the mammographic calcifications. No discrete residual mass is visualized. The malignancy initially measured 2.4 x 1.1 x 1.6 cm in February 2022.     IMPRESSION: Complete resolution of mass component of biopsied malignancy with only coarse residual calcifications present at this site, adjacent to the biopsy marking clip. Continued oncologic management is advised. ACR BI-RADS Category 6: Known Biopsy-Proven Malignancy. Results given to the patient.       Billing  Total time: 35 min; including review of EMR, reports, diagnostics and ordering/coordination of care    Signed by: Marilu Vergara,  NP

## 2022-06-29 NOTE — PROGRESS NOTES
Infusion Nursing Note:  Diana Salvador presents today for Keytruda..    Patient seen by provider today: Yes: Marilu Vergara NP   present during visit today: Not Applicable.    Note: N/A.    Intravenous Access:  Implanted Port.    Treatment Conditions:  Lab Results   Component Value Date     06/29/2022    POTASSIUM 3.6 06/29/2022    CR 0.65 06/29/2022    ANURADHA 8.6 06/29/2022    BILITOTAL 0.2 06/29/2022    ALBUMIN 2.9 (L) 06/29/2022    ALT 16 06/29/2022    AST 13 06/29/2022     Results reviewed, labs MET treatment parameters, ok to proceed with treatment.    Post Infusion Assessment:  Patient tolerated infusion without incident.  Blood return noted pre and post infusion.  Site patent and intact, free from redness, edema or discomfort.  No evidence of extravasations.  Access discontinued per protocol.     Discharge Plan:   Patient discharged in stable condition accompanied by: self.  Departure Mode: Ambulatory.      Johny Lovelace RN

## 2022-06-30 ENCOUNTER — ANESTHESIA (OUTPATIENT)
Dept: SURGERY | Facility: CLINIC | Age: 57
End: 2022-06-30
Payer: COMMERCIAL

## 2022-06-30 ENCOUNTER — HOSPITAL ENCOUNTER (OUTPATIENT)
Dept: NUCLEAR MEDICINE | Facility: CLINIC | Age: 57
Setting detail: NUCLEAR MEDICINE
Discharge: HOME | End: 2022-06-30
Attending: SURGERY | Admitting: SURGERY
Payer: COMMERCIAL

## 2022-06-30 ENCOUNTER — HOSPITAL ENCOUNTER (OUTPATIENT)
Facility: CLINIC | Age: 57
Discharge: HOME | End: 2022-07-01
Attending: SURGERY | Admitting: SURGERY
Payer: COMMERCIAL

## 2022-06-30 DIAGNOSIS — Z98.890 S/P BREAST RECONSTRUCTION, BILATERAL: Primary | ICD-10-CM

## 2022-06-30 DIAGNOSIS — C50.911 INFILTRATING DUCTAL CARCINOMA OF RIGHT BREAST (H): ICD-10-CM

## 2022-06-30 LAB
ERYTHROCYTE [DISTWIDTH] IN BLOOD BY AUTOMATED COUNT: 14.6 % (ref 10–15)
HCT VFR BLD AUTO: 31.7 % (ref 35–47)
HGB BLD-MCNC: 10.1 G/DL (ref 11.7–15.7)
MCH RBC QN AUTO: 31.3 PG (ref 26.5–33)
MCHC RBC AUTO-ENTMCNC: 31.9 G/DL (ref 31.5–36.5)
MCV RBC AUTO: 98 FL (ref 78–100)
PLATELET # BLD AUTO: 238 10E3/UL (ref 150–450)
RBC # BLD AUTO: 3.23 10E6/UL (ref 3.8–5.2)
WBC # BLD AUTO: 4.5 10E3/UL (ref 4–11)

## 2022-06-30 PROCEDURE — 85027 COMPLETE CBC AUTOMATED: CPT | Performed by: NURSE ANESTHETIST, CERTIFIED REGISTERED

## 2022-06-30 PROCEDURE — 250N000011 HC RX IP 250 OP 636: Performed by: PLASTIC SURGERY

## 2022-06-30 PROCEDURE — 250N000009 HC RX 250: Performed by: NURSE ANESTHETIST, CERTIFIED REGISTERED

## 2022-06-30 PROCEDURE — 250N000025 HC SEVOFLURANE, PER MIN: Performed by: SURGERY

## 2022-06-30 PROCEDURE — 250N000013 HC RX MED GY IP 250 OP 250 PS 637: Performed by: NURSE ANESTHETIST, CERTIFIED REGISTERED

## 2022-06-30 PROCEDURE — 360N000076 HC SURGERY LEVEL 3, PER MIN: Performed by: SURGERY

## 2022-06-30 PROCEDURE — 250N000011 HC RX IP 250 OP 636: Performed by: NURSE ANESTHETIST, CERTIFIED REGISTERED

## 2022-06-30 PROCEDURE — 710N000009 HC RECOVERY PHASE 1, LEVEL 1, PER MIN: Performed by: SURGERY

## 2022-06-30 PROCEDURE — 370N000017 HC ANESTHESIA TECHNICAL FEE, PER MIN: Performed by: SURGERY

## 2022-06-30 PROCEDURE — 250N000011 HC RX IP 250 OP 636: Performed by: SURGERY

## 2022-06-30 PROCEDURE — 19303 MAST SIMPLE COMPLETE: CPT | Mod: 50 | Performed by: SURGERY

## 2022-06-30 PROCEDURE — 258N000003 HC RX IP 258 OP 636: Performed by: NURSE ANESTHETIST, CERTIFIED REGISTERED

## 2022-06-30 PROCEDURE — 250N000013 HC RX MED GY IP 250 OP 250 PS 637: Performed by: SURGERY

## 2022-06-30 PROCEDURE — 250N000009 HC RX 250: Performed by: SURGERY

## 2022-06-30 PROCEDURE — 343N000001 HC RX 343: Performed by: SURGERY

## 2022-06-30 PROCEDURE — 19357 TISS XPNDR PLMT BRST RCNSTJ: CPT | Mod: 50 | Performed by: PLASTIC SURGERY

## 2022-06-30 PROCEDURE — A9520 TC99 TILMANOCEPT DIAG 0.5MCI: HCPCS | Performed by: SURGERY

## 2022-06-30 PROCEDURE — 88307 TISSUE EXAM BY PATHOLOGIST: CPT | Mod: 26 | Performed by: PATHOLOGY

## 2022-06-30 PROCEDURE — 278N000051 HC OR IMPLANT GENERAL: Performed by: SURGERY

## 2022-06-30 PROCEDURE — 999N000141 HC STATISTIC PRE-PROCEDURE NURSING ASSESSMENT: Performed by: SURGERY

## 2022-06-30 PROCEDURE — 272N000001 HC OR GENERAL SUPPLY STERILE: Performed by: SURGERY

## 2022-06-30 PROCEDURE — 38525 BIOPSY/REMOVAL LYMPH NODES: CPT | Mod: RT | Performed by: SURGERY

## 2022-06-30 PROCEDURE — 38900 IO MAP OF SENT LYMPH NODE: CPT | Performed by: SURGERY

## 2022-06-30 PROCEDURE — 78195 LYMPH SYSTEM IMAGING: CPT | Performed by: SURGERY

## 2022-06-30 PROCEDURE — 88331 PATH CONSLTJ SURG 1 BLK 1SPC: CPT | Mod: TC | Performed by: SURGERY

## 2022-06-30 DEVICE — IMPLANTABLE DEVICE
Type: IMPLANTABLE DEVICE | Site: BREAST | Status: NON-FUNCTIONAL
Removed: 2022-10-07

## 2022-06-30 RX ORDER — DULOXETIN HYDROCHLORIDE 60 MG/1
60 CAPSULE, DELAYED RELEASE ORAL AT BEDTIME
Status: DISCONTINUED | OUTPATIENT
Start: 2022-06-30 | End: 2022-06-30

## 2022-06-30 RX ORDER — LIDOCAINE 40 MG/G
CREAM TOPICAL
Status: DISCONTINUED | OUTPATIENT
Start: 2022-06-30 | End: 2022-06-30 | Stop reason: HOSPADM

## 2022-06-30 RX ORDER — NALOXONE HYDROCHLORIDE 0.4 MG/ML
0.2 INJECTION, SOLUTION INTRAMUSCULAR; INTRAVENOUS; SUBCUTANEOUS
Status: DISCONTINUED | OUTPATIENT
Start: 2022-06-30 | End: 2022-06-30

## 2022-06-30 RX ORDER — ACETAMINOPHEN 325 MG/1
975 TABLET ORAL ONCE
Status: COMPLETED | OUTPATIENT
Start: 2022-06-30 | End: 2022-06-30

## 2022-06-30 RX ORDER — ONDANSETRON 2 MG/ML
4 INJECTION INTRAMUSCULAR; INTRAVENOUS EVERY 30 MIN PRN
Status: DISCONTINUED | OUTPATIENT
Start: 2022-06-30 | End: 2022-06-30 | Stop reason: HOSPADM

## 2022-06-30 RX ORDER — HYDROMORPHONE HCL IN WATER/PF 6 MG/30 ML
0.4 PATIENT CONTROLLED ANALGESIA SYRINGE INTRAVENOUS EVERY 5 MIN PRN
Status: DISCONTINUED | OUTPATIENT
Start: 2022-06-30 | End: 2022-06-30 | Stop reason: HOSPADM

## 2022-06-30 RX ORDER — OXYCODONE HYDROCHLORIDE 5 MG/1
10 TABLET ORAL EVERY 4 HOURS PRN
Status: DISCONTINUED | OUTPATIENT
Start: 2022-06-30 | End: 2022-07-01 | Stop reason: HOSPADM

## 2022-06-30 RX ORDER — CEFAZOLIN SODIUM/WATER 2 G/20 ML
2 SYRINGE (ML) INTRAVENOUS
Status: DISCONTINUED | OUTPATIENT
Start: 2022-06-30 | End: 2022-06-30 | Stop reason: HOSPADM

## 2022-06-30 RX ORDER — HEPARIN SODIUM (PORCINE) LOCK FLUSH IV SOLN 100 UNIT/ML 100 UNIT/ML
500 SOLUTION INTRAVENOUS ONCE
Status: COMPLETED | OUTPATIENT
Start: 2022-06-30 | End: 2022-06-30

## 2022-06-30 RX ORDER — FENTANYL CITRATE 50 UG/ML
50 INJECTION, SOLUTION INTRAMUSCULAR; INTRAVENOUS EVERY 5 MIN PRN
Status: DISCONTINUED | OUTPATIENT
Start: 2022-06-30 | End: 2022-06-30 | Stop reason: HOSPADM

## 2022-06-30 RX ORDER — DULOXETIN HYDROCHLORIDE 30 MG/1
60 CAPSULE, DELAYED RELEASE ORAL AT BEDTIME
Status: DISCONTINUED | OUTPATIENT
Start: 2022-06-30 | End: 2022-07-01 | Stop reason: HOSPADM

## 2022-06-30 RX ORDER — SULFAMETHOXAZOLE/TRIMETHOPRIM 800-160 MG
1 TABLET ORAL 2 TIMES DAILY
Qty: 28 TABLET | Refills: 0 | Status: SHIPPED | OUTPATIENT
Start: 2022-06-30 | End: 2022-07-14

## 2022-06-30 RX ORDER — PROCHLORPERAZINE MALEATE 10 MG
10 TABLET ORAL EVERY 6 HOURS PRN
Status: DISCONTINUED | OUTPATIENT
Start: 2022-06-30 | End: 2022-07-01 | Stop reason: HOSPADM

## 2022-06-30 RX ORDER — ONDANSETRON 2 MG/ML
4 INJECTION INTRAMUSCULAR; INTRAVENOUS EVERY 6 HOURS PRN
Status: DISCONTINUED | OUTPATIENT
Start: 2022-06-30 | End: 2022-07-01 | Stop reason: HOSPADM

## 2022-06-30 RX ORDER — NALOXONE HYDROCHLORIDE 0.4 MG/ML
0.2 INJECTION, SOLUTION INTRAMUSCULAR; INTRAVENOUS; SUBCUTANEOUS
Status: DISCONTINUED | OUTPATIENT
Start: 2022-06-30 | End: 2022-07-01 | Stop reason: HOSPADM

## 2022-06-30 RX ORDER — GABAPENTIN 300 MG/1
300 CAPSULE ORAL
Status: COMPLETED | OUTPATIENT
Start: 2022-06-30 | End: 2022-06-30

## 2022-06-30 RX ORDER — NALOXONE HYDROCHLORIDE 0.4 MG/ML
0.4 INJECTION, SOLUTION INTRAMUSCULAR; INTRAVENOUS; SUBCUTANEOUS
Status: DISCONTINUED | OUTPATIENT
Start: 2022-06-30 | End: 2022-06-30

## 2022-06-30 RX ORDER — SODIUM CHLORIDE, SODIUM LACTATE, POTASSIUM CHLORIDE, CALCIUM CHLORIDE 600; 310; 30; 20 MG/100ML; MG/100ML; MG/100ML; MG/100ML
INJECTION, SOLUTION INTRAVENOUS CONTINUOUS
Status: DISCONTINUED | OUTPATIENT
Start: 2022-06-30 | End: 2022-06-30 | Stop reason: HOSPADM

## 2022-06-30 RX ORDER — NALOXONE HYDROCHLORIDE 0.4 MG/ML
0.4 INJECTION, SOLUTION INTRAMUSCULAR; INTRAVENOUS; SUBCUTANEOUS
Status: DISCONTINUED | OUTPATIENT
Start: 2022-06-30 | End: 2022-07-01 | Stop reason: HOSPADM

## 2022-06-30 RX ORDER — CITALOPRAM HYDROBROMIDE 40 MG/1
40 TABLET ORAL DAILY
Status: DISCONTINUED | OUTPATIENT
Start: 2022-06-30 | End: 2022-07-01 | Stop reason: HOSPADM

## 2022-06-30 RX ORDER — CEFAZOLIN SODIUM/WATER 2 G/20 ML
2 SYRINGE (ML) INTRAVENOUS
Status: DISCONTINUED | OUTPATIENT
Start: 2022-06-30 | End: 2022-06-30

## 2022-06-30 RX ORDER — HYDROMORPHONE HCL IN WATER/PF 6 MG/30 ML
0.4 PATIENT CONTROLLED ANALGESIA SYRINGE INTRAVENOUS
Status: DISCONTINUED | OUTPATIENT
Start: 2022-06-30 | End: 2022-07-01 | Stop reason: HOSPADM

## 2022-06-30 RX ORDER — BUPIVACAINE HYDROCHLORIDE 5 MG/ML
INJECTION, SOLUTION PERINEURAL PRN
Status: DISCONTINUED | OUTPATIENT
Start: 2022-06-30 | End: 2022-06-30 | Stop reason: HOSPADM

## 2022-06-30 RX ORDER — ONDANSETRON 2 MG/ML
INJECTION INTRAMUSCULAR; INTRAVENOUS PRN
Status: DISCONTINUED | OUTPATIENT
Start: 2022-06-30 | End: 2022-06-30

## 2022-06-30 RX ORDER — FAMOTIDINE 20 MG/1
20 TABLET, FILM COATED ORAL 2 TIMES DAILY
Status: DISCONTINUED | OUTPATIENT
Start: 2022-06-30 | End: 2022-07-01 | Stop reason: HOSPADM

## 2022-06-30 RX ORDER — ONDANSETRON 4 MG/1
4 TABLET, ORALLY DISINTEGRATING ORAL EVERY 6 HOURS PRN
Status: DISCONTINUED | OUTPATIENT
Start: 2022-06-30 | End: 2022-07-01 | Stop reason: HOSPADM

## 2022-06-30 RX ORDER — KETOROLAC TROMETHAMINE 30 MG/ML
INJECTION, SOLUTION INTRAMUSCULAR; INTRAVENOUS PRN
Status: DISCONTINUED | OUTPATIENT
Start: 2022-06-30 | End: 2022-06-30

## 2022-06-30 RX ORDER — PHENYLEPHRINE HYDROCHLORIDE 10 MG/ML
INJECTION INTRAVENOUS PRN
Status: DISCONTINUED | OUTPATIENT
Start: 2022-06-30 | End: 2022-06-30

## 2022-06-30 RX ORDER — ROPINIROLE 1 MG/1
1 TABLET, FILM COATED ORAL AT BEDTIME
Status: DISCONTINUED | OUTPATIENT
Start: 2022-06-30 | End: 2022-07-01 | Stop reason: HOSPADM

## 2022-06-30 RX ORDER — CEFAZOLIN SODIUM/WATER 2 G/20 ML
2 SYRINGE (ML) INTRAVENOUS SEE ADMIN INSTRUCTIONS
Status: DISCONTINUED | OUTPATIENT
Start: 2022-06-30 | End: 2022-06-30 | Stop reason: HOSPADM

## 2022-06-30 RX ORDER — PROPOFOL 10 MG/ML
INJECTION, EMULSION INTRAVENOUS CONTINUOUS PRN
Status: DISCONTINUED | OUTPATIENT
Start: 2022-06-30 | End: 2022-06-30

## 2022-06-30 RX ORDER — LIDOCAINE HYDROCHLORIDE 10 MG/ML
INJECTION, SOLUTION INFILTRATION; PERINEURAL PRN
Status: DISCONTINUED | OUTPATIENT
Start: 2022-06-30 | End: 2022-06-30

## 2022-06-30 RX ORDER — MAGNESIUM SULFATE HEPTAHYDRATE 40 MG/ML
2 INJECTION, SOLUTION INTRAVENOUS ONCE
Status: COMPLETED | OUTPATIENT
Start: 2022-06-30 | End: 2022-06-30

## 2022-06-30 RX ORDER — HYDROCODONE BITARTRATE AND ACETAMINOPHEN 5; 325 MG/1; MG/1
1 TABLET ORAL EVERY 6 HOURS PRN
Qty: 60 TABLET | Refills: 0 | OUTPATIENT
Start: 2022-06-30

## 2022-06-30 RX ORDER — FENTANYL CITRATE 50 UG/ML
INJECTION, SOLUTION INTRAMUSCULAR; INTRAVENOUS PRN
Status: DISCONTINUED | OUTPATIENT
Start: 2022-06-30 | End: 2022-06-30

## 2022-06-30 RX ORDER — ACETAMINOPHEN 325 MG/1
650 TABLET ORAL EVERY 4 HOURS PRN
Qty: 100 TABLET | Refills: 0 | Status: SHIPPED | OUTPATIENT
Start: 2022-06-30

## 2022-06-30 RX ORDER — DEXAMETHASONE SODIUM PHOSPHATE 4 MG/ML
INJECTION, SOLUTION INTRA-ARTICULAR; INTRALESIONAL; INTRAMUSCULAR; INTRAVENOUS; SOFT TISSUE PRN
Status: DISCONTINUED | OUTPATIENT
Start: 2022-06-30 | End: 2022-06-30

## 2022-06-30 RX ORDER — HYDRALAZINE HYDROCHLORIDE 20 MG/ML
INJECTION INTRAMUSCULAR; INTRAVENOUS PRN
Status: DISCONTINUED | OUTPATIENT
Start: 2022-06-30 | End: 2022-06-30

## 2022-06-30 RX ORDER — CEFAZOLIN SODIUM/WATER 2 G/20 ML
2 SYRINGE (ML) INTRAVENOUS SEE ADMIN INSTRUCTIONS
Status: DISCONTINUED | OUTPATIENT
Start: 2022-06-30 | End: 2022-06-30

## 2022-06-30 RX ORDER — HYDROXYZINE HYDROCHLORIDE 25 MG/1
25 TABLET, FILM COATED ORAL EVERY 6 HOURS PRN
Status: DISCONTINUED | OUTPATIENT
Start: 2022-06-30 | End: 2022-06-30 | Stop reason: HOSPADM

## 2022-06-30 RX ORDER — LIDOCAINE 40 MG/G
CREAM TOPICAL
Status: DISCONTINUED | OUTPATIENT
Start: 2022-06-30 | End: 2022-07-01 | Stop reason: HOSPADM

## 2022-06-30 RX ORDER — OXYCODONE HYDROCHLORIDE 5 MG/1
5 TABLET ORAL EVERY 4 HOURS PRN
Status: DISCONTINUED | OUTPATIENT
Start: 2022-06-30 | End: 2022-07-01 | Stop reason: HOSPADM

## 2022-06-30 RX ORDER — EPHEDRINE SULFATE 50 MG/ML
INJECTION, SOLUTION INTRAVENOUS PRN
Status: DISCONTINUED | OUTPATIENT
Start: 2022-06-30 | End: 2022-06-30

## 2022-06-30 RX ORDER — DOXEPIN 3 MG/1
3 TABLET, FILM COATED ORAL AT BEDTIME
Status: DISCONTINUED | OUTPATIENT
Start: 2022-06-30 | End: 2022-06-30

## 2022-06-30 RX ORDER — LIDOCAINE HYDROCHLORIDE AND EPINEPHRINE 10; 10 MG/ML; UG/ML
INJECTION, SOLUTION INFILTRATION; PERINEURAL PRN
Status: DISCONTINUED | OUTPATIENT
Start: 2022-06-30 | End: 2022-06-30 | Stop reason: HOSPADM

## 2022-06-30 RX ORDER — KETAMINE HYDROCHLORIDE 10 MG/ML
INJECTION INTRAMUSCULAR; INTRAVENOUS PRN
Status: DISCONTINUED | OUTPATIENT
Start: 2022-06-30 | End: 2022-06-30

## 2022-06-30 RX ORDER — ONDANSETRON 4 MG/1
4 TABLET, ORALLY DISINTEGRATING ORAL EVERY 30 MIN PRN
Status: DISCONTINUED | OUTPATIENT
Start: 2022-06-30 | End: 2022-06-30 | Stop reason: HOSPADM

## 2022-06-30 RX ORDER — HYDROMORPHONE HCL IN WATER/PF 6 MG/30 ML
0.2 PATIENT CONTROLLED ANALGESIA SYRINGE INTRAVENOUS
Status: DISCONTINUED | OUTPATIENT
Start: 2022-06-30 | End: 2022-07-01 | Stop reason: HOSPADM

## 2022-06-30 RX ADMIN — ACETAMINOPHEN 975 MG: 325 TABLET, FILM COATED ORAL at 12:00

## 2022-06-30 RX ADMIN — HYDROMORPHONE HYDROCHLORIDE 0.4 MG: 1 INJECTION, SOLUTION INTRAMUSCULAR; INTRAVENOUS; SUBCUTANEOUS at 16:53

## 2022-06-30 RX ADMIN — EPHEDRINE SULFATE 10 MG: 50 INJECTION, SOLUTION INTRAVENOUS at 14:58

## 2022-06-30 RX ADMIN — KETAMINE HYDROCHLORIDE 20 MG: 10 INJECTION, SOLUTION INTRAMUSCULAR; INTRAVENOUS at 15:45

## 2022-06-30 RX ADMIN — EPHEDRINE SULFATE 10 MG: 50 INJECTION, SOLUTION INTRAVENOUS at 14:40

## 2022-06-30 RX ADMIN — ROPINIROLE HYDROCHLORIDE 1 MG: 1 TABLET, FILM COATED ORAL at 21:31

## 2022-06-30 RX ADMIN — HYDROMORPHONE HYDROCHLORIDE 0.5 MG: 1 INJECTION, SOLUTION INTRAMUSCULAR; INTRAVENOUS; SUBCUTANEOUS at 14:29

## 2022-06-30 RX ADMIN — Medication 2 G: at 13:15

## 2022-06-30 RX ADMIN — DEXAMETHASONE SODIUM PHOSPHATE 8 MG: 4 INJECTION, SOLUTION INTRA-ARTICULAR; INTRALESIONAL; INTRAMUSCULAR; INTRAVENOUS; SOFT TISSUE at 13:37

## 2022-06-30 RX ADMIN — CITALOPRAM 40 MG: 40 TABLET ORAL at 18:40

## 2022-06-30 RX ADMIN — HYDROMORPHONE HYDROCHLORIDE 0.5 MG: 1 INJECTION, SOLUTION INTRAMUSCULAR; INTRAVENOUS; SUBCUTANEOUS at 14:14

## 2022-06-30 RX ADMIN — SUGAMMADEX 200 MG: 100 INJECTION, SOLUTION INTRAVENOUS at 16:09

## 2022-06-30 RX ADMIN — KETOROLAC TROMETHAMINE 30 MG: 30 INJECTION, SOLUTION INTRAMUSCULAR at 16:08

## 2022-06-30 RX ADMIN — ROCURONIUM BROMIDE 50 MG: 50 INJECTION, SOLUTION INTRAVENOUS at 13:37

## 2022-06-30 RX ADMIN — HYDROMORPHONE HYDROCHLORIDE 0.5 MG: 1 INJECTION, SOLUTION INTRAMUSCULAR; INTRAVENOUS; SUBCUTANEOUS at 15:51

## 2022-06-30 RX ADMIN — HYDROMORPHONE HYDROCHLORIDE 0.4 MG: 1 INJECTION, SOLUTION INTRAMUSCULAR; INTRAVENOUS; SUBCUTANEOUS at 17:06

## 2022-06-30 RX ADMIN — PHENYLEPHRINE HYDROCHLORIDE 200 MCG: 10 INJECTION INTRAVENOUS at 15:30

## 2022-06-30 RX ADMIN — Medication 45 MCG: at 14:05

## 2022-06-30 RX ADMIN — PHENYLEPHRINE HYDROCHLORIDE 200 MCG: 10 INJECTION INTRAVENOUS at 15:53

## 2022-06-30 RX ADMIN — HYDROXYZINE HYDROCHLORIDE 25 MG: 25 TABLET, FILM COATED ORAL at 17:07

## 2022-06-30 RX ADMIN — HYDROMORPHONE HYDROCHLORIDE 0.4 MG: 1 INJECTION, SOLUTION INTRAMUSCULAR; INTRAVENOUS; SUBCUTANEOUS at 16:47

## 2022-06-30 RX ADMIN — OXYCODONE HYDROCHLORIDE 5 MG: 5 TABLET ORAL at 21:32

## 2022-06-30 RX ADMIN — FENTANYL CITRATE 50 MCG: 50 INJECTION, SOLUTION INTRAMUSCULAR; INTRAVENOUS at 16:41

## 2022-06-30 RX ADMIN — FENTANYL CITRATE 50 MCG: 50 INJECTION, SOLUTION INTRAMUSCULAR; INTRAVENOUS at 16:36

## 2022-06-30 RX ADMIN — EPHEDRINE SULFATE 15 MG: 50 INJECTION, SOLUTION INTRAVENOUS at 15:34

## 2022-06-30 RX ADMIN — ONDANSETRON 4 MG: 2 INJECTION INTRAMUSCULAR; INTRAVENOUS at 16:09

## 2022-06-30 RX ADMIN — ROCURONIUM BROMIDE 10 MG: 50 INJECTION, SOLUTION INTRAVENOUS at 15:00

## 2022-06-30 RX ADMIN — DULOXETINE HYDROCHLORIDE 60 MG: 30 CAPSULE, DELAYED RELEASE ORAL at 21:31

## 2022-06-30 RX ADMIN — EPHEDRINE SULFATE 10 MG: 50 INJECTION, SOLUTION INTRAVENOUS at 14:55

## 2022-06-30 RX ADMIN — HYDRALAZINE HYDROCHLORIDE 10 MG: 20 INJECTION INTRAMUSCULAR; INTRAVENOUS at 14:10

## 2022-06-30 RX ADMIN — MAGNESIUM SULFATE HEPTAHYDRATE 2 G: 2 INJECTION, SOLUTION INTRAVENOUS at 16:40

## 2022-06-30 RX ADMIN — MIDAZOLAM 2 MG: 1 INJECTION INTRAMUSCULAR; INTRAVENOUS at 13:26

## 2022-06-30 RX ADMIN — MAGNESIUM SULFATE HEPTAHYDRATE 2 G: 40 INJECTION, SOLUTION INTRAVENOUS at 13:44

## 2022-06-30 RX ADMIN — FAMOTIDINE 20 MG: 20 TABLET, FILM COATED ORAL at 20:21

## 2022-06-30 RX ADMIN — TILMANOCEPT 0.5 MILLICURIE: KIT at 13:27

## 2022-06-30 RX ADMIN — LIDOCAINE HYDROCHLORIDE 50 MG: 10 INJECTION, SOLUTION INFILTRATION; PERINEURAL at 13:26

## 2022-06-30 RX ADMIN — KETAMINE HYDROCHLORIDE 30 MG: 10 INJECTION, SOLUTION INTRAMUSCULAR; INTRAVENOUS at 13:54

## 2022-06-30 RX ADMIN — PHENYLEPHRINE HYDROCHLORIDE 100 MCG: 10 INJECTION INTRAVENOUS at 15:06

## 2022-06-30 RX ADMIN — Medication 500 UNITS: at 17:15

## 2022-06-30 RX ADMIN — HYDROMORPHONE HYDROCHLORIDE 0.5 MG: 1 INJECTION, SOLUTION INTRAMUSCULAR; INTRAVENOUS; SUBCUTANEOUS at 16:06

## 2022-06-30 RX ADMIN — EPHEDRINE SULFATE 15 MG: 50 INJECTION, SOLUTION INTRAVENOUS at 14:33

## 2022-06-30 RX ADMIN — SODIUM CHLORIDE, POTASSIUM CHLORIDE, SODIUM LACTATE AND CALCIUM CHLORIDE: 600; 310; 30; 20 INJECTION, SOLUTION INTRAVENOUS at 12:01

## 2022-06-30 RX ADMIN — EPHEDRINE SULFATE 10 MG: 50 INJECTION, SOLUTION INTRAVENOUS at 15:15

## 2022-06-30 RX ADMIN — PROPOFOL 100 MCG/KG/MIN: 10 INJECTION, EMULSION INTRAVENOUS at 13:37

## 2022-06-30 RX ADMIN — GABAPENTIN 300 MG: 300 CAPSULE ORAL at 12:00

## 2022-06-30 RX ADMIN — SODIUM CHLORIDE, POTASSIUM CHLORIDE, SODIUM LACTATE AND CALCIUM CHLORIDE: 600; 310; 30; 20 INJECTION, SOLUTION INTRAVENOUS at 15:03

## 2022-06-30 RX ADMIN — FENTANYL CITRATE 100 MCG: 50 INJECTION, SOLUTION INTRAMUSCULAR; INTRAVENOUS at 13:26

## 2022-06-30 RX ADMIN — EPHEDRINE SULFATE 5 MG: 50 INJECTION, SOLUTION INTRAVENOUS at 14:52

## 2022-06-30 ASSESSMENT — ACTIVITIES OF DAILY LIVING (ADL)
DRESSING/BATHING_DIFFICULTY: NO
CONCENTRATING,_REMEMBERING_OR_MAKING_DECISIONS_DIFFICULTY: NO
TOILETING_ISSUES: NO
FALL_HISTORY_WITHIN_LAST_SIX_MONTHS: YES
WEAR_GLASSES_OR_BLIND: YES
WALKING_OR_CLIMBING_STAIRS_DIFFICULTY: NO
NUMBER_OF_TIMES_PATIENT_HAS_FALLEN_WITHIN_LAST_SIX_MONTHS: 1
DIFFICULTY_EATING/SWALLOWING: NO
DOING_ERRANDS_INDEPENDENTLY_DIFFICULTY: NO
VISION_MANAGEMENT: GLASSES
CHANGE_IN_FUNCTIONAL_STATUS_SINCE_ONSET_OF_CURRENT_ILLNESS/INJURY: NO

## 2022-06-30 NOTE — PROGRESS NOTES
"SPIRITUAL HEALTH SERVICES  [facility] - Surgery    Referral Source: Patient request during pre-surgical call    - Diana referred to our two previous visits and acknowledged that she has been on a challenging journey.  She has \"a little chemo\" to finish up but that this procedure, the bilateral mastectomy, would be a good ending, in a way.    - She is very comfortable and confident with Dr Marina and her plastic surgeon.  Dr Marina was hoping to get an early start so we said a prayer together for a successful surgery and thanks for the way things have progressed in a favorable way.    Dread Dickson M.A., [facility]  Lead   [facility]  Office: [phone]    "

## 2022-06-30 NOTE — OP NOTE
PREOPERATIVE DIAGNOSIS: Status post right breast cancer requiring bilateral nipple-non-sparing mastectomy and immediate reconstruction.     POSTOPERATIVE DIAGNOSIS: Status post right breast cancer requiring bilateral nipple-non-sparing mastectomy and immediate reconstruction.     PROCEDURES:   1. Bilateral immediate breast reconstruction using pre-pectoral expander (Allergan Smooth Expanders, 650 ml filled with air without tension for about a 550-650 gram mastectomy specimen. Medical Indication for Use in the Pre-pectoral plane: Prevent animation defect, decrease geovanna-operative pain, decrease anatomical destruction/distortion of the pectoral muscle).       IMPLANTS:     Implant Name Type Inv. Item Serial No.  Lot No. LRB No. Used Action   Natrelle 133S Tissue Expander   [id number] ALLERGAN  Right 1 Implanted   Natrelle 133S Tissue Expander   [id number] ALLERGAN  Left 1 Implanted         SURGEON: Al Nieto MD     RESIDENT: None    ANESTHESIA: General anesthesia with endotracheal intubation.     COMPLICATIONS: Nil.     DRAINS: One 15-Palauan channel DAWIT drain on each side.    SPECIMENS: Nil.     BLOOD LOSS: 15 mL      DESCRIPTION OF PROCEDURE: After informed consent was taken from the patient, the proper site and procedure was ascertained with her and she was appropriately marked and taken to the operating room. She was placed in a supine position with her knees comfortably flexed, pillows underneath them and pneumoboots placed and running prior to induction of anesthesia. Preoperative antibiotics were given in the OR and appropriately redosed during the case. General anesthesia was administered without any complications. A Rivera catheter was inserted. Her arms were abducted to about 50 degrees and appropriately padded. Surgical Oncology took over and began the procedure. The bilateral nipple-non sparing mastectomy was completed and I was called to the operating room. She was reprepped and draped and I began  the procedure by first analyzing the skin flaps. Clinically, the edges of both sides were thin and the right side had some superior flap button-holing. I went ahead and refreshed the edges of the flaps and they bled red blood. Given the clinical findings, we decided to proceed with the immediate pre-pectoral reconstruction. Identical steps were performed bilaterally.The dead-space in the lateral areas were closed off and the LMF and IMF were recreated with 0-vicryl sutures. Key 0-Vicryl sutures were placed in the MMF and IMF to sew the tabs of the expanders to. No Alloderm/ADM was used.  We measured the base width at about 14.0 cm. The surgical pocket was then irrigated with Betadine solution irrigtation. We changed our gloves. The expander was then removed from the package and were placed in the pockets. The medial and inferior tabs were sutured down to the present sutures. Air was removed completely. A 15-English channeled DAWIT drain was then placed on each side and brought out through a separate stab incision and sewn into position. The edges of the wounds were refreshened again and there was bleeding seen at the edges of the wounds and then the skin was closed using 2-0 Monocryl suture in a deep dermal layer. We went ahead and then placed a 3-0 Strattafix suture in a running intracuticular manner followed by Surgical Glue and Tape. Appropriate dressings were placed over the drain sites. The patient was wrapped not tightly. The patient tolerated the procedure well. All counts were correct at the end of the case. The patient was extubated and sent to recovery room in stable condition.

## 2022-06-30 NOTE — OP NOTE
OPERATIVE NOTE  [facility] SURGERY    DATE:  6/30/2022    SURGEON:  Baldemar Marina DO    ASSISTANT:  Jennifer Chirinos DO (needed for expertise in surgery, retraction, suction)  Dread Messer PA-C (needed for retraction, suction, assistance with closure)    PREOPERATIVE DIAGNOSIS:  1. Right breast cancer    POSTOPERATIVE DIAGNOSIS:  1. Same    OPERATION:  1.  Injection of technetium 99  2.  Intraoperative lymphoscintigraphy  3.  Injection of Methylene blue  4.  Left Simple mastectomy  5.  Right [place] lymph node biopsy  6.  Right mastectomy    Anesthesia: General    Estimated blood loss: 150 mL   Total IV fluids: (See anesthesia record)   Blood transfusion: No transfusion was given during surgery   Total urine output: (See anesthesia record)  Not measured   Drains: None  Drains per Dr. Nieto   Specimens: ID Type Source Tests Collected by Time Destination   1 :  Tissue Breast, Right SURGICAL PATHOLOGY EXAM Baldemar Marina,  6/30/2022  2:50 PM    2 : sentinel node number 1 Tissue Lymph Node(s) [place], Breast, Right SURGICAL PATHOLOGY EXAM Baldemar Marina,  6/30/2022  2:54 PM    3 :  Tissue Breast, Left SURGICAL PATHOLOGY EXAM Baldemar Marina,  6/30/2022  3:11 PM           Findings: None.   Complications: None.   Condition: Stable       INDICATIONS FOR PROCEDURE:  Diana Salvador is a 57 year old who had an abnormal mammogram that on workup with core-needle biopsy was found to be invasive ductal carcinoma which was HER2 positive. She underwent neoadjuvant chemotherapy but developed toxicity.  She had repeat imaging which showed response.  After discussion of alternatives, the patient elected to undergo bilateral mastectomy due to positive genetic testing with implant based reconstruction. The patient expressed understanding of risks, benefits, and alternatives of the above procedures and wishes to proceed.    FINDINGS:  Single lymph node    PROCEDURE:  The patient was brought to the  OR and placed in a supine position.  Anesthesia was induced per anesthesia protocol.  A preinjection pause was carried out.  We started our procedure by methylene blue into the right breast intradermally at the nipple areolar complex.  Technetium 99 was then similarly injected.  This was massaged for several minutes.  The patient was then prepped and draped in the usual sterile fashion.  A timeout was then done to confirm the correct patient, positioning and procedure.       A skin incision was made that encompassed the nipple-areola complex on the right and left side working in tandem with Dr. Chirinos, sparing as much skin as possible. Flaps were raised in the avascular plane between subcutaneous tissue and breast tissue from clavicle superiorly, the sternum medially, the anterior rectus sheath inferiorly, and the anterior border of the latissimus dorsi muscle laterally. Hemostasis was achieved in the flaps. Next, the breast tissue and underlying pectoralis fascia were excised from the pectoralis major muscle, progressing from medially to laterally.  The right specimen was removed first, marked with the short suture superior and long suture lateral.  The incision was extended to allow access to the axilla on the right and the pepe probe was used to locate a single radioactive and blue lymph node.  This was excised with electrocautery and sent for frozen section.  No further radioactive uptake was noted, no blue tracts were noted, and no palpable nodes were felt.    The left breast specimen was similarly removed, marked with short suture superior and long suture laterally.  Both cavities were irrigated with irrigant and suctioned free.  Hemostasis was achieved in both cavities.  Pathology called, they were unable to perform a formal frozen section but no gross disease was noted on their examination.      Dr. Nieto then entered the OR to perform expander placement.    Baldemar Marina DO on 6/30/2022 at 3:33  [facility]

## 2022-06-30 NOTE — ANESTHESIA POSTPROCEDURE EVALUATION
Patient: Diana Salvador    Procedure: Procedure(s):  MASTECTOMY, SIMPLE  MASTECTOMY, SIMPLE, WITH SENTINEL LYMPH NODE DISSECTION  RECONSTRUCTION, BREAST, BILATERAL, WITH TISSUE EXPANDER INSERTION       Anesthesia Type:  General    Note:  Disposition: Admission   Postop Pain Control: Uneventful            Sign Out: Well controlled pain   PONV: No   Neuro/Psych: Uneventful            Sign Out: Acceptable/Baseline neuro status   Airway/Respiratory: Uneventful            Sign Out: Acceptable/Baseline resp. status   CV/Hemodynamics: Uneventful            Sign Out: Acceptable CV status; No obvious hypovolemia; No obvious fluid overload   Other NRE: NONE   DID A NON-ROUTINE EVENT OCCUR? No           Last vitals:  Vitals Value Taken Time   BP 96/63 06/30/22 1730   Temp 36.7  C (98  F) 06/30/22 1715   Pulse 68 06/30/22 1734   Resp 22 06/30/22 1734   SpO2 96 % 06/30/22 1734   Vitals shown include unvalidated device data.    Electronically Signed By: ROSALINDA Tran CRNA  June 30, 2022  6:02 PM

## 2022-06-30 NOTE — DISCHARGE INSTRUCTIONS
TISSUE EXPANSION FOR BREAST RECONSTRUCTION  POST-OPERATIVE INSTRUCTIONS    Instructions  ?  Shower with Bactoshield the night before and morning of each tissue     expansion.  ?  You need someone to drive you to your appointment for the first 3 weeks     post-operatively or as long as you requiring narcotic pain medicine.    Activities  ?  You cannot perform house hold chores or heavy lifting greater than 5 to     10 pounds for 6 weeks post-operatively.  ?  No hot tubs, swimming in lakes until fully healed.  Be Aware:  ?  You cannot have an MRI if you have tissue expanders in place.  ?  The tissue expanders may be detected via the security scanners at the      airport. You will need a note from your Doctor if you plan to travel      indicating you have tissue expanders in place with metal.    Incision Care  ?  You can shower 48 hours after the last drain tube has removed.  ?  Avoid sun exposure to scars for at least 12 months after your last     surgery.  ?  If sun exposure is unavoidable then always use a sun block with 50 SPF     or higher.  ?  Shower regularly to keep the incisions clean and inspect for signs of      Infection.  ?  You can use moisturizer on the incisions and surrounding skin starting 3      Weeks.    What to Expect  ?  You may experience minor discomfort for the following 12 to 24 hours      after each tissue expansion. This discomfort usually subsides 2 to 3 days      after each tissue expansion.  ?  The tissue expander may shift after the 1st or 2 nd expansion.  ?  You may experience more discomfort on one side than the other if you      have bilateral tissue expanders placed.  ?  Your posture may change causing you to have some upper and/or mid      back pain as you re expanded.  ?  Your shoulder range of motion may become stiff requiring continuing to     perform the shoulder exercises during the tissue expansion process to     prevent shoulder stiffness and a frozen shoulder.  ?  You may  find it difficult to sleep because you feel tight across the chest      and shoulders.  ?  You may find it difficult to fit into certain types of tight fitting clothing.      You may need to wear oversized shirts until the final exchange of the      tissue expander (s).  ?  You may feel chest persist tightness or heaviness secondary to being     expanded. This usually subsides with time.  ?  The tissue expanders will remain in place for a minimum of 8 weeks after     completing the last tissue expansion. This will allow for the soft tissues     (i.e. skin and muscle) to heal and recover from being stretched before the     second surgery takes place for the exchange of the tissue expander for a     permanent breast implant.  ?  You ll see your surgeon when we think you have achieve your desired     breast size to determine if you need additional expansions and for     surgical planning.    How can I treat discomfort?  ?  Ibuprofen (or other NSAIDs) 600 mg by mouth every 6 to 8 hours with     food starting the morning of each tissue expansion and continue to take     around the clock for 3 to 5 days as needed for discomfort. You can start     this 2 weeks after surgery.  ?  If you need additional pain control at night, you may take the prescribed      Vicodin or Norco you were prescribed for pain immediately after surgery.  ?  You can take the ibuprofen with the Vicodin or Norco if additional pain      relief is required.    Will I achieve my desired breast size?  ?  This is determined on an individual basis.  ?  There is no scientific way to determine the exact breast size. It will be     determined by a number of different factors i.e.  ?  How well you tolerate each tissue expansion.  ?  How well your skin reacts to the expansions.  ?  The size of the other breast (if a unilateral tissue expander).  ?  Previous surgeries or amount of scarring  ?  We recommend trying on a desired bra size as you approach your  desired      breast size to see how well the bra size fits.    Appearance  ?  While the tissue is being expanded, a bulge will be created. Depending     upon the location of the tissue expander, the bulge may be considered     desirable or unsightly.  ?  Following the exchange of the tissue expander, a more normal and     desirable look should be restored.  ?  After the exchange for permanent implant, the breasts will feel softer     with less fullness in your axilla.    When to Call:  ?  If you have increasing swelling or bruising.  ?  If swelling and redness persist after a few days.  ?  If you have increased redness along the incision.  ?  If you have severe or increased pain not relieved by medication.  ?  If you have any side effects to mediations; such as, rash, nauseas,      headache, vomiting, etc.  ?  If you have an oral temperature of 100.5 degrees or higher.  ?  If you have any yellow or greenish drainage from the incisions or notice a      foul smell.  ?  If you have bleeding from the incisions that is difficult to control with      light pressure.  ?  If you have loss of feeling or motion.    For Medical Questions, Please Call:  ?  [phone], Monday - Friday, 8 a.m. - 4:30 p.m.  ?  After hours and on weekends, call Hospital Paging at [phone] and     ask for the Plastic Surgeon on call.    Please note my office will call you 1-2 business days after your procedure to check up on how you're doing and to schedule your post-operative appointment.

## 2022-06-30 NOTE — TELEPHONE ENCOUNTER
She is having mastectomies today, so will likely be given post-op pain meds by surgery. I would defer refilling this until post-op recovery complete and we can reassess her chronic pain needs and who should manage.

## 2022-06-30 NOTE — ANESTHESIA CARE TRANSFER NOTE
Patient: Diana Salvador    Procedure: Procedure(s):  MASTECTOMY, SIMPLE  MASTECTOMY, SIMPLE, WITH SENTINEL LYMPH NODE DISSECTION  RECONSTRUCTION, BREAST, BILATERAL, WITH TISSUE EXPANDER INSERTION       Diagnosis: Infiltrating ductal carcinoma of right breast (H) [C50.911]  Diagnosis Additional Information: No value filed.    Anesthesia Type:   General     Note:    Oropharynx: spontaneously breathing and oropharynx clear of all foreign objects  Level of Consciousness: drowsy  Oxygen Supplementation: face mask  Level of Supplemental Oxygen (L/min / FiO2): 6  Independent Airway: airway patency satisfactory and stable  Dentition: dentition unchanged  Vital Signs Stable: post-procedure vital signs reviewed and stable  Report to RN Given: handoff report given  Patient transferred to: PACU    Handoff Report: Identifed the Patient, Identified the Reponsible Provider, Reviewed the pertinent medical history, Discussed the surgical course, Reviewed Intra-OP anesthesia mangement and issues during anesthesia, Set expectations for post-procedure period and Allowed opportunity for questions and acknowledgement of understanding      Vitals:  Vitals Value Taken Time   BP     Temp     Pulse     Resp     SpO2 99 % 06/30/22 1626   Vitals shown include unvalidated device data.    Electronically Signed By: ROSALINDA Duarte CRNA  June 30, 2022  4:28 PM

## 2022-06-30 NOTE — PROGRESS NOTES
Patient arrived to room 2047 from PACU at 1800. Ambulated to toilet and had unmeasured void. DAWIT drains both have bloody drainage right 1/4 full, left nearly 1/2 full. Patient rates pain 3/10. Lung sounds clear. Patient eating dinner with significant other.    Karishma Iglesias RN on 6/30/2022 at 7:19 PM

## 2022-06-30 NOTE — ANESTHESIA PROCEDURE NOTES
Airway       Patient location during procedure: OR       Procedure Start/Stop Times: 6/30/2022 1:27 PM  Staff -        CRNA: Lanie Calderón APRN CRNA       Performed By: CRNAIndications and Patient Condition       Indications for airway management: geovanna-procedural, airway protection and altered level of consciousness       Induction type:intravenous       Mask difficulty assessment: 1 - vent by mask    Final Airway Details       Final airway type: endotracheal airway       Successful airway: ETT and ETT - single  Endotracheal Airway Details        ETT size (mm): 7.0       Cuffed: yes       Successful intubation technique: asleep and video laryngoscopy       VL Blade Size: MAC 3       Grade View of Cords: 1       Adjucts: stylet       Position: Right       Measured from: teeth       Secured at (cm): 21    Post intubation assessment        Placement verified by: capnometry, equal breath sounds and chest rise        Secured with: silk tape       Ease of procedure: easy       Dentition: Unchanged    Medication(s) Administered   Medication Administration Time: 6/30/2022 1:27 PM

## 2022-06-30 NOTE — INTERVAL H&P NOTE
The History and Physical has been reviewed, the patient has been examined and no changes have occurred in the patient's condition since the H & P was completed.        Clinical Conditions Present on Arrival:  Clinically Significant Risk Factors Present on Admission               # Hypoalbuminemia: Albumin = 2.9 g/dL (Ref range: 3.4 - 5.0 g/dL) on admission, will monitor as appropriate

## 2022-07-01 VITALS
HEART RATE: 84 BPM | BODY MASS INDEX: 32.65 KG/M2 | DIASTOLIC BLOOD PRESSURE: 56 MMHG | OXYGEN SATURATION: 93 % | RESPIRATION RATE: 16 BRPM | TEMPERATURE: 98.5 F | SYSTOLIC BLOOD PRESSURE: 113 MMHG | WEIGHT: 210 LBS

## 2022-07-01 PROCEDURE — 250N000013 HC RX MED GY IP 250 OP 250 PS 637: Performed by: SURGERY

## 2022-07-01 RX ORDER — OXYCODONE HYDROCHLORIDE 5 MG/1
5 TABLET ORAL EVERY 6 HOURS PRN
Qty: 12 TABLET | Refills: 0 | Status: SHIPPED | OUTPATIENT
Start: 2022-07-01 | End: 2022-07-04

## 2022-07-01 RX ADMIN — OXYCODONE HYDROCHLORIDE 10 MG: 5 TABLET ORAL at 12:16

## 2022-07-01 RX ADMIN — CITALOPRAM 40 MG: 40 TABLET ORAL at 07:50

## 2022-07-01 RX ADMIN — FAMOTIDINE 20 MG: 20 TABLET, FILM COATED ORAL at 07:49

## 2022-07-01 RX ADMIN — OXYCODONE HYDROCHLORIDE 5 MG: 5 TABLET ORAL at 04:04

## 2022-07-01 RX ADMIN — OXYCODONE HYDROCHLORIDE 10 MG: 5 TABLET ORAL at 07:48

## 2022-07-01 NOTE — PROGRESS NOTES
[facility] DISCHARGE NOTE    Patient discharged to home at 2:46 PM via wheel chair. Accompanied by significant other and staff. Discharge instructions reviewed with patient, opportunity offered to ask questions. Handout to record measurments of DAWIT given and cups for measuring.  This writer showed pt how to empty and strip JPs per Dr Patel verbal order. Prescriptions sent to patients preferred pharmacy. All belongings sent with patient.    Nubia Ureña RN

## 2022-07-01 NOTE — PLAN OF CARE
/55   Pulse 69   Temp 98.3  F (36.8  C) (Oral)   Resp 17   Wt 95.3 kg (210 lb)   LMP  (LMP Unknown)   SpO2 94%   BMI 32.65 kg/m       Neuros:  A&Ox4. Able to make needs known.   Activity: SBA.  Cardiac:  WNL. Denies cardiac chest pain   Respiratory:  WNL. Sating well on RA. Denies SOB  Diet: Regular-tolerating  GI/Gu:  Voiding without difficulty.No BM this shift.+BS Denies N/V  Skin/Incisions: Bilateral breast incisions covered with ace wrap. No drainage noted.   LDA: R PIV SL. SL chest port. Bilateral DAWIT drains with bloody red output  Pain: Incisional pain.   PRN: Oxy X2  Changes:  No acute changes this shift   Plan: Continue POC. Probable discharge this afternoon.      ALLEGRA BOLES, RN on 7/1/2022 at 4:22 AM

## 2022-07-01 NOTE — DISCHARGE SUMMARY
[facility]    Discharge Summary  Surgery    Date of Admission:  6/30/2022  Date of Discharge:  7/1/2022  1:29 PM  Discharging Provider: Baldemar Marina DO    Discharge Diagnoses   Patient Active Problem List   Diagnosis    Osteopenia of multiple sites    Restless legs syndrome    Lower extremity edema    Sciatica of right side    Chronic, continuous use of opioids    Situational anxiety    Cervical high risk HPV (human papillomavirus) test positive    Malignant neoplasm of upper-inner quadrant of right breast in female, estrogen receptor negative (triple-neg., germline test neg. 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)    S/P breast reconstruction, bilateral       Procedure/Surgery Information   Procedure: Procedure(s):  MASTECTOMY, SIMPLE  MASTECTOMY, SIMPLE, WITH SENTINEL LYMPH NODE DISSECTION  RECONSTRUCTION, BREAST, BILATERAL, WITH TISSUE EXPANDER INSERTION   Surgeon(s): Surgeon(s) and Role:  Panel 1:     * Baldemar Marina DO - Primary     * Tomer Chirinos MD - Assisting     * Dread Messer PA-C - Assisting  Panel 2:     * ROSEMARIE Nieto MD - Primary   Specimens: ID Type Source Tests Collected by Time Destination   1 :  Tissue Breast, Right SURGICAL PATHOLOGY EXAM Baldemar Marina,  6/30/2022  2:50 PM    2 : sentinel node number 1 Tissue Lymph Node(s) Cecilia, Breast, Right SURGICAL PATHOLOGY EXAM Baldemar Marina,  6/30/2022  2:54 PM    3 :  Tissue Breast, Left SURGICAL PATHOLOGY EXAM Baldemar Marina,  6/30/2022  3:11 PM       Non-operative procedures None performed     History of Present Illness   Diana Salvador is a 57 year old female who presented with breast cancer    Hospital Course   Diana Salvador was admitted on 6/30/2022.  The following problems were addressed during her  hospitalization:  Breast cancer: patient underwent bilateral mastectomy with right sentinel lymph node biopsy.  She had expander placement by plastic surgery.  She was admitted for observation.  Her pain was controlled.  She was stable for discharge on POD 1.      Post-operative pain control: included Hydromorphone (dilaudid) IV and will be Oxycodone on discharge.   # Discharge Pain Plan:   - During her hospitalization, Diana experienced pain due to surgery.  The pain plan for discharge was discussed with Diana and the plan was created in a collaborative fashion.    - Opioids prescribed on discharge: Roxicodone  - Duration of opioids after discharge: Per CDC opioid prescribing guidelines, a 3 day prescription of opioids was provided.  - Bowel regimen: docusate      Medications discontinued or adjusted during this hospitalization: No change     Antibiotics prescribed at discharge: Bactrim DS per plastic surgery     Imaging study follow up needs:   -None performed    Significant Findings: Single sentinel node    Baldemar Marina DO    Discharge Disposition   Discharged to home   Condition at discharge: Stable    Pending Results   Final pathology results: Pending at time of discharge  These results will be followed up by Baldemar Marina DO   Unresulted Labs Ordered in the Past 30 Days of this Admission       Date and Time Order Name Status Description    6/30/2022  2:51 PM Surgical Pathology Exam In process             Primary Care Physician   Quoc Chu    Physical Exam   Temp: 98.5  F (36.9  C) Temp src: Oral BP: 113/56 Pulse: 84   Resp: 16 SpO2: 93 % O2 Device: None (Room air)    Vitals:    06/30/22 1144   Weight: 95.3 kg (210 lb)     Vital Signs with Ranges  Temp:  [97.7  F (36.5  C)-98.5  F (36.9  C)] 98.5  F (36.9  C)  Pulse:  [69-86] 84  Resp:  [8-20] 16  BP: (101-125)/(36-66) 113/56  SpO2:  [93 %-100 %] 93 %  I/O last 3 completed shifts:  In: 1800 [I.V.:1800]  Out: 315 [Drains:160;  Blood:155]    Constitutional: Alert, appropriate  Skin: Incision C/D/I    Consultations This Hospital Stay   [facility] IP CONSULT    Time Spent on this Encounter   I have spent less than 30 minutes on this discharge.    Discharge Orders      When to call - Contact Surgeon Team    You may experience symptoms that require follow-up before your scheduled appointment. Contact your Surgeon Team if you are concerned about pain control, large amount of bleeding, blood clots, constipation, or if you experience signs of infection (fever, growing tenderness at the surgery site, a large amount of drainage, severe pain, foul-smelling drainage, redness or swelling.     When to call - Reach out to Urgent Care    If you are experiencing uncontrolled Nausea and Vomiting, uncontrolled pain, inability to urinate and uncomfortable, and in need of immediate care, and you are NOT able to reach your Surgeon Team, go to an Urgent Care clinic. Do NOT go to the Emergency Room unless you have shortness of breath, chest pain, or other signs of a medical emergency.     When to call - Reasons to Call 911    Call 911 immediately if you experience sudden-onset chest pain, arm weakness/numbness, slurred speech, or shortness of breath     Symptoms - Fever Management    A low grade fever can be expected after surgery. Your Provider many have prescribed an Opioid pain medication that also contains acetaminophen (TYLENOL) that may help with Fever management.  Do NOT take additional acetaminophen (TYLENOL) in combination with an Opioid/acetaminophen (TYLENOL) product. Read the labels on your Over The Counter (OTC) medications with care.     Symptoms - Reduced Urine Output    If it has been greater than 8 hours since you have urinated despite drinking plenty of water, call your Surgeon Team.     No driving or operating machinery    Do NOT drive any vehicle or operate mechanical equipment for 24 hours following the end of your surgery.   Even though you may feel normal, your reactions may be affected by Anesthesia medication you received.     No Alcohol    Do NOT drink alcoholic beverages for 24 hours following your surgery and while taking pain medications.     Diet Instructions    Follow your surgeon's orders for any diet restrictions.  If you did not receive any diet restrictions, you may drink clear liquids (apple juice, ginger ale, 7-up, broth, etc.), and progress to your regular diet as you feel able. It is important to stay well-hydrated after surgery and drink plenty of water.     Discharge Instructions - Comfort and Pain Management    Pain after surgery is normal and expected. You will have some amount of pain after surgery. Your pain will improve with time. There are several things you can do to help reduce your pain including: rest, ice, and using pain medications as needed. Use pain interventions and don't wait until pain level is out of control. Contact your Surgeon Team if you have pain that persists or worsens after surgery despite rest, ice, and taking your medication(s) as prescribed. You may have a dry mouth, a sore throat, muscles aches or trouble sleeping, and these symptoms should go away after 24 hours.     Discharge Instructions - Rest    Rest and relax for the next 24 hours. Make arrangements to have someone stay with you overnight, and avoid hazardous and strenuous activities.  Do NOT make any important decisions for the next 24 hours.     Shower/Bathing - No restrictions, may shower after 24 hours    Shower/Bathing - No restrictions, may shower after 24 hours.     NO Dressing / Wound Care - Wound     Other Activity restrictions (specify):    Other Activity restrictions (specify): As in post op instructions     Reason for your hospital stay    Breast cancer     Follow-up and recommended labs and tests     Follow up with me,  Baldemar Marina DO, within 2 weeks. to evaluate after surgery.  No follow up labs or test are  needed.     Activity    Your activity upon discharge: Per Dr. Nieto's instructions     Diet    Follow this diet upon discharge: Orders Placed This Encounter      Regular Diet Adult     Discharge Medications   Current Discharge Medication List        START taking these medications    Details   acetaminophen (TYLENOL) 325 MG tablet Take 2 tablets (650 mg) by mouth every 4 hours as needed for other (mild pain)  Qty: 100 tablet, Refills: 0    Associated Diagnoses: S/P breast reconstruction, bilateral      oxyCODONE (ROXICODONE) 5 MG tablet Take 1 tablet (5 mg) by mouth every 6 hours as needed for pain  Qty: 12 tablet, Refills: 0    Associated Diagnoses: S/P breast reconstruction, bilateral      sulfamethoxazole-trimethoprim (BACTRIM DS) 800-160 MG tablet Take 1 tablet by mouth 2 times daily for 14 days  Qty: 28 tablet, Refills: 0    Associated Diagnoses: S/P breast reconstruction, bilateral           CONTINUE these medications which have NOT CHANGED    Details   alendronate (FOSAMAX) 35 MG tablet TAKE 1 TABLET(35 MG) BY MOUTH EVERY 7 DAYS  Qty: 12 tablet, Refills: 3    Associated Diagnoses: Osteoporosis, unspecified osteoporosis type, unspecified pathological fracture presence      citalopram (CELEXA) 40 MG tablet Take 1 tablet (40 mg) by mouth daily  Qty: 30 tablet, Refills: 3    Associated Diagnoses: Anxiety      cyanocobalamin (CYANOCOBALAMIN) 1000 MCG/ML injection Inject 1 mL (1,000 mcg) into the muscle every 30 days  Qty: 3 mL, Refills: 3    Comments: Dispense 3 1mL syringe and 1inch 25g needle along with vials  Associated Diagnoses: Vitamin B12 deficiency (non anemic)      cyclobenzaprine (FLEXERIL) 5 MG tablet TAKE 1 TABLET(5 MG) BY MOUTH TWICE DAILY AS NEEDED FOR MUSCLE SPASMS  Qty: 40 tablet, Refills: 3    Associated Diagnoses: Acute right-sided low back pain with right-sided sciatica      diclofenac (VOLTAREN) 1 % topical gel Place 2 g onto the skin 4 times daily  Qty: 100 g, Refills: 3    Associated  Diagnoses: Acute right-sided low back pain with right-sided sciatica      DULoxetine (CYMBALTA) 30 MG capsule Take 1 capsule (30 mg) by mouth At Bedtime for 7 days, THEN 2 capsules (60 mg) At Bedtime for 23 days.  Qty: 53 capsule, Refills: 1    Associated Diagnoses: Chemotherapy-induced neuropathy (H)      furosemide (LASIX) 20 MG tablet Take 1 tablet (20 mg) by mouth daily as needed (swelling)  Qty: 60 tablet, Refills: 5    Comments: Patient will call for fill.  Associated Diagnoses: Lower extremity edema      HYDROcodone-acetaminophen (NORCO) 5-325 MG tablet Take 1 tablet by mouth every 6 hours as needed for pain  Qty: 60 tablet, Refills: 0      loratadine (CLARITIN) 10 MG tablet Take 1 tablet (10 mg) by mouth daily  Qty: 90 tablet, Refills: 1    Associated Diagnoses: Rash      LORazepam (ATIVAN) 0.5 MG tablet Take 1-2 tablets (0.5-1 mg) by mouth every 8 hours as needed for anxiety  Qty: 60 tablet, Refills: 3    Associated Diagnoses: Situational anxiety      omeprazole (PRILOSEC) 20 MG DR capsule As needed      !! prochlorperazine (COMPAZINE) 10 MG tablet Take 1 tablet (10 mg) by mouth every 6 hours as needed (Nausea/Vomiting)  Qty: 30 tablet, Refills: 5    Associated Diagnoses: Chemotherapy-induced neutropenia (H); Encounter for antineoplastic chemotherapy; Malignant neoplasm of upper-inner quadrant of right breast in female, estrogen receptor negative (H)      !! prochlorperazine (COMPAZINE) 10 MG tablet Take 1 tablet (10 mg) by mouth every 6 hours as needed (Nausea/Vomiting)  Qty: 30 tablet, Refills: 2    Associated Diagnoses: Encounter for antineoplastic chemotherapy; Malignant neoplasm of right breast in female, estrogen receptor negative, unspecified site of breast (H)      rOPINIRole (REQUIP) 1 MG tablet TAKE 1 TABLET(1 MG) BY MOUTH AT BEDTIME  Qty: 90 tablet, Refills: 0    Associated Diagnoses: Restless legs syndrome      sucralfate (CARAFATE) 1 GM tablet Take 1 tablet (1 g) by mouth 4 times daily as  needed (Abdominal discomfort)  Qty: 30 tablet, Refills: 3    Associated Diagnoses: Abdominal pain, epigastric; Gastroesophageal reflux disease without esophagitis      !! traMADol (ULTRAM) 50 MG tablet Take 50 mg by mouth every 6 hours as needed for severe pain      !! traMADol (ULTRAM) 50 MG tablet Take 1 tablet (50 mg) by mouth every 6 hours as needed for severe pain  Qty: 120 tablet, Refills: 0    Associated Diagnoses: Chronic, continuous use of opioids; Sciatica of right side      !! traMADol (ULTRAM) 50 MG tablet Take 1 tablet (50 mg) by mouth every 6 hours as needed for severe pain  Qty: 120 tablet, Refills: 0    Associated Diagnoses: Chronic, continuous use of opioids; Sciatica of right side      !! traMADol (ULTRAM) 50 MG tablet Take 1 tablet (50 mg) by mouth every 6 hours as needed for severe pain  Qty: 120 tablet, Refills: 0    Associated Diagnoses: Chronic, continuous use of opioids; Sciatica of right side      triamcinolone (KENALOG) 0.1 % external cream Apply topically 2 times daily Apply to rash until improved  Qty: 30 g, Refills: 1    Associated Diagnoses: Rash      valACYclovir (VALTREX) 1000 mg tablet TAKE 1 TABLET(1000 MG) BY MOUTH TWICE DAILY. REPEAT AS NEEDED FOR COLD SORE  Qty: 16 tablet, Refills: 3    Associated Diagnoses: Cold sore      vitamin D3 (CHOLECALCIFEROL) 50 mcg (2000 units) tablet Take 1 tablet (50 mcg) by mouth daily  Qty: 90 tablet, Refills: 3    Associated Diagnoses: History of Orestes-en-Y gastric bypass; Vitamin D deficiency      doxepin (SILENOR) 3 MG tablet Take 1 tablet (3 mg) by mouth At Bedtime  Qty: 90 tablet, Refills: 1    Associated Diagnoses: Insomnia, unspecified type      lidocaine-prilocaine (EMLA) 2.5-2.5 % external cream Apply thick layer to port site and cover with clear dressing or saran wrap 30-60 mins prior to appt.  Qty: 30 g, Refills: 0    Associated Diagnoses: Encounter for antineoplastic chemotherapy      potassium chloride ER (K-TAB) 20 MEQ CR tablet Take 1  tablet (20 mEq) by mouth daily  Qty: 90 tablet, Refills: 1    Associated Diagnoses: Hypokalemia       !! - Potential duplicate medications found. Please discuss with provider.        Allergies   No Known Allergies  Data

## 2022-07-05 ENCOUNTER — MYC REFILL (OUTPATIENT)
Dept: FAMILY MEDICINE | Facility: CLINIC | Age: 57
End: 2022-07-05

## 2022-07-05 DIAGNOSIS — Z98.890 S/P BREAST RECONSTRUCTION, BILATERAL: ICD-10-CM

## 2022-07-05 RX ORDER — OXYCODONE HYDROCHLORIDE 5 MG/1
5 TABLET ORAL EVERY 6 HOURS PRN
Qty: 12 TABLET | Refills: 0 | Status: CANCELLED | OUTPATIENT
Start: 2022-07-05

## 2022-07-06 ENCOUNTER — MYC REFILL (OUTPATIENT)
Dept: FAMILY MEDICINE | Facility: CLINIC | Age: 57
End: 2022-07-06

## 2022-07-06 DIAGNOSIS — Z98.890 S/P BREAST RECONSTRUCTION, BILATERAL: ICD-10-CM

## 2022-07-06 DIAGNOSIS — C50.911 INFILTRATING DUCTAL CARCINOMA OF RIGHT BREAST (H): Primary | ICD-10-CM

## 2022-07-06 RX ORDER — OXYCODONE HYDROCHLORIDE 5 MG/1
5 TABLET ORAL EVERY 4 HOURS PRN
Qty: 18 TABLET | Refills: 0 | Status: SHIPPED | OUTPATIENT
Start: 2022-07-06 | End: 2022-07-09

## 2022-07-06 RX ORDER — OXYCODONE HYDROCHLORIDE 5 MG/1
5 TABLET ORAL EVERY 6 HOURS PRN
Qty: 12 TABLET | Refills: 0 | Status: CANCELLED | OUTPATIENT
Start: 2022-07-06

## 2022-07-11 DIAGNOSIS — F41.9 ANXIETY: ICD-10-CM

## 2022-07-11 DIAGNOSIS — Z51.11 ENCOUNTER FOR ANTINEOPLASTIC CHEMOTHERAPY: ICD-10-CM

## 2022-07-11 RX ORDER — CITALOPRAM HYDROBROMIDE 40 MG/1
40 TABLET ORAL DAILY
Qty: 90 TABLET | Refills: 0 | Status: SHIPPED | OUTPATIENT
Start: 2022-07-11 | End: 2023-02-03

## 2022-07-11 RX ORDER — LIDOCAINE/PRILOCAINE 2.5 %-2.5%
CREAM (GRAM) TOPICAL
Qty: 30 G | Refills: 0 | Status: SHIPPED | OUTPATIENT
Start: 2022-07-11 | End: 2023-10-03

## 2022-07-12 ENCOUNTER — E-VISIT (OUTPATIENT)
Dept: FAMILY MEDICINE | Facility: CLINIC | Age: 57
End: 2022-07-12
Payer: OTHER GOVERNMENT

## 2022-07-12 ENCOUNTER — OFFICE VISIT (OUTPATIENT)
Dept: FAMILY MEDICINE | Facility: CLINIC | Age: 57
End: 2022-07-12
Payer: OTHER GOVERNMENT

## 2022-07-12 VITALS
SYSTOLIC BLOOD PRESSURE: 133 MMHG | DIASTOLIC BLOOD PRESSURE: 86 MMHG | HEART RATE: 80 BPM | TEMPERATURE: 98.1 F | WEIGHT: 210 LBS | OXYGEN SATURATION: 99 % | RESPIRATION RATE: 20 BRPM | BODY MASS INDEX: 32.96 KG/M2 | HEIGHT: 67 IN

## 2022-07-12 DIAGNOSIS — Z17.1 MALIGNANT NEOPLASM OF UPPER-INNER QUADRANT OF RIGHT BREAST IN FEMALE, ESTROGEN RECEPTOR NEGATIVE (H): ICD-10-CM

## 2022-07-12 DIAGNOSIS — I80.8 SUPERFICIAL THROMBOPHLEBITIS OF LEFT UPPER EXTREMITY: Primary | ICD-10-CM

## 2022-07-12 DIAGNOSIS — C50.211 MALIGNANT NEOPLASM OF UPPER-INNER QUADRANT OF RIGHT BREAST IN FEMALE, ESTROGEN RECEPTOR NEGATIVE (H): ICD-10-CM

## 2022-07-12 DIAGNOSIS — T14.8XXA HEMATOMA: Primary | ICD-10-CM

## 2022-07-12 DIAGNOSIS — I82.619 SUPERFICIAL VENOUS THROMBOSIS OF UPPER EXTREMITY, UNSPECIFIED LATERALITY: ICD-10-CM

## 2022-07-12 PROCEDURE — 99207 PR NON-BILLABLE SERV PER CHARTING: CPT | Performed by: FAMILY MEDICINE

## 2022-07-12 PROCEDURE — 99214 OFFICE O/P EST MOD 30 MIN: CPT | Performed by: FAMILY MEDICINE

## 2022-07-12 RX ORDER — OXYCODONE HYDROCHLORIDE 5 MG/1
5 TABLET ORAL EVERY 6 HOURS PRN
Qty: 30 TABLET | Refills: 0 | Status: SHIPPED | OUTPATIENT
Start: 2022-07-12 | End: 2022-07-19

## 2022-07-12 RX ORDER — APIXABAN 5 MG (74)
KIT ORAL
Qty: 74 EACH | Refills: 0 | Status: SHIPPED | OUTPATIENT
Start: 2022-07-12 | End: 2022-08-11

## 2022-07-12 ASSESSMENT — ANXIETY QUESTIONNAIRES
4. TROUBLE RELAXING: NEARLY EVERY DAY
3. WORRYING TOO MUCH ABOUT DIFFERENT THINGS: NEARLY EVERY DAY
5. BEING SO RESTLESS THAT IT IS HARD TO SIT STILL: NEARLY EVERY DAY
4. TROUBLE RELAXING: NEARLY EVERY DAY
6. BECOMING EASILY ANNOYED OR IRRITABLE: NEARLY EVERY DAY
5. BEING SO RESTLESS THAT IT IS HARD TO SIT STILL: NEARLY EVERY DAY
GAD7 TOTAL SCORE: 18
7. FEELING AFRAID AS IF SOMETHING AWFUL MIGHT HAPPEN: NOT AT ALL
3. WORRYING TOO MUCH ABOUT DIFFERENT THINGS: NEARLY EVERY DAY
1. FEELING NERVOUS, ANXIOUS, OR ON EDGE: NEARLY EVERY DAY
GAD7 TOTAL SCORE: 18
6. BECOMING EASILY ANNOYED OR IRRITABLE: NEARLY EVERY DAY
GAD7 TOTAL SCORE: 18
8. IF YOU CHECKED OFF ANY PROBLEMS, HOW DIFFICULT HAVE THESE MADE IT FOR YOU TO DO YOUR WORK, TAKE CARE OF THINGS AT HOME, OR GET ALONG WITH OTHER PEOPLE?: EXTREMELY DIFFICULT
2. NOT BEING ABLE TO STOP OR CONTROL WORRYING: NEARLY EVERY DAY
GAD7 TOTAL SCORE: 18
1. FEELING NERVOUS, ANXIOUS, OR ON EDGE: NEARLY EVERY DAY
7. FEELING AFRAID AS IF SOMETHING AWFUL MIGHT HAPPEN: NOT AT ALL
7. FEELING AFRAID AS IF SOMETHING AWFUL MIGHT HAPPEN: NOT AT ALL
2. NOT BEING ABLE TO STOP OR CONTROL WORRYING: NEARLY EVERY DAY

## 2022-07-12 ASSESSMENT — PAIN SCALES - GENERAL: PAINLEVEL: EXTREME PAIN (8)

## 2022-07-12 NOTE — PATIENT INSTRUCTIONS
Thank you for choosing us for your care. Based on the information provided, I believe you need to be seen today.  Please go the emergency department as soon as possible.   This is likely a venous clot, but You may need an ultrasound to know how big a potential clot in the vein is.    You will not be charged for this eVisit.

## 2022-07-12 NOTE — PROGRESS NOTES
Assessment & Plan     Superficial thrombophlebitis of left upper extremity  See comment below  - Apixaban Starter Pack (ELIQUIS DVT/PE STARTER PACK) 5 MG TBPK  Dispense: 74 each; Refill: 0  - oxyCODONE (ROXICODONE) 5 MG tablet  Dispense: 30 tablet; Refill: 0  - US Upper Extremity Venous Duplex Left    Malignant neoplasm of upper-inner quadrant of right breast in female, estrogen receptor negative (triple-neg., germline test neg. for BRCA 1/2)  With very recent surgery, hospitalization, and IV placement.  - All of these are contributing factors to PROVOKED superficial VTE she has above but also, she is at higher risk therefore she should continue for a minimum of 3 months.    Return in about 2 weeks (around 7/26/2022) for Follow up blood clots.    Nikki Estrada MD  [facility]            Stephie Noble is a 57 year old accompanied by her self, presenting for the following health issues:  Hospital F/U and Mass (Left arm )      HPI       Hospital Follow-up Visit:    Hospital/Nursing Home/IP Rehab Facility: [facility]  Date of Admission: 6-30-22  Date of Discharge: 7-1-22  Reason(s) for Admission: Mastectomy    Was your hospitalization related to COVID-19? No   Problems taking medications regularly:  None  Medication changes since discharge: None  Problems adhering to non-medication therapy:  None    Summary of hospitalization:  [facility] discharge summary reviewed  Diagnostic Tests/Treatments reviewed.  Follow up needed: none  Other Healthcare Providers Involved in Patient s Care:         Is following with Breast,Onc   Update since discharge: stable. Except for the new complaint  Medications reconciled but she will add new meds listed above    Plan of care communicated with patient    Concern - Left arm pain and swollen   Onset: 6-30 and has been increasing   Description: Patient had and IV in L arm for her surgery on 6-30-22 she is having arm  "pain it is warm to the touch and swollen     Intensity: moderate  Progression of Symptoms:  Worsening getting larger   Accompanying Signs & Symptoms: Patient had IV placed in arm for her surgery on 6-30-22  Previous history of similar problem: no  Precipitating factors:        Worsened by: Pain swelling is getting worse   Alleviating factors:        Improved by: it is not improving   Therapies tried and outcome: Patient has been doing heat, cold tylenol and ibuprofen she is getting no relief     Review of Systems   Constitutional, HEENT, cardiovascular, pulmonary, gi and gu systems are negative, except as otherwise noted.      Objective    /86 (BP Location: Right leg)   Pulse 80   Temp 98.1  F (36.7  C) (Tympanic)   Resp 20   Ht 1.702 m (5' 7\")   Wt 95.3 kg (210 lb)   LMP  (LMP Unknown)   SpO2 99%   BMI 32.89 kg/m    Body mass index is 32.89 kg/m .  Physical Exam   GENERAL: appears chronically ill but not toxic and alert. Drains are in place, draining serosanguinous fluid. L arm slightly swollen but not induration, not pitting, not red. Palpable cord that goes all the way up from forearm to axilla  RESP: normal respiratory effort, speaking in complete sentences  MS: no gross musculoskeletal defects noted, no edema  PSYCH: mentation appears normal, affect normal/bright            .  ..  Answers for HPI/ROS submitted by the patient on 7/12/2022  SHADE 7 TOTAL SCORE: 18      "

## 2022-07-15 ENCOUNTER — OFFICE VISIT (OUTPATIENT)
Dept: SURGERY | Facility: CLINIC | Age: 57
End: 2022-07-15
Payer: OTHER GOVERNMENT

## 2022-07-15 DIAGNOSIS — Z98.890 S/P BREAST RECONSTRUCTION, BILATERAL: Primary | ICD-10-CM

## 2022-07-15 PROCEDURE — 99024 POSTOP FOLLOW-UP VISIT: CPT | Performed by: PLASTIC SURGERY

## 2022-07-15 ASSESSMENT — PAIN SCALES - GENERAL: PAINLEVEL: SEVERE PAIN (7)

## 2022-07-15 NOTE — PROGRESS NOTES
PRESENTING COMPLAINT:  Postoperative visit status post right breast cancer, underwent bilateral nipple-nonsparing mastectomy and immediate expander-based reconstruction done on 06/30/2022.    HISTORY OF PRESENTING COMPLAINT:  Ms. Salvador is 57 years old.  She is a couple of weeks out from her surgery.  This was done at [facility].  The patient has had a complication in the interim with a left upper extremity superficial thrombophlebitis.  I do not see any ultrasound to rule out a DVT, but nonetheless, she was placed on apixaban for superficial thrombophlebitis.  Her DAWIT drainage is about 50-70 cc a day.  She is otherwise doing well.    PHYSICAL EXAMINATION:  Vital signs stable.  She is afebrile, in no obvious distress.  She has a wrap along her left arm.  Her incisions are healing in well.  No evidence of infection, seroma, hematoma.  DAWIT drainage is serous.    ASSESSMENT AND PLAN:  Based on the above findings, a diagnosis of bilateral breast reconstruction for breast cancer was made.  Plan is to keep the drains in another week, see my nurse for removal next week.  Then a week after that, see my nurse for expansion and then see me back once her expansion is complete to plan the next stages of reconstruction.  The patient has already had an abdominoplasty in the past; therefore, abdominally-based free flap reconstructions are not available to us.  Her options will either be implant-based reconstruction or a latissimus with implant-based reconstruction, which most likely will be the way to proceed if the skin quality is not adequate.  This was discussed with the patient.  She understood.  I will see her back in a few weeks' time.  She will continue to moisturize her chest and proceed as discussed above.  She needs to discuss the apixaban duration with her hematologist.

## 2022-07-15 NOTE — LETTER
7/15/2022         RE: Diana Salvador  [address] [place] [id number]        Dear Colleague,    Thank you for referring your patient, Diana Salvador, to the [facility]. Please see a copy of my visit note below.    PRESENTING COMPLAINT:  Postoperative visit status post right breast cancer, underwent bilateral nipple-nonsparing mastectomy and immediate expander-based reconstruction done on 06/30/2022.    HISTORY OF PRESENTING COMPLAINT:  Ms. Salvador is 57 years old.  She is a couple of weeks out from her surgery.  This was done at [facility].  The patient has had a complication in the interim with a left upper extremity superficial thrombophlebitis.  I do not see any ultrasound to rule out a DVT, but nonetheless, she was placed on apixaban for superficial thrombophlebitis.  Her DAWIT drainage is about 50-70 cc a day.  She is otherwise doing well.    PHYSICAL EXAMINATION:  Vital signs stable.  She is afebrile, in no obvious distress.  She has a wrap along her left arm.  Her incisions are healing in well.  No evidence of infection, seroma, hematoma.  DAWIT drainage is serous.    ASSESSMENT AND PLAN:  Based on the above findings, a diagnosis of bilateral breast reconstruction for breast cancer was made.  Plan is to keep the drains in another week, see my nurse for removal next week.  Then a week after that, see my nurse for expansion and then see me back once her expansion is complete to plan the next stages of reconstruction.  The patient has already had an abdominoplasty in the past; therefore, abdominally-based free flap reconstructions are not available to us.  Her options will either be implant-based reconstruction or a latissimus with implant-based reconstruction, which most likely will be the way to proceed if the skin quality is not adequate.  This was discussed with the patient.  She understood.  I will see her back in a few weeks' time.  She will continue to moisturize her  chest and proceed as discussed above.  She needs to discuss the apixaban duration with her hematologist.          Again, thank you for allowing me to participate in the care of your patient.        Sincerely,        ROSEMARIE Nieto MD

## 2022-07-15 NOTE — NURSING NOTE
Diana Salvador's chief complaint for this visit includes:  Chief Complaint   Patient presents with     Surgical Followup     2 weeks S/p MASTECTOMY, SIMPLE, WITH SENTINEL LYMPH NODE DISSECTION, RIGHT -simple mastectomy left - Dr Hendrickson & Dr. Leono - RECONSTRUCTION, BREAST, BILATERAL, WITH TISSUE EXPANDER INSERTION - Dr. Nieto DOS: 6/30/2022     PCP: Quoc Chu    Referring Provider:  ROSEMARIE Nieto MD  [address]  [place] [address]    LMP  (LMP Unknown)   Severe Pain (7)      No Known Allergies      Do you need any medication refills at today's visit? No    Abbie Haley CMA

## 2022-07-19 ENCOUNTER — MYC MEDICAL ADVICE (OUTPATIENT)
Dept: DERMATOLOGY | Facility: CLINIC | Age: 57
End: 2022-07-19

## 2022-07-19 ENCOUNTER — TELEPHONE (OUTPATIENT)
Dept: FAMILY MEDICINE | Facility: CLINIC | Age: 57
End: 2022-07-19

## 2022-07-19 DIAGNOSIS — M54.41 ACUTE RIGHT-SIDED LOW BACK PAIN WITH RIGHT-SIDED SCIATICA: ICD-10-CM

## 2022-07-19 DIAGNOSIS — G47.00 INSOMNIA, UNSPECIFIED TYPE: ICD-10-CM

## 2022-07-19 RX ORDER — DOXEPIN 3 MG/1
3 TABLET, FILM COATED ORAL AT BEDTIME
Qty: 90 TABLET | Refills: 1 | Status: CANCELLED | OUTPATIENT
Start: 2022-07-19

## 2022-07-19 RX ORDER — CYCLOBENZAPRINE HCL 5 MG
TABLET ORAL
Qty: 40 TABLET | Refills: 3 | Status: CANCELLED | OUTPATIENT
Start: 2022-07-19

## 2022-07-19 NOTE — TELEPHONE ENCOUNTER
[facility] called to says they have faxed prescription refill request 3 times, no documentation seen in chart, they did not have correct fax number. Correct fax number given. Pt had requested that medications be filled through [facility] due to insurance.

## 2022-07-19 NOTE — TELEPHONE ENCOUNTER
[facility] message sent to pt in regards to message below..ROSEMARIE Gomez RN, MD Eastman, Colleen E, RN; Brea Bermudez LPN  Hi     Plan is:     1. Next week see Nurse Visit to remove JPs   2. See Rochelle the following week for expansion   3. See me after expansion complete     Thanks     Al

## 2022-07-21 ENCOUNTER — ONCOLOGY VISIT (OUTPATIENT)
Dept: ONCOLOGY | Facility: CLINIC | Age: 57
End: 2022-07-21
Attending: INTERNAL MEDICINE
Payer: OTHER GOVERNMENT

## 2022-07-21 ENCOUNTER — APPOINTMENT (OUTPATIENT)
Dept: LAB | Facility: CLINIC | Age: 57
End: 2022-07-21
Payer: OTHER GOVERNMENT

## 2022-07-21 ENCOUNTER — TELEPHONE (OUTPATIENT)
Dept: SURGERY | Facility: CLINIC | Age: 57
End: 2022-07-21

## 2022-07-21 ENCOUNTER — LAB (OUTPATIENT)
Dept: INFUSION THERAPY | Facility: CLINIC | Age: 57
End: 2022-07-21
Attending: INTERNAL MEDICINE
Payer: OTHER GOVERNMENT

## 2022-07-21 ENCOUNTER — PATIENT OUTREACH (OUTPATIENT)
Dept: ONCOLOGY | Facility: CLINIC | Age: 57
End: 2022-07-21

## 2022-07-21 VITALS
HEART RATE: 84 BPM | RESPIRATION RATE: 12 BRPM | OXYGEN SATURATION: 100 % | TEMPERATURE: 99.3 F | WEIGHT: 205 LBS | BODY MASS INDEX: 32.11 KG/M2 | DIASTOLIC BLOOD PRESSURE: 101 MMHG | SYSTOLIC BLOOD PRESSURE: 142 MMHG

## 2022-07-21 DIAGNOSIS — Z17.1 MALIGNANT NEOPLASM OF RIGHT BREAST IN FEMALE, ESTROGEN RECEPTOR NEGATIVE, UNSPECIFIED SITE OF BREAST (H): ICD-10-CM

## 2022-07-21 DIAGNOSIS — T45.1X5A CHEMOTHERAPY-INDUCED NEUTROPENIA (H): ICD-10-CM

## 2022-07-21 DIAGNOSIS — C50.911 MALIGNANT NEOPLASM OF RIGHT BREAST IN FEMALE, ESTROGEN RECEPTOR NEGATIVE, UNSPECIFIED SITE OF BREAST (H): ICD-10-CM

## 2022-07-21 DIAGNOSIS — C50.211 MALIGNANT NEOPLASM OF UPPER-INNER QUADRANT OF RIGHT BREAST IN FEMALE, ESTROGEN RECEPTOR NEGATIVE (H): Primary | ICD-10-CM

## 2022-07-21 DIAGNOSIS — D70.1 CHEMOTHERAPY-INDUCED NEUTROPENIA (H): ICD-10-CM

## 2022-07-21 DIAGNOSIS — Z17.1 MALIGNANT NEOPLASM OF UPPER-INNER QUADRANT OF RIGHT BREAST IN FEMALE, ESTROGEN RECEPTOR NEGATIVE (H): Primary | ICD-10-CM

## 2022-07-21 DIAGNOSIS — Z51.11 ENCOUNTER FOR ANTINEOPLASTIC CHEMOTHERAPY: ICD-10-CM

## 2022-07-21 LAB
ALBUMIN SERPL-MCNC: 3.3 G/DL (ref 3.4–5)
ALP SERPL-CCNC: 98 U/L (ref 40–150)
ALT SERPL W P-5'-P-CCNC: 15 U/L (ref 0–50)
ANION GAP SERPL CALCULATED.3IONS-SCNC: 7 MMOL/L (ref 3–14)
AST SERPL W P-5'-P-CCNC: 13 U/L (ref 0–45)
BASOPHILS # BLD AUTO: 0.1 10E3/UL (ref 0–0.2)
BASOPHILS NFR BLD AUTO: 1 %
BILIRUB SERPL-MCNC: 0.2 MG/DL (ref 0.2–1.3)
BUN SERPL-MCNC: 7 MG/DL (ref 7–30)
CALCIUM SERPL-MCNC: 8.9 MG/DL (ref 8.5–10.1)
CHLORIDE BLD-SCNC: 105 MMOL/L (ref 94–109)
CO2 SERPL-SCNC: 27 MMOL/L (ref 20–32)
CREAT SERPL-MCNC: 0.63 MG/DL (ref 0.52–1.04)
EOSINOPHIL # BLD AUTO: 0.3 10E3/UL (ref 0–0.7)
EOSINOPHIL NFR BLD AUTO: 6 %
ERYTHROCYTE [DISTWIDTH] IN BLOOD BY AUTOMATED COUNT: 12.8 % (ref 10–15)
GFR SERPL CREATININE-BSD FRML MDRD: >90 ML/MIN/1.73M2
GLUCOSE BLD-MCNC: 85 MG/DL (ref 70–99)
HCT VFR BLD AUTO: 36.9 % (ref 35–47)
HGB BLD-MCNC: 11.4 G/DL (ref 11.7–15.7)
IMM GRANULOCYTES # BLD: 0 10E3/UL
IMM GRANULOCYTES NFR BLD: 0 %
LYMPHOCYTES # BLD AUTO: 2.4 10E3/UL (ref 0.8–5.3)
LYMPHOCYTES NFR BLD AUTO: 43 %
MCH RBC QN AUTO: 29.8 PG (ref 26.5–33)
MCHC RBC AUTO-ENTMCNC: 30.9 G/DL (ref 31.5–36.5)
MCV RBC AUTO: 96 FL (ref 78–100)
MONOCYTES # BLD AUTO: 0.4 10E3/UL (ref 0–1.3)
MONOCYTES NFR BLD AUTO: 7 %
NEUTROPHILS # BLD AUTO: 2.5 10E3/UL (ref 1.6–8.3)
NEUTROPHILS NFR BLD AUTO: 43 %
NRBC # BLD AUTO: 0 10E3/UL
NRBC BLD AUTO-RTO: 0 /100
PLATELET # BLD AUTO: 315 10E3/UL (ref 150–450)
POTASSIUM BLD-SCNC: 3.7 MMOL/L (ref 3.4–5.3)
PROT SERPL-MCNC: 7.1 G/DL (ref 6.8–8.8)
RBC # BLD AUTO: 3.83 10E6/UL (ref 3.8–5.2)
SODIUM SERPL-SCNC: 139 MMOL/L (ref 133–144)
TSH SERPL DL<=0.005 MIU/L-ACNC: 0.49 MU/L (ref 0.4–4)
WBC # BLD AUTO: 5.7 10E3/UL (ref 4–11)

## 2022-07-21 PROCEDURE — 84443 ASSAY THYROID STIM HORMONE: CPT | Performed by: NURSE PRACTITIONER

## 2022-07-21 PROCEDURE — G0463 HOSPITAL OUTPT CLINIC VISIT: HCPCS | Mod: 25

## 2022-07-21 PROCEDURE — 250N000011 HC RX IP 250 OP 636: Performed by: INTERNAL MEDICINE

## 2022-07-21 PROCEDURE — 85004 AUTOMATED DIFF WBC COUNT: CPT | Performed by: NURSE PRACTITIONER

## 2022-07-21 PROCEDURE — 99215 OFFICE O/P EST HI 40 MIN: CPT | Performed by: INTERNAL MEDICINE

## 2022-07-21 PROCEDURE — G0463 HOSPITAL OUTPT CLINIC VISIT: HCPCS

## 2022-07-21 PROCEDURE — 80053 COMPREHEN METABOLIC PANEL: CPT | Performed by: NURSE PRACTITIONER

## 2022-07-21 PROCEDURE — 36591 DRAW BLOOD OFF VENOUS DEVICE: CPT

## 2022-07-21 PROCEDURE — 36593 DECLOT VASCULAR DEVICE: CPT

## 2022-07-21 RX ORDER — MEPERIDINE HYDROCHLORIDE 25 MG/ML
25 INJECTION INTRAMUSCULAR; INTRAVENOUS; SUBCUTANEOUS EVERY 30 MIN PRN
Status: CANCELLED | OUTPATIENT
Start: 2022-07-22

## 2022-07-21 RX ORDER — ALBUTEROL SULFATE 0.83 MG/ML
2.5 SOLUTION RESPIRATORY (INHALATION)
Status: CANCELLED | OUTPATIENT
Start: 2022-07-22

## 2022-07-21 RX ORDER — EPINEPHRINE 1 MG/ML
0.3 INJECTION, SOLUTION, CONCENTRATE INTRAVENOUS EVERY 5 MIN PRN
Status: CANCELLED | OUTPATIENT
Start: 2022-07-22

## 2022-07-21 RX ORDER — METHYLPREDNISOLONE SODIUM SUCCINATE 125 MG/2ML
125 INJECTION, POWDER, LYOPHILIZED, FOR SOLUTION INTRAMUSCULAR; INTRAVENOUS
Status: CANCELLED
Start: 2022-07-22

## 2022-07-21 RX ORDER — DIPHENHYDRAMINE HYDROCHLORIDE 50 MG/ML
50 INJECTION INTRAMUSCULAR; INTRAVENOUS
Status: CANCELLED
Start: 2022-07-22

## 2022-07-21 RX ORDER — LORAZEPAM 2 MG/ML
0.5 INJECTION INTRAMUSCULAR EVERY 4 HOURS PRN
Status: CANCELLED | OUTPATIENT
Start: 2022-07-22

## 2022-07-21 RX ORDER — HEPARIN SODIUM (PORCINE) LOCK FLUSH IV SOLN 100 UNIT/ML 100 UNIT/ML
5 SOLUTION INTRAVENOUS ONCE
Status: COMPLETED | OUTPATIENT
Start: 2022-07-21 | End: 2022-07-21

## 2022-07-21 RX ORDER — HEPARIN SODIUM (PORCINE) LOCK FLUSH IV SOLN 100 UNIT/ML 100 UNIT/ML
5 SOLUTION INTRAVENOUS
Status: CANCELLED | OUTPATIENT
Start: 2022-07-22

## 2022-07-21 RX ORDER — ALBUTEROL SULFATE 90 UG/1
1-2 AEROSOL, METERED RESPIRATORY (INHALATION)
Status: CANCELLED
Start: 2022-07-22

## 2022-07-21 RX ORDER — NALOXONE HYDROCHLORIDE 0.4 MG/ML
0.2 INJECTION, SOLUTION INTRAMUSCULAR; INTRAVENOUS; SUBCUTANEOUS
Status: CANCELLED | OUTPATIENT
Start: 2022-07-22

## 2022-07-21 RX ADMIN — ALTEPLASE 2 MG: 2.2 INJECTION, POWDER, LYOPHILIZED, FOR SOLUTION INTRAVENOUS at 15:14

## 2022-07-21 RX ADMIN — Medication 5 ML: at 15:02

## 2022-07-21 ASSESSMENT — PAIN SCALES - GENERAL: PAINLEVEL: SEVERE PAIN (6)

## 2022-07-21 NOTE — PATIENT INSTRUCTIONS
At this time, I think the potential for further toxicity from cytotoxic chemotherapy outweighs its potential benefits and recommend to continue immunotherapy alone to complete the required number of immunotherapy doses as administered in the keynote 522 trial.      However, I do recommend that you get a second opinion about your further treatment strategy. You were agreeable and my clinic staff will refer you to Dr. Demi Lyons at the [facility].      You were in agreement to continue immunotherapy alone for the time being until we get Dr. Demi Lyons's opinion and my clinic staff will schedule you for your 7th dose of Keytruda to be administered tomorrow if the rest of your CMP blood test returns adequately.      I will also refer you to radiation oncology for consultation.

## 2022-07-21 NOTE — PROGRESS NOTES
"Oncology Rooming Note    July 21, 2022 3:39 PM   Diana Salvador is a 57 year old female who presents for:    Chief Complaint   Patient presents with     Oncology Clinic Visit     Malignant neoplasm of upper-inner quadrant of right breast in female, estrogen receptor negative - Labs and provider      Initial Vitals: BP (!) 142/101 (BP Location: Right arm, Patient Position: Sitting, Cuff Size: Adult Small)   Pulse 84   Temp 99.3  F (37.4  C) (Tympanic)   Resp 12   Wt 93 kg (205 lb)   LMP  (LMP Unknown)   SpO2 100%   BMI 32.11 kg/m   Estimated body mass index is 32.11 kg/m  as calculated from the following:    Height as of 7/12/22: 1.702 m (5' 7\").    Weight as of this encounter: 93 kg (205 lb). Body surface area is 2.1 meters squared.  Severe Pain (6) Comment: Data Unavailable   No LMP recorded (lmp unknown). Patient has had a hysterectomy.  Allergies reviewed: Yes  Medications reviewed: Yes    Medications: Medication refills not needed today.  Pharmacy name entered into [employer]:    [facility][id number] - [place], [place] - [address] AT [address] AND CLINICS  [facility] - [facility], [place] - [address]    Clinical concerns:  None      Emy Hollingsworth CMA            "

## 2022-07-21 NOTE — TELEPHONE ENCOUNTER
also notified of message below and replied back that he  is okay with this..Rochelle Nichole RN

## 2022-07-21 NOTE — LETTER
7/21/2022         RE: Diana Salvador  [address] [place] [id number]        Dear Colleague,    Thank you for referring your patient, Diana Salvador, to the [facility]. Please see a copy of my visit note below.    The patient is being seen for the following issue/s:  1. Malignant neoplasm of upper-inner quadrant of right breast in female, estrogen receptor negative (triple-neg., germline test neg. for BRCA 1/2)      Cancer Staging  Malignant neoplasm of upper-inner quadrant of right breast in female, estrogen receptor negative (triple-neg., germline test neg. for BRCA 1/2)  Staging form: Breast, AJCC 8th Edition  - Clinical stage from 3/4/2022: Stage IIB (cT2, cN0, cM0, G3, ER-, ME-, HER2-) - Signed by Ricardo Diamond MD on 3/4/2022    March 2022: She commenced neoadjuvant chemotherapy based on the KEYNOTE-522 trial (Shobha et al., 2020):      She received 3 cycles of pembrolizumab 200 mg flat dose IV, paclitaxel 80 mg/m  IV once daily on days 1, 8, and 15 and Carboplatin AUC=5 IV on day 1 with filgrastim 5 mcg/kg subcutaneously once daily on days 16, 17, and 18.    Unfortunately, she developed grade 3 peripheral motor and sensory neuropathy and further chemotherapy with carboplatin and paclitaxel was canceled and pembrolizumab alone was continued.    She was able to finish a total of 6 preoperative doses of pembrolizumab.    On 6/30/22 she underwent bilateral mastectomy with right sentinel lymph node biopsy. She had expander placement by plastic surgery.  She was found to have had a path CR.     She still has her drains in place and is little sore from her recent chest surgery.  She has not had any fevers or chills.    Her neuropathy has improved but is still quite painful at a couple of her fingertips.    She had also developed a skin hypersensitivity reaction manifesting as skin erythema on the back of her hands which has resolved completely. She still has some discoloration  of her nails.        PHYSICAL EXAMINATION:  Wt 93 kg (205 lb)   LMP  (LMP Unknown)   BMI 32.11 kg/m      General: Todays' vital signs reviewed in the EMR.    ECOG PS is 1.  Cardiovascular: Cor RRR  Respiratory: Lungs clear to auscultation bilaterally    ASSESSMENT/PLAN:    1. Malignant neoplasm of upper-inner quadrant of right breast in female, estrogen receptor negative (triple-neg., germline test neg. for BRCA 1/2)        CBC from today reviewed which was normal except for mild anemia with hemoglobin of 11.4 g/DL.    CMP results from today are partially available.  Sodium, potassium and chloride are normal with the rest of the results pending.    TSH result from today is pending.    At this time, I think the potential for further toxicity from cytotoxic chemotherapy outweighs its potential benefits and recommend to continue immunotherapy alone to complete the required number of immunotherapy doses as administered in the keynote 522 trial.      However, I do recommend that you get a second opinion about your further treatment strategy. You were agreeable and my clinic staff will refer you to Dr. Demi Lyons at the [facility].      You were in agreement to continue immunotherapy alone for the time being until we get Dr. Demi Lyons's opinion and my clinic staff will schedule you for your 7th dose of Keytruda to be administered tomorrow if the rest of your CMP blood test returns adequately.      I will also refer you to radiation oncology for consultation.    I spent a total of 41 minutes on the care of this patient on the day of service including face to face time and the remainder in chart review, care coordination, and documentation on the day of service.          Oncology Rooming Note    July 21, 2022 3:39 PM   Diana Salvador is a 57 year old female who presents for:    Chief Complaint   Patient presents with     Oncology Clinic Visit     Malignant neoplasm of upper-inner quadrant of  "right breast in female, estrogen receptor negative - Labs and provider      Initial Vitals: BP (!) 142/101 (BP Location: Right arm, Patient Position: Sitting, Cuff Size: Adult Small)   Pulse 84   Temp 99.3  F (37.4  C) (Tympanic)   Resp 12   Wt 93 kg (205 lb)   LMP  (LMP Unknown)   SpO2 100%   BMI 32.11 kg/m   Estimated body mass index is 32.11 kg/m  as calculated from the following:    Height as of 7/12/22: 1.702 m (5' 7\").    Weight as of this encounter: 93 kg (205 lb). Body surface area is 2.1 meters squared.  Severe Pain (6) Comment: Data Unavailable   No LMP recorded (lmp unknown). Patient has had a hysterectomy.  Allergies reviewed: Yes  Medications reviewed: Yes    Medications: Medication refills not needed today.  Pharmacy name entered into [employer]:    [id number] - [facility] [address] AT [address] AND CLINICS  [facility] - [place], [place] - [address] [place] [address]    Clinical concerns:  None      Emy Hollingsworth CMA                Again, thank you for allowing me to participate in the care of your patient.        Sincerely,        Ricardo Diamond MD    "

## 2022-07-21 NOTE — TELEPHONE ENCOUNTER
Pt calling to report her drain output is less than 30cc for two days each day and would like to know if instead of driving far from her home to come to  she is hoping to have the Oncology nurses remove the drains but is hoping that its okay. RN advised that if the nurses are okay with removing and have done this previously then it should be okay but if not then pt will have to come in to the [place] office to have the drains removed. Pt voiced understanding and states she will have the nurses call  RN if needed. Message sent to  to advise on this as well...Rochelle Nichole RN

## 2022-07-21 NOTE — PROGRESS NOTES
Infusion Nursing Note:  Diana Salvador presents today for Port labs.    Patient seen by provider today: Yes: Dr. Diamond   present during visit today: Not Applicable.    Note: Patient wondering if we can remove her drain- she received the okay from the surgery clinic (see telephone encounter from today). This isn't something that is typically done in our clinic. Instructed patient to ask Dr. Diamond to remove it for her.     Intravenous Access:  Implanted Port. Unable to obtain blood return. TPA instilled for 35 minutes. Good brisk blood return noted.  Labs drawn peripherally by .    Treatment Conditions:  Not Applicable.    Post Infusion Assessment:  Blood return noted.  Site patent and intact, free from redness, edema or discomfort.  No evidence of extravasations.  Port left accessed for tomorrow's infusion    Discharge Plan:   Discharge instructions reviewed with: Patient.  Patient and/or family verbalized understanding of discharge instructions and all questions answered.  AVS to patient via [employer].  Patient will return 7/22/2022 for next appointment.   Patient discharged in stable condition accompanied by: self.  Departure Mode: Ambulatory.    Farida Romero RN

## 2022-07-21 NOTE — PROGRESS NOTES
The patient is being seen for the following issue/s:  1. Malignant neoplasm of upper-inner quadrant of right breast in female, estrogen receptor negative (triple-neg., germline test neg. for BRCA 1/2)      Cancer Staging  Malignant neoplasm of upper-inner quadrant of right breast in female, estrogen receptor negative (triple-neg., germline test neg. for BRCA 1/2)  Staging form: Breast, AJCC 8th Edition  - Clinical stage from 3/4/2022: Stage IIB (cT2, cN0, cM0, G3, ER-, IN-, HER2-) - Signed by Ricardo Diamond MD on 3/4/2022    March 2022: She commenced neoadjuvant chemotherapy based on the KEYNOTE-522 trial (Shobha et al., 2020):      She received 3 cycles of pembrolizumab 200 mg flat dose IV, paclitaxel 80 mg/m  IV once daily on days 1, 8, and 15 and Carboplatin AUC=5 IV on day 1 with filgrastim 5 mcg/kg subcutaneously once daily on days 16, 17, and 18.    Unfortunately, she developed grade 3 peripheral motor and sensory neuropathy and further chemotherapy with carboplatin and paclitaxel was canceled and pembrolizumab alone was continued.    She was able to finish a total of 6 preoperative doses of pembrolizumab.    On 6/30/22 she underwent bilateral mastectomy with right sentinel lymph node biopsy. She had expander placement by plastic surgery.  She was found to have had a path CR.     She still has her drains in place and is little sore from her recent chest surgery.  She has not had any fevers or chills.    Her neuropathy has improved but is still quite painful at a couple of her fingertips.    She had also developed a skin hypersensitivity reaction manifesting as skin erythema on the back of her hands which has resolved completely. She still has some discoloration of her nails.        PHYSICAL EXAMINATION:  Wt 93 kg (205 lb)   LMP  (LMP Unknown)   BMI 32.11 kg/m      General: Todays' vital signs reviewed in the EMR.    ECOG PS is 1.  Cardiovascular: Cor RRR  Respiratory: Lungs clear to auscultation  note bilaterally    ASSESSMENT/PLAN:    1. Malignant neoplasm of upper-inner quadrant of right breast in female, estrogen receptor negative (triple-neg., germline test neg. for BRCA 1/2)        CBC from today reviewed which was normal except for mild anemia with hemoglobin of 11.4 g/DL.    CMP results from today are partially available.  Sodium, potassium and chloride are normal with the rest of the results pending.    TSH result from today is pending.    At this time, I think the potential for further toxicity from cytotoxic chemotherapy outweighs its potential benefits and recommend to continue immunotherapy alone to complete the required number of immunotherapy doses as administered in the keynote 522 trial.      However, I do recommend that you get a second opinion about your further treatment strategy. You were agreeable and my clinic staff will refer you to Dr. Demi Lyons at the [facility].      You were in agreement to continue immunotherapy alone for the time being until we get Dr. Demi Lyons's opinion and my clinic staff will schedule you for your 7th dose of Keytruda to be administered tomorrow if the rest of your CMP blood test returns adequately.      I will also refer you to radiation oncology for consultation.    I spent a total of 41 minutes on the care of this patient on the day of service including face to face time and the remainder in chart review, care coordination, and documentation on the day of service.

## 2022-07-22 ENCOUNTER — INFUSION THERAPY VISIT (OUTPATIENT)
Dept: INFUSION THERAPY | Facility: CLINIC | Age: 57
End: 2022-07-22
Attending: INTERNAL MEDICINE
Payer: OTHER GOVERNMENT

## 2022-07-22 VITALS — DIASTOLIC BLOOD PRESSURE: 80 MMHG | TEMPERATURE: 99.2 F | SYSTOLIC BLOOD PRESSURE: 132 MMHG

## 2022-07-22 DIAGNOSIS — D70.1 CHEMOTHERAPY-INDUCED NEUTROPENIA (H): ICD-10-CM

## 2022-07-22 DIAGNOSIS — T45.1X5A CHEMOTHERAPY-INDUCED NEUTROPENIA (H): ICD-10-CM

## 2022-07-22 DIAGNOSIS — C50.211 MALIGNANT NEOPLASM OF UPPER-INNER QUADRANT OF RIGHT BREAST IN FEMALE, ESTROGEN RECEPTOR NEGATIVE (H): ICD-10-CM

## 2022-07-22 DIAGNOSIS — Z17.1 MALIGNANT NEOPLASM OF UPPER-INNER QUADRANT OF RIGHT BREAST IN FEMALE, ESTROGEN RECEPTOR NEGATIVE (H): ICD-10-CM

## 2022-07-22 DIAGNOSIS — Z51.11 ENCOUNTER FOR ANTINEOPLASTIC CHEMOTHERAPY: Primary | ICD-10-CM

## 2022-07-22 PROCEDURE — 258N000003 HC RX IP 258 OP 636: Performed by: INTERNAL MEDICINE

## 2022-07-22 PROCEDURE — 96413 CHEMO IV INFUSION 1 HR: CPT

## 2022-07-22 PROCEDURE — 250N000011 HC RX IP 250 OP 636: Performed by: INTERNAL MEDICINE

## 2022-07-22 RX ORDER — HEPARIN SODIUM (PORCINE) LOCK FLUSH IV SOLN 100 UNIT/ML 100 UNIT/ML
5 SOLUTION INTRAVENOUS
Status: DISCONTINUED | OUTPATIENT
Start: 2022-07-22 | End: 2022-07-22 | Stop reason: HOSPADM

## 2022-07-22 RX ADMIN — Medication 5 ML: at 12:08

## 2022-07-22 RX ADMIN — SODIUM CHLORIDE 200 MG: 9 INJECTION, SOLUTION INTRAVENOUS at 11:29

## 2022-07-22 RX ADMIN — SODIUM CHLORIDE 250 ML: 9 INJECTION, SOLUTION INTRAVENOUS at 11:29

## 2022-07-22 NOTE — PROGRESS NOTES
Infusion Nursing Note:  Diana SANTA Elizabethluis presents today for Keytruda.    Patient seen by provider today: No   present during visit today: Not Applicable.    Note: N/A.    Intravenous Access:  Implanted Port.    Treatment Conditions:  Lab Results   Component Value Date     07/21/2022    POTASSIUM 3.7 07/21/2022    CR 0.63 07/21/2022    ANURADHA 8.9 07/21/2022    BILITOTAL 0.2 07/21/2022    ALBUMIN 3.3 (L) 07/21/2022    ALT 15 07/21/2022    AST 13 07/21/2022     Results reviewed, labs MET treatment parameters, ok to proceed with treatment.    Post Infusion Assessment:  Patient tolerated infusion without incident.  Blood return noted pre and post infusion.  Site patent and intact, free from redness, edema or discomfort.  No evidence of extravasations.  Access discontinued per protocol.     Discharge Plan:   Copy of AVS reviewed with patient and/or family.  Patient will return August 2022 for next appointment.  Patient discharged in stable condition accompanied by: self.  Departure Mode: Ambulatory.      Adrianna De Guzman RN

## 2022-07-22 NOTE — PROGRESS NOTES
Writer received referral, reviewed for appropriate plan, and sent to New Patient Scheduling for completion.  Patient is seeking 2nd opinion with Dr. Dmei Lyons after her care with Dr. Diamond. Radha Graham RN

## 2022-07-29 NOTE — TELEPHONE ENCOUNTER
RECORDS STATUS - BREAST    RECORDS REQUESTED FROM: Epic   DATE REQUESTED: 7/29   NOTES DETAILS STATUS   OFFICE NOTE from referring provider Ricardo Shipley MD in [place] [facility]   OFFICE NOTE from medical oncologist Micaela Diamond: 7/21/22   OFFICE NOTE from surgeon [facility] Dr. Nieto: 7/15/22   DISCHARGE SUMMARY from hospital [facility] 6/30/22, 3/9/2   OPERATIVE REPORT Epic 6/30/22: Mastectomy    3/9/22: Insertion, Vascular Access Port   MEDICATION LIST [facility] 7/20/22   LABS     PATHOLOGY REPORTS  (Tissue diagnosis, Stage, ER/DC percentage positive and intensity of staining, HER2 IHC, FISH, and all biopsies from breast and any distant metastasis)                 Epic 6/30/22, 2/22/22: Surg Path   GENONOMIC TESTING     TYPE:   (Next Generation Sequencing, including Foundation One testing, and Oncotype score) Epic 3/23/22: NGS   IMAGING (NEED IMAGES & REPORT)     MAMMO PACS [facility]   ULTRASOUND PACS Epic

## 2022-08-02 ENCOUNTER — ALLIED HEALTH/NURSE VISIT (OUTPATIENT)
Dept: DERMATOLOGY | Facility: CLINIC | Age: 57
End: 2022-08-02
Payer: OTHER GOVERNMENT

## 2022-08-02 ENCOUNTER — MYC MEDICAL ADVICE (OUTPATIENT)
Dept: SURGERY | Facility: CLINIC | Age: 57
End: 2022-08-02

## 2022-08-02 DIAGNOSIS — Z98.890 S/P BREAST RECONSTRUCTION, BILATERAL: Primary | ICD-10-CM

## 2022-08-02 PROCEDURE — 99207 PR NO CHARGE NURSE ONLY: CPT | Performed by: DERMATOLOGY

## 2022-08-02 RX ORDER — GABAPENTIN 300 MG/1
300 CAPSULE ORAL 3 TIMES DAILY
Qty: 90 CAPSULE | Refills: 0 | Status: SHIPPED | OUTPATIENT
Start: 2022-08-02 | End: 2022-08-18

## 2022-08-02 NOTE — TELEPHONE ENCOUNTER
"ROSEMARIE Nieto MD Eastman, Colleen E, RN  Thanks but no narcotics.     We can start her on Gabapentin. 300 TID for 1 month. Have her see me in clinic     Thanks     Al             Previous Messages       ----- Message -----   From: Rochelle Nichole, RN   Sent: 8/2/2022   3:36 PM CDT   To: ROSEMARIE Nieto MD, *     Hi Dr.Choudry Noble was in today for her first breast fill. S/P bilateral breast reconstruction on 6/30.  She reported experiencing moderate to severe pain with the expanders which is causing her difficulty sleeping. I gave her some recommendations with home care measures to help with sleeping and applying cool compresses if needed (which she says she has been trying) but she is requesting if possible  something stronger to take than Tylenol as she reports \"she would be better off taking nothing\". Due to her having Esophageal reflux and also currently being on Eliquis she cannot take Ibuprofen. I saw she was on Tramadol in the past but she was told to stop taking it but she is not completely sure why. Would a Rx for Tylenol #3 be appropriate? It can be called in  She has no allergies.       Thanks     Rochelle"

## 2022-08-02 NOTE — NURSING NOTE
Diana Salvador comes into clinic today at the request of Dr Nieto  Ordering Provider for Tissue expansion .    Per  RN to remove air from right and left  expander(s) and fill Bilateral breast expander(s). 260 ml of NS filled into right breast expander and 260 ml of NS filled into left breast expander. Total fill volume for right breast expander is 260ml . Total fill volume for left breast expander 260 ml. Bacitracin and a bandaid applied to the injection sites. Pt tolerated the breast fill without any issues. Pt advised to monitor the injection sites for any increased redness, drainage, pain or swelling and call the clinic back if any of these issues develop. Pt to follow up in one week (s)for further expansion.    Pt also requested a stronger pain medication than Tylenol as Tylenol does not help her pain. Pain is localized to the expander areas and pt expressed difficulty sleeping. RN reviewed with pt regarding the use of pillows for support while sleeping and also to try cool compresses to help relieve the pain. Pt states she is doing this. RN notified pt that a message would be sent to  to review and pt will be contacted once RN hears back    This service provided today was under the supervising provider of the day , who was available if needed.    Rochelle Nichole RN

## 2022-08-03 ENCOUNTER — TELEPHONE (OUTPATIENT)
Dept: ONCOLOGY | Facility: CLINIC | Age: 57
End: 2022-08-03

## 2022-08-03 NOTE — TELEPHONE ENCOUNTER
[facility] Prior Authorization Team   Phone: [phone]  Fax: [phone]    PRIOR AUTHORIZATION DENIED    Medication: Neupogen    Denial Date: 8/2/2022    Denial Rational:     Appeal Information:     Started PA request due to change in insurance but when I spoke with patient, she said she was only used this med after a cycle of chemo and that has been completed. I have forwarded this info to our TM to access.

## 2022-08-04 NOTE — PROGRESS NOTES
Oncology Consultation:  Date on this visit: 8/8/2022    Diana Salvador is referred by Dr.Kamran Diamond for an oncology consultation. She requires second opinion for diagnosis of triple negative breast cancer of the upper inner right breast.    Primary Physician: Quoc Chu     History Of Present Illness:  Ms. Salvador is a 57 year old female with a h/o T2N0M0 triple negative right breast cancer.  Patient self palpated a lump in her upper outer right breast in 02/2022.  Bilateral diagnostic mammograms confirmed a focal mass with associated calcifications in the area of palpable abnormality.  Right breast ultrasound confirmed a 2.4 cm mass at 2:00, 6 cm from the nipple.  Ultrasound of the right axilla was without lymphadenopathy.  Biopsy of the right breast mass was consistent with grade 3, triple negative invasive ductal carcinoma.  She received treatment with neoadjuvant carboplatin, paclitaxel, and pembrolizumab x 3 cycles under the care of Dr. Diamond.  Cycle 4 was not given due to grade 3 neuropathy and mammogram and ultrasound after 3 cycles suggestive of no residual mass.  She underwent bilateral mastectomies and right axillary sentinel lymph node biopsy on 6/30/2022.  Pathology showed no evidence of residual carcinoma in the right breast.  A single sentinel lymph node was benign.  Left breast was negative for atypia or malignancy.    Patient reports having had significant fatigue, nausea, and vomiting with treatment.  Insurance approved one dose of Kytril, but thereafter she relied on Compazine for treatment of nausea.  These symptoms have resolved since completion of treatment.  She continues to have persistent neuropathy.  She describes as pain in the area of the finger and toenails with intermittent shooting pains into the hand and the foot.  She frequently drops objects.  She has noted only mild improvement since completing carboplatin and taxol.  She reports persistent poor appetite and dysgeusia.   Despite this, she has been able to maintain weight.  She has questions today about duration of pembrolizumab therapy and whether adjuvant radiation is indicated.    Since her breast surgery, she has had limited ROM of the bilateral upper extremities.  She also reports intermittent right lateral chest wall swelling.  She also reports h/o left superficial thrombophlebitis in the left hand.  She is not currently being seen in the lymphedema clinic.  She has had one fill of tissue expanders and reports significant chest wall discomfort associated with this.      She has chronic sciatica of the right lower extremity and chronic back pain.  She denies new bone or joint aches or pains.  She has no cough, shortness of breath, or chest pain.  No abdominal complaints.  She denies headaches or focal neurologic complaints.  She has chronic post-nasal drip.  No current infection complaints.  The remainder of a complete 12 point review of systems was reviewed with the patient and was negative with the exception of that mentioned above.    Breast Cancer Treatment:  3/15/2022 - 5/10/2022  Carboplatin, paclitaxel, pembrolizumab (carboplatin and paclitaxel d/c'd 2/2 G3 peripheral neuropathy)  2022 - 2022  pembrolizumab  2022  - Bilateral mastectomies with right sentinel lymph node biopsy and tissue expander placement.  Pathology c/w pCR to neoadjuvant therapy.    Past Medical/Surgical History:  Past Medical History:   Diagnosis Date     Cervical high risk HPV (human papillomavirus) test positive 03/15/2020    See problem list     Past Surgical History:   Procedure Laterality Date     ARTHROSCOPY SHOULDER ROTATOR CUFF REPAIR Right      ARTHROSCOPY SHOULDER ROTATOR CUFF REPAIR Left       SECTION       CHOLECYSTECTOMY       CYST REMOVAL      on chest     DAVINCI BYPASS GASTRIC ROBERT-EN-Y       HYSTERECTOMY SUPRACERVICAL       INSERT PORT VASCULAR ACCESS Left 2022    Procedure: INSERTION, VASCULAR ACCESS  PORT;  Surgeon: Baldemar Marina DO;  Location: [facility]     MASTECTOMY SIMPLE Left 6/30/2022    Procedure: MASTECTOMY, SIMPLE, LEFT;  Surgeon: Baldemar Marina DO;  Location: [facility]     MASTECTOMY SIMPLE, SENTINEL NODE, COMBINED Right 6/30/2022    Procedure: MASTECTOMY, SIMPLE, WITH SENTINEL LYMPH NODE DISSECTION, RIGHT;  Surgeon: Baldemar Mairna DO;  Location: [facility]     RECONSTRUCT BREAST, INSERT TISSUE EXPANDER BILATERAL, COMBINED Bilateral 6/30/2022    Procedure: RECONSTRUCTION, BREAST, BILATERAL, WITH TISSUE EXPANDER INSERTION;  Surgeon: ROSEMARIE Nieto MD;  Location: [facility]     right ulnar nerve repositioning surgery       Allergies:  Allergies as of 08/08/2022     (No Known Allergies)     Current Medications:  Current Outpatient Medications   Medication Sig Dispense Refill     acetaminophen (TYLENOL) 325 MG tablet Take 2 tablets (650 mg) by mouth every 4 hours as needed for other (mild pain) 100 tablet 0     alendronate (FOSAMAX) 35 MG tablet TAKE 1 TABLET(35 MG) BY MOUTH EVERY 7 DAYS (Patient not taking: No sig reported) 12 tablet 3     Apixaban Starter Pack (ELIQUIS DVT/PE STARTER PACK) 5 MG TBPK Take 10 mg by mouth 2 times daily for 7 days, THEN 5 mg 2 times daily for 23 days. 74 each 0     citalopram (CELEXA) 40 MG tablet Take 1 tablet (40 mg) by mouth daily 90 tablet 0     cyanocobalamin (CYANOCOBALAMIN) 1000 MCG/ML injection Inject 1 mL (1,000 mcg) into the muscle every 30 days 3 mL 3     cyclobenzaprine (FLEXERIL) 5 MG tablet TAKE 1 TABLET(5 MG) BY MOUTH TWICE DAILY AS NEEDED FOR MUSCLE SPASMS 40 tablet 3     diclofenac (VOLTAREN) 1 % topical gel Place 2 g onto the skin 4 times daily 100 g 3     doxepin (SILENOR) 3 MG tablet Take 1 tablet (3 mg) by mouth At Bedtime 90 tablet 1     DULoxetine (CYMBALTA) 30 MG capsule Take 1 capsule (30 mg) by mouth At Bedtime for 7 days, THEN 2 capsules (60 mg) At Bedtime for 23 days. 53 capsule 1     furosemide (LASIX) 20 MG tablet Take 1  tablet (20 mg) by mouth daily as needed (swelling) 60 tablet 5     gabapentin (NEURONTIN) 300 MG capsule Take 1 capsule (300 mg) by mouth 3 times daily for 30 days 90 capsule 0     lidocaine-prilocaine (EMLA) 2.5-2.5 % external cream Apply thick layer to port site and cover with clear dressing or saran wrap 30-60 mins prior to appt. 30 g 0     loratadine (CLARITIN) 10 MG tablet Take 1 tablet (10 mg) by mouth daily 90 tablet 1     LORazepam (ATIVAN) 0.5 MG tablet Take 1-2 tablets (0.5-1 mg) by mouth every 8 hours as needed for anxiety 60 tablet 3     omeprazole (PRILOSEC) 20 MG DR capsule As needed       oxyCODONE (ROXICODONE) 5 MG tablet Take 1 tablet (5 mg) by mouth every 6 hours as needed for pain or severe pain Would need to be seen by a provider in person for any further refills. 20 tablet 0     potassium chloride ER (K-TAB) 20 MEQ CR tablet Take 1 tablet (20 mEq) by mouth daily 90 tablet 1     prochlorperazine (COMPAZINE) 10 MG tablet Take 1 tablet (10 mg) by mouth every 6 hours as needed (Nausea/Vomiting) 30 tablet 5     prochlorperazine (COMPAZINE) 10 MG tablet Take 1 tablet (10 mg) by mouth every 6 hours as needed (Nausea/Vomiting) 30 tablet 2     rOPINIRole (REQUIP) 1 MG tablet TAKE 1 TABLET(1 MG) BY MOUTH AT BEDTIME 90 tablet 0     sucralfate (CARAFATE) 1 GM tablet Take 1 tablet (1 g) by mouth 4 times daily as needed (Abdominal discomfort) 30 tablet 3     traMADol (ULTRAM) 50 MG tablet Take 50 mg by mouth every 6 hours as needed for severe pain (Patient not taking: No sig reported)       traMADol (ULTRAM) 50 MG tablet Take 1 tablet (50 mg) by mouth every 6 hours as needed for severe pain (Patient not taking: No sig reported) 120 tablet 0     [START ON 8/14/2022] traMADol (ULTRAM) 50 MG tablet Take 1 tablet (50 mg) by mouth every 6 hours as needed for severe pain (Patient not taking: No sig reported) 120 tablet 0     triamcinolone (KENALOG) 0.1 % external cream Apply topically 2 times daily Apply to rash  "until improved (Patient not taking: No sig reported) 30 g 1     valACYclovir (VALTREX) 1000 mg tablet TAKE 1 TABLET(1000 MG) BY MOUTH TWICE DAILY. REPEAT AS NEEDED FOR COLD SORE 16 tablet 3     vitamin D3 (CHOLECALCIFEROL) 50 mcg (2000 units) tablet Take 1 tablet (50 mcg) by mouth daily 90 tablet 3      Family History:  She has a maternal aunt with a h/o breast cancer at 53 yo.  Maternal grandmother with a h/o colon cancer.  Patient had hereditary breast actionable panel which was negative.     Social History:  Patient is .  She has 2 adult children, both of whom live locally.  Unfortunately, patient's job as a  was terminated due to time away from work for her breast cancer treatment.  She quit smoking in 2000 and \"socially\" smoked prior to this, perhaps 1 pack every 3 days.  She has 1 or 2 drinks every 2 weeks.    Physical Exam:  /82   Pulse 73   Temp 98.9  F (37.2  C) (Oral)   Ht 1.726 m (5' 7.95\")   Wt 92.4 kg (203 lb 12.8 oz)   LMP  (LMP Unknown)   SpO2 98%   BMI 31.03 kg/m    LMP  (LMP Unknown)   General:  Well appearing adult female in NAD.  Alert and oriented x 3.  HEENT:  Normocephalic.  Sclera anicteric.  MMM.  No lesions of the oropharynx.  Lymph:  No palpable cervical, supraclavicular, or axillary LAD.  Chest:  CTA bilaterally.  No wheezes or crackles.  CV:  RRR.  Nl S1 and S2.  No m/r/g.  Breast: Bilateral mastectomies with tissue expanders in place.  Palpable fluid wave overlying the right tissue expander.  No intradermal masses of the chest wall.  Abd:  Soft/ND.    Ext:  No pitting edema of the bilateral lower extremities.    Musculo:  Limited ROM of the bilateral extremities to just above shoulder level bilaterally.  Neuro:  Cranial nerves grossly intact.  Psych:  Mood and affect appear normal.  Skin:  No visible concerning skin rashes or lesions.    Laboratory/Imaging Studies  2/18/2022 Bilateral diagnostic mammogram:  Right breast: At the site of the palpable " abnormality there is a focal  mass with some associated microcalcifications. Ultrasound will be  obtained.  Left breast: No mammographic evidence for malignancy.     Right breast ultrasound: At the site of the palpable abnormality in  the right breast 2:00 position approximately 6 cm from the nipple  there is a bilobed hypoechoic mass with some shadowing and some  internal blood flow as well as some echogenic foci likely representing  calcifications. This measures 2.4 x 1.1 x 1.6 cm. Tissue diagnosis is recommended. No right axillary adenopathy.    2/22/2022 Right breast biopsy:  Right breast, lower inner quadrant, core biopsy  -Invasive ductal carcinoma, Philip grade 3  -Negative for estrogen and progesterone receptor    Ratio of HER2/SACHA-17 signals  Diana Johnsonluis:  1.6 (JOHANNA Negative)                                      Avg. number HER2 signals/nucleus:  2.9                                                                                                Avg. number SACHA-17 signals/nucleus:  1.8     6/1/2022 Right breast diagnostic mammogram and ultrasound:  Complete resolution of mass component of biopsied malignancy with only coarse residual calcifications present at this site, adjacent to the biopsy marking clip. Continued oncologic management is advised.    6/30/2022 Pathology, bilateral mastectomies, left axillary sentinel lymph node biopsy:  A.  Right breast, simple mastectomy, with sutured designated margins:  - No evidence of residual breast carcinoma.  - No evidence of lymphovascular invasion.  - Evidence of previous tumor bed and post neoadjuvant chemotherapy changes in upper inner quadrant.  - Previous biopsy clip identified by X-ray of the mastectomy specimen, but not identified on gross examination.  (See gross description)     B.  Right axillary sentinel lymph node, excision:  - A single benign lymph node, negative for metastatic tumor.  - No post neoadjuvant chemotherapy changes.     C.  Left breast,  simple mastectomy, with sutured designated margins:  - Benign breast tissue with a 4.5 cm fibroadenomatous area and focal moderate increase in stromal fibrous tissue.  - Negative for atypia and malignancy.    ASSESSMENT/PLAN:  Ms. Salvador is a 58 yo female with a clinical stage IIB, T2N0M0, grade 3, triple negative right breast cancer.  She is s/p treatment with Carboplatin, paclitaxel, and pembrolizumab x 3 cycles, followed by bilateral mastectomies (pCR), right axillary sentinel lymph node biopsy, and 1 cycle of adjuvant pembrolizumab.    1.  Right breast cancer:  She presents today for a second opinion regarding the following questions: 1) how many cycles of pembrolizumab I would recommend in the setting of a pCR to neoadjuvant chemotherapy and 2) do I need adjuvant radiation therapy.    We reviewed the results of the KEYNOTE 522 trial which showed increased rates of both pCR and EFS with the addition of pembrolizumab to both the taxol and carboplatin and adriamycin and cyclophosphamide portions of neoadjuvant chemotherapy and an additional 9 cycles of adjuvant pembrolizumab.  We reviewed data from the ISPY2 clinical trial which suggests in the treatment of triple negative breast cancer, no matter what the neoadjuvant treatment, if a pCR was obtained the prognosis is excellent with very low rates of breast cancer recurrence in the ensuing 5 years.  We also noted this to be true in the patients who achieved a pCR in the KEYNOTE 522 trial.  This brings into question as to whether the 9 cycles of adjuvant pembrolizumab are actually needed.  Unfortunately, there was not an arm of the KEYNOTE 522 trial that did not receive the adjuvant immunotherapy.  Therefore, the current standard of care is to administer these additional 9 cycles of adjuvant pembrolizumab.  I am thus in favor of her continuing pembrolizumab with plan to complete a total of 9 cycles.  We discussed, however, if she were to develop an adverse  autoimmune effect of the treatment, I would have a low threshold for discontinuing it.    In regards to the question regarding adjuvant radiation, we discussed this would be indicated in the scenario that she had a positive node at any time during her treatment.  We reviewed her initial right axillary ultrasound and Dr. Diamond's initial clinical exam, both of which were without evidence of lymphadenopathy.  We also reviewed the pathology from her sentinel lymph node biopsy which showed no treatment related effect in the lymph node, which suggests this lymph node was never involved with malignancy.  One could also consider radiation in the scenario that original tumor size was > 5 cm or in the situation of h/o dermal involvement.  Neither of these tumor characteristics were present.  Therefore, I do not see an indication for post-mastectomy adjuvant radiation for this patient.    - Recommend continuing pembrolizumab to complete a total of 9 adjuvant cycles.  - No indication for radiation therapy.    2.  Lymphedema/restricted ROM of the bilateral upper extremities:  She has lymphedema of the chest wall and restriction of ROM of the bilateral upper extremities on today's exam.  I have placed a lymphedema referral, which she will pursue locally.    3.  Cancer Risk Management:  Breast Actionable panel was drawn and was negative for germline pathogenic mutation.  We discussed this panel only evaluates for the 11 most common genes associated with breast cancer.  Given her family history of both breast and colon cancer, I recommend she meet with genetic counseling to determine if additional genetic testing is indicated.  Referral to Cancer Risk Management was placed.    4.  Grade 2 drug-induced peripheral neuropathy:  We discussed neuropathy may continue to improve in the year after completion of Taxol and carboplatin chemotherapy.  Typically there is little improvement after the one year point.  There are no treatments to  reverse neuropathy.  We reviewed medicines that treat nerve pain such as gabapentin, Lyrica, Cymbalta, and Wellbutrin can mask the symptoms, but do not treat the underlying neuropathy.  She declines medication at this time.    5.  Follow Up:  No need for scheduled follow up with me. Patient will resume care with Dr. Diamond.  I encouraged her to contact me with any future questions.    65 minutes spent on the date of the encounter doing chart review, review of test results, interpretation of tests, patient visit and documentation.

## 2022-08-08 ENCOUNTER — ONCOLOGY VISIT (OUTPATIENT)
Dept: ONCOLOGY | Facility: CLINIC | Age: 57
End: 2022-08-08
Attending: INTERNAL MEDICINE
Payer: OTHER GOVERNMENT

## 2022-08-08 ENCOUNTER — PRE VISIT (OUTPATIENT)
Dept: ONCOLOGY | Facility: CLINIC | Age: 57
End: 2022-08-08

## 2022-08-08 VITALS
WEIGHT: 203.8 LBS | BODY MASS INDEX: 30.89 KG/M2 | DIASTOLIC BLOOD PRESSURE: 82 MMHG | OXYGEN SATURATION: 98 % | SYSTOLIC BLOOD PRESSURE: 116 MMHG | HEART RATE: 73 BPM | TEMPERATURE: 98.9 F | HEIGHT: 68 IN

## 2022-08-08 DIAGNOSIS — G62.0 DRUG-INDUCED PERIPHERAL NEUROPATHY (H): ICD-10-CM

## 2022-08-08 DIAGNOSIS — M25.60 JOINT MOVEMENT RESTRICTED: ICD-10-CM

## 2022-08-08 DIAGNOSIS — C50.211 MALIGNANT NEOPLASM OF UPPER-INNER QUADRANT OF RIGHT BREAST IN FEMALE, ESTROGEN RECEPTOR NEGATIVE (H): Primary | ICD-10-CM

## 2022-08-08 DIAGNOSIS — Z17.1 MALIGNANT NEOPLASM OF UPPER-INNER QUADRANT OF RIGHT BREAST IN FEMALE, ESTROGEN RECEPTOR NEGATIVE (H): Primary | ICD-10-CM

## 2022-08-08 DIAGNOSIS — Z80.0 FAMILY HISTORY OF COLON CANCER: ICD-10-CM

## 2022-08-08 DIAGNOSIS — Z80.3 FAMILY HISTORY OF MALIGNANT NEOPLASM OF BREAST: ICD-10-CM

## 2022-08-08 DIAGNOSIS — I89.0 LYMPHEDEMA: ICD-10-CM

## 2022-08-08 PROCEDURE — 99215 OFFICE O/P EST HI 40 MIN: CPT | Performed by: INTERNAL MEDICINE

## 2022-08-08 PROCEDURE — G0463 HOSPITAL OUTPT CLINIC VISIT: HCPCS

## 2022-08-08 ASSESSMENT — PAIN SCALES - GENERAL: PAINLEVEL: SEVERE PAIN (6)

## 2022-08-08 NOTE — NURSING NOTE
"Oncology Rooming Note    August 8, 2022 1:23 PM   Diana Salvador is a 57 year old female who presents for:    Chief Complaint   Patient presents with     Oncology Clinic Visit     Malignant neoplasm of upper-inner quadrant of right breast in female     Initial Vitals: /82   Pulse 73   Temp 98.9  F (37.2  C) (Oral)   Ht 1.726 m (5' 7.95\")   Wt 92.4 kg (203 lb 12.8 oz)   LMP  (LMP Unknown)   SpO2 98%   BMI 31.03 kg/m   Estimated body mass index is 31.03 kg/m  as calculated from the following:    Height as of this encounter: 1.726 m (5' 7.95\").    Weight as of this encounter: 92.4 kg (203 lb 12.8 oz). Body surface area is 2.1 meters squared.  Severe Pain (6) Comment: Data Unavailable   No LMP recorded (lmp unknown). Patient has had a hysterectomy.  Allergies reviewed: Yes  Medications reviewed: Yes    Medications: Medication refills not needed today.  Pharmacy name entered into Dro[facility]:    [id number] - [place], [place] - [address] AT [address] CLINICS  [facility] - [facility], [place] - [address]    Clinical concerns: none       Liss Lofton            "

## 2022-08-08 NOTE — LETTER
8/8/2022         RE: Diana Salvador  [address] [place] [id number]        Dear Colleague,    Thank you for referring your patient, Diana Salvador, to the [facility]. Please see a copy of my visit note below.    Oncology Consultation:  Date on this visit: 8/8/2022    Diana Salvador is referred by Dr.Kamran Diamond for an oncology consultation. She requires second opinion for diagnosis of triple negative breast cancer of the upper inner right breast.    Primary Physician: Quoc Chu     History Of Present Illness:  Ms. Salvador is a 57 year old female with a h/o T2N0M0 triple negative right breast cancer.  Patient self palpated a lump in her upper outer right breast in 02/2022.  Bilateral diagnostic mammograms confirmed a focal mass with associated calcifications in the area of palpable abnormality.  Right breast ultrasound confirmed a 2.4 cm mass at 2:00, 6 cm from the nipple.  Ultrasound of the right axilla was without lymphadenopathy.  Biopsy of the right breast mass was consistent with grade 3, triple negative invasive ductal carcinoma.  She received treatment with neoadjuvant carboplatin, paclitaxel, and pembrolizumab x 3 cycles under the care of Dr. Diamond.  Cycle 4 was not given due to grade 3 neuropathy and mammogram and ultrasound after 3 cycles suggestive of no residual mass.  She underwent bilateral mastectomies and right axillary sentinel lymph node biopsy on 6/30/2022.  Pathology showed no evidence of residual carcinoma in the right breast.  A single sentinel lymph node was benign.  Left breast was negative for atypia or malignancy.    Patient reports having had significant fatigue, nausea, and vomiting with treatment.  Insurance approved one dose of Kytril, but thereafter she relied on Compazine for treatment of nausea.  These symptoms have resolved since completion of treatment.  She continues to have persistent neuropathy.  She describes as pain in the area  of the finger and toenails with intermittent shooting pains into the hand and the foot.  She frequently drops objects.  She has noted only mild improvement since completing carboplatin and taxol.  She reports persistent poor appetite and dysgeusia.  Despite this, she has been able to maintain weight.  She has questions today about duration of pembrolizumab therapy and whether adjuvant radiation is indicated.    Since her breast surgery, she has had limited ROM of the bilateral upper extremities.  She also reports intermittent right lateral chest wall swelling.  She also reports h/o left superficial thrombophlebitis in the left hand.  She is not currently being seen in the lymphedema clinic.  She has had one fill of tissue expanders and reports significant chest wall discomfort associated with this.      She has chronic sciatica of the right lower extremity and chronic back pain.  She denies new bone or joint aches or pains.  She has no cough, shortness of breath, or chest pain.  No abdominal complaints.  She denies headaches or focal neurologic complaints.  She has chronic post-nasal drip.  No current infection complaints.  The remainder of a complete 12 point review of systems was reviewed with the patient and was negative with the exception of that mentioned above.    Breast Cancer Treatment:  3/15/2022 - 5/10/2022  Carboplatin, paclitaxel, pembrolizumab (carboplatin and paclitaxel d/c'd 2/2 G3 peripheral neuropathy)  5/18/2022 - 7/22/2022  pembrolizumab  6/30/2022  - Bilateral mastectomies with right sentinel lymph node biopsy and tissue expander placement.  Pathology c/w pCR to neoadjuvant therapy.    Past Medical/Surgical History:  Past Medical History:   Diagnosis Date     Cervical high risk HPV (human papillomavirus) test positive 03/15/2020    See problem list     Past Surgical History:   Procedure Laterality Date     ARTHROSCOPY SHOULDER ROTATOR CUFF REPAIR Right      ARTHROSCOPY SHOULDER ROTATOR CUFF  REPAIR Left       SECTION       CHOLECYSTECTOMY       CYST REMOVAL      on chest     DAVINCI BYPASS GASTRIC ROBERT-EN-Y       HYSTERECTOMY SUPRACERVICAL       INSERT PORT VASCULAR ACCESS Left 2022    Procedure: INSERTION, VASCULAR ACCESS PORT;  Surgeon: Baldemar Marina DO;  Location: [facility]     MASTECTOMY SIMPLE Left 2022    Procedure: MASTECTOMY, SIMPLE, LEFT;  Surgeon: Baldemar Marina DO;  Location: [facility]     MASTECTOMY SIMPLE, SENTINEL NODE, COMBINED Right 2022    Procedure: MASTECTOMY, SIMPLE, WITH SENTINEL LYMPH NODE DISSECTION, RIGHT;  Surgeon: Baldemar Marina DO;  Location: [facility]     RECONSTRUCT BREAST, INSERT TISSUE EXPANDER BILATERAL, COMBINED Bilateral 2022    Procedure: RECONSTRUCTION, BREAST, BILATERAL, WITH TISSUE EXPANDER INSERTION;  Surgeon: ROSEMARIE Nieto MD;  Location: [facility] OR     right ulnar nerve repositioning surgery       Allergies:  Allergies as of 2022     (No Known Allergies)     Current Medications:  Current Outpatient Medications   Medication Sig Dispense Refill     acetaminophen (TYLENOL) 325 MG tablet Take 2 tablets (650 mg) by mouth every 4 hours as needed for other (mild pain) 100 tablet 0     alendronate (FOSAMAX) 35 MG tablet TAKE 1 TABLET(35 MG) BY MOUTH EVERY 7 DAYS (Patient not taking: No sig reported) 12 tablet 3     Apixaban Starter Pack (ELIQUIS DVT/PE STARTER PACK) 5 MG TBPK Take 10 mg by mouth 2 times daily for 7 days, THEN 5 mg 2 times daily for 23 days. 74 each 0     citalopram (CELEXA) 40 MG tablet Take 1 tablet (40 mg) by mouth daily 90 tablet 0     cyanocobalamin (CYANOCOBALAMIN) 1000 MCG/ML injection Inject 1 mL (1,000 mcg) into the muscle every 30 days 3 mL 3     cyclobenzaprine (FLEXERIL) 5 MG tablet TAKE 1 TABLET(5 MG) BY MOUTH TWICE DAILY AS NEEDED FOR MUSCLE SPASMS 40 tablet 3     diclofenac (VOLTAREN) 1 % topical gel Place 2 g onto the skin 4 times daily 100 g 3     doxepin (SILENOR) 3 MG tablet  Take 1 tablet (3 mg) by mouth At Bedtime 90 tablet 1     DULoxetine (CYMBALTA) 30 MG capsule Take 1 capsule (30 mg) by mouth At Bedtime for 7 days, THEN 2 capsules (60 mg) At Bedtime for 23 days. 53 capsule 1     furosemide (LASIX) 20 MG tablet Take 1 tablet (20 mg) by mouth daily as needed (swelling) 60 tablet 5     gabapentin (NEURONTIN) 300 MG capsule Take 1 capsule (300 mg) by mouth 3 times daily for 30 days 90 capsule 0     lidocaine-prilocaine (EMLA) 2.5-2.5 % external cream Apply thick layer to port site and cover with clear dressing or saran wrap 30-60 mins prior to appt. 30 g 0     loratadine (CLARITIN) 10 MG tablet Take 1 tablet (10 mg) by mouth daily 90 tablet 1     LORazepam (ATIVAN) 0.5 MG tablet Take 1-2 tablets (0.5-1 mg) by mouth every 8 hours as needed for anxiety 60 tablet 3     omeprazole (PRILOSEC) 20 MG DR capsule As needed       oxyCODONE (ROXICODONE) 5 MG tablet Take 1 tablet (5 mg) by mouth every 6 hours as needed for pain or severe pain Would need to be seen by a provider in person for any further refills. 20 tablet 0     potassium chloride ER (K-TAB) 20 MEQ CR tablet Take 1 tablet (20 mEq) by mouth daily 90 tablet 1     prochlorperazine (COMPAZINE) 10 MG tablet Take 1 tablet (10 mg) by mouth every 6 hours as needed (Nausea/Vomiting) 30 tablet 5     prochlorperazine (COMPAZINE) 10 MG tablet Take 1 tablet (10 mg) by mouth every 6 hours as needed (Nausea/Vomiting) 30 tablet 2     rOPINIRole (REQUIP) 1 MG tablet TAKE 1 TABLET(1 MG) BY MOUTH AT BEDTIME 90 tablet 0     sucralfate (CARAFATE) 1 GM tablet Take 1 tablet (1 g) by mouth 4 times daily as needed (Abdominal discomfort) 30 tablet 3     traMADol (ULTRAM) 50 MG tablet Take 50 mg by mouth every 6 hours as needed for severe pain (Patient not taking: No sig reported)       traMADol (ULTRAM) 50 MG tablet Take 1 tablet (50 mg) by mouth every 6 hours as needed for severe pain (Patient not taking: No sig reported) 120 tablet 0     [START ON  "8/14/2022] traMADol (ULTRAM) 50 MG tablet Take 1 tablet (50 mg) by mouth every 6 hours as needed for severe pain (Patient not taking: No sig reported) 120 tablet 0     triamcinolone (KENALOG) 0.1 % external cream Apply topically 2 times daily Apply to rash until improved (Patient not taking: No sig reported) 30 g 1     valACYclovir (VALTREX) 1000 mg tablet TAKE 1 TABLET(1000 MG) BY MOUTH TWICE DAILY. REPEAT AS NEEDED FOR COLD SORE 16 tablet 3     vitamin D3 (CHOLECALCIFEROL) 50 mcg (2000 units) tablet Take 1 tablet (50 mcg) by mouth daily 90 tablet 3      Family History:  She has a maternal aunt with a h/o breast cancer at 53 yo.  Maternal grandmother with a h/o colon cancer.  Patient had hereditary breast actionable panel which was negative.     Social History:  Patient is .  She has 2 adult children, both of whom live locally.  Unfortunately, patient's job as a  was terminated due to time away from work for her breast cancer treatment.  She quit smoking in 2000 and \"socially\" smoked prior to this, perhaps 1 pack every 3 days.  She has 1 or 2 drinks every 2 weeks.    Physical Exam:  /82   Pulse 73   Temp 98.9  F (37.2  C) (Oral)   Ht 1.726 m (5' 7.95\")   Wt 92.4 kg (203 lb 12.8 oz)   LMP  (LMP Unknown)   SpO2 98%   BMI 31.03 kg/m    LMP  (LMP Unknown)   General:  Well appearing adult female in NAD.  Alert and oriented x 3.  HEENT:  Normocephalic.  Sclera anicteric.  MMM.  No lesions of the oropharynx.  Lymph:  No palpable cervical, supraclavicular, or axillary LAD.  Chest:  CTA bilaterally.  No wheezes or crackles.  CV:  RRR.  Nl S1 and S2.  No m/r/g.  Breast: Bilateral mastectomies with tissue expanders in place.  Palpable fluid wave overlying the right tissue expander.  No intradermal masses of the chest wall.  Abd:  Soft/ND.    Ext:  No pitting edema of the bilateral lower extremities.    Musculo:  Limited ROM of the bilateral extremities to just above shoulder level " bilaterally.  Neuro:  Cranial nerves grossly intact.  Psych:  Mood and affect appear normal.  Skin:  No visible concerning skin rashes or lesions.    Laboratory/Imaging Studies  2/18/2022 Bilateral diagnostic mammogram:  Right breast: At the site of the palpable abnormality there is a focal  mass with some associated microcalcifications. Ultrasound will be  obtained.  Left breast: No mammographic evidence for malignancy.     Right breast ultrasound: At the site of the palpable abnormality in  the right breast 2:00 position approximately 6 cm from the nipple  there is a bilobed hypoechoic mass with some shadowing and some  internal blood flow as well as some echogenic foci likely representing  calcifications. This measures 2.4 x 1.1 x 1.6 cm. Tissue diagnosis is recommended. No right axillary adenopathy.    2/22/2022 Right breast biopsy:  Right breast, lower inner quadrant, core biopsy  -Invasive ductal carcinoma, [place] grade 3  -Negative for estrogen and progesterone receptor    Ratio of HER2/SACHA-17 signals  Diana Salvador:  1.6 (JOHANNA Negative)                                      Avg. number HER2 signals/nucleus:  2.9                                                                                                Avg. number SACHA-17 signals/nucleus:  1.8     6/1/2022 Right breast diagnostic mammogram and ultrasound:  Complete resolution of mass component of biopsied malignancy with only coarse residual calcifications present at this site, adjacent to the biopsy marking clip. Continued oncologic management is advised.    6/30/2022 Pathology, bilateral mastectomies, left axillary sentinel lymph node biopsy:  A.  Right breast, simple mastectomy, with sutured designated margins:  - No evidence of residual breast carcinoma.  - No evidence of lymphovascular invasion.  - Evidence of previous tumor bed and post neoadjuvant chemotherapy changes in upper inner quadrant.  - Previous biopsy clip identified by X-ray of the  mastectomy specimen, but not identified on gross examination.  (See gross description)     B.  Right axillary sentinel lymph node, excision:  - A single benign lymph node, negative for metastatic tumor.  - No post neoadjuvant chemotherapy changes.     C.  Left breast, simple mastectomy, with sutured designated margins:  - Benign breast tissue with a 4.5 cm fibroadenomatous area and focal moderate increase in stromal fibrous tissue.  - Negative for atypia and malignancy.    ASSESSMENT/PLAN:  Ms. Salvador is a 56 yo female with a clinical stage IIB, T2N0M0, grade 3, triple negative right breast cancer.  She is s/p treatment with Carboplatin, paclitaxel, and pembrolizumab x 3 cycles, followed by bilateral mastectomies (pCR), right axillary sentinel lymph node biopsy, and 1 cycle of adjuvant pembrolizumab.    1.  Right breast cancer:  She presents today for a second opinion regarding the following questions: 1) how many cycles of pembrolizumab I would recommend in the setting of a pCR to neoadjuvant chemotherapy and 2) do I need adjuvant radiation therapy.    We reviewed the results of the KEYNOTE 522 trial which showed increased rates of both pCR and EFS with the addition of pembrolizumab to both the taxol and carboplatin and adriamycin and cyclophosphamide portions of neoadjuvant chemotherapy and an additional 9 cycles of adjuvant pembrolizumab.  We reviewed data from the ISPY2 clinical trial which suggests in the treatment of triple negative breast cancer, no matter what the neoadjuvant treatment, if a pCR was obtained the prognosis is excellent with very low rates of breast cancer recurrence in the ensuing 5 years.  We also noted this to be true in the patients who achieved a pCR in the KEYNOTE 522 trial.  This brings into question as to whether the 9 cycles of adjuvant pembrolizumab are actually needed.  Unfortunately, there was not an arm of the KEYNOTE 522 trial that did not receive the adjuvant immunotherapy.   Therefore, the current standard of care is to administer these additional 9 cycles of adjuvant pembrolizumab.  I am thus in favor of her continuing pembrolizumab with plan to complete a total of 9 cycles.  We discussed, however, if she were to develop an adverse autoimmune effect of the treatment, I would have a low threshold for discontinuing it.    In regards to the question regarding adjuvant radiation, we discussed this would be indicated in the scenario that she had a positive node at any time during her treatment.  We reviewed her initial right axillary ultrasound and Dr. Diamond's initial clinical exam, both of which were without evidence of lymphadenopathy.  We also reviewed the pathology from her sentinel lymph node biopsy which showed no treatment related effect in the lymph node, which suggests this lymph node was never involved with malignancy.  One could also consider radiation in the scenario that original tumor size was > 5 cm or in the situation of h/o dermal involvement.  Neither of these tumor characteristics were present.  Therefore, I do not see an indication for post-mastectomy adjuvant radiation for this patient.    - Recommend continuing pembrolizumab to complete a total of 9 adjuvant cycles.  - No indication for radiation therapy.    2.  Lymphedema/restricted ROM of the bilateral upper extremities:  She has lymphedema of the chest wall and restriction of ROM of the bilateral upper extremities on today's exam.  I have placed a lymphedema referral, which she will pursue locally.    3.  Cancer Risk Management:  Breast Actionable panel was drawn and was negative for germline pathogenic mutation.  We discussed this panel only evaluates for the 11 most common genes associated with breast cancer.  Given her family history of both breast and colon cancer, I recommend she meet with genetic counseling to determine if additional genetic testing is indicated.  Referral to Cancer Risk Management was  placed.    4.  Grade 2 drug-induced peripheral neuropathy:  We discussed neuropathy may continue to improve in the year after completion of Taxol and carboplatin chemotherapy.  Typically there is little improvement after the one year point.  There are no treatments to reverse neuropathy.  We reviewed medicines that treat nerve pain such as gabapentin, Lyrica, Cymbalta, and Wellbutrin can mask the symptoms, but do not treat the underlying neuropathy.  She declines medication at this time.    5.  Follow Up:  No need for scheduled follow up with me. Patient will resume care with Dr. Diamond.  I encouraged her to contact me with any future questions.    65 minutes spent on the date of the encounter doing chart review, review of test results, interpretation of tests, patient visit and documentation.       Sincerely,    Demi Lyons MD

## 2022-08-10 ENCOUNTER — TELEPHONE (OUTPATIENT)
Dept: SURGERY | Facility: CLINIC | Age: 57
End: 2022-08-10

## 2022-08-10 NOTE — TELEPHONE ENCOUNTER
Patient called clinic today wanting to speak with ANGELLA Byrd but writer took a message for her. Patient wants to cancel her appointment today at 2:15pm because she is not feeling well. Patient requested a call back to reschedule.     Danitza Vega  In-Clinic Visit Facilitator

## 2022-08-10 NOTE — TELEPHONE ENCOUNTER
Called pt and she states she is having some stomach issues and doesn't want to try to make the drive. Appt rescheduled...Rochelle Nichole RN

## 2022-08-15 LAB
PATH REPORT.COMMENTS IMP SPEC: NORMAL
PATH REPORT.FINAL DX SPEC: NORMAL
PATH REPORT.GROSS SPEC: NORMAL
PATH REPORT.MICROSCOPIC SPEC OTHER STN: NORMAL
PATH REPORT.RELEVANT HX SPEC: NORMAL
PHOTO IMAGE: NORMAL

## 2022-08-16 ENCOUNTER — ALLIED HEALTH/NURSE VISIT (OUTPATIENT)
Dept: DERMATOLOGY | Facility: CLINIC | Age: 57
End: 2022-08-16
Payer: OTHER GOVERNMENT

## 2022-08-16 DIAGNOSIS — Z98.890 S/P BREAST RECONSTRUCTION, BILATERAL: Primary | ICD-10-CM

## 2022-08-16 PROCEDURE — 99207 PR NO CHARGE NURSE ONLY: CPT | Performed by: DERMATOLOGY

## 2022-08-16 NOTE — NURSING NOTE
Diana Salvador comes into clinic today at the request of  Ordering Provider for Breast fill .    0cc of NS filled into right breast expander and 250cc of NS filled into left breast expander. Total fill volume for right breast expander is 260cc. Total fill volume for left breast expander is 510cc   Bacitracin and a bandaid applied to the injection sites. Pt tolerated the breast fill without any issues. Pt advised to monitor the injection sites for any increased redness, drainage, pain or swelling and call the clinic back if any of these issues develop. RN could not definitively locate the Expander port of the right breast after multiple attempts  so RN notified pt that RN would reach out to  and or Jessi in his absence and follow up with pt. Pt expressed understanding and voiced appreciation to RN for being up front and putting her safety above anything else.       This service provided today was under the supervising provider of the day  , who was available if needed.    Rochelle Nichole RN

## 2022-08-17 NOTE — NURSING NOTE
Spoke with Marilee HERNANDEZ regarding pt and Marilee advised that she would be at [facility] in the morning on 8/24 with another plastics provider and can stop to see pt anytime after 11:15 or so to check on right breast expander.Appt on 8/24/22 at 11:30am confirmed with pt and Marilee HERNANDEZ ..Rochelle Nichole RN

## 2022-08-18 ENCOUNTER — LAB (OUTPATIENT)
Dept: INFUSION THERAPY | Facility: CLINIC | Age: 57
End: 2022-08-18
Attending: INTERNAL MEDICINE
Payer: OTHER GOVERNMENT

## 2022-08-18 ENCOUNTER — ONCOLOGY VISIT (OUTPATIENT)
Dept: ONCOLOGY | Facility: CLINIC | Age: 57
End: 2022-08-18
Attending: INTERNAL MEDICINE
Payer: OTHER GOVERNMENT

## 2022-08-18 ENCOUNTER — PATIENT OUTREACH (OUTPATIENT)
Dept: FAMILY MEDICINE | Facility: CLINIC | Age: 57
End: 2022-08-18

## 2022-08-18 VITALS
OXYGEN SATURATION: 97 % | RESPIRATION RATE: 12 BRPM | TEMPERATURE: 97.4 F | SYSTOLIC BLOOD PRESSURE: 104 MMHG | DIASTOLIC BLOOD PRESSURE: 72 MMHG | HEART RATE: 70 BPM | WEIGHT: 205 LBS | BODY MASS INDEX: 31.21 KG/M2

## 2022-08-18 VITALS — SYSTOLIC BLOOD PRESSURE: 101 MMHG | DIASTOLIC BLOOD PRESSURE: 70 MMHG

## 2022-08-18 DIAGNOSIS — R87.810 CERVICAL HIGH RISK HPV (HUMAN PAPILLOMAVIRUS) TEST POSITIVE: ICD-10-CM

## 2022-08-18 DIAGNOSIS — D70.1 CHEMOTHERAPY-INDUCED NEUTROPENIA (H): ICD-10-CM

## 2022-08-18 DIAGNOSIS — G62.0 CHEMOTHERAPY-INDUCED NEUROPATHY (H): ICD-10-CM

## 2022-08-18 DIAGNOSIS — Z51.11 ENCOUNTER FOR ANTINEOPLASTIC CHEMOTHERAPY: Primary | ICD-10-CM

## 2022-08-18 DIAGNOSIS — Z17.1 MALIGNANT NEOPLASM OF UPPER-INNER QUADRANT OF RIGHT BREAST IN FEMALE, ESTROGEN RECEPTOR NEGATIVE (H): ICD-10-CM

## 2022-08-18 DIAGNOSIS — T45.1X5A CHEMOTHERAPY-INDUCED NEUROPATHY (H): ICD-10-CM

## 2022-08-18 DIAGNOSIS — Z51.11 ENCOUNTER FOR ANTINEOPLASTIC CHEMOTHERAPY: ICD-10-CM

## 2022-08-18 DIAGNOSIS — Z17.1 MALIGNANT NEOPLASM OF UPPER-INNER QUADRANT OF RIGHT BREAST IN FEMALE, ESTROGEN RECEPTOR NEGATIVE (H): Primary | ICD-10-CM

## 2022-08-18 DIAGNOSIS — C50.211 MALIGNANT NEOPLASM OF UPPER-INNER QUADRANT OF RIGHT BREAST IN FEMALE, ESTROGEN RECEPTOR NEGATIVE (H): Primary | ICD-10-CM

## 2022-08-18 DIAGNOSIS — T45.1X5A CHEMOTHERAPY-INDUCED NEUTROPENIA (H): ICD-10-CM

## 2022-08-18 DIAGNOSIS — C50.211 MALIGNANT NEOPLASM OF UPPER-INNER QUADRANT OF RIGHT BREAST IN FEMALE, ESTROGEN RECEPTOR NEGATIVE (H): ICD-10-CM

## 2022-08-18 LAB
ALBUMIN SERPL-MCNC: 3.2 G/DL (ref 3.4–5)
ALP SERPL-CCNC: 95 U/L (ref 40–150)
ALT SERPL W P-5'-P-CCNC: 13 U/L (ref 0–50)
ANION GAP SERPL CALCULATED.3IONS-SCNC: 3 MMOL/L (ref 3–14)
AST SERPL W P-5'-P-CCNC: 11 U/L (ref 0–45)
BILIRUB SERPL-MCNC: 0.2 MG/DL (ref 0.2–1.3)
BUN SERPL-MCNC: 8 MG/DL (ref 7–30)
CALCIUM SERPL-MCNC: 8.7 MG/DL (ref 8.5–10.1)
CHLORIDE BLD-SCNC: 109 MMOL/L (ref 94–109)
CO2 SERPL-SCNC: 30 MMOL/L (ref 20–32)
CREAT SERPL-MCNC: 0.71 MG/DL (ref 0.52–1.04)
GFR SERPL CREATININE-BSD FRML MDRD: >90 ML/MIN/1.73M2
GLUCOSE BLD-MCNC: 97 MG/DL (ref 70–99)
POTASSIUM BLD-SCNC: 4.1 MMOL/L (ref 3.4–5.3)
PROT SERPL-MCNC: 6.8 G/DL (ref 6.8–8.8)
SODIUM SERPL-SCNC: 142 MMOL/L (ref 133–144)
TSH SERPL DL<=0.005 MIU/L-ACNC: 1.36 MU/L (ref 0.4–4)

## 2022-08-18 PROCEDURE — 99214 OFFICE O/P EST MOD 30 MIN: CPT | Performed by: INTERNAL MEDICINE

## 2022-08-18 PROCEDURE — 250N000011 HC RX IP 250 OP 636: Performed by: INTERNAL MEDICINE

## 2022-08-18 PROCEDURE — G0463 HOSPITAL OUTPT CLINIC VISIT: HCPCS | Mod: 25

## 2022-08-18 PROCEDURE — 84443 ASSAY THYROID STIM HORMONE: CPT | Performed by: INTERNAL MEDICINE

## 2022-08-18 PROCEDURE — 96413 CHEMO IV INFUSION 1 HR: CPT

## 2022-08-18 PROCEDURE — 258N000003 HC RX IP 258 OP 636: Performed by: INTERNAL MEDICINE

## 2022-08-18 PROCEDURE — 80053 COMPREHEN METABOLIC PANEL: CPT | Performed by: INTERNAL MEDICINE

## 2022-08-18 PROCEDURE — 250N000011 HC RX IP 250 OP 636

## 2022-08-18 RX ORDER — MEPERIDINE HYDROCHLORIDE 25 MG/ML
25 INJECTION INTRAMUSCULAR; INTRAVENOUS; SUBCUTANEOUS EVERY 30 MIN PRN
Status: CANCELLED | OUTPATIENT
Start: 2022-09-08

## 2022-08-18 RX ORDER — METHYLPREDNISOLONE SODIUM SUCCINATE 125 MG/2ML
125 INJECTION, POWDER, LYOPHILIZED, FOR SOLUTION INTRAMUSCULAR; INTRAVENOUS
Status: CANCELLED
Start: 2022-09-08

## 2022-08-18 RX ORDER — EPINEPHRINE 1 MG/ML
0.3 INJECTION, SOLUTION, CONCENTRATE INTRAVENOUS EVERY 5 MIN PRN
Status: CANCELLED | OUTPATIENT
Start: 2022-09-29

## 2022-08-18 RX ORDER — MEPERIDINE HYDROCHLORIDE 25 MG/ML
25 INJECTION INTRAMUSCULAR; INTRAVENOUS; SUBCUTANEOUS EVERY 30 MIN PRN
Status: CANCELLED | OUTPATIENT
Start: 2022-09-29

## 2022-08-18 RX ORDER — LORAZEPAM 2 MG/ML
0.5 INJECTION INTRAMUSCULAR EVERY 4 HOURS PRN
Status: CANCELLED | OUTPATIENT
Start: 2022-10-20

## 2022-08-18 RX ORDER — HEPARIN SODIUM (PORCINE) LOCK FLUSH IV SOLN 100 UNIT/ML 100 UNIT/ML
5 SOLUTION INTRAVENOUS
Status: CANCELLED | OUTPATIENT
Start: 2022-09-29

## 2022-08-18 RX ORDER — NALOXONE HYDROCHLORIDE 0.4 MG/ML
0.2 INJECTION, SOLUTION INTRAMUSCULAR; INTRAVENOUS; SUBCUTANEOUS
Status: CANCELLED | OUTPATIENT
Start: 2022-08-18

## 2022-08-18 RX ORDER — MEPERIDINE HYDROCHLORIDE 25 MG/ML
25 INJECTION INTRAMUSCULAR; INTRAVENOUS; SUBCUTANEOUS EVERY 30 MIN PRN
Status: CANCELLED | OUTPATIENT
Start: 2022-08-18

## 2022-08-18 RX ORDER — ALBUTEROL SULFATE 0.83 MG/ML
2.5 SOLUTION RESPIRATORY (INHALATION)
Status: CANCELLED | OUTPATIENT
Start: 2022-10-20

## 2022-08-18 RX ORDER — DIPHENHYDRAMINE HYDROCHLORIDE 50 MG/ML
50 INJECTION INTRAMUSCULAR; INTRAVENOUS
Status: CANCELLED
Start: 2022-10-20

## 2022-08-18 RX ORDER — ALBUTEROL SULFATE 0.83 MG/ML
2.5 SOLUTION RESPIRATORY (INHALATION)
Status: CANCELLED | OUTPATIENT
Start: 2022-09-29

## 2022-08-18 RX ORDER — DIPHENHYDRAMINE HYDROCHLORIDE 50 MG/ML
50 INJECTION INTRAMUSCULAR; INTRAVENOUS
Status: CANCELLED
Start: 2022-08-18

## 2022-08-18 RX ORDER — EPINEPHRINE 1 MG/ML
0.3 INJECTION, SOLUTION, CONCENTRATE INTRAVENOUS EVERY 5 MIN PRN
Status: CANCELLED | OUTPATIENT
Start: 2022-08-18

## 2022-08-18 RX ORDER — METHYLPREDNISOLONE SODIUM SUCCINATE 125 MG/2ML
125 INJECTION, POWDER, LYOPHILIZED, FOR SOLUTION INTRAMUSCULAR; INTRAVENOUS
Status: CANCELLED
Start: 2022-10-20

## 2022-08-18 RX ORDER — NALOXONE HYDROCHLORIDE 0.4 MG/ML
0.2 INJECTION, SOLUTION INTRAMUSCULAR; INTRAVENOUS; SUBCUTANEOUS
Status: CANCELLED | OUTPATIENT
Start: 2022-10-20

## 2022-08-18 RX ORDER — ALBUTEROL SULFATE 90 UG/1
1-2 AEROSOL, METERED RESPIRATORY (INHALATION)
Status: CANCELLED
Start: 2022-10-20

## 2022-08-18 RX ORDER — LORAZEPAM 2 MG/ML
0.5 INJECTION INTRAMUSCULAR EVERY 4 HOURS PRN
Status: CANCELLED | OUTPATIENT
Start: 2022-09-08

## 2022-08-18 RX ORDER — DIPHENHYDRAMINE HYDROCHLORIDE 50 MG/ML
50 INJECTION INTRAMUSCULAR; INTRAVENOUS
Status: CANCELLED
Start: 2022-09-29

## 2022-08-18 RX ORDER — METHYLPREDNISOLONE SODIUM SUCCINATE 125 MG/2ML
125 INJECTION, POWDER, LYOPHILIZED, FOR SOLUTION INTRAMUSCULAR; INTRAVENOUS
Status: CANCELLED
Start: 2022-09-29

## 2022-08-18 RX ORDER — ALBUTEROL SULFATE 90 UG/1
1-2 AEROSOL, METERED RESPIRATORY (INHALATION)
Status: CANCELLED
Start: 2022-09-29

## 2022-08-18 RX ORDER — HEPARIN SODIUM (PORCINE) LOCK FLUSH IV SOLN 100 UNIT/ML 100 UNIT/ML
5 SOLUTION INTRAVENOUS
Status: CANCELLED | OUTPATIENT
Start: 2022-09-08

## 2022-08-18 RX ORDER — DIPHENHYDRAMINE HYDROCHLORIDE 50 MG/ML
50 INJECTION INTRAMUSCULAR; INTRAVENOUS
Status: CANCELLED
Start: 2022-09-08

## 2022-08-18 RX ORDER — ALBUTEROL SULFATE 0.83 MG/ML
2.5 SOLUTION RESPIRATORY (INHALATION)
Status: CANCELLED | OUTPATIENT
Start: 2022-08-18

## 2022-08-18 RX ORDER — NALOXONE HYDROCHLORIDE 0.4 MG/ML
0.2 INJECTION, SOLUTION INTRAMUSCULAR; INTRAVENOUS; SUBCUTANEOUS
Status: CANCELLED | OUTPATIENT
Start: 2022-09-29

## 2022-08-18 RX ORDER — METHYLPREDNISOLONE SODIUM SUCCINATE 125 MG/2ML
125 INJECTION, POWDER, LYOPHILIZED, FOR SOLUTION INTRAMUSCULAR; INTRAVENOUS
Status: CANCELLED
Start: 2022-08-18

## 2022-08-18 RX ORDER — HEPARIN SODIUM (PORCINE) LOCK FLUSH IV SOLN 100 UNIT/ML 100 UNIT/ML
5 SOLUTION INTRAVENOUS
Status: DISCONTINUED | OUTPATIENT
Start: 2022-08-18 | End: 2022-08-18 | Stop reason: HOSPADM

## 2022-08-18 RX ORDER — ALBUTEROL SULFATE 0.83 MG/ML
2.5 SOLUTION RESPIRATORY (INHALATION)
Status: CANCELLED | OUTPATIENT
Start: 2022-09-08

## 2022-08-18 RX ORDER — ALBUTEROL SULFATE 90 UG/1
1-2 AEROSOL, METERED RESPIRATORY (INHALATION)
Status: CANCELLED
Start: 2022-09-08

## 2022-08-18 RX ORDER — HEPARIN SODIUM (PORCINE) LOCK FLUSH IV SOLN 100 UNIT/ML 100 UNIT/ML
SOLUTION INTRAVENOUS
Status: COMPLETED
Start: 2022-08-18 | End: 2022-08-18

## 2022-08-18 RX ORDER — DULOXETIN HYDROCHLORIDE 60 MG/1
60 CAPSULE, DELAYED RELEASE ORAL AT BEDTIME
Qty: 30 CAPSULE | Refills: 5 | Status: SHIPPED | OUTPATIENT
Start: 2022-08-18 | End: 2023-02-03

## 2022-08-18 RX ORDER — MEPERIDINE HYDROCHLORIDE 25 MG/ML
25 INJECTION INTRAMUSCULAR; INTRAVENOUS; SUBCUTANEOUS EVERY 30 MIN PRN
Status: CANCELLED | OUTPATIENT
Start: 2022-10-20

## 2022-08-18 RX ORDER — LORAZEPAM 2 MG/ML
0.5 INJECTION INTRAMUSCULAR EVERY 4 HOURS PRN
Status: CANCELLED | OUTPATIENT
Start: 2022-08-18

## 2022-08-18 RX ORDER — HEPARIN SODIUM (PORCINE) LOCK FLUSH IV SOLN 100 UNIT/ML 100 UNIT/ML
5 SOLUTION INTRAVENOUS
Status: CANCELLED | OUTPATIENT
Start: 2022-10-20

## 2022-08-18 RX ORDER — LORAZEPAM 2 MG/ML
0.5 INJECTION INTRAMUSCULAR EVERY 4 HOURS PRN
Status: CANCELLED | OUTPATIENT
Start: 2022-09-29

## 2022-08-18 RX ORDER — ALBUTEROL SULFATE 90 UG/1
1-2 AEROSOL, METERED RESPIRATORY (INHALATION)
Status: CANCELLED
Start: 2022-08-18

## 2022-08-18 RX ORDER — HEPARIN SODIUM (PORCINE) LOCK FLUSH IV SOLN 100 UNIT/ML 100 UNIT/ML
5 SOLUTION INTRAVENOUS
Status: CANCELLED | OUTPATIENT
Start: 2022-08-18

## 2022-08-18 RX ORDER — EPINEPHRINE 1 MG/ML
0.3 INJECTION, SOLUTION, CONCENTRATE INTRAVENOUS EVERY 5 MIN PRN
Status: CANCELLED | OUTPATIENT
Start: 2022-10-20

## 2022-08-18 RX ORDER — HYDROCODONE BITARTRATE AND ACETAMINOPHEN 5; 325 MG/1; MG/1
1 TABLET ORAL EVERY 6 HOURS PRN
Qty: 60 TABLET | Refills: 0 | Status: SHIPPED | OUTPATIENT
Start: 2022-08-18 | End: 2022-08-21

## 2022-08-18 RX ORDER — EPINEPHRINE 1 MG/ML
0.3 INJECTION, SOLUTION, CONCENTRATE INTRAVENOUS EVERY 5 MIN PRN
Status: CANCELLED | OUTPATIENT
Start: 2022-09-08

## 2022-08-18 RX ORDER — NALOXONE HYDROCHLORIDE 0.4 MG/ML
0.2 INJECTION, SOLUTION INTRAMUSCULAR; INTRAVENOUS; SUBCUTANEOUS
Status: CANCELLED | OUTPATIENT
Start: 2022-09-08

## 2022-08-18 RX ADMIN — SODIUM CHLORIDE 200 MG: 9 INJECTION, SOLUTION INTRAVENOUS at 11:11

## 2022-08-18 RX ADMIN — HEPARIN 500 UNITS: 100 SYRINGE at 11:50

## 2022-08-18 RX ADMIN — SODIUM CHLORIDE 250 ML: 9 INJECTION, SOLUTION INTRAVENOUS at 11:13

## 2022-08-18 ASSESSMENT — PAIN SCALES - GENERAL: PAINLEVEL: SEVERE PAIN (6)

## 2022-08-18 NOTE — PROGRESS NOTES
The patient is being seen for the following issue/s:  1. Malignant neoplasm of upper-inner quadrant of right breast in female, estrogen receptor negative (triple-neg., germline test neg. for BRCA 1/2)    2. 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 Chemotherapy-induced neuropathy (H)    4. Chemotherapy-induced neutropenia (H)    5. Encounter for antineoplastic chemotherapy      Cancer Staging  Malignant neoplasm of upper-inner quadrant of right breast in female, estrogen receptor negative (triple-neg., germline test neg. for BRCA 1/2)  Staging form: Breast, AJCC 8th Edition  - Clinical stage from 3/4/2022: Stage IIB (cT2, cN0, cM0, G3, ER-, DE-, HER2-) - Signed by Ricardo Diamond MD on 3/4/2022    March 2022: She commenced neoadjuvant chemotherapy based on the KEYNOTE-522 trial (Shobha et al., 2020):      She received 3 cycles of pembrolizumab 200 mg flat dose IV, paclitaxel 80 mg/m  IV once daily on days 1, 8, and 15 and Carboplatin AUC=5 IV on day 1 with filgrastim 5 mcg/kg subcutaneously once daily on days 16, 17, and 18.    Unfortunately, she developed grade 3 peripheral motor and sensory neuropathy and further chemotherapy with carboplatin and paclitaxel was canceled and pembrolizumab alone was continued.    She was able to finish a total of 6 preoperative doses of pembrolizumab.    On 6/30/22 she underwent bilateral mastectomy with right sentinel lymph node biopsy. She had expander placement by plastic surgery.  She was found to have a path CR.     She has continued to recover well from her surgery.  She has seen Dr. Smith with who has recommended no adjuvant radiation but to complete a total of 9 adjuvant doses of pembrolizumab.      Her neuropathy has improved but is still quite painful at a couple of her fingertips.  She was switched from gabapentin to duloxetine.  She has to take some hydrocodone here and there.  She also has to take some Ativan for anxiety here  and there.    PHYSICAL EXAMINATION:  /72 (BP Location: Right arm, Patient Position: Sitting, Cuff Size: Adult Regular)   Pulse 70   Temp 97.4  F (36.3  C) (Tympanic)   Resp 12   Wt 93 kg (205 lb)   LMP  (LMP Unknown)   SpO2 97%   BMI 31.21 kg/m      General: Todays' vital signs reviewed in the EMR.    ECOG PS is 1.  Cardiovascular: Cor RRR  Respiratory: Lungs clear to auscultation bilaterally    ASSESSMENT/PLAN:    1. Malignant neoplasm of upper-inner quadrant of right breast in female, estrogen receptor negative (triple-neg., germline test neg. for BRCA 1/2)    2. 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 Chemotherapy-induced neuropathy (H)    4. Chemotherapy-induced neutropenia (H)    5. Encounter for antineoplastic chemotherapy      CMP results from today are adequate for continued immunotherapy.     TSH result from today is pending.    Your next followup will be scheduled with Marilu Vergara NP on 10/13/2022.    Based on Dr. Lyons's recommendation we will continue adjuvant Keytruda every 3 weeks through January 2023 if tolerated.     I counseled her not to take Ativan and hydrocodone at the same time due to the increased risk of respiratory depression when benzodiazepines and opiates are taken together.      I spent a total of 33 minutes on the care of this patient on the day of service including face to face time and the remainder in chart review, care coordination, and documentation on the day of service.

## 2022-08-18 NOTE — PROGRESS NOTES
"Oncology Rooming Note    August 18, 2022 9:00 AM   Diana Salvador is a 57 year old female who presents for:    Chief Complaint   Patient presents with     Oncology Clinic Visit     Malignant neoplasm of upper-inner quadrant of right breast in female, estrogen receptor negative - Labs provider and infusion     Initial Vitals: /72 (BP Location: Right arm, Patient Position: Sitting, Cuff Size: Adult Regular)   Pulse 70   Temp 97.4  F (36.3  C) (Tympanic)   Resp 12   Wt 93 kg (205 lb)   LMP  (LMP Unknown)   SpO2 97%   BMI 31.21 kg/m   Estimated body mass index is 31.21 kg/m  as calculated from the following:    Height as of 8/8/22: 1.726 m (5' 7.95\").    Weight as of this encounter: 93 kg (205 lb). Body surface area is 2.11 meters squared.  Severe Pain (6) Comment: Data Unavailable   No LMP recorded (lmp unknown). Patient has had a hysterectomy.  Allergies reviewed: Yes  Medications reviewed: Yes    Medications: Medication refills not needed today.  Pharmacy name entered into [facility]:    [id number] - [place], [place] - [address] AT [address] CLINICS  [facility] - [place], [place] - [address]    Clinical concerns:  Still having painful fingers and toes.       Emy Hollingsworth, [facility]            "

## 2022-08-18 NOTE — LETTER
"    8/18/2022         RE: Diana Salvador  [address] [place] [id number]        Dear Colleague,    Thank you for referring your patient, Diana Salvador, to the [facility]. Please see a copy of my visit note below.    Oncology Rooming Note    August 18, 2022 9:00 AM   Diana Salvador is a 57 year old female who presents for:    Chief Complaint   Patient presents with     Oncology Clinic Visit     Malignant neoplasm of upper-inner quadrant of right breast in female, estrogen receptor negative - Labs provider and infusion     Initial Vitals: /72 (BP Location: Right arm, Patient Position: Sitting, Cuff Size: Adult Regular)   Pulse 70   Temp 97.4  F (36.3  C) (Tympanic)   Resp 12   Wt 93 kg (205 lb)   LMP  (LMP Unknown)   SpO2 97%   BMI 31.21 kg/m   Estimated body mass index is 31.21 kg/m  as calculated from the following:    Height as of 8/8/22: 1.726 m (5' 7.95\").    Weight as of this encounter: 93 kg (205 lb). Body surface area is 2.11 meters squared.  Severe Pain (6) Comment: Data Unavailable   No LMP recorded (lmp unknown). Patient has had a hysterectomy.  Allergies reviewed: Yes  Medications reviewed: Yes    Medications: Medication refills not needed today.  Pharmacy name entered into [employer]:    [id number] - [facility] [address] AT [address] AND CLINICS  [facility] - [place] [id number] [facility]    Clinical concerns:  Still having painful fingers and toes.       Emy Hollingsworth CMA              The patient is being seen for the following issue/s:  1. Malignant neoplasm of upper-inner quadrant of right breast in female, estrogen receptor negative (triple-neg., germline test neg. for BRCA 1/2)    2. 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 Chemotherapy-induced neuropathy (H)    4. Chemotherapy-induced neutropenia (H)    5. " Encounter for antineoplastic chemotherapy      Cancer Staging  Malignant neoplasm of upper-inner quadrant of right breast in female, estrogen receptor negative (triple-neg., germline test neg. for BRCA 1/2)  Staging form: Breast, AJCC 8th Edition  - Clinical stage from 3/4/2022: Stage IIB (cT2, cN0, cM0, G3, ER-, MA-, HER2-) - Signed by Ricardo Diamond MD on 3/4/2022    March 2022: She commenced neoadjuvant chemotherapy based on the KEYNOTE-522 trial (Shobha et al., 2020):      She received 3 cycles of pembrolizumab 200 mg flat dose IV, paclitaxel 80 mg/m  IV once daily on days 1, 8, and 15 and Carboplatin AUC=5 IV on day 1 with filgrastim 5 mcg/kg subcutaneously once daily on days 16, 17, and 18.    Unfortunately, she developed grade 3 peripheral motor and sensory neuropathy and further chemotherapy with carboplatin and paclitaxel was canceled and pembrolizumab alone was continued.    She was able to finish a total of 6 preoperative doses of pembrolizumab.    On 6/30/22 she underwent bilateral mastectomy with right sentinel lymph node biopsy. She had expander placement by plastic surgery.  She was found to have a path CR.     She has continued to recover well from her surgery.  She has seen Dr. Smith with who has recommended no adjuvant radiation but to complete a total of 9 adjuvant doses of pembrolizumab.      Her neuropathy has improved but is still quite painful at a couple of her fingertips.  She was switched from gabapentin to duloxetine.  She has to take some hydrocodone here and there.  She also has to take some Ativan for anxiety here and there.    PHYSICAL EXAMINATION:  /72 (BP Location: Right arm, Patient Position: Sitting, Cuff Size: Adult Regular)   Pulse 70   Temp 97.4  F (36.3  C) (Tympanic)   Resp 12   Wt 93 kg (205 lb)   LMP  (LMP Unknown)   SpO2 97%   BMI 31.21 kg/m      General: Todays' vital signs reviewed in the EMR.    ECOG PS is 1.  Cardiovascular: Cor RRR  Respiratory: Lungs  clear to auscultation bilaterally    ASSESSMENT/PLAN:    1. Malignant neoplasm of upper-inner quadrant of right breast in female, estrogen receptor negative (triple-neg., germline test neg. for BRCA 1/2)    2. 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 Chemotherapy-induced neuropathy (H)    4. Chemotherapy-induced neutropenia (H)    5. Encounter for antineoplastic chemotherapy      CMP results from today are adequate for continued immunotherapy.     TSH result from today is pending.    Your next followup will be scheduled with Marilu Vergara NP on 10/13/2022.    Based on Dr. Lyons's recommendation we will continue adjuvant Keytruda every 3 weeks through January 2023 if tolerated.     I counseled her not to take Ativan and hydrocodone at the same time due to the increased risk of respiratory depression when benzodiazepines and opiates are taken together.      I spent a total of 33 minutes on the care of this patient on the day of service including face to face time and the remainder in chart review, care coordination, and documentation on the day of service.            Again, thank you for allowing me to participate in the care of your patient.        Sincerely,        Ricardo Diamond MD

## 2022-08-18 NOTE — PROGRESS NOTES
PAC labs drawn without difficulty via site protocol. Patient tolerated well.    Adrianna De Guzman RN on 8/18/2022 at 8:35 AM

## 2022-08-18 NOTE — PATIENT INSTRUCTIONS
CMP results from today are adequate for continued immunotherapy.     TSH result from today is pending.    Your next followup will be scheduled with Marilu Vergara NP on 10/13/2022.    Based on Dr. Lyons's recommendation we will continue adjuvant Keytruda every 3 weeks through January 2023 if tolerated.

## 2022-08-18 NOTE — LETTER
August 18, 2022      Diana Salvador  [address] [place] [id number]        Dear ,    This letter is to remind you that you are due for your follow-up Pap smear and Human Papillomavirus (HPV) test.    Please call [phone] to schedule your appointment at your earliest convenience.    If you have completed the appointment outside of the [facility] system, please have the records forwarded to our office. We will update your chart for your provider to review before your next annual wellness visit.     Thank you for choosing [facility]!      Sincerely,    Your [facility] Care Team

## 2022-08-18 NOTE — PROGRESS NOTES
Infusion Nursing Note:  Diana Salvador presents today for Keytruda.    Patient seen by provider today: Yes, Dr. Diamond therefore assessment deferred.   present during visit today: Not Applicable.    Note: N/A.    Intravenous Access:  Implanted Port.    Treatment Conditions:  Lab Results   Component Value Date     08/18/2022    POTASSIUM 4.1 08/18/2022    CR 0.71 08/18/2022    ANURADHA 8.7 08/18/2022    BILITOTAL 0.2 08/18/2022    ALBUMIN 3.2 (L) 08/18/2022    ALT 13 08/18/2022    AST 11 08/18/2022     Results reviewed, labs MET treatment parameters, ok to proceed with treatment.    Post Infusion Assessment:  Patient tolerated infusion without incident.  Blood return noted pre and post infusion.  Site patent and intact, free from redness, edema or discomfort.  No evidence of extravasations.  Access discontinued per protocol.     Discharge Plan:   Copy of AVS reviewed with patient and/or family.  Patient will return 9/9/22 for next appointment.  Patient discharged in stable condition accompanied by: self.  Departure Mode: Ambulatory.      Adrianna De Guzman RN

## 2022-08-19 ENCOUNTER — PATIENT OUTREACH (OUTPATIENT)
Dept: ONCOLOGY | Facility: CLINIC | Age: 57
End: 2022-08-19

## 2022-08-24 ENCOUNTER — ALLIED HEALTH/NURSE VISIT (OUTPATIENT)
Dept: DERMATOLOGY | Facility: CLINIC | Age: 57
End: 2022-08-24
Payer: OTHER GOVERNMENT

## 2022-08-24 DIAGNOSIS — Z98.890 S/P BREAST RECONSTRUCTION, BILATERAL: Primary | ICD-10-CM

## 2022-08-24 PROCEDURE — 99207 PR NO CHARGE NURSE ONLY: CPT | Performed by: DERMATOLOGY

## 2022-08-24 NOTE — NURSING NOTE
Diana Salvador comes into clinic today at the request of   Ordering Provider for Tissue expansion .      460 ml of NS filled into right breast expander and 200ccof NS filled into left breast expander. Total fill volume for right breast expander is 720cc. Total fill volume for left breast expander 710cc  Bacitracin and a bandaid applied to the injection sites. Pt tolerated the breast fill without any issues. Pt advised to monitor the injection sites for any increased redness, drainage, pain or swelling and call the clinic back if any of these issues develop. Pt to follow up in one week (s)for further expansion and follow up scheduled with  as well    This service provided today was under the supervising provider of the day Dr. Barber, who was available if needed.    Rochelle Nichole RN

## 2022-08-31 ENCOUNTER — ALLIED HEALTH/NURSE VISIT (OUTPATIENT)
Dept: DERMATOLOGY | Facility: CLINIC | Age: 57
End: 2022-08-31
Payer: OTHER GOVERNMENT

## 2022-08-31 DIAGNOSIS — Z98.890 S/P BREAST RECONSTRUCTION, BILATERAL: Primary | ICD-10-CM

## 2022-08-31 PROCEDURE — 99207 PR NO CHARGE NURSE ONLY: CPT | Performed by: DERMATOLOGY

## 2022-08-31 NOTE — NURSING NOTE
Diana Salvador comes into clinic today at the request of  Ordering Provider for Tissue Expansion .     100cc of NS filled into right breast expander and 80cc of NS filled into left breast expander. Total fill volume for right breast expander is 820cc . Total fill volume for left breast expander 790cc   Bacitracin and a bandaid applied to the injection sites. Pt tolerated the breast fill without any issues. Pt advised to monitor the injection sites for any increased redness, drainage, pain or swelling and call the clinic back if any of these issues develop. Pt to follow up in with  on 9/16     This service provided today was under the supervising provider of the day  , who was available if needed.    Rochelle Nichole RN

## 2022-09-08 ENCOUNTER — HOSPITAL ENCOUNTER (OUTPATIENT)
Dept: PHYSICAL THERAPY | Facility: CLINIC | Age: 57
Setting detail: THERAPIES SERIES
Discharge: HOME | End: 2022-09-08
Attending: INTERNAL MEDICINE
Payer: OTHER GOVERNMENT

## 2022-09-08 DIAGNOSIS — I89.0 LYMPHEDEMA: ICD-10-CM

## 2022-09-08 PROCEDURE — 97110 THERAPEUTIC EXERCISES: CPT | Mod: GP | Performed by: PHYSICAL THERAPIST

## 2022-09-08 PROCEDURE — 97161 PT EVAL LOW COMPLEX 20 MIN: CPT | Mod: GP | Performed by: PHYSICAL THERAPIST

## 2022-09-08 PROCEDURE — 97140 MANUAL THERAPY 1/> REGIONS: CPT | Mod: GP | Performed by: PHYSICAL THERAPIST

## 2022-09-08 NOTE — PROGRESS NOTES
Outpatient Lymphedema Therapy Evaluation       09/08/22 1200   Rehab Discipline   Discipline PT   Type of Visit   Type of visit Initial Edema Evaluation       present No   General Information   Start of care 09/08/22   Referring physician Dr. Demi Lyons MD   Orders Evaluate and treat as indicated   Order date 08/08/22   Medical diagnosis R-breast / RUQ lymphedema with fibrosis   Onset of illness / date of surgery 06/30/22  (bilateral mastectomies and R-axillary LN removal (single node benign))   Affected body parts Other  (R and L UQ are tight and painful)   Edema etiology comments Breast cancer treatment: 3/15/22 - 5/10/22 chemo; 6/30/22 bilateral mastectomies with R sentinel LN biopsy and tissue expander placement; met with MD on 8/8/22 and no indication for post-mastectomy radiation at this time; pt has had history of tummy tuck ~2015 and has scar from hip to hip and vertically from sternum to underwear line   Pertinent history of current problem (PT: include personal factors and/or comorbidities that impact the POC; OT: include additional occupational profile info) since breast surgery pt has had limited ROM of the BUEs; also reports intermittent right lateral chest wall swelling; she also reports h/o left superficial thrombophlebitis in the L hand, she has had one fill of tissue expanders and reports significant chest wall discomfort associated with this; now having a lot of pain and tightness from expanders and since surgery, pt reports more pain after each fill, pt thinks she's done filling, no date yet for surgery (sees Dr. Nieto next week); pt is R-handed so feels she's moving that side more on her own   Surgical / medical history reviewed Yes   Edema special tests   (LUE superficial clot on 7/11/22 from IV)   Prior level of functional mobility tightness of BUE is extremely limiting all mobility with arms (reaching, washing hair, getting dressed, household chores, folding  clothes, can't  1 year old grandson; also can't sleep in her bed due to pain - sleeping on her couch so she can prop self up better); having to rely on daughter-in-law more and more, baseline is complete independence   Patient role / employment history Unemployed  (employer couldn't accomodate [employer]; 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 No   Have you fallen and had an injury in the past year? No   Is patient a fall risk? No   Abuse Screen (yes response referral indicated)   Feels Unsafe at Home or Work/School no   Feels Threatened by Someone no   Does Anyone Try to Keep You From Having Contact with Others or Doing Things Outside Your Home? no   Physical Signs of Abuse Present no   System Outcome Measures   Outcome Measures Lymphedema   Lymphedema Life Impact Scale (score range 0-72). A higher score indicates greater impairment. 40   Subjective Report   Patient report of symptoms BUQ are painful, tight, limiting/ limited ROM   Precautions   Precautions comments no known precautions   Patient / Family Goals   Patient / family goals statement to reduce pain and increase ROM to return to baseline activity   Pain   Patient currently in pain Yes   Pain location BUQ   Pain rating 3/10   Cognitive Status   Orientation Orientation to person, place and time   Level of consciousness Alert   Follows commands and answers questions 100% of the time   Personal safety and judgement Intact   Memory Intact   Edema Exam / Assessment   Skin condition Intact   Skin condition comments skin of B breast intact, scars are slightly tight, edematous pockets of non-pitting edema just distal to B axilla (R>L) which is most bothersome to pt   Scar Yes   Location B mastectomy   Mobility fair    Scar comments slightly tight along entire length bilaterally   Radial pulse Symmetrical   Ulceration No   Girth Measurements   Girth Measurements Refer to separate girth measurement flowsheet   Volume UE   Right UE (mL) 1924.88   Range of Motion   ROM comments RUE GH flex 80 deg and LUE GH flex 88deg; R abduction 82 and L abduction 90deg; all motions limited by pain and tightness   Posture   Posture Normal   Palpation   Palpation Allan UQ/breast tender to touch   Sensory   Sensory perception Light touch   Light touch Impaired   Sensory perception comments at Allan breasts; neuropathy from chemo in B hands and feet   Coordination   Coordination Gross motor coordination appropriate   Muscle Tone   Muscle tone No deficits were identified   Planned Edema Interventions   Planned edema interventions Manual lymph drainage;Gradient compression bandaging;Fit for compression garment;Exercises;Precautions to prevent infection / exacerbation;Education;Manual therapy;Skin care / precautions;Scar mobilization;Myofascial release;Home management program development;Other  (cupping)   Clinical Impression   Criteria for skilled therapeutic intervention met Yes   Therapy diagnosis BUQ lymphedema and myofascial tightness   Influenced by the following impairments / conditions Stage 2   Functional limitations due to impairments / conditions tightness of BUE is extremely limiting all mobility with arms: reaching, washing hair, getting dressed, household chores, folding clothes, can't  1 year old grandson; cutting lawn; also can't sleep in her bed due to pain   Clinical Presentation Stable/Uncomplicated   Clinical Presentation Rationale clinical judgement   Clinical Decision Making (Complexity) Low complexity   Treatment Frequency 2x/week   Treatment duration 12 weeks   Patient / family and/or staff in agreement with plan of care Yes   Risks and benefits of therapy have been explained Yes   Education Assessment   Preferred learning  style Listening   Barriers to learning No barriers   Goals   Edema Eval Goals 1;2;3;4   Goal 1   Goal identifier stg   Goal description pt to be independent with BUE ROM exercises/stretches and performing at least 5 days/week to increase functional range for overhead reaching   Target date 09/22/22   Goal 2   Goal identifier stg   Goal description pt to have at least daytime tolerance to wearing a compression bra and/or tank with Komprex2 inside to decrease BUQ lymphedema   Target date 09/22/22   Goal 3   Goal identifier ltg   Goal description pt to increase BUE flexion to at least 150 degrees to make self more independent with household tasks and ADL   Target date 12/07/22   Goal 4   Goal identifier ltg   Goal description pt to be independent with longterm BUQ lymphedema management via HEP (ROM ex and stretching), self-MLD, self-MFR and compression garment wear/use for longterm lymphedema mangement for maintenance   Target date 12/07/22   Total Evaluation Time   PT Eval, Low Complexity Minutes (49231) 10

## 2022-09-09 ENCOUNTER — LAB (OUTPATIENT)
Dept: INFUSION THERAPY | Facility: CLINIC | Age: 57
End: 2022-09-09
Attending: INTERNAL MEDICINE
Payer: OTHER GOVERNMENT

## 2022-09-09 VITALS
BODY MASS INDEX: 32.4 KG/M2 | SYSTOLIC BLOOD PRESSURE: 91 MMHG | RESPIRATION RATE: 18 BRPM | DIASTOLIC BLOOD PRESSURE: 53 MMHG | HEART RATE: 61 BPM | TEMPERATURE: 97.4 F | WEIGHT: 212.8 LBS

## 2022-09-09 DIAGNOSIS — Z51.11 ENCOUNTER FOR ANTINEOPLASTIC CHEMOTHERAPY: ICD-10-CM

## 2022-09-09 DIAGNOSIS — C50.211 MALIGNANT NEOPLASM OF UPPER-INNER QUADRANT OF RIGHT BREAST IN FEMALE, ESTROGEN RECEPTOR NEGATIVE (H): ICD-10-CM

## 2022-09-09 DIAGNOSIS — D70.1 CHEMOTHERAPY-INDUCED NEUTROPENIA (H): Primary | ICD-10-CM

## 2022-09-09 DIAGNOSIS — T45.1X5A CHEMOTHERAPY-INDUCED NEUTROPENIA (H): Primary | ICD-10-CM

## 2022-09-09 DIAGNOSIS — T45.1X5A CHEMOTHERAPY-INDUCED NEUTROPENIA (H): ICD-10-CM

## 2022-09-09 DIAGNOSIS — D70.1 CHEMOTHERAPY-INDUCED NEUTROPENIA (H): ICD-10-CM

## 2022-09-09 DIAGNOSIS — Z17.1 MALIGNANT NEOPLASM OF UPPER-INNER QUADRANT OF RIGHT BREAST IN FEMALE, ESTROGEN RECEPTOR NEGATIVE (H): ICD-10-CM

## 2022-09-09 DIAGNOSIS — Z51.11 ENCOUNTER FOR ANTINEOPLASTIC CHEMOTHERAPY: Primary | ICD-10-CM

## 2022-09-09 LAB
ALBUMIN SERPL BCG-MCNC: 3.6 G/DL (ref 3.5–5.2)
ALP SERPL-CCNC: 97 U/L (ref 35–104)
ALT SERPL W P-5'-P-CCNC: 9 U/L (ref 10–35)
ANION GAP SERPL CALCULATED.3IONS-SCNC: 13 MMOL/L (ref 7–15)
AST SERPL W P-5'-P-CCNC: 17 U/L (ref 10–35)
BILIRUB SERPL-MCNC: 0.2 MG/DL
BUN SERPL-MCNC: 12.5 MG/DL (ref 6–20)
CALCIUM SERPL-MCNC: 8.9 MG/DL (ref 8.6–10)
CHLORIDE SERPL-SCNC: 102 MMOL/L (ref 98–107)
CREAT SERPL-MCNC: 0.75 MG/DL (ref 0.51–0.95)
DEPRECATED HCO3 PLAS-SCNC: 24 MMOL/L (ref 22–29)
GFR SERPL CREATININE-BSD FRML MDRD: >90 ML/MIN/1.73M2
GLUCOSE SERPL-MCNC: 107 MG/DL (ref 70–99)
POTASSIUM SERPL-SCNC: 3.5 MMOL/L (ref 3.4–5.3)
PROT SERPL-MCNC: 6.1 G/DL (ref 6.4–8.3)
SODIUM SERPL-SCNC: 139 MMOL/L (ref 136–145)
TSH SERPL DL<=0.005 MIU/L-ACNC: 1.72 UIU/ML (ref 0.3–4.2)

## 2022-09-09 PROCEDURE — 250N000011 HC RX IP 250 OP 636: Performed by: INTERNAL MEDICINE

## 2022-09-09 PROCEDURE — 84443 ASSAY THYROID STIM HORMONE: CPT | Performed by: INTERNAL MEDICINE

## 2022-09-09 PROCEDURE — 96413 CHEMO IV INFUSION 1 HR: CPT

## 2022-09-09 PROCEDURE — 258N000003 HC RX IP 258 OP 636: Performed by: INTERNAL MEDICINE

## 2022-09-09 PROCEDURE — 82040 ASSAY OF SERUM ALBUMIN: CPT | Performed by: INTERNAL MEDICINE

## 2022-09-09 PROCEDURE — 80053 COMPREHEN METABOLIC PANEL: CPT | Performed by: INTERNAL MEDICINE

## 2022-09-09 RX ORDER — HEPARIN SODIUM (PORCINE) LOCK FLUSH IV SOLN 100 UNIT/ML 100 UNIT/ML
5 SOLUTION INTRAVENOUS
Status: DISCONTINUED | OUTPATIENT
Start: 2022-09-09 | End: 2022-09-09 | Stop reason: HOSPADM

## 2022-09-09 RX ADMIN — SODIUM CHLORIDE 200 MG: 9 INJECTION, SOLUTION INTRAVENOUS at 09:33

## 2022-09-09 RX ADMIN — Medication 5 ML: at 10:19

## 2022-09-09 RX ADMIN — SODIUM CHLORIDE 250 ML: 9 INJECTION, SOLUTION INTRAVENOUS at 09:35

## 2022-09-09 NOTE — PROGRESS NOTES
Infusion Nursing Note:  Diana Salvador presents today for Keytruda C5D1.    Patient seen by provider today: No   present during visit today: Not Applicable.    Note: N/A.    Intravenous Access:  Implanted Port.    Treatment Conditions:  Lab Results   Component Value Date     09/09/2022    POTASSIUM 3.5 09/09/2022    CR 0.75 09/09/2022    ANURADHA 8.9 09/09/2022    BILITOTAL 0.2 09/09/2022    ALBUMIN 3.6 09/09/2022    ALT 9 (L) 09/09/2022    AST 17 09/09/2022     Results reviewed, labs MET treatment parameters, ok to proceed with treatment.    Post Infusion Assessment:  Patient tolerated infusion without incident.  Blood return noted pre and post infusion.  Site patent and intact, free from redness, edema or discomfort.  No evidence of extravasations.  Access discontinued per protocol.     Discharge Plan:   Discharge instructions reviewed with: Patient.  Patient and/or family verbalized understanding of discharge instructions and all questions answered.  AVS to patient via [employer].  Patient will return 9/29/2022 for next appointment.   Patient discharged in stable condition accompanied by: self.  Departure Mode: Ambulatory.    Farida Romero RN

## 2022-09-16 ENCOUNTER — OFFICE VISIT (OUTPATIENT)
Dept: SURGERY | Facility: CLINIC | Age: 57
End: 2022-09-16
Payer: OTHER GOVERNMENT

## 2022-09-16 DIAGNOSIS — Z98.890 S/P BREAST RECONSTRUCTION, BILATERAL: Primary | ICD-10-CM

## 2022-09-16 PROCEDURE — 99024 POSTOP FOLLOW-UP VISIT: CPT | Performed by: PLASTIC SURGERY

## 2022-09-16 NOTE — NURSING NOTE
Diana Salvador's goals for this visit include:   Chief Complaint   Patient presents with     RECHECK     Breast reconstruction       She requests these members of her care team be copied on today's visit information: no    PCP: Quoc Chu    Referring Provider:  No referring provider defined for this encounter.    LMP  (LMP Unknown)     Do you need any medication refills at today's visit? No    Brea Bermudez LPN

## 2022-09-16 NOTE — LETTER
9/16/2022         RE: Diana Salvador  [address] [place] [id number]        Dear Colleague,    Thank you for referring your patient, Diana Salvador, to the [facility]. Please see a copy of my visit note below.    PRESENTING COMPLAINT:  Postoperative visit status post bilateral breast reconstruction with subcutaneous prepectoral expanders after undergoing bilateral nipple non-sparing mastectomy for right sided breast cancer back in 06/2022.    HISTORY OF PRESENTING COMPLAINT:  Ms. Salvador is 57 years old, here for regular postoperative visit.  She is now fully expanded with about 800 cc each side and wants to proceed with the next stage. No change otherwise in her history and physical exam.    ASSESSMENT AND PLAN:  Based on the above findings, a diagnosis of bilateral breast reconstruction for breast cancer was made.  Plan now is to proceed with implant-based reconstruction after discussing options with her.  Options including implant-based reconstruction versus autologous reconstruction with thigh-based flaps given the fact she has had an abdominoplasty versus latissimus flaps with implants.  I think that is a reasonable approach.  She does have risks, benefits and alternatives, which were explained including pain, infection, bleeding, scarring, asymmetry, seromas, hematomas, wound breakdown, wound dehiscence, implant issues requiring revisional surgeries, implant infection, exposure, rippling, capsular contracture, requirement of further surgeries in the future to replace the implants, DVT, PE, MI, CVA, pneumonia, renal failure and death.  She understood them all and wants to proceed.  She understands that there may be a need for next stage including fat grafting and then 3-D nipple tattoos.  All questions were answered.  She wants to proceed with implant-based reconstruction.  We will schedule as soon as possible.  All exam and discussion done in presence of my resident, Aubrie  Tera.          Again, thank you for allowing me to participate in the care of your patient.        Sincerely,        ROSEMARIE Nieto MD

## 2022-09-16 NOTE — PROGRESS NOTES
PRESENTING COMPLAINT:  Postoperative visit status post bilateral breast reconstruction with subcutaneous prepectoral expanders after undergoing bilateral nipple non-sparing mastectomy for right sided breast cancer back in 06/2022.    HISTORY OF PRESENTING COMPLAINT:  Ms. Salvador is 57 years old, here for regular postoperative visit.  She is now fully expanded with about 800 cc each side and wants to proceed with the next stage. No change otherwise in her history and physical exam.    ASSESSMENT AND PLAN:  Based on the above findings, a diagnosis of bilateral breast reconstruction for breast cancer was made.  Plan now is to proceed with implant-based reconstruction after discussing options with her.  Options including implant-based reconstruction versus autologous reconstruction with thigh-based flaps given the fact she has had an abdominoplasty versus latissimus flaps with implants.  I think that is a reasonable approach.  She does have risks, benefits and alternatives, which were explained including pain, infection, bleeding, scarring, asymmetry, seromas, hematomas, wound breakdown, wound dehiscence, implant issues requiring revisional surgeries, implant infection, exposure, rippling, capsular contracture, requirement of further surgeries in the future to replace the implants, DVT, PE, MI, CVA, pneumonia, renal failure and death.  She understood them all and wants to proceed.  She understands that there may be a need for next stage including fat grafting and then 3-D nipple tattoos.  All questions were answered.  She wants to proceed with implant-based reconstruction.  We will schedule as soon as possible.  All exam and discussion done in presence of my resident, Aubrie Haley.

## 2022-09-18 ENCOUNTER — HEALTH MAINTENANCE LETTER (OUTPATIENT)
Age: 57
End: 2022-09-18

## 2022-09-20 ENCOUNTER — HOSPITAL ENCOUNTER (OUTPATIENT)
Dept: PHYSICAL THERAPY | Facility: CLINIC | Age: 57
Setting detail: THERAPIES SERIES
Discharge: HOME | End: 2022-09-20
Attending: INTERNAL MEDICINE
Payer: OTHER GOVERNMENT

## 2022-09-20 DIAGNOSIS — Z98.890 S/P BREAST RECONSTRUCTION, BILATERAL: Primary | ICD-10-CM

## 2022-09-20 PROCEDURE — 97110 THERAPEUTIC EXERCISES: CPT | Mod: GP | Performed by: PHYSICAL THERAPIST

## 2022-09-20 PROCEDURE — 97140 MANUAL THERAPY 1/> REGIONS: CPT | Mod: GP | Performed by: PHYSICAL THERAPIST

## 2022-09-20 RX ORDER — CEFAZOLIN SODIUM 2 G/50ML
2 SOLUTION INTRAVENOUS
Status: CANCELLED | OUTPATIENT
Start: 2022-09-20

## 2022-09-20 RX ORDER — CEFAZOLIN SODIUM 2 G/50ML
2 SOLUTION INTRAVENOUS SEE ADMIN INSTRUCTIONS
Status: CANCELLED | OUTPATIENT
Start: 2022-09-20

## 2022-09-22 ENCOUNTER — TELEPHONE (OUTPATIENT)
Dept: SURGERY | Facility: CLINIC | Age: 57
End: 2022-09-22

## 2022-09-22 DIAGNOSIS — F41.8 SITUATIONAL ANXIETY: ICD-10-CM

## 2022-09-22 NOTE — TELEPHONE ENCOUNTER
Scheduled patient for 10/7 in [facility] with Dr. Nieto.    H&P with PCP.    COVID test will be done 1-2 days before surgery and will bring a picture of the results the morning of.    Post-op scheduled for 10/21.    Writer will mail surg packet.    No further questions/concerns at this time.

## 2022-09-22 NOTE — TELEPHONE ENCOUNTER
Called patient and LVM to schedule surgery with Dr. Nieto. Provided direct call back number to writer.

## 2022-09-23 RX ORDER — LORAZEPAM 0.5 MG/1
.5-1 TABLET ORAL EVERY 8 HOURS PRN
Qty: 60 TABLET | Refills: 3 | Status: SHIPPED | OUTPATIENT
Start: 2022-09-23 | End: 2023-02-03

## 2022-09-26 ENCOUNTER — HOSPITAL ENCOUNTER (OUTPATIENT)
Dept: PHYSICAL THERAPY | Facility: CLINIC | Age: 57
Setting detail: THERAPIES SERIES
Discharge: HOME | End: 2022-09-26
Attending: INTERNAL MEDICINE
Payer: OTHER GOVERNMENT

## 2022-09-26 PROCEDURE — 97140 MANUAL THERAPY 1/> REGIONS: CPT | Mod: GP | Performed by: PHYSICAL THERAPIST

## 2022-09-29 ENCOUNTER — INFUSION THERAPY VISIT (OUTPATIENT)
Dept: INFUSION THERAPY | Facility: CLINIC | Age: 57
End: 2022-09-29
Attending: INTERNAL MEDICINE
Payer: OTHER GOVERNMENT

## 2022-09-29 VITALS — WEIGHT: 211.6 LBS | TEMPERATURE: 98.3 F | BODY MASS INDEX: 32.22 KG/M2

## 2022-09-29 VITALS — SYSTOLIC BLOOD PRESSURE: 119 MMHG | HEART RATE: 73 BPM | DIASTOLIC BLOOD PRESSURE: 82 MMHG

## 2022-09-29 DIAGNOSIS — Z17.1 MALIGNANT NEOPLASM OF UPPER-INNER QUADRANT OF RIGHT BREAST IN FEMALE, ESTROGEN RECEPTOR NEGATIVE (H): ICD-10-CM

## 2022-09-29 DIAGNOSIS — D70.1 CHEMOTHERAPY-INDUCED NEUTROPENIA (H): ICD-10-CM

## 2022-09-29 DIAGNOSIS — C50.211 MALIGNANT NEOPLASM OF UPPER-INNER QUADRANT OF RIGHT BREAST IN FEMALE, ESTROGEN RECEPTOR NEGATIVE (H): ICD-10-CM

## 2022-09-29 DIAGNOSIS — T45.1X5A CHEMOTHERAPY-INDUCED NEUTROPENIA (H): ICD-10-CM

## 2022-09-29 DIAGNOSIS — Z51.11 ENCOUNTER FOR ANTINEOPLASTIC CHEMOTHERAPY: Primary | ICD-10-CM

## 2022-09-29 LAB
ALBUMIN SERPL BCG-MCNC: 4 G/DL (ref 3.5–5.2)
ALP SERPL-CCNC: 97 U/L (ref 35–104)
ALT SERPL W P-5'-P-CCNC: 9 U/L (ref 10–35)
ANION GAP SERPL CALCULATED.3IONS-SCNC: 10 MMOL/L (ref 7–15)
AST SERPL W P-5'-P-CCNC: 10 U/L (ref 10–35)
BILIRUB SERPL-MCNC: 0.3 MG/DL
BUN SERPL-MCNC: 10.9 MG/DL (ref 6–20)
CALCIUM SERPL-MCNC: 9 MG/DL (ref 8.6–10)
CHLORIDE SERPL-SCNC: 105 MMOL/L (ref 98–107)
CREAT SERPL-MCNC: 0.73 MG/DL (ref 0.51–0.95)
DEPRECATED HCO3 PLAS-SCNC: 26 MMOL/L (ref 22–29)
GFR SERPL CREATININE-BSD FRML MDRD: >90 ML/MIN/1.73M2
GLUCOSE SERPL-MCNC: 103 MG/DL (ref 70–99)
POTASSIUM SERPL-SCNC: 4.1 MMOL/L (ref 3.4–5.3)
PROT SERPL-MCNC: 6.8 G/DL (ref 6.4–8.3)
SODIUM SERPL-SCNC: 141 MMOL/L (ref 136–145)
TSH SERPL DL<=0.005 MIU/L-ACNC: 0.63 UIU/ML (ref 0.3–4.2)

## 2022-09-29 PROCEDURE — 82040 ASSAY OF SERUM ALBUMIN: CPT | Performed by: INTERNAL MEDICINE

## 2022-09-29 PROCEDURE — 84443 ASSAY THYROID STIM HORMONE: CPT | Performed by: INTERNAL MEDICINE

## 2022-09-29 PROCEDURE — 258N000003 HC RX IP 258 OP 636: Performed by: INTERNAL MEDICINE

## 2022-09-29 PROCEDURE — 80053 COMPREHEN METABOLIC PANEL: CPT | Performed by: INTERNAL MEDICINE

## 2022-09-29 PROCEDURE — 250N000011 HC RX IP 250 OP 636: Performed by: INTERNAL MEDICINE

## 2022-09-29 PROCEDURE — 96413 CHEMO IV INFUSION 1 HR: CPT

## 2022-09-29 RX ORDER — HEPARIN SODIUM (PORCINE) LOCK FLUSH IV SOLN 100 UNIT/ML 100 UNIT/ML
5 SOLUTION INTRAVENOUS
Status: DISCONTINUED | OUTPATIENT
Start: 2022-09-29 | End: 2022-09-29 | Stop reason: HOSPADM

## 2022-09-29 RX ADMIN — SODIUM CHLORIDE 250 ML: 9 INJECTION, SOLUTION INTRAVENOUS at 11:10

## 2022-09-29 RX ADMIN — Medication 5 ML: at 11:46

## 2022-09-29 RX ADMIN — SODIUM CHLORIDE 200 MG: 9 INJECTION, SOLUTION INTRAVENOUS at 11:07

## 2022-09-29 NOTE — PROGRESS NOTES
Infusion Nursing Note:  Diana SANTA Elizabethluis presents today for Keytruda.    Patient seen by provider today: No   present during visit today: Not Applicable.    Note: N/A.    Intravenous Access:  Implanted Port.    Treatment Conditions:  Lab Results   Component Value Date     09/29/2022    POTASSIUM 4.1 09/29/2022    CR 0.73 09/29/2022    ANURADHA 9.0 09/29/2022    BILITOTAL 0.3 09/29/2022    ALBUMIN 4.0 09/29/2022    ALT 9 (L) 09/29/2022    AST 10 09/29/2022     Results reviewed, labs MET treatment parameters, ok to proceed with treatment.    Post Infusion Assessment:  Patient tolerated infusion without incident.  Blood return noted pre and post infusion.  Site patent and intact, free from redness, edema or discomfort.  No evidence of extravasations.  Access discontinued per protocol.     Discharge Plan:   Patient discharged in stable condition accompanied by: self.  Departure Mode: Ambulatory.      Johny Lovelace RN

## 2022-09-30 ENCOUNTER — OFFICE VISIT (OUTPATIENT)
Dept: FAMILY MEDICINE | Facility: CLINIC | Age: 57
End: 2022-09-30
Payer: OTHER GOVERNMENT

## 2022-09-30 VITALS
BODY MASS INDEX: 32.71 KG/M2 | WEIGHT: 208.4 LBS | SYSTOLIC BLOOD PRESSURE: 124 MMHG | HEIGHT: 67 IN | HEART RATE: 88 BPM | DIASTOLIC BLOOD PRESSURE: 80 MMHG | OXYGEN SATURATION: 100 % | RESPIRATION RATE: 16 BRPM

## 2022-09-30 DIAGNOSIS — Z01.818 PREOP GENERAL PHYSICAL EXAM: Primary | ICD-10-CM

## 2022-09-30 DIAGNOSIS — Z98.890 S/P BREAST RECONSTRUCTION, BILATERAL: ICD-10-CM

## 2022-09-30 DIAGNOSIS — Z17.1 MALIGNANT NEOPLASM OF UPPER-INNER QUADRANT OF RIGHT BREAST IN FEMALE, ESTROGEN RECEPTOR NEGATIVE (H): ICD-10-CM

## 2022-09-30 DIAGNOSIS — C50.211 MALIGNANT NEOPLASM OF UPPER-INNER QUADRANT OF RIGHT BREAST IN FEMALE, ESTROGEN RECEPTOR NEGATIVE (H): ICD-10-CM

## 2022-09-30 DIAGNOSIS — Z23 HIGH PRIORITY FOR 2019-NCOV VACCINE: ICD-10-CM

## 2022-09-30 DIAGNOSIS — Z11.4 SCREENING FOR HIV (HUMAN IMMUNODEFICIENCY VIRUS): ICD-10-CM

## 2022-09-30 DIAGNOSIS — M54.31 SCIATICA OF RIGHT SIDE: ICD-10-CM

## 2022-09-30 DIAGNOSIS — M54.41 ACUTE RIGHT-SIDED LOW BACK PAIN WITH RIGHT-SIDED SCIATICA: ICD-10-CM

## 2022-09-30 DIAGNOSIS — F11.90 CHRONIC, CONTINUOUS USE OF OPIOIDS: ICD-10-CM

## 2022-09-30 DIAGNOSIS — Z11.59 NEED FOR HEPATITIS C SCREENING TEST: ICD-10-CM

## 2022-09-30 PROCEDURE — 0124A COVID-19,PF,PFIZER BOOSTER BIVALENT: CPT | Performed by: FAMILY MEDICINE

## 2022-09-30 PROCEDURE — 90471 IMMUNIZATION ADMIN: CPT | Performed by: FAMILY MEDICINE

## 2022-09-30 PROCEDURE — 90682 RIV4 VACC RECOMBINANT DNA IM: CPT | Performed by: FAMILY MEDICINE

## 2022-09-30 PROCEDURE — 91312 COVID-19,PF,PFIZER BOOSTER BIVALENT: CPT | Performed by: FAMILY MEDICINE

## 2022-09-30 PROCEDURE — 99214 OFFICE O/P EST MOD 30 MIN: CPT | Mod: 25 | Performed by: FAMILY MEDICINE

## 2022-09-30 RX ORDER — TRAMADOL HYDROCHLORIDE 50 MG/1
50 TABLET ORAL EVERY 6 HOURS PRN
Qty: 120 TABLET | Refills: 5 | Status: SHIPPED | OUTPATIENT
Start: 2022-09-30 | End: 2023-03-27

## 2022-09-30 RX ORDER — HYDROCODONE BITARTRATE AND ACETAMINOPHEN 5; 325 MG/1; MG/1
1 TABLET ORAL EVERY 6 HOURS PRN
Qty: 18 TABLET | Refills: 0 | Status: SHIPPED | OUTPATIENT
Start: 2022-09-30 | End: 2022-12-22

## 2022-09-30 RX ORDER — LORAZEPAM 0.5 MG/1
TABLET ORAL
COMMUNITY
Start: 2022-03-22 | End: 2023-10-03

## 2022-09-30 ASSESSMENT — ANXIETY QUESTIONNAIRES
4. TROUBLE RELAXING: NEARLY EVERY DAY
2. NOT BEING ABLE TO STOP OR CONTROL WORRYING: NEARLY EVERY DAY
7. FEELING AFRAID AS IF SOMETHING AWFUL MIGHT HAPPEN: NEARLY EVERY DAY
5. BEING SO RESTLESS THAT IT IS HARD TO SIT STILL: NEARLY EVERY DAY
GAD7 TOTAL SCORE: 20
1. FEELING NERVOUS, ANXIOUS, OR ON EDGE: NEARLY EVERY DAY
8. IF YOU CHECKED OFF ANY PROBLEMS, HOW DIFFICULT HAVE THESE MADE IT FOR YOU TO DO YOUR WORK, TAKE CARE OF THINGS AT HOME, OR GET ALONG WITH OTHER PEOPLE?: VERY DIFFICULT
7. FEELING AFRAID AS IF SOMETHING AWFUL MIGHT HAPPEN: NEARLY EVERY DAY
GAD7 TOTAL SCORE: 20
IF YOU CHECKED OFF ANY PROBLEMS ON THIS QUESTIONNAIRE, HOW DIFFICULT HAVE THESE PROBLEMS MADE IT FOR YOU TO DO YOUR WORK, TAKE CARE OF THINGS AT HOME, OR GET ALONG WITH OTHER PEOPLE: VERY DIFFICULT
3. WORRYING TOO MUCH ABOUT DIFFERENT THINGS: NEARLY EVERY DAY
GAD7 TOTAL SCORE: 20
6. BECOMING EASILY ANNOYED OR IRRITABLE: MORE THAN HALF THE DAYS

## 2022-09-30 ASSESSMENT — PATIENT HEALTH QUESTIONNAIRE - PHQ9
SUM OF ALL RESPONSES TO PHQ QUESTIONS 1-9: 16
10. IF YOU CHECKED OFF ANY PROBLEMS, HOW DIFFICULT HAVE THESE PROBLEMS MADE IT FOR YOU TO DO YOUR WORK, TAKE CARE OF THINGS AT HOME, OR GET ALONG WITH OTHER PEOPLE: EXTREMELY DIFFICULT
SUM OF ALL RESPONSES TO PHQ QUESTIONS 1-9: 16

## 2022-09-30 ASSESSMENT — PAIN SCALES - GENERAL: PAINLEVEL: EXTREME PAIN (8)

## 2022-09-30 NOTE — PROGRESS NOTES
[facility]  [address] [address]  Phone: [phone]  Primary Provider: Rivka Chen  Pre-op Performing Provider: RIVKA CHEN    PREOPERATIVE EVALUATION:  Today's date: 9/30/2022    Diana Salvador is a 57 year old female who presents for a preoperative evaluation.    Surgical Information:  Surgery/Procedure: breast reconstruction  Surgery Location: [facility]   Surgeon: dr solares  Surgery Date: 10/07/2022  Time of Surgery: 1015am  Where patient plans to recover: At home with family  Fax number for surgical facility: Note does not need to be faxed, will be available electronically in Epic.    Type of Anesthesia Anticipated: Choice    Assessment & Plan     The proposed surgical procedure is considered INTERMEDIATE risk.    Preop general physical exam    S/P breast reconstruction, bilateral  Expanders currently in place, ready now for reconstruction following  Malignant neoplasm of upper-inner quadrant of right breast in female, estrogen receptor negative (triple-neg., germline test neg. for BRCA 1/2)    Chronic, continuous use of opioids  Refills, worse pain with the expander on the left  - traMADol (ULTRAM) 50 MG tablet; Take 1 tablet (50 mg) by mouth every 6 hours as needed for severe pain  - HYDROcodone-acetaminophen (NORCO) 5-325 MG tablet; Take 1 tablet by mouth every 6 hours as needed for pain    Sciatica of right side  Stable, refill  - traMADol (ULTRAM) 50 MG tablet; Take 1 tablet (50 mg) by mouth every 6 hours as needed for severe pain  - HYDROcodone-acetaminophen (NORCO) 5-325 MG tablet; Take 1 tablet by mouth every 6 hours as needed for pain    Acute right-sided low back pain with right-sided sciatica    Screening for HIV (human immunodeficiency virus)    Need for hepatitis C screening test      Possible Sleep Apnea: not sleep apnea, related to keytruda         Risks and Recommendations:  The patient has the following additional  risks and recommendations for perioperative complications:   - No identified additional risk factors other than previously addressed    Medication Instructions:  Patient is to take all scheduled medications on the day of surgery    RECOMMENDATION:  APPROVAL GIVEN to proceed with proposed procedure, without further diagnostic evaluation.        Subjective     HPI related to upcoming procedure: history of breast cancer, s/p mastectomy and expanders have been in place, due for reconstruction.  Expanders are causing pain and pushing.  Tramadol using this for pain until recently when ran out and didn't get refills.    Preop Questions 9/30/2022   1. Have you ever had a heart attack or stroke? No   2. Have you ever had surgery on your heart or blood vessels, such as a stent placement, a coronary artery bypass, or surgery on an artery in your head, neck, heart, or legs? No   3. Do you have chest pain with activity? No   4. Do you have a history of  heart failure? No   5. Do you currently have a cold, bronchitis or symptoms of other infection? No   6. Do you have a cough, shortness of breath, or wheezing? YES - a little shortness of breath after Keytruda.   7. Do you or anyone in your family have previous history of blood clots? No   8. Do you or does anyone in your family have a serious bleeding problem such as prolonged bleeding following surgeries or cuts? No   9. Have you ever had problems with anemia or been told to take iron pills? No   10. Have you had any abnormal blood loss such as black, tarry or bloody stools, or abnormal vaginal bleeding? No   11. Have you ever had a blood transfusion? No   12. Are you willing to have a blood transfusion if it is medically needed before, during, or after your surgery? Yes   13. Have you or any of your relatives ever had problems with anesthesia? No   14. Do you have sleep apnea, excessive snoring or daytime drowsiness? YES - feeling tired all the time, can't get comfortable  because of expanders, and the keytruda.   14a. Do you have a CPAP machine? No   15. Do you have any artifical heart valves or other implanted medical devices like a pacemaker, defibrillator, or continuous glucose monitor? No   16. Do you have artificial joints? No   17. Are you allergic to latex? No   18. Is there any chance that you may be pregnant? No       Health Care Directive:  Patient does not have a Health Care Directive or Living Will: Discussed advance care planning with patient; information given to patient to review.    Preoperative Review of :   reviewed - no record of controlled substances prescribed.    Status of Chronic Conditions:  DEPRESSION - Patient has a long history of Depression of moderate severity requiring medication for control with recent symptoms being stable..Current symptoms of depression include none.       Review of Systems  CONSTITUTIONAL: NEGATIVE for fever, chills, change in weight  INTEGUMENTARY/SKIN: NEGATIVE for worrisome rashes, moles or lesions  EYES: NEGATIVE for vision changes or irritation  ENT/MOUTH: NEGATIVE for ear, mouth and throat problems  RESP: NEGATIVE for significant cough or SOB  CV: NEGATIVE for chest pain, palpitations or peripheral edema  GI: NEGATIVE for nausea, abdominal pain, heartburn, or change in bowel habits  : NEGATIVE for frequency, dysuria, or hematuria  MUSCULOSKELETAL: NEGATIVE for significant arthralgias or myalgia  NEURO: NEGATIVE for weakness, dizziness or paresthesias  ENDOCRINE: NEGATIVE for temperature intolerance, skin/hair changes  HEME: NEGATIVE for bleeding problems  PSYCHIATRIC: NEGATIVE for changes in mood or affect    Patient Active Problem List    Diagnosis Date Noted     S/P breast reconstruction, bilateral 06/30/2022     Priority: Medium     Chemotherapy-induced neuropathy (grade 3, 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 for BRCA 1/2) 03/04/2022     Priority: Medium     Check 12.22 for f/u Darabi       Situational anxiety 04/08/2020     Priority: Medium     Cervical high risk HPV (human papillomavirus) test positive 03/15/2020     Priority: Medium     3/14/13 NIL pap.  9/15/20 NIL pap, + HR HPV (not 16 or 18). Plan cotest in 1 year  9/8/21 NIL Pap, Neg HPV. Plan cotest in 1 year.   8/18/22 Reminder mychart/letter  9/16/22 Reminder call -- Pt notified         Chronic, continuous use of opioids 11/13/2019     Priority: Medium     Patient is followed by Quoc Chu MD for ongoing prescription of pain medication.  All refills should be approved by this provider only at face-to-face appointments - not by phone request.    Medication(s): Tramadol 50mg.   Maximum quantity per month: 120  Clinic visit frequency required: Q 3 months      Controlled substance agreement:  Encounter-Level CSA:    There are no encounter-level csa.     Patient-Level CSA:    There are no patient-level csa.       Pain Clinic evaluation in the past: Yes       Date/Location:  Had seen pain clinic in [place] many years ago.    DIRE Total Score(s):  No flowsheet data found.    Last [facility] website verification:  done on 1/6/2021   [email or web address]         Osteopenia of multiple sites 12/12/2018     Priority: Medium     on fosamax 35mg daily since 2014       Restless legs syndrome 12/12/2018     Priority: Medium     Lower extremity edema 12/12/2018     Priority: Medium     Sciatica of right side 12/07/2015     Priority: Medium     Overview:   Chronic. Takes Norco as needed.         Past Medical History:   Diagnosis Date     Cervical high risk HPV (human papillomavirus) test positive 03/15/2020    See problem list     Past Surgical  History:   Procedure Laterality Date     ARTHROSCOPY SHOULDER ROTATOR CUFF REPAIR Right      ARTHROSCOPY SHOULDER ROTATOR CUFF REPAIR Left       SECTION       CHOLECYSTECTOMY       CYST REMOVAL      on chest     DAVINCI BYPASS GASTRIC ROBERT-EN-Y  2001     HYSTERECTOMY SUPRACERVICAL       INSERT PORT VASCULAR ACCESS Left 2022    Procedure: INSERTION, VASCULAR ACCESS PORT;  Surgeon: Baldemar Marina DO;  Location: [facility]     MASTECTOMY SIMPLE Left 2022    Procedure: MASTECTOMY, SIMPLE, LEFT;  Surgeon: Baldemar Marina DO;  [facility]: [facility]     MASTECTOMY SIMPLE, SENTINEL NODE, COMBINED Right 2022    Procedure: MASTECTOMY, SIMPLE, WITH SENTINEL LYMPH NODE DISSECTION, RIGHT;  Surgeon: Baldemar Marina DO;  Location: [facility]     RECONSTRUCT BREAST, INSERT TISSUE EXPANDER BILATERAL, COMBINED Bilateral 2022    Procedure: RECONSTRUCTION, BREAST, BILATERAL, WITH TISSUE EXPANDER INSERTION;  Surgeon: ROSEMARIE Nieto MD;  Location: [facility]     right ulnar nerve repositioning surgery       Current Outpatient Medications   Medication Sig Dispense Refill     acetaminophen (TYLENOL) 325 MG tablet Take 2 tablets (650 mg) by mouth every 4 hours as needed for other (mild pain) 100 tablet 0     alendronate (FOSAMAX) 35 MG tablet TAKE 1 TABLET(35 MG) BY MOUTH EVERY 7 DAYS 12 tablet 3     Apixaban (ELIQUIS PO)        citalopram (CELEXA) 40 MG tablet Take 1 tablet (40 mg) by mouth daily 90 tablet 0     cyanocobalamin (CYANOCOBALAMIN) 1000 MCG/ML injection Inject 1 mL (1,000 mcg) into the muscle every 30 days 3 mL 3     cyclobenzaprine (FLEXERIL) 5 MG tablet TAKE 1 TABLET(5 MG) BY MOUTH TWICE DAILY AS NEEDED FOR MUSCLE SPASMS 40 tablet 3     diclofenac (VOLTAREN) 1 % topical gel Place 2 g onto the skin 4 times daily 100 g 3     doxepin (SILENOR) 3 MG tablet Take 1 tablet (3 mg) by mouth At Bedtime 90 tablet 1     DULoxetine (CYMBALTA) 60 MG capsule Take 1 capsule (60 mg) by mouth At  Bedtime 30 capsule 5     furosemide (LASIX) 20 MG tablet Take 1 tablet (20 mg) by mouth daily as needed (swelling) 60 tablet 5     lidocaine-prilocaine (EMLA) 2.5-2.5 % external cream Apply thick layer to port site and cover with clear dressing or saran wrap 30-60 mins prior to appt. 30 g 0     loratadine (CLARITIN) 10 MG tablet Take 1 tablet (10 mg) by mouth daily 90 tablet 1     LORazepam (ATIVAN) 0.5 MG tablet        LORazepam (ATIVAN) 0.5 MG tablet Take 1-2 tablets (0.5-1 mg) by mouth every 8 hours as needed for anxiety 60 tablet 3     omeprazole (PRILOSEC) 20 MG DR capsule As needed       potassium chloride ER (K-TAB) 20 MEQ CR tablet Take 1 tablet (20 mEq) by mouth daily 90 tablet 1     prochlorperazine (COMPAZINE) 10 MG tablet Take 1 tablet (10 mg) by mouth every 6 hours as needed (Nausea/Vomiting) 30 tablet 5     prochlorperazine (COMPAZINE) 10 MG tablet Take 1 tablet (10 mg) by mouth every 6 hours as needed (Nausea/Vomiting) 30 tablet 2     rOPINIRole (REQUIP) 1 MG tablet TAKE 1 TABLET(1 MG) BY MOUTH AT BEDTIME 90 tablet 0     sucralfate (CARAFATE) 1 GM tablet Take 1 tablet (1 g) by mouth 4 times daily as needed (Abdominal discomfort) 30 tablet 3     traMADol (ULTRAM) 50 MG tablet TAKE 1 TABLET(50 MG) BY MOUTH EVERY 6 HOURS AS NEEDED FOR SEVERE PAIN 120 tablet 0     triamcinolone (KENALOG) 0.1 % external cream Apply topically 2 times daily Apply to rash until improved 30 g 1     valACYclovir (VALTREX) 1000 mg tablet TAKE 1 TABLET(1000 MG) BY MOUTH TWICE DAILY. REPEAT AS NEEDED FOR COLD SORE 16 tablet 3     vitamin D3 (CHOLECALCIFEROL) 50 mcg (2000 units) tablet Take 1 tablet (50 mcg) by mouth daily 90 tablet 3       No Known Allergies     Social History     Tobacco Use     Smoking status: Former Smoker     Start date: 1982     Quit date: 1984     Years since quittin.7     Smokeless tobacco: Never Used   Substance Use Topics     Alcohol use: Yes     Comment: occ     History   Drug Use No        "  Objective     /80 (BP Location: Right arm, Patient Position: Sitting, Cuff Size: Adult Large)   Pulse 88   Resp 16   Ht 1.695 m (5' 6.73\")   Wt 94.5 kg (208 lb 6.4 oz)   LMP  (LMP Unknown)   SpO2 100%   BMI 32.90 kg/m      Physical Exam    GENERAL APPEARANCE: healthy, alert and no distress     EYES: EOMI, PERRL     HENT: ear canals and TM's normal and nose and mouth without ulcers or lesions     NECK: no adenopathy, no asymmetry, masses, or scars and thyroid normal to palpation     RESP: lungs clear to auscultation - no rales, rhonchi or wheezes     CV: regular rates and rhythm, normal S1 S2, no S3 or S4 and no murmur, click or rub     ABDOMEN:  soft, nontender, no HSM or masses and bowel sounds normal     MS: extremities normal- no gross deformities noted, no evidence of inflammation in joints, FROM in all extremities.     SKIN: no suspicious lesions or rashes     NEURO: Normal strength and tone, sensory exam grossly normal, mentation intact and speech normal     PSYCH: mentation appears normal. and affect normal/bright     LYMPHATICS: No cervical adenopathy    Recent Labs   Lab Test 09/29/22  0952 09/09/22  0829 08/18/22  0833 07/21/22  1502 06/30/22  1457   HGB  --   --   --  11.4* 10.1*   PLT  --   --   --  315 238    139   < > 139  --    POTASSIUM 4.1 3.5   < > 3.7  --    CR 0.73 0.75   < > 0.63  --     < > = values in this interval not displayed.        Diagnostics:  No labs were ordered during this visit.   No EKG required, no history of coronary heart disease, significant arrhythmia, peripheral arterial disease or other structural heart disease.    Revised Cardiac Risk Index (RCRI):  The patient has the following serious cardiovascular risks for perioperative complications:   - No serious cardiac risks = 0 points     RCRI Interpretation: 0 points: Class I (very low risk - 0.4% complication rate)           Signed Electronically by: Quoc Chu MD  Copy of this evaluation report is " provided to requesting physician.

## 2022-09-30 NOTE — PATIENT INSTRUCTIONS
Ok to take  Medications on the evening before surgery as usual. The morning of it is ok to take tylenol or tramadol if needed for pain.    No NSAIDs (ibuprofen, advil, aleve, aspirin) 7 days before surgery.    OK to take tylenol.    Preparing for Your Surgery  Getting started  A nurse will call you to review your health history and instructions. They will give you an arrival time based on your scheduled surgery time. Please be ready to share:    Your doctor's clinic name and phone number    Your medical, surgical and anesthesia history    A list of allergies and sensitivities    A list of medicines, including herbal treatments and over-the-counter drugs    Whether the patient has a legal guardian (ask how to send us the papers in advance)  Please tell us if you're pregnant--or if there's any chance you might be pregnant. Some surgeries may injure a fetus (unborn baby), so they require a pregnancy test. Surgeries that are safe for a fetus don't always need a test, and you can choose whether to have one.   If you have a child who's having surgery, please ask for a copy of Preparing for Your Child's Surgery.    Preparing for surgery    Within 10 to 30 days of surgery: Have a pre-op exam (sometimes called an H&P, or History and Physical). This can be done at a clinic or pre-operative center.  ? If you're having a , you may not need this exam. Talk to your care team.    At your pre-op exam, talk to your care team about all medicines you take. If you need to stop any medicines before surgery, ask when to start taking them again.  ? We do this for your safety. Many medicines can make you bleed too much during surgery. Some change how well surgery (anesthesia) drugs work.    Call your insurance company to let them know you're having surgery. (If you don't have insurance, call [phone].)    Call your clinic if there's any change in your health. This includes signs of a cold or flu (sore throat, runny nose,  cough, rash, fever). It also includes a scrape or scratch near the surgery site.    If you have questions on the day of surgery, call your hospital or surgery center.  COVID testing  You may need to be tested for COVID-19 before having surgery. If so, we will give you instructions (or click here).  Eating and drinking guidelines  For your safety: Unless your surgeon tells you otherwise, follow the guidelines below.    Eat and drink as usual until 8 hours before surgery. After that, no food or milk.    Drink clear liquids until 2 hours before surgery. These are liquids you can see through, like water, Gatorade and Propel Water. You may also have black coffee and tea (no cream or milk).    Nothing by mouth within 2 hours of surgery. This includes gum, candy and breath mints.    If you drink alcohol: Stop drinking it the night before surgery.    If your care team tells you to take medicine on the morning of surgery, it's okay to take it with a sip of water.  Preventing infection    Shower or bathe the night before and morning of your surgery. Follow the instructions your clinic gave you. (If no instructions, use regular soap.)    Don't shave or clip hair near your surgery site. We'll remove the hair if needed.    Don't smoke or vape the morning of surgery. You may chew nicotine gum up to 2 hours before surgery. A nicotine patch is okay.  ? Note: Some surgeries require you to completely quit smoking and nicotine. Check with your surgeon.    Your care team will make every effort to keep you safe from infection. We will:  ? Clean our hands often with soap and water (or an alcohol-based hand rub).  ? Clean the skin at your surgery site with a special soap that kills germs.  ? Give you a special gown to keep you warm. (Cold raises the risk of infection.)  ? Wear special hair covers, masks, gowns and gloves during surgery.  ? Give antibiotic medicine, if prescribed. Not all surgeries need antibiotics.  What to bring on the  day of surgery    Photo ID and insurance card    Copy of your health care directive, if you have one    Glasses and hearing aides (bring cases)  ? You can't wear contacts during surgery    Inhaler and eye drops, if you use them (tell us about these when you arrive)    CPAP machine or breathing device, if you use them    A few personal items, if spending the night    If you have . . .  ? A pacemaker, ICD (cardiac defibrillator) or other implant: Bring the ID card.  ? An implanted stimulator: Bring the remote control.  ? A legal guardian: Bring a copy of the certified (court-stamped) guardianship papers.  Please remove any jewelry, including body piercings. Leave jewelry and other valuables at home.  If you're going home the day of surgery    You must have a responsible adult drive you home. They should stay with you overnight as well.    If you don't have someone to stay with you, and you aren't safe to go home alone, we may keep you overnight. Insurance often won't pay for this.  After surgery  If it's hard to control your pain or you need more pain medicine, please call your surgeon's office.  Questions?   If you have any questions for your care team, list them here: _________________________________________________________________________________________________________________________________________________________________________ ____________________________________ ____________________________________ ____________________________________  For informational purposes only. Not to replace the advice of your health care provider. Copyright   2003, 2019 [facility]. All rights reserved. Clinically reviewed by Luh Lee MD. [facility] [employer] [id number] - REV 07/22.

## 2022-10-05 ENCOUNTER — ANESTHESIA EVENT (OUTPATIENT)
Dept: SURGERY | Facility: AMBULATORY SURGERY CENTER | Age: 57
End: 2022-10-05
Payer: OTHER GOVERNMENT

## 2022-10-06 RX ORDER — NALOXONE HYDROCHLORIDE 0.4 MG/ML
0.4 INJECTION, SOLUTION INTRAMUSCULAR; INTRAVENOUS; SUBCUTANEOUS
Status: DISCONTINUED | OUTPATIENT
Start: 2022-10-06 | End: 2022-10-08 | Stop reason: HOSPADM

## 2022-10-06 RX ORDER — NALOXONE HYDROCHLORIDE 0.4 MG/ML
0.2 INJECTION, SOLUTION INTRAMUSCULAR; INTRAVENOUS; SUBCUTANEOUS
Status: DISCONTINUED | OUTPATIENT
Start: 2022-10-06 | End: 2022-10-08 | Stop reason: HOSPADM

## 2022-10-06 ASSESSMENT — LIFESTYLE VARIABLES: TOBACCO_USE: 1

## 2022-10-06 NOTE — ANESTHESIA PREPROCEDURE EVALUATION
Anesthesia Pre-Procedure Evaluation    Patient: Diana Salvador   MRN: [id number] : 1965        Procedure : Procedure(s):  Bilateral reconstruction          Past Medical History:   Diagnosis Date     Cervical high risk HPV (human papillomavirus) test positive 03/15/2020    See problem list      Past Surgical History:   Procedure Laterality Date     ARTHROSCOPY SHOULDER ROTATOR CUFF REPAIR Right      ARTHROSCOPY SHOULDER ROTATOR CUFF REPAIR Left       SECTION       CHOLECYSTECTOMY       CYST REMOVAL      on chest     DAVINCI BYPASS GASTRIC ROBERT-EN-Y       HYSTERECTOMY SUPRACERVICAL       INSERT PORT VASCULAR ACCESS Left 2022    Procedure: INSERTION, VASCULAR ACCESS PORT;  Surgeon: Baldemar Marina DO;  Location: [facility]     MASTECTOMY SIMPLE Left 2022    Procedure: MASTECTOMY, SIMPLE, LEFT;  Surgeon: Baldemar Marina DO;  Location: [facility]     MASTECTOMY SIMPLE, SENTINEL NODE, COMBINED Right 2022    Procedure: MASTECTOMY, SIMPLE, WITH SENTINEL LYMPH NODE DISSECTION, RIGHT;  Surgeon: Baldemar Marina DO;  Location: [facility]     RECONSTRUCT BREAST, INSERT TISSUE EXPANDER BILATERAL, COMBINED Bilateral 2022    Procedure: RECONSTRUCTION, BREAST, BILATERAL, WITH TISSUE EXPANDER INSERTION;  Surgeon: ROSEMARIE Nieto MD;  Location: [facility]     right ulnar nerve repositioning surgery        No Known Allergies   Social History     Tobacco Use     Smoking status: Former Smoker     Start date: 1982     Quit date: 1984     Years since quittin.7     Smokeless tobacco: Never Used   Substance Use Topics     Alcohol use: Yes     Comment: occ      Wt Readings from Last 1 Encounters:   22 94.5 kg (208 lb 6.4 oz)        Anesthesia Evaluation   Pt has had prior anesthetic. Type: General, Regional and MAC.        ROS/MED HX  ENT/Pulmonary:     (+) tobacco use, Past use,     Neurologic:       Cardiovascular:     (+) -----Previous cardiac testing   Echo: Date:  Results:    Stress Test: Date: Results:    ECG Reviewed: Date: 8/5/19 Results:  Sinus Rhythm   WITHIN NORMAL LIMITS  Cath: Date: Results:      METS/Exercise Tolerance:     Hematologic:       Musculoskeletal: Comment: RLS  Sciatica    (+) arthritis,     GI/Hepatic:       Renal/Genitourinary:       Endo:     (+) Obesity,     Psychiatric/Substance Use:     (+) psychiatric history anxiety H/O chronic opiod use .     Infectious Disease:       Malignancy:   (+) Malignancy, History of Breast.Breast CA Active status post Surgery and Chemo.        Other:            Physical Exam    Airway  airway exam normal      Mallampati: II   TM distance: > 3 FB   Neck ROM: full   Mouth opening: > 3 cm    Respiratory Devices and Support         Dental  no notable dental history         Cardiovascular   cardiovascular exam normal          Pulmonary   pulmonary exam normal                OUTSIDE LABS:  CBC:   Lab Results   Component Value Date    WBC 5.7 07/21/2022    WBC 4.5 06/30/2022    HGB 11.4 (L) 07/21/2022    HGB 10.1 (L) 06/30/2022    HCT 36.9 07/21/2022    HCT 31.7 (L) 06/30/2022     07/21/2022     06/30/2022     BMP:   Lab Results   Component Value Date     09/29/2022     09/09/2022    POTASSIUM 4.1 09/29/2022    POTASSIUM 3.5 09/09/2022    CHLORIDE 105 09/29/2022    CHLORIDE 102 09/09/2022    CO2 26 09/29/2022    CO2 24 09/09/2022    BUN 10.9 09/29/2022    BUN 12.5 09/09/2022    CR 0.73 09/29/2022    CR 0.75 09/09/2022     (H) 09/29/2022     (H) 09/09/2022     COAGS: No results found for: PTT, INR, FIBR  POC:   Lab Results   Component Value Date    HCGS Negative 08/05/2019     HEPATIC:   Lab Results   Component Value Date    ALBUMIN 4.0 09/29/2022    PROTTOTAL 6.8 09/29/2022    ALT 9 (L) 09/29/2022    AST 10 09/29/2022    ALKPHOS 97 09/29/2022    BILITOTAL 0.3 09/29/2022     OTHER:   Lab Results   Component Value Date    ANURADHA 9.0 09/29/2022    LIPASE 64 (L) 08/05/2019    TSH 0.63 09/29/2022        Anesthesia Plan    ASA Status:  3   NPO Status:  NPO Appropriate    Anesthesia Type: General.     - Airway: ETT   Induction: Intravenous.   Maintenance: Inhalation.        Consents    Anesthesia Plan(s) and associated risks, benefits, and realistic alternatives discussed. Questions answered and patient/representative(s) expressed understanding.    - Discussed:     - Discussed with:  Patient         Postoperative Care    Pain management: Multi-modal analgesia.   PONV prophylaxis: Ondansetron (or other 5HT-3), Dexamethasone or Solumedrol     Comments:                    Dread Ferrari MD

## 2022-10-07 ENCOUNTER — HOSPITAL ENCOUNTER (OUTPATIENT)
Facility: AMBULATORY SURGERY CENTER | Age: 57
Discharge: HOME | End: 2022-10-07
Attending: PLASTIC SURGERY | Admitting: PLASTIC SURGERY
Payer: OTHER GOVERNMENT

## 2022-10-07 ENCOUNTER — ANESTHESIA (OUTPATIENT)
Dept: SURGERY | Facility: AMBULATORY SURGERY CENTER | Age: 57
End: 2022-10-07
Payer: OTHER GOVERNMENT

## 2022-10-07 VITALS
RESPIRATION RATE: 11 BRPM | TEMPERATURE: 96.9 F | DIASTOLIC BLOOD PRESSURE: 74 MMHG | OXYGEN SATURATION: 95 % | HEART RATE: 68 BPM | SYSTOLIC BLOOD PRESSURE: 121 MMHG

## 2022-10-07 DIAGNOSIS — Z98.890 S/P BREAST RECONSTRUCTION, BILATERAL: Primary | ICD-10-CM

## 2022-10-07 PROCEDURE — G8907 PT DOC NO EVENTS ON DISCHARG: HCPCS

## 2022-10-07 PROCEDURE — 19380 REVJ RECONSTRUCTED BREAST: CPT | Mod: 50 | Performed by: PLASTIC SURGERY

## 2022-10-07 PROCEDURE — 11970 RPLCMT TISS XPNDR PERM IMPLT: CPT | Mod: 50 | Performed by: PLASTIC SURGERY

## 2022-10-07 PROCEDURE — 11970 RPLCMT TISS XPNDR PERM IMPLT: CPT

## 2022-10-07 PROCEDURE — 88300 SURGICAL PATH GROSS: CPT | Performed by: PATHOLOGY

## 2022-10-07 PROCEDURE — G8916 PT W IV AB GIVEN ON TIME: HCPCS

## 2022-10-07 PROCEDURE — 88305 TISSUE EXAM BY PATHOLOGIST: CPT | Performed by: PATHOLOGY

## 2022-10-07 PROCEDURE — L8600 IMPLANT BREAST SILICONE/EQ: HCPCS

## 2022-10-07 DEVICE — NATRELLE STY 68HP-800 HIGH PROJECTION  SMOOTH ROUND SALINE
Type: IMPLANTABLE DEVICE | Site: BREAST | Status: FUNCTIONAL
Brand: NATRELLE SALINE-FILLED BREAST IMPLANTS

## 2022-10-07 RX ORDER — LIDOCAINE 40 MG/G
CREAM TOPICAL
Status: DISCONTINUED | OUTPATIENT
Start: 2022-10-07 | End: 2022-10-08 | Stop reason: HOSPADM

## 2022-10-07 RX ORDER — FENTANYL CITRATE 50 UG/ML
25 INJECTION, SOLUTION INTRAMUSCULAR; INTRAVENOUS
Status: DISCONTINUED | OUTPATIENT
Start: 2022-10-07 | End: 2022-10-08 | Stop reason: HOSPADM

## 2022-10-07 RX ORDER — OXYCODONE HYDROCHLORIDE 5 MG/1
5-10 TABLET ORAL EVERY 6 HOURS PRN
Qty: 10 TABLET | Refills: 0 | Status: SHIPPED | OUTPATIENT
Start: 2022-10-07 | End: 2023-01-12

## 2022-10-07 RX ORDER — ONDANSETRON 4 MG/1
4 TABLET, ORALLY DISINTEGRATING ORAL EVERY 30 MIN PRN
Status: DISCONTINUED | OUTPATIENT
Start: 2022-10-07 | End: 2022-10-08 | Stop reason: HOSPADM

## 2022-10-07 RX ORDER — GLYCOPYRROLATE 0.2 MG/ML
INJECTION, SOLUTION INTRAMUSCULAR; INTRAVENOUS PRN
Status: DISCONTINUED | OUTPATIENT
Start: 2022-10-07 | End: 2022-10-07

## 2022-10-07 RX ORDER — DEXAMETHASONE SODIUM PHOSPHATE 10 MG/ML
INJECTION, SOLUTION INTRAMUSCULAR; INTRAVENOUS PRN
Status: DISCONTINUED | OUTPATIENT
Start: 2022-10-07 | End: 2022-10-07

## 2022-10-07 RX ORDER — CEFAZOLIN SODIUM 2 G/100ML
2 INJECTION, SOLUTION INTRAVENOUS
Status: COMPLETED | OUTPATIENT
Start: 2022-10-07 | End: 2022-10-07

## 2022-10-07 RX ORDER — SODIUM CHLORIDE, SODIUM LACTATE, POTASSIUM CHLORIDE, CALCIUM CHLORIDE 600; 310; 30; 20 MG/100ML; MG/100ML; MG/100ML; MG/100ML
INJECTION, SOLUTION INTRAVENOUS CONTINUOUS
Status: DISCONTINUED | OUTPATIENT
Start: 2022-10-07 | End: 2022-10-08 | Stop reason: HOSPADM

## 2022-10-07 RX ORDER — METOPROLOL TARTRATE 1 MG/ML
1-2 INJECTION, SOLUTION INTRAVENOUS EVERY 5 MIN PRN
Status: DISCONTINUED | OUTPATIENT
Start: 2022-10-07 | End: 2022-10-08 | Stop reason: HOSPADM

## 2022-10-07 RX ORDER — ONDANSETRON 2 MG/ML
4 INJECTION INTRAMUSCULAR; INTRAVENOUS EVERY 30 MIN PRN
Status: DISCONTINUED | OUTPATIENT
Start: 2022-10-07 | End: 2022-10-08 | Stop reason: HOSPADM

## 2022-10-07 RX ORDER — ONDANSETRON 4 MG/1
4 TABLET, ORALLY DISINTEGRATING ORAL EVERY 8 HOURS PRN
Qty: 4 TABLET | Refills: 0 | Status: SHIPPED | OUTPATIENT
Start: 2022-10-07 | End: 2023-02-10

## 2022-10-07 RX ORDER — CEFAZOLIN SODIUM 2 G/100ML
2 INJECTION, SOLUTION INTRAVENOUS SEE ADMIN INSTRUCTIONS
Status: DISCONTINUED | OUTPATIENT
Start: 2022-10-07 | End: 2022-10-08 | Stop reason: HOSPADM

## 2022-10-07 RX ORDER — FENTANYL CITRATE 50 UG/ML
50 INJECTION, SOLUTION INTRAMUSCULAR; INTRAVENOUS
Status: DISCONTINUED | OUTPATIENT
Start: 2022-10-07 | End: 2022-10-08 | Stop reason: HOSPADM

## 2022-10-07 RX ORDER — PHENYLEPHRINE HYDROCHLORIDE 10 MG/ML
INJECTION INTRAVENOUS PRN
Status: DISCONTINUED | OUTPATIENT
Start: 2022-10-07 | End: 2022-10-07

## 2022-10-07 RX ORDER — ACETAMINOPHEN 325 MG/1
975 TABLET ORAL ONCE
Status: COMPLETED | OUTPATIENT
Start: 2022-10-07 | End: 2022-10-07

## 2022-10-07 RX ORDER — OXYCODONE HYDROCHLORIDE 5 MG/1
5 TABLET ORAL EVERY 4 HOURS PRN
Status: DISCONTINUED | OUTPATIENT
Start: 2022-10-07 | End: 2022-10-08 | Stop reason: HOSPADM

## 2022-10-07 RX ORDER — AMOXICILLIN 250 MG
1-2 CAPSULE ORAL 2 TIMES DAILY
Qty: 30 TABLET | Refills: 0 | Status: SHIPPED | OUTPATIENT
Start: 2022-10-07 | End: 2023-02-10

## 2022-10-07 RX ORDER — PROPOFOL 10 MG/ML
INJECTION, EMULSION INTRAVENOUS PRN
Status: DISCONTINUED | OUTPATIENT
Start: 2022-10-07 | End: 2022-10-07

## 2022-10-07 RX ORDER — ONDANSETRON 2 MG/ML
INJECTION INTRAMUSCULAR; INTRAVENOUS PRN
Status: DISCONTINUED | OUTPATIENT
Start: 2022-10-07 | End: 2022-10-07

## 2022-10-07 RX ORDER — PROPOFOL 10 MG/ML
INJECTION, EMULSION INTRAVENOUS CONTINUOUS PRN
Status: DISCONTINUED | OUTPATIENT
Start: 2022-10-07 | End: 2022-10-07

## 2022-10-07 RX ORDER — BUPIVACAINE HYDROCHLORIDE 5 MG/ML
INJECTION, SOLUTION EPIDURAL; INTRACAUDAL
Status: COMPLETED | OUTPATIENT
Start: 2022-10-07 | End: 2022-10-07

## 2022-10-07 RX ORDER — EPHEDRINE SULFATE 50 MG/ML
INJECTION, SOLUTION INTRAVENOUS PRN
Status: DISCONTINUED | OUTPATIENT
Start: 2022-10-07 | End: 2022-10-07

## 2022-10-07 RX ORDER — FENTANYL CITRATE 50 UG/ML
25 INJECTION, SOLUTION INTRAMUSCULAR; INTRAVENOUS EVERY 5 MIN PRN
Status: DISCONTINUED | OUTPATIENT
Start: 2022-10-07 | End: 2022-10-08 | Stop reason: HOSPADM

## 2022-10-07 RX ORDER — LIDOCAINE HYDROCHLORIDE 20 MG/ML
INJECTION, SOLUTION INFILTRATION; PERINEURAL PRN
Status: DISCONTINUED | OUTPATIENT
Start: 2022-10-07 | End: 2022-10-07

## 2022-10-07 RX ORDER — FENTANYL CITRATE 50 UG/ML
25-50 INJECTION, SOLUTION INTRAMUSCULAR; INTRAVENOUS
Status: DISCONTINUED | OUTPATIENT
Start: 2022-10-07 | End: 2022-10-08 | Stop reason: HOSPADM

## 2022-10-07 RX ORDER — HYDRALAZINE HYDROCHLORIDE 20 MG/ML
2.5-5 INJECTION INTRAMUSCULAR; INTRAVENOUS EVERY 10 MIN PRN
Status: DISCONTINUED | OUTPATIENT
Start: 2022-10-07 | End: 2022-10-08 | Stop reason: HOSPADM

## 2022-10-07 RX ORDER — FLUMAZENIL 0.1 MG/ML
0.2 INJECTION, SOLUTION INTRAVENOUS
Status: DISCONTINUED | OUTPATIENT
Start: 2022-10-07 | End: 2022-10-08 | Stop reason: HOSPADM

## 2022-10-07 RX ADMIN — EPHEDRINE SULFATE 5 MG: 50 INJECTION, SOLUTION INTRAVENOUS at 07:57

## 2022-10-07 RX ADMIN — PROPOFOL 175 MCG/KG/MIN: 10 INJECTION, EMULSION INTRAVENOUS at 07:46

## 2022-10-07 RX ADMIN — EPHEDRINE SULFATE 5 MG: 50 INJECTION, SOLUTION INTRAVENOUS at 08:05

## 2022-10-07 RX ADMIN — GLYCOPYRROLATE 0.1 MG: 0.2 INJECTION, SOLUTION INTRAMUSCULAR; INTRAVENOUS at 08:05

## 2022-10-07 RX ADMIN — CEFAZOLIN SODIUM 2 G: 2 INJECTION, SOLUTION INTRAVENOUS at 07:35

## 2022-10-07 RX ADMIN — EPHEDRINE SULFATE 10 MG: 50 INJECTION, SOLUTION INTRAVENOUS at 08:02

## 2022-10-07 RX ADMIN — Medication 0.2 MG: at 10:12

## 2022-10-07 RX ADMIN — FENTANYL CITRATE 25 MCG: 50 INJECTION, SOLUTION INTRAMUSCULAR; INTRAVENOUS at 09:35

## 2022-10-07 RX ADMIN — BUPIVACAINE HYDROCHLORIDE 20 ML: 5 INJECTION, SOLUTION EPIDURAL; INTRACAUDAL at 09:22

## 2022-10-07 RX ADMIN — FENTANYL CITRATE 25 MCG: 50 INJECTION, SOLUTION INTRAMUSCULAR; INTRAVENOUS at 09:55

## 2022-10-07 RX ADMIN — ONDANSETRON 4 MG: 2 INJECTION INTRAMUSCULAR; INTRAVENOUS at 09:05

## 2022-10-07 RX ADMIN — DEXAMETHASONE SODIUM PHOSPHATE 10 MG: 10 INJECTION, SOLUTION INTRAMUSCULAR; INTRAVENOUS at 07:49

## 2022-10-07 RX ADMIN — Medication 50 MG: at 07:44

## 2022-10-07 RX ADMIN — SODIUM CHLORIDE, SODIUM LACTATE, POTASSIUM CHLORIDE, CALCIUM CHLORIDE: 600; 310; 30; 20 INJECTION, SOLUTION INTRAVENOUS at 09:12

## 2022-10-07 RX ADMIN — LIDOCAINE HYDROCHLORIDE 60 MG: 20 INJECTION, SOLUTION INFILTRATION; PERINEURAL at 07:44

## 2022-10-07 RX ADMIN — SODIUM CHLORIDE, SODIUM LACTATE, POTASSIUM CHLORIDE, CALCIUM CHLORIDE: 600; 310; 30; 20 INJECTION, SOLUTION INTRAVENOUS at 06:34

## 2022-10-07 RX ADMIN — Medication 0.5 MCG/KG/MIN: at 08:15

## 2022-10-07 RX ADMIN — PROPOFOL 200 MG: 10 INJECTION, EMULSION INTRAVENOUS at 07:44

## 2022-10-07 RX ADMIN — GLYCOPYRROLATE 0.1 MG: 0.2 INJECTION, SOLUTION INTRAMUSCULAR; INTRAVENOUS at 07:56

## 2022-10-07 RX ADMIN — OXYCODONE HYDROCHLORIDE 5 MG: 5 TABLET ORAL at 09:52

## 2022-10-07 RX ADMIN — FENTANYL CITRATE 50 MCG: 50 INJECTION, SOLUTION INTRAMUSCULAR; INTRAVENOUS at 06:51

## 2022-10-07 RX ADMIN — PHENYLEPHRINE HYDROCHLORIDE 100 MCG: 10 INJECTION INTRAVENOUS at 08:05

## 2022-10-07 RX ADMIN — FENTANYL CITRATE 25 MCG: 50 INJECTION, SOLUTION INTRAMUSCULAR; INTRAVENOUS at 09:41

## 2022-10-07 RX ADMIN — EPHEDRINE SULFATE 5 MG: 50 INJECTION, SOLUTION INTRAVENOUS at 08:00

## 2022-10-07 RX ADMIN — ACETAMINOPHEN 975 MG: 325 TABLET ORAL at 06:23

## 2022-10-07 RX ADMIN — FENTANYL CITRATE 25 MCG: 50 INJECTION, SOLUTION INTRAMUSCULAR; INTRAVENOUS at 09:49

## 2022-10-07 NOTE — OP NOTE
Procedure Date: 10/07/2022    PREOPERATIVE DIAGNOSIS:  Bilateral breast reconstruction for breast cancer with stage I expander placement, now ready for stage II implant placement and symmetry enhancement    POSTOPERATIVE DIAGNOSIS:  Bilateral breast reconstruction for breast cancer with stage I expander placement, now ready for stage II implant placement and symmetry enhancement    PROCEDURES:  1.  Bilateral breast expander removal and replacement with permanent implants (Allergan Natrelle saline filled breast implants, 68 HP, 800 mL filled to 1000 mL).  2.  Extensive revisional surgeries of bilateral breasts with removal of excess skin and subcutaneous tissues to shape the breast on each side.    SURGEON:  Al Nieto MD    RESIDENT:  Dax Frank MD    ANESTHESIA:  General anesthesia with endotracheal intubation.    COMPLICATIONS:  Nil.    DRAINS:  Nil.    SPECIMENS:  Bilateral breast tissue and bilateral expanders.    BLOOD LOSS:  25 mL    DESCRIPTION OF PROCEDURE:  After informed consent was taken from the patient, the proper site and procedure was ascertained with her and she was appropriately marked, she was taken to the operating room.  She was placed in the supine position with the knees comfortably flexed, pillows underneath and pneumoboots placed and running prior to induction of anesthesia.  Preoperative antibiotics were given in the OR.  All pressure points were appropriately padded.  General anesthesia was administered without any complications.  She was prepped and draped in standard surgical fashion, placed in a sitting position On evaluation, her right breast was much more ptotic than the left side.  Both breasts are very squared with large standing cutaneous cones bilaterally and needed extensive revisional surgeries to get a good shape and symmetry.  Additionally, she had 800 mL in the expander.  Thus, a total of about between 900-1000 mL of volume.  Went ahead and opened up the original mastectomy  incisions, dissected down to the capsule, opened the capsule and retrieved the expanders.  Then went ahead and placed different sizers and came to the conclusion that between a 900-1000 mL implant would be needed, which there are no silicone implants of that volume.  Therefore, a saline implant was needed and the largest saline implant was decided upon.  We then thoroughly irrigated out the breast pocket with antibiotic irrigation and Betadine, reprepped the area, changed our gloves and placed the permanent saline implants in position and then filled them with 1000 mL of saline on each side.  Then, we closed the capsule with a running 2-0 Monocryl suture, then set the patient up and then tailor tacked the breasts on each side to get the best shape and symmetric shape as possible, extensive removal of skin and subcutaneous tissues, especially medially and laterally on both sides as needed.  This was marked out.  The patient was flattened.  The excess skin was excised, some of the tissue was excised.  Hemostasis was ensured.  Release of the scar was carried out and then closure was carried out with 2-0 Monocryl suture in deep dermal layer, followed by 3-0 Stratafix suture in a running manner, followed by surgical tape and glue.  The patient tolerated the procedure well.  All counts were correct at the end of the case.  The patient was wrapped with an Ace Wrap, extubated, and sent to recovery room in stable condition.    ROSEMARIE Nieto MD        D: 10/07/2022   T: 10/07/2022   MT: md    Name:     SAMMY MARIE  MRN:      [id number]        Account:        [id number]   :      1965           Procedure Date: 10/07/2022     Document: [id number]

## 2022-10-07 NOTE — ANESTHESIA PROCEDURE NOTES
Airway       Patient location during procedure: OR       Procedure Start/Stop Times: 10/7/2022 7:47 AM  Staff -        Performed By: anesthesiologist and with residents       Procedure performed by resident/fellow/CRNA in presence of a teaching physician.  Indications and Patient Condition       Indications for airway management: geovanna-procedural       Induction type:intravenous       Mask difficulty assessment: 1 - vent by mask    Final Airway Details       Final airway type: endotracheal airway       Successful airway: ETT - single  Endotracheal Airway Details        ETT size (mm): 7.0       Cuffed: yes       Successful intubation technique: direct laryngoscopy       DL Blade Type: MAC 3       Grade View of Cords: 1       Adjucts: stylet       Position: Right       Measured from: gums/teeth       Secured at (cm): 21    Post intubation assessment        Placement verified by: capnometry, equal breath sounds and chest rise        Number of attempts at approach: 1       Number of other approaches attempted: 0       Secured with: cloth tape       Ease of procedure: easy       Dentition: Intact    Medication(s) Administered   Medication Administration Time: 10/7/2022 7:47 AM

## 2022-10-07 NOTE — ANESTHESIA POSTPROCEDURE EVALUATION
Patient: Diana Salvador    Procedure: Procedure(s):  Bilateral breast implant placement and revision       Anesthesia Type:  General    Note:     Postop Pain Control: Uneventful            Sign Out: Well controlled pain   PONV: No   Neuro/Psych: Uneventful            Sign Out: Acceptable/Baseline neuro status   Airway/Respiratory: Uneventful            Sign Out: Acceptable/Baseline resp. status   CV/Hemodynamics: Uneventful            Sign Out: Acceptable CV status; No obvious hypovolemia; No obvious fluid overload   Other NRE: NONE   DID A NON-ROUTINE EVENT OCCUR? No           Last vitals:  Vitals Value Taken Time   /71 10/07/22 0945   Temp 96.9  F (36.1  C) 10/07/22 0924   Pulse 66 10/07/22 0947   Resp 9 10/07/22 0947   SpO2 96 % 10/07/22 0947   Vitals shown include unvalidated device data.    Electronically Signed By: Dread Ferrari MD  October 7, 2022  9:48 AM

## 2022-10-07 NOTE — DISCHARGE INSTRUCTIONS
BREAST RECONSTRUCTION WITH IMPLANTS  POST-OPERATIVE INSTRUCTIONS    Instructions  ?  Have someone drive you home after surgery and help you at home for 1-2     days.  ?  Get plenty of rest and follow balanced diet.  ?  Decreased activity may promote constipation, so you may want to add     more raw fruit to your diet, and be sure to increase fluid intake.  ?  Take pain medication as prescribed. Do not take aspirin or any products      containing aspirin for one week after surgery.  ?  Do not drink alcohol when taking pain medications.  ?  Do not smoke, as smoking delays healing and increases the risk of complications.    Activities  ?  Start walking as soon as possible, this helps to reduce swelling and      lowers the chance of blood clots.  ?  Do not drive until you are no longer taking narcotic pain medications.  ?  Do not drive until you have full range of motion with your arms.  ?  No overhead lifting, strenuous sports, or lifting > 5 pounds for 3-6 weeks.    Incision Care  ?  You may shower after 24 hours. The ACE wrap (if used) may be rewrapped as needed (if too tight or loose). Use it for support and may subtitute with a sports bra if preferred.   ?  Avoid exposing scars to sun for at least 12 months.  ?  Always use a strong sunblock, if sun exposure is unavoidable (SPF 50 or     greater).  ?  Keep surgical tape on until it peels off.  ?  Keep incisions clean and inspect daily for signs of infection.  ?  No tub soaking, bathing or swimming until healed.  ?  If used, empty drains twice a day and record the output separately for each drain. Record 24 hour output for each drain.    What to Expect  ?  You are likely to feel tired and sore for 1-2 weeks  ?  Normal sensation to the breast cannot be restored; in time, some feeling may return.  ?  You may feel muscle spasms in the chest.    Appearance  ?  Most scars will fade substantially over time, 1-2 years.  ?  Reconstructed breast(s) may feel firmer and look  rounder or flatter that the natural           Breast.  ?  Reconstructed breast may not match the natural breast.    Follow-Up Care  ?  If surgical drain was used, it will be removed in 1-2 weeks when the output is less            than 30 ml for 2 days.        Please note my office will call you 1-2 business days after your procedure to check up on how you're doing and to schedule your post-operative appointment.     When to Call  ?  If you have increased swelling or bruising.  ?  If swelling and redness persist after a few days.  ?  If you have increased redness along the incision.  ?  If you have severe or increased pain not relieved by medication.  ?  If you have any side effects to medications; such as, rash, nausea,      headache, vomiting.  ?  If you have an oral temperature over 100.4 degrees.  ?  If you have any yellowish or greenish drainage from the incisions or     notice a foul odor.  ?  If you have bleeding from the incisions that is difficult to control with      light pressure.  ?  If you have loss of feeling or motion.    For Medical Questions, Please Call:  ?  [phone], Monday - Friday, 8 a.m. - 4:30 p.m.  ?  After hours and on weekends, please call Hospital Paging at [phone] and ask       for the Plastic Surgeon on call.    [facility]   Adult Discharge Orders & Instructions     For 24 hours after surgery    Get plenty of rest.  A responsible adult must stay with you for at least 24 hours after you leave the hospital.   Do not drive or use heavy equipment.  If you have weakness or tingling, don't drive or use heavy equipment until this feeling goes away.  Do not drink alcohol.  Avoid strenuous or risky activities.  Ask for help when climbing stairs.   You may feel lightheaded.  IF so, sit for a few minutes before standing.  Have someone help you get up.   If you have nausea (feel sick to your stomach): Drink only clear liquids such as apple juice, ginger ale, broth or 7-Up.   "Rest may also help.  Be sure to drink enough fluids.  Move to a regular diet as you feel able.  You may have a slight fever. Call the doctor if your fever is over 100 F (37.7 C) (taken under the tongue) or lasts longer than 24 hours.  You may have a dry mouth, a sore throat, muscle aches or trouble sleeping.  These should go away after 24 hours.  Do not make important or legal decisions.     Call your doctor for any of the followin.  Signs of infection (fever, growing tenderness at the surgery site, a large amount of drainage or bleeding, severe pain, foul-smelling drainage, redness, swelling).    2. It has been over 8 to 10 hours since surgery and you are still not able to urinate (pass water).    3.  Headache for over 24 hours.    4.  Numbness, tingling or weakness the day after surgery (if you had spinal anesthesia).                  5. Signs of Covid-19 infection (temperature over 100 degrees, shortness of breath, cough, loss of taste/smell, generalized body aches, persistent headache,                  chills, sore throat, nausea/vomiting/diarrhea).    To contact a doctor, call To contact Dr Nieto call:  [phone] - during office hours  [phone] - After office hours, ask for the plastic surgery resident on call     Information about liposomal bupivacaine (Exparel)    What is Liposomal Bupivacaine?    Liposomal Bupivacaine is a numbing medication that can help you manage your pain after surgery.  This medication is similar to \"novacaine,\" which is often used by the dentist.  Liposomal bupivacaine is released slowly and can help control pain for up to 72 hours.    What is the purpose of Liposomal Bupivacaine?  To manage your pain after surgery  To help you sleep better, take deep breaths, walk more comfortable, and feel up to visiting with others    How is the procedure done?  Liposomal bupivacaine is a medication given by an injection.  It is usually given right before your surgery.  If this is " "the case, you will be awake or sedated, but you should experience minimal pain during the procedure.  For some people, the injection may be given at the very end of your surgery.  It all depends on the type of surgery and your situation.  The procedure usually takes about 5-15 minutes.  An ultrasound machine will help the anesthesiologist insert it in the right place or the surgeon will inject it under direct vision.   A needle is used to place the numbing medication under your skin.  It provides pain relief by numbing the tissue in the area where your surgeon will make the incision.    What can I expect?  You may experience numbness, tingling, or a feeling of heaviness around the area that was injected.  If you experience any of the follow symptoms IMMEDIATELY CALL THE REGIONAL ANESTHESIA PAIN SERVICE:  Numbness or tingling occurs in areas other than around the injection site  Blurry vision  Ringing in your ears  A metallic taste in your mouth    CALL the Regional Anesthesia Pain Service at:  [phone].  Press \"4\" for the  and let the hospital  know that you are having a problem with a nerve block and that you would like to page the \"adult care inpatient pain management/[facility] team\".  From 7 am - 5 pm, page the \"staff\" physician  From 5 pm - 7 am, page the \"resident\" physician (if no response from \"resident\" physician, call the  back and the \"staff\" physician).    You should not receive any other type of numbing medication within 4 days after receiving liposomal bupivacaine unless your anesthesiologist approves.    Post Operative Instructions: Regional Anesthetic with Liposomal Bupivacaine for Chest and Abdominal Surgery  General Information:   Regional anesthesia is when local anesthetic or  numbing  medication is injected around the nerves to anesthetize or  numb  the area supplied by that set of nerves.     Types of Regional Blocks:  Transversus Abdominis Plane (TAP): A block " injected beneath the covering of a muscle layer of the abdomen for abdominal surgery  Pectoral: A block injected near the breast for surgery on the breast and armpit  Paravertebral: A block injected in the back for surgery on the chest, ribs, and breast    Procedure:  The type of anesthesia your doctor used to numb your chest or abdomen will usually not wear off for 24-48 hours, but may last as long as 72 hours.     Diet:  There are no restrictions on your diet. You should drink plenty of fluids.     Discomfort:  You will have a tingling and prickly sensation in your chest or abdomen as the feeling begins to return. You can also expect some discomfort. The amount of discomfort is unpredictable, but if you have more pain than can be controlled with pain medication you should notify your physician.     Pain Medicine:   Begin taking your oral pain pills before bedtime and during the night to avoid a sudden onset of pain as part of the block wears off.  Do not engage in drinking, driving, or hazardous occupations while taking pain medication.     Stitches:   You may have stitches or special skin closures. You doctor will inform you when to return to the office to have them removed.

## 2022-10-07 NOTE — ANESTHESIA PROCEDURE NOTES
Pectoralis Procedure Note    Pre-Procedure   Staff -        Anesthesiologist:  Dread Ferrair MD       Performed By: anesthesiologist       Location: pre-op       Procedure Start/Stop Times: 10/7/2022 9:22 AM and 10/7/2022 7:12 AM       Pre-Anesthestic Checklist: patient identified, IV checked, site marked, risks and benefits discussed, informed consent, monitors and equipment checked, pre-op evaluation, at physician/surgeon's request and post-op pain management  Timeout:       Correct Patient: Yes        Correct Procedure: Yes        Correct Site: Yes        Correct Position: Yes        Correct Laterality: N/A        Site Marked: Yes  Procedure Documentation  Procedure: Pectoralis       Laterality: bilateral       Patient Position: sitting       Skin prep: Chloraprep             Pectoralis I and Pectoralis II       Needle Gauge: 21.        Needle Length (Inches): 1        1. Ultrasound was used to identify targeted nerve, plexus, vascular marker, or fascial plane and place a needle adjacent to it in real-time.       2. Ultrasound was used to visualize the spread of anesthetic in close proximity to the above referenced structure.       3. A permanent image is entered into the patient's record.       4. The visualized anatomic structures appeared normal.       5. There were no apparent abnormal pathologic findings.    Assessment/Narrative         The placement was negative for: blood aspirated and painful injection       Bolus given via needle..        Secured via.        Insertion/Infusion Method: Single Shot    Medication(s) Administered   Bupivacaine 0.5% PF (Infiltration) - Infiltration   20 mL - 10/7/2022 9:22:00 AM  Bupivacaine liposome (Exparel) 1.3% LA inj susp (Infiltration) - Infiltration   20 mL - 10/7/2022 9:22:00 AM  Medication Administration Time: 10/7/2022 9:22 AM     Comments:  Exparel 266 mg

## 2022-10-07 NOTE — PROGRESS NOTES
No change in History and Physical since the last visit.  I went over the planned procedure in detail today.  All risks, benefits and alternatives were explained in detail again.  All questions were answered.  The patient understood the plan and all that was discussed and wants to proceed with the planned procedure today.  The patient understands the team approach to care in the operating room and agrees to the procedure as such.  The patient has been cleared by medicine for this procedure and all laboratory tests are stable.

## 2022-10-07 NOTE — ANESTHESIA CARE TRANSFER NOTE
Patient: Diana Salvador    Procedure: Procedure(s):  Bilateral breast implant placement and revision       Diagnosis: S/P breast reconstruction, bilateral [Z98.890]  Diagnosis Additional Information: No value filed.    Anesthesia Type:   General     Note:    Oropharynx: oropharynx clear of all foreign objects  Level of Consciousness: awake  Oxygen Supplementation: nasal cannula    Independent Airway: airway patency satisfactory and stable  Dentition: dentition unchanged  Vital Signs Stable: post-procedure vital signs reviewed and stable  Report to RN Given: handoff report given  Patient transferred to: PACU  Comments: Report to ANGELLA Pruett. Awake, exchanging well, sats 95%, RN states comfortable with patient  Handoff Report: Identifed the Patient, Identified the Reponsible Provider, Reviewed the pertinent medical history, Discussed the surgical course, Reviewed Intra-OP anesthesia mangement and issues during anesthesia, Set expectations for post-procedure period and Allowed opportunity for questions and acknowledgement of understanding      Vitals:  Vitals Value Taken Time   BP     Temp     Pulse     Resp     SpO2         Electronically Signed By: ROSALINDA Eddy CRNA  October 7, 2022  9:27 AM

## 2022-10-07 NOTE — BRIEF OP NOTE
[facility] Brief Operative Note    Pre-operative diagnosis: S/P breast reconstruction, bilateral [Z98.890]   Post-operative diagnosis As above   Procedure: Procedure(s):  Bilateral breast implant placement and revision   Surgeon(s): Surgeon(s) and Role:     * ROSEMARIE Nieto MD - Primary   Estimated blood loss: 25 mL    Specimens: ID Type Source Tests Collected by Time Destination   1 : right breast tissue Tissue Breast, Right SURGICAL PATHOLOGY EXAM ROSEMARIE Nieto MD 10/7/2022  8:43 AM    2 : left breast tissue Tissue Breast, Left SURGICAL PATHOLOGY EXAM ROSEMARIE Nieto MD 10/7/2022  8:44 AM    3 : right breast expander implant Implant Explant SURGICAL PATHOLOGY EXAM ROSEMARIE Nieto MD 10/7/2022  8:48 AM    4 : left breast expander implant Implant Explant SURGICAL PATHOLOGY EXAM ROSEMARIE Nieto MD 10/7/2022  8:56 AM       Findings: NA

## 2022-10-11 NOTE — PROGRESS NOTES
[facility]    Outpatient Physical Therapy Progress Note  Patient: Diana Salvador  : 1965    Beginning/End Dates of Reporting Period:  22 to 10/8/    Referring Provider: Dr. Demi Lyons MD    Therapy Diagnosis: R-breast / RUQ lymphedema with fibrosis     Client Self Report: my range is so much better in two weeks, I can get my shirt on/off over my head, reach above the refridgerator and able to wash my hair    Objective Measurements:  Objective Measure: ROM  Details: RUE GH flex 126 deg and LUE GH flex 135deg; R abduction 110 and L abduction 116deg; all motions limited by pain and tightness     Outcome Measures (most recent score):   LLIS = 40 on eval; will again complete at time of discharge     Goals:  Goal Identifier stg   Goal Description pt to be independent with BUE ROM exercises/stretches and performing at least 5 days/week to increase functional range for overhead reaching   Target Date 22   Date Met  22   Progress (detail required for progress note):       Goal Identifier stg   Goal Description pt to have at least daytime tolerance to wearing a compression bra and/or tank with Komprex2 inside to decrease BUQ lymphedema   Target Date 22   Date Met  22   Progress (detail required for progress note):       Goal Identifier ltg   Goal Description pt to increase BUE flexion to at least 150 degrees to make self more independent with household tasks and ADL   Target Date 22   Date Met      Progress (detail required for progress note):       Goal Identifier ltg   Goal Description pt to be independent with longterm BUQ lymphedema management via HEP (ROM ex and stretching), self-MLD, self-MFR and compression garment wear/use for longterm lymphedema mangement for maintenance   Target Date 22   Date Met      Progress (detail required for progress note):        Plan:  Continue therapy per current plan of care.  Patient has made good progress with her ROM from just a few sessions.  Last seen in clinic on 9/26/22 and had reconstruction surgery planned for 10/7/22 so instructed to continue with HEP up until surgery and then return post surgery when ok'd be MD/surgeon for ongoing ROM exercises and MFR    Discharge:  No

## 2022-10-13 NOTE — PROGRESS NOTES
This is a recent snapshot of the patient's [facility] medical record.  For current drug dose and complete information and questions, call [phone] or In Basket pool, [facility] ([id number])  CSN Number:  [id number]

## 2022-10-20 ENCOUNTER — INFUSION THERAPY VISIT (OUTPATIENT)
Dept: INFUSION THERAPY | Facility: CLINIC | Age: 57
End: 2022-10-20
Attending: INTERNAL MEDICINE
Payer: OTHER GOVERNMENT

## 2022-10-20 ENCOUNTER — ONCOLOGY VISIT (OUTPATIENT)
Dept: ONCOLOGY | Facility: CLINIC | Age: 57
End: 2022-10-20
Attending: INTERNAL MEDICINE
Payer: OTHER GOVERNMENT

## 2022-10-20 VITALS
BODY MASS INDEX: 33.63 KG/M2 | SYSTOLIC BLOOD PRESSURE: 157 MMHG | HEART RATE: 71 BPM | DIASTOLIC BLOOD PRESSURE: 97 MMHG | TEMPERATURE: 98.5 F | RESPIRATION RATE: 12 BRPM | OXYGEN SATURATION: 94 % | WEIGHT: 213 LBS

## 2022-10-20 VITALS — DIASTOLIC BLOOD PRESSURE: 85 MMHG | SYSTOLIC BLOOD PRESSURE: 137 MMHG

## 2022-10-20 DIAGNOSIS — Z51.11 ENCOUNTER FOR ANTINEOPLASTIC CHEMOTHERAPY: ICD-10-CM

## 2022-10-20 DIAGNOSIS — G62.0 CHEMOTHERAPY-INDUCED NEUROPATHY (H): ICD-10-CM

## 2022-10-20 DIAGNOSIS — Z51.11 ENCOUNTER FOR ANTINEOPLASTIC CHEMOTHERAPY: Primary | ICD-10-CM

## 2022-10-20 DIAGNOSIS — Z17.1 MALIGNANT NEOPLASM OF UPPER-INNER QUADRANT OF RIGHT BREAST IN FEMALE, ESTROGEN RECEPTOR NEGATIVE (H): Primary | ICD-10-CM

## 2022-10-20 DIAGNOSIS — Z17.1 MALIGNANT NEOPLASM OF UPPER-INNER QUADRANT OF RIGHT BREAST IN FEMALE, ESTROGEN RECEPTOR NEGATIVE (H): ICD-10-CM

## 2022-10-20 DIAGNOSIS — T45.1X5A CHEMOTHERAPY-INDUCED NEUROPATHY (H): ICD-10-CM

## 2022-10-20 DIAGNOSIS — C50.211 MALIGNANT NEOPLASM OF UPPER-INNER QUADRANT OF RIGHT BREAST IN FEMALE, ESTROGEN RECEPTOR NEGATIVE (H): ICD-10-CM

## 2022-10-20 DIAGNOSIS — T45.1X5A CHEMOTHERAPY-INDUCED NEUTROPENIA (H): ICD-10-CM

## 2022-10-20 DIAGNOSIS — D70.1 CHEMOTHERAPY-INDUCED NEUTROPENIA (H): ICD-10-CM

## 2022-10-20 DIAGNOSIS — C50.211 MALIGNANT NEOPLASM OF UPPER-INNER QUADRANT OF RIGHT BREAST IN FEMALE, ESTROGEN RECEPTOR NEGATIVE (H): Primary | ICD-10-CM

## 2022-10-20 LAB
ALBUMIN SERPL BCG-MCNC: 3.8 G/DL (ref 3.5–5.2)
ALP SERPL-CCNC: 100 U/L (ref 35–104)
ALT SERPL W P-5'-P-CCNC: 16 U/L (ref 10–35)
ANION GAP SERPL CALCULATED.3IONS-SCNC: 11 MMOL/L (ref 7–15)
AST SERPL W P-5'-P-CCNC: 19 U/L (ref 10–35)
BILIRUB SERPL-MCNC: 0.4 MG/DL
BUN SERPL-MCNC: 7.5 MG/DL (ref 6–20)
CALCIUM SERPL-MCNC: 9.1 MG/DL (ref 8.6–10)
CHLORIDE SERPL-SCNC: 104 MMOL/L (ref 98–107)
CREAT SERPL-MCNC: 0.7 MG/DL (ref 0.51–0.95)
DEPRECATED HCO3 PLAS-SCNC: 25 MMOL/L (ref 22–29)
GFR SERPL CREATININE-BSD FRML MDRD: >90 ML/MIN/1.73M2
GLUCOSE SERPL-MCNC: 108 MG/DL (ref 70–99)
POTASSIUM SERPL-SCNC: 4 MMOL/L (ref 3.4–5.3)
PROT SERPL-MCNC: 6.5 G/DL (ref 6.4–8.3)
SODIUM SERPL-SCNC: 140 MMOL/L (ref 136–145)
TSH SERPL DL<=0.005 MIU/L-ACNC: 0.54 UIU/ML (ref 0.3–4.2)

## 2022-10-20 PROCEDURE — 99214 OFFICE O/P EST MOD 30 MIN: CPT | Performed by: INTERNAL MEDICINE

## 2022-10-20 PROCEDURE — G0463 HOSPITAL OUTPT CLINIC VISIT: HCPCS | Mod: 25

## 2022-10-20 PROCEDURE — 84443 ASSAY THYROID STIM HORMONE: CPT | Performed by: INTERNAL MEDICINE

## 2022-10-20 PROCEDURE — 96413 CHEMO IV INFUSION 1 HR: CPT

## 2022-10-20 PROCEDURE — 250N000011 HC RX IP 250 OP 636: Performed by: INTERNAL MEDICINE

## 2022-10-20 PROCEDURE — 250N000011 HC RX IP 250 OP 636

## 2022-10-20 PROCEDURE — 82310 ASSAY OF CALCIUM: CPT | Performed by: INTERNAL MEDICINE

## 2022-10-20 PROCEDURE — 258N000003 HC RX IP 258 OP 636: Performed by: INTERNAL MEDICINE

## 2022-10-20 RX ORDER — HEPARIN SODIUM (PORCINE) LOCK FLUSH IV SOLN 100 UNIT/ML 100 UNIT/ML
5 SOLUTION INTRAVENOUS
Status: DISCONTINUED | OUTPATIENT
Start: 2022-10-20 | End: 2022-10-20 | Stop reason: HOSPADM

## 2022-10-20 RX ORDER — HEPARIN SODIUM (PORCINE) LOCK FLUSH IV SOLN 100 UNIT/ML 100 UNIT/ML
SOLUTION INTRAVENOUS
Status: COMPLETED
Start: 2022-10-20 | End: 2022-10-20

## 2022-10-20 RX ADMIN — Medication 500 UNITS: at 12:01

## 2022-10-20 RX ADMIN — SODIUM CHLORIDE 250 ML: 9 INJECTION, SOLUTION INTRAVENOUS at 11:23

## 2022-10-20 RX ADMIN — SODIUM CHLORIDE 200 MG: 9 INJECTION, SOLUTION INTRAVENOUS at 11:22

## 2022-10-20 ASSESSMENT — PAIN SCALES - GENERAL: PAINLEVEL: SEVERE PAIN (7)

## 2022-10-20 NOTE — LETTER
"    10/20/2022         RE: Diana Salvador  [address] [place] [id number]        Dear Colleague,    Thank you for referring your patient, Diana Salvador, to the [facility]. Please see a copy of my visit note below.    Oncology Rooming Note    October 20, 2022 10:47 AM   Diana Salvador is a 57 year old female who presents for:    Chief Complaint   Patient presents with     Oncology Clinic Visit     Malignant neoplasm of upper-inner quadrant of right breast in female, estrogen receptor negative - Labs provider and infusion     Initial Vitals: BP (!) 157/97 (BP Location: Right arm, Patient Position: Sitting, Cuff Size: Adult Regular)   Pulse 71   Temp 98.5  F (36.9  C) (Tympanic)   Resp 12   Wt 96.6 kg (213 lb)   LMP  (LMP Unknown)   SpO2 94%   BMI 33.63 kg/m   Estimated body mass index is 33.63 kg/m  as calculated from the following:    Height as of 9/30/22: 1.695 m (5' 6.73\").    Weight as of this encounter: 96.6 kg (213 lb). Body surface area is 2.13 meters squared.  Severe Pain (7) Comment: Data Unavailable   No LMP recorded (lmp unknown). Patient has had a hysterectomy.  Allergies reviewed: Yes  Medications reviewed: Yes    Medications: Medication refills not needed today.  Pharmacy name entered into .com:    [id number] - [place], [place] - [address] AT [address] AND CLINICS  [facility] - [address]    Clinical concerns:  None      Emy Hollingsworth CMA              The patient is being seen for the following issue/s:  1. Malignant neoplasm of upper-inner quadrant of right breast in female, estrogen receptor negative (triple-neg., germline test neg. for BRCA 1/2)    2. Chemotherapy-induced neuropathy (H)       Cancer Staging   Malignant neoplasm of upper-inner quadrant of right breast in female, estrogen receptor negative (triple-neg., germline test neg. for BRCA " 1/2)  Staging form: Breast, AJCC 8th Edition  - Clinical stage from 3/4/2022: Stage IIB (cT2, cN0, cM0, G3, ER-, WV-, HER2-) - Signed by Ricardo Diamond MD on 3/4/2022    March 2022: She commenced neoadjuvant chemotherapy based on the KEYNOTE-522 trial (Shobha et al., 2020):      She received 3 cycles of pembrolizumab 200 mg flat dose IV, paclitaxel 80 mg/m  IV once daily on days 1, 8, and 15 and Carboplatin AUC=5 IV on day 1 with filgrastim 5 mcg/kg subcutaneously once daily on days 16, 17, and 18.    Unfortunately, she then developed grade 3 peripheral motor and sensory neuropathy and further chemotherapy with carboplatin and paclitaxel was canceled and pembrolizumab alone was continued.    However, she was able to continue pembrolizumab alone in neoadjuvant fashion without any immune related adverse events and finish a total of 6 preoperative doses of pembrolizumab.    On 6/30/22 she underwent bilateral mastectomy with right sentinel lymph node biopsy. She had expander placement by plastic surgery.  She was found to have a path CR.     She saw Dr. Lyons for medical oncology second opinion who recommended no adjuvant radiation but to complete a total of 9 adjuvant doses of pembrolizumab.      Her neuropathy has continued to improve and it is not as painful as it used to be.  It is mostly painful at her fingertips.    She was switched from gabapentin to duloxetine but is concerned about high co-pay. She has cut back on hydrocodone for pain control.    She continues to take as needed Ativan for anxiety.      She received her last pembrolizumab (Keytruda) infusion 2 weeks ago and reports no side effects from immunotherapy.  She specifically denies diarrhea, skin rashes, dyspnea, abdominal pain, bleeding, or headaches.    She just underwent breast reconstruction surgery with Dr. Johns in [place].      PHYSICAL EXAMINATION:  BP (!) 157/97 (BP Location: Right arm, Patient Position: Sitting, Cuff Size: Adult  Regular)   Pulse 71   Temp 98.5  F (36.9  C) (Tympanic)   Resp 12   Wt 96.6 kg (213 lb)   LMP  (LMP Unknown)   SpO2 94%   BMI 33.63 kg/m      General: Todays' vital signs reviewed in the EMR.    ECOG PS is 1.  Cardiovascular: Cor RRR  Respiratory: Lungs clear to auscultation bilaterally  HEENT: No oral thrush    ASSESSMENT/PLAN:    1. Malignant neoplasm of upper-inner quadrant of right breast in female, estrogen receptor negative (triple-neg., germline test neg. for BRCA 1/2)    2. Chemotherapy-induced neuropathy (H)      Lab results reviewed:    Recent Results (from the past 720 hour(s))   Comprehensive metabolic panel    Collection Time: 09/29/22  9:52 AM   Result Value Ref Range    Sodium 141 136 - 145 mmol/L    Potassium 4.1 3.4 - 5.3 mmol/L    Chloride 105 98 - 107 mmol/L    Carbon Dioxide (CO2) 26 22 - 29 mmol/L    Anion Gap 10 7 - 15 mmol/L    Urea Nitrogen 10.9 6.0 - 20.0 mg/dL    Creatinine 0.73 0.51 - 0.95 mg/dL    Calcium 9.0 8.6 - 10.0 mg/dL    Glucose 103 (H) 70 - 99 mg/dL    Alkaline Phosphatase 97 35 - 104 U/L    AST 10 10 - 35 U/L    ALT 9 (L) 10 - 35 U/L    Protein Total 6.8 6.4 - 8.3 g/dL    Albumin 4.0 3.5 - 5.2 g/dL    Bilirubin Total 0.3 <=1.2 mg/dL    GFR Estimate >90 >60 mL/min/1.73m2   TSH with free T4 reflex    Collection Time: 09/29/22  9:52 AM   Result Value Ref Range    TSH 0.63 0.30 - 4.20 uIU/mL   Comprehensive metabolic panel    Collection Time: 10/20/22 10:15 AM   Result Value Ref Range    Sodium 140 136 - 145 mmol/L    Potassium 4.0 3.4 - 5.3 mmol/L    Chloride 104 98 - 107 mmol/L    Carbon Dioxide (CO2) 25 22 - 29 mmol/L    Anion Gap 11 7 - 15 mmol/L    Urea Nitrogen 7.5 6.0 - 20.0 mg/dL    Creatinine 0.70 0.51 - 0.95 mg/dL    Calcium 9.1 8.6 - 10.0 mg/dL    Glucose 108 (H) 70 - 99 mg/dL    Alkaline Phosphatase 100 35 - 104 U/L    AST 19 10 - 35 U/L    ALT 16 10 - 35 U/L    Protein Total 6.5 6.4 - 8.3 g/dL    Albumin 3.8 3.5 - 5.2 g/dL    Bilirubin Total 0.4 <=1.2 mg/dL     GFR Estimate >90 >60 mL/min/1.73m2     We will recheck a CMP and TSH today and if these tests are adequate we will proceed with your next pembrolizumab (Keytruda) infusion today and have you return for your next Keytruda infusion in 3 weeks and your next oncology provider office visit with Marilu Vergara NP in 6 weeks     We will plan on continuing adjuvant pembrolizumab (Keytruda) every 3 weeks through January 2023.    Please contact my office if your co-pay for duloxetine is prohibitive and I will switch you back to gabapentin.            Again, thank you for allowing me to participate in the care of your patient.        Sincerely,        Ricardo Diamond MD

## 2022-10-20 NOTE — PATIENT INSTRUCTIONS
We will recheck a CMP and TSH today and if these tests are adequate we will proceed with your next Keytruda infusion today and have you return for your next Keytruda infusion in 3 weeks and your next oncology provider office visit with Marilu Vergara NP in 6 weeks     We will plan on continuing adjuvant Keytruda every 3 weeks through January 2023.    Please contact my office if your co-pay for duloxetine is prohibitive and I will switch you back to gabapentin.

## 2022-10-20 NOTE — PROGRESS NOTES
The patient is being seen for the following issue/s:  1. Malignant neoplasm of upper-inner quadrant of right breast in female, estrogen receptor negative (triple-neg., germline test neg. for BRCA 1/2)    2. Chemotherapy-induced neuropathy (H)       Cancer Staging   Malignant neoplasm of upper-inner quadrant of right breast in female, estrogen receptor negative (triple-neg., germline test neg. for BRCA 1/2)  Staging form: Breast, AJCC 8th Edition  - Clinical stage from 3/4/2022: Stage IIB (cT2, cN0, cM0, G3, ER-, UT-, HER2-) - Signed by Ricardo Diamond MD on 3/4/2022    March 2022: She commenced neoadjuvant chemotherapy based on the KEYNOTE-522 trial (Shobha et al., 2020):      She received 3 cycles of pembrolizumab 200 mg flat dose IV, paclitaxel 80 mg/m  IV once daily on days 1, 8, and 15 and Carboplatin AUC=5 IV on day 1 with filgrastim 5 mcg/kg subcutaneously once daily on days 16, 17, and 18.    Unfortunately, she then developed grade 3 peripheral motor and sensory neuropathy and further chemotherapy with carboplatin and paclitaxel was canceled and pembrolizumab alone was continued.    However, she was able to continue pembrolizumab alone in neoadjuvant fashion without any immune related adverse events and finish a total of 6 preoperative doses of pembrolizumab.    On 6/30/22 she underwent bilateral mastectomy with right sentinel lymph node biopsy. She had expander placement by plastic surgery.  She was found to have a path CR.     She saw Dr. Lyons for medical oncology second opinion who recommended no adjuvant radiation but to complete a total of 9 adjuvant doses of pembrolizumab.      Her neuropathy has continued to improve and it is not as painful as it used to be.  It is mostly painful at her fingertips.    She was switched from gabapentin to duloxetine but is concerned about high co-pay. She has cut back on hydrocodone for pain control.    She continues to take as needed Ativan for anxiety.      She  received her last pembrolizumab (Keytruda) infusion 2 weeks ago and reports no side effects from immunotherapy.  She specifically denies diarrhea, skin rashes, dyspnea, abdominal pain, bleeding, or headaches.    She just underwent breast reconstruction surgery with Dr. Johns in [place].      PHYSICAL EXAMINATION:  BP (!) 157/97 (BP Location: Right arm, Patient Position: Sitting, Cuff Size: Adult Regular)   Pulse 71   Temp 98.5  F (36.9  C) (Tympanic)   Resp 12   Wt 96.6 kg (213 lb)   LMP  (LMP Unknown)   SpO2 94%   BMI 33.63 kg/m      General: Todays' vital signs reviewed in the EMR.    ECOG PS is 1.  Cardiovascular: Cor RRR  Respiratory: Lungs clear to auscultation bilaterally  HEENT: No oral thrush    ASSESSMENT/PLAN:    1. Malignant neoplasm of upper-inner quadrant of right breast in female, estrogen receptor negative (triple-neg., germline test neg. for BRCA 1/2)    2. Chemotherapy-induced neuropathy (H)      Lab results reviewed:    Recent Results (from the past 720 hour(s))   Comprehensive metabolic panel    Collection Time: 09/29/22  9:52 AM   Result Value Ref Range    Sodium 141 136 - 145 mmol/L    Potassium 4.1 3.4 - 5.3 mmol/L    Chloride 105 98 - 107 mmol/L    Carbon Dioxide (CO2) 26 22 - 29 mmol/L    Anion Gap 10 7 - 15 mmol/L    Urea Nitrogen 10.9 6.0 - 20.0 mg/dL    Creatinine 0.73 0.51 - 0.95 mg/dL    Calcium 9.0 8.6 - 10.0 mg/dL    Glucose 103 (H) 70 - 99 mg/dL    Alkaline Phosphatase 97 35 - 104 U/L    AST 10 10 - 35 U/L    ALT 9 (L) 10 - 35 U/L    Protein Total 6.8 6.4 - 8.3 g/dL    Albumin 4.0 3.5 - 5.2 g/dL    Bilirubin Total 0.3 <=1.2 mg/dL    GFR Estimate >90 >60 mL/min/1.73m2   TSH with free T4 reflex    Collection Time: 09/29/22  9:52 AM   Result Value Ref Range    TSH 0.63 0.30 - 4.20 uIU/mL   Comprehensive metabolic panel    Collection Time: 10/20/22 10:15 AM   Result Value Ref Range    Sodium 140 136 - 145 mmol/L    Potassium 4.0 3.4 - 5.3 mmol/L    Chloride 104 98 - 107  mmol/L    Carbon Dioxide (CO2) 25 22 - 29 mmol/L    Anion Gap 11 7 - 15 mmol/L    Urea Nitrogen 7.5 6.0 - 20.0 mg/dL    Creatinine 0.70 0.51 - 0.95 mg/dL    Calcium 9.1 8.6 - 10.0 mg/dL    Glucose 108 (H) 70 - 99 mg/dL    Alkaline Phosphatase 100 35 - 104 U/L    AST 19 10 - 35 U/L    ALT 16 10 - 35 U/L    Protein Total 6.5 6.4 - 8.3 g/dL    Albumin 3.8 3.5 - 5.2 g/dL    Bilirubin Total 0.4 <=1.2 mg/dL    GFR Estimate >90 >60 mL/min/1.73m2     We will recheck a CMP and TSH today and if these tests are adequate we will proceed with your next pembrolizumab (Keytruda) infusion today and have you return for your next Keytruda infusion in 3 weeks and your next oncology provider office visit with Marilu Vergara NP in 6 weeks     We will plan on continuing adjuvant pembrolizumab (Keytruda) every 3 weeks through January 2023.    Please contact my office if your co-pay for duloxetine is prohibitive and I will switch you back to gabapentin.

## 2022-10-20 NOTE — PROGRESS NOTES
PAC labs drawn without difficulty via site protocol. Patient tolerated well.    Adrianna De Guzman RN on 10/20/2022 at 10:29 AM

## 2022-10-20 NOTE — PROGRESS NOTES
"Oncology Rooming Note    October 20, 2022 10:47 AM   Diana Salvador is a 57 year old female who presents for:    Chief Complaint   Patient presents with     Oncology Clinic Visit     Malignant neoplasm of upper-inner quadrant of right breast in female, estrogen receptor negative - Labs provider and infusion     Initial Vitals: BP (!) 157/97 (BP Location: Right arm, Patient Position: Sitting, Cuff Size: Adult Regular)   Pulse 71   Temp 98.5  F (36.9  C) (Tympanic)   Resp 12   Wt 96.6 kg (213 lb)   LMP  (LMP Unknown)   SpO2 94%   BMI 33.63 kg/m   Estimated body mass index is 33.63 kg/m  as calculated from the following:    Height as of 9/30/22: 1.695 m (5' 6.73\").    Weight as of this encounter: 96.6 kg (213 lb). Body surface area is 2.13 meters squared.  Severe Pain (7) Comment: Data Unavailable   No LMP recorded (lmp unknown). Patient has had a hysterectomy.  Allergies reviewed: Yes  Medications reviewed: Yes    Medications: Medication refills not needed today.  Pharmacy name entered into [employer]:    [facility][id number] - [place], [place] - [address] AT [facility] [place] [address] [place] [address] AND [facility]  [facility] - [facility] [place] [place], [place] - [facility]    Clinical concerns:  None      Emy Hollingsworth CMA            "

## 2022-10-20 NOTE — PROGRESS NOTES
Infusion Nursing Note:  Diana Salvador presents today for Keytruda.    Patient seen by provider today: Yes: Dr. Diamond, therefore assessment deferred.   present during visit today: Not Applicable.    Note: N/A.    Intravenous Access:  Implanted Port.    Treatment Conditions:  Lab Results   Component Value Date     10/20/2022    POTASSIUM 4.0 10/20/2022    CR 0.70 10/20/2022    ANURADHA 9.1 10/20/2022    BILITOTAL 0.4 10/20/2022    ALBUMIN 3.8 10/20/2022    ALT 16 10/20/2022    AST 19 10/20/2022     Results reviewed, labs MET treatment parameters, ok to proceed with treatment.    Post Infusion Assessment:  Patient tolerated infusion without incident.  Blood return noted pre and post infusion.  Site patent and intact, free from redness, edema or discomfort.  No evidence of extravasations.  Access discontinued per protocol.     Discharge Plan:   Copy of AVS reviewed with patient and/or family.  Patient will return 11/10/22 for next appointment.  Patient discharged in stable condition accompanied by: self.  Departure Mode: Ambulatory.      Adrianna De Guzman RN

## 2022-10-21 ENCOUNTER — OFFICE VISIT (OUTPATIENT)
Dept: SURGERY | Facility: CLINIC | Age: 57
End: 2022-10-21
Payer: OTHER GOVERNMENT

## 2022-10-21 DIAGNOSIS — Z98.890 S/P BREAST RECONSTRUCTION, BILATERAL: Primary | ICD-10-CM

## 2022-10-21 PROCEDURE — 99024 POSTOP FOLLOW-UP VISIT: CPT | Performed by: PLASTIC SURGERY

## 2022-10-21 ASSESSMENT — PAIN SCALES - GENERAL: PAINLEVEL: SEVERE PAIN (6)

## 2022-10-21 NOTE — NURSING NOTE
Diana Salvador's goals for this visit include:   Chief Complaint   Patient presents with     RECHECK     Areas to left lower incision that have opened up. Reports increased pain  DOS 10/7/22     She requests these members of her care team be copied on today's visit information: no    PCP: Quoc Chu    Referring Provider:  ROSEMARIE Nieto MD  [address] [facility] [address]  [place] [address]    LMP  (LMP Unknown)     Do you need any medication refills at today's visit? No..Rochelle Nichole RN

## 2022-10-21 NOTE — PROGRESS NOTES
PRESENTING COMPLAINT:  Postoperative visit status post stage 2 implant-based reconstruction done 10/07/2022.    HISTORY OF PRESENTING COMPLAINT:  Ms. Salvador is 57 years old.  She is a couple of weeks out from her second stage where the expander was replaced with permanent saline implants and revisional surgeries.  Overall doing well, no major issues.    PHYSICAL EXAMINATION:  Vital signs are stable.  She is afebrile, in no obvious distress.  Both breasts are healing in well.  A couple of small blood blisters medially on the left breast.  No evidence of infection.    ASSESSMENT AND PLAN:  Based on above findings, a diagnosis of stage 2 bilateral implant-based reconstruction for breast cancer was made.  Plan now is to allow everything to heal.  See me back in 2-4 more weeks.  I advised aggressive moisturization.  Once she is healed, we will plan nipple-areolar tattooing of both breasts and potential fat grafting.  I will see her back in 2-4 weeks.  All exam and discussion done in presence of my resident, Dax Frank.       [FreeTextEntry1] : GAIT: No limp. Good coordination and balance noted.\par GENERAL: alert, cooperative pleasant young 3 yo male in NAD\par SKIN: The skin is intact, warm, pink and dry over the area examined.\par EYES: Normal conjunctiva, normal eyelids and pupils were equal and round.\par ENT: normal ears, normal nose and normal lips.\par CARDIOVASCULAR: brisk capillary refill, but no peripheral edema.\par RESPIRATORY: The patient is in no apparent respiratory distress. They're taking full deep breaths without use of accessory muscles or evidence of audible wheezes or stridor without the use of a stethoscope. Normal respiratory effort.\par ABDOMEN: not examined \par \par Bilateral lower extremities\par - skin intact, no bruising, no swelling.\par - full hip, knee, ankle range of motion\par - good subtalar ROM\par - arch reconstitutes when standing on toes\par - mild physiologic genu valgum of knees\par - motor/sensory intact\par - WWP extremities

## 2022-10-21 NOTE — NURSING NOTE
Per  photos to be taken of bilateral breasts and pt needs to be scheduled for a two week follow up.Per Dr. Nieto chest photos to be taken. Name and  confirmed with patient then photos taken with patient facing forward, arms to sides, arms on hips, 45 and 90 degree angle on both left and right sides...Rochelle Nichole RN

## 2022-10-21 NOTE — LETTER
10/21/2022         RE: Diana Salvador  [address] [place] [id number]        Dear Colleague,    Thank you for referring your patient, Diana Salvador, to the [facility]. Please see a copy of my visit note below.    PRESENTING COMPLAINT:  Postoperative visit status post stage 2 implant-based reconstruction done 10/07/2022.    HISTORY OF PRESENTING COMPLAINT:  Ms. Salvador is 57 years old.  She is a couple of weeks out from her second stage where the expander was replaced with permanent saline implants and revisional surgeries.  Overall doing well, no major issues.    PHYSICAL EXAMINATION:  Vital signs are stable.  She is afebrile, in no obvious distress.  Both breasts are healing in well.  A couple of small blood blisters medially on the left breast.  No evidence of infection.    ASSESSMENT AND PLAN:  Based on above findings, a diagnosis of stage 2 bilateral implant-based reconstruction for breast cancer was made.  Plan now is to allow everything to heal.  See me back in 2-4 more weeks.  I advised aggressive moisturization.  Once she is healed, we will plan nipple-areolar tattooing of both breasts and potential fat grafting.  I will see her back in 2-4 weeks.  All exam and discussion done in presence of my resident, Dax Frank.          Again, thank you for allowing me to participate in the care of your patient.        Sincerely,        ROSEMARIE Nieto MD

## 2022-10-22 LAB
PATH REPORT.COMMENTS IMP SPEC: NORMAL
PATH REPORT.COMMENTS IMP SPEC: NORMAL
PATH REPORT.FINAL DX SPEC: NORMAL
PATH REPORT.GROSS SPEC: NORMAL
PATH REPORT.MICROSCOPIC SPEC OTHER STN: NORMAL
PATH REPORT.RELEVANT HX SPEC: NORMAL
PHOTO IMAGE: NORMAL

## 2022-10-26 ENCOUNTER — PATIENT OUTREACH (OUTPATIENT)
Dept: CARE COORDINATION | Facility: CLINIC | Age: 57
End: 2022-10-26

## 2022-10-26 NOTE — TELEPHONE ENCOUNTER
FYI to provider - Patient is lost to pap tracking follow-up. Attempts to contact pt have been made per reminder process and there has been no reply and/or no appt scheduled.       3/14/13 NIL pap.  9/15/20 NIL pap, + HR HPV (not 16 or 18). Plan cotest in 1 year  9/8/21 NIL Pap, Neg HPV. Plan cotest in 1 year.   8/18/22 Reminder mychart/letter  9/16/22 Reminder call -- Pt notified  10/26/22 lost to follow up    Annabel Wilson RN, BSN

## 2022-10-26 NOTE — PROGRESS NOTES
"Oncology Distress Screening Follow-up  Clinical Social Work  [facility]    Identified Concern and Score From Distress Screenin. How concerned are you about your ability to eat?  0       2. How concerned are you about unintended weight loss or your current weight?  0       3. How concerned are you about feeling depressed or very sad?  8 Abnormal   being treated       4. How concerned are you about feeling anxious or very scared?  8 Abnormal   being treated       5. Do you struggle with the loss of meaning and khadra in your life?  Not at all       6. How concerned are you about work and home life issues that may be affected by your cancer?  0       7. How concerned are you about knowing what resources are available to help you?  0       8. Do you currently have what you would describe as Islam or spiritual struggles?             Not at all               Date of Distress Screening: 10/20/22      Intervention/Education Provided:: OYEL called and spoke to Aide this afternoon, introducing self and reason for call. Aide acknowledged that she has been through a difficult year, starting with a fall about a year ago that led to significant dental damage and work. She then found out about her cancer diagnosis and has been going through treatment for that. And as she was out of work for such an extended leave, she wasn't able to maintain her employment. Aide notes, \"I was probably just having a bad day\" when she completed the distress screen. YOEL acknowledged she is certainly entitled to a bad day, we just want to be sure she has the support and resources she needs. Aide feels she is coping well overall. She declines any additional resources at this time, but thanked YOEL for reaching out and checking in.       Follow-up Required: No follow up planned at this time. Aide will reach out with further needs or questions.         Farida Lopez, JOSE ANTONIO, LICSW, OSW-C  Clinical , Adult Oncology  Phone: [phone]    "
yes

## 2022-11-04 ENCOUNTER — OFFICE VISIT (OUTPATIENT)
Dept: SURGERY | Facility: CLINIC | Age: 57
End: 2022-11-04
Payer: OTHER GOVERNMENT

## 2022-11-04 DIAGNOSIS — Z98.890 S/P BREAST RECONSTRUCTION, BILATERAL: Primary | ICD-10-CM

## 2022-11-04 PROCEDURE — 99024 POSTOP FOLLOW-UP VISIT: CPT | Performed by: PLASTIC SURGERY

## 2022-11-04 ASSESSMENT — PAIN SCALES - GENERAL: PAINLEVEL: MILD PAIN (2)

## 2022-11-04 NOTE — NURSING NOTE
Chief Complaint   Patient presents with     RECHECK     Post op DOS 10/21/22      ...Rochelle Nichole RN

## 2022-11-04 NOTE — LETTER
2022         RE: Diana Marie  [address] [place] [id number]        Dear Colleague,    Thank you for referring your patient, Diana Marie, to the [facility]. Please see a copy of my visit note below.    Service Date: 2022    PRESENTING COMPLAINT:  Postoperative visit status post stage 2 breast implant with done 10/07/2022.    HISTORY OF PRESENTING COMPLAINT:  Ms. Marie is 57 years old.  She is a month out from her surgery, done well.  No major issues.  Overall, happy with the results.    PHYSICAL EXAMINATION:  Vital signs stable.  She is afebrile, in no obvious distress.  Overall, volume symmetry is good.  The right side is slightly more inferior and laterally placed with some upper pole indentations bilaterally.    ASSESSMENT AND PLAN:  Based on the above findings, a diagnosis of bilateral breast reconstruction for breast cancer was made.  Plan is to proceed with fat grafting of her breasts to try and give her more symmetry and fill the upper poles.  She understood and agreed.  We went over the planned procedure with the patient in detail.  She will get 3-D nipple areolar tattoos thereafter.      All risks, benefits, and alternatives of the procedure including pain, infection, bleeding, scarring, asymmetry, seromas, hematomas, wound breakdown, wound dehiscence, injury to deeper structures like nerves, vessels and bowel and the implant contour irregularities, requirement of further surgeries, only 50%-80% of the fat remaining/surviving, DVT, PE, MI, CVA, pneumonia, renal failure and death were all explained.  She understood them all and wants to proceed.  We will schedule as soon as possible.  She was happy with the visit.  All exam and discussion done in presence of my med student Nafisa Duran.    ROSEMARIE Nieto MD        D: 2022   T: 2022   MT: MARIA TERESA    Name:     DIANA MARIE  MRN:      [id number]        Account:      [id number]   :       1965           Service Date: 11/04/2022       Document: [id number]      Again, thank you for allowing me to participate in the care of your patient.        Sincerely,        ROSEMARIE Nieto MD

## 2022-11-07 ENCOUNTER — TELEPHONE (OUTPATIENT)
Dept: SURGERY | Facility: CLINIC | Age: 57
End: 2022-11-07

## 2022-11-07 DIAGNOSIS — Z98.890 S/P BREAST RECONSTRUCTION, BILATERAL: Primary | ICD-10-CM

## 2022-11-07 RX ORDER — CEFAZOLIN SODIUM 2 G/50ML
2 SOLUTION INTRAVENOUS
Status: CANCELLED | OUTPATIENT
Start: 2022-11-07

## 2022-11-07 RX ORDER — CEFAZOLIN SODIUM 2 G/50ML
2 SOLUTION INTRAVENOUS SEE ADMIN INSTRUCTIONS
Status: CANCELLED | OUTPATIENT
Start: 2022-11-07

## 2022-11-07 NOTE — TELEPHONE ENCOUNTER
Called patient and LVM for patient to schedule surgery with Dr. Nieto. Provided direct call back number to writer - [phone].

## 2022-11-07 NOTE — PROGRESS NOTES
Service Date: 2022    PRESENTING COMPLAINT:  Postoperative visit status post stage 2 breast implant with done 10/07/2022.    HISTORY OF PRESENTING COMPLAINT:  Ms. Marie is 57 years old.  She is a month out from her surgery, done well.  No major issues.  Overall, happy with the results.    PHYSICAL EXAMINATION:  Vital signs stable.  She is afebrile, in no obvious distress.  Overall, volume symmetry is good.  The right side is slightly more inferior and laterally placed with some upper pole indentations bilaterally.    ASSESSMENT AND PLAN:  Based on the above findings, a diagnosis of bilateral breast reconstruction for breast cancer was made.  Plan is to proceed with fat grafting of her breasts to try and give her more symmetry and fill the upper poles.  She understood and agreed.  We went over the planned procedure with the patient in detail.  She will get 3-D nipple areolar tattoos thereafter.      All risks, benefits, and alternatives of the procedure including pain, infection, bleeding, scarring, asymmetry, seromas, hematomas, wound breakdown, wound dehiscence, injury to deeper structures like nerves, vessels and bowel and the implant contour irregularities, requirement of further surgeries, only 50%-80% of the fat remaining/surviving, DVT, PE, MI, CVA, pneumonia, renal failure and death were all explained.  She understood them all and wants to proceed.  We will schedule as soon as possible.  She was happy with the visit.  All exam and discussion done in presence of my med student Nafisa Duran.    ROSEMARIE Nieto MD        D: 2022   T: 2022   MT: MARIA TERESA    Name:     SAMMY MARIE  MRN:      [id number]        Account:      [id number]   :      1965           Service Date: 2022       Document: [id number]

## 2022-11-09 DIAGNOSIS — C50.211 MALIGNANT NEOPLASM OF UPPER-INNER QUADRANT OF RIGHT BREAST IN FEMALE, ESTROGEN RECEPTOR NEGATIVE (H): ICD-10-CM

## 2022-11-09 DIAGNOSIS — D70.1 CHEMOTHERAPY-INDUCED NEUTROPENIA (H): ICD-10-CM

## 2022-11-09 DIAGNOSIS — Z51.11 ENCOUNTER FOR ANTINEOPLASTIC CHEMOTHERAPY: Primary | ICD-10-CM

## 2022-11-09 DIAGNOSIS — Z17.1 MALIGNANT NEOPLASM OF UPPER-INNER QUADRANT OF RIGHT BREAST IN FEMALE, ESTROGEN RECEPTOR NEGATIVE (H): ICD-10-CM

## 2022-11-09 DIAGNOSIS — T45.1X5A CHEMOTHERAPY-INDUCED NEUTROPENIA (H): ICD-10-CM

## 2022-11-09 RX ORDER — HEPARIN SODIUM (PORCINE) LOCK FLUSH IV SOLN 100 UNIT/ML 100 UNIT/ML
5 SOLUTION INTRAVENOUS
Status: CANCELLED | OUTPATIENT
Start: 2022-11-10

## 2022-11-09 RX ORDER — NALOXONE HYDROCHLORIDE 0.4 MG/ML
0.2 INJECTION, SOLUTION INTRAMUSCULAR; INTRAVENOUS; SUBCUTANEOUS
Status: CANCELLED | OUTPATIENT
Start: 2022-12-22

## 2022-11-09 RX ORDER — LORAZEPAM 2 MG/ML
0.5 INJECTION INTRAMUSCULAR EVERY 4 HOURS PRN
Status: CANCELLED | OUTPATIENT
Start: 2022-12-22

## 2022-11-09 RX ORDER — LORAZEPAM 2 MG/ML
0.5 INJECTION INTRAMUSCULAR EVERY 4 HOURS PRN
Status: CANCELLED | OUTPATIENT
Start: 2022-12-01

## 2022-11-09 RX ORDER — MEPERIDINE HYDROCHLORIDE 25 MG/ML
25 INJECTION INTRAMUSCULAR; INTRAVENOUS; SUBCUTANEOUS EVERY 30 MIN PRN
Status: CANCELLED | OUTPATIENT
Start: 2022-12-22

## 2022-11-09 RX ORDER — DIPHENHYDRAMINE HYDROCHLORIDE 50 MG/ML
50 INJECTION INTRAMUSCULAR; INTRAVENOUS
Status: CANCELLED
Start: 2022-11-10

## 2022-11-09 RX ORDER — ALBUTEROL SULFATE 90 UG/1
1-2 AEROSOL, METERED RESPIRATORY (INHALATION)
Status: CANCELLED
Start: 2022-12-22

## 2022-11-09 RX ORDER — METHYLPREDNISOLONE SODIUM SUCCINATE 125 MG/2ML
125 INJECTION, POWDER, LYOPHILIZED, FOR SOLUTION INTRAMUSCULAR; INTRAVENOUS
Status: CANCELLED
Start: 2022-12-01

## 2022-11-09 RX ORDER — HEPARIN SODIUM (PORCINE) LOCK FLUSH IV SOLN 100 UNIT/ML 100 UNIT/ML
5 SOLUTION INTRAVENOUS
Status: CANCELLED | OUTPATIENT
Start: 2022-12-22

## 2022-11-09 RX ORDER — EPINEPHRINE 1 MG/ML
0.3 INJECTION, SOLUTION, CONCENTRATE INTRAVENOUS EVERY 5 MIN PRN
Status: CANCELLED | OUTPATIENT
Start: 2022-12-01

## 2022-11-09 RX ORDER — DIPHENHYDRAMINE HYDROCHLORIDE 50 MG/ML
50 INJECTION INTRAMUSCULAR; INTRAVENOUS
Status: CANCELLED
Start: 2022-12-22

## 2022-11-09 RX ORDER — LORAZEPAM 2 MG/ML
0.5 INJECTION INTRAMUSCULAR EVERY 4 HOURS PRN
Status: CANCELLED | OUTPATIENT
Start: 2022-11-10

## 2022-11-09 RX ORDER — NALOXONE HYDROCHLORIDE 0.4 MG/ML
0.2 INJECTION, SOLUTION INTRAMUSCULAR; INTRAVENOUS; SUBCUTANEOUS
Status: CANCELLED | OUTPATIENT
Start: 2022-11-10

## 2022-11-09 RX ORDER — EPINEPHRINE 1 MG/ML
0.3 INJECTION, SOLUTION, CONCENTRATE INTRAVENOUS EVERY 5 MIN PRN
Status: CANCELLED | OUTPATIENT
Start: 2022-11-10

## 2022-11-09 RX ORDER — METHYLPREDNISOLONE SODIUM SUCCINATE 125 MG/2ML
125 INJECTION, POWDER, LYOPHILIZED, FOR SOLUTION INTRAMUSCULAR; INTRAVENOUS
Status: CANCELLED
Start: 2022-12-22

## 2022-11-09 RX ORDER — HEPARIN SODIUM (PORCINE) LOCK FLUSH IV SOLN 100 UNIT/ML 100 UNIT/ML
5 SOLUTION INTRAVENOUS
Status: CANCELLED | OUTPATIENT
Start: 2022-12-01

## 2022-11-09 RX ORDER — ALBUTEROL SULFATE 90 UG/1
1-2 AEROSOL, METERED RESPIRATORY (INHALATION)
Status: CANCELLED
Start: 2022-12-01

## 2022-11-09 RX ORDER — METHYLPREDNISOLONE SODIUM SUCCINATE 125 MG/2ML
125 INJECTION, POWDER, LYOPHILIZED, FOR SOLUTION INTRAMUSCULAR; INTRAVENOUS
Status: CANCELLED
Start: 2022-11-10

## 2022-11-09 RX ORDER — ALBUTEROL SULFATE 90 UG/1
1-2 AEROSOL, METERED RESPIRATORY (INHALATION)
Status: CANCELLED
Start: 2022-11-10

## 2022-11-09 RX ORDER — EPINEPHRINE 1 MG/ML
0.3 INJECTION, SOLUTION, CONCENTRATE INTRAVENOUS EVERY 5 MIN PRN
Status: CANCELLED | OUTPATIENT
Start: 2022-12-22

## 2022-11-09 RX ORDER — NALOXONE HYDROCHLORIDE 0.4 MG/ML
0.2 INJECTION, SOLUTION INTRAMUSCULAR; INTRAVENOUS; SUBCUTANEOUS
Status: CANCELLED | OUTPATIENT
Start: 2022-12-01

## 2022-11-09 RX ORDER — ALBUTEROL SULFATE 0.83 MG/ML
2.5 SOLUTION RESPIRATORY (INHALATION)
Status: CANCELLED | OUTPATIENT
Start: 2022-12-22

## 2022-11-09 RX ORDER — ALBUTEROL SULFATE 0.83 MG/ML
2.5 SOLUTION RESPIRATORY (INHALATION)
Status: CANCELLED | OUTPATIENT
Start: 2022-11-10

## 2022-11-09 RX ORDER — MEPERIDINE HYDROCHLORIDE 25 MG/ML
25 INJECTION INTRAMUSCULAR; INTRAVENOUS; SUBCUTANEOUS EVERY 30 MIN PRN
Status: CANCELLED | OUTPATIENT
Start: 2022-11-10

## 2022-11-09 RX ORDER — ALBUTEROL SULFATE 0.83 MG/ML
2.5 SOLUTION RESPIRATORY (INHALATION)
Status: CANCELLED | OUTPATIENT
Start: 2022-12-01

## 2022-11-09 RX ORDER — MEPERIDINE HYDROCHLORIDE 25 MG/ML
25 INJECTION INTRAMUSCULAR; INTRAVENOUS; SUBCUTANEOUS EVERY 30 MIN PRN
Status: CANCELLED | OUTPATIENT
Start: 2022-12-01

## 2022-11-09 RX ORDER — DIPHENHYDRAMINE HYDROCHLORIDE 50 MG/ML
50 INJECTION INTRAMUSCULAR; INTRAVENOUS
Status: CANCELLED
Start: 2022-12-01

## 2022-11-09 NOTE — TELEPHONE ENCOUNTER
Scheduled surgery for 2/3 in [facility] with Dr. Nieto.    H&P with PCP.    PT will take an at home COVID test 1-2 days before surgery and bring the results the day of surgery.     Post-op scheduled for 2/17.    PT declined a pre-op consult before surgery with Dr. Nieto.    Writer will mail surgery packet.    No further questions/concerns at this time.

## 2022-11-10 ENCOUNTER — LAB (OUTPATIENT)
Dept: INFUSION THERAPY | Facility: CLINIC | Age: 57
End: 2022-11-10
Attending: INTERNAL MEDICINE
Payer: OTHER GOVERNMENT

## 2022-11-10 VITALS
TEMPERATURE: 98 F | WEIGHT: 210.4 LBS | HEART RATE: 61 BPM | DIASTOLIC BLOOD PRESSURE: 92 MMHG | BODY MASS INDEX: 33.22 KG/M2 | RESPIRATION RATE: 14 BRPM | SYSTOLIC BLOOD PRESSURE: 129 MMHG

## 2022-11-10 DIAGNOSIS — Z51.11 ENCOUNTER FOR ANTINEOPLASTIC CHEMOTHERAPY: Primary | ICD-10-CM

## 2022-11-10 DIAGNOSIS — T45.1X5A CHEMOTHERAPY-INDUCED NEUTROPENIA (H): ICD-10-CM

## 2022-11-10 DIAGNOSIS — C50.211 MALIGNANT NEOPLASM OF UPPER-INNER QUADRANT OF RIGHT BREAST IN FEMALE, ESTROGEN RECEPTOR NEGATIVE (H): ICD-10-CM

## 2022-11-10 DIAGNOSIS — D70.1 CHEMOTHERAPY-INDUCED NEUTROPENIA (H): ICD-10-CM

## 2022-11-10 DIAGNOSIS — Z17.1 MALIGNANT NEOPLASM OF UPPER-INNER QUADRANT OF RIGHT BREAST IN FEMALE, ESTROGEN RECEPTOR NEGATIVE (H): ICD-10-CM

## 2022-11-10 LAB
ALBUMIN SERPL BCG-MCNC: 3.8 G/DL (ref 3.5–5.2)
ALP SERPL-CCNC: 96 U/L (ref 35–104)
ALT SERPL W P-5'-P-CCNC: 10 U/L (ref 10–35)
ANION GAP SERPL CALCULATED.3IONS-SCNC: 9 MMOL/L (ref 7–15)
AST SERPL W P-5'-P-CCNC: 16 U/L (ref 10–35)
BILIRUB SERPL-MCNC: <0.2 MG/DL
BUN SERPL-MCNC: 8.7 MG/DL (ref 6–20)
CALCIUM SERPL-MCNC: 9.2 MG/DL (ref 8.6–10)
CHLORIDE SERPL-SCNC: 105 MMOL/L (ref 98–107)
CREAT SERPL-MCNC: 0.68 MG/DL (ref 0.51–0.95)
DEPRECATED HCO3 PLAS-SCNC: 27 MMOL/L (ref 22–29)
GFR SERPL CREATININE-BSD FRML MDRD: >90 ML/MIN/1.73M2
GLUCOSE SERPL-MCNC: 100 MG/DL (ref 70–99)
POTASSIUM SERPL-SCNC: 4.1 MMOL/L (ref 3.4–5.3)
PROT SERPL-MCNC: 6.6 G/DL (ref 6.4–8.3)
SODIUM SERPL-SCNC: 141 MMOL/L (ref 136–145)
TSH SERPL DL<=0.005 MIU/L-ACNC: 0.99 UIU/ML (ref 0.3–4.2)

## 2022-11-10 PROCEDURE — 250N000011 HC RX IP 250 OP 636: Performed by: INTERNAL MEDICINE

## 2022-11-10 PROCEDURE — 80053 COMPREHEN METABOLIC PANEL: CPT | Performed by: INTERNAL MEDICINE

## 2022-11-10 PROCEDURE — 258N000003 HC RX IP 258 OP 636: Performed by: INTERNAL MEDICINE

## 2022-11-10 PROCEDURE — 96413 CHEMO IV INFUSION 1 HR: CPT

## 2022-11-10 PROCEDURE — 84443 ASSAY THYROID STIM HORMONE: CPT | Performed by: INTERNAL MEDICINE

## 2022-11-10 PROCEDURE — 36593 DECLOT VASCULAR DEVICE: CPT

## 2022-11-10 RX ORDER — HEPARIN SODIUM (PORCINE) LOCK FLUSH IV SOLN 100 UNIT/ML 100 UNIT/ML
5 SOLUTION INTRAVENOUS
Status: DISCONTINUED | OUTPATIENT
Start: 2022-11-10 | End: 2022-11-10 | Stop reason: HOSPADM

## 2022-11-10 RX ADMIN — SODIUM CHLORIDE 250 ML: 9 INJECTION, SOLUTION INTRAVENOUS at 11:55

## 2022-11-10 RX ADMIN — Medication 5 ML: at 12:34

## 2022-11-10 RX ADMIN — ALTEPLASE 2 MG: 2.2 INJECTION, POWDER, LYOPHILIZED, FOR SOLUTION INTRAVENOUS at 10:32

## 2022-11-10 RX ADMIN — SODIUM CHLORIDE 200 MG: 9 INJECTION, SOLUTION INTRAVENOUS at 11:56

## 2022-11-10 ASSESSMENT — PAIN SCALES - GENERAL: PAINLEVEL: MILD PAIN (2)

## 2022-11-10 NOTE — PROGRESS NOTES
Labs to be drawn from port, however no blood return despite repositioning. Alteplase instilled. +blood return after 30 minute dwell time. Labs collected.

## 2022-11-10 NOTE — TELEPHONE ENCOUNTER
Patient is scheduled for surgery on 1/9/23 at 10:00AM.   Pre-op on 12/19/22, post-op on 1/23/23.   Instructions sent via my chart.      Requested Prescriptions   Pending Prescriptions Disp Refills     LORazepam (ATIVAN) 0.5 MG tablet [Pharmacy Med Name: LORAZEPAM 0.5MG TABLETS] 20 tablet      Sig: TAKE 1 TABLET(0.5 MG) BY MOUTH EVERY NIGHT AS NEEDED FOR ANXIETY       There is no refill protocol information for this order

## 2022-11-15 NOTE — PROGRESS NOTES
[facility] Service    Outpatient Physical Therapy Discharge Note  Patient: Diana Salvador  : 1965    Beginning/End Dates of Reporting Period:  10/8/22 to 22    Referring Provider: Dr. Demi Lyons    Therapy Diagnosis: R-breast / RUQ lymphedema with fibrosis     Client Self Report: not seen this reporting period    Objective Measurements:  Objective Measure: ROM  Details: RUE GH flex 126 deg and LUE GH flex 135deg; R abduction 110 and L abduction 116deg; all motions limited by pain and tightness     Outcome Measures (most recent score):   LLIS = 40 on eval; didn't return again for discharge     Goals:  Goal Identifier stg   Goal Description pt to be independent with BUE ROM exercises/stretches and performing at least 5 days/week to increase functional range for overhead reaching   Target Date 22   Date Met  22   Progress (detail required for progress note):       Goal Identifier stg   Goal Description pt to have at least daytime tolerance to wearing a compression bra and/or tank with Komprex2 inside to decrease BUQ lymphedema   Target Date 22   Date Met  22   Progress (detail required for progress note):       Goal Identifier ltg   Goal Description pt to increase BUE flexion to at least 150 degrees to make self more independent with household tasks and ADL   Target Date 22   Date Met      Progress (detail required for progress note):       Goal Identifier ltg   Goal Description pt to be independent with longterm BUQ lymphedema management via HEP (ROM ex and stretching), self-MLD, self-MFR and compression garment wear/use for longterm lymphedema mangement for maintenance   Target Date 22   Date Met      Progress (detail required for progress note):         Plan:  Discharge from therapy.    Discharge:    Reason for Discharge: Patient has failed to schedule further  appointments - last seen in clinic on 9/26/22 and supposed to return after reconstruction surgery but never did    Equipment Issued: none    Discharge Plan: Patient to continue home program of ROM Exercises and self-MFR

## 2022-11-30 ENCOUNTER — LAB (OUTPATIENT)
Dept: INFUSION THERAPY | Facility: CLINIC | Age: 57
End: 2022-11-30
Attending: NURSE PRACTITIONER
Payer: OTHER GOVERNMENT

## 2022-11-30 ENCOUNTER — PATIENT OUTREACH (OUTPATIENT)
Dept: CARE COORDINATION | Facility: CLINIC | Age: 57
End: 2022-11-30

## 2022-11-30 ENCOUNTER — ONCOLOGY VISIT (OUTPATIENT)
Dept: ONCOLOGY | Facility: CLINIC | Age: 57
End: 2022-11-30
Attending: NURSE PRACTITIONER
Payer: OTHER GOVERNMENT

## 2022-11-30 VITALS
SYSTOLIC BLOOD PRESSURE: 140 MMHG | BODY MASS INDEX: 34.26 KG/M2 | OXYGEN SATURATION: 96 % | WEIGHT: 217 LBS | RESPIRATION RATE: 12 BRPM | TEMPERATURE: 99.1 F | HEART RATE: 81 BPM | DIASTOLIC BLOOD PRESSURE: 99 MMHG

## 2022-11-30 VITALS — SYSTOLIC BLOOD PRESSURE: 124 MMHG | DIASTOLIC BLOOD PRESSURE: 84 MMHG | HEART RATE: 67 BPM

## 2022-11-30 DIAGNOSIS — C50.211 MALIGNANT NEOPLASM OF UPPER-INNER QUADRANT OF RIGHT BREAST IN FEMALE, ESTROGEN RECEPTOR NEGATIVE (H): ICD-10-CM

## 2022-11-30 DIAGNOSIS — Z98.84 HISTORY OF ROUX-EN-Y GASTRIC BYPASS: ICD-10-CM

## 2022-11-30 DIAGNOSIS — G62.0 CHEMOTHERAPY-INDUCED NEUROPATHY (H): ICD-10-CM

## 2022-11-30 DIAGNOSIS — F41.8 SITUATIONAL ANXIETY: ICD-10-CM

## 2022-11-30 DIAGNOSIS — Z17.1 MALIGNANT NEOPLASM OF UPPER-INNER QUADRANT OF RIGHT BREAST IN FEMALE, ESTROGEN RECEPTOR NEGATIVE (H): Primary | ICD-10-CM

## 2022-11-30 DIAGNOSIS — T45.1X5A CHEMOTHERAPY-INDUCED NEUTROPENIA (H): ICD-10-CM

## 2022-11-30 DIAGNOSIS — F33.9 EPISODE OF RECURRENT MAJOR DEPRESSIVE DISORDER, UNSPECIFIED DEPRESSION EPISODE SEVERITY (H): ICD-10-CM

## 2022-11-30 DIAGNOSIS — Z17.1 MALIGNANT NEOPLASM OF UPPER-INNER QUADRANT OF RIGHT BREAST IN FEMALE, ESTROGEN RECEPTOR NEGATIVE (H): ICD-10-CM

## 2022-11-30 DIAGNOSIS — D70.1 CHEMOTHERAPY-INDUCED NEUTROPENIA (H): ICD-10-CM

## 2022-11-30 DIAGNOSIS — T45.1X5A CHEMOTHERAPY-INDUCED NEUROPATHY (H): ICD-10-CM

## 2022-11-30 DIAGNOSIS — Z51.11 ENCOUNTER FOR ANTINEOPLASTIC CHEMOTHERAPY: Primary | ICD-10-CM

## 2022-11-30 DIAGNOSIS — C50.211 MALIGNANT NEOPLASM OF UPPER-INNER QUADRANT OF RIGHT BREAST IN FEMALE, ESTROGEN RECEPTOR NEGATIVE (H): Primary | ICD-10-CM

## 2022-11-30 DIAGNOSIS — E55.9 VITAMIN D DEFICIENCY: ICD-10-CM

## 2022-11-30 LAB
ALBUMIN SERPL BCG-MCNC: 3.7 G/DL (ref 3.5–5.2)
ALP SERPL-CCNC: 98 U/L (ref 35–104)
ALT SERPL W P-5'-P-CCNC: 10 U/L (ref 10–35)
ANION GAP SERPL CALCULATED.3IONS-SCNC: 9 MMOL/L (ref 7–15)
AST SERPL W P-5'-P-CCNC: 15 U/L (ref 10–35)
BILIRUB SERPL-MCNC: 0.4 MG/DL
BUN SERPL-MCNC: 9.2 MG/DL (ref 6–20)
CALCIUM SERPL-MCNC: 9 MG/DL (ref 8.6–10)
CHLORIDE SERPL-SCNC: 106 MMOL/L (ref 98–107)
CREAT SERPL-MCNC: 0.63 MG/DL (ref 0.51–0.95)
DEPRECATED HCO3 PLAS-SCNC: 27 MMOL/L (ref 22–29)
GFR SERPL CREATININE-BSD FRML MDRD: >90 ML/MIN/1.73M2
GLUCOSE SERPL-MCNC: 125 MG/DL (ref 70–99)
POTASSIUM SERPL-SCNC: 4 MMOL/L (ref 3.4–5.3)
PROT SERPL-MCNC: 6.5 G/DL (ref 6.4–8.3)
SODIUM SERPL-SCNC: 142 MMOL/L (ref 136–145)
TSH SERPL DL<=0.005 MIU/L-ACNC: 0.85 UIU/ML (ref 0.3–4.2)

## 2022-11-30 PROCEDURE — 99215 OFFICE O/P EST HI 40 MIN: CPT | Performed by: NURSE PRACTITIONER

## 2022-11-30 PROCEDURE — 80053 COMPREHEN METABOLIC PANEL: CPT | Performed by: INTERNAL MEDICINE

## 2022-11-30 PROCEDURE — 84443 ASSAY THYROID STIM HORMONE: CPT | Performed by: INTERNAL MEDICINE

## 2022-11-30 PROCEDURE — 250N000011 HC RX IP 250 OP 636: Performed by: INTERNAL MEDICINE

## 2022-11-30 PROCEDURE — 258N000003 HC RX IP 258 OP 636: Performed by: INTERNAL MEDICINE

## 2022-11-30 PROCEDURE — G0463 HOSPITAL OUTPT CLINIC VISIT: HCPCS | Mod: 25

## 2022-11-30 PROCEDURE — 96413 CHEMO IV INFUSION 1 HR: CPT

## 2022-11-30 RX ORDER — HEPARIN SODIUM (PORCINE) LOCK FLUSH IV SOLN 100 UNIT/ML 100 UNIT/ML
5 SOLUTION INTRAVENOUS
Status: DISCONTINUED | OUTPATIENT
Start: 2022-11-30 | End: 2022-11-30 | Stop reason: HOSPADM

## 2022-11-30 RX ORDER — CHOLECALCIFEROL (VITAMIN D3) 50 MCG
TABLET ORAL
Qty: 90 TABLET | Refills: 3 | Status: SHIPPED | OUTPATIENT
Start: 2022-11-30

## 2022-11-30 RX ADMIN — SODIUM CHLORIDE 200 MG: 9 INJECTION, SOLUTION INTRAVENOUS at 12:15

## 2022-11-30 RX ADMIN — SODIUM CHLORIDE 250 ML: 9 INJECTION, SOLUTION INTRAVENOUS at 12:15

## 2022-11-30 RX ADMIN — Medication 5 ML: at 12:57

## 2022-11-30 ASSESSMENT — PAIN SCALES - GENERAL: PAINLEVEL: NO PAIN (0)

## 2022-11-30 NOTE — PROGRESS NOTES
Social Work Telephone Call  [facility]    Data/Intervention:     Patient Name:  Diana Salvador  /Age:  1965 (57 year old)    Reached out to Aide at the request of Jena Blair, RN to assess needs and offer support. Was unable to connect with patient so left voicemail with contact information. [facility] remains available and will await her call back.     JOSE ANTONIO Brewer, [facility]  Adult Oncology Clinics  [place] (M,W), [place] (T) and [facility] (Th)  *I am off Friday  Office: [phone]

## 2022-11-30 NOTE — LETTER
"    11/30/2022         RE: Diana Salvador  [address] [place] [id number]        Dear Colleague,    Thank you for referring your patient, Diana Salvador, to the [facility]. Please see a copy of my visit note below.    Oncology Rooming Note    November 30, 2022 10:56 AM   Diana Salvador is a 57 year old female who presents for:    Chief Complaint   Patient presents with     Oncology Clinic Visit     Malignant neoplasm of upper-inner quadrant of right breast in female, estrogen receptor negative - Labs provider and infusion     Initial Vitals: BP (!) 140/99 (BP Location: Right arm, Patient Position: Sitting, Cuff Size: Adult Regular)   Pulse 81   Temp 99.1  F (37.3  C) (Tympanic)   Resp 12   Wt 98.4 kg (217 lb)   LMP  (LMP Unknown)   SpO2 96%   BMI 34.26 kg/m   Estimated body mass index is 34.26 kg/m  as calculated from the following:    Height as of 9/30/22: 1.695 m (5' 6.73\").    Weight as of this encounter: 98.4 kg (217 lb). Body surface area is 2.15 meters squared.  No Pain (0) Comment: Data Unavailable   No LMP recorded (lmp unknown). Patient has had a hysterectomy.  Allergies reviewed: Yes  Medications reviewed: Yes    Medications: Medication refills not needed today.  Pharmacy name entered into [employer]:    [id number] - [facility] [address] AT [address] AND CLINICS  [facility] - [place], [place] - [address] [place] [address]    Clinical concerns: very tired.      Emy Hollingsworth, [facility] Hematology and Oncology Outpatient Progress Note ([place])    Patient: Diana Salvador  MRN: [id number]  Nov 30, 2022            Reason for Visit    1. Stage IIB triple negative breast cancer  2. On adjuvant immunotherapy    Primary Oncologist: Dr. Diamond (Dr. Lyons, [facility])    Assessment/Plan  1.   Stage IIB triple negative breast cancer, ypCR  Tolerating adjuvant Pembrolizumab " "every 3 weeks.  She's completed 6 of a planned 9 adjuvant cycles.    Labs: CMP and TSH within normal limits.     No symptoms of cancer recurrence.     Plan:  -Continue with next cycle Pembrolizumab today and in 3 weeks  -Return with Dr. Diamond in 6 weeks, ahead of adjuvant cycle 9 (her last cycle planned)  -Will arrange for Oncology Survivorship visit with MARY Younger in Feb/March, 2023    3.   Chemo-induced neuropathy, gr 2  Now off chemo for >6 months.   This may be getting slightly better, but still remains gr 2. Painful and interferes with ADLs. Duloxetine 60 mg is helping and better tolerated than gabapentin, but higher copay. Using hydrocodone less frequently    Plan:  -Continue duloxetine 60 mg daily   -Likely assessing max improvement now that >6 mths past chemo, but could see some continued slight improvement up to 12 mths    4.   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     Plan:  -Stay active  -Duloxetine may help with this as well  -Tylenol as needed    5.  Depression/anxiety, chronic but acutely worsened with cancer diagnosis//treatment  Had been on Celexa to 40 mg with good management and tolerance, but she stopped this one month ago as she was concerned it could cause her weight loss (although was not occurring). She is very depressed and overwhelmed with her cancer diagnosis, the number of physical side effects she continues experiencing since she started her treatment, financial stressors since losing her job in July. I believe her insomnia and fatigue are related. Not suicidal by her report \"I would never do that to my family\". She is struggling with self-image issues with her alopecia and breast surgeries, so is isolating herself more. She does not want to be a burden to her family. She had previously been referred to a therapist, but didn't follow through as she is trying to limit out-of-pocket expenses.      We " spent a considerable amount of time at today's visit discussing this.     Plan:  -Resume Celexa 40 mg given prior tolerance and benefit. This may take 6-8 weeks to see effect. If she does struggle with weight gain on this, can look into alternate selective serotonin reuptake inhibitor options.   -Watch for Celexa / duloxetine drug:drug interactions, although she previously tolerated this combination  -Refer for Onc psychotherapy. She will look into her insurance coverage  -Refer to Onc SW to assist with distress/financial stressors  -Encouraged she stay engaged outside of her home with family/friends/activities now that she is not working.  -Encouraged activity  -Will benefit from Onc Survivorship visit as it appears she is and will be dealing with some chronic survivorship issues. Will request this get scheduled in early 2023 upon completion of her planned treatment.    ______________________________________________________________________________    History of Present Illness/ Interval History    Ms. Diana Salvador is a 57 year old who completed neoadjvuant treatment with pembrolizumab, carboplatin and taxol (weekly) x 3 cycles of this combo, complicated by grade 3 neuropathy (fingers) and neutropenia so chemo was stopped and she continued Pembro alone for a few more cycles. She then had bilateral mastectomies with pathologic CR. She is now on adjuvant Pembrolizumab every 3 weeks. Returns for 6-week visit ahead of next cycle (due for adjuvant cycle 7, of a planned 9).    She tolerates immunotherapy generally well, aside from possible arthralgias related.   She specifically denies diarrhea, skin rashes, dyspnea, abdominal pain, bleeding, or headaches.    Hand and knee arthralgias are unique for her.   Chronic pain/arthralgias unchanged, but provoked with lesser activity on her cancer treatment.     Her neuropathy has continued to improve and it is not as painful as it used to be.  It is mostly painful at her  fingertips. Still bothersome to her and interferes with function. She was switched from gabapentin to duloxetine but is concerned about high co-pay. She has cut back on hydrocodone for pain control.      More fatigued the last few cycles. Goes to bed early but then not sleeping well.   Feels depressed, tired of not feeling well and has self-image issues limiting her desire to go out in public and is isolated. Not suicidal. Stopped Celexa abruptly a month ago as she was concerned about weight gain with it (although was not gaining weight on it). Is not meeting with therapist due to cost constraints    Financial concerns as she lost her job over the summer and now has higher copays. This is a big stressor for her.       ECOG Performance    0-1      Oncology History/Treatment  Diagnosis/Stage:   2/2022: Stage IIB (cT2-cN0-cM0) right breast cancer (triple negative) //ypT0-N0 (CR)  -self-palpated right breast lump  -diagnostic mammo + breast US: 2.4 cm R breast (2:00, lower-inner quad) lump and no axillary nodes detected  -breast biopsy: invasive ductal carcinoma, grade 3. ER neg, WI neg, HER2 neg via FISH  -CA 27.29 WNL (<5)  -Genetic testing negative for germline mutations    -6/30/2022 surgical path = CR after neoadjuvant chemo    Treatment:  3/15/2022 - present: Neoadjuvant chemo (based off [id number]) Pembrolizumab 200 mg D1 q 21 days + Carboplatin AUC=5 D1 q21 Days + weekly Paclitaxel 80 mg/m  D1, 8, 15  x 12 Weeks (with Filgrastim 5 mcg/kg subcutaneously once daily on days 16, 17, and 18 of cycles 1 through 4)  -->after cycle 3, developed grade 3 neuropathy and delayed neutropenia (despite using Neupogen). Therefore, chemo held with cycle 4 and only received Pembrolizumab through cycle 6.     6/30/2022: bilateral mastectomies with R sLN biopsy and expander placement.  (Elysia Marina + Gerson). Pathologic CR  -later underwent reconstruction surgery    No adjuvant RT recommended    7/22/22 - present: adjuvant  Pembrolizumab continued (to complete 9 total adjuvant doses)      Physical Exam    GENERAL: Alert and oriented to time place and person. Seated comfortably.  Alone. Very tearful today.   HEAD: Atraumatic and normocephalic. Resolving alopecia.  EYES: MELISSA, EOMI. No erythema. No icterus.  BREASTS: Implants.  LYMPH NODES: Not assessed today  CHEST:  Symmetrical breath movements bilaterally.  ABDOMEN: Soft. Not tender. Not distended.   EXTREMITIES: Warm. No peripheral edema.  SKIN: No rashes  NEURO: No gross deficit noted. Non-antalgic gait.      Lab Results    Recent Results (from the past 168 hour(s))   Comprehensive metabolic panel   Result Value Ref Range    Sodium 142 136 - 145 mmol/L    Potassium 4.0 3.4 - 5.3 mmol/L    Chloride 106 98 - 107 mmol/L    Carbon Dioxide (CO2) 27 22 - 29 mmol/L    Anion Gap 9 7 - 15 mmol/L    Urea Nitrogen 9.2 6.0 - 20.0 mg/dL    Creatinine 0.63 0.51 - 0.95 mg/dL    Calcium 9.0 8.6 - 10.0 mg/dL    Glucose 125 (H) 70 - 99 mg/dL    Alkaline Phosphatase 98 35 - 104 U/L    AST 15 10 - 35 U/L    ALT 10 10 - 35 U/L    Protein Total 6.5 6.4 - 8.3 g/dL    Albumin 3.7 3.5 - 5.2 g/dL    Bilirubin Total 0.4 <=1.2 mg/dL    GFR Estimate >90 >60 mL/min/1.73m2   TSH with free T4 reflex   Result Value Ref Range    TSH 0.85 0.30 - 4.20 uIU/mL       Imaging    No results found.    Billing  Total time: 60 min; including review of EMR, reports, diagnostics and ordering/coordination of care    Signed by: Marilu Vergara NP        Again, thank you for allowing me to participate in the care of your patient.        Sincerely,        Marilu Vergara NP

## 2022-11-30 NOTE — PROGRESS NOTES
Infusion Nursing Note:  Dianaarti Johnsonluis presents today for Keytruda.    Patient seen by provider today: Yes   present during visit today: Not Applicable.    Note: N/A.    Intravenous Access:  Implanted Port.    Treatment Conditions:  Lab Results   Component Value Date     11/30/2022    POTASSIUM 4.0 11/30/2022    CR 0.63 11/30/2022    ANURADHA 9.0 11/30/2022    BILITOTAL 0.4 11/30/2022    ALBUMIN 3.7 11/30/2022    ALT 10 11/30/2022    AST 15 11/30/2022     Results reviewed, labs MET treatment parameters, ok to proceed with treatment.    Post Infusion Assessment:  Patient tolerated infusion without incident.  Blood return noted pre and post infusion.  Site patent and intact, free from redness, edema or discomfort.  No evidence of extravasations.  Access discontinued per protocol.     Discharge Plan:   Patient discharged in stable condition accompanied by: self.  Departure Mode: Ambulatory.      Bella Nunez RN

## 2022-11-30 NOTE — PATIENT INSTRUCTIONS
Proceed with pembrolizumab today and in 3 weeks    6 weeks - Dr. Diamond + Pembro    2-3 mths - Onc Survivorship visit with MARY Younger    Onc Psychotherapy referral    SW referral (distress, financial stressors)    Restart Celexa

## 2022-11-30 NOTE — PROGRESS NOTES
[facility] Outpatient Progress Note ([place])    Patient: Diana Salvador  MRN: [id number]  Nov 30, 2022            Reason for Visit    1. Stage IIB triple negative breast cancer  2. On adjuvant immunotherapy    Primary Oncologist: Dr. Diamond (Dr. Lyons, [facility])    Assessment/Plan  1.   Stage IIB triple negative breast cancer, ypCR  Tolerating adjuvant Pembrolizumab every 3 weeks.  She's completed 6 of a planned 9 adjuvant cycles.    Labs: CMP and TSH within normal limits.     No symptoms of cancer recurrence.     Plan:  -Continue with next cycle Pembrolizumab today and in 3 weeks  -Return with Dr. Diamond in 6 weeks, ahead of adjuvant cycle 9 (her last cycle planned)  -Will arrange for Oncology Survivorship visit with MARY Younger in Feb/March, 2023    3.   Chemo-induced neuropathy, gr 2  Now off chemo for >6 months.   This may be getting slightly better, but still remains gr 2. Painful and interferes with ADLs. Duloxetine 60 mg is helping and better tolerated than gabapentin, but higher copay. Using hydrocodone less frequently    Plan:  -Continue duloxetine 60 mg daily   -Likely assessing max improvement now that >6 mths past chemo, but could see some continued slight improvement up to 12 mths    4.   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     Plan:  -Stay active  -Duloxetine may help with this as well  -Tylenol as needed    5.  Depression/anxiety, chronic but acutely worsened with cancer diagnosis//treatment  Had been on Celexa to 40 mg with good management and tolerance, but she stopped this one month ago as she was concerned it could cause her weight loss (although was not occurring). She is very depressed and overwhelmed with her cancer diagnosis, the number of physical side effects she continues experiencing since she started her treatment, financial stressors since losing her job in July. I  "believe her insomnia and fatigue are related. Not suicidal by her report \"I would never do that to my family\". She is struggling with self-image issues with her alopecia and breast surgeries, so is isolating herself more. She does not want to be a burden to her family. She had previously been referred to a therapist, but didn't follow through as she is trying to limit out-of-pocket expenses.      We spent a considerable amount of time at today's visit discussing this.     Plan:  -Resume Celexa 40 mg given prior tolerance and benefit. This may take 6-8 weeks to see effect. If she does struggle with weight gain on this, can look into alternate selective serotonin reuptake inhibitor options.   -Watch for Celexa / duloxetine drug:drug interactions, although she previously tolerated this combination  -Refer for Onc psychotherapy. She will look into her insurance coverage  -Refer to Onc SW to assist with distress/financial stressors  -Encouraged she stay engaged outside of her home with family/friends/activities now that she is not working.  -Encouraged activity  -Will benefit from Onc Survivorship visit as it appears she is and will be dealing with some chronic survivorship issues. Will request this get scheduled in early 2023 upon completion of her planned treatment.    ______________________________________________________________________________    History of Present Illness/ Interval History    Ms. Diana Salvador is a 57 year old who completed neoadjvuant treatment with pembrolizumab, carboplatin and taxol (weekly) x 3 cycles of this combo, complicated by grade 3 neuropathy (fingers) and neutropenia so chemo was stopped and she continued Pembro alone for a few more cycles. She then had bilateral mastectomies with pathologic CR. She is now on adjuvant Pembrolizumab every 3 weeks. Returns for 6-week visit ahead of next cycle (due for adjuvant cycle 7, of a planned 9).    She tolerates immunotherapy generally well, " aside from possible arthralgias related.   She specifically denies diarrhea, skin rashes, dyspnea, abdominal pain, bleeding, or headaches.    Hand and knee arthralgias are unique for her.   Chronic pain/arthralgias unchanged, but provoked with lesser activity on her cancer treatment.     Her neuropathy has continued to improve and it is not as painful as it used to be.  It is mostly painful at her fingertips. Still bothersome to her and interferes with function. She was switched from gabapentin to duloxetine but is concerned about high co-pay. She has cut back on hydrocodone for pain control.      More fatigued the last few cycles. Goes to bed early but then not sleeping well.   Feels depressed, tired of not feeling well and has self-image issues limiting her desire to go out in public and is isolated. Not suicidal. Stopped Celexa abruptly a month ago as she was concerned about weight gain with it (although was not gaining weight on it). Is not meeting with therapist due to cost constraints    Financial concerns as she lost her job over the summer and now has higher copays. This is a big stressor for her.       ECOG Performance    0-1      Oncology History/Treatment  Diagnosis/Stage:   2/2022: Stage IIB (cT2-cN0-cM0) right breast cancer (triple negative) //ypT0-N0 (CR)  -self-palpated right breast lump  -diagnostic mammo + breast US: 2.4 cm R breast (2:00, lower-inner quad) lump and no axillary nodes detected  -breast biopsy: invasive ductal carcinoma, grade 3. ER neg, PA neg, HER2 neg via FISH  -CA 27.29 WNL (<5)  -Genetic testing negative for germline mutations    -6/30/2022 surgical path = CR after neoadjuvant chemo    Treatment:  3/15/2022 - present: Neoadjuvant chemo (based off KEYNOTE-522) Pembrolizumab 200 mg D1 q 21 days + Carboplatin AUC=5 D1 q21 Days + weekly Paclitaxel 80 mg/m  D1, 8, 15  x 12 Weeks (with Filgrastim 5 mcg/kg subcutaneously once daily on days 16, 17, and 18 of cycles 1 through 4)  -->after  cycle 3, developed grade 3 neuropathy and delayed neutropenia (despite using Neupogen). Therefore, chemo held with cycle 4 and only received Pembrolizumab through cycle 6.     6/30/2022: bilateral mastectomies with R sLN biopsy and expander placement.  (Elysia Marina + Gerson). Pathologic CR  -later underwent reconstruction surgery    No adjuvant RT recommended    7/22/22 - present: adjuvant Pembrolizumab continued (to complete 9 total adjuvant doses)      Physical Exam    GENERAL: Alert and oriented to time place and person. Seated comfortably.  Alone. Very tearful today.   HEAD: Atraumatic and normocephalic. Resolving alopecia.  EYES: MELISSA, EOMI. No erythema. No icterus.  BREASTS: Implants.  LYMPH NODES: Not assessed today  CHEST:  Symmetrical breath movements bilaterally.  ABDOMEN: Soft. Not tender. Not distended.   EXTREMITIES: Warm. No peripheral edema.  SKIN: No rashes  NEURO: No gross deficit noted. Non-antalgic gait.      Lab Results    Recent Results (from the past 168 hour(s))   Comprehensive metabolic panel   Result Value Ref Range    Sodium 142 136 - 145 mmol/L    Potassium 4.0 3.4 - 5.3 mmol/L    Chloride 106 98 - 107 mmol/L    Carbon Dioxide (CO2) 27 22 - 29 mmol/L    Anion Gap 9 7 - 15 mmol/L    Urea Nitrogen 9.2 6.0 - 20.0 mg/dL    Creatinine 0.63 0.51 - 0.95 mg/dL    Calcium 9.0 8.6 - 10.0 mg/dL    Glucose 125 (H) 70 - 99 mg/dL    Alkaline Phosphatase 98 35 - 104 U/L    AST 15 10 - 35 U/L    ALT 10 10 - 35 U/L    Protein Total 6.5 6.4 - 8.3 g/dL    Albumin 3.7 3.5 - 5.2 g/dL    Bilirubin Total 0.4 <=1.2 mg/dL    GFR Estimate >90 >60 mL/min/1.73m2   TSH with free T4 reflex   Result Value Ref Range    TSH 0.85 0.30 - 4.20 uIU/mL       Imaging    No results found.    Billing  Total time: 60 min; including review of EMR, reports, diagnostics and ordering/coordination of care    Signed by: Marilu Vergara NP

## 2022-11-30 NOTE — PROGRESS NOTES
"Oncology Rooming Note    November 30, 2022 10:56 AM   Diana Salvador is a 57 year old female who presents for:    Chief Complaint   Patient presents with     Oncology Clinic Visit     Malignant neoplasm of upper-inner quadrant of right breast in female, estrogen receptor negative - Labs provider and infusion     Initial Vitals: BP (!) 140/99 (BP Location: Right arm, Patient Position: Sitting, Cuff Size: Adult Regular)   Pulse 81   Temp 99.1  F (37.3  C) (Tympanic)   Resp 12   Wt 98.4 kg (217 lb)   LMP  (LMP Unknown)   SpO2 96%   BMI 34.26 kg/m   Estimated body mass index is 34.26 kg/m  as calculated from the following:    Height as of 9/30/22: 1.695 m (5' 6.73\").    Weight as of this encounter: 98.4 kg (217 lb). Body surface area is 2.15 meters squared.  No Pain (0) Comment: Data Unavailable   No LMP recorded (lmp unknown). Patient has had a hysterectomy.  Allergies reviewed: Yes  Medications reviewed: Yes    Medications: Medication refills not needed today.  Pharmacy name entered into Solvate:    [id number] - [place], [place] - [address] AT [facility] OF [address] [place] [address] AND CLINICS  [facility] - [facility], [place] - [address]    Clinical concerns: very tired.      Emy Hollingsworth, JESSICA            "

## 2022-12-08 ENCOUNTER — TELEPHONE (OUTPATIENT)
Dept: NUTRITION | Facility: CLINIC | Age: 57
End: 2022-12-08

## 2022-12-08 NOTE — PROGRESS NOTES
CLINICAL NUTRITION SERVICES - BRIEF NOTE    Patient was contacted by phone due to reporting concerns about unintended weight loss or current weight on the Oncology Distress Screening tool. Spoke with patient for 15 minutes regarding food insecurity, general healthy nutrition guidelines and eating well when not feeling well. RD went over food assistance programs such as MOW, and SNAP, and local Food shelf assistance with patient. Registered Dietitian contact information provided to patient if further questions arise.    Samra Burciaga RDN, LD  Clinical Dietitian  Office: [phone]  Weekend pager: [phone]

## 2022-12-22 ENCOUNTER — LAB (OUTPATIENT)
Dept: INFUSION THERAPY | Facility: CLINIC | Age: 57
End: 2022-12-22
Attending: INTERNAL MEDICINE
Payer: OTHER GOVERNMENT

## 2022-12-22 ENCOUNTER — PATIENT OUTREACH (OUTPATIENT)
Dept: ONCOLOGY | Facility: CLINIC | Age: 57
End: 2022-12-22

## 2022-12-22 VITALS
RESPIRATION RATE: 14 BRPM | TEMPERATURE: 98.6 F | SYSTOLIC BLOOD PRESSURE: 124 MMHG | DIASTOLIC BLOOD PRESSURE: 83 MMHG | HEART RATE: 66 BPM

## 2022-12-22 DIAGNOSIS — T45.1X5A CHEMOTHERAPY-INDUCED NEUTROPENIA (H): ICD-10-CM

## 2022-12-22 DIAGNOSIS — Z51.11 ENCOUNTER FOR ANTINEOPLASTIC CHEMOTHERAPY: Primary | ICD-10-CM

## 2022-12-22 DIAGNOSIS — F11.90 CHRONIC, CONTINUOUS USE OF OPIOIDS: ICD-10-CM

## 2022-12-22 DIAGNOSIS — D70.1 CHEMOTHERAPY-INDUCED NEUTROPENIA (H): ICD-10-CM

## 2022-12-22 DIAGNOSIS — C50.211 MALIGNANT NEOPLASM OF UPPER-INNER QUADRANT OF RIGHT BREAST IN FEMALE, ESTROGEN RECEPTOR NEGATIVE (H): ICD-10-CM

## 2022-12-22 DIAGNOSIS — Z17.1 MALIGNANT NEOPLASM OF UPPER-INNER QUADRANT OF RIGHT BREAST IN FEMALE, ESTROGEN RECEPTOR NEGATIVE (H): ICD-10-CM

## 2022-12-22 DIAGNOSIS — M54.31 SCIATICA OF RIGHT SIDE: ICD-10-CM

## 2022-12-22 LAB
ALBUMIN SERPL BCG-MCNC: 3.9 G/DL (ref 3.5–5.2)
ALP SERPL-CCNC: 101 U/L (ref 35–104)
ALT SERPL W P-5'-P-CCNC: 9 U/L (ref 10–35)
ANION GAP SERPL CALCULATED.3IONS-SCNC: 8 MMOL/L (ref 7–15)
AST SERPL W P-5'-P-CCNC: 13 U/L (ref 10–35)
BILIRUB SERPL-MCNC: 0.2 MG/DL
BUN SERPL-MCNC: 12.2 MG/DL (ref 6–20)
CALCIUM SERPL-MCNC: 9.2 MG/DL (ref 8.6–10)
CHLORIDE SERPL-SCNC: 106 MMOL/L (ref 98–107)
CREAT SERPL-MCNC: 0.69 MG/DL (ref 0.51–0.95)
DEPRECATED HCO3 PLAS-SCNC: 27 MMOL/L (ref 22–29)
GFR SERPL CREATININE-BSD FRML MDRD: >90 ML/MIN/1.73M2
GLUCOSE SERPL-MCNC: 108 MG/DL (ref 70–99)
POTASSIUM SERPL-SCNC: 4.3 MMOL/L (ref 3.4–5.3)
PROT SERPL-MCNC: 6.8 G/DL (ref 6.4–8.3)
SODIUM SERPL-SCNC: 141 MMOL/L (ref 136–145)
TSH SERPL DL<=0.005 MIU/L-ACNC: 0.33 UIU/ML (ref 0.3–4.2)

## 2022-12-22 PROCEDURE — 96413 CHEMO IV INFUSION 1 HR: CPT

## 2022-12-22 PROCEDURE — 258N000003 HC RX IP 258 OP 636: Performed by: INTERNAL MEDICINE

## 2022-12-22 PROCEDURE — 250N000011 HC RX IP 250 OP 636: Performed by: INTERNAL MEDICINE

## 2022-12-22 PROCEDURE — 80053 COMPREHEN METABOLIC PANEL: CPT | Performed by: INTERNAL MEDICINE

## 2022-12-22 PROCEDURE — 84443 ASSAY THYROID STIM HORMONE: CPT | Performed by: INTERNAL MEDICINE

## 2022-12-22 PROCEDURE — 82040 ASSAY OF SERUM ALBUMIN: CPT | Performed by: INTERNAL MEDICINE

## 2022-12-22 RX ORDER — HYDROCODONE BITARTRATE AND ACETAMINOPHEN 5; 325 MG/1; MG/1
1 TABLET ORAL EVERY 6 HOURS PRN
Qty: 30 TABLET | Refills: 0 | Status: SHIPPED | OUTPATIENT
Start: 2022-12-22 | End: 2023-01-12

## 2022-12-22 RX ORDER — HEPARIN SODIUM (PORCINE) LOCK FLUSH IV SOLN 100 UNIT/ML 100 UNIT/ML
5 SOLUTION INTRAVENOUS
Status: DISCONTINUED | OUTPATIENT
Start: 2022-12-22 | End: 2022-12-22 | Stop reason: HOSPADM

## 2022-12-22 RX ADMIN — SODIUM CHLORIDE 250 ML: 9 INJECTION, SOLUTION INTRAVENOUS at 15:23

## 2022-12-22 RX ADMIN — Medication 5 ML: at 15:58

## 2022-12-22 RX ADMIN — SODIUM CHLORIDE 200 MG: 9 INJECTION, SOLUTION INTRAVENOUS at 15:24

## 2022-12-22 ASSESSMENT — PAIN SCALES - GENERAL: PAINLEVEL: SEVERE PAIN (7)

## 2022-12-22 NOTE — PROGRESS NOTES
"Patient here for infusion today. Complaining of Right hip, shoulder and knee pain. She is using tylenol and ibuprofen with some relief but not when pain is really severe. Patient asking for refill of the vicodin. Per Marilu Vergara NP    \"I refilled it. She should use just for severe pain. If this pain persists past her immunotherapy, ongoing mgmt should be through her PCP as I think this was pre-existing and chronic. I was thinking the immunotherapy may have exacerbated some of the arthralgias but if that's the case, will end now that she's wrapping up on that.    Patient notified of recommendations and in agreement with  plan.Claire Blair RN on 12/22/2022 at 3:53 PM    "

## 2022-12-22 NOTE — PROGRESS NOTES
Infusion Nursing Note:  Dianaarti Johnsonluis presents today for Keytruda.    Patient seen by provider today: No   present during visit today: Not Applicable.    Note: N/A.    Intravenous Access:  Implanted Port.    Treatment Conditions:  Lab Results   Component Value Date     12/22/2022    POTASSIUM 4.3 12/22/2022    CR 0.69 12/22/2022    ANURADHA 9.2 12/22/2022    BILITOTAL 0.2 12/22/2022    ALBUMIN 3.9 12/22/2022    ALT 9 (L) 12/22/2022    AST 13 12/22/2022     Results reviewed, labs MET treatment parameters, ok to proceed with treatment.    Post Infusion Assessment:  Patient tolerated infusion without incident.     Discharge Plan:   Patient discharged in stable condition accompanied by: self.  Departure Mode: Ambulatory.      Bella Nunez RN

## 2022-12-26 ENCOUNTER — VIRTUAL VISIT (OUTPATIENT)
Dept: ONCOLOGY | Facility: CLINIC | Age: 57
End: 2022-12-26
Attending: NURSE PRACTITIONER
Payer: OTHER GOVERNMENT

## 2022-12-26 DIAGNOSIS — F32.0 CURRENT MILD EPISODE OF MAJOR DEPRESSIVE DISORDER WITHOUT PRIOR EPISODE (H): Primary | ICD-10-CM

## 2022-12-26 PROCEDURE — 90832 PSYTX W PT 30 MINUTES: CPT | Mod: 95 | Performed by: PSYCHOLOGIST

## 2022-12-26 NOTE — PROGRESS NOTES
[facility] Oncology- Psychotherapy                                    Progress Note    Patient Name: Daina Salvador  Date: 12/26/22         Service Type: Individual      Session Start Time: 10:00  Session End Time: 10:28     Session Length: 28 minutes    Session #: 1    Attendees: Client attended alone    Service Modality:  Video Visit:      Provider verified identity through the following two step process.  Patient provided:  Patient photo    Telemedicine Visit: The patient's condition can be safely assessed and treated via synchronous audio and visual telemedicine encounter.      Reason for Telemedicine Visit: Services only offered telehealth    Originating Site (Patient Location): Patient's home    Distant Site (Provider Location): Provider Remote Setting- Home Office    Consent:  The patient/guardian has verbally consented to: the potential risks and benefits of telemedicine (video visit) versus in person care; bill my insurance or make self-payment for services provided; and responsibility for payment of non-covered services.     Patient would like the video invitation sent by:  My Chart    Mode of Communication:  Video Conference via Amwell    Distant Location (Provider):  Off-site    As the provider I attest to compliance with applicable laws and regulations related to telemedicine.    DATA  Interactive Complexity: No  Crisis: No   Current / Ongoing Stressors and Concerns:   Depressed mood, cancer, loss of job, stress     Treatment Objective(s) Addressed in This Session:   identify coping strategies to more effectively address stressors     Intervention:   Motivational Interviewing: to help her increase problem solving     ASSESSMENT: Current Emotional / Mental Status (status of significant symptoms):   Risk status (Self / Other harm or suicidal ideation)   Patient denies current fears or concerns for personal safety.   Patient denies current or recent suicidal ideation or behaviors.   Patient  denies current or recent homicidal ideation or behaviors.   Patient denies current or recent self injurious behavior or ideation.   Patient denies other safety concerns.   Patient reports there has been no change in risk factors since their last session.     Patient reports there has been no change in protective factors since their last session.     Recommended that patient call 911 or go to the local ED should there be a change in any of these risk factors.     Appearance:   Appropriate    Eye Contact:   Good    Psychomotor Behavior: Normal    Attitude:   Cooperative    Orientation:   All   Speech    Rate / Production: Normal     Volume:  Normal    Mood:    Normal   Affect:    Appropriate    Thought Content:  Clear    Thought Form:  Coherent  Logical    Insight:    Good      Medication Review:   No changes to current psychiatric medication(s)     Medication Compliance:   Yes     Changes in Health Issues:   None reported     Chemical Use Review:   Substance Use: Chemical use reviewed, no active concerns identified      Tobacco Use: No current tobacco use.      Diagnosis:  1. Current mild episode of major depressive disorder without prior episode (H)        Collateral Reports Completed:   Not Applicable    PLAN: (Patient Tasks / Therapist Tasks / Other)  She is to increase her use of problem solving to increase activity and reduce depressed mood    Magan Russ PsyD                                                         ______________________________________________________________________    Individual Treatment Plan    Patient's Name: Diana Salvador  YOB: 1965    Date of Creation: 12/26/22  Date Treatment Plan Last Reviewed/Revised:     DSM5 Diagnoses: 296.21 (F32.0) Major Depressive Disorder, Single Episode, Mild _ and With melancholic features  Psychosocial / Contextual Factors: cancer  Anticipated number of session for this episode of care: 3-6 sessions  Anticipation frequency of session:  Every other week  Anticipated Duration of each session: 16-37 minutes  Treatment plan will be reviewed in 90 days or when goals have been changed.       MeasurableTreatment Goal(s) related to diagnosis / functional impairment(s)  Goal 1: Patient will use problem solving    I will know I've met my goal when I feel less depressed.      Patient has reviewed and agreed to the above plan.      Magan Russ PsyD  December 26, 2022

## 2023-01-12 ENCOUNTER — INFUSION THERAPY VISIT (OUTPATIENT)
Dept: INFUSION THERAPY | Facility: CLINIC | Age: 58
End: 2023-01-12
Attending: INTERNAL MEDICINE
Payer: OTHER GOVERNMENT

## 2023-01-12 ENCOUNTER — ONCOLOGY VISIT (OUTPATIENT)
Dept: ONCOLOGY | Facility: CLINIC | Age: 58
End: 2023-01-12
Attending: INTERNAL MEDICINE
Payer: OTHER GOVERNMENT

## 2023-01-12 VITALS
DIASTOLIC BLOOD PRESSURE: 79 MMHG | TEMPERATURE: 98.3 F | SYSTOLIC BLOOD PRESSURE: 125 MMHG | OXYGEN SATURATION: 97 % | WEIGHT: 215 LBS | BODY MASS INDEX: 33.94 KG/M2 | RESPIRATION RATE: 12 BRPM | HEART RATE: 76 BPM

## 2023-01-12 VITALS — HEART RATE: 61 BPM | SYSTOLIC BLOOD PRESSURE: 127 MMHG | DIASTOLIC BLOOD PRESSURE: 83 MMHG

## 2023-01-12 DIAGNOSIS — Z51.11 ENCOUNTER FOR ANTINEOPLASTIC CHEMOTHERAPY: ICD-10-CM

## 2023-01-12 DIAGNOSIS — Z51.11 ENCOUNTER FOR ANTINEOPLASTIC CHEMOTHERAPY: Primary | ICD-10-CM

## 2023-01-12 DIAGNOSIS — D70.1 CHEMOTHERAPY-INDUCED NEUTROPENIA (H): ICD-10-CM

## 2023-01-12 DIAGNOSIS — R06.09 DOE (DYSPNEA ON EXERTION): Primary | ICD-10-CM

## 2023-01-12 DIAGNOSIS — C50.211 MALIGNANT NEOPLASM OF UPPER-INNER QUADRANT OF RIGHT BREAST IN FEMALE, ESTROGEN RECEPTOR NEGATIVE (H): ICD-10-CM

## 2023-01-12 DIAGNOSIS — T45.1X5A CHEMOTHERAPY-INDUCED NEUTROPENIA (H): ICD-10-CM

## 2023-01-12 DIAGNOSIS — Z17.1 MALIGNANT NEOPLASM OF UPPER-INNER QUADRANT OF RIGHT BREAST IN FEMALE, ESTROGEN RECEPTOR NEGATIVE (H): ICD-10-CM

## 2023-01-12 DIAGNOSIS — M54.31 SCIATICA OF RIGHT SIDE: ICD-10-CM

## 2023-01-12 DIAGNOSIS — F11.90 CHRONIC, CONTINUOUS USE OF OPIOIDS: ICD-10-CM

## 2023-01-12 LAB
ALBUMIN SERPL BCG-MCNC: 3.8 G/DL (ref 3.5–5.2)
ALP SERPL-CCNC: 105 U/L (ref 35–104)
ALT SERPL W P-5'-P-CCNC: 8 U/L (ref 10–35)
ANION GAP SERPL CALCULATED.3IONS-SCNC: 11 MMOL/L (ref 7–15)
AST SERPL W P-5'-P-CCNC: 14 U/L (ref 10–35)
BILIRUB SERPL-MCNC: 0.2 MG/DL
BUN SERPL-MCNC: 17.5 MG/DL (ref 6–20)
CALCIUM SERPL-MCNC: 8.9 MG/DL (ref 8.6–10)
CHLORIDE SERPL-SCNC: 103 MMOL/L (ref 98–107)
CREAT SERPL-MCNC: 0.68 MG/DL (ref 0.51–0.95)
DEPRECATED HCO3 PLAS-SCNC: 25 MMOL/L (ref 22–29)
GFR SERPL CREATININE-BSD FRML MDRD: >90 ML/MIN/1.73M2
GLUCOSE SERPL-MCNC: 126 MG/DL (ref 70–99)
POTASSIUM SERPL-SCNC: 3.6 MMOL/L (ref 3.4–5.3)
PROT SERPL-MCNC: 6.8 G/DL (ref 6.4–8.3)
SODIUM SERPL-SCNC: 139 MMOL/L (ref 136–145)
TSH SERPL DL<=0.005 MIU/L-ACNC: 1.48 UIU/ML (ref 0.3–4.2)

## 2023-01-12 PROCEDURE — 99214 OFFICE O/P EST MOD 30 MIN: CPT | Performed by: INTERNAL MEDICINE

## 2023-01-12 PROCEDURE — 84443 ASSAY THYROID STIM HORMONE: CPT | Performed by: INTERNAL MEDICINE

## 2023-01-12 PROCEDURE — G0463 HOSPITAL OUTPT CLINIC VISIT: HCPCS | Mod: 25 | Performed by: INTERNAL MEDICINE

## 2023-01-12 PROCEDURE — 80053 COMPREHEN METABOLIC PANEL: CPT | Performed by: INTERNAL MEDICINE

## 2023-01-12 PROCEDURE — 258N000003 HC RX IP 258 OP 636: Performed by: INTERNAL MEDICINE

## 2023-01-12 PROCEDURE — 96413 CHEMO IV INFUSION 1 HR: CPT

## 2023-01-12 PROCEDURE — 250N000011 HC RX IP 250 OP 636: Performed by: INTERNAL MEDICINE

## 2023-01-12 RX ORDER — ALBUTEROL SULFATE 90 UG/1
1-2 AEROSOL, METERED RESPIRATORY (INHALATION)
Status: CANCELLED
Start: 2023-01-12

## 2023-01-12 RX ORDER — HEPARIN SODIUM (PORCINE) LOCK FLUSH IV SOLN 100 UNIT/ML 100 UNIT/ML
5 SOLUTION INTRAVENOUS
Status: CANCELLED | OUTPATIENT
Start: 2023-01-12

## 2023-01-12 RX ORDER — HEPARIN SODIUM (PORCINE) LOCK FLUSH IV SOLN 100 UNIT/ML 100 UNIT/ML
5 SOLUTION INTRAVENOUS
Status: DISCONTINUED | OUTPATIENT
Start: 2023-01-12 | End: 2023-01-12 | Stop reason: HOSPADM

## 2023-01-12 RX ORDER — ALBUTEROL SULFATE 0.83 MG/ML
2.5 SOLUTION RESPIRATORY (INHALATION)
Status: CANCELLED | OUTPATIENT
Start: 2023-01-12

## 2023-01-12 RX ORDER — DIPHENHYDRAMINE HYDROCHLORIDE 50 MG/ML
50 INJECTION INTRAMUSCULAR; INTRAVENOUS
Status: CANCELLED
Start: 2023-01-12

## 2023-01-12 RX ORDER — HYDROCODONE BITARTRATE AND ACETAMINOPHEN 5; 325 MG/1; MG/1
1 TABLET ORAL EVERY 6 HOURS PRN
Qty: 90 TABLET | Refills: 0 | Status: SHIPPED | OUTPATIENT
Start: 2023-01-12 | End: 2023-02-13

## 2023-01-12 RX ORDER — METHYLPREDNISOLONE SODIUM SUCCINATE 125 MG/2ML
125 INJECTION, POWDER, LYOPHILIZED, FOR SOLUTION INTRAMUSCULAR; INTRAVENOUS
Status: CANCELLED
Start: 2023-01-12

## 2023-01-12 RX ORDER — MEPERIDINE HYDROCHLORIDE 25 MG/ML
25 INJECTION INTRAMUSCULAR; INTRAVENOUS; SUBCUTANEOUS EVERY 30 MIN PRN
Status: CANCELLED | OUTPATIENT
Start: 2023-01-12

## 2023-01-12 RX ORDER — NALOXONE HYDROCHLORIDE 0.4 MG/ML
0.2 INJECTION, SOLUTION INTRAMUSCULAR; INTRAVENOUS; SUBCUTANEOUS
Status: CANCELLED | OUTPATIENT
Start: 2023-01-12

## 2023-01-12 RX ORDER — EPINEPHRINE 1 MG/ML
0.3 INJECTION, SOLUTION, CONCENTRATE INTRAVENOUS EVERY 5 MIN PRN
Status: CANCELLED | OUTPATIENT
Start: 2023-01-12

## 2023-01-12 RX ORDER — LORAZEPAM 2 MG/ML
0.5 INJECTION INTRAMUSCULAR EVERY 4 HOURS PRN
Status: CANCELLED | OUTPATIENT
Start: 2023-01-12

## 2023-01-12 RX ADMIN — Medication 5 ML: at 11:30

## 2023-01-12 RX ADMIN — SODIUM CHLORIDE 200 MG: 9 INJECTION, SOLUTION INTRAVENOUS at 10:57

## 2023-01-12 RX ADMIN — SODIUM CHLORIDE 250 ML: 9 INJECTION, SOLUTION INTRAVENOUS at 10:20

## 2023-01-12 ASSESSMENT — PAIN SCALES - GENERAL: PAINLEVEL: MODERATE PAIN (4)

## 2023-01-12 NOTE — LETTER
"    1/12/2023         RE: Diana Salvador  [address] [place] [id number]        Dear Colleague,    Thank you for referring your patient, Diana Salvador, to the [facility]. Please see a copy of my visit note below.    Oncology Rooming Note    January 12, 2023 9:44 AM   Diana Salvador is a 57 year old female who presents for:    Chief Complaint   Patient presents with     Oncology Clinic Visit     Malignant neoplasm of upper-inner quadrant of right breast in female, estrogen receptor negative - Labs provider and infusion     Initial Vitals: Wt 97.5 kg (215 lb)   LMP  (LMP Unknown)   BMI 33.94 kg/m   Estimated body mass index is 33.94 kg/m  as calculated from the following:    Height as of 9/30/22: 1.695 m (5' 6.73\").    Weight as of this encounter: 97.5 kg (215 lb). Body surface area is 2.14 meters squared.  Data Unavailable Comment: Data Unavailable   No LMP recorded (lmp unknown). Patient has had a hysterectomy.  Allergies reviewed: Yes  Medications reviewed: Yes    Medications: Medication refills not needed today.  Pharmacy name entered into Personeta:    [id number] - [facility] [address] AT [address] AND CLINICS  [facility] - [place], [place] - [address] [facility] [address] [address] [employer] - [place], [place] - [address] [place] [address]    Clinical concerns:  None      Emy Hollingsworth CMA              The patient is being seen for the following issue/s:  1. Chemotherapy-induced neutropenia (H)    2. Encounter for antineoplastic chemotherapy    3. Malignant neoplasm of upper-inner quadrant of right breast in female, estrogen receptor negative (triple-neg., germline test neg. for BRCA 1/2)       Cancer Staging   Malignant neoplasm of upper-inner quadrant of right breast in female, estrogen receptor negative (triple-neg., germline test neg. for BRCA 1/2)  Staging form: Breast, AJCC 8th Edition  - Clinical " stage from 3/4/2022: Stage IIB (cT2, cN0, cM0, G3, ER-, OH-, HER2-) - Signed by Ricardo Diamond MD on 3/4/2022    March 2022: She commenced neoadjuvant chemotherapy based on the KEYNOTE-522 trial (Shobha et al., 2020):      She received 3 cycles of pembrolizumab 200 mg flat dose IV, paclitaxel 80 mg/m  IV once daily on days 1, 8, and 15 and Carboplatin AUC=5 IV on day 1 with filgrastim 5 mcg/kg subcutaneously once daily on days 16, 17, and 18.    Unfortunately, she then developed grade 3 peripheral motor and sensory neuropathy and further chemotherapy with carboplatin and paclitaxel was canceled and pembrolizumab alone was continued.    However, she was able to continue pembrolizumab alone in neoadjuvant fashion without any immune related adverse events and finish a total of 6 preoperative doses of pembrolizumab.    On 6/30/22 she underwent bilateral mastectomy with right sentinel lymph node biopsy and expander placement by plastic surgery. In October had saline implants placed.  She was found to have a path CR. She has a dent in her right chest and will have an implant revision with a fat tissue graft.     She saw Dr. Lyons for medical oncology second opinion who recommended no adjuvant radiation but to complete a total of 9 adjuvant doses of pembrolizumab.      Her neuropathy has continued to improve and it is not as painful as it used to be. Her right hand can also go numb sometimes.    She stopped gabapentin and has started duloxetine. She still has to take hydrocodone for pain control.    She continues to take as needed Ativan for anxiety.      She is due for her last adjuvant pembrolizumab (Keytruda) infusion today. She reports no side effects from immunotherapy.  She specifically denies diarrhea, skin rashes, dyspnea, abdominal pain, or bleeding. She can get headaches due to dehydration or fasting. She can gets short of breath with strenuous activity.    PHYSICAL EXAMINATION:  /79 (BP Location: Right  arm, Patient Position: Sitting, Cuff Size: Adult Regular)   Pulse 76   Temp 98.3  F (36.8  C) (Tympanic)   Resp 12   Wt 97.5 kg (215 lb)   LMP  (LMP Unknown)   SpO2 97%   BMI 33.94 kg/m      General: Todays' vital signs reviewed in the EMR.    ECOG PS is 1.  Cardiovascular: Cor RRR  Respiratory: Lungs clear to auscultation bilaterally  HEENT: No oral thrush    ASSESSMENT/PLAN:    1. Chemotherapy-induced neutropenia (H)    2. Encounter for antineoplastic chemotherapy    3. Malignant neoplasm of upper-inner quadrant of right breast in female, estrogen receptor negative (triple-neg., germline test neg. for BRCA 1/2)      Lab results reviewed:    Recent Results (from the past 720 hour(s))   Comprehensive metabolic panel    Collection Time: 12/22/22  2:26 PM   Result Value Ref Range    Sodium 141 136 - 145 mmol/L    Potassium 4.3 3.4 - 5.3 mmol/L    Chloride 106 98 - 107 mmol/L    Carbon Dioxide (CO2) 27 22 - 29 mmol/L    Anion Gap 8 7 - 15 mmol/L    Urea Nitrogen 12.2 6.0 - 20.0 mg/dL    Creatinine 0.69 0.51 - 0.95 mg/dL    Calcium 9.2 8.6 - 10.0 mg/dL    Glucose 108 (H) 70 - 99 mg/dL    Alkaline Phosphatase 101 35 - 104 U/L    AST 13 10 - 35 U/L    ALT 9 (L) 10 - 35 U/L    Protein Total 6.8 6.4 - 8.3 g/dL    Albumin 3.9 3.5 - 5.2 g/dL    Bilirubin Total 0.2 <=1.2 mg/dL    GFR Estimate >90 >60 mL/min/1.73m2   TSH with free T4 reflex    Collection Time: 12/22/22  2:26 PM   Result Value Ref Range    TSH 0.33 0.30 - 4.20 uIU/mL   Comprehensive metabolic panel    Collection Time: 01/12/23  9:08 AM   Result Value Ref Range    Sodium 139 136 - 145 mmol/L    Potassium 3.6 3.4 - 5.3 mmol/L    Chloride 103 98 - 107 mmol/L    Carbon Dioxide (CO2) 25 22 - 29 mmol/L    Anion Gap 11 7 - 15 mmol/L    Urea Nitrogen 17.5 6.0 - 20.0 mg/dL    Creatinine 0.68 0.51 - 0.95 mg/dL    Calcium 8.9 8.6 - 10.0 mg/dL    Glucose 126 (H) 70 - 99 mg/dL    Alkaline Phosphatase 105 (H) 35 - 104 U/L    AST 14 10 - 35 U/L    ALT 8 (L) 10 - 35 U/L     Protein Total 6.8 6.4 - 8.3 g/dL    Albumin 3.8 3.5 - 5.2 g/dL    Bilirubin Total 0.2 <=1.2 mg/dL    GFR Estimate >90 >60 mL/min/1.73m2   TSH with free T4 reflex    Collection Time: 01/12/23  9:08 AM   Result Value Ref Range    TSH 1.48 0.30 - 4.20 uIU/mL     CMP and TSH today today are adequate we will proceed with your last pembrolizumab (Keytruda) infusion today and have you return for your next office visit with Marilu Vergara NP in 12 weeks and get your port flushed every 6 weeks in our clinic.    My clinic staff will refer you for a cardiology consultation regarding dyspnea on exertion.              Again, thank you for allowing me to participate in the care of your patient.        Sincerely,        Ricardo Diamond MD

## 2023-01-12 NOTE — PROGRESS NOTES
"Oncology Rooming Note    January 12, 2023 9:44 AM   Diana Salvador is a 57 year old female who presents for:    Chief Complaint   Patient presents with     Oncology Clinic Visit     Malignant neoplasm of upper-inner quadrant of right breast in female, estrogen receptor negative - Labs provider and infusion     Initial Vitals: Wt 97.5 kg (215 lb)   LMP  (LMP Unknown)   BMI 33.94 kg/m   Estimated body mass index is 33.94 kg/m  as calculated from the following:    Height as of 9/30/22: 1.695 m (5' 6.73\").    Weight as of this encounter: 97.5 kg (215 lb). Body surface area is 2.14 meters squared.  Data Unavailable Comment: Data Unavailable   No LMP recorded (lmp unknown). Patient has had a hysterectomy.  Allergies reviewed: Yes  Medications reviewed: Yes    Medications: Medication refills not needed today.  Pharmacy name entered into StandDesk:    [id number] - [place], [place] - [address] AT [address] AND CLINICS  [facility] - [facility], [place] - [address] [facility] [address] [facility] [employer] - [place], [place] - [address] [place] [address]    Clinical concerns:  None      Emy Hollingsworth CMA            "

## 2023-01-12 NOTE — PROGRESS NOTES
Infusion Nursing Note:  Dianaarti Johnsonluis presents today for Keytruda.    Patient seen by provider today: Yes   present during visit today: Not Applicable.    Note: N/A.    Intravenous Access:  Implanted Port.    Treatment Conditions:  Lab Results   Component Value Date     01/12/2023    POTASSIUM 3.6 01/12/2023    CR 0.68 01/12/2023    ANURADHA 8.9 01/12/2023    BILITOTAL 0.2 01/12/2023    ALBUMIN 3.8 01/12/2023    ALT 8 (L) 01/12/2023    AST 14 01/12/2023     Results reviewed, labs MET treatment parameters, ok to proceed with treatment.    Post Infusion Assessment:  Patient tolerated infusion without incident.  Blood return noted pre and post infusion.  Site patent and intact, free from redness, edema or discomfort.  No evidence of extravasations.  Access discontinued per protocol.     Discharge Plan:   Patient discharged in stable condition accompanied by: self.  Departure Mode: Ambulatory.      Bella Nunez RN

## 2023-01-12 NOTE — PATIENT INSTRUCTIONS
CMP and TSH today today are adequate we will proceed with your last pembrolizumab (Keytruda) infusion today and have you return for your next office visit with Marilu Vergara NP in 12 weeks and get your port flushed every 6 weeks in our clinic.    My clinic staff will refer you for a cardiology consultation regarding dyspnea on exertion.

## 2023-01-12 NOTE — PROGRESS NOTES
The patient is being seen for the following issue/s:  1. Chemotherapy-induced neutropenia (H)    2. Encounter for antineoplastic chemotherapy    3. Malignant neoplasm of upper-inner quadrant of right breast in female, estrogen receptor negative (triple-neg., germline test neg. for BRCA 1/2)       Cancer Staging   Malignant neoplasm of upper-inner quadrant of right breast in female, estrogen receptor negative (triple-neg., germline test neg. for BRCA 1/2)  Staging form: Breast, AJCC 8th Edition  - Clinical stage from 3/4/2022: Stage IIB (cT2, cN0, cM0, G3, ER-, TN-, HER2-) - Signed by Ricardo Diamond MD on 3/4/2022    March 2022: She commenced neoadjuvant chemotherapy based on the KEYNOTE-522 trial (Hsobha et al., 2020):      She received 3 cycles of pembrolizumab 200 mg flat dose IV, paclitaxel 80 mg/m  IV once daily on days 1, 8, and 15 and Carboplatin AUC=5 IV on day 1 with filgrastim 5 mcg/kg subcutaneously once daily on days 16, 17, and 18.    Unfortunately, she then developed grade 3 peripheral motor and sensory neuropathy and further chemotherapy with carboplatin and paclitaxel was canceled and pembrolizumab alone was continued.    However, she was able to continue pembrolizumab alone in neoadjuvant fashion without any immune related adverse events and finish a total of 6 preoperative doses of pembrolizumab.    On 6/30/22 she underwent bilateral mastectomy with right sentinel lymph node biopsy and expander placement by plastic surgery. In October had saline implants placed.  She was found to have a path CR. She has a dent in her right chest and will have an implant revision with a fat tissue graft.     She saw Dr. Lyons for medical oncology second opinion who recommended no adjuvant radiation but to complete a total of 9 adjuvant doses of pembrolizumab.      Her neuropathy has continued to improve and it is not as painful as it used to be. Her right hand can also go numb sometimes.    She stopped gabapentin  and has started duloxetine. She still has to take hydrocodone for pain control.    She continues to take as needed Ativan for anxiety.      She is due for her last adjuvant pembrolizumab (Keytruda) infusion today. She reports no side effects from immunotherapy.  She specifically denies diarrhea, skin rashes, dyspnea, abdominal pain, or bleeding. She can get headaches due to dehydration or fasting. She can gets short of breath with strenuous activity.    PHYSICAL EXAMINATION:  /79 (BP Location: Right arm, Patient Position: Sitting, Cuff Size: Adult Regular)   Pulse 76   Temp 98.3  F (36.8  C) (Tympanic)   Resp 12   Wt 97.5 kg (215 lb)   LMP  (LMP Unknown)   SpO2 97%   BMI 33.94 kg/m      General: Todays' vital signs reviewed in the EMR.    ECOG PS is 1.  Cardiovascular: Cor RRR  Respiratory: Lungs clear to auscultation bilaterally  HEENT: No oral thrush    ASSESSMENT/PLAN:    1. Chemotherapy-induced neutropenia (H)    2. Encounter for antineoplastic chemotherapy    3. Malignant neoplasm of upper-inner quadrant of right breast in female, estrogen receptor negative (triple-neg., germline test neg. for BRCA 1/2)      Lab results reviewed:    Recent Results (from the past 720 hour(s))   Comprehensive metabolic panel    Collection Time: 12/22/22  2:26 PM   Result Value Ref Range    Sodium 141 136 - 145 mmol/L    Potassium 4.3 3.4 - 5.3 mmol/L    Chloride 106 98 - 107 mmol/L    Carbon Dioxide (CO2) 27 22 - 29 mmol/L    Anion Gap 8 7 - 15 mmol/L    Urea Nitrogen 12.2 6.0 - 20.0 mg/dL    Creatinine 0.69 0.51 - 0.95 mg/dL    Calcium 9.2 8.6 - 10.0 mg/dL    Glucose 108 (H) 70 - 99 mg/dL    Alkaline Phosphatase 101 35 - 104 U/L    AST 13 10 - 35 U/L    ALT 9 (L) 10 - 35 U/L    Protein Total 6.8 6.4 - 8.3 g/dL    Albumin 3.9 3.5 - 5.2 g/dL    Bilirubin Total 0.2 <=1.2 mg/dL    GFR Estimate >90 >60 mL/min/1.73m2   TSH with free T4 reflex    Collection Time: 12/22/22  2:26 PM   Result Value Ref Range    TSH 0.33 0.30  - 4.20 uIU/mL   Comprehensive metabolic panel    Collection Time: 01/12/23  9:08 AM   Result Value Ref Range    Sodium 139 136 - 145 mmol/L    Potassium 3.6 3.4 - 5.3 mmol/L    Chloride 103 98 - 107 mmol/L    Carbon Dioxide (CO2) 25 22 - 29 mmol/L    Anion Gap 11 7 - 15 mmol/L    Urea Nitrogen 17.5 6.0 - 20.0 mg/dL    Creatinine 0.68 0.51 - 0.95 mg/dL    Calcium 8.9 8.6 - 10.0 mg/dL    Glucose 126 (H) 70 - 99 mg/dL    Alkaline Phosphatase 105 (H) 35 - 104 U/L    AST 14 10 - 35 U/L    ALT 8 (L) 10 - 35 U/L    Protein Total 6.8 6.4 - 8.3 g/dL    Albumin 3.8 3.5 - 5.2 g/dL    Bilirubin Total 0.2 <=1.2 mg/dL    GFR Estimate >90 >60 mL/min/1.73m2   TSH with free T4 reflex    Collection Time: 01/12/23  9:08 AM   Result Value Ref Range    TSH 1.48 0.30 - 4.20 uIU/mL     CMP and TSH today today are adequate we will proceed with your last pembrolizumab (Keytruda) infusion today and have you return for your next office visit with Marilu Vergara NP in 12 weeks and get your port flushed every 6 weeks in our clinic.    My clinic staff will refer you for a cardiology consultation regarding dyspnea on exertion.

## 2023-01-16 ENCOUNTER — VIRTUAL VISIT (OUTPATIENT)
Dept: ONCOLOGY | Facility: CLINIC | Age: 58
End: 2023-01-16
Attending: PSYCHOLOGIST
Payer: OTHER GOVERNMENT

## 2023-01-16 DIAGNOSIS — F41.9 ANXIETY: Primary | ICD-10-CM

## 2023-01-16 PROCEDURE — 90832 PSYTX W PT 30 MINUTES: CPT | Mod: 95 | Performed by: PSYCHOLOGIST

## 2023-01-16 NOTE — PROGRESS NOTES
[facility]- Psychotherapy                                    Progress Note    Patient Name: Diana Salvador  Date: 1/16/23         Service Type: Individual      Session Start Time: 11:00  Session End Time: 1u1:28     Session Length: 28 minutes    Session #: 2    Attendees: Client attended alone    Service Modality:  Video Visit:      Provider verified identity through the following two step process.  Patient provided:  Patient photo    Telemedicine Visit: The patient's condition can be safely assessed and treated via synchronous audio and visual telemedicine encounter.      Reason for Telemedicine Visit: Services only offered telehealth    Originating Site (Patient Location): Patient's home    Distant Site (Provider Location): Provider Remote Setting- Home Office    Consent:  The patient/guardian has verbally consented to: the potential risks and benefits of telemedicine (video visit) versus in person care; bill my insurance or make self-payment for services provided; and responsibility for payment of non-covered services.     Patient would like the video invitation sent by:  My Chart    Mode of Communication:  Video Conference via [employer]    Distant Location (Provider):  Off-site    As the provider I attest to compliance with applicable laws and regulations related to telemedicine.    DATA  Interactive Complexity: No  Crisis: No   Current / Ongoing Stressors and Concerns:   Depressed mood, cancer, loss of job, stress     Treatment Objective(s) Addressed in This Session:   identify coping strategies to more effectively address stressors     Intervention:   Motivational Interviewing: to help her increase problem solving     ASSESSMENT: Current Emotional / Mental Status (status of significant symptoms):   Risk status (Self / Other harm or suicidal ideation)   Patient denies current fears or concerns for personal safety.   Patient denies current or recent suicidal ideation or behaviors.   Patient  denies current or recent homicidal ideation or behaviors.   Patient denies current or recent self injurious behavior or ideation.   Patient denies other safety concerns.   Patient reports there has been no change in risk factors since their last session.     Patient reports there has been no change in protective factors since their last session.     Recommended that patient call 911 or go to the local ED should there be a change in any of these risk factors.     Appearance:   Appropriate    Eye Contact:   Good    Psychomotor Behavior: Normal    Attitude:   Cooperative    Orientation:   All   Speech    Rate / Production: Normal     Volume:  Normal    Mood:    Normal   Affect:    Appropriate    Thought Content:  Clear    Thought Form:  Coherent  Logical    Insight:    Good      Medication Review:   No changes to current psychiatric medication(s)     Medication Compliance:   Yes     Changes in Health Issues:   None reported     Chemical Use Review:   Substance Use: Chemical use reviewed, no active concerns identified      Tobacco Use: No current tobacco use.      Diagnosis:  1. Anxiety        Collateral Reports Completed:   Not Applicable    PLAN: (Patient Tasks / Therapist Tasks / Other)  She is to increase her use of problem solving to increase activity and reduce depressed mood    Magan Russ, PsyD                                                         ______________________________________________________________________    Individual Treatment Plan    Patient's Name: Diana Salvador  YOB: 1965    Date of Creation: 12/26/22  Date Treatment Plan Last Reviewed/Revised:     DSM5 Diagnoses: 296.21 (F32.0) Major Depressive Disorder, Single Episode, Mild _ and With melancholic features  Psychosocial / Contextual Factors: cancer  Anticipated number of session for this episode of care: 3-6 sessions  Anticipation frequency of session: Every other week  Anticipated Duration of each session: 16-37  minutes  Treatment plan will be reviewed in 90 days or when goals have been changed.       MeasurableTreatment Goal(s) related to diagnosis / functional impairment(s)  Goal 1: Patient will use problem solving    I will know I've met my goal when I feel less depressed.      Patient has reviewed and agreed to the above plan.      Magan Russ PsyD  January 16, 2023

## 2023-01-16 NOTE — LETTER
1/16/2023         RE: Diana Salvador  [address] [place] [id number]        Dear Colleague,    Thank you for referring your patient, Diana Salvador, to the [facility]. Please see a copy of my visit note below.          [facility]- Psychotherapy                                    Progress Note    Patient Name: Diana Salvador  Date: 1/16/23         Service Type: Individual      Session Start Time: 11:00  Session End Time: 1u1:28     Session Length: 28 minutes    Session #: 2    Attendees: Client attended alone    Service Modality:  Video Visit:      Provider verified identity through the following two step process.  Patient provided:  Patient photo    Telemedicine Visit: The patient's condition can be safely assessed and treated via synchronous audio and visual telemedicine encounter.      Reason for Telemedicine Visit: Services only offered telehealth    Originating Site (Patient Location): Patient's home    Distant Site (Provider Location): Provider Remote Setting- Home Office    Consent:  The patient/guardian has verbally consented to: the potential risks and benefits of telemedicine (video visit) versus in person care; bill my insurance or make self-payment for services provided; and responsibility for payment of non-covered services.     Patient would like the video invitation sent by:  My Chart    Mode of Communication:  [facility] Conference via [facility]    Distant Location (Provider):  Off-site    As the provider I attest to compliance with applicable laws and regulations related to telemedicine.    DATA  Interactive Complexity: No  Crisis: No   Current / Ongoing Stressors and Concerns:   Depressed mood, cancer, loss of job, stress     Treatment Objective(s) Addressed in This Session:   identify coping strategies to more effectively address stressors     Intervention:   Motivational Interviewing: to help her increase problem solving     ASSESSMENT: Current  Emotional / Mental Status (status of significant symptoms):   Risk status (Self / Other harm or suicidal ideation)   Patient denies current fears or concerns for personal safety.   Patient denies current or recent suicidal ideation or behaviors.   Patient denies current or recent homicidal ideation or behaviors.   Patient denies current or recent self injurious behavior or ideation.   Patient denies other safety concerns.   Patient reports there has been no change in risk factors since their last session.     Patient reports there has been no change in protective factors since their last session.     Recommended that patient call 911 or go to the local ED should there be a change in any of these risk factors.     Appearance:   Appropriate    Eye Contact:   Good    Psychomotor Behavior: Normal    Attitude:   Cooperative    Orientation:   All   Speech    Rate / Production: Normal     Volume:  Normal    Mood:    Normal   Affect:    Appropriate    Thought Content:  Clear    Thought Form:  Coherent  Logical    Insight:    Good      Medication Review:   No changes to current psychiatric medication(s)     Medication Compliance:   Yes     Changes in Health Issues:   None reported     Chemical Use Review:   Substance Use: Chemical use reviewed, no active concerns identified      Tobacco Use: No current tobacco use.      Diagnosis:  1. Anxiety        Collateral Reports Completed:   Not Applicable    PLAN: (Patient Tasks / Therapist Tasks / Other)  She is to increase her use of problem solving to increase activity and reduce depressed mood    Magan Russ, Miracle                                                         ______________________________________________________________________    Individual Treatment Plan    Patient's Name: Diana Salvador  YOB: 1965    Date of Creation: 12/26/22  Date Treatment Plan Last Reviewed/Revised:     DSM5 Diagnoses: 296.21 (F32.0) Major Depressive Disorder, Single  Episode, Mild _ and With melancholic features  Psychosocial / Contextual Factors: cancer  Anticipated number of session for this episode of care: 3-6 sessions  Anticipation frequency of session: Every other week  Anticipated Duration of each session: 16-37 minutes  Treatment plan will be reviewed in 90 days or when goals have been changed.       MeasurableTreatment Goal(s) related to diagnosis / functional impairment(s)  Goal 1: Patient will use problem solving    I will know I've met my goal when I feel less depressed.      Patient has reviewed and agreed to the above plan.      Magan Russ PsyD  January 16, 2023

## 2023-01-28 ENCOUNTER — HEALTH MAINTENANCE LETTER (OUTPATIENT)
Age: 58
End: 2023-01-28

## 2023-02-03 ENCOUNTER — OFFICE VISIT (OUTPATIENT)
Dept: FAMILY MEDICINE | Facility: CLINIC | Age: 58
End: 2023-02-03
Payer: OTHER GOVERNMENT

## 2023-02-03 VITALS
WEIGHT: 210.4 LBS | SYSTOLIC BLOOD PRESSURE: 136 MMHG | BODY MASS INDEX: 31.16 KG/M2 | TEMPERATURE: 97.8 F | HEART RATE: 65 BPM | DIASTOLIC BLOOD PRESSURE: 88 MMHG | RESPIRATION RATE: 16 BRPM | OXYGEN SATURATION: 97 % | HEIGHT: 69 IN

## 2023-02-03 DIAGNOSIS — F41.8 SITUATIONAL ANXIETY: ICD-10-CM

## 2023-02-03 DIAGNOSIS — M54.41 ACUTE RIGHT-SIDED LOW BACK PAIN WITH RIGHT-SIDED SCIATICA: ICD-10-CM

## 2023-02-03 DIAGNOSIS — Z12.11 SCREEN FOR COLON CANCER: ICD-10-CM

## 2023-02-03 DIAGNOSIS — R06.09 DYSPNEA ON EXERTION: ICD-10-CM

## 2023-02-03 DIAGNOSIS — Z01.818 PREOP GENERAL PHYSICAL EXAM: Primary | ICD-10-CM

## 2023-02-03 DIAGNOSIS — F41.9 ANXIETY: ICD-10-CM

## 2023-02-03 DIAGNOSIS — G47.00 INSOMNIA, UNSPECIFIED TYPE: ICD-10-CM

## 2023-02-03 DIAGNOSIS — Z90.13 S/P MASTECTOMY, BILATERAL: ICD-10-CM

## 2023-02-03 PROBLEM — Z17.1 MALIGNANT NEOPLASM OF UPPER-INNER QUADRANT OF RIGHT BREAST IN FEMALE, ESTROGEN RECEPTOR NEGATIVE (H): Status: ACTIVE | Noted: 2022-03-04

## 2023-02-03 PROBLEM — C50.211 MALIGNANT NEOPLASM OF UPPER-INNER QUADRANT OF RIGHT BREAST IN FEMALE, ESTROGEN RECEPTOR NEGATIVE (H): Status: ACTIVE | Noted: 2022-03-04

## 2023-02-03 PROCEDURE — 99214 OFFICE O/P EST MOD 30 MIN: CPT | Performed by: FAMILY MEDICINE

## 2023-02-03 PROCEDURE — 93000 ELECTROCARDIOGRAM COMPLETE: CPT | Performed by: FAMILY MEDICINE

## 2023-02-03 RX ORDER — CYCLOBENZAPRINE HCL 5 MG
5 TABLET ORAL 2 TIMES DAILY PRN
Qty: 40 TABLET | Refills: 5 | Status: SHIPPED | OUTPATIENT
Start: 2023-02-03 | End: 2023-07-07

## 2023-02-03 RX ORDER — DOXEPIN 3 MG/1
3 TABLET, FILM COATED ORAL AT BEDTIME
Qty: 90 TABLET | Refills: 3 | Status: SHIPPED | OUTPATIENT
Start: 2023-02-03 | End: 2023-05-19

## 2023-02-03 RX ORDER — LORAZEPAM 0.5 MG/1
.5-1 TABLET ORAL EVERY 8 HOURS PRN
Qty: 60 TABLET | Refills: 3 | Status: SHIPPED | OUTPATIENT
Start: 2023-02-03 | End: 2023-05-30

## 2023-02-03 RX ORDER — CITALOPRAM HYDROBROMIDE 40 MG/1
40 TABLET ORAL DAILY
Qty: 90 TABLET | Refills: 1 | Status: SHIPPED | OUTPATIENT
Start: 2023-02-03 | End: 2023-05-19

## 2023-02-03 RX ORDER — BUSPIRONE HYDROCHLORIDE 7.5 MG/1
TABLET ORAL
Qty: 180 TABLET | Refills: 1 | Status: SHIPPED | OUTPATIENT
Start: 2023-02-03 | End: 2023-05-19

## 2023-02-03 ASSESSMENT — ANXIETY QUESTIONNAIRES
2. NOT BEING ABLE TO STOP OR CONTROL WORRYING: NEARLY EVERY DAY
GAD7 TOTAL SCORE: 16
8. IF YOU CHECKED OFF ANY PROBLEMS, HOW DIFFICULT HAVE THESE MADE IT FOR YOU TO DO YOUR WORK, TAKE CARE OF THINGS AT HOME, OR GET ALONG WITH OTHER PEOPLE?: VERY DIFFICULT
7. FEELING AFRAID AS IF SOMETHING AWFUL MIGHT HAPPEN: NEARLY EVERY DAY
5. BEING SO RESTLESS THAT IT IS HARD TO SIT STILL: SEVERAL DAYS
1. FEELING NERVOUS, ANXIOUS, OR ON EDGE: NEARLY EVERY DAY
GAD7 TOTAL SCORE: 16
3. WORRYING TOO MUCH ABOUT DIFFERENT THINGS: NEARLY EVERY DAY
IF YOU CHECKED OFF ANY PROBLEMS ON THIS QUESTIONNAIRE, HOW DIFFICULT HAVE THESE PROBLEMS MADE IT FOR YOU TO DO YOUR WORK, TAKE CARE OF THINGS AT HOME, OR GET ALONG WITH OTHER PEOPLE: VERY DIFFICULT
6. BECOMING EASILY ANNOYED OR IRRITABLE: NOT AT ALL
GAD7 TOTAL SCORE: 16
7. FEELING AFRAID AS IF SOMETHING AWFUL MIGHT HAPPEN: NEARLY EVERY DAY
4. TROUBLE RELAXING: NEARLY EVERY DAY

## 2023-02-03 ASSESSMENT — PATIENT HEALTH QUESTIONNAIRE - PHQ9
SUM OF ALL RESPONSES TO PHQ QUESTIONS 1-9: 14
10. IF YOU CHECKED OFF ANY PROBLEMS, HOW DIFFICULT HAVE THESE PROBLEMS MADE IT FOR YOU TO DO YOUR WORK, TAKE CARE OF THINGS AT HOME, OR GET ALONG WITH OTHER PEOPLE: VERY DIFFICULT
SUM OF ALL RESPONSES TO PHQ QUESTIONS 1-9: 14

## 2023-02-03 ASSESSMENT — PAIN SCALES - GENERAL: PAINLEVEL: NO PAIN (0)

## 2023-02-03 NOTE — PATIENT INSTRUCTIONS
No Medications on the day of surgery.    No NSAIDs (ibuprofen, advil, aleve, aspirin) 7 days before surgery.    Stop all supplements 2 weeks prior to surgery.    OK to take tylenol.      Start buspar 7.5mg twice a day for 1 weeks then increase to 2 pill (15mg) twice a day and schedule follow up in 4 weeks.  Let me know sooner if concerns.    For informational purposes only. Not to replace the advice of your health care provider. Copyright   2003, 2019 [employer] [facility]. All rights reserved. Clinically reviewed by Luh Lee MD. [employer] [id number] - REV .  Preparing for Your Surgery  Getting started  A nurse will call you to review your health history and instructions. They will give you an arrival time based on your scheduled surgery time. Please be ready to share:  Your doctor's clinic name and phone number  Your medical, surgical, and anesthesia history  A list of allergies and sensitivities  A list of medicines, including herbal treatments and over-the-counter drugs  Whether the patient has a legal guardian (ask how to send us the papers in advance)  Please tell us if you're pregnant--or if there's any chance you might be pregnant. Some surgeries may injure a fetus (unborn baby), so they require a pregnancy test. Surgeries that are safe for a fetus don't always need a test, and you can choose whether to have one.   If you have a child who's having surgery, please ask for a copy of Preparing for Your Child's Surgery.    Preparing for surgery  Within 10 to 30 days of surgery: Have a pre-op exam (sometimes called an H&P, or History and Physical). This can be done at a clinic or pre-operative center.  If you're having a , you may not need this exam. Talk to your care team.  At your pre-op exam, talk to your care team about all medicines you take. If you need to stop any medicines before surgery, ask when to start taking them again.  We do this for your safety. Many medicines can make you bleed  too much during surgery. Some change how well surgery (anesthesia) drugs work.  Call your insurance company to let them know you're having surgery. (If you don't have insurance, call [phone].)  Call your clinic if there's any change in your health. This includes signs of a cold or flu (sore throat, runny nose, cough, rash, fever). It also includes a scrape or scratch near the surgery site.  If you have questions on the day of surgery, call your hospital or surgery center.  Eating and drinking guidelines  For your safety: Unless your surgeon tells you otherwise, follow the guidelines below.  Eat and drink as usual until 8 hours before you arrive for surgery. After that, no food or milk.  Drink clear liquids until 2 hours before you arrive. These are liquids you can see through, like water, Gatorade, and Propel Water. They also include plain black coffee and tea (no cream or milk), candy, and breath mints. You can spit out gum when you arrive.  If you drink alcohol: Stop drinking it the night before surgery.  If your care team tells you to take medicine on the morning of surgery, it's okay to take it with a sip of water.  Preventing infection  Shower or bathe the night before and morning of your surgery. Follow the instructions your clinic gave you. (If no instructions, use regular soap.)  Don't shave or clip hair near your surgery site. We'll remove the hair if needed.  Don't smoke or vape the morning of surgery. You may chew nicotine gum up to 2 hours before surgery. A nicotine patch is okay.  Note: Some surgeries require you to completely quit smoking and nicotine. Check with your surgeon.  Your care team will make every effort to keep you safe from infection. We will:  Clean our hands often with soap and water (or an alcohol-based hand rub).  Clean the skin at your surgery site with a special soap that kills germs.  Give you a special gown to keep you warm. (Cold raises the risk of infection.)  Wear special  hair covers, masks, gowns and gloves during surgery.  Give antibiotic medicine, if prescribed. Not all surgeries need antibiotics.  What to bring on the day of surgery  Photo ID and insurance card  Copy of your health care directive, if you have one  Glasses and hearing aids (bring cases)  You can't wear contacts during surgery  Inhaler and eye drops, if you use them (tell us about these when you arrive)  CPAP machine or breathing device, if you use them  A few personal items, if spending the night  If you have . . .  A pacemaker, ICD (cardiac defibrillator) or other implant: Bring the ID card.  An implanted stimulator: Bring the remote control.  A legal guardian: Bring a copy of the certified (court-stamped) guardianship papers.  Please remove any jewelry, including body piercings. Leave jewelry and other valuables at home.  If you're going home the day of surgery  You must have a responsible adult drive you home. They should stay with you overnight as well.  If you don't have someone to stay with you, and you aren't safe to go home alone, we may keep you overnight. Insurance often won't pay for this.  After surgery  If it's hard to control your pain or you need more pain medicine, please call your surgeon's office.  Questions?   If you have any questions for your care team, list them here: _________________________________________________________________________________________________________________________________________________________________________ ____________________________________ ____________________________________ ____________________________________

## 2023-02-03 NOTE — PROGRESS NOTES
[facility]  [address] [address]  Phone: [phone]  Primary Provider: Rivka Chen  Pre-op Performing Provider: RIVKA CHEN      PREOPERATIVE EVALUATION:  Today's date: 2/3/2023    Diana Salvador is a 57 year old female who presents for a preoperative evaluation.    Surgical Information:  Surgery/Procedure: Bilteral breast fat grafting from abdomen possible thighs  Surgery Location:  OR   Surgeon: ROSEMARIE iNeto MD  Surgery Date: 02/10/23  Time of Surgery: 7:00 AM   Where patient plans to recover: At home with family  Fax number for surgical facility: Note does not need to be faxed, will be available electronically in Epic.    Type of Anesthesia Anticipated: General    Assessment & Plan     The proposed surgical procedure is considered INTERMEDIATE risk.    Preop general physical exam      S/P mastectomy, bilateral  Requiring next and final surgery for reconstruction    Acute right-sided low back pain with right-sided sciatica  PRN  - cyclobenzaprine (FLEXERIL) 5 MG tablet; Take 1 tablet (5 mg) by mouth 2 times daily as needed for muscle spasms    Insomnia, unspecified type  Not well controlled without doxepin  - doxepin (SILENOR) 3 MG tablet; Take 1 tablet (3 mg) by mouth At Bedtime    Anxiety  Not well controlled, add buspar  -discussed risks, benefits, and side effects of this new medication. Patient understands and is in agreement.  Recheck in 4 weeks.  - citalopram (CELEXA) 40 MG tablet; Take 1 tablet (40 mg) by mouth daily  - busPIRone (BUSPAR) 7.5 MG tablet; Take 1 tablet (7.5 mg) by mouth 2 times daily for 7 days, THEN 2 tablets (15 mg) 2 times daily for 30 days.      Dyspnea on exertion  EKG bradycardia otherwise normal, clear for surgery  - EKG 12-lead complete w/read - Clinics           Risks and Recommendations:  The patient has the following additional risks and recommendations for perioperative complications:   - No identified  additional risk factors other than previously addressed    Medication Instructions:  Patient will take her scheduled med the evening prior to surgery as usuall, no med in the morning.    RECOMMENDATION:  APPROVAL GIVEN to proceed with proposed procedure, without further diagnostic evaluation.            Subjective     HPI related to upcoming procedure: malignant neoplasm of upper-inner quadrant of right breast treated with chemo then bilateral mastectomy 6/30/22 with reconstruction planning and immune therapy. But currently the bag on the right is pushing lower and into the side with numbness/tingling in to the arm/side wall.  Now planning surgery for breast reconstruction.    Preop Questions 2/3/2023   1. Have you ever had a heart attack or stroke? No   2. Have you ever had surgery on your heart or blood vessels, such as a stent placement, a coronary artery bypass, or surgery on an artery in your head, neck, heart, or legs? No   3. Do you have chest pain with activity? No   4. Do you have a history of  heart failure? No   5. Do you currently have a cold, bronchitis or symptoms of other infection? No   6. Do you have a cough, shortness of breath, or wheezing? No   7. Do you or anyone in your family have previous history of blood clots? YES - self superficial thrombosis after mastectomy, was on eliquis   8. Do you or does anyone in your family have a serious bleeding problem such as prolonged bleeding following surgeries or cuts? No   9. Have you ever had problems with anemia or been told to take iron pills? No   10. Have you had any abnormal blood loss such as black, tarry or bloody stools, or abnormal vaginal bleeding? No   11. Have you ever had a blood transfusion? No   12. Are you willing to have a blood transfusion if it is medically needed before, during, or after your surgery? Yes   13. Have you or any of your relatives ever had problems with anesthesia? No   14. Do you have sleep apnea, excessive snoring or  daytime drowsiness? No   14a. Do you have a CPAP machine? -   15. Do you have any artifical heart valves or other implanted medical devices like a pacemaker, defibrillator, or continuous glucose monitor? No   16. Do you have artificial joints? No   17. Are you allergic to latex? No   18. Is there any chance that you may be pregnant? No       Health Care Directive:  Patient does not have a Health Care Directive or Living Will: Discussed advance care planning with patient; information given to patient to review.    Preoperative Review of :   reviewed - controlled substances reflected in medication list.      Status of Chronic Conditions:  Anxiety and Insomnia:     Review of Systems  CONSTITUTIONAL: NEGATIVE for fever, chills, change in weight  INTEGUMENTARY/SKIN: NEGATIVE for worrisome rashes, moles or lesions  EYES: NEGATIVE for vision changes or irritation  ENT/MOUTH: NEGATIVE for ear, mouth and throat problems  RESP: NEGATIVE for significant cough or SOB  CV: shortness of breath with exertion since chemo NEGATIVE for chest pain, palpitations or peripheral edema  GI: NEGATIVE for nausea, abdominal pain, heartburn, or change in bowel habits  : NEGATIVE for frequency, dysuria, or hematuria  MUSCULOSKELETAL: NEGATIVE for significant arthralgias or myalgia  NEURO: numbness/sensitive feet after chemo NEGATIVE for weakness, dizziness or paresthesias  ENDOCRINE: NEGATIVE for temperature intolerance, skin/hair changes  HEME: NEGATIVE for bleeding problems  PSYCHIATRIC: NEGATIVE for changes in mood or affect    Patient Active Problem List    Diagnosis Date Noted     S/P breast reconstruction, bilateral 06/30/2022     Priority: Medium     Chemotherapy-induced neuropathy (grade 3, 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 for BRCA 1/2) 03/04/2022     Priority: Medium     Check 3.23 for f/u Darabi       Situational anxiety 04/08/2020     Priority: Medium     Cervical high risk HPV (human papillomavirus) test positive 03/15/2020     Priority: Medium     3/14/13 NIL pap.  9/15/20 NIL pap, + HR HPV (not 16 or 18). Plan cotest in 1 year  9/8/21 NIL Pap, Neg HPV. Plan cotest in 1 year.   8/18/22 Reminder mychart/letter  9/16/22 Reminder call -- Pt notified  10/26/22 lost to follow up         Chronic, continuous use of opioids 11/13/2019     Priority: Medium     Patient is followed by Quoc Chu MD for ongoing prescription of pain medication.  All refills should be approved by this provider only at face-to-face appointments - not by phone request.    Medication(s): Tramadol 50mg.   Maximum quantity per month: 120  Clinic visit frequency required: Q 3 months      Controlled substance agreement:  Encounter-Level CSA:    There are no encounter-level csa.     Patient-Level CSA:    There are no patient-level csa.       Pain Clinic evaluation in the past: Yes       Date/Location:  Had seen pain clinic in [place] many years ago.    DIRE Total Score(s):  No flowsheet data found.    Last [facility] website verification:  done on 1/6/2021   [email or web address]         Osteopenia of multiple sites 12/12/2018     Priority: Medium     on fosamax 35mg daily since 2014       Restless legs syndrome 12/12/2018     Priority: Medium     Lower extremity edema 12/12/2018     Priority: Medium     Sciatica of right side 12/07/2015     Priority: Medium     Overview:   Chronic. Takes Norco as needed.         Past Medical History:   Diagnosis Date     Cervical high risk HPV (human papillomavirus) test positive 03/15/2020    See problem list     Motion sickness      Past Surgical History:   Procedure Laterality Date      ARTHROSCOPY SHOULDER ROTATOR CUFF REPAIR Right      ARTHROSCOPY SHOULDER ROTATOR CUFF REPAIR Left       SECTION       CHOLECYSTECTOMY       CYST REMOVAL      on chest     DAVINCI BYPASS GASTRIC ROBERT-EN-Y  2001     HYSTERECTOMY SUPRACERVICAL       INSERT PORT VASCULAR ACCESS Left 2022    Procedure: INSERTION, VASCULAR ACCESS PORT;  Surgeon: Baldemar Marina DO;  Location: [facility]     MASTECTOMY SIMPLE Left 2022    Procedure: MASTECTOMY, SIMPLE, LEFT;  Surgeon: Baldemar Marina DO;  Location: [facility]     MASTECTOMY SIMPLE, SENTINEL NODE, COMBINED Right 2022    Procedure: MASTECTOMY, SIMPLE, WITH SENTINEL LYMPH NODE DISSECTION, RIGHT;  Surgeon: Baldemar Marina DO;  Location: [facility]     RECONSTRUCT BREAST, INSERT TISSUE EXPANDER BILATERAL, COMBINED Bilateral 2022    Procedure: RECONSTRUCTION, BREAST, BILATERAL, WITH TISSUE EXPANDER INSERTION;  Surgeon: ROSEMARIE Nieto MD;  Location: [facility]     REMOVE AND REPLACE BREAST IMPLANT PROSTHESIS Bilateral 10/7/2022    Procedure: Bilateral breast implant placement and revision;  Surgeon: ROSEMARIE Nieto MD;  Location: [facility] OR     right ulnar nerve repositioning surgery       Current Outpatient Medications   Medication Sig Dispense Refill     acetaminophen (TYLENOL) 325 MG tablet Take 2 tablets (650 mg) by mouth every 4 hours as needed for other (mild pain) 100 tablet 0     busPIRone (BUSPAR) 7.5 MG tablet Take 1 tablet (7.5 mg) by mouth 2 times daily for 7 days, THEN 2 tablets (15 mg) 2 times daily for 30 days. 180 tablet 1     citalopram (CELEXA) 40 MG tablet Take 1 tablet (40 mg) by mouth daily 90 tablet 1     cyanocobalamin (CYANOCOBALAMIN) 1000 MCG/ML injection Inject 1 mL (1,000 mcg) into the muscle every 30 days 3 mL 3     cyclobenzaprine (FLEXERIL) 5 MG tablet Take 1 tablet (5 mg) by mouth 2 times daily as needed for muscle spasms 40 tablet 5     diclofenac (VOLTAREN) 1 % topical gel Place 2 g onto the skin 4  times daily 100 g 3     doxepin (SILENOR) 3 MG tablet Take 1 tablet (3 mg) by mouth At Bedtime 90 tablet 3     furosemide (LASIX) 20 MG tablet TAKE 1 TABLET(20 MG) BY MOUTH DAILY AS NEEDED FOR SWELLING 60 tablet 0     HYDROcodone-acetaminophen (NORCO) 5-325 MG tablet Take 1 tablet by mouth every 6 hours as needed for severe pain (7-10) 90 tablet 0     lidocaine-prilocaine (EMLA) 2.5-2.5 % external cream Apply thick layer to port site and cover with clear dressing or saran wrap 30-60 mins prior to appt. 30 g 0     LORazepam (ATIVAN) 0.5 MG tablet        LORazepam (ATIVAN) 0.5 MG tablet Take 1-2 tablets (0.5-1 mg) by mouth every 8 hours as needed for anxiety 60 tablet 3     omeprazole (PRILOSEC) 20 MG DR capsule As needed       potassium chloride ER (K-TAB) 20 MEQ CR tablet TAKE 1 TABLET(20 MEQ) BY MOUTH DAILY 90 tablet 0     rOPINIRole (REQUIP) 1 MG tablet TAKE 1 TABLET(1 MG) BY MOUTH AT BEDTIME 90 tablet 0     senna-docusate (SENOKOT-S/PERICOLACE) 8.6-50 MG tablet Take 1-2 tablets by mouth 2 times daily 30 tablet 0     sucralfate (CARAFATE) 1 GM tablet Take 1 tablet (1 g) by mouth 4 times daily as needed (Abdominal discomfort) 30 tablet 3     traMADol (ULTRAM) 50 MG tablet Take 1 tablet (50 mg) by mouth every 6 hours as needed for severe pain 120 tablet 5     valACYclovir (VALTREX) 1000 mg tablet TAKE 1 TABLET(1000 MG) BY MOUTH TWICE DAILY. REPEAT AS NEEDED FOR COLD SORE 16 tablet 3     VITAMIN D3 50 MCG (2000 UT) tablet TAKE 1 TABLET BY MOUTH EVERY DAY 90 tablet 3     alendronate (FOSAMAX) 35 MG tablet (on hold until after surgery and immune therapy) TAKE 1 TABLET(35 MG) BY MOUTH EVERY 7 DAYS (Patient not taking: Reported on 11/30/2022) 12 tablet 3            ondansetron (ZOFRAN ODT) 4 MG ODT tab Take 1 tablet (4 mg) by mouth every 8 hours as needed for nausea (Patient not taking: Reported on 2/3/2023) 4 tablet 0     triamcinolone (KENALOG) 0.1 % external cream Apply topically 2 times daily Apply to rash until  "improved 30 g 1       No Known Allergies     Social History     Tobacco Use     Smoking status: Former     Types: Cigarettes     Start date: 1982     Quit date: 1984     Years since quittin.1     Smokeless tobacco: Never   Substance Use Topics     Alcohol use: Yes     Comment: occ       History   Drug Use No         Objective     BP (!) 130/92 (BP Location: Right arm, Patient Position: Sitting, Cuff Size: Adult Large)   Pulse 65   Temp 97.8  F (36.6  C) (Tympanic)   Resp 16   Ht 1.74 m (5' 8.5\")   Wt 95.4 kg (210 lb 6.4 oz)   LMP  (LMP Unknown)   SpO2 97%   BMI 31.52 kg/m      Physical Exam    GENERAL APPEARANCE: healthy, alert and no distress     EYES: EOMI, PERRL     HENT: ear canals and TM's normal and nose and mouth without ulcers or lesions     NECK: no adenopathy, no asymmetry, masses, or scars and thyroid normal to palpation     RESP: lungs clear to auscultation - no rales, rhonchi or wheezes     CV: regular rates and rhythm, normal S1 S2, no S3 or S4 and no murmur, click or rub     ABDOMEN:  soft, nontender, no HSM or masses and bowel sounds normal     MS: extremities normal- no gross deformities noted, no evidence of inflammation in joints, FROM in all extremities.     SKIN: no suspicious lesions or rashes     NEURO: Normal strength and tone, sensory exam grossly normal, mentation intact and speech normal     PSYCH: mentation appears normal. and affect normal/bright     LYMPHATICS: No cervical adenopathy    Recent Labs   Lab Test 23  0908 22  1426 22  0833 22  1502 22  1457   HGB  --   --   --  11.4* 10.1*   PLT  --   --   --  315 238    141   < > 139  --    POTASSIUM 3.6 4.3   < > 3.7  --    CR 0.68 0.69   < > 0.63  --     < > = values in this interval not displayed.        Diagnostics:  No labs were ordered during this visit.   EKG: sinus bradycardia, normal axis, normal intervals, no acute ST/T changes c/w ischemia, no LVH by voltage criteria, " unchanged from previous tracings    Revised Cardiac Risk Index (RCRI):  The patient has the following serious cardiovascular risks for perioperative complications:   - No serious cardiac risks = 0 points     RCRI Interpretation: 0 points: Class I (very low risk - 0.4% complication rate)           Signed Electronically by: Quoc Chu MD  Copy of this evaluation report is provided to requesting physician.      Answers for HPI/ROS submitted by the patient on 2/3/2023  If you checked off any problems, how difficult have these problems made it for you to do your work, take care of things at home, or get along with other people?: Very difficult  PHQ9 TOTAL SCORE: 14  SHADE 7 TOTAL SCORE: 16

## 2023-02-08 ENCOUNTER — ANESTHESIA EVENT (OUTPATIENT)
Dept: SURGERY | Facility: AMBULATORY SURGERY CENTER | Age: 58
End: 2023-02-08
Payer: OTHER GOVERNMENT

## 2023-02-09 NOTE — ANESTHESIA PREPROCEDURE EVALUATION
Anesthesia Pre-Procedure Evaluation    Patient: Diana Salvador   MRN: [id number] : 1965        Procedure : Procedure(s):  Bilteral breast fat grafting from abdomen possible thighs          Past Medical History:   Diagnosis Date     Cervical high risk HPV (human papillomavirus) test positive 03/15/2020    See problem list     Motion sickness       Past Surgical History:   Procedure Laterality Date     ARTHROSCOPY SHOULDER ROTATOR CUFF REPAIR Right      ARTHROSCOPY SHOULDER ROTATOR CUFF REPAIR Left       SECTION       CHOLECYSTECTOMY       CYST REMOVAL      on chest     DAVINCI BYPASS GASTRIC ROBERT-EN-Y       HYSTERECTOMY SUPRACERVICAL       INSERT PORT VASCULAR ACCESS Left 2022    Procedure: INSERTION, VASCULAR ACCESS PORT;  Surgeon: Baldemar Marina DO;  Location: [facility]     MASTECTOMY SIMPLE Left 2022    Procedure: MASTECTOMY, SIMPLE, LEFT;  Surgeon: Baldemar Marina DO;  Location: [facility]     MASTECTOMY SIMPLE, SENTINEL NODE, COMBINED Right 2022    Procedure: MASTECTOMY, SIMPLE, WITH SENTINEL LYMPH NODE DISSECTION, RIGHT;  Surgeon: Baldemar Marina DO;  Location: [facility]     RECONSTRUCT BREAST, INSERT TISSUE EXPANDER BILATERAL, COMBINED Bilateral 2022    Procedure: RECONSTRUCTION, BREAST, BILATERAL, WITH TISSUE EXPANDER INSERTION;  Surgeon: ROSEMARIE Nieto MD;  Location: [facility]     REMOVE AND REPLACE BREAST IMPLANT PROSTHESIS Bilateral 10/7/2022    Procedure: Bilateral breast implant placement and revision;  Surgeon: ROSEMARIE Nieto MD;  Location: [facility]     right ulnar nerve repositioning surgery        No Known Allergies   Social History     Tobacco Use     Smoking status: Former     Types: Cigarettes     Start date: 1982     Quit date: 1984     Years since quittin.1     Smokeless tobacco: Never   Substance Use Topics     Alcohol use: Yes     Comment: occ      Wt Readings from Last 1 Encounters:   23 95.4 kg (210 lb 6.4 oz)         Anesthesia Evaluation   Pt has had prior anesthetic.     History of anesthetic complications  - motion sickness.      ROS/MED HX  ENT/Pulmonary:  - neg pulmonary ROS     Neurologic:     (+) peripheral neuropathy (right sciatica),     Cardiovascular:  - neg cardiovascular ROS  (-) murmur   METS/Exercise Tolerance: >4 METS    Hematologic:  - neg hematologic  ROS     Musculoskeletal:  - neg musculoskeletal ROS     GI/Hepatic:  - neg GI/hepatic ROS     Renal/Genitourinary:  - neg Renal ROS     Endo:  - neg endo ROS     Psychiatric/Substance Use:     (+) H/O chronic opiod use .     Infectious Disease:  - neg infectious disease ROS     Malignancy:   (+) Malignancy, History of Breast.Breast CA Remission status post.        Other:  - neg other ROS          Physical Exam    Airway        Mallampati: I   TM distance: > 3 FB   Neck ROM: full   Mouth opening: > 3 cm    Respiratory Devices and Support         Dental     Comment: Upper dentures    (+) Removable bridges or other hardware      Cardiovascular          Rhythm and rate: regular and normal (-) no murmur    Pulmonary   pulmonary exam normal        breath sounds clear to auscultation           OUTSIDE LABS:  CBC:   Lab Results   Component Value Date    WBC 5.7 07/21/2022    WBC 4.5 06/30/2022    HGB 11.4 (L) 07/21/2022    HGB 10.1 (L) 06/30/2022    HCT 36.9 07/21/2022    HCT 31.7 (L) 06/30/2022     07/21/2022     06/30/2022     BMP:   Lab Results   Component Value Date     01/12/2023     12/22/2022    POTASSIUM 3.6 01/12/2023    POTASSIUM 4.3 12/22/2022    CHLORIDE 103 01/12/2023    CHLORIDE 106 12/22/2022    CO2 25 01/12/2023    CO2 27 12/22/2022    BUN 17.5 01/12/2023    BUN 12.2 12/22/2022    CR 0.68 01/12/2023    CR 0.69 12/22/2022     (H) 01/12/2023     (H) 12/22/2022     COAGS: No results found for: PTT, INR, FIBR  POC:   Lab Results   Component Value Date    HCGS Negative 08/05/2019     HEPATIC:   Lab Results    Component Value Date    ALBUMIN 3.8 01/12/2023    PROTTOTAL 6.8 01/12/2023    ALT 8 (L) 01/12/2023    AST 14 01/12/2023    ALKPHOS 105 (H) 01/12/2023    BILITOTAL 0.2 01/12/2023     OTHER:   Lab Results   Component Value Date    ANURADHA 8.9 01/12/2023    LIPASE 64 (L) 08/05/2019    TSH 1.48 01/12/2023       Anesthesia Plan    ASA Status:  2      Anesthesia Type: General.     - Airway: LMA   Induction: Intravenous, Propofol.   Maintenance: Balanced.        Consents            Postoperative Care    Pain management: IV analgesics, Oral pain medications, Multi-modal analgesia.   PONV prophylaxis: Ondansetron (or other 5HT-3), Dexamethasone or Solumedrol, Background Propofol Infusion     Comments:    Other Comments: Discussed plan for general anesthetic with LMA. Discussed risks of sore throat, post op pain/nausea, oropharyngeal damage, rare major complications.         H&P reviewed: Unable to attach H&P to encounter due to EHR limitations. H&P Update: appropriate H&P reviewed, patient examined. No interval changes since H&P (within 30 days).         Javid Albert MD

## 2023-02-10 ENCOUNTER — ANESTHESIA (OUTPATIENT)
Dept: SURGERY | Facility: AMBULATORY SURGERY CENTER | Age: 58
End: 2023-02-10
Payer: OTHER GOVERNMENT

## 2023-02-10 ENCOUNTER — HOSPITAL ENCOUNTER (EMERGENCY)
Facility: CLINIC | Age: 58
Discharge: HOME | End: 2023-02-10
Attending: NURSE PRACTITIONER | Admitting: NURSE PRACTITIONER
Payer: OTHER GOVERNMENT

## 2023-02-10 ENCOUNTER — HOSPITAL ENCOUNTER (OUTPATIENT)
Facility: AMBULATORY SURGERY CENTER | Age: 58
Discharge: HOME | End: 2023-02-10
Attending: PLASTIC SURGERY | Admitting: PLASTIC SURGERY
Payer: OTHER GOVERNMENT

## 2023-02-10 VITALS
WEIGHT: 215 LBS | RESPIRATION RATE: 16 BRPM | BODY MASS INDEX: 32.21 KG/M2 | HEART RATE: 66 BPM | SYSTOLIC BLOOD PRESSURE: 116 MMHG | DIASTOLIC BLOOD PRESSURE: 73 MMHG | TEMPERATURE: 97.3 F | OXYGEN SATURATION: 95 %

## 2023-02-10 VITALS
DIASTOLIC BLOOD PRESSURE: 90 MMHG | TEMPERATURE: 97 F | WEIGHT: 190 LBS | SYSTOLIC BLOOD PRESSURE: 133 MMHG | HEART RATE: 71 BPM | RESPIRATION RATE: 18 BRPM | HEIGHT: 68 IN | BODY MASS INDEX: 28.79 KG/M2 | OXYGEN SATURATION: 93 %

## 2023-02-10 DIAGNOSIS — T14.8XXA DRAINAGE FROM WOUND: ICD-10-CM

## 2023-02-10 DIAGNOSIS — Z98.890 S/P BREAST RECONSTRUCTION, BILATERAL: Primary | ICD-10-CM

## 2023-02-10 PROCEDURE — 99283 EMERGENCY DEPT VISIT LOW MDM: CPT | Performed by: NURSE PRACTITIONER

## 2023-02-10 PROCEDURE — 15772 GRFG AUTOL FAT LIPO EA ADDL: CPT | Mod: GC | Performed by: PLASTIC SURGERY

## 2023-02-10 PROCEDURE — G8907 PT DOC NO EVENTS ON DISCHARG: HCPCS

## 2023-02-10 PROCEDURE — 250N000013 HC RX MED GY IP 250 OP 250 PS 637: Performed by: NURSE PRACTITIONER

## 2023-02-10 PROCEDURE — 15771 GRFG AUTOL FAT LIPO 50 CC/<: CPT

## 2023-02-10 PROCEDURE — 15772 GRFG AUTOL FAT LIPO EA ADDL: CPT

## 2023-02-10 PROCEDURE — 15771 GRFG AUTOL FAT LIPO 50 CC/<: CPT | Mod: GC | Performed by: PLASTIC SURGERY

## 2023-02-10 PROCEDURE — G8916 PT W IV AB GIVEN ON TIME: HCPCS

## 2023-02-10 RX ORDER — FENTANYL CITRATE 50 UG/ML
25 INJECTION, SOLUTION INTRAMUSCULAR; INTRAVENOUS EVERY 5 MIN PRN
Status: DISCONTINUED | OUTPATIENT
Start: 2023-02-10 | End: 2023-02-11 | Stop reason: HOSPADM

## 2023-02-10 RX ORDER — LABETALOL HYDROCHLORIDE 5 MG/ML
10 INJECTION, SOLUTION INTRAVENOUS
Status: DISCONTINUED | OUTPATIENT
Start: 2023-02-10 | End: 2023-02-11 | Stop reason: HOSPADM

## 2023-02-10 RX ORDER — OXYCODONE HYDROCHLORIDE 5 MG/1
5 TABLET ORAL ONCE
Status: COMPLETED | OUTPATIENT
Start: 2023-02-10 | End: 2023-02-10

## 2023-02-10 RX ORDER — DEXAMETHASONE SODIUM PHOSPHATE 4 MG/ML
4 INJECTION, SOLUTION INTRA-ARTICULAR; INTRALESIONAL; INTRAMUSCULAR; INTRAVENOUS; SOFT TISSUE
Status: DISCONTINUED | OUTPATIENT
Start: 2023-02-10 | End: 2023-02-11 | Stop reason: HOSPADM

## 2023-02-10 RX ORDER — LIDOCAINE HYDROCHLORIDE 20 MG/ML
INJECTION, SOLUTION INFILTRATION; PERINEURAL PRN
Status: DISCONTINUED | OUTPATIENT
Start: 2023-02-10 | End: 2023-02-10

## 2023-02-10 RX ORDER — OXYCODONE HYDROCHLORIDE 5 MG/1
5 TABLET ORAL EVERY 4 HOURS PRN
Status: DISCONTINUED | OUTPATIENT
Start: 2023-02-10 | End: 2023-02-11 | Stop reason: HOSPADM

## 2023-02-10 RX ORDER — SODIUM CHLORIDE, SODIUM LACTATE, POTASSIUM CHLORIDE, CALCIUM CHLORIDE 600; 310; 30; 20 MG/100ML; MG/100ML; MG/100ML; MG/100ML
INJECTION, SOLUTION INTRAVENOUS CONTINUOUS
Status: DISCONTINUED | OUTPATIENT
Start: 2023-02-10 | End: 2023-02-11 | Stop reason: HOSPADM

## 2023-02-10 RX ORDER — FENTANYL CITRATE 50 UG/ML
INJECTION, SOLUTION INTRAMUSCULAR; INTRAVENOUS PRN
Status: DISCONTINUED | OUTPATIENT
Start: 2023-02-10 | End: 2023-02-10

## 2023-02-10 RX ORDER — PROPOFOL 10 MG/ML
INJECTION, EMULSION INTRAVENOUS CONTINUOUS PRN
Status: DISCONTINUED | OUTPATIENT
Start: 2023-02-10 | End: 2023-02-10

## 2023-02-10 RX ORDER — ONDANSETRON 2 MG/ML
4 INJECTION INTRAMUSCULAR; INTRAVENOUS EVERY 30 MIN PRN
Status: DISCONTINUED | OUTPATIENT
Start: 2023-02-10 | End: 2023-02-11 | Stop reason: HOSPADM

## 2023-02-10 RX ORDER — ACETAMINOPHEN 325 MG/1
975 TABLET ORAL ONCE
Status: DISCONTINUED | OUTPATIENT
Start: 2023-02-10 | End: 2023-02-11 | Stop reason: HOSPADM

## 2023-02-10 RX ORDER — OXYCODONE HYDROCHLORIDE 5 MG/1
10 TABLET ORAL EVERY 4 HOURS PRN
Status: DISCONTINUED | OUTPATIENT
Start: 2023-02-10 | End: 2023-02-11 | Stop reason: HOSPADM

## 2023-02-10 RX ORDER — AMOXICILLIN 250 MG
1-2 CAPSULE ORAL 2 TIMES DAILY
Qty: 30 TABLET | Refills: 0 | Status: SHIPPED | OUTPATIENT
Start: 2023-02-10 | End: 2023-05-19

## 2023-02-10 RX ORDER — ONDANSETRON 4 MG/1
4 TABLET, ORALLY DISINTEGRATING ORAL EVERY 8 HOURS PRN
Qty: 4 TABLET | Refills: 0 | Status: SHIPPED | OUTPATIENT
Start: 2023-02-10 | End: 2023-09-01

## 2023-02-10 RX ORDER — HALOPERIDOL 5 MG/ML
1 INJECTION INTRAMUSCULAR
Status: DISCONTINUED | OUTPATIENT
Start: 2023-02-10 | End: 2023-02-11 | Stop reason: HOSPADM

## 2023-02-10 RX ORDER — ONDANSETRON 4 MG/1
4 TABLET, ORALLY DISINTEGRATING ORAL EVERY 30 MIN PRN
Status: DISCONTINUED | OUTPATIENT
Start: 2023-02-10 | End: 2023-02-11 | Stop reason: HOSPADM

## 2023-02-10 RX ORDER — DEXAMETHASONE SODIUM PHOSPHATE 10 MG/ML
INJECTION, SOLUTION INTRAMUSCULAR; INTRAVENOUS PRN
Status: DISCONTINUED | OUTPATIENT
Start: 2023-02-10 | End: 2023-02-10

## 2023-02-10 RX ORDER — KETOROLAC TROMETHAMINE 30 MG/ML
15 INJECTION, SOLUTION INTRAMUSCULAR; INTRAVENOUS
Status: DISCONTINUED | OUTPATIENT
Start: 2023-02-10 | End: 2023-02-11 | Stop reason: HOSPADM

## 2023-02-10 RX ORDER — FENTANYL CITRATE 50 UG/ML
25 INJECTION, SOLUTION INTRAMUSCULAR; INTRAVENOUS
Status: DISCONTINUED | OUTPATIENT
Start: 2023-02-10 | End: 2023-02-11 | Stop reason: HOSPADM

## 2023-02-10 RX ORDER — LIDOCAINE 40 MG/G
CREAM TOPICAL
Status: DISCONTINUED | OUTPATIENT
Start: 2023-02-10 | End: 2023-02-11 | Stop reason: HOSPADM

## 2023-02-10 RX ORDER — FENTANYL CITRATE 50 UG/ML
50 INJECTION, SOLUTION INTRAMUSCULAR; INTRAVENOUS EVERY 5 MIN PRN
Status: DISCONTINUED | OUTPATIENT
Start: 2023-02-10 | End: 2023-02-11 | Stop reason: HOSPADM

## 2023-02-10 RX ORDER — PROPOFOL 10 MG/ML
INJECTION, EMULSION INTRAVENOUS PRN
Status: DISCONTINUED | OUTPATIENT
Start: 2023-02-10 | End: 2023-02-10

## 2023-02-10 RX ORDER — HYDROXYZINE HYDROCHLORIDE 25 MG/1
25 TABLET, FILM COATED ORAL EVERY 6 HOURS PRN
Status: DISCONTINUED | OUTPATIENT
Start: 2023-02-10 | End: 2023-02-11 | Stop reason: HOSPADM

## 2023-02-10 RX ORDER — CEFAZOLIN SODIUM 2 G/100ML
2 INJECTION, SOLUTION INTRAVENOUS SEE ADMIN INSTRUCTIONS
Status: DISCONTINUED | OUTPATIENT
Start: 2023-02-10 | End: 2023-02-11 | Stop reason: HOSPADM

## 2023-02-10 RX ORDER — OXYCODONE HYDROCHLORIDE 5 MG/1
5-10 TABLET ORAL EVERY 6 HOURS PRN
Qty: 10 TABLET | Refills: 0 | Status: SHIPPED | OUTPATIENT
Start: 2023-02-10 | End: 2023-02-13

## 2023-02-10 RX ORDER — CEFAZOLIN SODIUM 2 G/100ML
2 INJECTION, SOLUTION INTRAVENOUS
Status: COMPLETED | OUTPATIENT
Start: 2023-02-10 | End: 2023-02-10

## 2023-02-10 RX ORDER — HYDRALAZINE HYDROCHLORIDE 20 MG/ML
2.5-5 INJECTION INTRAMUSCULAR; INTRAVENOUS EVERY 10 MIN PRN
Status: DISCONTINUED | OUTPATIENT
Start: 2023-02-10 | End: 2023-02-11 | Stop reason: HOSPADM

## 2023-02-10 RX ORDER — EPHEDRINE SULFATE 50 MG/ML
INJECTION, SOLUTION INTRAMUSCULAR; INTRAVENOUS; SUBCUTANEOUS PRN
Status: DISCONTINUED | OUTPATIENT
Start: 2023-02-10 | End: 2023-02-10

## 2023-02-10 RX ORDER — ALBUTEROL SULFATE 0.83 MG/ML
2.5 SOLUTION RESPIRATORY (INHALATION) EVERY 4 HOURS PRN
Status: DISCONTINUED | OUTPATIENT
Start: 2023-02-10 | End: 2023-02-11 | Stop reason: HOSPADM

## 2023-02-10 RX ORDER — LORAZEPAM 2 MG/ML
.5-1 INJECTION INTRAMUSCULAR
Status: DISCONTINUED | OUTPATIENT
Start: 2023-02-10 | End: 2023-02-11 | Stop reason: HOSPADM

## 2023-02-10 RX ORDER — MEPERIDINE HYDROCHLORIDE 25 MG/ML
12.5 INJECTION INTRAMUSCULAR; INTRAVENOUS; SUBCUTANEOUS EVERY 5 MIN PRN
Status: DISCONTINUED | OUTPATIENT
Start: 2023-02-10 | End: 2023-02-11 | Stop reason: HOSPADM

## 2023-02-10 RX ORDER — ACETAMINOPHEN 325 MG/1
975 TABLET ORAL ONCE
Status: COMPLETED | OUTPATIENT
Start: 2023-02-10 | End: 2023-02-10

## 2023-02-10 RX ORDER — DIMENHYDRINATE 50 MG/ML
25 INJECTION, SOLUTION INTRAMUSCULAR; INTRAVENOUS
Status: DISCONTINUED | OUTPATIENT
Start: 2023-02-10 | End: 2023-02-11 | Stop reason: HOSPADM

## 2023-02-10 RX ADMIN — ACETAMINOPHEN 975 MG: 325 TABLET ORAL at 06:30

## 2023-02-10 RX ADMIN — FENTANYL CITRATE 50 MCG: 50 INJECTION, SOLUTION INTRAMUSCULAR; INTRAVENOUS at 07:04

## 2023-02-10 RX ADMIN — ONDANSETRON 4 MG: 2 INJECTION INTRAMUSCULAR; INTRAVENOUS at 08:13

## 2023-02-10 RX ADMIN — FENTANYL CITRATE 25 MCG: 50 INJECTION, SOLUTION INTRAMUSCULAR; INTRAVENOUS at 08:40

## 2023-02-10 RX ADMIN — PROPOFOL 150 MCG/KG/MIN: 10 INJECTION, EMULSION INTRAVENOUS at 07:04

## 2023-02-10 RX ADMIN — EPHEDRINE SULFATE 10 MG: 50 INJECTION, SOLUTION INTRAMUSCULAR; INTRAVENOUS; SUBCUTANEOUS at 07:36

## 2023-02-10 RX ADMIN — PROPOFOL 150 MG: 10 INJECTION, EMULSION INTRAVENOUS at 07:04

## 2023-02-10 RX ADMIN — Medication 0.5 MG: at 08:13

## 2023-02-10 RX ADMIN — SODIUM CHLORIDE, SODIUM LACTATE, POTASSIUM CHLORIDE, CALCIUM CHLORIDE: 600; 310; 30; 20 INJECTION, SOLUTION INTRAVENOUS at 06:50

## 2023-02-10 RX ADMIN — LIDOCAINE HYDROCHLORIDE 60 MG: 20 INJECTION, SOLUTION INFILTRATION; PERINEURAL at 07:04

## 2023-02-10 RX ADMIN — OXYCODONE HYDROCHLORIDE 5 MG: 5 TABLET ORAL at 22:35

## 2023-02-10 RX ADMIN — EPHEDRINE SULFATE 5 MG: 50 INJECTION, SOLUTION INTRAMUSCULAR; INTRAVENOUS; SUBCUTANEOUS at 07:39

## 2023-02-10 RX ADMIN — FENTANYL CITRATE 25 MCG: 50 INJECTION, SOLUTION INTRAMUSCULAR; INTRAVENOUS at 08:31

## 2023-02-10 RX ADMIN — OXYCODONE HYDROCHLORIDE 5 MG: 5 TABLET ORAL at 08:59

## 2023-02-10 RX ADMIN — EPHEDRINE SULFATE 5 MG: 50 INJECTION, SOLUTION INTRAMUSCULAR; INTRAVENOUS; SUBCUTANEOUS at 07:55

## 2023-02-10 RX ADMIN — DEXAMETHASONE SODIUM PHOSPHATE 10 MG: 10 INJECTION, SOLUTION INTRAMUSCULAR; INTRAVENOUS at 07:07

## 2023-02-10 RX ADMIN — CEFAZOLIN SODIUM 2 G: 2 INJECTION, SOLUTION INTRAVENOUS at 06:58

## 2023-02-10 RX ADMIN — Medication 100 MCG: at 07:34

## 2023-02-10 NOTE — OP NOTE
PREOPERATIVE DIAGNOSIS: Status post bilateral breast reconstruction for breast cancer. Now with indentations in the breast mound construct.    POSTOPERATIVE DIAGNOSIS: Status post bilateral breast reconstruction for breast cancer. Now with indentations in the breast mound construct.    PROCEDURES: Fat grafting of bilateral breast construct with fat harvested from the abdomen, flanks and thighs. A total of 200 cc of fat injection in multiple planes.     SURGEON: Al Nieto MD     RESIDENT: Paresh Mcgee MD     ANESTHESIA: General anesthesia with endotracheal intubation.     COMPLICATIONS: Nil.     DRAINS: Nil.     SPECIMENS: Nil.     BLOOD LOSS: 10 mL    DESCRIPTION OF PROCEDURE: After informed consent was taken from the patient,  the proper site and procedure was ascertained with her and she was appropriately marked and taken to the operating room. She was placed in a supine position with her knees comfortably flexed with pillows underneath them, and pneumoboots placed and running prior to induction of anesthesia. Preoperative antibiotics were given in the OR. All pressure points were padded. General anesthesia was administered without any complications. I had preoperatively marked out the harvest site and injection sites. I went ahead and injected tumescent solution in the subcutaneous plane of the abdomen (The tumescent solution was made up of 1L LR, mixed with 30cc 1% lidocaine and 1 cc of 1:1000 epinephrine). I waited about 10 minutes for the medication to take effect, then harvested the fat using 5 mm cannulas and used the Tiempy system to capture the fat until we had about 400 cc of fatty effluent in the system. We then washed it 4 different times with warm Ringer's lactate and then collected all the fat in 10 cc syringes. A total of 200 cc was then injected through multiple incision sites in multiple planes of the injection sites. Extreme care was taken not to injure the underlying implant. A fanning  technique was used with Lawrence catheters to carry out the fat grafting. Once it was completed, we then closed all the stab incisions with 5-0 fast absorbing gut suture. A pressure dressing was placed over the abdomen. The patient tolerated the procedure well. All counts were correct at the end of the case. The patient was extubated and sent to recovery room in a stable condition.

## 2023-02-10 NOTE — ANESTHESIA CARE TRANSFER NOTE
Patient: Diana Salvador    Procedure: Procedure(s):  Bilteral breast fat grafting from abdomen possible thighs       Diagnosis: S/P breast reconstruction, bilateral [Z98.890]  Diagnosis Additional Information: No value filed.    Anesthesia Type:   General     Note:    Oropharynx: oropharynx clear of all foreign objects  Level of Consciousness: awake  Oxygen Supplementation: nasal cannula    Independent Airway: airway patency satisfactory and stable  Dentition: dentition unchanged  Vital Signs Stable: post-procedure vital signs reviewed and stable  Report to RN Given: handoff report given  Patient transferred to: PACU    Handoff Report: Identifed the Patient, Identified the Reponsible Provider, Reviewed the pertinent medical history, Discussed the surgical course, Reviewed Intra-OP anesthesia mangement and issues during anesthesia, Set expectations for post-procedure period and Allowed opportunity for questions and acknowledgement of understanding      Vitals:  Vitals Value Taken Time   /81 02/10/23 0818   Temp     Pulse 83 02/10/23 0821   Resp 13 02/10/23 0821   SpO2 91 % 02/10/23 0821   Vitals shown include unvalidated device data.    Electronically Signed By: ROSALINDA Eddy CRNA  February 10, 2023  8:22 AM

## 2023-02-10 NOTE — BRIEF OP NOTE
[facility]    Brief Operative Note    Pre-operative diagnosis: S/P breast reconstruction, bilateral [Z98.890]  Post-operative diagnosis Same as pre-operative diagnosis    Procedure: Procedure(s):  Bilteral breast fat grafting from abdomen possible thighs  Surgeon: Surgeon(s) and Role:     * ROSEMARIE Nieto MD - Primary   Paresh Gray MD -   Anesthesia: General   Estimated Blood Loss: 10 mL from 2/10/2023  7:00 AM to 2/10/2023  8:17 AM      Drains: None  Specimens: * No specimens in log *  Findings:   None.  Complications: None.  Implants: * No implants in log *

## 2023-02-10 NOTE — ANESTHESIA POSTPROCEDURE EVALUATION
Patient: Diana Salvador    Procedure: Procedure(s):  Bilteral breast fat grafting from abdomen possible thighs       Anesthesia Type:  General    Note:  Disposition: Outpatient   Postop Pain Control: Uneventful            Sign Out: Well controlled pain   PONV: No   Neuro/Psych: Uneventful            Sign Out: Acceptable/Baseline neuro status   Airway/Respiratory: Uneventful            Sign Out: Acceptable/Baseline resp. status   CV/Hemodynamics: Uneventful            Sign Out: Acceptable CV status; No obvious hypovolemia; No obvious fluid overload   Other NRE:    DID A NON-ROUTINE EVENT OCCUR? No           Last vitals:  Vitals Value Taken Time   /79 02/10/23 0900   Temp 97.3  F (36.3  C) 02/10/23 0900   Pulse 71 02/10/23 0900   Resp 11 02/10/23 0900   SpO2 97 % 02/10/23 0900   Vitals shown include unvalidated device data.    Electronically Signed By: Javid Albert MD  February 10, 2023  10:40 AM

## 2023-02-10 NOTE — DISCHARGE INSTRUCTIONS
FAT GRAFTING POST-OPERATIVE INSTRUCTIONS    Instructions      Have someone drive you home after surgery and help you at home for 1-2      days.     Get plenty of rest.     Follow balanced diet.     Decreased activity may promote constipation, so you may want to add      more raw fruit to your diet, and be sure to increase fluid intake.     Take pain medication as prescribed. Do not take aspirin or any products      containing aspirin unless approved by your surgeon.     Do not drink alcohol when taking pain medications.     Even when not taking pain medications, no alcohol for 3 weeks as it      causes fluid retention.     If you are taking vitamins with iron, resume these as tolerated.     Do not smoke, as smoking delays healing and increases the risk of      complications.    Activities     Do not drive until you are no longer taking any pain medications      (narcotics).     Start walking as soon as possible, this helps to reduce swelling and       lowers the chance of blood clots.     Unless stated on this form, discuss your time off work with your surgeon.    Treated Area Care      You may shower after 24 hours. The ACE wrap (if used) may be rewrapped as needed (if too tight or loose). Use it for support and may subtitute with a sports bra if preferred.  Wear the compression garment (spandex type clothing or the provided sponge and binder) in the area where the liposuction was performed to harvest the fat for the fat injection for 2 weeks post opor per the surgeon's recommendation.     Avoid exposing scars to sun for at least 12 months.     Always use a strong sunblock, if sun exposure is unavoidable (SPF 50 or      greater).     Inspect daily for signs of infection.     No tub soaking, bathing, or swimming while sutures or drains are in place.     You may wear makeup with sunblock protection shortly.     Stay out of the sun until redness and bruising subsides (usually 48      Hours).    What to Expect      Temporary stinging, throbbing, burning sensation, redness, swelling,       bruising, and excess fullness.     Some swelling, bruising or redness in the donor and recipient sites.     Swelling and puffiness may last several weeks.     Redness and bruising usually lasts about 48 hours.     Appearance     Improved skin texture.     Firmer and smoother skin.    Follow-Up Care     With regular follow-up treatments, you can easily maintain your new       look.     Repeated treatments may be necessary.    When to Call     If you have increased swelling or bruising.     If swelling and redness persist after a few days.     If you have increased redness along the incision.     If you have severe or increased pain not relieved by medication.     If you have any side effects to medications; such as, rash, nausea,      headache, vomiting.     If you have an oral temperature over 100.4 degrees.     If you have any yellowish or greenish drainage from the incisions or      notice a foul odor.     If you have bleeding from the incisions that is difficult to control with      light pressure.     If you have loss of feeling or motion.     If you have any sign of abscesses, open sores, skin peeling or lumpiness.    For Medical Questions, Please Call:     [phone], Monday - Friday, 8 a.m. - 4:30 p.m.     After hours and on weekends, call Hospital Paging at [phone] and      ask for the Plastic Surgeon on call.    [facility]   Adult Discharge Orders & Instructions     For 24 hours after surgery    Get plenty of rest.  A responsible adult must stay with you for at least 24 hours after you leave the hospital.   Do not drive or use heavy equipment.  If you have weakness or tingling, don't drive or use heavy equipment until this feeling goes away.  Do not drink alcohol.  Avoid strenuous or risky activities.  Ask for help when climbing stairs.   You may feel lightheaded.  IF so, sit for a few minutes before standing.   Have someone help you get up.   If you have nausea (feel sick to your stomach): Drink only clear liquids such as apple juice, ginger ale, broth or 7-Up.  Rest may also help.  Be sure to drink enough fluids.  Move to a regular diet as you feel able.  You may have a slight fever. Call the doctor if your fever is over 100 F (37.7 C) (taken under the tongue) or lasts longer than 24 hours.  You may have a dry mouth, a sore throat, muscle aches or trouble sleeping.  These should go away after 24 hours.  Do not make important or legal decisions.     Call your doctor for any of the followin.  Signs of infection (fever, growing tenderness at the surgery site, a large amount of drainage or bleeding, severe pain, foul-smelling drainage, redness, swelling).    2. It has been over 8 to 10 hours since surgery and you are still not able to urinate (pass water).    3.  Headache for over 24 hours.    4.  Numbness, tingling or weakness the day after surgery (if you had spinal anesthesia).                  5. Signs of Covid-19 infection (temperature over 100 degrees, shortness of breath, cough, loss of taste/smell, generalized body aches, persistent headache,                  chills, sore throat, nausea/vomiting/diarrhea).      Tylenol 975 mg was given at 630 am.    You should not take more then 4,000 mg of tylenol/acetaminophen in a 24 hour period.

## 2023-02-10 NOTE — ANESTHESIA PROCEDURE NOTES
Airway       Patient location during procedure: OR  Staff -        Performed By: CRNAIndications and Patient Condition       Indications for airway management: geovanna-procedural       Induction type:intravenous       Mask difficulty assessment: 1 - vent by mask    Final Airway Details       Final airway type: supraglottic airway    Supraglottic Airway Details        Type: LMA       Brand: LMA Unique       LMA size: 4    Post intubation assessment        Placement verified by: capnometry, equal breath sounds and chest rise        Number of attempts at approach: 1       Number of other approaches attempted: 0       Secured with: cloth tape       Ease of procedure: easy       Dentition: Intact

## 2023-02-11 NOTE — PROGRESS NOTES
[facility] Hematology and Oncology Outpatient Progress Note ([place])    Patient: Diana Salvador  MRN: [id number]          Reason for Visit    1. Stage IIB triple negative breast cancer  2. On neoadjuvant chemo    Primary Oncologist: Dr. Diamond    Assessment/Plan  1.   Stage IIB triple negative breast cancer; on neoadjuvant chemo + immunotherapy  2.   Chemo-induced neutropenia  After 3 cycles, developed severe grade 3 neuropathy and delayed neutropenia despite Neupogen after day 15. Therefore, last week chemo was omitted and she got single agent pembrolizumab for cycle 4.    Ongoing rash upper extremities, may be related to the pembrolizumab; otherwise no new side effects.     Clinical response in right breast tumor on exam.    Plan:  -While the overall plan is 4 cycles of carbo/taxol+immunotherapy, then 4 cycles of AC+immunotherapy neoadjuvantly, the development of grade 3 neuropathy and delayed neutropenia did not allow for further chemo with cycle 4. She did get Pembro alone last week.   - Dr. Diamond has requested her mastectomies be moved up since we are going to be limited in further chemo given her toxicities  -She will have breast mammogram/US next week to get a better assessment of response and feasibility of resection sooner  -Dr. Marina and Gerson are working on seeing her back sooner to plan for surgery. She will follow-up with Dr. Marina tomorrow.  -If there is delay in getting the coordinated surgery scheduled in a timely fashion, we can consider another cycle of pembro +/- carboplatin in two weeks (will need to omit Taxol with her neuropathy) - she is scheduled back with Dr. Diamond 6/8 to consider this.  -Following surgery, reassess for further adjuvant chemo    3.   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   She is only on gabapentin 300 mg twice daily with good tolerance, but no improvement. Using hydrocodone with some benefit.     Plan:  -gabapentin titration to dose that  provides best relief and tolerable: 300 mg TID now x 2 days; 300-300-600 mg x 3 days; 300-600-600 mg x 3 days; max 600 mg TID  -continue hydrocodone if needed  -If not getting benefit on gabapentin, other options would be to change to Lyrica or duloxetine (is on selective serotonin reuptake inhibitor for anxiety, so would need to consider drug interactions)    4.   Chemo vs immunotherapy dermatitis  Pruritic dermatitis on dorsal hand/thumbs R>L a few weeks ago.  Prior rash on trunk resolved.  Likley side effect of either Pembrolizumab since it flared a bit again after her cycle last week. Improved on Claritin and triamcinolone as needed.     Plan:  -Claritin daily  -Triamcinolone 0.1% to affected areas as needed, take breaks from this as able. Refill provided.    5.   Diffuse polyarthralgias, treatment-induced   Likley related to Taxol since it did not flare/recur after last week's dose of Pembrolizumab. Has hydrocodone on hand, as needed.     6.   Chemo-induced nausea  When on chemo, has done well on olanzapine x 7 days starting the night before day 1 of each cycle (before carboplatin and/or doxorubicin when this is started in future). Compazine or Zofran as needed.     7.  Anxiety, secondary to cancer diagnosis  Started Celexa 20 mg 6 weeks ago, tolerating well. Anxiety may a bit better, but still an issue with multiple stressors.  Working with a therapist.    Plan:  -Titrate Celexa up to 40 mg  -Continue psychotherapy.    -Offered SW visit, she declined for now    ______________________________________________________________________________    History of Present Illness/ Interval History    Ms. Diana Salvador  is a 56 year old on neoadjvuant treatment with pembrolizumab, carboplatin and taxol (weekly) . She completed 3 cycles of this combo, and cycle 3 was complicated by the development of grade 3 neuropathy (fingers) and neutropenia (ANC 1000) despite use of Neupogen. Therefore, last week, she got single agent  Pembrolizumab alone with cycle 4 and chemo was held.     She returns today for 1-week re-evaluation of her neuropathy of fingers and toes. This persists and is still very sensitive to use/light touch. She has numbness and is having a hard time with her fine motor tasks. Her nail beds are very tender and discolored. She is up to 300 mg twice daily of gabapentin. Taking hydrocodone with some benefit.     She developed a pruritic, non-painful raised rash on bilateral dorsal thumbs/hand R>L. Significantly improved after starting Claritin and using triamcinolone cream. This flared a bit into right wrist area last week after Pembro.     No fevers.     Started Celexa about 6 weeks ago for anxiety with cancer diagnosis. Meeting a therapist.  This is getting better, but still can feel emotional and overwhelmed some days due to her multiple stressors (financial, medical).      ECOG Performance    0      Oncology History/Treatment  Diagnosis/Stage:   2/2022: Stage IIB (cT2-cN0-cM0) right breast cancer (triple negative)  -self-palpated right breast lump  -diagnostic mammo + breast US: 2.4 cm R breast (2:00, lower-inner quad) lump and no axillary nodes detected  -breast biopsy: invasive ductal carcinoma, grade 3. ER neg, NV neg, HER2 neg via FISH  -CA 27.29 WNL (<5)    Treatment:  3/15/2022 - present: Neoadjuvant chemo (based off [id number]) Pembrolizumab 200 mg D1 q 21 days + Carboplatin AUC=5 D1 q21 Days + weekly Paclitaxel 80 mg/m  D1, 8, 15  x 12 Weeks (with Filgrastim 5 mcg/kg subcutaneously once daily on days 16, 17, and 18 of cycles 1 through 4)  -->after cycle 3, developed grade 3 neuropathy and delayed neutropenia (despite using Neupogen). Therefore, chemo held with cycle 4 and only received Pembrolizumab.     Planning bilateral mastectomies + reconstruction with Elysia Marina + Gerson following neoadjuvant treatment      Physical Exam    GENERAL: Alert and oriented to time place and person. Seated comfortably.   Alone.   HEAD: Atraumatic and normocephalic. Alopecia.  EYES: MELISSA, EOMI. No erythema. No icterus.  BREASTS: Right breast lump (2:00, near nipple) about 2.0 cm (smaller) and less defined, in response to chemo.  LYMPH NODES: No axillary adenopathy.  CHEST: clear to auscultation bilaterally. Resonant to percussion throughout bilaterally. Symmetrical breath movements bilaterally.  CVS: S1 and S2 are heard. Regular rate and rhythm. No murmur or gallop or rub heard.  ABDOMEN: Soft. Not tender. Not distended. No palpable hepatomegaly or splenomegaly. No other mass palpable. Bowel sounds present.  EXTREMITIES: Warm. No peripheral edema.  SKIN: Raised papular pink lesions right dorsal thumb and wrist. Fingernail and great toenail beds purple in color.   NEURO: No gross deficit noted. Non-antalgic gait.      Lab Results    Recent Results (from the past 168 hour(s))   Comprehensive metabolic panel   Result Value Ref Range    Sodium 143 133 - 144 mmol/L    Potassium 4.1 3.4 - 5.3 mmol/L    Chloride 108 94 - 109 mmol/L    Carbon Dioxide (CO2) 29 20 - 32 mmol/L    Anion Gap 6 3 - 14 mmol/L    Urea Nitrogen 8 7 - 30 mg/dL    Creatinine 0.54 0.52 - 1.04 mg/dL    Calcium 8.3 (L) 8.5 - 10.1 mg/dL    Glucose 104 (H) 70 - 99 mg/dL    Alkaline Phosphatase 95 40 - 150 U/L    AST 14 0 - 45 U/L    ALT 19 0 - 50 U/L    Protein Total 5.9 (L) 6.8 - 8.8 g/dL    Albumin 2.7 (L) 3.4 - 5.0 g/dL    Bilirubin Total 0.2 0.2 - 1.3 mg/dL    GFR Estimate >90 >60 mL/min/1.73m2   TSH with free T4 reflex   Result Value Ref Range    TSH 1.09 0.40 - 4.00 mU/L   CBC with platelets and differential   Result Value Ref Range    WBC Count 3.0 (L) 4.0 - 11.0 10e3/uL    RBC Count 3.21 (L) 3.80 - 5.20 10e6/uL    Hemoglobin 9.7 (L) 11.7 - 15.7 g/dL    Hematocrit 30.6 (L) 35.0 - 47.0 %    MCV 95 78 - 100 fL    MCH 30.2 26.5 - 33.0 pg    MCHC 31.7 31.5 - 36.5 g/dL    RDW 18.6 (H) 10.0 - 15.0 %    Platelet Count 220 150 - 450 10e3/uL   Manual Differential   Result  Value Ref Range    % Neutrophils 32 %    % Lymphocytes 45 %    % Monocytes 20 %    % Eosinophils 0 %    % Basophils 3 %    Absolute Neutrophils 1.0 (L) 1.6 - 8.3 10e3/uL    Absolute Lymphocytes 1.4 0.8 - 5.3 10e3/uL    Absolute Monocytes 0.6 0.0 - 1.3 10e3/uL    Absolute Eosinophils 0.0 0.0 - 0.7 10e3/uL    Absolute Basophils 0.1 0.0 - 0.2 10e3/uL    RBC Morphology Confirmed RBC Indices     Platelet Assessment  Automated Count Confirmed. Platelet morphology is normal.     Automated Count Confirmed. Platelet morphology is normal.       Imaging    No results found.    Billing  Total time: 40 min; including review of EMR, reports, diagnostics and ordering/coordination of care    Signed by: Marilu Vergara NP     normal...

## 2023-02-11 NOTE — ED PROVIDER NOTES
History     Chief Complaint   Patient presents with     Post-op Problem     HPI  Diana Salvador is a 57 year old female who presents to urgent care with bleeding from her incision.  Patient had breast week and obstruction surgery with fat grafting.  Patient reports bleeding from the right upper thigh/lower abdominal wall region.   reports he has applied tape and pressure bandages.    Patient reports she is in moderate to severe pain just from having surgery today.  Patient states it has been 6 hours since her oxycodone.  Patient currently rating her pain 10 out of 10.  Patient reports otherwise doing okay.    Allergies:  No Known Allergies    Problem List:    Patient Active Problem List    Diagnosis Date Noted     S/P breast reconstruction, bilateral 06/30/2022     Priority: Medium     Chemotherapy-induced neuropathy (grade 3, after 3 cycles of Carbo/Taxol/Keytruda) 05/19/2022     Priority: Medium     Encounter for antineoplastic chemotherapy 03/11/2022     Priority: Medium     Chemotherapy-induced neutropenia (H) 03/11/2022     Priority: Medium     Malignant neoplasm of upper-inner quadrant of right breast in female, estrogen receptor negative (triple-neg., germline test neg. for BRCA 1/2) 03/04/2022     Priority: Medium     Check 3.23 for f/u Darabi.       Situational anxiety 04/08/2020     Priority: Medium     Cervical high risk HPV (human papillomavirus) test positive 03/15/2020     Priority: Medium     3/14/13 NIL pap.  9/15/20 NIL pap, + HR HPV (not 16 or 18). Plan cotest in 1 year  9/8/21 NIL Pap, Neg HPV. Plan cotest in 1 year.   8/18/22 Reminder mychart/letter  9/16/22 Reminder call -- Pt notified  10/26/22 lost to follow up         Chronic, continuous use of opioids 11/13/2019     Priority: Medium     Patient is followed by Quoc Chu MD for ongoing prescription of pain medication.  All refills should be approved by this provider only at face-to-face appointments - not by phone  request.    Medication(s): Tramadol 50mg.   Maximum quantity per month: 120  Clinic visit frequency required: Q 3 months      Controlled substance agreement:  Encounter-Level CSA:    There are no encounter-level csa.     Patient-Level CSA:    There are no patient-level csa.       Pain Clinic evaluation in the past: Yes       Date/Location:  Had seen pain clinic in [place] many years ago.    DIRE Total Score(s):  No flowsheet data found.    Last [facility] website verification:  done on 2021   [email or web address]         Osteopenia of multiple sites 2018     Priority: Medium     on fosamax 35mg daily since        Restless legs syndrome 2018     Priority: Medium     Lower extremity edema 2018     Priority: Medium     Sciatica of right side 2015     Priority: Medium     Overview:   Chronic. Takes Norco as needed.           Past Medical History:    Past Medical History:   Diagnosis Date     Cervical high risk HPV (human papillomavirus) test positive 03/15/2020     Motion sickness        Past Surgical History:    Past Surgical History:   Procedure Laterality Date     ARTHROSCOPY SHOULDER ROTATOR CUFF REPAIR Right      ARTHROSCOPY SHOULDER ROTATOR CUFF REPAIR Left       SECTION       CHOLECYSTECTOMY       CYST REMOVAL      on chest     DAVINCI BYPASS GASTRIC ROBERT-EN-Y  2001     HYSTERECTOMY SUPRACERVICAL       INSERT PORT VASCULAR ACCESS Left 2022    Procedure: INSERTION, VASCULAR ACCESS PORT;  Surgeon: Baldemar Marina DO;  Location: [facility] OR     MASTECTOMY SIMPLE Left 2022    Procedure: MASTECTOMY, SIMPLE, LEFT;  Surgeon: Baldemar Marina DO;  Location: [facility] OR     MASTECTOMY SIMPLE, SENTINEL NODE, COMBINED Right 2022    Procedure: MASTECTOMY, SIMPLE, WITH SENTINEL LYMPH NODE DISSECTION, RIGHT;  Surgeon: Baldemar Marina DO;  Location: [facility] OR     RECONSTRUCT BREAST, INSERT TISSUE EXPANDER BILATERAL, COMBINED Bilateral 2022     Procedure: RECONSTRUCTION, BREAST, BILATERAL, WITH TISSUE EXPANDER INSERTION;  Surgeon: ROSEMARIE Nieto MD;  Location: [facility] OR     REMOVE AND REPLACE BREAST IMPLANT PROSTHESIS Bilateral 10/7/2022    Procedure: Bilateral breast implant placement and revision;  Surgeon: ROSEMARIE Nieto MD;  Location: [facility] OR     right ulnar nerve repositioning surgery         Family History:    Family History   Problem Relation Age of Onset     Heart Failure Mother        Social History:  Marital Status:   [5]  Social History     Tobacco Use     Smoking status: Former     Types: Cigarettes     Start date: 1982     Quit date: 1984     Years since quittin.1     Smokeless tobacco: Never   Vaping Use     Vaping Use: Never used   Substance Use Topics     Alcohol use: Yes     Comment: occ     Drug use: No        Medications:    acetaminophen (TYLENOL) 325 MG tablet  alendronate (FOSAMAX) 35 MG tablet  busPIRone (BUSPAR) 7.5 MG tablet  citalopram (CELEXA) 40 MG tablet  cyanocobalamin (CYANOCOBALAMIN) 1000 MCG/ML injection  cyclobenzaprine (FLEXERIL) 5 MG tablet  diclofenac (VOLTAREN) 1 % topical gel  doxepin (SILENOR) 3 MG tablet  furosemide (LASIX) 20 MG tablet  HYDROcodone-acetaminophen (NORCO) 5-325 MG tablet  lidocaine-prilocaine (EMLA) 2.5-2.5 % external cream  LORazepam (ATIVAN) 0.5 MG tablet  LORazepam (ATIVAN) 0.5 MG tablet  omeprazole (PRILOSEC) 20 MG DR capsule  ondansetron (ZOFRAN ODT) 4 MG ODT tab  oxyCODONE (ROXICODONE) 5 MG tablet  potassium chloride ER (K-TAB) 20 MEQ CR tablet  rOPINIRole (REQUIP) 1 MG tablet  senna-docusate (SENOKOT-S/PERICOLACE) 8.6-50 MG tablet  sucralfate (CARAFATE) 1 GM tablet  traMADol (ULTRAM) 50 MG tablet  triamcinolone (KENALOG) 0.1 % external cream  valACYclovir (VALTREX) 1000 mg tablet  VITAMIN D3 50 MCG ( UT) tablet      Review of Systems  As mentioned above in the history present illness. All other systems were reviewed and are negative.    Physical Exam   BP:  "123/80  Pulse: 71  Temp: 97  F (36.1  C)  Resp: 16  Height: 172.7 cm (5' 8\")  Weight: 86.2 kg (190 lb)  SpO2: 94 %      Physical Exam  Vitals and nursing note reviewed.   Constitutional:       General: She is in acute distress.      Appearance: She is well-developed. She is not diaphoretic.   HENT:      Head: Normocephalic and atraumatic.      Right Ear: External ear normal.      Left Ear: External ear normal.   Eyes:      General:         Right eye: No discharge.         Left eye: No discharge.      Conjunctiva/sclera: Conjunctivae normal.   Cardiovascular:      Rate and Rhythm: Normal rate and regular rhythm.      Heart sounds: Normal heart sounds. No murmur heard.    No friction rub.   Pulmonary:      Effort: Pulmonary effort is normal. No respiratory distress.      Breath sounds: Normal breath sounds. No stridor. No wheezing or rales.   Chest:      Chest wall: No tenderness.   Skin:     General: Skin is warm and dry.      Coloration: Skin is not pale.      Findings: No erythema or rash.          Neurological:      Mental Status: She is alert.         ED Course                 Procedures    No results found for this or any previous visit (from the past 24 hour(s)).    Medications   oxyCODONE (ROXICODONE) tablet 5 mg (5 mg Oral Given 2/10/23 2235)       Assessments & Plan (with Medical Decision Making)     I have reviewed the nursing notes.    I have reviewed the findings, diagnosis, plan and need for follow up with the patient.  Diana Salvador is a 57 year old female who presents to urgent care with bleeding from her incision.  Patient had breast week and obstruction surgery with fat grafting.  Patient reports bleeding from the right upper thigh/lower abdominal wall region.   reports he has applied tape and pressure bandages.    Patient reports she is in moderate to severe pain just from having surgery today.  Patient states it has been 6 hours since her oxycodone.  Patient currently rating her pain 10 out " of 10.  On exam patient is tearful and appears to be in acute distress.  Patient medicated with oxycodone 5 mg.  Bandages removed that  had applied and no immediate bleeding noted from the puncture wound or incision site.  Some manipulation reveals some small amounts of bloody serous drainage.  The area was treated with a Steri-Strip and wound adhesive.  Pressure dressing applied on top of this.  Patient tolerated the procedure well.  Patient was given an additional abdominal binder.  Patient discharged in stable condition.  There is no obvious hematoma noted and no signs of infection at this present time.      New Prescriptions    No medications on file       Final diagnoses:   Drainage from wound       2/10/2023   [facility] EMERGENCY DEPT     Tennille Mathew, APRN CNP  02/10/23 3550

## 2023-02-11 NOTE — ED TRIAGE NOTES
Pt presents after having a skin graft today. Told to come to ER if bleeding at graft sites isn't stopping. Pt has had continued bleeding from graft site in lower right abdomen.      Triage Assessment     Row Name 02/10/23 2115       Triage Assessment (Adult)    Airway WDL WDL       Respiratory WDL    Respiratory WDL WDL       Skin Circulation/Temperature WDL    Skin Circulation/Temperature WDL WDL       Cardiac WDL    Cardiac WDL WDL       Peripheral/Neurovascular WDL    Peripheral Neurovascular WDL WDL       Cognitive/Neuro/Behavioral WDL    Cognitive/Neuro/Behavioral WDL WDL

## 2023-02-11 NOTE — DISCHARGE INSTRUCTIONS
Leave pressure dressing on until tomorrow afternoon.  Then you may remove pressure dressing.  Contact your surgeon if you are having ongoing problems with drainage or having uncontrolled pain.  Return here to the emergency department assistance as well.

## 2023-02-13 ENCOUNTER — MYC REFILL (OUTPATIENT)
Dept: FAMILY MEDICINE | Facility: CLINIC | Age: 58
End: 2023-02-13
Payer: OTHER GOVERNMENT

## 2023-02-13 ENCOUNTER — PATIENT OUTREACH (OUTPATIENT)
Dept: FAMILY MEDICINE | Facility: CLINIC | Age: 58
End: 2023-02-13
Payer: OTHER GOVERNMENT

## 2023-02-13 ENCOUNTER — MYC REFILL (OUTPATIENT)
Dept: ONCOLOGY | Facility: CLINIC | Age: 58
End: 2023-02-13
Payer: OTHER GOVERNMENT

## 2023-02-13 ENCOUNTER — VIRTUAL VISIT (OUTPATIENT)
Dept: ONCOLOGY | Facility: CLINIC | Age: 58
End: 2023-02-13
Attending: PSYCHOLOGIST
Payer: OTHER GOVERNMENT

## 2023-02-13 DIAGNOSIS — F11.90 CHRONIC, CONTINUOUS USE OF OPIOIDS: ICD-10-CM

## 2023-02-13 DIAGNOSIS — Z98.890 S/P BREAST RECONSTRUCTION, BILATERAL: ICD-10-CM

## 2023-02-13 DIAGNOSIS — Z91.199 NO-SHOW FOR APPOINTMENT: Primary | ICD-10-CM

## 2023-02-13 DIAGNOSIS — M54.31 SCIATICA OF RIGHT SIDE: ICD-10-CM

## 2023-02-13 PROCEDURE — 99207 PR NO SHOW FOR SCHEDULED APPT: CPT | Performed by: PSYCHOLOGIST

## 2023-02-13 RX ORDER — OXYCODONE HYDROCHLORIDE 5 MG/1
5-10 TABLET ORAL EVERY 6 HOURS PRN
Qty: 10 TABLET | Refills: 0 | Status: CANCELLED | OUTPATIENT
Start: 2023-02-13

## 2023-02-13 RX ORDER — OXYCODONE HYDROCHLORIDE 5 MG/1
5-10 TABLET ORAL EVERY 6 HOURS PRN
Qty: 10 TABLET | Refills: 0 | Status: SHIPPED | OUTPATIENT
Start: 2023-02-13 | End: 2023-09-01

## 2023-02-13 NOTE — LETTER
Date:February 14, 2023      Provider requested that no letter be sent. Do not send.       [facility]

## 2023-02-13 NOTE — LETTER
2/13/2023         RE: Diana Salvador  [address] [place] [id number]        Dear Colleague,    Thank you for referring your patient, Diana Salvador, to the [facility]. Please see a copy of my visit note below.      This patient was a no show for this scheduled appointment.      Again, thank you for allowing me to participate in the care of your patient.        Sincerely,        Magan Russ PsyD

## 2023-02-13 NOTE — TELEPHONE ENCOUNTER
Called pt to discuss pain medication refill request.    When asked, pt states her pain is 7 /10.  When moving it is sharp.  When inactive, it is a throbbing pain.  Pain is where surgery site was.    Pt states she was in then ED for drainage later on Friday.  Drainage has been better since Friday.    Pt states she is taking Oxycodone for pain.  She is alternating with regular strength Tylenol.     She states she starts with Tylenol, then takes Oxycodone if she needs to after.    Pt states she is taking one Oxycodone every 6 hours as she has been able to.      She is taking 650mg Tylenol every 6hrs.    I asked the pt what else she is using for pain control and she states she is also using ice for pain with some relief.  Encouraged her to continue using ice and she can use it for 20 minutes on at a time.  Then, take off for 20-30 minutes.  She can continue doing this and alternate with heat if that works better.  Also, continue using Tylenol.    I told her Dr. Nieto does not typically order additional opioid pain medications, but I will reach out to him and provide this information.  If pain gets worse in the meantime, please go to the ED for care.  Pt verbalized understanding.    Maile Milner RN

## 2023-02-14 NOTE — TELEPHONE ENCOUNTER
ED / Discharge Outreach Protocol    Patient Contact    Attempt # 1    Was call answered?  No.  Left message on voicemail with information to call me back.    Destiny Kim RN   [facility], Internal Medicine, and Pediatric Clinics

## 2023-02-16 RX ORDER — HYDROCODONE BITARTRATE AND ACETAMINOPHEN 5; 325 MG/1; MG/1
1 TABLET ORAL EVERY 6 HOURS PRN
Qty: 90 TABLET | Refills: 0 | Status: SHIPPED | OUTPATIENT
Start: 2023-02-16 | End: 2023-04-07

## 2023-03-25 DIAGNOSIS — M54.31 SCIATICA OF RIGHT SIDE: ICD-10-CM

## 2023-03-25 DIAGNOSIS — F11.90 CHRONIC, CONTINUOUS USE OF OPIOIDS: ICD-10-CM

## 2023-03-27 RX ORDER — TRAMADOL HYDROCHLORIDE 50 MG/1
TABLET ORAL
Qty: 120 TABLET | Refills: 0 | Status: SHIPPED | OUTPATIENT
Start: 2023-03-27 | End: 2023-05-04

## 2023-03-29 ENCOUNTER — TELEPHONE (OUTPATIENT)
Dept: FAMILY MEDICINE | Facility: CLINIC | Age: 58
End: 2023-03-29
Payer: OTHER GOVERNMENT

## 2023-03-29 NOTE — TELEPHONE ENCOUNTER
Patient Quality Outreach    Patient is due for the following:   Colon Cancer Screening  Cervical Cancer Screening - PAP Needed  Physical Preventive Adult Physical    Next Steps:   Patient has upcoming appointment, these items will be addressed at that time.    Type of outreach:    Chart review performed, no outreach needed.      Questions for provider review:    None     Stacy Chavez

## 2023-04-05 ENCOUNTER — ONCOLOGY VISIT (OUTPATIENT)
Dept: ONCOLOGY | Facility: CLINIC | Age: 58
End: 2023-04-05
Attending: NURSE PRACTITIONER
Payer: OTHER GOVERNMENT

## 2023-04-05 ENCOUNTER — INFUSION THERAPY VISIT (OUTPATIENT)
Dept: INFUSION THERAPY | Facility: CLINIC | Age: 58
End: 2023-04-05
Attending: INTERNAL MEDICINE
Payer: OTHER GOVERNMENT

## 2023-04-05 VITALS
DIASTOLIC BLOOD PRESSURE: 78 MMHG | WEIGHT: 208 LBS | TEMPERATURE: 98.3 F | RESPIRATION RATE: 12 BRPM | HEART RATE: 73 BPM | OXYGEN SATURATION: 100 % | SYSTOLIC BLOOD PRESSURE: 135 MMHG | BODY MASS INDEX: 31.63 KG/M2

## 2023-04-05 DIAGNOSIS — M25.551 HIP PAIN, RIGHT: ICD-10-CM

## 2023-04-05 DIAGNOSIS — Z17.1 MALIGNANT NEOPLASM OF RIGHT BREAST IN FEMALE, ESTROGEN RECEPTOR NEGATIVE, UNSPECIFIED SITE OF BREAST (H): Primary | ICD-10-CM

## 2023-04-05 DIAGNOSIS — C50.911 MALIGNANT NEOPLASM OF RIGHT BREAST IN FEMALE, ESTROGEN RECEPTOR NEGATIVE, UNSPECIFIED SITE OF BREAST (H): ICD-10-CM

## 2023-04-05 DIAGNOSIS — M25.511 ACUTE PAIN OF RIGHT SHOULDER: ICD-10-CM

## 2023-04-05 DIAGNOSIS — R06.09 EXERTIONAL DYSPNEA: ICD-10-CM

## 2023-04-05 DIAGNOSIS — C50.211 MALIGNANT NEOPLASM OF UPPER-INNER QUADRANT OF RIGHT BREAST IN FEMALE, ESTROGEN RECEPTOR NEGATIVE (H): Primary | ICD-10-CM

## 2023-04-05 DIAGNOSIS — C50.911 MALIGNANT NEOPLASM OF RIGHT BREAST IN FEMALE, ESTROGEN RECEPTOR NEGATIVE, UNSPECIFIED SITE OF BREAST (H): Primary | ICD-10-CM

## 2023-04-05 DIAGNOSIS — Z17.1 MALIGNANT NEOPLASM OF UPPER-INNER QUADRANT OF RIGHT BREAST IN FEMALE, ESTROGEN RECEPTOR NEGATIVE (H): Primary | ICD-10-CM

## 2023-04-05 DIAGNOSIS — Z17.1 MALIGNANT NEOPLASM OF RIGHT BREAST IN FEMALE, ESTROGEN RECEPTOR NEGATIVE, UNSPECIFIED SITE OF BREAST (H): ICD-10-CM

## 2023-04-05 LAB
ALBUMIN SERPL BCG-MCNC: 4.1 G/DL (ref 3.5–5.2)
ALP SERPL-CCNC: 114 U/L (ref 35–104)
ALT SERPL W P-5'-P-CCNC: 19 U/L (ref 10–35)
ANION GAP SERPL CALCULATED.3IONS-SCNC: 12 MMOL/L (ref 7–15)
AST SERPL W P-5'-P-CCNC: 17 U/L (ref 10–35)
BASOPHILS # BLD AUTO: 0 10E3/UL (ref 0–0.2)
BASOPHILS NFR BLD AUTO: 0 %
BILIRUB SERPL-MCNC: 0.2 MG/DL
BUN SERPL-MCNC: 9.4 MG/DL (ref 6–20)
CALCIUM SERPL-MCNC: 9.4 MG/DL (ref 8.6–10)
CHLORIDE SERPL-SCNC: 105 MMOL/L (ref 98–107)
CREAT SERPL-MCNC: 0.72 MG/DL (ref 0.51–0.95)
DEPRECATED HCO3 PLAS-SCNC: 25 MMOL/L (ref 22–29)
EOSINOPHIL # BLD AUTO: 0.2 10E3/UL (ref 0–0.7)
EOSINOPHIL NFR BLD AUTO: 2 %
ERYTHROCYTE [DISTWIDTH] IN BLOOD BY AUTOMATED COUNT: 16.7 % (ref 10–15)
GFR SERPL CREATININE-BSD FRML MDRD: >90 ML/MIN/1.73M2
GLUCOSE SERPL-MCNC: 114 MG/DL (ref 70–99)
HCT VFR BLD AUTO: 37.6 % (ref 35–47)
HGB BLD-MCNC: 11.6 G/DL (ref 11.7–15.7)
IMM GRANULOCYTES # BLD: 0 10E3/UL
IMM GRANULOCYTES NFR BLD: 0 %
LYMPHOCYTES # BLD AUTO: 1.8 10E3/UL (ref 0.8–5.3)
LYMPHOCYTES NFR BLD AUTO: 27 %
MCH RBC QN AUTO: 25.9 PG (ref 26.5–33)
MCHC RBC AUTO-ENTMCNC: 30.9 G/DL (ref 31.5–36.5)
MCV RBC AUTO: 84 FL (ref 78–100)
MONOCYTES # BLD AUTO: 0.4 10E3/UL (ref 0–1.3)
MONOCYTES NFR BLD AUTO: 6 %
NEUTROPHILS # BLD AUTO: 4.2 10E3/UL (ref 1.6–8.3)
NEUTROPHILS NFR BLD AUTO: 65 %
NRBC # BLD AUTO: 0 10E3/UL
NRBC BLD AUTO-RTO: 0 /100
PLATELET # BLD AUTO: 361 10E3/UL (ref 150–450)
POTASSIUM SERPL-SCNC: 4.1 MMOL/L (ref 3.4–5.3)
PROT SERPL-MCNC: 7 G/DL (ref 6.4–8.3)
RBC # BLD AUTO: 4.48 10E6/UL (ref 3.8–5.2)
SODIUM SERPL-SCNC: 142 MMOL/L (ref 136–145)
WBC # BLD AUTO: 6.5 10E3/UL (ref 4–11)

## 2023-04-05 PROCEDURE — 250N000011 HC RX IP 250 OP 636: Performed by: NURSE PRACTITIONER

## 2023-04-05 PROCEDURE — 80053 COMPREHEN METABOLIC PANEL: CPT | Performed by: NURSE PRACTITIONER

## 2023-04-05 PROCEDURE — 85004 AUTOMATED DIFF WBC COUNT: CPT | Performed by: NURSE PRACTITIONER

## 2023-04-05 PROCEDURE — 99215 OFFICE O/P EST HI 40 MIN: CPT | Performed by: NURSE PRACTITIONER

## 2023-04-05 PROCEDURE — 36415 COLL VENOUS BLD VENIPUNCTURE: CPT

## 2023-04-05 PROCEDURE — G0463 HOSPITAL OUTPT CLINIC VISIT: HCPCS | Performed by: NURSE PRACTITIONER

## 2023-04-05 RX ORDER — HEPARIN SODIUM (PORCINE) LOCK FLUSH IV SOLN 100 UNIT/ML 100 UNIT/ML
500 SOLUTION INTRAVENOUS ONCE
Status: COMPLETED | OUTPATIENT
Start: 2023-04-05 | End: 2023-04-05

## 2023-04-05 RX ORDER — HEPARIN SODIUM,PORCINE 10 UNIT/ML
5 VIAL (ML) INTRAVENOUS
Status: CANCELLED | OUTPATIENT
Start: 2023-04-05

## 2023-04-05 RX ORDER — HEPARIN SODIUM (PORCINE) LOCK FLUSH IV SOLN 100 UNIT/ML 100 UNIT/ML
5 SOLUTION INTRAVENOUS
Status: CANCELLED | OUTPATIENT
Start: 2023-04-05

## 2023-04-05 RX ADMIN — SODIUM CHLORIDE, PRESERVATIVE FREE 500 UNITS: 5 INJECTION INTRAVENOUS at 11:50

## 2023-04-05 ASSESSMENT — PAIN SCALES - GENERAL: PAINLEVEL: NO PAIN (0)

## 2023-04-05 NOTE — PROGRESS NOTES
PAC labs and port flush. Port flushed well. No blood return. Labs drawn via venipuncture R ac. Pt advised if no blood return next port flush to expect a 30 minute wait for TPA instillation.     MWdanitza RN

## 2023-04-05 NOTE — PROGRESS NOTES
[facility] Hematology and Oncology Outpatient Progress Note ([place])    Patient: Diana Salvador  MRN: [id number]  Apr 5, 2023            Reason for Visit    1. Stage IIB triple negative breast cancer    Primary Oncologist: formerly, Dr. Diamond    Assessment/Plan  1.   Stage IIB triple negative breast cancer, ypCR  Aide completed all of her treatment 3 mths ago. She is recovering generally well.     She has chronic pain and had generalized arthralgias on her treatment (possibly immunotherapy related). She is having persistent pain in right neck/shoulder and right leg; she thinks her right shoulder is a bit worse over past few months. She had to stop her duloxetine due to insurance coverage, so may be noting more discomfort from that as well.     Mild headaches sound benign, but will follow.     She still has her port in. Prefers keeping it in for now, until she is further into her surveillance.     Plan:  -Bone scan to exclude bone mets - call with results  -Update CBC and CMP post treatment. Monitor CBC annually post chemo   -Continue surveillance through history/exam every 3-4 mths x 2 yrs, then every 6 mths up to 5 yrs  -Will arrange for Oncology Survivorship visit with MARY Younger at her next visit as she has a lot of Survivorship concerns I think she may benefit from comprehensive assessment/education on  -Can maintain routine surveillance at [facility] with me thereafter. Since Dr. Diamond has left the practice, will need to align with new primary MD over time/as needed (could see Dr. Lyons at [facility] whom she met before or new MD at [place] sites).  -Will need port flushes every 4-6 weeks. Can remove port anytime, she prefers deferring until she is further out from her diagnosis to ensure no recurrence.    2.   Chemo-induced neuropathy, gr 2  Now off chemo for ~10 months.   Fingertips have improved and no longer painful, but still some intermittent tingling that can interfere with some  ADLs.  Toes are still intermittent painful, no balance issues.    She did not previously tolerate gabapentin. Did pretty well with duloxetine (on for neuropathy and arthralgias), but insurance no longer covering this so she's been off since January.     Plan:  -Likely assessing max improvement now that >6 mths past chemo, but could see some continued slight improvement up to 12 mths  -Could consider trying Lyrica if not improving at follow-up     3.   Chronic back pain  4.   Chronic arthralgias  She has chronic pain (back and joints), but this was exacerbated on immunotherapy. She's been off immunotherapy 3 months and having persistent focal sites of pain in right neck/shoulder and right leg (hip, knee). Duloxetine may have been helping previously, but she's not been able to afford since insurance not covering.     Plan:  -Bone scan to exclude bone mets  -Stay active  -Tylenol as needed  -Has been on hydrocodone long-term. We refilled on her cancer treatment, but would defer ongoing chronic pain mgmt to her PCP.    5.  Depression/anxiety, chronic but acutely worsened with cancer diagnosis/treatment  Had been on Celexa to 40 mg with good management and tolerance for sometime, but abruptly stopped last Fall as she was concerned it could cause her weight loss (although was not occurring). She was very depressed at her visit in late November, so restarted Celexa 40 mg which she has continued. Her PCP recently started her on Buspar 4 weeks ago.   She was referred for psychotherapy but only had 1-2 sessions and hasn't maintained follow-up.  She's doing a bit better.      Plan:  -Continue mgmt with PCP. Continues on Celexa 40 mg and recently started Buspar (may be too early to assess effectiveness of this).   -Encouraged she continue psychotherapy  -Encouraged she stay engaged outside of her home with family/friends/activities.  -Encouraged physical activity  -Will benefit from Onc Survivorship visit as it appears she is  and will be dealing with some chronic survivorship issues; will request this for her next visit upon Sloane Waddell's return.    6.  Exertional dyspnea  She believes this is improving after treatment, as she's been more active.   No chest pain, cough, fevers.    Likely related to deconditioning from her cancer treatments and will improve with time/returning to normal activities.    Plan:  -Await Cardiology consult scheduled later this month  -CBC to check for anemia, pending.     ADDENDUM:   Anemia improving and almost back to normal range.   ______________________________________________________________________________    History of Present Illness/ Interval History    Ms. Diana Salvador is a 57 year old who completed neoadjvuant treatment with pembrolizumab, carboplatin and taxol (weekly) x 3 cycles of this combo, complicated by grade 3 neuropathy (fingers) and neutropenia so chemo was stopped and she continued Pembro alone for a few more cycles. She then had bilateral mastectomies with pathologic CR. She completed 9 cycles of adjuvant Pembrolizumab every 3 weeks in January. Now on surveillance. Returns for 3 month follow-up.     She tolerated immunotherapy well, aside from arthralgias. No new symptoms since stopping.  She has persistent right shoulder, right hip and right knee pain; shoulder is feeling a bit worse. She did have to stop duloxetine in January due to lack of insurance coverage.     Chronic low back pain and generalized arthralgias.  Using hydrocodone 1-2 times/day a few days/week.     In January, was reporting exertional dyspnea that is improving with time. Feels as her activity levels are improving, this is improving. She was referred to Cardiology, pending later this month.  No chest pain, cough, fevers.    Some visual acuity changes, needs eye doctor follow-up. With this, has some related periorbital strain-like headaches. Relieved with Tylenol. No other neuro symptoms.     Her neuropathy has  continued to improve and it is not as painful as it used to be, but peristent.   Fingertip discomfort resolved, numbness intermittent. Toes are still intermittently tender. Doing ADLs a bit better.  She stopped duloxetine in January due to lack of insurance coverage.       Late 2022, she was really struggling with depression and anxiety.   Manifesting as daytime fatigue, insomnia, isolation/lack of desire to engage with family/friends. Felt manifesting from her cancer diagnosis/treatment and self-image issues with her breast surgery and alopecia.  She had recently abruptly stopped her Celexa last Fall as she was concerned about potential for weight gain with it (although was not gaining weight on it), so this was restarted (40 mg) in December. Her PCP also recently started Buspar and she will be following up with her.   Using lorazepam as needed.       ECOG Performance    0-1      Oncology History/Treatment  Diagnosis/Stage:   2/2022: Stage IIB (cT2-cN0-cM0) right breast cancer (triple negative) //ypT0-N0 (CR)  -self-palpated right breast lump  -diagnostic mammo + breast US: 2.4 cm R breast (2:00, lower-inner quad) lump and no axillary nodes detected  -breast biopsy: invasive ductal carcinoma, grade 3. ER neg, VA neg, HER2 neg via FISH  -CA 27.29 WNL (<5)  -Genetic testing negative for germline mutations    -6/30/2022 surgical path = CR after neoadjuvant chemo    Treatment:  3/15/2022 - 6/2022: Neoadjuvant chemo (based off [id number]) Pembrolizumab 200 mg D1 q 21 days + Carboplatin AUC=5 D1 q21 Days + weekly Paclitaxel 80 mg/m  D1, 8, 15  x 12 Weeks (with Filgrastim 5 mcg/kg subcutaneously once daily on days 16, 17, and 18 of cycles 1 through 4)  -->after cycle 3, developed grade 3 neuropathy and delayed neutropenia (despite using Neupogen). Therefore, chemo held with cycle 4 and only received Pembrolizumab through cycle 6.     6/30/2022: bilateral mastectomies with R sLN biopsy and expander placement.  (Drs.  Torgeson + Gerson). Pathologic CR  -later underwent reconstruction surgery    No adjuvant RT recommended    7/22/22 - 1/12/2023: adjuvant Pembrolizumab x 9 additional cycles      Physical Exam    GENERAL: Alert and oriented to time place and person. Seated comfortably.  Alone.   HEAD: Atraumatic and normocephalic. No alopecia.  EYES: MELISSA, EOMI. No erythema. No icterus.  BREASTS: Implants.  LYMPH NODES: No cervical nor axillary adenopathy.  CHEST:  Symmetrical breath movements bilaterally. Clear lung sounds bilaterally.  ABDOMEN: Soft. Not tender. Not distended. No hepatomegaly.  EXTREMITIES: Warm. No peripheral edema.  SKIN: No rashes  NEURO: No gross deficit noted. Non-antalgic gait.      Lab Results    Recent Results (from the past 168 hour(s))   Comprehensive metabolic panel (BMP + Alb, Alk Phos, ALT, AST, Total. Bili, TP)   Result Value Ref Range    Sodium 142 136 - 145 mmol/L    Potassium 4.1 3.4 - 5.3 mmol/L    Chloride 105 98 - 107 mmol/L    Carbon Dioxide (CO2) 25 22 - 29 mmol/L    Anion Gap 12 7 - 15 mmol/L    Urea Nitrogen 9.4 6.0 - 20.0 mg/dL    Creatinine 0.72 0.51 - 0.95 mg/dL    Calcium 9.4 8.6 - 10.0 mg/dL    Glucose 114 (H) 70 - 99 mg/dL    Alkaline Phosphatase 114 (H) 35 - 104 U/L    AST 17 10 - 35 U/L    ALT 19 10 - 35 U/L    Protein Total 7.0 6.4 - 8.3 g/dL    Albumin 4.1 3.5 - 5.2 g/dL    Bilirubin Total 0.2 <=1.2 mg/dL    GFR Estimate >90 >60 mL/min/1.73m2   CBC with platelets and differential   Result Value Ref Range    WBC Count 6.5 4.0 - 11.0 10e3/uL    RBC Count 4.48 3.80 - 5.20 10e6/uL    Hemoglobin 11.6 (L) 11.7 - 15.7 g/dL    Hematocrit 37.6 35.0 - 47.0 %    MCV 84 78 - 100 fL    MCH 25.9 (L) 26.5 - 33.0 pg    MCHC 30.9 (L) 31.5 - 36.5 g/dL    RDW 16.7 (H) 10.0 - 15.0 %    Platelet Count 361 150 - 450 10e3/uL    % Neutrophils 65 %    % Lymphocytes 27 %    % Monocytes 6 %    % Eosinophils 2 %    % Basophils 0 %    % Immature Granulocytes 0 %    NRBCs per 100 WBC 0 <1 /100    Absolute  Neutrophils 4.2 1.6 - 8.3 10e3/uL    Absolute Lymphocytes 1.8 0.8 - 5.3 10e3/uL    Absolute Monocytes 0.4 0.0 - 1.3 10e3/uL    Absolute Eosinophils 0.2 0.0 - 0.7 10e3/uL    Absolute Basophils 0.0 0.0 - 0.2 10e3/uL    Absolute Immature Granulocytes 0.0 <=0.4 10e3/uL    Absolute NRBCs 0.0 10e3/uL       Imaging    No results found.    Billing  Total time: 45 min; including review of EMR, reports, diagnostics and ordering/coordination of care    Signed by: Marilu Vergara, NP

## 2023-04-05 NOTE — PROGRESS NOTES
"Oncology Rooming Note    April 5, 2023 10:50 AM   Diana Salvador is a 57 year old female who presents for:    Chief Complaint   Patient presents with     Oncology Clinic Visit     Malignant neoplasm of upper-inner quadrant of right breast in female, estrogen receptor negative - Provider and infusion     Initial Vitals: /78 (BP Location: Right arm, Patient Position: Sitting, Cuff Size: Adult Large)   Pulse 73   Temp 98.3  F (36.8  C) (Tympanic)   Resp 12   Wt 94.3 kg (208 lb)   LMP  (LMP Unknown)   SpO2 100%   BMI 31.63 kg/m   Estimated body mass index is 31.63 kg/m  as calculated from the following:    Height as of 2/10/23: 1.727 m (5' 8\").    Weight as of this encounter: 94.3 kg (208 lb). Body surface area is 2.13 meters squared.  No Pain (0) Comment: Data Unavailable   No LMP recorded (lmp unknown). Patient has had a hysterectomy.  Allergies reviewed: Yes  Medications reviewed: Yes    Medications: Medication refills not needed today.  Pharmacy name entered into [employer]:    [id number] - [place], [place] - [address] AT [address] CLINICS  [facility] - [place], [place] - [address] [facility]  [id number] - [place], [place] - [address] [place] [address]    Clinical concerns:  None      Emy Hollingsworth CMA            "

## 2023-04-05 NOTE — LETTER
4/5/2023         RE: Diana Salvador  [address] [place] [id number]        Dear Colleague,    Thank you for referring your patient, Diana Salvador, to the [facility]. Please see a copy of my visit note below.    [facility] Hematology and Oncology Outpatient Progress Note ([place])    Patient: Diana Salvador  MRN: [id number]  Apr 5, 2023            Reason for Visit    1. Stage IIB triple negative breast cancer    Primary Oncologist: formerly, Dr. Diamond    Assessment/Plan  1.   Stage IIB triple negative breast cancer, ypCR  Aide completed all of her treatment 3 mths ago. She is recovering generally well.     She has chronic pain and had generalized arthralgias on her treatment (possibly immunotherapy related). She is having persistent pain in right neck/shoulder and right leg; she thinks her right shoulder is a bit worse over past few months. She had to stop her duloxetine due to insurance coverage, so may be noting more discomfort from that as well.     Mild headaches sound benign, but will follow.     She still has her port in. Prefers keeping it in for now, until she is further into her surveillance.     Plan:  -Bone scan to exclude bone mets - call with results  -Update CBC and CMP post treatment. Monitor CBC annually post chemo   -Continue surveillance through history/exam every 3-4 mths x 2 yrs, then every 6 mths up to 5 yrs  -Will arrange for Oncology Survivorship visit with MARY Younger at her next visit as she has a lot of Survivorship concerns I think she may benefit from comprehensive assessment/education on  -Can maintain routine surveillance at [facility] with me thereafter. Since Dr. Diamond has left the practice, will need to align with new primary MD over time/as needed (could see Dr. Lyons at [facility] whom she met before or new MD at [place] sites).  -Will need port flushes every 4-6 weeks. Can remove port anytime, she prefers deferring until  she is further out from her diagnosis to ensure no recurrence.    2.   Chemo-induced neuropathy, gr 2  Now off chemo for ~10 months.   Fingertips have improved and no longer painful, but still some intermittent tingling that can interfere with some ADLs.  Toes are still intermittent painful, no balance issues.    She did not previously tolerate gabapentin. Did pretty well with duloxetine (on for neuropathy and arthralgias), but insurance no longer covering this so she's been off since January.     Plan:  -Likely assessing max improvement now that >6 mths past chemo, but could see some continued slight improvement up to 12 mths  -Could consider trying Lyrica if not improving at follow-up     3.   Chronic back pain  4.   Chronic arthralgias  She has chronic pain (back and joints), but this was exacerbated on immunotherapy. She's been off immunotherapy 3 months and having persistent focal sites of pain in right neck/shoulder and right leg (hip, knee). Duloxetine may have been helping previously, but she's not been able to afford since insurance not covering.     Plan:  -Bone scan to exclude bone mets  -Stay active  -Tylenol as needed  -Has been on hydrocodone long-term. We refilled on her cancer treatment, but would defer ongoing chronic pain mgmt to her PCP.    5.  Depression/anxiety, chronic but acutely worsened with cancer diagnosis/treatment  Had been on Celexa to 40 mg with good management and tolerance for sometime, but abruptly stopped last Fall as she was concerned it could cause her weight loss (although was not occurring). She was very depressed at her visit in late November, so restarted Celexa 40 mg which she has continued. Her PCP recently started her on Buspar 4 weeks ago.   She was referred for psychotherapy but only had 1-2 sessions and hasn't maintained follow-up.  She's doing a bit better.      Plan:  -Continue mgmt with PCP. Continues on Celexa 40 mg and recently started Buspar (may be too early to  assess effectiveness of this).   -Encouraged she continue psychotherapy  -Encouraged she stay engaged outside of her home with family/friends/activities.  -Encouraged physical activity  -Will benefit from Onc Survivorship visit as it appears she is and will be dealing with some chronic survivorship issues; will request this for her next visit upon Sloane Waddell's return.    6.  Exertional dyspnea  She believes this is improving after treatment, as she's been more active.   No chest pain, cough, fevers.    Likely related to deconditioning from her cancer treatments and will improve with time/returning to normal activities.    Plan:  -Await Cardiology consult scheduled later this month  -CBC to check for anemia, pending.     ADDENDUM:   Anemia improving and almost back to normal range.   ______________________________________________________________________________    History of Present Illness/ Interval History    Ms. Diana Salvador is a 57 year old who completed neoadjvuant treatment with pembrolizumab, carboplatin and taxol (weekly) x 3 cycles of this combo, complicated by grade 3 neuropathy (fingers) and neutropenia so chemo was stopped and she continued Pembro alone for a few more cycles. She then had bilateral mastectomies with pathologic CR. She completed 9 cycles of adjuvant Pembrolizumab every 3 weeks in January. Now on surveillance. Returns for 3 month follow-up.     She tolerated immunotherapy well, aside from arthralgias. No new symptoms since stopping.  She has persistent right shoulder, right hip and right knee pain; shoulder is feeling a bit worse. She did have to stop duloxetine in January due to lack of insurance coverage.     Chronic low back pain and generalized arthralgias.  Using hydrocodone 1-2 times/day a few days/week.     In January, was reporting exertional dyspnea that is improving with time. Feels as her activity levels are improving, this is improving. She was referred to Cardiology, pending  later this month.  No chest pain, cough, fevers.    Some visual acuity changes, needs eye doctor follow-up. With this, has some related periorbital strain-like headaches. Relieved with Tylenol. No other neuro symptoms.     Her neuropathy has continued to improve and it is not as painful as it used to be, but peristent.   Fingertip discomfort resolved, numbness intermittent. Toes are still intermittently tender. Doing ADLs a bit better.  She stopped duloxetine in January due to lack of insurance coverage.       Late 2022, she was really struggling with depression and anxiety.   Manifesting as daytime fatigue, insomnia, isolation/lack of desire to engage with family/friends. Felt manifesting from her cancer diagnosis/treatment and self-image issues with her breast surgery and alopecia.  She had recently abruptly stopped her Celexa last Fall as she was concerned about potential for weight gain with it (although was not gaining weight on it), so this was restarted (40 mg) in December. Her PCP also recently started Buspar and she will be following up with her.   Using lorazepam as needed.       ECOG Performance    0-1      Oncology History/Treatment  Diagnosis/Stage:   2/2022: Stage IIB (cT2-cN0-cM0) right breast cancer (triple negative) //ypT0-N0 (CR)  -self-palpated right breast lump  -diagnostic mammo + breast US: 2.4 cm R breast (2:00, lower-inner quad) lump and no axillary nodes detected  -breast biopsy: invasive ductal carcinoma, grade 3. ER neg, DE neg, HER2 neg via FISH  -CA 27.29 WNL (<5)  -Genetic testing negative for germline mutations    -6/30/2022 surgical path = CR after neoadjuvant chemo    Treatment:  3/15/2022 - 6/2022: Neoadjuvant chemo (based off [id number]) Pembrolizumab 200 mg D1 q 21 days + Carboplatin AUC=5 D1 q21 Days + weekly Paclitaxel 80 mg/m  D1, 8, 15  x 12 Weeks (with Filgrastim 5 mcg/kg subcutaneously once daily on days 16, 17, and 18 of cycles 1 through 4)  -->after cycle 3, developed  grade 3 neuropathy and delayed neutropenia (despite using Neupogen). Therefore, chemo held with cycle 4 and only received Pembrolizumab through cycle 6.     6/30/2022: bilateral mastectomies with R sLN biopsy and expander placement.  (Elysia Marina + Gerson). Pathologic CR  -later underwent reconstruction surgery    No adjuvant RT recommended    7/22/22 - 1/12/2023: adjuvant Pembrolizumab x 9 additional cycles      Physical Exam    GENERAL: Alert and oriented to time place and person. Seated comfortably.  Alone.   HEAD: Atraumatic and normocephalic. No alopecia.  EYES: MELISSA, EOMI. No erythema. No icterus.  BREASTS: Implants.  LYMPH NODES: No cervical nor axillary adenopathy.  CHEST:  Symmetrical breath movements bilaterally. Clear lung sounds bilaterally.  ABDOMEN: Soft. Not tender. Not distended. No hepatomegaly.  EXTREMITIES: Warm. No peripheral edema.  SKIN: No rashes  NEURO: No gross deficit noted. Non-antalgic gait.      Lab Results    Recent Results (from the past 168 hour(s))   Comprehensive metabolic panel (BMP + Alb, Alk Phos, ALT, AST, Total. Bili, TP)   Result Value Ref Range    Sodium 142 136 - 145 mmol/L    Potassium 4.1 3.4 - 5.3 mmol/L    Chloride 105 98 - 107 mmol/L    Carbon Dioxide (CO2) 25 22 - 29 mmol/L    Anion Gap 12 7 - 15 mmol/L    Urea Nitrogen 9.4 6.0 - 20.0 mg/dL    Creatinine 0.72 0.51 - 0.95 mg/dL    Calcium 9.4 8.6 - 10.0 mg/dL    Glucose 114 (H) 70 - 99 mg/dL    Alkaline Phosphatase 114 (H) 35 - 104 U/L    AST 17 10 - 35 U/L    ALT 19 10 - 35 U/L    Protein Total 7.0 6.4 - 8.3 g/dL    Albumin 4.1 3.5 - 5.2 g/dL    Bilirubin Total 0.2 <=1.2 mg/dL    GFR Estimate >90 >60 mL/min/1.73m2   CBC with platelets and differential   Result Value Ref Range    WBC Count 6.5 4.0 - 11.0 10e3/uL    RBC Count 4.48 3.80 - 5.20 10e6/uL    Hemoglobin 11.6 (L) 11.7 - 15.7 g/dL    Hematocrit 37.6 35.0 - 47.0 %    MCV 84 78 - 100 fL    MCH 25.9 (L) 26.5 - 33.0 pg    MCHC 30.9 (L) 31.5 - 36.5 g/dL    RDW  "16.7 (H) 10.0 - 15.0 %    Platelet Count 361 150 - 450 10e3/uL    % Neutrophils 65 %    % Lymphocytes 27 %    % Monocytes 6 %    % Eosinophils 2 %    % Basophils 0 %    % Immature Granulocytes 0 %    NRBCs per 100 WBC 0 <1 /100    Absolute Neutrophils 4.2 1.6 - 8.3 10e3/uL    Absolute Lymphocytes 1.8 0.8 - 5.3 10e3/uL    Absolute Monocytes 0.4 0.0 - 1.3 10e3/uL    Absolute Eosinophils 0.2 0.0 - 0.7 10e3/uL    Absolute Basophils 0.0 0.0 - 0.2 10e3/uL    Absolute Immature Granulocytes 0.0 <=0.4 10e3/uL    Absolute NRBCs 0.0 10e3/uL       Imaging    No results found.    Billing  Total time: 45 min; including review of EMR, reports, diagnostics and ordering/coordination of care    Signed by: Marilu Vergara NP      Oncology Rooming Note    April 5, 2023 10:50 AM   Diana Salvador is a 57 year old female who presents for:    Chief Complaint   Patient presents with     Oncology Clinic Visit     Malignant neoplasm of upper-inner quadrant of right breast in female, estrogen receptor negative - Provider and infusion     Initial Vitals: /78 (BP Location: Right arm, Patient Position: Sitting, Cuff Size: Adult Large)   Pulse 73   Temp 98.3  F (36.8  C) (Tympanic)   Resp 12   Wt 94.3 kg (208 lb)   LMP  (LMP Unknown)   SpO2 100%   BMI 31.63 kg/m   Estimated body mass index is 31.63 kg/m  as calculated from the following:    Height as of 2/10/23: 1.727 m (5' 8\").    Weight as of this encounter: 94.3 kg (208 lb). Body surface area is 2.13 meters squared.  No Pain (0) Comment: Data Unavailable   No LMP recorded (lmp unknown). Patient has had a hysterectomy.  Allergies reviewed: Yes  Medications reviewed: Yes    Medications: Medication refills not needed today.  Pharmacy name entered into [employer]:    [facility][id number] - [place], [place] - [address] AT NW OF [address] [place] [address] AND [facility]  [facility] - [facility], [place] - [address] [place] [id number] [facility] - " [place], [place] - [address] [place] [address]    Clinical concerns:  None      Emy Hollingsworth, [facility]                Again, thank you for allowing me to participate in the care of your patient.        Sincerely,        Marilu Vergara, NP

## 2023-04-07 ENCOUNTER — OFFICE VISIT (OUTPATIENT)
Dept: SURGERY | Facility: CLINIC | Age: 58
End: 2023-04-07
Payer: OTHER GOVERNMENT

## 2023-04-07 DIAGNOSIS — Z98.890 S/P BREAST RECONSTRUCTION, BILATERAL: Primary | ICD-10-CM

## 2023-04-07 PROCEDURE — 99024 POSTOP FOLLOW-UP VISIT: CPT | Performed by: PLASTIC SURGERY

## 2023-04-07 RX ORDER — CEFAZOLIN SODIUM 2 G/50ML
2 SOLUTION INTRAVENOUS SEE ADMIN INSTRUCTIONS
Status: CANCELLED | OUTPATIENT
Start: 2023-04-07

## 2023-04-07 RX ORDER — CEFAZOLIN SODIUM 2 G/50ML
2 SOLUTION INTRAVENOUS
Status: CANCELLED | OUTPATIENT
Start: 2023-04-07

## 2023-04-07 NOTE — NURSING NOTE
Diana Salvador's goals for this visit include:   Chief Complaint   Patient presents with     RECHECK     Post post breast fat grafting       She requests these members of her care team be copied on today's visit information: no    PCP: Quoc Chu    Referring Provider:  Referred Self, MD  No address on file    LMP  (LMP Unknown)     Do you need any medication refills at today's visit? No    Brea Bermudez LPN

## 2023-04-07 NOTE — PROGRESS NOTES
POSTOPERATIVE VISIT NOTE    PRESENTING COMPLAINT:  Postoperative visit, status post bilateral breast reconstruction for breast cancer.  Last surgery done was fat grafting of both breasts done on 02/10/2023.    HISTORY OF PRESENTING COMPLAINT:  Ms. Salvador is 57 years old.  She underwent bilateral breast reconstruction for breast cancer with implants (saline implants 800 cc filled to 1000 cc bilaterally).  Here as a postoperative visit.  Overall, she feels her right breast is still lower and lateral compared to the left side.    PHYSICAL EXAMINATION:  Vital signs are stable.  She is afebrile, in no obvious distress.  Her breasts are healed.  Overall good aesthetics. Right side is lower and lateral with some upper pole indentations.    ASSESSMENT AND PLAN:  Based upon the above findings, a diagnosis of bilateral breast reconstruction was made.  I advised the patient that her right chest wall is wider than the left side, and therefore these asymmetries are based on the fact that she is asymmetric in terms of the chest wall, but nonetheless we can try and make them more symmetric by trying to tighten up the right-sided construct and maybe a little bit, also, on the left side.  This will entail removal of skin, re-forming the IMF and using the underlying saline implants.  She understood the plan and agreed.  We will try and schedule her as soon as possible.  All questions were answered.  All risks, benefits and alternatives of the procedure, including pain, infection, bleeding, scarring, asymmetry, seromas, hematomas, wound breakdown, wound dehiscence, loss of implants, requirement of further surgeries, DVT, PE, MI, CVA, pneumonia, renal failure and death, were explained.  She understood that over time, her right side will always be wider and lower than the left side.  All questions were answered.  She was happy with the visit.  All exam and discussion done in the presence of my resident, Keshawn Nice, and my student,  Juaquin Osullivan.

## 2023-04-07 NOTE — LETTER
4/7/2023         RE: Diana Salvador  [address] [place] [address]        Dear Colleague,    Thank you for referring your patient, Diana Salvador, to the [facility]. Please see a copy of my visit note below.    POSTOPERATIVE VISIT NOTE    PRESENTING COMPLAINT:  Postoperative visit, status post bilateral breast reconstruction for breast cancer.  Last surgery done was fat grafting of both breasts done on 02/10/2023.    HISTORY OF PRESENTING COMPLAINT:  Ms. Salvador is 57 years old.  She underwent bilateral breast reconstruction for breast cancer with implants (saline implants 800 cc filled to 1000 cc bilaterally).  Here as a postoperative visit.  Overall, she feels her right breast is still lower and lateral compared to the left side.    PHYSICAL EXAMINATION:  Vital signs are stable.  She is afebrile, in no obvious distress.  Her breasts are healed.  Overall good aesthetics. Right side is lower and lateral with some upper pole indentations.    ASSESSMENT AND PLAN:  Based upon the above findings, a diagnosis of bilateral breast reconstruction was made.  I advised the patient that her right chest wall is wider than the left side, and therefore these asymmetries are based on the fact that she is asymmetric in terms of the chest wall, but nonetheless we can try and make them more symmetric by trying to tighten up the right-sided construct and maybe a little bit, also, on the left side.  This will entail removal of skin, re-forming the IMF and using the underlying saline implants.  She understood the plan and agreed.  We will try and schedule her as soon as possible.  All questions were answered.  All risks, benefits and alternatives of the procedure, including pain, infection, bleeding, scarring, asymmetry, seromas, hematomas, wound breakdown, wound dehiscence, loss of implants, requirement of further surgeries, DVT, PE, MI, CVA, pneumonia, renal failure and death, were explained.  She  understood that over time, her right side will always be wider and lower than the left side.  All questions were answered.  She was happy with the visit.  All exam and discussion done in the presence of my resident, Keshawn Nice, and my student, Juaquin Osullivan.        Again, thank you for allowing me to participate in the care of your patient.        Sincerely,        ROSEMARIE Nieto MD

## 2023-04-10 ENCOUNTER — TELEPHONE (OUTPATIENT)
Dept: PLASTIC SURGERY | Facility: CLINIC | Age: 58
End: 2023-04-10
Payer: OTHER GOVERNMENT

## 2023-04-10 NOTE — TELEPHONE ENCOUNTER
PB DOS: TBD   Type of Procedure: bilateral breast reconstruction   CPT Codes:   ICD10 Codes:   Surgeon/Ordering provider: ROSEMARIE Nieto MD  Pre-cert/Authorization completed:  pending codes   Payer:    Spoke to   Ref. # / Auth #   Valid Dates:     This is not a guarantee of payment. Payment is subject to the patient's plan benefits and eligibility at the time services are rendered.

## 2023-04-11 NOTE — TELEPHONE ENCOUNTER
Pt called, No answer.  does not identify pt. Left general message for pt to call the [facility] back at [phone].    Brea Bermudez LPN

## 2023-04-11 NOTE — TELEPHONE ENCOUNTER
PB DOS: TBD   Type of Procedure: bilateral breast reconstruction   CPT Codes: 45665-58  Revision of reconstructed breast (eg, significant removal of tissue, re-advancement and/or re-inset of flaps in autologous reconstruction or significant capsular revision combined with soft tissue excision in implant-based reconstruction)   ICD10 Codes: N65.0  Z90.13  C50.911  Z98.82    Surgeon/Ordering provider: ROSEMARIE Nieto MD  Pre-cert/Authorization completed:  no PA required   Payer: [address]  Spoke to [facility] portal   Ref. # / Auth #   Valid Dates:     An approval from [employer] is not required for this service.  Select beneficiaries (this includes  [employer],  [employer] and  [employer]) who choose to use network providers may save on their cost-share.    This is not a guarantee of payment. Payment is subject to the patient's plan benefits and eligibility at the time services are rendered.

## 2023-04-11 NOTE — TELEPHONE ENCOUNTER
Patient returned call to clinic.  Informed pt no PA required and she should call her insurance company to confirm coverage.  Asked pt if she would like to proceed and she confirmed.    Sending this message to provider for surgery orders.  Once they are in, ask surgery schedule to reach out to pt.    Maile Milner RN

## 2023-04-14 ENCOUNTER — HOSPITAL ENCOUNTER (OUTPATIENT)
Dept: NUCLEAR MEDICINE | Facility: CLINIC | Age: 58
Setting detail: NUCLEAR MEDICINE
Discharge: HOME | End: 2023-04-14
Attending: NURSE PRACTITIONER
Payer: OTHER GOVERNMENT

## 2023-04-14 ENCOUNTER — TELEPHONE (OUTPATIENT)
Dept: ONCOLOGY | Facility: CLINIC | Age: 58
End: 2023-04-14
Payer: OTHER GOVERNMENT

## 2023-04-14 DIAGNOSIS — C50.911 MALIGNANT NEOPLASM OF RIGHT BREAST IN FEMALE, ESTROGEN RECEPTOR NEGATIVE, UNSPECIFIED SITE OF BREAST (H): ICD-10-CM

## 2023-04-14 DIAGNOSIS — M25.511 ACUTE PAIN OF RIGHT SHOULDER: ICD-10-CM

## 2023-04-14 DIAGNOSIS — M25.551 HIP PAIN, RIGHT: ICD-10-CM

## 2023-04-14 DIAGNOSIS — Z17.1 MALIGNANT NEOPLASM OF RIGHT BREAST IN FEMALE, ESTROGEN RECEPTOR NEGATIVE, UNSPECIFIED SITE OF BREAST (H): ICD-10-CM

## 2023-04-14 PROCEDURE — A9561 TC99M OXIDRONATE: HCPCS | Performed by: NURSE PRACTITIONER

## 2023-04-14 PROCEDURE — 78306 BONE IMAGING WHOLE BODY: CPT

## 2023-04-14 PROCEDURE — 343N000001 HC RX 343: Performed by: NURSE PRACTITIONER

## 2023-04-14 RX ADMIN — Medication 25 MILLICURIE: at 08:35

## 2023-04-14 NOTE — TELEPHONE ENCOUNTER
Notified Aide by phone that her bone scan is negative.     She has chronic arthralgias.   Ongoing pain mgmt/pain med refills should be with PCP

## 2023-04-17 NOTE — TELEPHONE ENCOUNTER
Called and LVM for patient to schedule surgery with Dr. Nieto. Provided direct call back number [phone].      
No call back from patient. Sent MyChart to patient with direct call back number to reach out when she is ready to move forward.   
Second attempt.    Called and LVM for patient to schedule surgery with Dr. Nieto. Provided direct call back number [phone].  
Pt presents to ED stating "they told me to come back to the ED for another blood test since I am pregnant". Pt reports she had a miscarriage in February so they MD told her to come back to check "her levels". Pt denies abd pain and vaginal bleeding.

## 2023-05-04 ENCOUNTER — MYC REFILL (OUTPATIENT)
Dept: FAMILY MEDICINE | Facility: CLINIC | Age: 58
End: 2023-05-04

## 2023-05-04 ENCOUNTER — INFUSION THERAPY VISIT (OUTPATIENT)
Dept: INFUSION THERAPY | Facility: CLINIC | Age: 58
End: 2023-05-04
Attending: NURSE PRACTITIONER
Payer: OTHER GOVERNMENT

## 2023-05-04 DIAGNOSIS — F11.90 CHRONIC, CONTINUOUS USE OF OPIOIDS: ICD-10-CM

## 2023-05-04 DIAGNOSIS — Z17.1 MALIGNANT NEOPLASM OF UPPER-INNER QUADRANT OF RIGHT BREAST IN FEMALE, ESTROGEN RECEPTOR NEGATIVE (H): Primary | ICD-10-CM

## 2023-05-04 DIAGNOSIS — M54.31 SCIATICA OF RIGHT SIDE: ICD-10-CM

## 2023-05-04 DIAGNOSIS — C50.211 MALIGNANT NEOPLASM OF UPPER-INNER QUADRANT OF RIGHT BREAST IN FEMALE, ESTROGEN RECEPTOR NEGATIVE (H): Primary | ICD-10-CM

## 2023-05-04 PROCEDURE — 96523 IRRIG DRUG DELIVERY DEVICE: CPT

## 2023-05-04 PROCEDURE — 250N000011 HC RX IP 250 OP 636: Performed by: NURSE PRACTITIONER

## 2023-05-04 RX ORDER — HEPARIN SODIUM (PORCINE) LOCK FLUSH IV SOLN 100 UNIT/ML 100 UNIT/ML
5 SOLUTION INTRAVENOUS
Status: DISCONTINUED | OUTPATIENT
Start: 2023-05-04 | End: 2023-05-04 | Stop reason: HOSPADM

## 2023-05-04 RX ORDER — HEPARIN SODIUM (PORCINE) LOCK FLUSH IV SOLN 100 UNIT/ML 100 UNIT/ML
5 SOLUTION INTRAVENOUS
Status: CANCELLED | OUTPATIENT
Start: 2023-05-04

## 2023-05-04 RX ORDER — HEPARIN SODIUM,PORCINE 10 UNIT/ML
5 VIAL (ML) INTRAVENOUS
Status: CANCELLED | OUTPATIENT
Start: 2023-05-04

## 2023-05-04 RX ADMIN — Medication 5 ML: at 11:19

## 2023-05-04 NOTE — PROGRESS NOTES
Infusion Nursing Note:  Diana Salvador presents today for port flush.    Patient seen by provider today: No   present during visit today: Not Applicable.    Note: N/A.      Intravenous Access:  Implanted Port.    Treatment Conditions:  Not Applicable.      Post Infusion Assessment:  Site patent and intact, free from redness, edema or discomfort.  No evidence of extravasations.  Access discontinued per protocol.       Discharge Plan:   Patient discharged in stable condition accompanied by: self.  Departure Mode: Ambulatory.      Bella Nunez RN

## 2023-05-05 RX ORDER — TRAMADOL HYDROCHLORIDE 50 MG/1
50 TABLET ORAL EVERY 6 HOURS PRN
Qty: 120 TABLET | Refills: 0 | Status: SHIPPED | OUTPATIENT
Start: 2023-05-05 | End: 2024-01-23

## 2023-05-19 ENCOUNTER — VIRTUAL VISIT (OUTPATIENT)
Dept: FAMILY MEDICINE | Facility: CLINIC | Age: 58
End: 2023-05-19
Payer: OTHER GOVERNMENT

## 2023-05-19 DIAGNOSIS — G47.00 INSOMNIA, UNSPECIFIED TYPE: ICD-10-CM

## 2023-05-19 DIAGNOSIS — R60.0 LOWER EXTREMITY EDEMA: ICD-10-CM

## 2023-05-19 DIAGNOSIS — F41.9 ANXIETY: Primary | ICD-10-CM

## 2023-05-19 DIAGNOSIS — M54.41 ACUTE RIGHT-SIDED LOW BACK PAIN WITH RIGHT-SIDED SCIATICA: ICD-10-CM

## 2023-05-19 DIAGNOSIS — F11.90 CHRONIC, CONTINUOUS USE OF OPIOIDS: ICD-10-CM

## 2023-05-19 DIAGNOSIS — G25.81 RESTLESS LEGS SYNDROME: ICD-10-CM

## 2023-05-19 DIAGNOSIS — B00.1 COLD SORE: ICD-10-CM

## 2023-05-19 DIAGNOSIS — E53.8 VITAMIN B12 DEFICIENCY (NON ANEMIC): ICD-10-CM

## 2023-05-19 DIAGNOSIS — M54.31 SCIATICA OF RIGHT SIDE: ICD-10-CM

## 2023-05-19 DIAGNOSIS — Z12.11 SCREEN FOR COLON CANCER: ICD-10-CM

## 2023-05-19 PROCEDURE — 99214 OFFICE O/P EST MOD 30 MIN: CPT | Mod: VID | Performed by: FAMILY MEDICINE

## 2023-05-19 RX ORDER — TRAMADOL HYDROCHLORIDE 50 MG/1
50 TABLET ORAL EVERY 6 HOURS PRN
Qty: 120 TABLET | Refills: 0 | Status: SHIPPED | OUTPATIENT
Start: 2023-08-04 | End: 2023-06-28

## 2023-05-19 RX ORDER — VALACYCLOVIR HYDROCHLORIDE 1 G/1
TABLET, FILM COATED ORAL
Qty: 16 TABLET | Refills: 3 | Status: SHIPPED | OUTPATIENT
Start: 2023-05-19 | End: 2023-09-01

## 2023-05-19 RX ORDER — ROPINIROLE 1 MG/1
1 TABLET, FILM COATED ORAL AT BEDTIME
Qty: 90 TABLET | Refills: 1 | Status: SHIPPED | OUTPATIENT
Start: 2023-05-19 | End: 2024-01-03

## 2023-05-19 RX ORDER — BUSPIRONE HYDROCHLORIDE 7.5 MG/1
TABLET ORAL
Qty: 180 TABLET | Refills: 1 | Status: CANCELLED | OUTPATIENT
Start: 2023-05-19 | End: 2023-06-25

## 2023-05-19 RX ORDER — HYDROCODONE BITARTRATE AND ACETAMINOPHEN 5; 325 MG/1; MG/1
1 TABLET ORAL EVERY 6 HOURS PRN
Qty: 90 TABLET | Refills: 0 | Status: SHIPPED | OUTPATIENT
Start: 2023-07-19 | End: 2023-06-01

## 2023-05-19 RX ORDER — CITALOPRAM HYDROBROMIDE 40 MG/1
20 TABLET ORAL DAILY
Qty: 1 TABLET | Refills: 1
Start: 2023-05-19 | End: 2023-07-27

## 2023-05-19 RX ORDER — QUETIAPINE FUMARATE 25 MG/1
25-50 TABLET, FILM COATED ORAL AT BEDTIME
Qty: 60 TABLET | Refills: 2 | Status: SHIPPED | OUTPATIENT
Start: 2023-05-19 | End: 2023-06-15

## 2023-05-19 RX ORDER — TRAMADOL HYDROCHLORIDE 50 MG/1
50 TABLET ORAL EVERY 6 HOURS PRN
Qty: 120 TABLET | Refills: 0 | Status: SHIPPED | OUTPATIENT
Start: 2023-07-05 | End: 2023-08-04

## 2023-05-19 RX ORDER — DULOXETIN HYDROCHLORIDE 30 MG/1
CAPSULE, DELAYED RELEASE ORAL
Qty: 194 CAPSULE | Refills: 0 | Status: SHIPPED | OUTPATIENT
Start: 2023-05-19 | End: 2023-06-27

## 2023-05-19 RX ORDER — FUROSEMIDE 20 MG
20 TABLET ORAL DAILY PRN
Qty: 60 TABLET | Refills: 3 | Status: SHIPPED | OUTPATIENT
Start: 2023-05-19 | End: 2024-05-31

## 2023-05-19 RX ORDER — CYANOCOBALAMIN 1000 UG/ML
1 INJECTION, SOLUTION INTRAMUSCULAR; SUBCUTANEOUS
Qty: 3 ML | Refills: 3 | Status: SHIPPED | OUTPATIENT
Start: 2023-05-19 | End: 2024-05-31

## 2023-05-19 RX ORDER — TRAMADOL HYDROCHLORIDE 50 MG/1
50 TABLET ORAL EVERY 6 HOURS PRN
Qty: 120 TABLET | Refills: 0 | Status: SHIPPED | OUTPATIENT
Start: 2023-06-05 | End: 2023-06-28

## 2023-05-19 ASSESSMENT — ANXIETY QUESTIONNAIRES
5. BEING SO RESTLESS THAT IT IS HARD TO SIT STILL: MORE THAN HALF THE DAYS
GAD7 TOTAL SCORE: 15
6. BECOMING EASILY ANNOYED OR IRRITABLE: NOT AT ALL
3. WORRYING TOO MUCH ABOUT DIFFERENT THINGS: NEARLY EVERY DAY
1. FEELING NERVOUS, ANXIOUS, OR ON EDGE: NEARLY EVERY DAY
7. FEELING AFRAID AS IF SOMETHING AWFUL MIGHT HAPPEN: SEVERAL DAYS
GAD7 TOTAL SCORE: 15
2. NOT BEING ABLE TO STOP OR CONTROL WORRYING: NEARLY EVERY DAY
IF YOU CHECKED OFF ANY PROBLEMS ON THIS QUESTIONNAIRE, HOW DIFFICULT HAVE THESE PROBLEMS MADE IT FOR YOU TO DO YOUR WORK, TAKE CARE OF THINGS AT HOME, OR GET ALONG WITH OTHER PEOPLE: VERY DIFFICULT

## 2023-05-19 ASSESSMENT — PATIENT HEALTH QUESTIONNAIRE - PHQ9
5. POOR APPETITE OR OVEREATING: NEARLY EVERY DAY
SUM OF ALL RESPONSES TO PHQ QUESTIONS 1-9: 21

## 2023-05-19 NOTE — PATIENT INSTRUCTIONS
Schedule an in clinic appt in 12 weeks.    Make one thing to get done today.  Call to schedule with therapy  Stop the buspar    Week 1-2  Citalopram decrease to 20mg once a day  Start cymbalta (duloxetine) 30mg once a day    Week 3-4  Decreased citalopram to 10mg every day (1/4 pill) or 20mg every other day  Increase duloxetine up to 60mg daily    Week 5 and on  Stop the citalopram  Continue duloxetine at 60mg daily      Sleep start the seroquel (quetiapine) 25mg full pill or 1/2 pill 12.5mg at bedtime for sleep.

## 2023-05-19 NOTE — PROGRESS NOTES
Aide is a 58 year old who is being evaluated via a billable video visit.      How would you like to obtain your AVS? MyChart  If the video visit is dropped, the invitation should be resent by: Text to cell phone: [phone]  Will anyone else be joining your video visit? No        Assessment & Plan     Anxiety  Not controlled, high stressors  Discussed options, plan to switch from celexa to cymbalta for both pain and mood.  - citalopram (CELEXA) 40 MG tablet; Take 0.5 tablets (20 mg) by mouth daily for 28 days Then decrease to 10mg (1/4 pill) daily for 2 weeks then stop medication.  - DULoxetine (CYMBALTA) 30 MG capsule; Take 1 capsule (30 mg) by mouth daily for 14 days, THEN 2 capsules (60 mg) daily for 90 days.    Vitamin B12 deficiency (non anemic)  - cyanocobalamin (CYANOCOBALAMIN) 1000 MCG/ML injection; Inject 1 mL (1,000 mcg) into the muscle every 30 days    Acute right-sided low back pain with right-sided sciatica    Insomnia, unspecified type  Not controlled with doxepin  Plan to start seroquel low dose and work up if needed  -discussed risks, benefits, and side effects of this new medication. Patient understands and is in agreement.  - QUEtiapine (SEROQUEL) 25 MG tablet; Take 1-2 tablets (25-50 mg) by mouth At Bedtime    Lower extremity edema  Refill for as needed  - furosemide (LASIX) 20 MG tablet; Take 1 tablet (20 mg) by mouth daily as needed (swelling)    Chronic, continuous use of opioids  Due to diffuse pain uses tramadol daily  Start cymbalta as above to help too  -discussed risks, benefits, and side effects of this medication. Patient understands and is in agreement.  - HYDROcodone-acetaminophen (NORCO) 5-325 MG tablet; Take 1 tablet by mouth every 6 hours as needed for severe pain  - traMADol (ULTRAM) 50 MG tablet; Take 1 tablet (50 mg) by mouth every 6 hours as needed for severe pain  - traMADol (ULTRAM) 50 MG tablet; Take 1 tablet (50 mg) by mouth every 6 hours as needed for severe pain  -  "traMADol (ULTRAM) 50 MG tablet; Take 1 tablet (50 mg) by mouth every 6 hours as needed for severe pain  - DULoxetine (CYMBALTA) 30 MG capsule; Take 1 capsule (30 mg) by mouth daily for 14 days, THEN 2 capsules (60 mg) daily for 90 days.    Sciatica of right side  Stable now  - HYDROcodone-acetaminophen (NORCO) 5-325 MG tablet; Take 1 tablet by mouth every 6 hours as needed for severe pain    Restless legs syndrome  Stable, well controlled  - rOPINIRole (REQUIP) 1 MG tablet; Take 1 tablet (1 mg) by mouth At Bedtime    Cold sore  - valACYclovir (VALTREX) 1000 mg tablet; TAKE 1 TABLET(1000 MG) BY MOUTH TWICE DAILY. REPEAT AS NEEDED FOR COLD SORE    Screen for colon cancer         BMI:   Estimated body mass index is 31.63 kg/m  as calculated from the following:    Height as of 2/10/23: 1.727 m (5' 8\").    Weight as of 4/5/23: 94.3 kg (208 lb).       Depression Screening Follow Up        5/19/2023    12:53 PM   PHQ   PHQ-9 Total Score 21   Q9: Thoughts of better off dead/self-harm past 2 weeks Not at all         Follow Up Actions Taken  Crisis resource information provided in After Visit Summary     See Patient Instructions    Quoc Chu MD  [facility] 58 [facility], presenting for the following health issues:  Recheck Medication        5/19/2023    12:45 PM   Additional Questions   Roomed by Stacy Chavez MA   Accompanied by Self         5/19/2023    12:45 PM   Patient Reported Additional Medications   Patient reports taking the following new medications multivitamin     HPI     Anxiety Follow-Up    How are you doing with your anxiety since your last visit? No change    Are you having other symptoms that might be associated with anxiety? Yes:  Doesn't like to leave her house. Social anxiety.    Have you had a significant life event? Job Concerns and OTHER: Doing chemo.     Are you feeling depressed? Yes:  .    Do you have any concerns with your use of alcohol or " other drugs? No  Therapy: has in the past, please call to schedule.  Insomnia: shortages of the doxepin, so sometimes takes and sometimes doesn't.  Not helpful if dose take it.  Social History     Tobacco Use     Smoking status: Former     Types: Cigarettes     Start date: 1982     Quit date: 1984     Years since quittin.4     Smokeless tobacco: Never   Vaping Use     Vaping status: Never Used   Substance Use Topics     Alcohol use: Yes     Comment: occ     Drug use: No         2022     3:58 PM 2022    11:26 AM 2/3/2023    10:37 AM   SHADE-7 SCORE   Total Score 18 (severe anxiety) 20 (severe anxiety) 16 (severe anxiety)   Total Score 18 20 16         6/15/2022    11:04 AM 2022    11:22 AM 2/3/2023    10:35 AM   PHQ   PHQ-9 Total Score 14 16 14   Q9: Thoughts of better off dead/self-harm past 2 weeks Not at all Not at all Not at all         2023    12:53 PM   Last PHQ-9   1.  Little interest or pleasure in doing things 3   2.  Feeling down, depressed, or hopeless 3   3.  Trouble falling or staying asleep, or sleeping too much 3   4.  Feeling tired or having little energy 3   5.  Poor appetite or overeating 3   6.  Feeling bad about yourself 3   7.  Trouble concentrating 3   8.  Moving slowly or restless 0   Q9: Thoughts of better off dead/self-harm past 2 weeks 0   PHQ-9 Total Score 21   Difficulty at work, home, or with people Very difficult         2023    12:53 PM   SHADE-7    1. Feeling nervous, anxious, or on edge 3   2. Not being able to stop or control worrying 3   3. Worrying too much about different things 3   4. Trouble relaxing 3   5. Being so restless that it is hard to sit still 2   6. Becoming easily annoyed or irritable 0   7. Feeling afraid, as if something awful might happen 1   SHADE-7 Total Score 15   If you checked any problems, how difficult have they made it for you to do your work, take care of things at home, or get along with other people? Very difficult  "      Pain History:  When did you first notice your pain? Ongoing for years 15+ years  Have you seen this provider for your pain in the past?   Yes   Where in your body do you have pain? Low back right sciatic. Getting worse. Generates in neck and down her back on the right side. Patient states she feels like there is something hard but there is nothing there.  Are you seeing anyone else for your pain? No        6/15/2022    11:04 AM 9/30/2022    11:22 AM 2/3/2023    10:35 AM   PHQ-9 SCORE   PHQ-9 Total Score MyChart 14 (Moderate depression) 16 (Moderately severe depression) 14 (Moderate depression)   PHQ-9 Total Score 14 16 14           7/12/2022     3:58 PM 9/30/2022    11:26 AM 2/3/2023    10:37 AM   SHADE-7 SCORE   Total Score 18 (severe anxiety) 20 (severe anxiety) 16 (severe anxiety)   Total Score 18 20 16           9/30/2022    11:22 AM 2/3/2023    10:35 AM 5/19/2023    12:53 PM   PHQ-9 SCORE   PHQ-9 Total Score MyChart 16 (Moderately severe depression) 14 (Moderate depression)    PHQ-9 Total Score 16 14 21         9/30/2022    11:26 AM 2/3/2023    10:37 AM 5/19/2023    12:53 PM   SHADE-7 SCORE   Total Score 20 (severe anxiety) 16 (severe anxiety)    Total Score 20 16 15         2/3/2023    10:51 AM 5/19/2023    12:53 PM   PEG Score   PEG Total Score 7 7       Chronic Pain Follow Up:    Location of pain: all over  Analgesia/pain control:    - Recent changes:  Worsened after COVID 2 months ago    - Overall control: Tolerable with discomfort  But sometimes not managed   - Current treatments: taking tramadol 'around the clock\"  Takes norco only ifn ot relieved by tramadol so about 4 times a week, it causes stomach discomfort.  Gabapentin for neuropathy  Adherence:     - Do you ever take more pain medicine than prescribed? No    - When did you take your last dose of pain medicine?  today   Adverse effects: No   PDMP Review       Value Time User    State PDMP site checked  Yes 3/27/2023 11:25 AM Quoc Chu, " MD        Last CSA Agreement:   CSA -- Patient Level:     [Media Unavailable] Controlled Substance Agreement - Opioid - Scan on 5/8/2022  1:14 PM   [Media Unavailable] Controlled Substance Agreement - Opioid - Scan on 11/17/2019  3:57 PM       Last UDS:           Medication Followup of Furosemide    Taking Medication as prescribed: yes    Side Effects:  None    Medication Helping Symptoms:  yes    Medication Followup of Requip    Taking Medication as prescribed: yes    Side Effects:  None    Medication Helping Symptoms:  yes          Review of Systems   Constitutional, HEENT, cardiovascular, pulmonary, gi and gu systems are negative, except as otherwise noted.      Objective           Vitals:  No vitals were obtained today due to virtual visit.    Physical Exam   GENERAL: Healthy, alert and no distress  EYES: Eyes grossly normal to inspection.  No discharge or erythema, or obvious scleral/conjunctival abnormalities.  RESP: No audible wheeze, cough, or visible cyanosis.  No visible retractions or increased work of breathing.    SKIN: Visible skin clear. No significant rash, abnormal pigmentation or lesions.  NEURO: Cranial nerves grossly intact.  Mentation and speech appropriate for age.  PSYCH:tearful, anxiouw Mentation appears normal, , judgement and insight intact, normal speech and appearance well-groomed.                Video-Visit Details    Type of service:  Video Visit     Originating Location (pt. Location): Home  Distant Location (provider location):  On-site  Platform used for Video Visit: [employer]

## 2023-05-29 DIAGNOSIS — F41.8 SITUATIONAL ANXIETY: ICD-10-CM

## 2023-05-30 RX ORDER — LORAZEPAM 0.5 MG/1
.5-1 TABLET ORAL EVERY 8 HOURS PRN
Qty: 60 TABLET | Refills: 3 | Status: SHIPPED | OUTPATIENT
Start: 2023-05-30 | End: 2023-09-01

## 2023-05-31 ENCOUNTER — VIRTUAL VISIT (OUTPATIENT)
Dept: ONCOLOGY | Facility: CLINIC | Age: 58
End: 2023-05-31
Attending: PSYCHOLOGIST
Payer: OTHER GOVERNMENT

## 2023-05-31 DIAGNOSIS — F41.9 ANXIETY: Primary | ICD-10-CM

## 2023-05-31 PROCEDURE — 90832 PSYTX W PT 30 MINUTES: CPT | Mod: 95 | Performed by: PSYCHOLOGIST

## 2023-05-31 NOTE — LETTER
5/31/2023         RE: Diana Salvador  [address] [place] [id number]        Dear Colleague,    Thank you for referring your patient, Diana Salvador, to the [facility]. Please see a copy of my visit note below.          [facility] Oncology- Psychotherapy                                    Progress Note    Patient Name: Diana Salvador  Date: 5/31/23         Service Type: Individual      Session Start Time: 9:00  Session End Time: 9:31     Session Length: 31 minutes    Session #: 3    Attendees: Client attended alone    Service Modality:  Video Visit:      Provider verified identity through the following two step process.  Patient provided:  Patient photo    Telemedicine Visit: The patient's condition can be safely assessed and treated via synchronous audio and visual telemedicine encounter.      Reason for Telemedicine Visit: Services only offered telehealth    Originating Site (Patient Location): Patient's home    Distant Site (Provider Location): Provider Remote Setting- Home Office    Consent:  The patient/guardian has verbally consented to: the potential risks and benefits of telemedicine (video visit) versus in person care; bill my insurance or make self-payment for services provided; and responsibility for payment of non-covered services.     Patient would like the video invitation sent by:  My Chart    Mode of Communication:  [facility] Conference via [facility]    Distant Location (Provider):  Off-site    As the provider I attest to compliance with applicable laws and regulations related to telemedicine.    DATA  Interactive Complexity: No  Crisis: No   Current / Ongoing Stressors and Concerns:   Depressed mood, cancer, loss of job, stress     Treatment Objective(s) Addressed in This Session:   identify coping strategies to more effectively address stressors     Intervention:   Motivational Interviewing: to help her increase problem solving     ASSESSMENT: Current Emotional  / Mental Status (status of significant symptoms):   Risk status (Self / Other harm or suicidal ideation)   Patient denies current fears or concerns for personal safety.   Patient denies current or recent suicidal ideation or behaviors.   Patient denies current or recent homicidal ideation or behaviors.   Patient denies current or recent self injurious behavior or ideation.   Patient denies other safety concerns.   Patient reports there has been no change in risk factors since their last session.     Patient reports there has been no change in protective factors since their last session.     Recommended that patient call 911 or go to the local ED should there be a change in any of these risk factors.     Appearance:   Appropriate    Eye Contact:   Good    Psychomotor Behavior: Normal    Attitude:   Cooperative    Orientation:   All   Speech    Rate / Production: Normal     Volume:  Normal    Mood:    Normal   Affect:    Appropriate    Thought Content:  Clear    Thought Form:  Coherent  Logical    Insight:    Good      Medication Review:   No changes to current psychiatric medication(s)     Medication Compliance:   Yes     Changes in Health Issues:   None reported     Chemical Use Review:   Substance Use: Chemical use reviewed, no active concerns identified      Tobacco Use: No current tobacco use.      Diagnosis:  1. Anxiety        Collateral Reports Completed:   Not Applicable    PLAN: (Patient Tasks / Therapist Tasks / Other)  She is to replace anxious thoughts with thoughts of strength. Develop goals for her post-treatment life.     Magan Russ PsyD                                                         ______________________________________________________________________    Individual Treatment Plan    Patient's Name: Diana Salvador  YOB: 1965    Date of Creation: 12/26/22  Date Treatment Plan Last Reviewed/Revised:     DSM5 Diagnoses: 296.21 (F32.0) Major Depressive Disorder, Single  Episode, Mild _ and With melancholic features  Psychosocial / Contextual Factors: cancer  Anticipated number of session for this episode of care: 3-6 sessions  Anticipation frequency of session: Every other week  Anticipated Duration of each session: 16-37 minutes  Treatment plan will be reviewed in 90 days or when goals have been changed.       MeasurableTreatment Goal(s) related to diagnosis / functional impairment(s)  Goal 1: Patient will use problem solving    I will know I've met my goal when I feel less depressed.      Patient has reviewed and agreed to the above plan.      Magan Russ PsyD  May 31, 2023

## 2023-05-31 NOTE — PROGRESS NOTES
[facility] Oncology- Psychotherapy                                    Progress Note    Patient Name: Diana Salvador  Date: 5/31/23         Service Type: Individual      Session Start Time: 9:00  Session End Time: 9:31     Session Length: 31 minutes    Session #: 3    Attendees: Client attended alone    Service Modality:  Video Visit:      Provider verified identity through the following two step process.  Patient provided:  Patient photo    Telemedicine Visit: The patient's condition can be safely assessed and treated via synchronous audio and visual telemedicine encounter.      Reason for Telemedicine Visit: Services only offered telehealth    Originating Site (Patient Location): Patient's home    Distant Site (Provider Location): Provider Remote Setting- Home Office    Consent:  The patient/guardian has verbally consented to: the potential risks and benefits of telemedicine (video visit) versus in person care; bill my insurance or make self-payment for services provided; and responsibility for payment of non-covered services.     Patient would like the video invitation sent by:  My Chart    Mode of Communication:  Video Conference via [employer]    Distant Location (Provider):  Off-site    As the provider I attest to compliance with applicable laws and regulations related to telemedicine.    DATA  Interactive Complexity: No  Crisis: No   Current / Ongoing Stressors and Concerns:   Depressed mood, cancer, loss of job, stress     Treatment Objective(s) Addressed in This Session:   identify coping strategies to more effectively address stressors     Intervention:   Motivational Interviewing: to help her increase problem solving     ASSESSMENT: Current Emotional / Mental Status (status of significant symptoms):   Risk status (Self / Other harm or suicidal ideation)   Patient denies current fears or concerns for personal safety.   Patient denies current or recent suicidal ideation or behaviors.   Patient  denies current or recent homicidal ideation or behaviors.   Patient denies current or recent self injurious behavior or ideation.   Patient denies other safety concerns.   Patient reports there has been no change in risk factors since their last session.     Patient reports there has been no change in protective factors since their last session.     Recommended that patient call 911 or go to the local ED should there be a change in any of these risk factors.     Appearance:   Appropriate    Eye Contact:   Good    Psychomotor Behavior: Normal    Attitude:   Cooperative    Orientation:   All   Speech    Rate / Production: Normal     Volume:  Normal    Mood:    Normal   Affect:    Appropriate    Thought Content:  Clear    Thought Form:  Coherent  Logical    Insight:    Good      Medication Review:   No changes to current psychiatric medication(s)     Medication Compliance:   Yes     Changes in Health Issues:   None reported     Chemical Use Review:   Substance Use: Chemical use reviewed, no active concerns identified      Tobacco Use: No current tobacco use.      Diagnosis:  1. Anxiety        Collateral Reports Completed:   Not Applicable    PLAN: (Patient Tasks / Therapist Tasks / Other)  She is to replace anxious thoughts with thoughts of strength. Develop goals for her post-treatment life.     Magan Russ, Miracle                                                         ______________________________________________________________________    Individual Treatment Plan    Patient's Name: Diana Salvador  YOB: 1965    Date of Creation: 12/26/22  Date Treatment Plan Last Reviewed/Revised:     DSM5 Diagnoses: 296.21 (F32.0) Major Depressive Disorder, Single Episode, Mild _ and With melancholic features  Psychosocial / Contextual Factors: cancer  Anticipated number of session for this episode of care: 3-6 sessions  Anticipation frequency of session: Every other week  Anticipated Duration of each session:  16-37 minutes  Treatment plan will be reviewed in 90 days or when goals have been changed.       MeasurableTreatment Goal(s) related to diagnosis / functional impairment(s)  Goal 1: Patient will use problem solving    I will know I've met my goal when I feel less depressed.      Patient has reviewed and agreed to the above plan.      Magan Russ PsyD  May 31, 2023

## 2023-06-01 ENCOUNTER — INFUSION THERAPY VISIT (OUTPATIENT)
Dept: INFUSION THERAPY | Facility: CLINIC | Age: 58
End: 2023-06-01
Attending: NURSE PRACTITIONER
Payer: OTHER GOVERNMENT

## 2023-06-01 DIAGNOSIS — Z17.1 MALIGNANT NEOPLASM OF UPPER-INNER QUADRANT OF RIGHT BREAST IN FEMALE, ESTROGEN RECEPTOR NEGATIVE (H): Primary | ICD-10-CM

## 2023-06-01 DIAGNOSIS — C50.211 MALIGNANT NEOPLASM OF UPPER-INNER QUADRANT OF RIGHT BREAST IN FEMALE, ESTROGEN RECEPTOR NEGATIVE (H): Primary | ICD-10-CM

## 2023-06-01 PROCEDURE — 96523 IRRIG DRUG DELIVERY DEVICE: CPT

## 2023-06-01 PROCEDURE — 250N000011 HC RX IP 250 OP 636: Performed by: NURSE PRACTITIONER

## 2023-06-01 RX ORDER — HEPARIN SODIUM (PORCINE) LOCK FLUSH IV SOLN 100 UNIT/ML 100 UNIT/ML
5 SOLUTION INTRAVENOUS
Status: CANCELLED | OUTPATIENT
Start: 2023-06-01

## 2023-06-01 RX ORDER — HEPARIN SODIUM (PORCINE) LOCK FLUSH IV SOLN 100 UNIT/ML 100 UNIT/ML
5 SOLUTION INTRAVENOUS
Status: DISCONTINUED | OUTPATIENT
Start: 2023-06-01 | End: 2023-06-01 | Stop reason: HOSPADM

## 2023-06-01 RX ORDER — HEPARIN SODIUM,PORCINE 10 UNIT/ML
5 VIAL (ML) INTRAVENOUS
Status: CANCELLED | OUTPATIENT
Start: 2023-06-01

## 2023-06-01 RX ADMIN — Medication 5 ML: at 11:06

## 2023-06-01 NOTE — PROGRESS NOTES
Port flushed without difficulty via site protocol. Patient tolerated well. Heparin locked and port de-accessed prior to discharge.    Port is tilted inward towards chest, pt states she thinks this is due to her reconstructive surgery.     LACY ADAMS RN on 6/1/2023 at 11:09 AM

## 2023-06-14 ENCOUNTER — VIRTUAL VISIT (OUTPATIENT)
Dept: ONCOLOGY | Facility: CLINIC | Age: 58
End: 2023-06-14
Attending: PSYCHOLOGIST
Payer: OTHER GOVERNMENT

## 2023-06-14 DIAGNOSIS — F43.22 ADJUSTMENT DISORDER WITH ANXIOUS MOOD: Primary | ICD-10-CM

## 2023-06-14 PROCEDURE — 90832 PSYTX W PT 30 MINUTES: CPT | Mod: 95 | Performed by: PSYCHOLOGIST

## 2023-06-14 NOTE — LETTER
6/14/2023         RE: Diana Salvador  [address] [place] [id number]        Dear Colleague,    Thank you for referring your patient, Diana Salvador, to the [facility]CER [facility]. Please see a copy of my visit note below.          [facility] Oncology- Psychotherapy                                    Progress Note    Patient Name: Diana Salvador  Date: 6/14/23         Service Type: Individual      Session Start Time: 11:00  Session End Time: 11:31     Session Length: 31 minutes    Session #: 4    Attendees: Client attended alone    Service Modality:  Video Visit:      Provider verified identity through the following two step process.  Patient provided:  Patient photo    Telemedicine Visit: The patient's condition can be safely assessed and treated via synchronous audio and visual telemedicine encounter.      Reason for Telemedicine Visit: Services only offered telehealth    Originating Site (Patient Location): Patient's home    Distant Site (Provider Location): Provider Remote Setting- Home Office    Consent:  The patient/guardian has verbally consented to: the potential risks and benefits of telemedicine (video visit) versus in person care; bill my insurance or make self-payment for services provided; and responsibility for payment of non-covered services.     Patient would like the video invitation sent by:  My Chart    Mode of Communication:  [facility]    Distant Location (Provider):  Off-site    As the provider I attest to compliance with applicable laws and regulations related to telemedicine.    DATA  Interactive Complexity: No  Crisis: No   Current / Ongoing Stressors and Concerns:   Depressed mood, cancer, loss of job, stress     Treatment Objective(s) Addressed in This Session:   identify coping strategies to more effectively address stressors     Intervention:   Motivational Interviewing: to help her increase problem solving     ASSESSMENT: Current  Emotional / Mental Status (status of significant symptoms):   Risk status (Self / Other harm or suicidal ideation)   Patient denies current fears or concerns for personal safety.   Patient denies current or recent suicidal ideation or behaviors.   Patient denies current or recent homicidal ideation or behaviors.   Patient denies current or recent self injurious behavior or ideation.   Patient denies other safety concerns.   Patient reports there has been no change in risk factors since their last session.     Patient reports there has been no change in protective factors since their last session.     Recommended that patient call 911 or go to the local ED should there be a change in any of these risk factors.     Appearance:   Appropriate    Eye Contact:   Good    Psychomotor Behavior: Normal    Attitude:   Cooperative    Orientation:   All   Speech    Rate / Production: Normal     Volume:  Normal    Mood:    Normal   Affect:    Appropriate    Thought Content:  Clear    Thought Form:  Coherent  Logical    Insight:    Good      Medication Review:   No changes to current psychiatric medication(s)     Medication Compliance:   Yes     Changes in Health Issues:   None reported     Chemical Use Review:   Substance Use: Chemical use reviewed, no active concerns identified      Tobacco Use: No current tobacco use.      Diagnosis:  1. Adjustment disorder with anxious mood        Collateral Reports Completed:   Not Applicable    PLAN: (Patient Tasks / Therapist Tasks / Other)  She is to replace anxious thoughts with thoughts of strength. Develop goals for her post-treatment life.     Magan Russ PsyD                                                         ______________________________________________________________________    Individual Treatment Plan    Patient's Name: Diana Salvador  YOB: 1965    Date of Creation: 12/26/22  Date Treatment Plan Last Reviewed/Revised:     DSM5 Diagnoses: 296.21  (F32.0) Major Depressive Disorder, Single Episode, Mild _ and With melancholic features  Psychosocial / Contextual Factors: cancer  Anticipated number of session for this episode of care: 3-6 sessions  Anticipation frequency of session: Every other week  Anticipated Duration of each session: 16-37 minutes  Treatment plan will be reviewed in 90 days or when goals have been changed.       MeasurableTreatment Goal(s) related to diagnosis / functional impairment(s)  Goal 1: Patient will use problem solving    I will know I've met my goal when I feel less depressed.      Patient has reviewed and agreed to the above plan.      Magan Russ PsyD  June 14, 2023

## 2023-06-14 NOTE — PROGRESS NOTES
[facility] Oncology- Psychotherapy                                    Progress Note    Patient Name: Diana Salvador  Date: 6/14/23         Service Type: Individual      Session Start Time: 11:00  Session End Time: 11:31     Session Length: 31 minutes    Session #: 4    Attendees: Client attended alone    Service Modality:  Video Visit:      Provider verified identity through the following two step process.  Patient provided:  Patient photo    Telemedicine Visit: The patient's condition can be safely assessed and treated via synchronous audio and visual telemedicine encounter.      Reason for Telemedicine Visit: Services only offered telehealth    Originating Site (Patient Location): Patient's home    Distant Site (Provider Location): Provider Remote Setting- Home Office    Consent:  The patient/guardian has verbally consented to: the potential risks and benefits of telemedicine (video visit) versus in person care; bill my insurance or make self-payment for services provided; and responsibility for payment of non-covered services.     Patient would like the video invitation sent by:  My Chart    Mode of Communication:  Video Conference via [facility]    Distant Location (Provider):  Off-site    As the provider I attest to compliance with applicable laws and regulations related to telemedicine.    DATA  Interactive Complexity: No  Crisis: No   Current / Ongoing Stressors and Concerns:   Depressed mood, cancer, loss of job, stress     Treatment Objective(s) Addressed in This Session:   identify coping strategies to more effectively address stressors     Intervention:   Motivational Interviewing: to help her increase problem solving     ASSESSMENT: Current Emotional / Mental Status (status of significant symptoms):   Risk status (Self / Other harm or suicidal ideation)   Patient denies current fears or concerns for personal safety.   Patient denies current or recent suicidal ideation or behaviors.   Patient  denies current or recent homicidal ideation or behaviors.   Patient denies current or recent self injurious behavior or ideation.   Patient denies other safety concerns.   Patient reports there has been no change in risk factors since their last session.     Patient reports there has been no change in protective factors since their last session.     Recommended that patient call 911 or go to the local ED should there be a change in any of these risk factors.     Appearance:   Appropriate    Eye Contact:   Good    Psychomotor Behavior: Normal    Attitude:   Cooperative    Orientation:   All   Speech    Rate / Production: Normal     Volume:  Normal    Mood:    Normal   Affect:    Appropriate    Thought Content:  Clear    Thought Form:  Coherent  Logical    Insight:    Good      Medication Review:   No changes to current psychiatric medication(s)     Medication Compliance:   Yes     Changes in Health Issues:   None reported     Chemical Use Review:   Substance Use: Chemical use reviewed, no active concerns identified      Tobacco Use: No current tobacco use.      Diagnosis:  1. Adjustment disorder with anxious mood        Collateral Reports Completed:   Not Applicable    PLAN: (Patient Tasks / Therapist Tasks / Other)  She is to replace anxious thoughts with thoughts of strength. Develop goals for her post-treatment life.     Magan Russ PsyD                                                         ______________________________________________________________________    Individual Treatment Plan    Patient's Name: Diana Salvador  YOB: 1965    Date of Creation: 12/26/22  Date Treatment Plan Last Reviewed/Revised:     DSM5 Diagnoses: 296.21 (F32.0) Major Depressive Disorder, Single Episode, Mild _ and With melancholic features  Psychosocial / Contextual Factors: cancer  Anticipated number of session for this episode of care: 3-6 sessions  Anticipation frequency of session: Every other  week  Anticipated Duration of each session: 16-37 minutes  Treatment plan will be reviewed in 90 days or when goals have been changed.       MeasurableTreatment Goal(s) related to diagnosis / functional impairment(s)  Goal 1: Patient will use problem solving    I will know I've met my goal when I feel less depressed.      Patient has reviewed and agreed to the above plan.      Magan Russ PsyD  June 14, 2023

## 2023-06-27 DIAGNOSIS — M54.31 SCIATICA OF RIGHT SIDE: ICD-10-CM

## 2023-06-27 DIAGNOSIS — F11.90 CHRONIC, CONTINUOUS USE OF OPIOIDS: ICD-10-CM

## 2023-06-27 RX ORDER — TRAMADOL HYDROCHLORIDE 50 MG/1
50 TABLET ORAL EVERY 6 HOURS PRN
Qty: 120 TABLET | Refills: 0 | Status: CANCELLED | OUTPATIENT
Start: 2023-08-04

## 2023-06-28 RX ORDER — TRAMADOL HYDROCHLORIDE 50 MG/1
50 TABLET ORAL EVERY 6 HOURS PRN
Qty: 120 TABLET | Refills: 0 | Status: SHIPPED | OUTPATIENT
Start: 2023-06-30 | End: 2023-07-30

## 2023-06-28 RX ORDER — TRAMADOL HYDROCHLORIDE 50 MG/1
50 TABLET ORAL EVERY 6 HOURS PRN
Qty: 120 TABLET | Refills: 0 | Status: SHIPPED | OUTPATIENT
Start: 2023-07-30 | End: 2023-08-23

## 2023-06-29 ENCOUNTER — INFUSION THERAPY VISIT (OUTPATIENT)
Dept: INFUSION THERAPY | Facility: CLINIC | Age: 58
End: 2023-06-29
Attending: NURSE PRACTITIONER
Payer: OTHER GOVERNMENT

## 2023-06-29 DIAGNOSIS — C50.211 MALIGNANT NEOPLASM OF UPPER-INNER QUADRANT OF RIGHT BREAST IN FEMALE, ESTROGEN RECEPTOR NEGATIVE (H): Primary | ICD-10-CM

## 2023-06-29 DIAGNOSIS — Z17.1 MALIGNANT NEOPLASM OF UPPER-INNER QUADRANT OF RIGHT BREAST IN FEMALE, ESTROGEN RECEPTOR NEGATIVE (H): Primary | ICD-10-CM

## 2023-06-29 PROCEDURE — 96523 IRRIG DRUG DELIVERY DEVICE: CPT

## 2023-06-29 PROCEDURE — 250N000011 HC RX IP 250 OP 636: Mod: JZ

## 2023-06-29 RX ORDER — HEPARIN SODIUM (PORCINE) LOCK FLUSH IV SOLN 100 UNIT/ML 100 UNIT/ML
5 SOLUTION INTRAVENOUS
Status: CANCELLED | OUTPATIENT
Start: 2023-06-29

## 2023-06-29 RX ORDER — HEPARIN SODIUM,PORCINE 10 UNIT/ML
5 VIAL (ML) INTRAVENOUS
Status: CANCELLED | OUTPATIENT
Start: 2023-06-29

## 2023-06-29 RX ORDER — HEPARIN SODIUM (PORCINE) LOCK FLUSH IV SOLN 100 UNIT/ML 100 UNIT/ML
5 SOLUTION INTRAVENOUS
Status: DISCONTINUED | OUTPATIENT
Start: 2023-06-29 | End: 2023-06-29 | Stop reason: HOSPADM

## 2023-06-29 RX ORDER — HEPARIN SODIUM (PORCINE) LOCK FLUSH IV SOLN 100 UNIT/ML 100 UNIT/ML
SOLUTION INTRAVENOUS
Status: COMPLETED
Start: 2023-06-29 | End: 2023-06-29

## 2023-06-29 RX ADMIN — Medication 5 ML: at 11:32

## 2023-06-29 RX ADMIN — HEPARIN SODIUM (PORCINE) LOCK FLUSH IV SOLN 100 UNIT/ML 5 ML: 100 SOLUTION at 11:32

## 2023-06-29 NOTE — PROGRESS NOTES
Infusion Nursing Note:  Diana Salvador presents today for Port flush.    Patient seen by provider today: No   present during visit today: Not Applicable.    Note: N/A.    Intravenous Access:  Implanted Port. Successfully accessed port by inserting needle directly below the bump that is visible/palpable.     Treatment Conditions:  Not Applicable.    Post Infusion Assessment:  Blood return noted.  Site patent and intact, free from redness, edema or discomfort.  No evidence of extravasations.  Access discontinued per protocol.     Discharge Plan:   Discharge instructions reviewed with: Patient.  Patient and/or family verbalized understanding of discharge instructions and all questions answered.  AVS to patient via [facility].  Patient will return 7/31/2023 for next appointment.   Patient discharged in stable condition accompanied by: self.  Departure Mode: Ambulatory.    Farida Romero RN

## 2023-07-05 ENCOUNTER — VIRTUAL VISIT (OUTPATIENT)
Dept: ONCOLOGY | Facility: CLINIC | Age: 58
End: 2023-07-05
Attending: PSYCHOLOGIST
Payer: OTHER GOVERNMENT

## 2023-07-05 DIAGNOSIS — F41.9 ANXIETY: Primary | ICD-10-CM

## 2023-07-05 PROCEDURE — 90832 PSYTX W PT 30 MINUTES: CPT | Mod: 95 | Performed by: PSYCHOLOGIST

## 2023-07-05 NOTE — LETTER
7/5/2023         RE: Diana Salvador  [address] [place] [id number]        Dear Colleague,    Thank you for referring your patient, Diana Salvador, to the [facility]. Please see a copy of my visit note below.          [facility] Oncology- Psychotherapy                                    Progress Note    Patient Name: Diana Salvador  Date: 7/5/23         Service Type: Individual      Session Start Time: 11:00  Session End Time: 11:31     Session Length: 31 minutes    Session #: 5    Attendees: Client attended alone    Service Modality:  Video Visit:      Provider verified identity through the following two step process.  Patient provided:  Patient photo    Telemedicine Visit: The patient's condition can be safely assessed and treated via synchronous audio and visual telemedicine encounter.      Reason for Telemedicine Visit: Services only offered telehealth    Originating Site (Patient Location): Patient's home    Distant Site (Provider Location): Provider Remote Setting- Home Office    Consent:  The patient/guardian has verbally consented to: the potential risks and benefits of telemedicine (video visit) versus in person care; bill my insurance or make self-payment for services provided; and responsibility for payment of non-covered services.     Patient would like the video invitation sent by:  My Chart    Mode of Communication:  Video Conference via [facility]    Distant Location (Provider):  Off-site    As the provider I attest to compliance with applicable laws and regulations related to telemedicine.    DATA  Interactive Complexity: No  Crisis: No   Current / Ongoing Stressors and Concerns:   Depressed mood, cancer, loss of job, stress     Treatment Objective(s) Addressed in This Session:   identify coping strategies to more effectively address stressors     Intervention:   Motivational Interviewing: to help her increase problem solving     ASSESSMENT: Current Emotional  / Mental Status (status of significant symptoms):   Risk status (Self / Other harm or suicidal ideation)   Patient denies current fears or concerns for personal safety.   Patient denies current or recent suicidal ideation or behaviors.   Patient denies current or recent homicidal ideation or behaviors.   Patient denies current or recent self injurious behavior or ideation.   Patient denies other safety concerns.   Patient reports there has been no change in risk factors since their last session.     Patient reports there has been no change in protective factors since their last session.     Recommended that patient call 911 or go to the local ED should there be a change in any of these risk factors.     Appearance:   Appropriate    Eye Contact:   Good    Psychomotor Behavior: Normal    Attitude:   Cooperative    Orientation:   All   Speech    Rate / Production: Normal     Volume:  Normal    Mood:    Normal   Affect:    Appropriate    Thought Content:  Clear    Thought Form:  Coherent  Logical    Insight:    Good      Medication Review:   No changes to current psychiatric medication(s)     Medication Compliance:   Yes     Changes in Health Issues:   None reported     Chemical Use Review:   Substance Use: Chemical use reviewed, no active concerns identified      Tobacco Use: No current tobacco use.      Diagnosis:  1. Anxiety        Collateral Reports Completed:   Not Applicable    PLAN: (Patient Tasks / Therapist Tasks / Other)  She is to replace anxious thoughts with thoughts of strength. Develop goals for her post-treatment life.     Magan Russ PsyD                                                         ______________________________________________________________________    Individual Treatment Plan    Patient's Name: Diana Salvador  YOB: 1965    Date of Creation: 12/26/22  Date Treatment Plan Last Reviewed/Revised:     DSM5 Diagnoses: 296.21 (F32.0) Major Depressive Disorder, Single  Episode, Mild _ and With melancholic features  Psychosocial / Contextual Factors: cancer  Anticipated number of session for this episode of care: 3-6 sessions  Anticipation frequency of session: Every other week  Anticipated Duration of each session: 16-37 minutes  Treatment plan will be reviewed in 90 days or when goals have been changed.       MeasurableTreatment Goal(s) related to diagnosis / functional impairment(s)  Goal 1: Patient will use problem solving    I will know I've met my goal when I feel less depressed.      Patient has reviewed and agreed to the above plan.      Magan Russ PsyD  July 5, 2023

## 2023-07-05 NOTE — PROGRESS NOTES
[facility] Oncology- Psychotherapy                                    Progress Note    Patient Name: Diana Salvador  Date: 7/5/23         Service Type: Individual      Session Start Time: 11:00  Session End Time: 11:31     Session Length: 31 minutes    Session #: 5    Attendees: Client attended alone    Service Modality:  Video Visit:      Provider verified identity through the following two step process.  Patient provided:  Patient photo    Telemedicine Visit: The patient's condition can be safely assessed and treated via synchronous audio and visual telemedicine encounter.      Reason for Telemedicine Visit: Services only offered telehealth    Originating Site (Patient Location): Patient's home    Distant Site (Provider Location): Provider Remote Setting- Home Office    Consent:  The patient/guardian has verbally consented to: the potential risks and benefits of telemedicine (video visit) versus in person care; bill my insurance or make self-payment for services provided; and responsibility for payment of non-covered services.     Patient would like the video invitation sent by:  My Chart    Mode of Communication:  Video Conference via [employer]    Distant Location (Provider):  Off-site    As the provider I attest to compliance with applicable laws and regulations related to telemedicine.    DATA  Interactive Complexity: No  Crisis: No   Current / Ongoing Stressors and Concerns:   Depressed mood, cancer, loss of job, stress     Treatment Objective(s) Addressed in This Session:   identify coping strategies to more effectively address stressors     Intervention:   Motivational Interviewing: to help her increase problem solving     ASSESSMENT: Current Emotional / Mental Status (status of significant symptoms):   Risk status (Self / Other harm or suicidal ideation)   Patient denies current fears or concerns for personal safety.   Patient denies current or recent suicidal ideation or behaviors.   Patient  denies current or recent homicidal ideation or behaviors.   Patient denies current or recent self injurious behavior or ideation.   Patient denies other safety concerns.   Patient reports there has been no change in risk factors since their last session.     Patient reports there has been no change in protective factors since their last session.     Recommended that patient call 911 or go to the local ED should there be a change in any of these risk factors.     Appearance:   Appropriate    Eye Contact:   Good    Psychomotor Behavior: Normal    Attitude:   Cooperative    Orientation:   All   Speech    Rate / Production: Normal     Volume:  Normal    Mood:    Normal   Affect:    Appropriate    Thought Content:  Clear    Thought Form:  Coherent  Logical    Insight:    Good      Medication Review:   No changes to current psychiatric medication(s)     Medication Compliance:   Yes     Changes in Health Issues:   None reported     Chemical Use Review:   Substance Use: Chemical use reviewed, no active concerns identified      Tobacco Use: No current tobacco use.      Diagnosis:  1. Anxiety        Collateral Reports Completed:   Not Applicable    PLAN: (Patient Tasks / Therapist Tasks / Other)  She is to replace anxious thoughts with thoughts of strength. Develop goals for her post-treatment life.     Magan Russ, [place]                                                         ______________________________________________________________________    Individual Treatment Plan    Patient's Name: Diana Salvador  YOB: 1965    Date of Creation: 12/26/22  Date Treatment Plan Last Reviewed/Revised:     DSM5 Diagnoses: 296.21 (F32.0) Major Depressive Disorder, Single Episode, Mild _ and With melancholic features  Psychosocial / Contextual Factors: cancer  Anticipated number of session for this episode of care: 3-6 sessions  Anticipation frequency of session: Every other week  Anticipated Duration of each session:  16-37 minutes  Treatment plan will be reviewed in 90 days or when goals have been changed.       MeasurableTreatment Goal(s) related to diagnosis / functional impairment(s)  Goal 1: Patient will use problem solving    I will know I've met my goal when I feel less depressed.      Patient has reviewed and agreed to the above plan.      Magan Russ PsyD  July 5, 2023

## 2023-07-20 NOTE — PROGRESS NOTES
"CANCER SURVIVORSHIP VISIT  2023    Care Team   Primary Care Provider Quoc Chu   Surgeon  Salas, Baldemar Burgess DO   Medical Oncologist  Ricardo Diamond MD       REASON FOR VISIT:  End of treatment survivorship visit for history of breast cancer    CANCER HISTORY AND TREATMENT:    History of right-sided stage IIB breast cancer, ER/OK negative, HER2 negative, diagnosed in   *Diagnosis:2022; 56 years old; palpated a mass; clinical stage IIB (T2N0M0), grade III, triple negative; infiltrating ductal carcinoma  *Chemotherapy: Pembrolizumab/paclitaxel/carboplatin x 3 cycles complicated by grade 3 neuropathy (plan was 4 cycles followed by 4 cycles of  Pembrolizumab/adriamycin/cytoxan). She then had 11 cycles of every 3 week Pembrolizumab, completed 2023.  *Surgery: 2022 Bilateral mastectomy and sentinel lymph node dissection, right. Pathologic complete response.   *Genetic testin negative BRCA1/BRCA2.      INTERVAL HISTORY:     Aide is here for an end of treatment cancer survivorship visit, referred by Marilu Vergara NP.  She feels she is having a hard time coping and wants \"to get back to how I was before.\"     Main issues addressed today:  -Decreased ROM in right shoulder, pain, and occasion numbness/tingling in 3-5th fingers. She has a history of an ulnar nerve surgery.  -Migratory arthralgias-She has chronic pain (back and joints), but this was exacerbated on immunotherapy. She's been off immunotherapy 6 months and having persistent focal sites of pain in right neck/shoulder and right leg (hip, knee). Duloxetine may have been helping previously, but she's not been able to afford since insurance not covering. Bone scan negative for metastatic disease,   -Neuropathy- on duloxetine. Fingertips have improved and no longer painful, but still some intermittent tingling that can interfere with some ADLs. Toes are still intermittent painful, no balance issues. Did not previously tolerate " "gabapentin.   -coping- on duloxetine and sees Dr. Wilfrid Russ. Has high expectations of self and is self conscious of new body and teeth. Avoiding people. Describes racing thoughts \"tornado.\" Planning to see Dr. Nieto about her right breast reconstruction.  No SI/HI  -Fatigue and cognitive changes, limiting function. TSH and Hgb ok.   -Financial concerns. She has been talking to a  in the financial office but hasn't yet talked to an oncology social worker about breast cancer resources.     Past Medical History:   Diagnosis Date    Cervical high risk HPV (human papillomavirus) test positive 03/15/2020    See problem list    Motion sickness        Current Outpatient Medications   Medication Sig Dispense Refill    acetaminophen (TYLENOL) 325 MG tablet Take 2 tablets (650 mg) by mouth every 4 hours as needed for other (mild pain) 100 tablet 0    alendronate (FOSAMAX) 35 MG tablet TAKE 1 TABLET(35 MG) BY MOUTH EVERY 7 DAYS (Patient not taking: Reported on 4/5/2023) 12 tablet 3    citalopram (CELEXA) 40 MG tablet Take 0.5 tablets (20 mg) by mouth daily for 28 days Then decrease to 10mg (1/4 pill) daily for 2 weeks then stop medication. 1 tablet 1    cyanocobalamin (CYANOCOBALAMIN) 1000 MCG/ML injection Inject 1 mL (1,000 mcg) into the muscle every 30 days 3 mL 3    cyclobenzaprine (FLEXERIL) 5 MG tablet TAKE 1 TABLET (5 MG) BY MOUTH 2 TIMES DAILY AS NEEDED FOR MUSCLE SPASMS 40 tablet 5    diclofenac (VOLTAREN) 1 % topical gel Place 2 g onto the skin 4 times daily 100 g 3    DULoxetine (CYMBALTA) 60 MG capsule Take 1 capsule (60 mg) by mouth daily 90 capsule 0    furosemide (LASIX) 20 MG tablet Take 1 tablet (20 mg) by mouth daily as needed (swelling) 60 tablet 3    HYDROcodone-acetaminophen (NORCO) 5-325 MG tablet Take 1 tablet by mouth 3 times daily as needed for severe pain 90 tablet 0    lidocaine-prilocaine (EMLA) 2.5-2.5 % external cream Apply thick layer to port site and cover with clear dressing or " saran wrap 30-60 mins prior to appt. (Patient not taking: Reported on 5/19/2023) 30 g 0    LORazepam (ATIVAN) 0.5 MG tablet TAKE 1-2 TABLETS (0.5-1 MG) BY MOUTH EVERY 8 HOURS AS NEEDED FOR ANXIETY 60 tablet 3    LORazepam (ATIVAN) 0.5 MG tablet  (Patient not taking: Reported on 5/19/2023)      omeprazole (PRILOSEC) 20 MG DR capsule As needed (Patient not taking: Reported on 5/19/2023)      ondansetron (ZOFRAN ODT) 4 MG ODT tab Take 1 tablet (4 mg) by mouth every 8 hours as needed for nausea 4 tablet 0    oxyCODONE (ROXICODONE) 5 MG tablet Take 1-2 tablets (5-10 mg) by mouth every 6 hours as needed for moderate to severe pain 10 tablet 0    potassium chloride ER (K-TAB) 20 MEQ CR tablet TAKE 1 TABLET(20 MEQ) BY MOUTH DAILY 90 tablet 0    QUEtiapine (SEROQUEL) 25 MG tablet TAKE 1 TO 2 TABLETS BY MOUTH EVERY DAY AT BEDTIME 180 tablet 0    rOPINIRole (REQUIP) 1 MG tablet Take 1 tablet (1 mg) by mouth At Bedtime 90 tablet 1    sucralfate (CARAFATE) 1 GM tablet Take 1 tablet (1 g) by mouth 4 times daily as needed (Abdominal discomfort) 30 tablet 3    traMADol (ULTRAM) 50 MG tablet Take 1 tablet (50 mg) by mouth every 6 hours as needed for severe pain 120 tablet 0    [START ON 7/30/2023] traMADol (ULTRAM) 50 MG tablet Take 1 tablet (50 mg) by mouth every 6 hours as needed for severe pain 120 tablet 0    traMADol (ULTRAM) 50 MG tablet Take 1 tablet (50 mg) by mouth every 6 hours as needed for severe pain 120 tablet 0    traMADol (ULTRAM) 50 MG tablet Take 1 tablet (50 mg) by mouth every 6 hours as needed for severe pain Schedule in clinic follow up. 120 tablet 0    triamcinolone (KENALOG) 0.1 % external cream Apply topically 2 times daily Apply to rash until improved (Patient not taking: Reported on 5/19/2023) 30 g 1    valACYclovir (VALTREX) 1000 mg tablet TAKE 1 TABLET(1000 MG) BY MOUTH TWICE DAILY. 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  Tobacco use:   Tobacco Use      Smoking status: Former        Types: Cigarettes        Start date: 1982        Quit date: 1984        Years since quittin.5      Smokeless tobacco: Never  Exercise: walking   Didn't address diet and ETOH today    Physical exam  /85 (BP Location: Right arm, Patient Position: Sitting, Cuff Size: Adult Large)   Pulse 77   Temp 98.8  F (37.1  C) (Tympanic)   Resp 12   Ht 1.715 m (5' 7.5\")   Wt 93.9 kg (207 lb)   LMP  (LMP Unknown)   SpO2 98%   BMI 31.94 kg/m    Wt Readings from Last 4 Encounters:   23 93.9 kg (207 lb)   23 94.3 kg (208 lb)   02/10/23 86.2 kg (190 lb)   02/10/23 97.5 kg (215 lb)   General: No acute distress.  Teary  HEENT: Sclera anicteric.   Lymph: No lymphadenopathy in neck, supraclavicular, and axillary areas  Heart: RRR  Lungs: Clear to ascultation bilaterally  Breasts: s/p bilateral mastectomy with reconstruction. No palpable masses.   Extremities: Decreased ROM in right shoulder. no lower extremity edema  Skin: No rash on exposed skin  Neuro: Cranial nerves grossly intact    IMPRESSION/PLAN:    History of right-sided stage IIB breast cancer, ER/ID negative, HER2 negative, diagnosed in   Treated with neoadjuvant pembrolizumab/carboplatin/taxol x3 cycles with complete pathologic response (bilateral mastectomy)  Oncology H&P every 3-4 months for the first 3 years then every 6 months until 5 years out. Follow-up scheduled with Marilu in 3 months.   Routine PCP care- scheduled for September  No routine labs or imaging are indicated    Depression/Anxiety  She is on duloxetine and sees Dr. Russ.   Encouraged her to consider a support group and to look at cancer survivorship " resources    Decreased ROM in right shoulder with pain and paresthesias  Chronic arthralgias s/p immunotherapy  Neuropathy s/p Taxol  Fatigue and cognitive changes  -Referral to Dr. Verduzco in PM&R  -Encouraged exercise    Financial concerns  -Oncology LISW referral    Potential late effects related to surgery:  -Risk of lymphedema: If she notices any swelling in her arm, she should be offered a referral to lymphedema physical therapy.     Potential late effects related to chemotherapy:  -Cardiovascular. She is at risk for hyperlipidemia and cardiovascular disease. She should continue to follow-up routinely with her PCP for lipid testing, and monitoring of blood pressure and blood glucose, with treatment as indicated.    -Risk of hematologic malignancy. Rare risk of developing secondary hematologic malignancy. Annual CBC recommended until 10 years from chemo.     Potential risk from the checkpoint inhibitors.   - Emerging data on chronic immunotherapy adverse effects: hypothyroidism (10.6%), arthritis (3.2%), adrenal insufficiency (3.2%) and neuropathy (2.8%). We are still learning about chronic immunotherapy adverse effects and how to manage them.     General late effects screenings and recommendations    -Cancer screening. She should undergo routine screening for women in her age group.     -Healthy lifestyle. She should maintain a healthy weight with a BMI between 20-25. She should exercise at least 150 minutes weekly at moderate intensity. She should see the eye doctor every 1-2 years, and dentist every 6 months for cleanings. She should not use any tobacco. She should minimize alcohol intake. If continuing to drink, should follow CDC recommendations for no more than 1 alcoholic drink/day for women.    Non-urgent scheduling should be complete within 7-10 business days. However, if you have not received a phone call from scheduling within 3 business days, you may call to schedule at [phone] option 5, option  2. This is the same number to call to make changes tor if you have questions about the schedule.    Gave resources for our [facility] Cancer Survivorship class series and mailings list for educational opportunities. [email or web address]    Cancer treatment summary was sent electronically to the patient and her PCP.      The total time of this encounter amounted to 60 minutes.This time included time spent with the patient, prep work, ordering tests, creating a cancer treatment summary, and performing post visit documentation.    Sloane Waddell, PhD, MPAS, PA-C  [facility] Cancer Survivorship Progam

## 2023-07-27 ENCOUNTER — INFUSION THERAPY VISIT (OUTPATIENT)
Dept: INFUSION THERAPY | Facility: CLINIC | Age: 58
End: 2023-07-27
Attending: NURSE PRACTITIONER
Payer: OTHER GOVERNMENT

## 2023-07-27 ENCOUNTER — ONCOLOGY SURVIVORSHIP VISIT (OUTPATIENT)
Dept: ONCOLOGY | Facility: CLINIC | Age: 58
End: 2023-07-27
Attending: PHYSICIAN ASSISTANT
Payer: OTHER GOVERNMENT

## 2023-07-27 ENCOUNTER — PATIENT OUTREACH (OUTPATIENT)
Dept: ONCOLOGY | Facility: CLINIC | Age: 58
End: 2023-07-27

## 2023-07-27 VITALS
BODY MASS INDEX: 31.37 KG/M2 | TEMPERATURE: 98.8 F | HEART RATE: 77 BPM | HEIGHT: 68 IN | OXYGEN SATURATION: 98 % | DIASTOLIC BLOOD PRESSURE: 85 MMHG | SYSTOLIC BLOOD PRESSURE: 122 MMHG | WEIGHT: 207 LBS | RESPIRATION RATE: 12 BRPM

## 2023-07-27 DIAGNOSIS — Z17.1 MALIGNANT NEOPLASM OF UPPER-INNER QUADRANT OF RIGHT BREAST IN FEMALE, ESTROGEN RECEPTOR NEGATIVE (H): Primary | ICD-10-CM

## 2023-07-27 DIAGNOSIS — F43.22 ADJUSTMENT DISORDER WITH ANXIOUS MOOD: ICD-10-CM

## 2023-07-27 DIAGNOSIS — T45.1X5A CHEMOTHERAPY-INDUCED NEUROPATHY (H): ICD-10-CM

## 2023-07-27 DIAGNOSIS — G62.0 CHEMOTHERAPY-INDUCED NEUROPATHY (H): ICD-10-CM

## 2023-07-27 DIAGNOSIS — C50.911 MALIGNANT NEOPLASM OF RIGHT BREAST IN FEMALE, ESTROGEN RECEPTOR NEGATIVE, UNSPECIFIED SITE OF BREAST (H): Primary | ICD-10-CM

## 2023-07-27 DIAGNOSIS — M25.50 ARTHRALGIA, UNSPECIFIED JOINT: ICD-10-CM

## 2023-07-27 DIAGNOSIS — M25.511 ACUTE PAIN OF RIGHT SHOULDER: ICD-10-CM

## 2023-07-27 DIAGNOSIS — C50.211 MALIGNANT NEOPLASM OF UPPER-INNER QUADRANT OF RIGHT BREAST IN FEMALE, ESTROGEN RECEPTOR NEGATIVE (H): Primary | ICD-10-CM

## 2023-07-27 DIAGNOSIS — Z17.1 MALIGNANT NEOPLASM OF RIGHT BREAST IN FEMALE, ESTROGEN RECEPTOR NEGATIVE, UNSPECIFIED SITE OF BREAST (H): Primary | ICD-10-CM

## 2023-07-27 PROCEDURE — 99215 OFFICE O/P EST HI 40 MIN: CPT | Performed by: PHYSICIAN ASSISTANT

## 2023-07-27 PROCEDURE — 250N000011 HC RX IP 250 OP 636: Mod: JZ | Performed by: NURSE PRACTITIONER

## 2023-07-27 PROCEDURE — G0463 HOSPITAL OUTPT CLINIC VISIT: HCPCS | Performed by: PHYSICIAN ASSISTANT

## 2023-07-27 RX ORDER — HEPARIN SODIUM (PORCINE) LOCK FLUSH IV SOLN 100 UNIT/ML 100 UNIT/ML
5 SOLUTION INTRAVENOUS
Status: DISCONTINUED | OUTPATIENT
Start: 2023-07-27 | End: 2023-07-27 | Stop reason: HOSPADM

## 2023-07-27 RX ORDER — HEPARIN SODIUM,PORCINE 10 UNIT/ML
5 VIAL (ML) INTRAVENOUS
Status: CANCELLED | OUTPATIENT
Start: 2023-07-27

## 2023-07-27 RX ORDER — HEPARIN SODIUM (PORCINE) LOCK FLUSH IV SOLN 100 UNIT/ML 100 UNIT/ML
5 SOLUTION INTRAVENOUS
Status: CANCELLED | OUTPATIENT
Start: 2023-07-27

## 2023-07-27 RX ORDER — HEPARIN SODIUM,PORCINE 10 UNIT/ML
5 VIAL (ML) INTRAVENOUS
Status: DISCONTINUED | OUTPATIENT
Start: 2023-07-27 | End: 2023-07-27 | Stop reason: HOSPADM

## 2023-07-27 RX ADMIN — Medication 5 ML: at 10:47

## 2023-07-27 ASSESSMENT — PAIN SCALES - GENERAL: PAINLEVEL: NO PAIN (0)

## 2023-07-27 NOTE — PROGRESS NOTES
received referral for Dr Verduzco in the PM&R clinic.     Referred for:   evaluation and management of right shoulder pain, immunotherapy arthralgias, taxol neuropathy, and treatment related fatigue       Scheduling instructions updated and sent to New Patient Scheduling for completion.

## 2023-07-27 NOTE — LETTER
"    2023         RE: Diana Salvador  [address] [place] [id number]        Dear Colleague,    Thank you for referring your patient, Diana Salvador, to the [facility]. Please see a copy of my visit note below.    CANCER SURVIVORSHIP VISIT  2023    Care Team   Primary Care Provider Quoc Chu   Surgeon  Salas, Baldemar Burgess DO   Medical Oncologist  Ricardo Diamond MD       REASON FOR VISIT:  End of treatment survivorship visit for history of breast cancer    CANCER HISTORY AND TREATMENT:    History of right-sided stage IIB breast cancer, ER/CA negative, HER2 negative, diagnosed in   *Diagnosis:2022; 56 years old; palpated a mass; clinical stage IIB (T2N0M0), grade III, triple negative; infiltrating ductal carcinoma  *Chemotherapy: Pembrolizumab/paclitaxel/carboplatin x 3 cycles complicated by grade 3 neuropathy (plan was 4 cycles followed by 4 cycles of  Pembrolizumab/adriamycin/cytoxan). She then had 11 cycles of every 3 week Pembrolizumab, completed 2023.  *Surgery: 2022 Bilateral mastectomy and sentinel lymph node dissection, right. Pathologic complete response.   *Genetic testin negative BRCA1/BRCA2.      INTERVAL HISTORY:     Aide is here for an end of treatment cancer survivorship visit, referred by Marilu Vergara NP.  She feels she is having a hard time coping and wants \"to get back to how I was before.\"     Main issues addressed today:  -Decreased ROM in right shoulder, pain, and occasion numbness/tingling in 3-5th fingers. She has a history of an ulnar nerve surgery.  -Migratory arthralgias-She has chronic pain (back and joints), but this was exacerbated on immunotherapy. She's been off immunotherapy 6 months and having persistent focal sites of pain in right neck/shoulder and right leg (hip, knee). Duloxetine may have been helping previously, but she's not been able to afford since insurance not covering. Bone scan negative for " "metastatic disease,   -Neuropathy- on duloxetine. Fingertips have improved and no longer painful, but still some intermittent tingling that can interfere with some ADLs. Toes are still intermittent painful, no balance issues. Did not previously tolerate gabapentin.   -coping- on duloxetine and sees Dr. Wilfrid Russ. Has high expectations of self and is self conscious of new body and teeth. Avoiding people. Describes racing thoughts \"tornado.\" Planning to see Dr. Nieto about her right breast reconstruction.  No SI/HI  -Fatigue and cognitive changes, limiting function. TSH and Hgb ok.   -Financial concerns. She has been talking to a  in the financial office but hasn't yet talked to an oncology social worker about breast cancer resources.     Past Medical History:   Diagnosis Date     Cervical high risk HPV (human papillomavirus) test positive 03/15/2020    See problem list     Motion sickness        Current Outpatient Medications   Medication Sig Dispense Refill     acetaminophen (TYLENOL) 325 MG tablet Take 2 tablets (650 mg) by mouth every 4 hours as needed for other (mild pain) 100 tablet 0     alendronate (FOSAMAX) 35 MG tablet TAKE 1 TABLET(35 MG) BY MOUTH EVERY 7 DAYS (Patient not taking: Reported on 4/5/2023) 12 tablet 3     citalopram (CELEXA) 40 MG tablet Take 0.5 tablets (20 mg) by mouth daily for 28 days Then decrease to 10mg (1/4 pill) daily for 2 weeks then stop medication. 1 tablet 1     cyanocobalamin (CYANOCOBALAMIN) 1000 MCG/ML injection Inject 1 mL (1,000 mcg) into the muscle every 30 days 3 mL 3     cyclobenzaprine (FLEXERIL) 5 MG tablet TAKE 1 TABLET (5 MG) BY MOUTH 2 TIMES DAILY AS NEEDED FOR MUSCLE SPASMS 40 tablet 5     diclofenac (VOLTAREN) 1 % topical gel Place 2 g onto the skin 4 times daily 100 g 3     DULoxetine (CYMBALTA) 60 MG capsule Take 1 capsule (60 mg) by mouth daily 90 capsule 0     furosemide (LASIX) 20 MG tablet Take 1 tablet (20 mg) by mouth daily as needed " (swelling) 60 tablet 3     HYDROcodone-acetaminophen (NORCO) 5-325 MG tablet Take 1 tablet by mouth 3 times daily as needed for severe pain 90 tablet 0     lidocaine-prilocaine (EMLA) 2.5-2.5 % external cream Apply thick layer to port site and cover with clear dressing or saran wrap 30-60 mins prior to appt. (Patient not taking: Reported on 5/19/2023) 30 g 0     LORazepam (ATIVAN) 0.5 MG tablet TAKE 1-2 TABLETS (0.5-1 MG) BY MOUTH EVERY 8 HOURS AS NEEDED FOR ANXIETY 60 tablet 3     LORazepam (ATIVAN) 0.5 MG tablet  (Patient not taking: Reported on 5/19/2023)       omeprazole (PRILOSEC) 20 MG DR capsule As needed (Patient not taking: Reported on 5/19/2023)       ondansetron (ZOFRAN ODT) 4 MG ODT tab Take 1 tablet (4 mg) by mouth every 8 hours as needed for nausea 4 tablet 0     oxyCODONE (ROXICODONE) 5 MG tablet Take 1-2 tablets (5-10 mg) by mouth every 6 hours as needed for moderate to severe pain 10 tablet 0     potassium chloride ER (K-TAB) 20 MEQ CR tablet TAKE 1 TABLET(20 MEQ) BY MOUTH DAILY 90 tablet 0     QUEtiapine (SEROQUEL) 25 MG tablet TAKE 1 TO 2 TABLETS BY MOUTH EVERY DAY AT BEDTIME 180 tablet 0     rOPINIRole (REQUIP) 1 MG tablet Take 1 tablet (1 mg) by mouth At Bedtime 90 tablet 1     sucralfate (CARAFATE) 1 GM tablet Take 1 tablet (1 g) by mouth 4 times daily as needed (Abdominal discomfort) 30 tablet 3     traMADol (ULTRAM) 50 MG tablet Take 1 tablet (50 mg) by mouth every 6 hours as needed for severe pain 120 tablet 0     [START ON 7/30/2023] traMADol (ULTRAM) 50 MG tablet Take 1 tablet (50 mg) by mouth every 6 hours as needed for severe pain 120 tablet 0     traMADol (ULTRAM) 50 MG tablet Take 1 tablet (50 mg) by mouth every 6 hours as needed for severe pain 120 tablet 0     traMADol (ULTRAM) 50 MG tablet Take 1 tablet (50 mg) by mouth every 6 hours as needed for severe pain Schedule in clinic follow up. 120 tablet 0     triamcinolone (KENALOG) 0.1 % external cream Apply topically 2 times daily  "Apply to rash until improved (Patient not taking: Reported on 2023) 30 g 1     valACYclovir (VALTREX) 1000 mg tablet TAKE 1 TABLET(1000 MG) BY MOUTH TWICE DAILY. 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  Tobacco use:   Tobacco Use      Smoking status: Former        Types: Cigarettes        Start date: 1982        Quit date: 1984        Years since quittin.5      Smokeless tobacco: Never  Exercise: walking   Didn't address diet and ETOH today    Physical exam  /85 (BP Location: Right arm, Patient Position: Sitting, Cuff Size: Adult Large)   Pulse 77   Temp 98.8  F (37.1  C) (Tympanic)   Resp 12   Ht 1.715 m (5' 7.5\")   Wt 93.9 kg (207 lb)   LMP  (LMP Unknown)   SpO2 98%   BMI 31.94 kg/m    Wt Readings from Last 4 Encounters:   23 93.9 kg (207 lb)   23 94.3 kg (208 lb)   02/10/23 86.2 kg (190 lb)   02/10/23 97.5 kg (215 lb)   General: No acute distress.  Teary  HEENT: Sclera anicteric.   Lymph: No lymphadenopathy in neck, supraclavicular, and axillary areas  Heart: RRR  Lungs: Clear to ascultation bilaterally  Breasts: s/p bilateral mastectomy with reconstruction. No palpable masses.   Extremities: Decreased ROM in right shoulder. no lower extremity edema  Skin: No rash on exposed skin  Neuro: Cranial nerves grossly intact    IMPRESSION/PLAN:    History of right-sided stage IIB breast cancer, ER/MI negative, HER2 negative, diagnosed in   Treated with neoadjuvant pembrolizumab/carboplatin/taxol x3 cycles with complete pathologic response (bilateral mastectomy)  Oncology H&P every 3-4 months for the first 3 years then every 6 months until 5 years out. " Follow-up scheduled with Marilu in 3 months.   Routine PCP care- scheduled for September  No routine labs or imaging are indicated    Depression/Anxiety  She is on duloxetine and sees Dr. Russ.   Encouraged her to consider a support group and to look at cancer survivorship resources    Decreased ROM in right shoulder with pain and paresthesias  Chronic arthralgias s/p immunotherapy  Neuropathy s/p Taxol  Fatigue and cognitive changes  -Referral to Dr. Verduzco in PM&R  -Encouraged exercise    Financial concerns  -Oncology LISW referral    Potential late effects related to surgery:  -Risk of lymphedema: If she notices any swelling in her arm, she should be offered a referral to lymphedema physical therapy.     Potential late effects related to chemotherapy:  -Cardiovascular. She is at risk for hyperlipidemia and cardiovascular disease. She should continue to follow-up routinely with her PCP for lipid testing, and monitoring of blood pressure and blood glucose, with treatment as indicated.    -Risk of hematologic malignancy. Rare risk of developing secondary hematologic malignancy. Annual CBC recommended until 10 years from chemo.     Potential risk from the checkpoint inhibitors.   - Emerging data on chronic immunotherapy adverse effects: hypothyroidism (10.6%), arthritis (3.2%), adrenal insufficiency (3.2%) and neuropathy (2.8%). We are still learning about chronic immunotherapy adverse effects and how to manage them.     General late effects screenings and recommendations    -Cancer screening. She should undergo routine screening for women in her age group.     -Healthy lifestyle. She should maintain a healthy weight with a BMI between 20-25. She should exercise at least 150 minutes weekly at moderate intensity. She should see the eye doctor every 1-2 years, and dentist every 6 months for cleanings. She should not use any tobacco. She should minimize alcohol intake. If continuing to drink, should follow CDC  recommendations for no more than 1 alcoholic drink/day for women.    Non-urgent scheduling should be complete within 7-10 business days. However, if you have not received a phone call from scheduling within 3 business days, you may call to schedule at [phone] option 5, option 2. This is the same number to call to make changes tor if you have questions about the schedule.    Gave resources for our [facility] Cancer Survivorship class series and mailings list for educational opportunities. [email or web address]    Cancer treatment summary was sent electronically to the patient and her PCP.      The total time of this encounter amounted to 60 minutes.This time included time spent with the patient, prep work, ordering tests, creating a cancer treatment summary, and performing post visit documentation.    Sloane Waddell, PhD, MPAS, PAJORJE  [facility] Cancer Survivorship Progam    Again, thank you for allowing me to participate in the care of your patient.        Sincerely,        Sloane Waddell PA-C

## 2023-07-27 NOTE — PATIENT INSTRUCTIONS
[email or web address]  [email or web address]    Follow up with me as needed

## 2023-07-31 ENCOUNTER — PATIENT OUTREACH (OUTPATIENT)
Dept: CARE COORDINATION | Facility: CLINIC | Age: 58
End: 2023-07-31
Payer: OTHER GOVERNMENT

## 2023-07-31 NOTE — PROGRESS NOTES
Social Work - Telephone/[employer] message  [facility]  Data:   Patient Name: Diana Salvador  Goes By: Aide NOVAK/Age: 1965 (58 year old)      Referral Source: Sloane Waddell PA-C  Reason for Referral: Resources and emotional support    Intervention: SW left voicemail introducing self. Left contact information and availability.   Plan:  will await patient's return phone call/message and provide assistance at that time.     JOSE ANTONIO Brewer, [facility]  Adult Oncology Clinics  [place] (M,W), [place] (T) & [place] (Th)  *I am off Friday  Office: [phone]

## 2023-08-14 ENCOUNTER — TELEPHONE (OUTPATIENT)
Dept: FAMILY MEDICINE | Facility: CLINIC | Age: 58
End: 2023-08-14
Payer: OTHER GOVERNMENT

## 2023-08-14 NOTE — TELEPHONE ENCOUNTER
Patient Quality Outreach    Patient is due for the following:   Colon Cancer Screening  Cervical Cancer Screening - PAP Needed  Physical Preventive Adult Physical    Next Steps:   Schedule a Adult Preventative  Patient needs to complete a colon cancer screening.    Type of outreach:    Sent letter.      Questions for provider review:    None           Stacy Chavez

## 2023-08-14 NOTE — LETTER
August 14, 2023      Diana Salvador  [address] [place] [id number]        Dear Diana,     Your team at [facility] cares about your health. We have reviewed your chart and based on our findings; we are making the following recommendations to better manage your health.     You are in particular need of attention regarding the following:     Schedule a primary care office visit with your provider for a Pap Smear to screen for Cervical Cancer.  Call or MyChart message your clinic to schedule a colonoscopy, schedule/ a FIT Test, or order a Cologuard test. If you are unsure what type of test you need, please call your clinic and speak to clinic staff.   Colon cancer is now the second leading cause of cancer-related deaths in the [place] for both men and women and there are over 130,000 new cases and 50,000 deaths per year from colon cancer. Colonoscopies can prevent 90-95% of these deaths. Problem lesions can be removed before they ever become cancer. This test is not only looking for cancer, but also getting rid of precancerous lesions.   PREVENTATIVE VISIT: Physical    If you have already completed these items, please contact the clinic via phone or   [facility] so your care team can review and update your records. Thank you for   choosing [facility] for your healthcare needs. For any questions,   concerns, or to schedule an appointment please contact our clinic.    Healthy Regards,      Your [facility] Care Team

## 2023-08-22 ENCOUNTER — MYC REFILL (OUTPATIENT)
Dept: FAMILY MEDICINE | Facility: CLINIC | Age: 58
End: 2023-08-22
Payer: OTHER GOVERNMENT

## 2023-08-22 DIAGNOSIS — F11.90 CHRONIC, CONTINUOUS USE OF OPIOIDS: ICD-10-CM

## 2023-08-22 RX ORDER — TRAMADOL HYDROCHLORIDE 50 MG/1
50 TABLET ORAL EVERY 6 HOURS PRN
Qty: 120 TABLET | Refills: 0 | OUTPATIENT
Start: 2023-08-22

## 2023-08-23 ENCOUNTER — TELEPHONE (OUTPATIENT)
Dept: FAMILY MEDICINE | Facility: CLINIC | Age: 58
End: 2023-08-23
Payer: OTHER GOVERNMENT

## 2023-08-23 ENCOUNTER — MYC MEDICAL ADVICE (OUTPATIENT)
Dept: FAMILY MEDICINE | Facility: CLINIC | Age: 58
End: 2023-08-23
Payer: OTHER GOVERNMENT

## 2023-08-23 RX ORDER — TRAMADOL HYDROCHLORIDE 50 MG/1
50 TABLET ORAL EVERY 6 HOURS PRN
Qty: 120 TABLET | Refills: 0 | Status: SHIPPED | OUTPATIENT
Start: 2023-08-23 | End: 2023-09-01

## 2023-08-23 NOTE — TELEPHONE ENCOUNTER
Received call from patient.  Patient states that they can not fill Tramadol prescription with out diagnosis.    Call placed to patient's pharmacy   On hard copy prescription states chronic opoid use.  Relayed to pharmacist that patient has chronic right sided sciatica.  Verbalized understanding.  Hardik Garsia RN

## 2023-08-24 ENCOUNTER — INFUSION THERAPY VISIT (OUTPATIENT)
Dept: INFUSION THERAPY | Facility: CLINIC | Age: 58
End: 2023-08-24
Attending: NURSE PRACTITIONER
Payer: OTHER GOVERNMENT

## 2023-08-24 VITALS
SYSTOLIC BLOOD PRESSURE: 152 MMHG | HEART RATE: 71 BPM | OXYGEN SATURATION: 96 % | TEMPERATURE: 98.6 F | RESPIRATION RATE: 18 BRPM | DIASTOLIC BLOOD PRESSURE: 84 MMHG

## 2023-08-24 DIAGNOSIS — C50.211 MALIGNANT NEOPLASM OF UPPER-INNER QUADRANT OF RIGHT BREAST IN FEMALE, ESTROGEN RECEPTOR NEGATIVE (H): Primary | ICD-10-CM

## 2023-08-24 DIAGNOSIS — Z17.1 MALIGNANT NEOPLASM OF UPPER-INNER QUADRANT OF RIGHT BREAST IN FEMALE, ESTROGEN RECEPTOR NEGATIVE (H): Primary | ICD-10-CM

## 2023-08-24 PROCEDURE — 250N000011 HC RX IP 250 OP 636: Mod: JZ | Performed by: NURSE PRACTITIONER

## 2023-08-24 PROCEDURE — 96523 IRRIG DRUG DELIVERY DEVICE: CPT

## 2023-08-24 RX ORDER — HEPARIN SODIUM,PORCINE 10 UNIT/ML
5 VIAL (ML) INTRAVENOUS
Status: CANCELLED | OUTPATIENT
Start: 2023-08-24

## 2023-08-24 RX ORDER — HEPARIN SODIUM (PORCINE) LOCK FLUSH IV SOLN 100 UNIT/ML 100 UNIT/ML
5 SOLUTION INTRAVENOUS
Status: DISCONTINUED | OUTPATIENT
Start: 2023-08-24 | End: 2023-08-24 | Stop reason: HOSPADM

## 2023-08-24 RX ORDER — HEPARIN SODIUM (PORCINE) LOCK FLUSH IV SOLN 100 UNIT/ML 100 UNIT/ML
5 SOLUTION INTRAVENOUS
Status: CANCELLED | OUTPATIENT
Start: 2023-08-24

## 2023-08-24 RX ADMIN — Medication 5 ML: at 11:31

## 2023-08-24 NOTE — PROGRESS NOTES
Infusion Nursing Note:  Diana Salvador presents today for Port Flush and Heparin Lock.    Patient seen by provider today: No   present during visit today: Not Applicable.    Note: N/A.      Intravenous Access:  Implanted Port.    Treatment Conditions:  Not Applicable.      Post Infusion Assessment:  Site patent and intact, free from redness, edema or discomfort.  No evidence of extravasations.  Access discontinued per protocol.       Discharge Plan:   Patient discharged in stable condition accompanied by: self.  Departure Mode: Ambulatory.      Demi Petit RN

## 2023-08-31 ASSESSMENT — ENCOUNTER SYMPTOMS
MYALGIAS: 1
SORE THROAT: 0
WEAKNESS: 1
NERVOUS/ANXIOUS: 1
ARTHRALGIAS: 1
PARESTHESIAS: 0
NAUSEA: 1
HEARTBURN: 1
DIARRHEA: 0
HEMATURIA: 0
JOINT SWELLING: 1
FEVER: 0
HEADACHES: 1
CHILLS: 0
EYE PAIN: 1
CONSTIPATION: 0
FREQUENCY: 1
ABDOMINAL PAIN: 0
DYSURIA: 0
BREAST MASS: 0
HEMATOCHEZIA: 0
COUGH: 0
DIZZINESS: 1
SHORTNESS OF BREATH: 0
PALPITATIONS: 0

## 2023-08-31 ASSESSMENT — PATIENT HEALTH QUESTIONNAIRE - PHQ9
10. IF YOU CHECKED OFF ANY PROBLEMS, HOW DIFFICULT HAVE THESE PROBLEMS MADE IT FOR YOU TO DO YOUR WORK, TAKE CARE OF THINGS AT HOME, OR GET ALONG WITH OTHER PEOPLE: EXTREMELY DIFFICULT
SUM OF ALL RESPONSES TO PHQ QUESTIONS 1-9: 19
SUM OF ALL RESPONSES TO PHQ QUESTIONS 1-9: 19

## 2023-09-01 ENCOUNTER — OFFICE VISIT (OUTPATIENT)
Dept: FAMILY MEDICINE | Facility: CLINIC | Age: 58
End: 2023-09-01
Payer: OTHER GOVERNMENT

## 2023-09-01 ENCOUNTER — LAB (OUTPATIENT)
Dept: FAMILY MEDICINE | Facility: CLINIC | Age: 58
End: 2023-09-01

## 2023-09-01 VITALS
DIASTOLIC BLOOD PRESSURE: 88 MMHG | TEMPERATURE: 98.3 F | WEIGHT: 207 LBS | RESPIRATION RATE: 16 BRPM | BODY MASS INDEX: 31.37 KG/M2 | OXYGEN SATURATION: 96 % | SYSTOLIC BLOOD PRESSURE: 126 MMHG | HEART RATE: 79 BPM | HEIGHT: 68 IN

## 2023-09-01 DIAGNOSIS — M25.511 RIGHT SHOULDER PAIN, UNSPECIFIED CHRONICITY: ICD-10-CM

## 2023-09-01 DIAGNOSIS — Z12.11 SCREEN FOR COLON CANCER: ICD-10-CM

## 2023-09-01 DIAGNOSIS — R87.810 CERVICAL HIGH RISK HPV (HUMAN PAPILLOMAVIRUS) TEST POSITIVE: ICD-10-CM

## 2023-09-01 DIAGNOSIS — Z00.00 ROUTINE GENERAL MEDICAL EXAMINATION AT A HEALTH CARE FACILITY: Primary | ICD-10-CM

## 2023-09-01 DIAGNOSIS — F41.9 ANXIETY: ICD-10-CM

## 2023-09-01 DIAGNOSIS — F33.1 MODERATE EPISODE OF RECURRENT MAJOR DEPRESSIVE DISORDER (H): ICD-10-CM

## 2023-09-01 DIAGNOSIS — M54.31 SCIATICA OF RIGHT SIDE: ICD-10-CM

## 2023-09-01 DIAGNOSIS — F41.8 SITUATIONAL ANXIETY: ICD-10-CM

## 2023-09-01 DIAGNOSIS — Z12.4 CERVICAL CANCER SCREENING: ICD-10-CM

## 2023-09-01 DIAGNOSIS — F11.20 OPIOID TYPE DEPENDENCE, CONTINUOUS USE (H): ICD-10-CM

## 2023-09-01 DIAGNOSIS — B00.1 COLD SORE: ICD-10-CM

## 2023-09-01 DIAGNOSIS — G47.00 INSOMNIA, UNSPECIFIED TYPE: ICD-10-CM

## 2023-09-01 PROCEDURE — 90677 PCV20 VACCINE IM: CPT | Performed by: FAMILY MEDICINE

## 2023-09-01 PROCEDURE — 99396 PREV VISIT EST AGE 40-64: CPT | Mod: 25 | Performed by: FAMILY MEDICINE

## 2023-09-01 PROCEDURE — 90471 IMMUNIZATION ADMIN: CPT | Performed by: FAMILY MEDICINE

## 2023-09-01 PROCEDURE — 99214 OFFICE O/P EST MOD 30 MIN: CPT | Mod: 25 | Performed by: FAMILY MEDICINE

## 2023-09-01 PROCEDURE — G0145 SCR C/V CYTO,THINLAYER,RESCR: HCPCS | Performed by: FAMILY MEDICINE

## 2023-09-01 PROCEDURE — 96127 BRIEF EMOTIONAL/BEHAV ASSMT: CPT | Mod: 59 | Performed by: FAMILY MEDICINE

## 2023-09-01 PROCEDURE — 87624 HPV HI-RISK TYP POOLED RSLT: CPT | Performed by: FAMILY MEDICINE

## 2023-09-01 RX ORDER — TRAMADOL HYDROCHLORIDE 50 MG/1
50 TABLET ORAL EVERY 6 HOURS PRN
Qty: 120 TABLET | Refills: 0 | Status: SHIPPED | OUTPATIENT
Start: 2023-10-22 | End: 2023-11-21

## 2023-09-01 RX ORDER — HYDROCODONE BITARTRATE AND ACETAMINOPHEN 5; 325 MG/1; MG/1
1 TABLET ORAL 2 TIMES DAILY PRN
Qty: 60 TABLET | Refills: 0 | Status: SHIPPED | OUTPATIENT
Start: 2023-11-01 | End: 2023-12-01

## 2023-09-01 RX ORDER — QUETIAPINE FUMARATE 25 MG/1
50 TABLET, FILM COATED ORAL AT BEDTIME
Qty: 180 TABLET | Refills: 3 | Status: SHIPPED | OUTPATIENT
Start: 2023-09-01 | End: 2024-07-02

## 2023-09-01 RX ORDER — TRAMADOL HYDROCHLORIDE 50 MG/1
50 TABLET ORAL EVERY 6 HOURS PRN
Qty: 120 TABLET | Refills: 0 | Status: SHIPPED | OUTPATIENT
Start: 2023-11-21 | End: 2023-12-19

## 2023-09-01 RX ORDER — HYDROCODONE BITARTRATE AND ACETAMINOPHEN 5; 325 MG/1; MG/1
1 TABLET ORAL 2 TIMES DAILY PRN
Qty: 60 TABLET | Refills: 0 | Status: SHIPPED | OUTPATIENT
Start: 2023-10-01 | End: 2023-10-31

## 2023-09-01 RX ORDER — LURASIDONE HYDROCHLORIDE 20 MG/1
TABLET, FILM COATED ORAL
Qty: 194 TABLET | Refills: 0 | Status: SHIPPED | OUTPATIENT
Start: 2023-09-01 | End: 2023-12-14

## 2023-09-01 RX ORDER — DULOXETIN HYDROCHLORIDE 30 MG/1
30 CAPSULE, DELAYED RELEASE ORAL DAILY
Qty: 14 CAPSULE | Refills: 0 | Status: SHIPPED | OUTPATIENT
Start: 2023-09-01 | End: 2024-01-03

## 2023-09-01 RX ORDER — LORAZEPAM 0.5 MG/1
.5-1 TABLET ORAL EVERY 8 HOURS PRN
Qty: 60 TABLET | Refills: 3 | Status: SHIPPED | OUTPATIENT
Start: 2023-09-20 | End: 2023-11-28

## 2023-09-01 RX ORDER — TRAMADOL HYDROCHLORIDE 50 MG/1
50 TABLET ORAL EVERY 6 HOURS PRN
Qty: 120 TABLET | Refills: 0 | Status: SHIPPED | OUTPATIENT
Start: 2023-09-22 | End: 2023-10-22

## 2023-09-01 RX ORDER — VALACYCLOVIR HYDROCHLORIDE 1 G/1
TABLET, FILM COATED ORAL
Qty: 16 TABLET | Refills: 3 | Status: SHIPPED | OUTPATIENT
Start: 2023-09-01

## 2023-09-01 RX ORDER — HYDROCODONE BITARTRATE AND ACETAMINOPHEN 5; 325 MG/1; MG/1
1 TABLET ORAL 2 TIMES DAILY PRN
Qty: 60 TABLET | Refills: 0 | Status: SHIPPED | OUTPATIENT
Start: 2023-09-01 | End: 2023-11-28

## 2023-09-01 ASSESSMENT — ENCOUNTER SYMPTOMS
DIARRHEA: 0
HEADACHES: 1
EYE PAIN: 1
FEVER: 0
NAUSEA: 1
PALPITATIONS: 0
WEAKNESS: 1
MYALGIAS: 1
DYSURIA: 0
DIZZINESS: 1
HEMATURIA: 0
CONSTIPATION: 0
SHORTNESS OF BREATH: 0
ARTHRALGIAS: 1
NERVOUS/ANXIOUS: 1
SORE THROAT: 0
BREAST MASS: 0
CHILLS: 0
COUGH: 0
ABDOMINAL PAIN: 0
FREQUENCY: 1
PARESTHESIAS: 0
HEMATOCHEZIA: 0
JOINT SWELLING: 1
HEARTBURN: 1

## 2023-09-01 ASSESSMENT — ANXIETY QUESTIONNAIRES
IF YOU CHECKED OFF ANY PROBLEMS ON THIS QUESTIONNAIRE, HOW DIFFICULT HAVE THESE PROBLEMS MADE IT FOR YOU TO DO YOUR WORK, TAKE CARE OF THINGS AT HOME, OR GET ALONG WITH OTHER PEOPLE: VERY DIFFICULT
8. IF YOU CHECKED OFF ANY PROBLEMS, HOW DIFFICULT HAVE THESE MADE IT FOR YOU TO DO YOUR WORK, TAKE CARE OF THINGS AT HOME, OR GET ALONG WITH OTHER PEOPLE?: VERY DIFFICULT
GAD7 TOTAL SCORE: 19
5. BEING SO RESTLESS THAT IT IS HARD TO SIT STILL: NEARLY EVERY DAY
7. FEELING AFRAID AS IF SOMETHING AWFUL MIGHT HAPPEN: SEVERAL DAYS
6. BECOMING EASILY ANNOYED OR IRRITABLE: NEARLY EVERY DAY
7. FEELING AFRAID AS IF SOMETHING AWFUL MIGHT HAPPEN: SEVERAL DAYS
3. WORRYING TOO MUCH ABOUT DIFFERENT THINGS: NEARLY EVERY DAY
4. TROUBLE RELAXING: NEARLY EVERY DAY
2. NOT BEING ABLE TO STOP OR CONTROL WORRYING: NEARLY EVERY DAY
1. FEELING NERVOUS, ANXIOUS, OR ON EDGE: NEARLY EVERY DAY
GAD7 TOTAL SCORE: 19

## 2023-09-01 ASSESSMENT — PAIN SCALES - GENERAL: PAINLEVEL: NO PAIN (0)

## 2023-09-01 NOTE — NURSING NOTE
Prior to immunization administration, verified patients identity using patient s name and date of birth. Please see Immunization Activity for additional information.     Screening Questionnaire for Adult Immunization    Are you sick today?   No   Do you have allergies to medications, food, a vaccine component or latex?   No   Have you ever had a serious reaction after receiving a vaccination?   No   Do you have a long-term health problem with heart, lung, kidney, or metabolic disease (e.g., diabetes), asthma, a blood disorder, no spleen, complement component deficiency, a cochlear implant, or a spinal fluid leak?  Are you on long-term aspirin therapy?   No   Do you have cancer, leukemia, HIV/AIDS, or any other immune system problem?   No   Do you have a parent, brother, or sister with an immune system problem?   No   In the past 3 months, have you taken medications that affect  your immune system, such as prednisone, other steroids, or anticancer drugs; drugs for the treatment of rheumatoid arthritis, Crohn s disease, or psoriasis; or have you had radiation treatments?   No   Have you had a seizure, or a brain or other nervous system problem?   No   During the past year, have you received a transfusion of blood or blood    products, or been given immune (gamma) globulin or antiviral drug?   No   For women: Are you pregnant or is there a chance you could become       pregnant during the next month?   No   Have you received any vaccinations in the past 4 weeks?   No     Immunization questionnaire answers were all negative.      Patient instructed to remain in clinic for 15 minutes afterwards, and to report any adverse reactions.     Screening performed by Stacy Chavez on 9/1/2023 at 4:50 PM.

## 2023-09-01 NOTE — LETTER
Opioid / Opioid Plus Controlled Substance Agreement    This is an agreement between you and your provider about the safe and appropriate use of controlled substance/opioids prescribed by your care team. Controlled substances are medicines that can cause physical and mental dependence (abuse).    There are strict laws about having and using these medicines. We here at [facility] are committing to working with you in your efforts to get better. To support you in this work, we ll help you schedule regular office appointments for medicine refills. If we must cancel or change your appointment for any reason, we ll make sure you have enough medicine to last until your next appointment.     As a Provider, I will:  Listen carefully to your concerns and treat you with respect.   Recommend a treatment plan that I believe is in your best interest. This plan may involve therapies other than opioid pain medication.   Talk with you often about the possible benefits, and the risk of harm of any medicine that we prescribe for you.   Provide a plan on how to taper (discontinue or go off) using this medicine if the decision is made to stop its use.    As a Patient, I understand that opioid(s):   Are a controlled substance prescribed by my care team to help me function or work and manage my condition(s).   Are strong medicines and can cause serious side effects such as:  Drowsiness, which can seriously affect my driving ability  A lower breathing rate, enough to cause death  Harm to my thinking ability   Depression   Abuse of and addiction to this medicine  Need to be taken exactly as prescribed. Combining opioids with certain medicines or chemicals (such as illegal drugs, sedatives, sleeping pills, and benzodiazepines) can be dangerous or even fatal. If I stop opioids suddenly, I may have severe withdrawal symptoms.  Do not work for all types of pain nor for all patients. If they re not helpful, I may be asked to stop  them.      The risks, benefits and side effects of these medicine(s) were explained to me. I agree that:  I will take part in other treatments as advised by my care team. This may be psychiatry or counseling, physical therapy, behavioral therapy, group treatment or a referral to a specialist.     I will keep all my appointments. I understand that this is part of the monitoring of opioids. My care team may require an office visit for EVERY opioid/controlled substance refill. If I miss appointments or don t follow instructions, my care team may stop my medicine.    I will take my medicines as prescribed. I will not change the dose or schedule unless my care team tells me to. There will be no refills if I run out early.     I may be asked to come to the clinic and complete a urine drug test or complete a pill count at any time. If I don t give a urine sample or participate in a pill count, the care team may stop my medicine.    I will only receive prescriptions from this clinic for chronic pain. If I am treated by another provider for acute pain issues, I will tell them that I am taking opioid pain medication for chronic pain and that I have a treatment agreement with this provider. I will inform my [facility] care team within one business day if I am given a prescription for any pain medication by another healthcare provider. My [facility] care team can contact other providers and pharmacists about my use of any medicines.    It is up to me to make sure that I don t run out of my medicines on weekends or holidays. If my care team is willing to refill my opioid prescription without a visit, I must request refills only during office hours. Refills may take up to 3 business days to process. I will use one pharmacy to fill all my opioid and other controlled substance prescriptions. I will notify the clinic about any changes to my insurance or medication availability.    I am responsible for my prescriptions.  If the medicine/prescription is lost, stolen or destroyed, it will not be replaced. I also agree not to share controlled substance medicines with anyone.    I am aware I should not use any illegal or recreational drugs. I agree not to drink alcohol unless my care team says I can.       If I enroll in the [place] [facility] program, I will tell my care team prior to my next refill.     I will tell my care team right away if I become pregnant, have a new medical problem treated outside of my regular clinic, or have a change in my medications.    I understand that this medicine can affect my thinking, judgment and reaction time. Alcohol and drugs affect the brain and body, which can affect the safety of my driving. Being under the influence of alcohol or drugs can affect my decision-making, behaviors, personal safety, and the safety of others. Driving while impaired (DWI) can occur if a person is driving, operating, or in physical control of a car, motorcycle, boat, snowmobile, ATV, motorbike, off-road vehicle, or any other motor vehicle ([place] Statute 169A.20). I understand the risk if I choose to drive or operate any vehicle or machinery.    I understand that if I do not follow any of the conditions above, my prescriptions or treatment may be stopped or changed.          Opioids  What You Need to Know    What are opioids?   Opioids are pain medicines that must be prescribed by a doctor. They are also known as narcotics.     Examples are:   morphine (MS Contin, Chasidy)  oxycodone (Oxycontin)  oxycodone and acetaminophen (Percocet)  hydrocodone and acetaminophen (Vicodin, Norco)   fentanyl patch (Duragesic)   hydromorphone (Dilaudid)   methadone  codeine (Tylenol #3)     What do opioids do well?   Opioids are best for severe short-term pain such as after a surgery or injury. They may work well for cancer pain. They may help some people with long-lasting (chronic) pain.     What do opioids NOT do well?   Opioids  never get rid of pain entirely, and they don t work well for most patients with chronic pain. Opioids don t reduce swelling, one of the causes of pain.                                    Other ways to manage chronic pain and improve function include:     Treat the health problem that may be causing pain  Anti-inflammation medicines, which reduce swelling and tenderness, such as ibuprofen (Advil, Motrin) or naproxen (Aleve)  Acetaminophen (Tylenol)  Antidepressants and anti-seizure medicines, especially for nerve pain  Topical treatments such as patches or creams  Injections or nerve blocks  Chiropractic or osteopathic treatment  Acupuncture, massage, deep breathing, meditation, visual imagery, aromatherapy  Use heat or ice at the pain site  Physical therapy   Exercise  Stop smoking  Take part in therapy       Risks and side effects     Talk to your doctor before you start or decide to keep taking opioids. Possible side effects include:    Lowering your breathing rate enough to cause death  Overdose, including death, especially if taking higher than prescribed doses  Worse depression symptoms; less pleasure in things you usually enjoy  Feeling tired or sluggish  Slower thoughts or cloudy thinking  Being more sensitive to pain over time; pain is harder to control  Trouble sleeping or restless sleep  Changes in hormone levels (for example, less testosterone)  Changes in sex drive or ability to have sex  Constipation  Unsafe driving  Itching and sweating  Dizziness  Nausea, throwing up and dry mouth    What else should I know about opioids?    Opioids may lead to dependence, tolerance, or addiction.    Dependence means that if you stop or reduce the medicine too quickly, you will have withdrawal symptoms. These include loose poop (diarrhea), jitters, flu-like symptoms, nervousness and tremors. Dependence is not the same as addiction.                     Tolerance means needing higher doses over time to get the same  effect. This may increase the chance of serious side effects.    Addiction is when people improperly use a substance that harms their body, their mind or their relations with others. Use of opiates can cause a relapse of addiction if you have a history of drug or alcohol abuse.    People who have used opioids for a long time may have a lower quality of life, worse depression, higher levels of pain and more visits to doctors.    You can overdose on opioids. Take these steps to lower your risk of overdose:    Recognize the signs:  Signs of overdose include decrease or loss of consciousness (blackout), slowed breathing, trouble waking up and blue lips. If someone is worried about overdose, they should call 911.    Talk to your doctor about Narcan (naloxone).   If you are at risk for overdose, you may be given a prescription for Narcan. This medicine very quickly reverses the effects of opioids.   If you overdose, a friend or family member can give you Narcan while waiting for the ambulance. They need to know the signs of overdose and how to give Narcan.     Don't use alcohol or street drugs.   Taking them with opioids can cause death.    Do not take any of these medicines unless your doctor says it s OK. Taking these with opioids can cause death:  Benzodiazepines, such as lorazepam (Ativan), alprazolam (Xanax) or diazepam (Valium)  Muscle relaxers, such as cyclobenzaprine (Flexeril)  Sleeping pills like zolpidem (Ambien)   Other opioids      How to keep you and other people safe while taking opioids:    Never share your opioids with others.  Opioid medicines are regulated by the Drug Enforcement Agency ([employer]). Selling or sharing medications is a criminal act.    2. Be sure to store opioids in a secure place, locked up if possible. Young children can easily swallow them and overdose.    3. When you are traveling with your medicines, keep them in the original bottles. If you use a pill box, be sure you also carry a copy  of your medicine list from your clinic or pharmacy.    4. Safe disposal of opioids    Most pharmacies have places to get rid of medicine, called disposal kiosks. Medicine disposal options are also available in every [facility]. Search your county and  medication disposal  to find more options. You can find more details at:  https://www.pca.[facility].[email or web address]./living-green/managing-unwanted-medications     I agree that my provider, clinic care team, and pharmacy may work with any city, state or federal law enforcement agency that investigates the misuse, sale, or other diversion of my controlled medicine. I will allow my provider to discuss my care with, or share a copy of, this agreement with any other treating provider, pharmacy or emergency room where I receive care.    I have read this agreement and have asked questions about anything I did not understand.    _______________________________________________________  Patient Signature - Diana Salvador _____________________                   Date     _______________________________________________________  Provider Signature - Quoc Chu MD   _____________________                   Date     _______________________________________________________  Witness Signature (required if provider not present while patient signing)   _____________________                   Date

## 2023-09-01 NOTE — PROGRESS NOTES
SUBJECTIVE:   CC: Aide is an 58 year old who presents for preventive health visit.       9/1/2023     3:40 PM   Additional Questions   Roomed by Stacy Chavez MA   Accompanied by self         9/1/2023     3:40 PM   Patient Reported Additional Medications   Patient reports taking the following new medications multivitamin womens 50+ daily       Healthy Habits:     Getting at least 3 servings of Calcium per day:  NO    Bi-annual eye exam:  Yes    Dental care twice a year:  Yes    Sleep apnea or symptoms of sleep apnea:  Daytime drowsiness    Diet:  Regular (no restrictions)    Frequency of exercise:  2-3 days/week    Duration of exercise:  15-30 minutes    Taking medications regularly:  Yes    Medication side effects:  None      Today's PHQ-9 Score:       8/31/2023     4:44 PM   PHQ-9 SCORE   PHQ-9 Total Score MyChart 19 (Moderately severe depression)   PHQ-9 Total Score 19           Medication Followup of Furosemide  Taking Medication as prescribed: yes  Side Effects:  None  Medication Helping Symptoms:  yes    Depression and Anxiety Follow-Up  How are you doing with your depression since your last visit? Worsened. Jeremi estates her medications don't feel like they're working as good as they did.   How are you doing with your anxiety since your last visit?  Worsened   Are you having other symptoms that might be associated with depression or anxiety? No  Have you had a significant life event? No   Do you have any concerns with your use of alcohol or other drugs? No  Duloxetine not helping. Had been on celexa prior to that.  Years ago was on sertraline which quit working or never really worked and lexapro after that which quit working or never did really work  Quetiapine medication is helpful for restful sleep.  Lorazepam 0-2 per day, depends on the day. Average of 1 pill a day from .  Therapy Dr. Russ  Social History     Tobacco Use    Smoking status: Former     Packs/day: 0.00     Years: 5.00     Pack  years: 0.00     Types: Cigarettes     Start date: 1982     Quit date: 1984     Years since quittin.6    Smokeless tobacco: Never   Vaping Use    Vaping Use: Never used   Substance Use Topics    Alcohol use: Yes     Comment: occ    Drug use: No         2/3/2023    10:35 AM 2023    12:53 PM 2023     4:44 PM   PHQ   PHQ-9 Total Score 14 21 19   Q9: Thoughts of better off dead/self-harm past 2 weeks Not at all Not at all Not at all         2/3/2023    10:37 AM 2023    12:53 PM 2023     3:40 PM   SHADE-7 SCORE   Total Score 16 (severe anxiety)  19 (severe anxiety)   Total Score 16 15 19         2023     4:44 PM   Last PHQ-9   1.  Little interest or pleasure in doing things 3   2.  Feeling down, depressed, or hopeless 3   3.  Trouble falling or staying asleep, or sleeping too much 1   4.  Feeling tired or having little energy 3   5.  Poor appetite or overeating 3   6.  Feeling bad about yourself 3   7.  Trouble concentrating 3   8.  Moving slowly or restless 0   Q9: Thoughts of better off dead/self-harm past 2 weeks 0   PHQ-9 Total Score 19         2023     3:40 PM   SHADE-7    1. Feeling nervous, anxious, or on edge 3   2. Not being able to stop or control worrying 3   3. Worrying too much about different things 3   4. Trouble relaxing 3   5. Being so restless that it is hard to sit still 3   6. Becoming easily annoyed or irritable 3   7. Feeling afraid, as if something awful might happen 1   SHADE-7 Total Score 19   If you checked any problems, how difficult have they made it for you to do your work, take care of things at home, or get along with other people? Very difficult       Suicide Assessment Five-step Evaluation and Treatment (SAFE-T)    Chronic/Recurring Back Pain Follow Up    Where is your back pain located? (Select all that apply) low back right, neck pain as well.  How would you describe your back pain?  sharp, shooting, and stabbing  Where does your back pain spread? the  right thigh and the right  knee  Since your last clinic visit for back pain, how has your pain changed? always present, but gets better and worse  Does your back pain interfere with your job? Not applicable  Since your last visit, have you tried any new treatment? No  Last norco 23 #80 tabs  Last tramadol 23 #120    Right shoulder/elbow  pain finger numbness      Social History     Tobacco Use    Smoking status: Former     Packs/day: 0.00     Years: 5.00     Pack years: 0.00     Types: Cigarettes     Start date: 1982     Quit date: 1984     Years since quittin.6    Smokeless tobacco: Never   Substance Use Topics    Alcohol use: Yes     Comment: occ             2023     4:47 PM   Alcohol Use   Prescreen: >3 drinks/day or >7 drinks/week? No          No data to display              Reviewed orders with patient.  Reviewed health maintenance and updated orders accordingly - Yes  BP Readings from Last 3 Encounters:   23 126/88   23 (!) 152/84   23 122/85    Wt Readings from Last 3 Encounters:   23 93.9 kg (207 lb)   23 93.9 kg (207 lb)   23 94.3 kg (208 lb)                    Breast Cancer Screening:    FHS-7:       2021     9:55 AM   Breast CA Risk Assessment (FHS-7)   Did any of your first-degree relatives have breast or ovarian cancer? Unknown   Did any of your relatives have bilateral breast cancer? Yes   Did any man in your family have breast cancer? Unknown   Did any woman in your family have breast and ovarian cancer? Unknown   Did any woman in your family have breast cancer before age 50 y? Yes   Do you have 2 or more relatives with breast and/or ovarian cancer? Unknown   Do you have 2 or more relatives with breast and/or bowel cancer? Unknown       Mammogram Screening - Alternate mammogram schedule due to breast cancer history  Pertinent mammograms are reviewed under the imaging tab.    History of abnormal Pap smear: YES - other categories - see  "link Cervical Cytology Screening Guidelines      Latest Ref Rng & Units 9/8/2021    11:07 AM 9/15/2020     4:55 PM 9/15/2020     4:44 PM   PAP / HPV   PAP  Negative for Intraepithelial Lesion or Malignancy (NILM)      PAP (Historical)   NIL     HPV 16 DNA Negative Negative   Negative    HPV 18 DNA Negative Negative   Negative    Other HR HPV Negative Negative   Positive      Reviewed and updated as needed this visit by clinical staff   Tobacco  Allergies  Meds              Reviewed and updated as needed this visit by Provider                     Review of Systems   Constitutional:  Negative for chills and fever.   HENT:  Positive for ear pain. Negative for congestion, hearing loss and sore throat.    Eyes:  Positive for pain and visual disturbance.   Respiratory:  Negative for cough and shortness of breath.    Cardiovascular:  Positive for peripheral edema. Negative for chest pain and palpitations.   Gastrointestinal:  Positive for heartburn and nausea. Negative for abdominal pain, constipation, diarrhea and hematochezia.   Breasts:  Negative for tenderness, breast mass and discharge.   Genitourinary:  Positive for frequency and urgency. Negative for dysuria, genital sores, hematuria, pelvic pain, vaginal bleeding and vaginal discharge.   Musculoskeletal:  Positive for arthralgias, joint swelling and myalgias.   Skin:  Negative for rash.   Neurological:  Positive for dizziness, weakness and headaches. Negative for paresthesias.   Psychiatric/Behavioral:  Positive for mood changes. The patient is nervous/anxious.         OBJECTIVE:   /88 (BP Location: Right arm, Patient Position: Sitting, Cuff Size: Adult Large)   Pulse 79   Temp 98.3  F (36.8  C) (Tympanic)   Resp 16   Ht 1.718 m (5' 7.64\")   Wt 93.9 kg (207 lb)   LMP  (LMP Unknown)   SpO2 96%   BMI 31.81 kg/m    Physical Exam  GENERAL APPEARANCE: healthy, alert and no distress  EYES: Eyes grossly normal to inspection, PERRL and conjunctivae and " sclerae normal  HENT: ear canals and TM's normal, nose and mouth without ulcers or lesions, oropharynx clear and oral mucous membranes moist  NECK: no adenopathy, no asymmetry, masses, or scars and thyroid normal to palpation  RESP: lungs clear to auscultation - no rales, rhonchi or wheezes  CV: regular rate and rhythm, normal S1 S2, no S3 or S4, no murmur, click or rub, no peripheral edema and peripheral pulses strong  ABDOMEN: soft, nontender, no hepatosplenomegaly, no masses and bowel sounds normal   (female): normal female external genitalia, normal urethral meatus, vaginal mucosal atrophy noted, normal cervix without masses or abnormal discharge  MS: no musculoskeletal defects are noted and gait is age appropriate without ataxia  SKIN: no suspicious lesions or rashes  NEURO: Normal strength and tone, sensory exam grossly normal, mentation intact and speech normal  PSYCH: mentation appears normal and affect normal/bright    Diagnostic Test Results:  Labs reviewed in Epic    ASSESSMENT/PLAN:   Diana was seen today for physical.    Diagnoses and all orders for this visit:    Routine general medical examination at a health care facility    Moderate episode of recurrent major depressive disorder (H): not controlled  Selective serotonin reuptake inhibitor and SNRI have not been helpful  Trial latuda  Continue seroquel  Continue therapy  -discussed risks, benefits, and side effects of this new medication. Patient understands and is in agreement.  Recheck in 6 weeks  -     lurasidone (LATUDA) 20 MG TABS tablet; Take 1 tablet (20 mg) by mouth daily with food for 14 days, THEN 2 tablets (40 mg) daily with food for 90 days.    Anxiety: not controled  Taper off cymbalta and start latuda  Continue seroquel at bedtime and ativan PRN  Continue therapy  -discussed risks, benefits, and side effects of this new medication. Patient understands and is in agreement.  Continue therapy  -     DULoxetine (CYMBALTA) 30 MG capsule;  Take 1 capsule (30 mg) by mouth daily for 14 days Then stop  -     lurasidone (LATUDA) 20 MG TABS tablet; Take 1 tablet (20 mg) by mouth daily with food for 14 days, THEN 2 tablets (40 mg) daily with food for 90 days.    Opioid type dependence, continuous use (H): ongoing pain medication use with tramadol during day and norco at bedtime  Cymbalta wasn't helpful for pain or mood.  -     DULoxetine (CYMBALTA) 30 MG capsule; Take 1 capsule (30 mg) by mouth daily for 14 days Then stop  -     HYDROcodone-acetaminophen (NORCO) 5-325 MG tablet; Take 1 tablet by mouth 2 times daily as needed for severe pain  -     traMADol (ULTRAM) 50 MG tablet; Take 1 tablet (50 mg) by mouth every 6 hours as needed for severe pain  -     Drug Confirmation Panel Urine with Creat - lab collect  -     traMADol (ULTRAM) 50 MG tablet; Take 1 tablet (50 mg) by mouth every 6 hours as needed for severe pain  -     traMADol (ULTRAM) 50 MG tablet; Take 1 tablet (50 mg) by mouth every 6 hours as needed for severe pain  -     HYDROcodone-acetaminophen (NORCO) 5-325 MG tablet; Take 1 tablet by mouth 2 times daily as needed for pain  -     HYDROcodone-acetaminophen (NORCO) 5-325 MG tablet; Take 1 tablet by mouth 2 times daily as needed for pain    Sciatica of right side  -     HYDROcodone-acetaminophen (NORCO) 5-325 MG tablet; Take 1 tablet by mouth 2 times daily as needed for severe pain  -     traMADol (ULTRAM) 50 MG tablet; Take 1 tablet (50 mg) by mouth every 6 hours as needed for severe pain  -     Drug Confirmation Panel Urine with Creat - lab collect  -     traMADol (ULTRAM) 50 MG tablet; Take 1 tablet (50 mg) by mouth every 6 hours as needed for severe pain  -     traMADol (ULTRAM) 50 MG tablet; Take 1 tablet (50 mg) by mouth every 6 hours as needed for severe pain  -     HYDROcodone-acetaminophen (NORCO) 5-325 MG tablet; Take 1 tablet by mouth 2 times daily as needed for pain  -     HYDROcodone-acetaminophen (NORCO) 5-325 MG tablet; Take 1  "tablet by mouth 2 times daily as needed for pain    Situational anxiety: refill until able to get mood controlled with daily mood medication.  -     LORazepam (ATIVAN) 0.5 MG tablet; Take 1-2 tablets (0.5-1 mg) by mouth every 8 hours as needed for anxiety      Insomnia, unspecified type  -     QUEtiapine (SEROQUEL) 25 MG tablet; Take 2 tablets (50 mg) by mouth At Bedtime    Cold sore  -     valACYclovir (VALTREX) 1000 mg tablet; TAKE 1 TABLET(1000 MG) BY MOUTH TWICE DAILY. REPEAT AS NEEDED FOR COLD SORE    Screen for colon cancer  -     COLOGUARD(EXACT SCIENCES); Future    Cervical cancer screening  -     Pap screen with HPV - recommended age 30 - 65 years    Cervical high risk HPV (human papillomavirus) test positive  -     Pap screen with HPV - recommended age 30 - 65 years    Right shoulder pain, unspecified chronicity: unable to discuss today, plan referral to ortho.  -     Orthopedic  Referral; Future    Other orders  -     Pneumococcal 20 Valent Conjugate (Prevnar 20)  -     REVIEW OF HEALTH MAINTENANCE PROTOCOL ORDERS  -     PRIMARY CARE FOLLOW-UP SCHEDULING; Future        Patient has been advised of split billing requirements and indicates understanding: Yes    Depression Screening Follow Up        8/31/2023     4:44 PM   PHQ   PHQ-9 Total Score 19   Q9: Thoughts of better off dead/self-harm past 2 weeks Not at all         Follow Up Actions Taken  Crisis resource information provided in After Visit Summary       COUNSELING:  Reviewed preventive health counseling, as reflected in patient instructions       Regular exercise       Healthy diet/nutrition       Vision screening       Osteoporosis prevention/bone health      BMI:   Estimated body mass index is 31.81 kg/m  as calculated from the following:    Height as of this encounter: 1.718 m (5' 7.64\").    Weight as of this encounter: 93.9 kg (207 lb).   Weight management plan: Discussed healthy diet and exercise guidelines      She reports that she " quit smoking about 39 years ago. Her smoking use included cigarettes. She started smoking about 41 years ago. She has never used smokeless tobacco.          Quoc Chu MD  [facility]

## 2023-09-01 NOTE — PATIENT INSTRUCTIONS
Transition of medication  Week 1-2  Decrease cymbalta to 30mg a day for 14 days  At the same time start the latuda 20mg once a day (dinner, 350cal at least)    Week 3 and on  Stop the cymbalta  Increase latuda to 40mg once a day dinner and stay at this dose.    Recheck in 6 weeks. Sooner if concerns      Pain medication, I'm going to send 3 months, in 3 months [employer] message request and put in the note 3 month refills.  Then in clinic appt in 6 months again.    This is a preventative visit and any additional concerns or chronic disease management including medication refills addressed today could be charged additionally.    Preventative visits screen for diseases prior to they occur.  They do not cover for any new diagnosis or chronic disease management.     If you have questions regarding your coverage please check with your insurance provider.  At [facility] we need to code correctly to be in compliance with all insurance companies.  Preventive Health Recommendations  Female Ages 50 - 64    Yearly exam: See your health care provider every year in order to  Review health changes.   Discuss preventive care.    Review your medicines if your doctor has prescribed any.    Get a Pap test every three years (unless you have an abnormal result and your provider advises testing more often).  If you get Pap tests with HPV test, you only need to test every 5 years, unless you have an abnormal result.   You do not need a Pap test if your uterus was removed (hysterectomy) and you have not had cancer.  You should be tested each year for STDs (sexually transmitted diseases) if you're at risk.   Have a mammogram every 1 to 2 years.  Have a colonoscopy at age 50, or have a yearly FIT test (stool test). These exams screen for colon cancer.    Have a cholesterol test every 5 years, or more often if advised.  Have a diabetes test (fasting glucose) every three years. If you are at risk for diabetes, you should have this test more often.    If you are at risk for osteoporosis (brittle bone disease), think about having a bone density scan (DEXA).    Shots: Get a flu shot each year. Get a tetanus shot every 10 years.    Nutrition:   Eat at least 5 servings of fruits and vegetables each day.  Eat whole-grain bread, whole-wheat pasta and brown rice instead of white grains and rice.  Get adequate Calcium and Vitamin D.     Lifestyle  Exercise at least 150 minutes a week (30 minutes a day, 5 days a week). This will help you control your weight and prevent disease.  Limit alcohol to one drink per day.  No smoking.   Wear sunscreen to prevent skin cancer.   See your dentist every six months for an exam and cleaning.  See your eye doctor every 1 to 2 years.

## 2023-09-06 LAB
BKR LAB AP GYN ADEQUACY: NORMAL
BKR LAB AP GYN INTERPRETATION: NORMAL
BKR LAB AP HPV REFLEX: NORMAL
BKR LAB AP PREVIOUS ABNL DX: NORMAL
BKR LAB AP PREVIOUS ABNORMAL: NORMAL
PATH REPORT.COMMENTS IMP SPEC: NORMAL
PATH REPORT.COMMENTS IMP SPEC: NORMAL
PATH REPORT.RELEVANT HX SPEC: NORMAL

## 2023-09-14 DIAGNOSIS — T45.1X5A CHEMOTHERAPY-INDUCED NEUROPATHY (H): ICD-10-CM

## 2023-09-14 DIAGNOSIS — G62.0 CHEMOTHERAPY-INDUCED NEUROPATHY (H): ICD-10-CM

## 2023-09-14 RX ORDER — DULOXETIN HYDROCHLORIDE 60 MG/1
CAPSULE, DELAYED RELEASE ORAL
Qty: 30 CAPSULE | Refills: 5 | OUTPATIENT
Start: 2023-09-14

## 2023-09-21 ENCOUNTER — INFUSION THERAPY VISIT (OUTPATIENT)
Dept: INFUSION THERAPY | Facility: CLINIC | Age: 58
End: 2023-09-21
Attending: NURSE PRACTITIONER
Payer: OTHER GOVERNMENT

## 2023-09-21 DIAGNOSIS — C50.211 MALIGNANT NEOPLASM OF UPPER-INNER QUADRANT OF RIGHT BREAST IN FEMALE, ESTROGEN RECEPTOR NEGATIVE (H): Primary | ICD-10-CM

## 2023-09-21 DIAGNOSIS — Z17.1 MALIGNANT NEOPLASM OF UPPER-INNER QUADRANT OF RIGHT BREAST IN FEMALE, ESTROGEN RECEPTOR NEGATIVE (H): Primary | ICD-10-CM

## 2023-09-21 PROCEDURE — 250N000011 HC RX IP 250 OP 636: Mod: JZ | Performed by: NURSE PRACTITIONER

## 2023-09-21 PROCEDURE — 96523 IRRIG DRUG DELIVERY DEVICE: CPT

## 2023-09-21 RX ORDER — HEPARIN SODIUM (PORCINE) LOCK FLUSH IV SOLN 100 UNIT/ML 100 UNIT/ML
5 SOLUTION INTRAVENOUS
Status: DISCONTINUED | OUTPATIENT
Start: 2023-09-21 | End: 2023-09-21 | Stop reason: HOSPADM

## 2023-09-21 RX ORDER — HEPARIN SODIUM (PORCINE) LOCK FLUSH IV SOLN 100 UNIT/ML 100 UNIT/ML
5 SOLUTION INTRAVENOUS
Status: CANCELLED | OUTPATIENT
Start: 2023-09-21

## 2023-09-21 RX ORDER — HEPARIN SODIUM,PORCINE 10 UNIT/ML
5 VIAL (ML) INTRAVENOUS
Status: CANCELLED | OUTPATIENT
Start: 2023-09-21

## 2023-09-21 RX ADMIN — Medication 5 ML: at 11:22

## 2023-09-21 NOTE — PROGRESS NOTES
Port accessed and good blood return noted.  Port flushed per [facility] protocol and then deaccessed.  Pt tolerated well.    Brandi Solis RN

## 2023-10-03 ENCOUNTER — VIRTUAL VISIT (OUTPATIENT)
Dept: FAMILY MEDICINE | Facility: CLINIC | Age: 58
End: 2023-10-03
Attending: FAMILY MEDICINE
Payer: OTHER GOVERNMENT

## 2023-10-03 ENCOUNTER — MYC MEDICAL ADVICE (OUTPATIENT)
Dept: FAMILY MEDICINE | Facility: CLINIC | Age: 58
End: 2023-10-03

## 2023-10-03 DIAGNOSIS — F33.1 MODERATE EPISODE OF RECURRENT MAJOR DEPRESSIVE DISORDER (H): ICD-10-CM

## 2023-10-03 DIAGNOSIS — F41.9 ANXIETY: ICD-10-CM

## 2023-10-03 DIAGNOSIS — N95.2 ATROPHIC VAGINITIS: Primary | ICD-10-CM

## 2023-10-03 PROCEDURE — 99442 PR PHYSICIAN TELEPHONE EVALUATION 11-20 MIN: CPT | Mod: 95 | Performed by: FAMILY MEDICINE

## 2023-10-03 RX ORDER — LURASIDONE HYDROCHLORIDE 20 MG/1
TABLET, FILM COATED ORAL
Qty: 194 TABLET | Refills: 0 | Status: CANCELLED | OUTPATIENT
Start: 2023-10-03 | End: 2024-01-14

## 2023-10-03 ASSESSMENT — ANXIETY QUESTIONNAIRES
4. TROUBLE RELAXING: NEARLY EVERY DAY
IF YOU CHECKED OFF ANY PROBLEMS ON THIS QUESTIONNAIRE, HOW DIFFICULT HAVE THESE PROBLEMS MADE IT FOR YOU TO DO YOUR WORK, TAKE CARE OF THINGS AT HOME, OR GET ALONG WITH OTHER PEOPLE: SOMEWHAT DIFFICULT
7. FEELING AFRAID AS IF SOMETHING AWFUL MIGHT HAPPEN: NOT AT ALL
3. WORRYING TOO MUCH ABOUT DIFFERENT THINGS: NEARLY EVERY DAY
5. BEING SO RESTLESS THAT IT IS HARD TO SIT STILL: NOT AT ALL
GAD7 TOTAL SCORE: 13
GAD7 TOTAL SCORE: 13
6. BECOMING EASILY ANNOYED OR IRRITABLE: NEARLY EVERY DAY
2. NOT BEING ABLE TO STOP OR CONTROL WORRYING: NEARLY EVERY DAY
1. FEELING NERVOUS, ANXIOUS, OR ON EDGE: SEVERAL DAYS

## 2023-10-03 ASSESSMENT — PATIENT HEALTH QUESTIONNAIRE - PHQ9: SUM OF ALL RESPONSES TO PHQ QUESTIONS 1-9: 14

## 2023-10-03 NOTE — COMMUNITY RESOURCES LIST (ENGLISH)
10/03/2023   [facility] [employer]  N/A  For questions about this resource list or additional care needs, please contact your primary care clinic or care manager.  Phone: [phone]   Email: N/A   Address: [address] [place] [address]   Hours: N/A        Food and Nutrition       Food pantry  1  [employer] Hel[employer] - Food Shelf Distance: 3.47 miles      In-Person   [address] [place] [address]  Language: English  Hours: Mon - Sun Appt. Only  Fees: Free   Phone: (918) 253-5529 Email: demario@[employer]CAS Website: http://www.XanEdupinghand.org     2  [facility] - Dry Food Pantry Distance: 4.27 miles      Pickup   [address] [place] [address]  Language: English  Hours: Mon - Fri 9:00 AM - 3:00 PM  Fees: Free   Phone: (598) 422-9474 Email: office@[facility] Website: http://www.[facility].Todacell/     SNAP application assistance  3  [facility] [place] Family [place] Program (MFIP) - [place] Family Investment Program (MFIP) Distance: 9.39 miles      In-Person, Phone/Virtual   [address] [address]  Language: English  Hours: Mon - Fri 8:00 AM - 4:30 PM  Fees: Free   Phone: (769) 672-2767 Email: imgeneralquestions@[facility].gov Website: https://www.[facility]./499/MFIP-Biennial-Service-Agreement-VCA-Plan     4  [facility] Action [place] ([facility]) [facility] Office Distance: 13.76 miles      In-Person, Phone/Virtual   [address] [place] [address]  Language: English  Hours: Mon - Fri 8:00 AM - 4:30 PM  Fees: Free   Phone: (254) 820-7315 Email: wilian@[employer] Website: https://www.[facility].org/agency-information     Soup kitchen or free meals  5  [facility] - Family MCFP - Thursday Night Community Meal Distance: 18.35 miles      In-Person   [address] [place] [address]  Language:  English, Bhutanese  Hours: Thu 6:00 PM - 7:00 PM  Fees: Free   Phone: (355) 398-9793 Email: center@[facility]andrews.org Website: https://www.saintandrews.org     6  [facility] - Family Table Meals - Family Table Meal Distance: 19.01 miles      Pickup   [address] [place] [address]  Language: English  Hours: Thu 5:00 PM - 6:30 PM  Fees: Free   Phone: (693) 983-4342 Email: yanely@[employer] Website: http://www.[employer].com.Drizly          Important Numbers & Websites       Emergency Services   911  [facility] Services   311  Poison Control   (899) 517-3581  Suicide Prevention Lifeline   (315) 591-7101 (TALK)  Child Abuse Hotline   (548) 338-5913 (4-A-Child)  Sexual Assault Hotline   (714) 644-3678 (HOPE)  National Runaway Safeline   (665) 105-4899 (RUNAWAY)  All-Options Talkline   (565) 246-7294  Substance Abuse Referral   (672) 388-6883 (HELP)

## 2023-10-03 NOTE — COMMUNITY RESOURCES LIST (ENGLISH)
10/03/2023   [facility] [employer]  N/A  For questions about this resource list or additional care needs, please contact your primary care clinic or care manager.  Phone: [phone]   Email: N/A   Address: [address] [place] [address]   Hours: N/A        Food and Nutrition       Food pantry  1  [employer] - Food Shelf Distance: 3.47 miles      In-Person   [address] [place] [address]  Language: English  Hours: Mon - Sun Appt. Only  Fees: Free   Phone: (470) 695-8807 Email: demario@[employer] Website: http://www.ENEFpropinghand.org     2  [facility] - Dry Food Pantry Distance: 4.27 miles      Pickup   [address] [place] [address]  Language: English  Hours: Mon - Fri 9:00 AM - 3:00 PM  Fees: Free   Phone: (802) 137-6254 Email: office@[facility] Website: http://www.[facility].MONOQI/     SNAP application assistance  3  [facility] - [place] Family Investment Program (MFIP) - [place] Family Investment Program (MFIP) Distance: 9.39 miles      In-Person, Phone/Virtual   [address] [address]  Language: English  Hours: Mon - Fri 8:00 AM - 4:30 PM  Fees: Free   Phone: (837) 429-3573 Email: imgeneralquestions@[facility].gov Website: https://www.[facility]./499/MFIP-Biennial-Service-Agreement-VCA-Plan     4  [facility] Action [place] ([facility]) [facility] Office Distance: 13.76 miles      In-Person, Phone/Virtual   [address] [place] [address]  Language: English  Hours: Mon - Fri 8:00 AM - 4:30 PM  Fees: Free   Phone: (513) 287-9820 Email: wilian@[employer] Website: https://www.[facility].org/agency-information     Soup kitchen or free meals  5  [facility] - Family alf - Thursday Night Community Meal Distance: 18.35 miles      In-Person   [address] [place] [address]  Language:  English, Vincentian  Hours: Thu 6:00 PM - 7:00 PM  Fees: Free   Phone: (384) 890-6768 Email: center@[facility].org Website: https://www.saintandrews.org     6  [facility] - Family Table Meals - Family Table Meal Distance: 19.01 miles      Pickup   [address] [place] [address]  Language: English  Hours: Thu 5:00 PM - 6:30 PM  Fees: Free   Phone: (860) 622-9584 Email: yanely@[employer] Website: http://www.[employer].Shopgate          Important Numbers & Websites       Emergency Services   911  [facility] Services   311  Poison Control   (782) 499-2685  Suicide Prevention Lifeline   (186) 372-2419 (TALK)  Child Abuse Hotline   (490) 400-7797 (4-A-Child)  Sexual Assault Hotline   (347) 984-8127 (HOPE)  National Runaway Safeline   (150) 600-1017 (RUNAWAY)  All-Options Talkline   (501) 417-8777  Substance Abuse Referral   (783) 902-5840 (HELP)

## 2023-10-03 NOTE — PATIENT INSTRUCTIONS
In 4 weeks send Heysan message and mood questionnaires.    Vaginal Dryness due to menopause:  -water based lubricants during sex  -Vaginal moisturizer Replense use it in and around the vagina once a day for one week and then few times a week after that

## 2023-10-03 NOTE — PROGRESS NOTES
Aide is a 58 year old who is being evaluated via a billable telephone visit.      What phone number would you like to be contacted at? [phone]  How would you like to obtain your AVS? MyChart    Distant Location (provider location):  On-site    Assessment & Plan     Atropic vaginitis: discussed replense, no vaginal estrogens due to breast cancer    Anxiety  &    Moderate episode of recurrent major depressive disorder (H)  Starting to improve, but has only been on latuda for 2 weeks, trouble with pharmacy getting it to start.  No side effects so continue medication and MyCHart message with questionaires for mood in 4 weeks.         Depression Screening Follow Up        10/3/2023    12:58 PM   PHQ   PHQ-9 Total Score 14   Q9: Thoughts of better off dead/self-harm past 2 weeks Not at all         Follow Up Actions Taken  Crisis resource information provided in After Visit Summary     See Patient Instructions    Quoc Chu MD  [facility]    Stephie Noble is a 58 year old, presenting for the following health issues:  Recheck Medication (Latuda)      10/3/2023    12:52 PM   Additional Questions   Roomed by Stacy Chavez MA   Accompanied by self         10/3/2023    12:52 PM   Patient Reported Additional Medications   Patient reports taking the following new medications none       History of Present Illness       Mental Health Follow-up:  Patient presents to follow-up on Depression & Anxiety.Patient's depression since last visit has been:  Good  The patient is not having other symptoms associated with depression.  Patient's anxiety since last visit has been:  Good  The patient is not having other symptoms associated with anxiety.  Any significant life events: No  Patient is feeling anxious or having panic attacks.  Patient has no concerns about alcohol or drug use.    She eats 0-1 servings of fruits and vegetables daily.She consumes 0 sweetened beverage(s) daily.She exercises with  enough effort to increase her heart rate 20 to 29 minutes per day.  She exercises with enough effort to increase her heart rate 3 or less days per week.   She is taking medications regularly.      Has tapered off the duloxetine last dose today, and on to Latuda, tomorrow will go up to the two pills. Has been on this for only 2 weeks. No side effects.    Duloxetine was no longer helpful.  Had been on celexa prior to that.  Years ago was on sertraline which quit working or never really worked and lexapro after that which quit working or never did really work  Quetiapine medication is helpful for restful sleep.  Lorazepam 0-2 per day, depends on the day. Average of 1 pill a day from [facility].  Therapy Dr. Russ a male, but wondering if would be more comfortable with a female therapist.    Did get put on SSD, so feeling relieved. Temporarily for chemo effects        5/19/2023    12:53 PM 8/31/2023     4:44 PM 10/3/2023    12:58 PM   PHQ   PHQ-9 Total Score 21 19 14   Q9: Thoughts of better off dead/self-harm past 2 weeks Not at all Not at all Not at all         5/19/2023    12:53 PM 9/1/2023     3:40 PM 10/3/2023    12:56 PM   SHADE-7 SCORE   Total Score  19 (severe anxiety) 13 (moderate anxiety)   Total Score 15 19 13         Review of Systems   Constitutional, HEENT, cardiovascular, pulmonary, gi and gu systems are negative, except as otherwise noted.      Objective    Vitals - Patient Reported  Pain Score: Mild Pain (3)  Pain Loc: Low Back        Physical Exam   healthy, alert, and no distress  PSYCH: Alert and oriented times 3; coherent speech, normal   rate and volume, able to articulate logical thoughts, able   to abstract reason, no tangential thoughts, no hallucinations   or delusions  Her affect is normal  RESP: No cough, no audible wheezing, able to talk in full sentences  Remainder of exam unable to be completed due to telephone visits              Phone call duration: 16 minutes

## 2023-10-31 ASSESSMENT — ANXIETY QUESTIONNAIRES
4. TROUBLE RELAXING: NEARLY EVERY DAY
6. BECOMING EASILY ANNOYED OR IRRITABLE: SEVERAL DAYS
7. FEELING AFRAID AS IF SOMETHING AWFUL MIGHT HAPPEN: NEARLY EVERY DAY
GAD7 TOTAL SCORE: 17
1. FEELING NERVOUS, ANXIOUS, OR ON EDGE: NEARLY EVERY DAY
2. NOT BEING ABLE TO STOP OR CONTROL WORRYING: NEARLY EVERY DAY
3. WORRYING TOO MUCH ABOUT DIFFERENT THINGS: NEARLY EVERY DAY
IF YOU CHECKED OFF ANY PROBLEMS ON THIS QUESTIONNAIRE, HOW DIFFICULT HAVE THESE PROBLEMS MADE IT FOR YOU TO DO YOUR WORK, TAKE CARE OF THINGS AT HOME, OR GET ALONG WITH OTHER PEOPLE: VERY DIFFICULT
GAD7 TOTAL SCORE: 17
5. BEING SO RESTLESS THAT IT IS HARD TO SIT STILL: SEVERAL DAYS

## 2023-10-31 ASSESSMENT — PATIENT HEALTH QUESTIONNAIRE - PHQ9
10. IF YOU CHECKED OFF ANY PROBLEMS, HOW DIFFICULT HAVE THESE PROBLEMS MADE IT FOR YOU TO DO YOUR WORK, TAKE CARE OF THINGS AT HOME, OR GET ALONG WITH OTHER PEOPLE: VERY DIFFICULT
SUM OF ALL RESPONSES TO PHQ QUESTIONS 1-9: 13
SUM OF ALL RESPONSES TO PHQ QUESTIONS 1-9: 13

## 2023-11-14 NOTE — TELEPHONE ENCOUNTER
----- Message from Noelle Epstein sent at 11/14/2023  3:16 PM CST -----  Jena -   I've called and left 2 voicemail (on 11/6 & today).  I will try again in a couple days.  Thanks!  Noelle    ----- Message -----  From: Claire Blair RN  Sent: 11/14/2023   2:55 PM CST  To: Fl Oncology     [facility],  This patient was a no show for follow up with labs and Marilu on 10/25. Please call her to reschedule.  Thanks!  Jena

## 2023-11-22 ENCOUNTER — E-VISIT (OUTPATIENT)
Dept: URGENT CARE | Facility: CLINIC | Age: 58
End: 2023-11-22
Payer: OTHER GOVERNMENT

## 2023-11-22 ENCOUNTER — MYC MEDICAL ADVICE (OUTPATIENT)
Dept: FAMILY MEDICINE | Facility: CLINIC | Age: 58
End: 2023-11-22
Payer: OTHER GOVERNMENT

## 2023-11-22 DIAGNOSIS — J01.90 ACUTE SINUSITIS, RECURRENCE NOT SPECIFIED, UNSPECIFIED LOCATION: Primary | ICD-10-CM

## 2023-11-22 PROCEDURE — 99207 PR NON-BILLABLE SERV PER CHARTING: CPT | Performed by: FAMILY MEDICINE

## 2023-11-23 NOTE — PATIENT INSTRUCTIONS
Acute Sinusitis: Care Instructions  Overview     Acute sinusitis is an inflammation of the mucous membranes inside the nose and sinuses. Sinuses are the hollow spaces in your skull around the eyes and nose. Acute sinusitis often follows a cold. Acute sinusitis causes thick, discolored mucus that drains from the nose or down the back of the throat. It also can cause pain and pressure in your head and face along with a stuffy or blocked nose.  In most cases, sinusitis gets better on its own in 1 to 2 weeks. But some mild symptoms may last for several weeks. Sometimes antibiotics are needed if there is a bacterial infection.  Follow-up care is a key part of your treatment and safety. Be sure to make and go to all appointments, and call your doctor if you are having problems. It's also a good idea to know your test results and keep a list of the medicines you take.  How can you care for yourself at home?  Use saline (saltwater) nasal washes. This can help keep your nasal passages open and wash out mucus and allergens.  You can buy saline nose washes at a grocery store or drugstore. Follow the instructions on the package.  You can make your own at home. Add 1 teaspoon of non-iodized salt and 1 teaspoon of baking soda to 2 cups of distilled or boiled and cooled water. Fill a squeeze bottle or a nasal cleansing pot (such as a neti pot) with the nasal wash. Then put the tip into your nostril, and lean over the sink. With your mouth open, gently squirt the liquid. Repeat on the other side.  Try a decongestant nasal spray like oxymetazoline (Afrin). Do not use it for more than 3 days in a row. Using it for more than 3 days can make your congestion worse.  If needed, take an over-the-counter pain medicine, such as acetaminophen (Tylenol), ibuprofen (Advil, Motrin), or naproxen (Aleve). Read and follow all instructions on the label.  If the doctor prescribed antibiotics, take them as directed. Do not stop taking them just  "because you feel better. You need to take the full course of antibiotics.  Be careful when taking over-the-counter cold or flu medicines and Tylenol at the same time. Many of these medicines have acetaminophen, which is Tylenol. Read the labels to make sure that you are not taking more than the recommended dose. Too much acetaminophen (Tylenol) can be harmful.  Try a steroid nasal spray. It may help with your symptoms.  Breathe warm, moist air. You can use a steamy shower, a hot bath, or a sink filled with hot water. Avoid cold, dry air. Using a humidifier in your home may help. Follow the directions for cleaning the machine.  When should you call for help?   Call your doctor now or seek immediate medical care if:    You have new or worse swelling, redness, or pain in your face or around one or both of your eyes.     You have double vision or a change in your vision.     You have a high fever.     You have a severe headache and a stiff neck.     You have mental changes, such as feeling confused or much less alert.   Watch closely for changes in your health, and be sure to contact your doctor if:    You are not getting better as expected.   Where can you learn more?  Go to https://www.Qio.net/patiented  Enter I933 in the search box to learn more about \"Acute Sinusitis: Care Instructions.\"  Current as of: February 28, 2023               Content Version: 13.8    2900-3184 [employer].   Care instructions adapted under license by your healthcare professional. If you have questions about a medical condition or this instruction, always ask your healthcare professional. [employer] disclaims any warranty or liability for your use of this information.      You may want to try a nasal lavage (also known as nasal irrigation). You can find over-the-counter products, such as Neti-Pot, at retail locations or make your own at home. Instructions for homemade nasal lavage and more information on the " process are available online at http://www.aafp.org/afp/2009/1115/p1121.html.    Thank you for choosing us for your care. I have placed an order for a prescription so that you can start treatment. View your full visit summary for details by clicking on the link below. Your pharmacist will able to address any questions you may have about the medication.     If you're not feeling better within 5-7 days, please schedule an appointment.  You can schedule an appointment right here in [facility], or call [phone]  If the visit is for the same symptoms as your eVisit, we'll refund the cost of your eVisit if seen within seven days.

## 2023-12-06 ENCOUNTER — TELEPHONE (OUTPATIENT)
Dept: ONCOLOGY | Facility: CLINIC | Age: 58
End: 2023-12-06
Payer: OTHER GOVERNMENT

## 2023-12-06 NOTE — TELEPHONE ENCOUNTER
----- Message from Noelle Epstein sent at 12/5/2023  3:03 PM CST -----  Jena -   We tried 3 times.    Thanks!  Noelle    ----- Message -----  From: Claire Blair RN  Sent: 11/14/2023   2:55 PM CST  To: Fl Oncology     [facility],  This patient was a no show for follow up with labs and Marilu on 10/25. Please call her to reschedule.  Thanks!  Jena

## 2023-12-12 DIAGNOSIS — M54.41 ACUTE RIGHT-SIDED LOW BACK PAIN WITH RIGHT-SIDED SCIATICA: ICD-10-CM

## 2023-12-13 RX ORDER — CYCLOBENZAPRINE HCL 5 MG
5 TABLET ORAL 2 TIMES DAILY PRN
Qty: 40 TABLET | Refills: 5 | Status: SHIPPED | OUTPATIENT
Start: 2023-12-13 | End: 2024-07-18

## 2023-12-14 DIAGNOSIS — F41.9 ANXIETY: ICD-10-CM

## 2023-12-14 DIAGNOSIS — F33.1 MODERATE EPISODE OF RECURRENT MAJOR DEPRESSIVE DISORDER (H): ICD-10-CM

## 2023-12-14 RX ORDER — LURASIDONE HYDROCHLORIDE 40 MG/1
40 TABLET, FILM COATED ORAL DAILY
Qty: 90 TABLET | Refills: 1 | Status: SHIPPED | OUTPATIENT
Start: 2023-12-14 | End: 2024-01-23

## 2023-12-20 DIAGNOSIS — F11.20 OPIOID TYPE DEPENDENCE, CONTINUOUS USE (H): ICD-10-CM

## 2023-12-20 DIAGNOSIS — M54.31 SCIATICA OF RIGHT SIDE: ICD-10-CM

## 2023-12-21 RX ORDER — TRAMADOL HYDROCHLORIDE 50 MG/1
50 TABLET ORAL EVERY 6 HOURS PRN
Qty: 120 TABLET | Refills: 0 | Status: SHIPPED | OUTPATIENT
Start: 2024-01-20 | End: 2024-01-23

## 2024-01-02 DIAGNOSIS — G25.81 RESTLESS LEGS SYNDROME: ICD-10-CM

## 2024-01-03 ENCOUNTER — ONCOLOGY VISIT (OUTPATIENT)
Dept: ONCOLOGY | Facility: CLINIC | Age: 59
End: 2024-01-03
Attending: NURSE PRACTITIONER
Payer: OTHER GOVERNMENT

## 2024-01-03 ENCOUNTER — INFUSION THERAPY VISIT (OUTPATIENT)
Dept: INFUSION THERAPY | Facility: CLINIC | Age: 59
End: 2024-01-03
Attending: NURSE PRACTITIONER
Payer: OTHER GOVERNMENT

## 2024-01-03 VITALS
SYSTOLIC BLOOD PRESSURE: 151 MMHG | OXYGEN SATURATION: 99 % | WEIGHT: 214 LBS | RESPIRATION RATE: 12 BRPM | DIASTOLIC BLOOD PRESSURE: 94 MMHG | HEART RATE: 85 BPM | TEMPERATURE: 98.8 F | HEIGHT: 68 IN | BODY MASS INDEX: 32.43 KG/M2

## 2024-01-03 DIAGNOSIS — Z17.1 MALIGNANT NEOPLASM OF UPPER-INNER QUADRANT OF RIGHT BREAST IN FEMALE, ESTROGEN RECEPTOR NEGATIVE (H): Primary | ICD-10-CM

## 2024-01-03 DIAGNOSIS — C50.211 MALIGNANT NEOPLASM OF UPPER-INNER QUADRANT OF RIGHT BREAST IN FEMALE, ESTROGEN RECEPTOR NEGATIVE (H): Primary | ICD-10-CM

## 2024-01-03 DIAGNOSIS — M79.601 PAIN OF RIGHT UPPER EXTREMITY: ICD-10-CM

## 2024-01-03 DIAGNOSIS — R20.0 NUMBNESS AND TINGLING OF RIGHT HAND: ICD-10-CM

## 2024-01-03 DIAGNOSIS — F33.9 RECURRENT MAJOR DEPRESSIVE DISORDER, REMISSION STATUS UNSPECIFIED (H): ICD-10-CM

## 2024-01-03 DIAGNOSIS — R20.2 NUMBNESS AND TINGLING OF RIGHT HAND: ICD-10-CM

## 2024-01-03 PROCEDURE — 99417 PROLNG OP E/M EACH 15 MIN: CPT | Performed by: NURSE PRACTITIONER

## 2024-01-03 PROCEDURE — 250N000011 HC RX IP 250 OP 636: Performed by: NURSE PRACTITIONER

## 2024-01-03 PROCEDURE — 99214 OFFICE O/P EST MOD 30 MIN: CPT | Performed by: NURSE PRACTITIONER

## 2024-01-03 PROCEDURE — 99215 OFFICE O/P EST HI 40 MIN: CPT | Performed by: NURSE PRACTITIONER

## 2024-01-03 RX ORDER — ROPINIROLE 1 MG/1
1 TABLET, FILM COATED ORAL AT BEDTIME
Qty: 90 TABLET | Refills: 1 | Status: SHIPPED | OUTPATIENT
Start: 2024-01-03 | End: 2024-01-23

## 2024-01-03 RX ORDER — HEPARIN SODIUM,PORCINE 10 UNIT/ML
5 VIAL (ML) INTRAVENOUS
Status: CANCELLED | OUTPATIENT
Start: 2024-01-03

## 2024-01-03 RX ORDER — HEPARIN SODIUM (PORCINE) LOCK FLUSH IV SOLN 100 UNIT/ML 100 UNIT/ML
5 SOLUTION INTRAVENOUS
Status: DISCONTINUED | OUTPATIENT
Start: 2024-01-03 | End: 2024-01-03 | Stop reason: HOSPADM

## 2024-01-03 RX ORDER — HEPARIN SODIUM (PORCINE) LOCK FLUSH IV SOLN 100 UNIT/ML 100 UNIT/ML
5 SOLUTION INTRAVENOUS
Status: CANCELLED | OUTPATIENT
Start: 2024-01-03

## 2024-01-03 RX ADMIN — Medication 5 ML: at 12:04

## 2024-01-03 ASSESSMENT — PAIN SCALES - GENERAL: PAINLEVEL: NO PAIN (0)

## 2024-01-03 NOTE — PROGRESS NOTES
Infusion Nursing Note:  Diana Salvador presents today for port flush.    Patient seen by provider today: Yes: Marilu Vergara   present during visit today: Not Applicable.    Note: N/A.    Intravenous Access:  Implanted Port.    Treatment Conditions:  Not Applicable.    Post Infusion Assessment:  Blood return noted.  Site patent and intact, free from redness, edema or discomfort.  No evidence of extravasations.  Access discontinued per protocol.     Discharge Plan:   Patient discharged in stable condition accompanied by: self.  Departure Mode: Ambulatory.    Johny Lovelace RN

## 2024-01-03 NOTE — PROGRESS NOTES
[facility] Hematology and Oncology Outpatient Progress Note ([place])    Patient: Diana Salvador  MRN: [id number]  Pavel 3, 2024            Reason for Visit    Stage IIB triple negative breast cancer    Primary Oncologist: formerly, Dr. Diamond    Assessment/Plan  1.   Stage IIB triple negative breast cancer, ypCR  2.   Right neck/shoulder pain with radicular/neuropathic features ALBERTINA Noble was diagnosed almost 2 years ago and completed all of her adjuvant treatment 1 yr ago. She is recovering generally well.     She has had 1 yr history of right neck/posterior shoulder pain with 6-mth history of intermittent numb/tingling in distal arm/hand (primarily pinky finger/ring finger). Bone scan last April negative for bone mets.     She still has her port in.      Plan:  -MRI cervical spine to exclude metastatic disease.  If benign, follow-up with PCP and/or Ortho for further evaluation which may include imaging of the shoulder as well  -Monitor CBC annually post chemo x 10 years. Repeat at next visit.  -No role for mammograms, post bilateral mastectomy  -Continue surveillance through history/exam.  We will transition to every 6-month visits to minimize her out-of-pocket co-pay expenses.  She will call if she has any new symptoms sooner.  -Can maintain routine surveillance at [facility] with me thereafter. Since Dr. Diamond has left the practice, will need to align with new primary MD over time/as needed (could see Dr. Lyons at [facility] whom she met before or new MD at [place] sites).  -Will need port flushes every 6-8 weeks until port is removed.  She can have her port removed anytime.    3.   Chemo-induced neuropathy, gr 1  Neuropathy has improved with time.  She has no persistent numbness but does have intermittent tenderness in the fingertips and toes.    Plan:  -Previously did not tolerate gabapentin and duloxetine did not make a difference  -She was referred to Dr. Verduzco in PM&R    4.   Chronic back  pain  5.   Chronic generalized arthralgias  She has chronic pain (back and joints), but this was exacerbated on immunotherapy.   Due to some increased neck and shoulder pain last year, a bone scan was done and negative for metastatic disease.  She's been off immunotherapy 12 months but there is some low risk (3%) for chronic arthritis following immunotherapy.    Plan:  -Stay active, was referred to Dr. Verduzco in PM&R but she never followed through on scheduling.  She is open to getting this scheduled, we will work on this with her  -Tylenol as needed  -Has been on hydrocodone and tramadol long-term.  Managed by PCP.    6.  Depression/anxiety, chronic but acutely worsened with cancer diagnosis/treatment  Manged with PCP.  She has been on a number of different antidepressants.  She has been most recently on Latuda (currently 40 mg) for the last 3 months which she thinks has been the most effective.  She was following with the oncology psychotherapist last summer but did not maintain follow-up and is interested in resuming.    She is feeling more depressed recently with the loss of her brother and biological father.  She also continues to struggle in her breast cancer survivorship with body image issues and financial stressors related to medical expenses.    Plan:  -Continue mgmt with PCP.  He is on Latuda 40 mg daily, she has been on this a few months.  She does think it has been helpful.  I am not sure if she can go up on her dose, encouraged her to update her PCP.  -She is open to returning for psychotherapy.  We will see if oncology psychotherapy has availability to resume management.  -Encouraged she stay engaged outside of her home with family/friends/activities.  -Encouraged physical activity    7.   Financial stressors  She limits appointments to avoid copayments due to limited income.  This is quite stressful for her.    Plan:  -Referral to oncology social worker  placed    ______________________________________________________________________________    History of Present Illness/ Interval History    Ms. Diana Salvador is a 58 year old who completed neoadjvuant treatment with pembrolizumab, carboplatin and taxol (weekly) x 3 cycles of this combo, complicated by grade 3 neuropathy (fingers) and neutropenia so chemo was stopped and she continued Pembro alone for a few more cycles. She then had bilateral mastectomies with pathologic CR. She completed 9 cycles of adjuvant Pembrolizumab every 3 weeks 1/2023 (1 yr ago). Now on surveillance. She met with Sloane Waddell in [facility] last visit. She missed her October visit, so this is a 6-mth interval visit.     Chronic low back pain and generalized arthralgias, predating her cancer/treatment.   For the last year she has had more right neck/right posterior shoulder pain. For the last 6 mths, she has also noted intermittent tingling in right pinky/ring finger and hand (mostly from elbow down). Bone scan last year negative for bone mets. MRI shoulder and Ortho referral was recommended by PCP, but she can't afford the co-pay so has deferred scheduling.   No evident signs of arm lymphedema.    Using hydrocodone or tramadol chronically.   Had been referred to Dr. Verduzco in PM&R but had never heard back on the scheduling of this.    Some visual acuity changes, needs eye doctor follow-up. No headaches.     Her neuropathy has continued to improve and it is not as painful as it used to be, but peristent. No more numbness. Fingers and toes are still intermittently sensitive.       She has struggled with chronic depression and anxiety, but exacerbated since her diagnosis.   Did meet with the oncology therapist for period of time last year, but missed an appt and never maintained follow-up.  On medication management through her PCP, has been on Latuda (x 3 mths) which she feels is the antidepressant that been the most helpful for her so far.  Using lorazepam as needed.  Over the last few months she lost a brother and her biological father, which has exacerbated her depression.  She still struggles with body image following her breast surgery and weight gain this year.  She stays pretty isolated as she does not want to leave her home.  No suicidal ideation.      ECOG Performance    0      Oncology History/Treatment  Diagnosis/Stage:   2/2022: Stage IIB (cT2-cN0-cM0) right breast cancer (triple negative) //ypT0-N0 (CR)  -self-palpated right breast lump  -diagnostic mammo + breast US: 2.4 cm R breast (2:00, lower-inner quad) lump and no axillary nodes detected  -breast biopsy: invasive ductal carcinoma, grade 3. ER neg, TX neg, HER2 neg via FISH  -CA 27.29 WNL (<5)  -Genetic testing negative for germline mutations    -6/30/2022 surgical path = CR after neoadjuvant chemo    Treatment:  3/15/2022 - 6/2022: Neoadjuvant chemo (based off [id number]) Pembrolizumab 200 mg D1 q 21 days + Carboplatin AUC=5 D1 q21 Days + weekly Paclitaxel 80 mg/m  D1, 8, 15  x 12 Weeks (with Filgrastim 5 mcg/kg subcutaneously once daily on days 16, 17, and 18 of cycles 1 through 4)  -->after cycle 3, developed grade 3 neuropathy and delayed neutropenia (despite using Neupogen). Therefore, chemo held with cycle 4 and only received Pembrolizumab through cycle 6.     6/30/2022: bilateral mastectomies with R sLN biopsy and expander placement.  (Elysia Marina + Gerson). Pathologic CR  -later underwent reconstruction surgery    No adjuvant RT recommended    7/22/22 - 1/12/2023: adjuvant Pembrolizumab x 9 additional cycles      Physical Exam    GENERAL: Alert and oriented to time place and person. Seated comfortably.  Alone.  Very tearful most the visit today.  HEAD: Atraumatic and normocephalic. No alopecia.  EYES: MELISSA, EOMI. No erythema. No icterus.  BREASTS: Implants.  No palpable chest wall masses.  LYMPH NODES: No cervical nor axillary adenopathy.  CHEST:  Symmetrical breath  movements bilaterally. Clear lung sounds bilaterally.  ABDOMEN: Soft. Not tender. Not distended. No hepatomegaly.  EXTREMITIES: Warm.  No extremity edema.  Reproducible pain along right upper/posterior shoulder (suprascapular), no reproducible pain along the right arm.  SKIN: No rashes  NEURO: No gross deficit noted. Non-antalgic gait.  Strength preserved bilateral upper extremities.      Lab Results    No results found for this or any previous visit (from the past 168 hour(s)).      Imaging    No results found.    Billing  Total time: 60 min; including review of EMR, reports, diagnostics and ordering/coordination of care    Signed by: Marilu Vergara NP

## 2024-01-03 NOTE — LETTER
1/3/2024         RE: Diana Salvador  [address] [place] [id number]        Dear Colleague,    Thank you for referring your patient, Diana Salvador, to the [facility] [place]. Please see a copy of my visit note below.    [facility] Hematology and Oncology Outpatient Progress Note ([place])    Patient: Diana Salvador  MRN: [id number]  Pavel 3, 2024            Reason for Visit    Stage IIB triple negative breast cancer    Primary Oncologist: formerly, Dr. Diamond    Assessment/Plan  1.   Stage IIB triple negative breast cancer, ypCR  2.   Right neck/shoulder pain with radicular/neuropathic features ALBERTINA Noble was diagnosed almost 2 years ago and completed all of her adjuvant treatment 1 yr ago. She is recovering generally well.     She has had 1 yr history of right neck/posterior shoulder pain with 6-mth history of intermittent numb/tingling in distal arm/hand (primarily pinky finger/ring finger). Bone scan last April negative for bone mets.     She still has her port in.      Plan:  -MRI cervical spine to exclude metastatic disease.  If benign, follow-up with PCP and/or Ortho for further evaluation which may include imaging of the shoulder as well  -Monitor CBC annually post chemo x 10 years. Repeat at next visit.  -No role for mammograms, post bilateral mastectomy  -Continue surveillance through history/exam.  We will transition to every 6-month visits to minimize her out-of-pocket co-pay expenses.  She will call if she has any new symptoms sooner.  -Can maintain routine surveillance at [facility] with me thereafter. Since Dr. Diamond has left the practice, will need to align with new primary MD over time/as needed (could see Dr. Lyons at [facility] whom she met before or new MD at [place] sites).  -Will need port flushes every 6-8 weeks until port is removed.  She can have her port removed anytime.    3.   Chemo-induced neuropathy, gr 1  Neuropathy has improved with time.   She has no persistent numbness but does have intermittent tenderness in the fingertips and toes.    Plan:  -Previously did not tolerate gabapentin and duloxetine did not make a difference  -She was referred to Dr. Verduzco in PM&R    4.   Chronic back pain  5.   Chronic generalized arthralgias  She has chronic pain (back and joints), but this was exacerbated on immunotherapy.   Due to some increased neck and shoulder pain last year, a bone scan was done and negative for metastatic disease.  She's been off immunotherapy 12 months but there is some low risk (3%) for chronic arthritis following immunotherapy.    Plan:  -Stay active, was referred to Dr. Verduzco in PM&R but she never followed through on scheduling.  She is open to getting this scheduled, we will work on this with her  -Tylenol as needed  -Has been on hydrocodone and tramadol long-term.  Managed by PCP.    6.  Depression/anxiety, chronic but acutely worsened with cancer diagnosis/treatment  Manged with PCP.  She has been on a number of different antidepressants.  She has been most recently on Latuda (currently 40 mg) for the last 3 months which she thinks has been the most effective.  She was following with the oncology psychotherapist last summer but did not maintain follow-up and is interested in resuming.    She is feeling more depressed recently with the loss of her brother and biological father.  She also continues to struggle in her breast cancer survivorship with body image issues and financial stressors related to medical expenses.    Plan:  -Continue mgmt with PCP.  He is on Latuda 40 mg daily, she has been on this a few months.  She does think it has been helpful.  I am not sure if she can go up on her dose, encouraged her to update her PCP.  -She is open to returning for psychotherapy.  We will see if oncology psychotherapy has availability to resume management.  -Encouraged she stay engaged outside of her home with  family/friends/activities.  -Encouraged physical activity    7.   Financial stressors  She limits appointments to avoid copayments due to limited income.  This is quite stressful for her.    Plan:  -Referral to oncology social worker placed    ______________________________________________________________________________    History of Present Illness/ Interval History    Ms. Diana Salvador is a 58 year old who completed neoadjvuant treatment with pembrolizumab, carboplatin and taxol (weekly) x 3 cycles of this combo, complicated by grade 3 neuropathy (fingers) and neutropenia so chemo was stopped and she continued Pembro alone for a few more cycles. She then had bilateral mastectomies with pathologic CR. She completed 9 cycles of adjuvant Pembrolizumab every 3 weeks 1/2023 (1 yr ago). Now on surveillance. She met with Sloane Waddell in Survivorship Clinic last visit. She missed her October visit, so this is a 6-mth interval visit.     Chronic low back pain and generalized arthralgias, predating her cancer/treatment.   For the last year she has had more right neck/right posterior shoulder pain. For the last 6 mths, she has also noted intermittent tingling in right pinky/ring finger and hand (mostly from elbow down). Bone scan last year negative for bone mets. MRI shoulder and Ortho referral was recommended by PCP, but she can't afford the co-pay so has deferred scheduling.   No evident signs of arm lymphedema.    Using hydrocodone or tramadol chronically.   Had been referred to Dr. Verduzco in PM&R but had never heard back on the scheduling of this.    Some visual acuity changes, needs eye doctor follow-up. No headaches.     Her neuropathy has continued to improve and it is not as painful as it used to be, but peristent. No more numbness. Fingers and toes are still intermittently sensitive.       She has struggled with chronic depression and anxiety, but exacerbated since her diagnosis.   Did meet with the oncology therapist  for period of time last year, but missed an appt and never maintained follow-up.  On medication management through her PCP, has been on Latuda (x 3 mths) which she feels is the antidepressant that been the most helpful for her so far. Using lorazepam as needed.  Over the last few months she lost a brother and her biological father, which has exacerbated her depression.  She still struggles with body image following her breast surgery and weight gain this year.  She stays pretty isolated as she does not want to leave her home.  No suicidal ideation.      ECOG Performance    0      Oncology History/Treatment  Diagnosis/Stage:   2/2022: Stage IIB (cT2-cN0-cM0) right breast cancer (triple negative) //ypT0-N0 (CR)  -self-palpated right breast lump  -diagnostic mammo + breast US: 2.4 cm R breast (2:00, lower-inner quad) lump and no axillary nodes detected  -breast biopsy: invasive ductal carcinoma, grade 3. ER neg, ND neg, HER2 neg via FISH  -CA 27.29 WNL (<5)  -Genetic testing negative for germline mutations    -6/30/2022 surgical path = CR after neoadjuvant chemo    Treatment:  3/15/2022 - 6/2022: Neoadjuvant chemo (based off [id number]) Pembrolizumab 200 mg D1 q 21 days + Carboplatin AUC=5 D1 q21 Days + weekly Paclitaxel 80 mg/m  D1, 8, 15  x 12 Weeks (with Filgrastim 5 mcg/kg subcutaneously once daily on days 16, 17, and 18 of cycles 1 through 4)  -->after cycle 3, developed grade 3 neuropathy and delayed neutropenia (despite using Neupogen). Therefore, chemo held with cycle 4 and only received Pembrolizumab through cycle 6.     6/30/2022: bilateral mastectomies with R sLN biopsy and expander placement.  (Elysia Marina + Gerson). Pathologic CR  -later underwent reconstruction surgery    No adjuvant RT recommended    7/22/22 - 1/12/2023: adjuvant Pembrolizumab x 9 additional cycles      Physical Exam    GENERAL: Alert and oriented to time place and person. Seated comfortably.  Alone.  Very tearful most the visit  "today.  HEAD: Atraumatic and normocephalic. No alopecia.  EYES: MELISSA, EOMI. No erythema. No icterus.  BREASTS: Implants.  No palpable chest wall masses.  LYMPH NODES: No cervical nor axillary adenopathy.  CHEST:  Symmetrical breath movements bilaterally. Clear lung sounds bilaterally.  ABDOMEN: Soft. Not tender. Not distended. No hepatomegaly.  EXTREMITIES: Warm.  No extremity edema.  Reproducible pain along right upper/posterior shoulder (suprascapular), no reproducible pain along the right arm.  SKIN: No rashes  NEURO: No gross deficit noted. Non-antalgic gait.  Strength preserved bilateral upper extremities.      Lab Results    No results found for this or any previous visit (from the past 168 hour(s)).      Imaging    No results found.    Billing  Total time: 60 min; including review of EMR, reports, diagnostics and ordering/coordination of care    Signed by: Marilu Vergara NP      Oncology Rooming Note    January 3, 2024 10:37 AM   Diana Salvador is a 58 year old female who presents for:    Chief Complaint   Patient presents with     Oncology Clinic Visit     Malignant neoplasm of upper-inner quadrant of right breast in female, estrogen receptor negative - Provider visit only     Initial Vitals: Ht 1.715 m (5' 7.5\")   Wt 97.1 kg (214 lb)   LMP  (LMP Unknown)   BMI 33.02 kg/m   Estimated body mass index is 33.02 kg/m  as calculated from the following:    Height as of this encounter: 1.715 m (5' 7.5\").    Weight as of this encounter: 97.1 kg (214 lb). Body surface area is 2.15 meters squared.  Data Unavailable Comment: Data Unavailable   No LMP recorded (lmp unknown). Patient has had a hysterectomy.  Allergies reviewed: Yes  Medications reviewed: Yes    Medications: Medication refills not needed today.  Pharmacy name entered into Seeo:    [id number] - [place], [place] - [address] AT [address] AND [facility]  [facility] - [facility], " [place] - [address] [facility] [address] [place] [address] [address] [facility]    Frailty Screening:   Is the patient here for a new oncology consult visit in cancer care? No      Clinical concerns: None    Emy Hollingsworth CMA                Again, thank you for allowing me to participate in the care of your patient.        Sincerely,        Marilu Vergara, NP

## 2024-01-03 NOTE — PROGRESS NOTES
"Oncology Rooming Note    January 3, 2024 10:37 AM   Diana Salvador is a 58 year old female who presents for:    Chief Complaint   Patient presents with    Oncology Clinic Visit     Malignant neoplasm of upper-inner quadrant of right breast in female, estrogen receptor negative - Provider visit only     Initial Vitals: Ht 1.715 m (5' 7.5\")   Wt 97.1 kg (214 lb)   LMP  (LMP Unknown)   BMI 33.02 kg/m   Estimated body mass index is 33.02 kg/m  as calculated from the following:    Height as of this encounter: 1.715 m (5' 7.5\").    Weight as of this encounter: 97.1 kg (214 lb). Body surface area is 2.15 meters squared.  Data Unavailable Comment: Data Unavailable   No LMP recorded (lmp unknown). Patient has had a hysterectomy.  Allergies reviewed: Yes  Medications reviewed: Yes    Medications: Medication refills not needed today.  Pharmacy name entered into Senseware:    [id number] - [place], [place] - [address] [facility] AT [address] AND [facility]  [facility] - [facility], [place] - [address] [facility] [id number] [place] [employer] - [place], [place] - [address] [place] [address]    Frailty Screening:   Is the patient here for a new oncology consult visit in cancer care? No      Clinical concerns: None    Emy Hollingsworth CMA              "

## 2024-01-03 NOTE — PATIENT INSTRUCTIONS
-Schedule cervical spine MRI - will call you with results  -Will ask our schedulers to follow-up with status of consult with Dr. Verduzco, Cancer Rehab  -Placed Oncology SW referral (finance support/resources)  -Will ask our schedulers to assist you in scheduling follow-up with Jeb (Oncology therapist) to resume  -Follow-up with Dr. Chu on antidepressant/depression mgmt  -If C spine MRI negative, follow-up with Dr. Chu on pain/pursuing other eval of shoulder  -Port removal anytime (could wait until after MRI results back just to be sure still no evidence of cancer). Flush every 6-8 weeks until out   -Return to see Marilu with labs in 6 mths. Call sooner with any new symptoms on concern

## 2024-01-04 ENCOUNTER — PATIENT OUTREACH (OUTPATIENT)
Dept: CARE COORDINATION | Facility: CLINIC | Age: 59
End: 2024-01-04
Payer: OTHER GOVERNMENT

## 2024-01-04 ENCOUNTER — TELEPHONE (OUTPATIENT)
Dept: ONCOLOGY | Facility: CLINIC | Age: 59
End: 2024-01-04
Payer: OTHER GOVERNMENT

## 2024-01-04 NOTE — TELEPHONE ENCOUNTER
Social Work - Distress Screen Intervention  [facility]    Identified Concern and Score from Distress Screenin. How concerned are you about your ability to eat? 0     2. How concerned are you about unintended weight loss or your current weight? (!) 10 (gaining possibly from new medication)     3. How concerned are you about feeling depressed or very sad?  (!) 10     4. How concerned are you about feeling anxious or very scared?  (!) 10     5. Do you struggle with the loss of meaning and khadra in your life?  Not at all     6. How concerned are you about work and home life issues that may be affected by your cancer?  6     7. How concerned are you about knowing what resources are available to help you?  0     8. Do you currently have what you would describe as Adventism or spiritual struggles? Not at all     9. If you want to be contacted by one of our professionals, I can send a message to them right now.  No data recorded     Date of Distress Screen: 1/3/24  Data: At time of last visit, patient scored positive on distress screening.  outreached to patient today to follow up on elevated distress and introduce psychosocial services and support. SW also received a referral for emotional support and resource coordination. Phone appt scheduled for 24 per pt's request.   Intervention/Education provided:  connected with Aide. She is experiencing high levels of anxiety and struggling to leave her home. She is also very worried about her finances and puts off care/therapy at times due to cost. Asked questions to build trust and rapport. Aide shared that her   5 years ago. She has two adult sons and two grandkids. She also has a supportive boyfriend. She is grieving the loss of who she was before cancer as she has changed physically, mentally and emotionally. SW provided active listening, validation of feelings, reassurance and emotional support. She has an appt  scheduled with Wilfrid Mendez, Onc therapist next week. Aide will call YOEL after that appt re scheduling an appt with YOEL as she found conversation very helpful.     Follow-up Required:   Aide will reach out to YOEL in 1-2 weeks re phone call support and/or meeting in [place]. YOEL confirmed Aide has contact information.     JOSE ANTONIO Brewer, [facility]  Adult Oncology Clinics  [place] (M,W), [place] (T) & [place] (Th)  *I am off Friday  Office: [phone]

## 2024-01-04 NOTE — PROGRESS NOTES
CLINICAL NUTRITION SERVICES     Reason for Contact: Questions from Oncology Distress Screening  1. How concerned are you about your ability to eat? :  0  2. How concerned are you about unintended weight loss or your current weight? : 10- gaining possibly from new medication     Action: RD called patient indicating reason for phone call. Left a VM with a return call back number.     Follow up: Wait for a return phone call.    Marjorie Rios RD, LD  [place] office [phone]

## 2024-01-09 ENCOUNTER — VIRTUAL VISIT (OUTPATIENT)
Dept: ONCOLOGY | Facility: HOSPITAL | Age: 59
End: 2024-01-09
Payer: OTHER GOVERNMENT

## 2024-01-09 DIAGNOSIS — F43.23 ADJUSTMENT DISORDER WITH MIXED ANXIETY AND DEPRESSED MOOD: Primary | ICD-10-CM

## 2024-01-09 PROCEDURE — 90837 PSYTX W PT 60 MINUTES: CPT | Mod: 95 | Performed by: PSYCHOLOGIST

## 2024-01-09 NOTE — PROGRESS NOTES
[facility] Oncology- Psychotherapy                                    Progress Note    Patient Name: Diana Salvador  Date: 2024       Service Type: Individual      Session Start Time:   Session End Time: 1550     Session Length: 53 mins    Session #: 1    Attendees: Client attended alone    Service Modality:  Video Visit:      Provider verified identity through the following two step process.  Patient provided:  Patient     Telemedicine Visit: The patient's condition can be safely assessed and treated via synchronous audio and visual telemedicine encounter.      Reason for Telemedicine Visit: Patient has requested telehealth visit    Originating Site (Patient Location): Patient's home    Distant Site (Provider Location): [facility] [place]    Consent:  The patient/guardian has verbally consented to: the potential risks and benefits of telemedicine (video visit) versus in person care; bill my insurance or make self-payment for services provided; and responsibility for payment of non-covered services.     Patient would like the video invitation sent by:  My Chart    Mode of Communication:  Video Conference via Amwell    Distant Location (Provider):  On-site    As the provider I attest to compliance with applicable laws and regulations related to telemedicine.    DATA  Interactive Complexity: No  Crisis: No        Progress Since Last Session (Related to Symptoms / Goals / Homework):   Symptoms:  Pt is dysphoric, sad, and depressed. Pt reports racing thoughts. She has a hard time organizing her tasks.      Pt reports she has pain and is taking pain meds.      Pt reports she has no energy.      Pt reports she has no energy.      Pt reports she has panic attacks daily when collection agencies call.      Pt reports she is sleeping adequately. She eats once per day.      Pt reports she has arthritis in her back and has been dx with scoliosis.      Pt reports she gets angry at  "herself for not functioning at the level she would like.       Homework:  None        Episode of Care Goals: Satisfactory progress - ACTION (Actively working towards change); Intervened by reinforcing change plan / affirming steps taken       Current / Ongoing Stressors and Concerns:   Pt reports she is doing a craft she has not done before.     Pt has a little dog she walks.     Pt reports she watches the history channel.      Pt reports she helps out with her grandson so her daughter in law can work.      Pt reports she cannot keep up with medical bills. She has been sent to collections.      Pt denies S/I.      Pt reports she has disability from work and social security disability.      Pt reports her breast reconstruction looks differently than original which is upsetting.        Social Hx:   Pt has been dealing with cancer two years. She goes to the [facility].     Pt reports she lives alone. Pt was  until her spouse  7 years ago after 31 years together. He was in an MVA in a 4 angel and later had blood clots that led to a pulmonary embolism.      Pt reports she has two sons and a daughter in law, and grandchildren.      Son 37 \"Christopher\" he is has PTSD from the [email or web address]. He is not getting help. He denies an issue.      Son 35 \"Michael\" is  and they have a boy 2 and a girl 8.      Both live 9 miles away together.      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 3 years ago and was dx with cancer two years ago.      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      Pt reports her mother  8 mos after her spouse from CHF., her father lives in [place] where she grew up.        Treatment Objective(s) Addressed in This Session:   identify medical stressors which contribute to feelings of depression  And anxiety.     Intervention:   CBT: ,  Emotion Focused Therapy: ,  Solution Focused: " ,    Assessments completed prior to visit:  None       ASSESSMENT: Current Emotional / Mental Status (status of significant symptoms):   Risk status (Self / Other harm or suicidal ideation)   Patient denies current fears or concerns for personal safety.   Patient denies current or recent suicidal ideation or behaviors.   Patient denies current or recent homicidal ideation or behaviors.   Patient denies current or recent self injurious behavior or ideation.   Patient denies other safety concerns.   Patient reports there has been no change in risk factors since their last session.     Patient reports there has been no change in protective factors since their last session.     Recommended that patient call 911 or go to the local ED should there be a change in any of these risk factors.     Appearance:   Appropriate    Eye Contact:   Good    Psychomotor Behavior: Normal    Attitude:   Cooperative    Orientation:   All   Speech    Rate / Production: Normal     Volume:  Normal    Mood:    Anxious  Depressed  Sad    Affect:    Labile    Thought Content:  Clear    Thought Form:  Coherent  Logical    Insight:    Good      Medication Review:   No changes to current psychiatric medication(s)     Medication Compliance:   Yes     Changes in Health Issues:   Yes: cancer, Associated Psychological Distress     Chemical Use Review:   Substance Use: Chemical use reviewed, no active concerns identified     Diagnosis:  F43.23 Adjustment D/O with anxiety and depressive sx.     Collateral Reports Completed:   Not Applicable    PLAN: (Patient Tasks / Therapist Tasks / Other)  Return in two weeks        Magan Mendez LP                                                         ______________________________________________________________________    Individual Treatment Plan    Patient's Name: Diana Salvador  YOB: 1965    Date of Creation: 1/9/2024    Date Treatment Plan Last Reviewed/Revised: 1/9/2024      DSM5  Diagnoses: F43.23 Adjustment D/O with anxiety and depressive sx.   Psychosocial / Contextual Factors: Cancer dx, arthritis.   PROMIS (reviewed every 90 days):     Referral / Collaboration:  Referral to another professional/service is not indicated at this time..    Anticipated number of session for this episode of care: 9-12 sessions  Anticipation frequency of session: Biweekly  Anticipated Duration of each session: 53 or more minutes  Treatment plan will be reviewed in 90 days or when goals have been changed.       MeasurableTreatment Goal(s) related to diagnosis / functional impairment(s)  Goal 1: Patient will reduce anxiety and depressive sx.       Objective #A (Patient Action)    Patient will identify medical stressors which contribute to feelings of depression.  Status: New - Date: 1/9/24      Intervention(s)  Therapist will teach distraction skills.   .    Objective #B  Patient will identify medical stressors which contribute to feelings of depression and anxiety  Status: New - Date: 1/9/24      Intervention(s)  Therapist will teach emotional recognition/identification.   .    Objective #C  Patient will identify medical stressors which contribute to feelings of depression.and anxiety  Status: New - Date: 1/9/24      Intervention(s)  Therapist will teach emotional regulation skills.   .        Patient has reviewed and agreed to the above plan.      Magan Mendez LP  January 9, 2024

## 2024-01-13 ENCOUNTER — HOSPITAL ENCOUNTER (OUTPATIENT)
Dept: MRI IMAGING | Facility: CLINIC | Age: 59
Discharge: HOME | End: 2024-01-13
Attending: NURSE PRACTITIONER | Admitting: NURSE PRACTITIONER
Payer: OTHER GOVERNMENT

## 2024-01-13 DIAGNOSIS — M79.601 PAIN OF RIGHT UPPER EXTREMITY: ICD-10-CM

## 2024-01-13 DIAGNOSIS — R20.0 NUMBNESS AND TINGLING OF RIGHT HAND: ICD-10-CM

## 2024-01-13 DIAGNOSIS — R20.2 NUMBNESS AND TINGLING OF RIGHT HAND: ICD-10-CM

## 2024-01-13 PROCEDURE — 72156 MRI NECK SPINE W/O & W/DYE: CPT

## 2024-01-13 PROCEDURE — 255N000002 HC RX 255 OP 636: Performed by: NURSE PRACTITIONER

## 2024-01-13 PROCEDURE — A9585 GADOBUTROL INJECTION: HCPCS | Performed by: NURSE PRACTITIONER

## 2024-01-13 RX ORDER — GADOBUTROL 604.72 MG/ML
9.5 INJECTION INTRAVENOUS ONCE
Status: COMPLETED | OUTPATIENT
Start: 2024-01-13 | End: 2024-01-13

## 2024-01-13 RX ADMIN — GADOBUTROL 9.5 ML: 604.72 INJECTION INTRAVENOUS at 09:03

## 2024-01-16 NOTE — RESULT ENCOUNTER NOTE
Spoke with pt regarding NP Marilu Vergara's note of benign cervical spine apart from stenosis that could be causing the right arm pain. Pt is following up with her PCP regarding spine on Tuesday.    LACY ADAMS RN on 1/16/2024 at 4:21 PM

## 2024-01-17 ENCOUNTER — MYC REFILL (OUTPATIENT)
Dept: FAMILY MEDICINE | Facility: CLINIC | Age: 59
End: 2024-01-17
Payer: OTHER GOVERNMENT

## 2024-01-17 DIAGNOSIS — M54.31 SCIATICA OF RIGHT SIDE: ICD-10-CM

## 2024-01-17 DIAGNOSIS — F11.20 OPIOID TYPE DEPENDENCE, CONTINUOUS USE (H): ICD-10-CM

## 2024-01-19 RX ORDER — TRAMADOL HYDROCHLORIDE 50 MG/1
50 TABLET ORAL EVERY 6 HOURS PRN
Qty: 120 TABLET | Refills: 0 | Status: SHIPPED | OUTPATIENT
Start: 2024-01-20 | End: 2024-05-31

## 2024-01-19 RX ORDER — HYDROCODONE BITARTRATE AND ACETAMINOPHEN 5; 325 MG/1; MG/1
1 TABLET ORAL 2 TIMES DAILY PRN
Qty: 60 TABLET | Refills: 0 | Status: SHIPPED | OUTPATIENT
Start: 2024-01-20 | End: 2024-02-20

## 2024-01-23 ENCOUNTER — VIRTUAL VISIT (OUTPATIENT)
Dept: FAMILY MEDICINE | Facility: CLINIC | Age: 59
End: 2024-01-23
Payer: OTHER GOVERNMENT

## 2024-01-23 DIAGNOSIS — F11.20 OPIOID TYPE DEPENDENCE, CONTINUOUS USE (H): ICD-10-CM

## 2024-01-23 DIAGNOSIS — G25.81 RESTLESS LEGS SYNDROME: ICD-10-CM

## 2024-01-23 DIAGNOSIS — M54.12 RIGHT CERVICAL RADICULOPATHY: Primary | ICD-10-CM

## 2024-01-23 DIAGNOSIS — F11.90 CHRONIC, CONTINUOUS USE OF OPIOIDS: ICD-10-CM

## 2024-01-23 DIAGNOSIS — E87.6 HYPOKALEMIA: ICD-10-CM

## 2024-01-23 DIAGNOSIS — M48.02 FORAMINAL STENOSIS OF CERVICAL REGION: ICD-10-CM

## 2024-01-23 DIAGNOSIS — F33.1 MODERATE EPISODE OF RECURRENT MAJOR DEPRESSIVE DISORDER (H): ICD-10-CM

## 2024-01-23 DIAGNOSIS — M54.31 SCIATICA OF RIGHT SIDE: ICD-10-CM

## 2024-01-23 DIAGNOSIS — F41.9 ANXIETY: ICD-10-CM

## 2024-01-23 DIAGNOSIS — F41.8 SITUATIONAL ANXIETY: ICD-10-CM

## 2024-01-23 PROCEDURE — 96127 BRIEF EMOTIONAL/BEHAV ASSMT: CPT | Mod: 95 | Performed by: FAMILY MEDICINE

## 2024-01-23 PROCEDURE — 99214 OFFICE O/P EST MOD 30 MIN: CPT | Mod: 95 | Performed by: FAMILY MEDICINE

## 2024-01-23 RX ORDER — TRAMADOL HYDROCHLORIDE 50 MG/1
50 TABLET ORAL EVERY 6 HOURS PRN
Qty: 120 TABLET | Refills: 0 | Status: SHIPPED | OUTPATIENT
Start: 2024-03-19 | End: 2024-04-24

## 2024-01-23 RX ORDER — LURASIDONE HYDROCHLORIDE 40 MG/1
40 TABLET, FILM COATED ORAL DAILY
Qty: 90 TABLET | Refills: 1 | Status: SHIPPED | OUTPATIENT
Start: 2024-01-23 | End: 2024-04-24

## 2024-01-23 RX ORDER — ROPINIROLE 1 MG/1
1 TABLET, FILM COATED ORAL AT BEDTIME
Qty: 90 TABLET | Refills: 1 | Status: SHIPPED | OUTPATIENT
Start: 2024-01-23 | End: 2024-05-31

## 2024-01-23 RX ORDER — LORAZEPAM 0.5 MG/1
.5-1 TABLET ORAL EVERY 8 HOURS PRN
Qty: 60 TABLET | Refills: 1 | Status: SHIPPED | OUTPATIENT
Start: 2024-01-23 | End: 2024-02-20

## 2024-01-23 RX ORDER — TRAMADOL HYDROCHLORIDE 50 MG/1
50 TABLET ORAL EVERY 6 HOURS PRN
Qty: 120 TABLET | Refills: 0 | Status: SHIPPED | OUTPATIENT
Start: 2024-02-18 | End: 2024-03-19

## 2024-01-23 RX ORDER — METHYLPREDNISOLONE 4 MG
TABLET, DOSE PACK ORAL
Qty: 21 TABLET | Refills: 0 | Status: SHIPPED | OUTPATIENT
Start: 2024-01-23 | End: 2024-01-29

## 2024-01-23 RX ORDER — POTASSIUM CHLORIDE 1500 MG/1
20 TABLET, EXTENDED RELEASE ORAL DAILY
Qty: 90 TABLET | Refills: 3 | Status: SHIPPED | OUTPATIENT
Start: 2024-01-23

## 2024-01-23 ASSESSMENT — ANXIETY QUESTIONNAIRES
GAD7 TOTAL SCORE: 15
IF YOU CHECKED OFF ANY PROBLEMS ON THIS QUESTIONNAIRE, HOW DIFFICULT HAVE THESE PROBLEMS MADE IT FOR YOU TO DO YOUR WORK, TAKE CARE OF THINGS AT HOME, OR GET ALONG WITH OTHER PEOPLE: SOMEWHAT DIFFICULT
4. TROUBLE RELAXING: NEARLY EVERY DAY
GAD7 TOTAL SCORE: 15
1. FEELING NERVOUS, ANXIOUS, OR ON EDGE: SEVERAL DAYS
2. NOT BEING ABLE TO STOP OR CONTROL WORRYING: NEARLY EVERY DAY
6. BECOMING EASILY ANNOYED OR IRRITABLE: NEARLY EVERY DAY
8. IF YOU CHECKED OFF ANY PROBLEMS, HOW DIFFICULT HAVE THESE MADE IT FOR YOU TO DO YOUR WORK, TAKE CARE OF THINGS AT HOME, OR GET ALONG WITH OTHER PEOPLE?: SOMEWHAT DIFFICULT
3. WORRYING TOO MUCH ABOUT DIFFERENT THINGS: NEARLY EVERY DAY
7. FEELING AFRAID AS IF SOMETHING AWFUL MIGHT HAPPEN: SEVERAL DAYS
7. FEELING AFRAID AS IF SOMETHING AWFUL MIGHT HAPPEN: SEVERAL DAYS
GAD7 TOTAL SCORE: 15
5. BEING SO RESTLESS THAT IT IS HARD TO SIT STILL: SEVERAL DAYS

## 2024-01-23 ASSESSMENT — PATIENT HEALTH QUESTIONNAIRE - PHQ9
SUM OF ALL RESPONSES TO PHQ QUESTIONS 1-9: 11
SUM OF ALL RESPONSES TO PHQ QUESTIONS 1-9: 11
10. IF YOU CHECKED OFF ANY PROBLEMS, HOW DIFFICULT HAVE THESE PROBLEMS MADE IT FOR YOU TO DO YOUR WORK, TAKE CARE OF THINGS AT HOME, OR GET ALONG WITH OTHER PEOPLE: VERY DIFFICULT

## 2024-01-23 ASSESSMENT — ENCOUNTER SYMPTOMS
NERVOUS/ANXIOUS: 1
BACK PAIN: 1

## 2024-01-23 NOTE — PATIENT INSTRUCTIONS
Try the Medrol dose penny to calm down the nerve and the arthritis    Spine clinic will call you to schedule.

## 2024-01-23 NOTE — PROGRESS NOTES
Aide is a 58 year old who is being evaluated via a billable video visit.      How would you like to obtain your AVS? MyChart  If the video visit is dropped, the invitation should be resent by: Text to cell phone: [phone]  Will anyone else be joining your video visit? No          Assessment & Plan     Right cervical radiculopathy  &  Foraminal stenosis of cervical region  Worsening symptom  - Spine  Referral; Future  - methylPREDNISolone (MEDROL DOSEPAK) 4 MG tablet therapy pack; Follow Package Directions    Anxiety  Not fully controlled, but improving.  - lurasidone (LATUDA) 40 MG TABS tablet; Take 1 tablet (40 mg) by mouth daily With food (350+soraya)    Moderate episode of recurrent major depressive disorder (H)  Improving, continue current medication.  - lurasidone (LATUDA) 40 MG TABS tablet; Take 1 tablet (40 mg) by mouth daily With food (350+soraya)    Situational anxiety  Still high at times  - LORazepam (ATIVAN) 0.5 MG tablet; Take 1-2 tablets (0.5-1 mg) by mouth every 8 hours as needed for anxiety    Restless legs syndrome  Stable, refill  - rOPINIRole (REQUIP) 1 MG tablet; Take 1 tablet (1 mg) by mouth at bedtime    Hypokalemia  Stable, refill  - potassium chloride ER (K-TAB) 20 MEQ CR tablet; Take 1 tablet (20 mEq) by mouth daily    Chronic, continuous use of opioids  Stable, refill  - traMADol (ULTRAM) 50 MG tablet; Take 1 tablet (50 mg) by mouth every 6 hours as needed for severe pain Schedule in clinic follow up.    Sciatica of right side  Stable, refill  - traMADol (ULTRAM) 50 MG tablet; Take 1 tablet (50 mg) by mouth every 6 hours as needed for severe pain Schedule in clinic follow up.  - traMADol (ULTRAM) 50 MG tablet; Take 1 tablet (50 mg) by mouth every 6 hours as needed for severe pain    Opioid type dependence, continuous use (H)  Stable, refill  - traMADol (ULTRAM) 50 MG tablet; Take 1 tablet (50 mg) by mouth every 6 hours as needed for severe pain          Depression Screening Follow  Up        2024    12:44 PM   PHQ   PHQ-9 Total Score 11   Q9: Thoughts of better off dead/self-harm past 2 weeks Not at all         Follow Up Actions Taken  Crisis resource information provided in After Visit Summary     See Patient Instructions    Stephie Nolbe is a 58 year old, presenting for the following health issues:  Recheck Medication, Anxiety (Follow up), and Back Pain (Follow up)        2024    12:48 PM   Additional Questions   Roomed by Britney PENNY MA   Accompanied by Self         2024    12:48 PM   Patient Reported Additional Medications   Patient reports taking the following new medications None     Anxiety    Back Pain          Medication Followup of Ropinerole 1mg, potassium chloride ER 20MEQ, lurasidone 40mg, lorazepam 0.5mg,   Taking Medication as prescribed: yes  Side Effects:  None, some muna gain  Medication Helping Symptoms:  yes      Right neck and shoulder and arm pain for months, shoulder and arm pain are worse in the last few months. Right fourth finger and pinkie finger numbness for a month.  Right handed.    Ibuprofen 400mg in the morning with food.    Anxiety Follow-Up  How are you doing with your anxiety since your last visit? No change  Are you having other symptoms that might be associated with anxiety? Yes:  panic attacks with leaving home.  Have you had a significant life event? Health Concerns   Are you feeling depressed? Yes:  bio dad just     Do you have any concerns with your use of alcohol or other drugs? No  Lorazepam 0.5mg twice a week. Usually when going out of the house.  Latuda overall mood has improved with this medication. Somewhat helpful with anxiety.  Therapy: Magan Navarrete  : who is a therapist too. Tanisha Soto  Social History     Tobacco Use    Smoking status: Former     Packs/day: 0.00     Years: 5.00     Additional pack years: 0.00     Total pack years: 0.00     Types: Cigarettes     Start date: 1982     Quit date: 1984      Years since quittin.0    Smokeless tobacco: Never   Vaping Use    Vaping Use: Never used   Substance Use Topics    Alcohol use: Yes     Comment: occ    Drug use: No         10/3/2023    12:56 PM 10/31/2023     5:51 AM 2024     1:00 PM   SHADE-7 SCORE   Total Score 13 (moderate anxiety) 17 (severe anxiety) 15 (severe anxiety)   Total Score 13 17 15         10/3/2023    12:58 PM 10/31/2023     5:50 AM 2024    12:44 PM   PHQ   PHQ-9 Total Score 14 13 11   Q9: Thoughts of better off dead/self-harm past 2 weeks Not at all Not at all Not at all               Objective    Vitals - Patient Reported  Pain Score: Severe Pain (7)  Pain Loc: Low Back        Physical Exam   GENERAL: alert and no distress  EYES: Eyes grossly normal to inspection.  No discharge or erythema, or obvious scleral/conjunctival abnormalities.  RESP: No audible wheeze, cough, or visible cyanosis.    SKIN: Visible skin clear. No significant rash, abnormal pigmentation or lesions.  NEURO: Cranial nerves grossly intact.  Mentation and speech appropriate for age.  PSYCH: Appropriate affect, tone, and pace of words    Reviewed Cervical MRI      Video-Visit Details    Type of service:  Video Visit     Originating Location (pt. Location): Home    Distant Location (provider location):  On-site  Platform used for Video Visit: [facility]  Signed Electronically by: Quoc Chu MD    Answers submitted by the patient for this visit:  Patient Health Questionnaire (Submitted on 2024)  If you checked off any problems, how difficult have these problems made it for you to do your work, take care of things at home, or get along with other people?: Very difficult  PHQ9 TOTAL SCORE: 11

## 2024-01-29 ENCOUNTER — OFFICE VISIT (OUTPATIENT)
Dept: PHYSICAL MEDICINE AND REHAB | Facility: CLINIC | Age: 59
End: 2024-01-29
Payer: OTHER GOVERNMENT

## 2024-01-29 VITALS
HEART RATE: 79 BPM | WEIGHT: 216.4 LBS | BODY MASS INDEX: 32.8 KG/M2 | HEIGHT: 68 IN | SYSTOLIC BLOOD PRESSURE: 150 MMHG | DIASTOLIC BLOOD PRESSURE: 73 MMHG

## 2024-01-29 DIAGNOSIS — M48.061 SPINAL STENOSIS OF LUMBAR REGION WITHOUT NEUROGENIC CLAUDICATION: ICD-10-CM

## 2024-01-29 DIAGNOSIS — M43.16 SPONDYLOLISTHESIS OF LUMBAR REGION: ICD-10-CM

## 2024-01-29 DIAGNOSIS — M54.16 RIGHT LUMBAR RADICULOPATHY: ICD-10-CM

## 2024-01-29 DIAGNOSIS — M54.41 CHRONIC RIGHT-SIDED LOW BACK PAIN WITH RIGHT-SIDED SCIATICA: ICD-10-CM

## 2024-01-29 DIAGNOSIS — M50.30 DDD (DEGENERATIVE DISC DISEASE), CERVICAL: ICD-10-CM

## 2024-01-29 DIAGNOSIS — M54.12 RIGHT CERVICAL RADICULOPATHY: Primary | ICD-10-CM

## 2024-01-29 DIAGNOSIS — M48.061 LUMBAR FORAMINAL STENOSIS: ICD-10-CM

## 2024-01-29 DIAGNOSIS — M48.02 CERVICAL STENOSIS OF SPINAL CANAL: ICD-10-CM

## 2024-01-29 DIAGNOSIS — Z98.890 HISTORY OF DECOMPRESSION OF ULNAR NERVE: ICD-10-CM

## 2024-01-29 DIAGNOSIS — M48.02 FORAMINAL STENOSIS OF CERVICAL REGION: ICD-10-CM

## 2024-01-29 DIAGNOSIS — G89.29 CHRONIC RIGHT-SIDED LOW BACK PAIN WITH RIGHT-SIDED SCIATICA: ICD-10-CM

## 2024-01-29 PROCEDURE — 99204 OFFICE O/P NEW MOD 45 MIN: CPT | Performed by: NURSE PRACTITIONER

## 2024-01-29 RX ORDER — DIAZEPAM 5 MG
TABLET ORAL
Qty: 1 TABLET | Refills: 0 | Status: SHIPPED | OUTPATIENT
Start: 2024-01-29 | End: 2024-05-31

## 2024-01-29 ASSESSMENT — PAIN SCALES - GENERAL: PAINLEVEL: SEVERE PAIN (7)

## 2024-01-29 NOTE — PROGRESS NOTES
ASSESSMENT: Diana Salvador is a 58 year old female presents for consultation at the request of PCP Quoc Chu, with past medical history significant for osteopenia, restless leg syndrome, lower extremity edema, breast cancer, chemotherapy-induced neuropathy grade 3, situational anxiety, ulnar nerve release surgery 20 years ago who presents today for new patient evaluation of :    -Progressive right cervical radiculopathy.  Cervical spine MRI with severe right foraminal stenosis at C6-7.    -Progressive right low back pain with right lumbar radiculopathy L5 dermatomal pattern    Patient is neurologically intact on exam. No myelopathic or red flag symptoms.          1/2/2019     9:00 AM   OSWESTRY DISABILITY INDEX   Count 10   Sum 19   Oswestry Score (%) 38 %            Diagnoses and all orders for this visit:  Right cervical radiculopathy  -     Spine  Referral  -     Physical Therapy Referral; Future  -     Pain Interlaminar Epidural Strd Inj Cervical; Future  -     EMG; Future  -     diazepam (VALIUM) 5 MG tablet; Valium 5 mg p.o., take 1 tablet 2 hours prior to injection.  Must have .  DDD (degenerative disc disease), cervical  -     Physical Therapy Referral; Future  -     Pain Interlaminar Epidural Strd Inj Cervical; Future  Foraminal stenosis of cervical region  -     Spine  Referral  -     Physical Therapy Referral; Future  -     Pain Interlaminar Epidural Strd Inj Cervical; Future  Cervical stenosis of spinal canal  -     Physical Therapy Referral; Future  Chronic right-sided low back pain with right-sided sciatica  -     MR Lumbar Spine w/o Contrast; Future  -     XR Lumbar Spine G/E 4 Views; Future  -     Physical Therapy Referral; Future  Right lumbar radiculopathy  -     MR Lumbar Spine w/o Contrast; Future  -     XR Lumbar Spine G/E 4 Views; Future  -     Physical Therapy Referral; Future  -     EMG; Future  Spondylolisthesis of lumbar region  -     MR Lumbar Spine w/o  Contrast; Future  -     XR Lumbar Spine G/E 4 Views; Future  -     Physical Therapy Referral; Future  Lumbar foraminal stenosis  -     MR Lumbar Spine w/o Contrast; Future  -     Physical Therapy Referral; Future  Spinal stenosis of lumbar region without neurogenic claudication  -     MR Lumbar Spine w/o Contrast; Future  -     Physical Therapy Referral; Future  History of decompression of ulnar nerve  -     EMG; Future     PLAN:  Reviewed spine anatomy and disease process. Discussed diagnosis and treatment options with the patient today. A shared decision making model was used. The patient's values and choices were respected. The following represents what was discussed and decided upon by the provider and the patient.     -DIAGNOSTIC TESTS:  Images were personally reviewed and interpreted and explained to patient today using spine model.   --Ordered updated lumbar spine MRI to evaluate progressive lumbar radiculopathy.  --Ordered right upper and right lower extremity EMG to evaluate cervical and lumbar radiculopathy with numbness and tingling.  Patient does have a history of ulnar release surgery right upper extremity as well.  -- Cervical spine MRI 1/13/2024 with C6-7 chronic changes with mild spinal stenosis moderate left and severe right foraminal stenosis.  -- Lumbar spine MRI 2021 with L4-5 spondylolisthesis with facet arthropathy with moderate central stenosis with RIGHT disc protrusion right greater than left lateral recess stenosis and foraminal stenosis.    -PHYSICAL THERAPY: Referral placed to physical therapy in [place] to establish home exercises for cervical and lumbar radiculopathy.  Discussed the importance of core strengthening, ROM, stretching exercises with the patient and how each of these entities is important in decreasing pain.  Explained to the patient that the purpose of physical therapy is to teach the patient a home exercise program.  These exercises need to be performed every day in order  to decrease pain and prevent future occurrences of pain.  Likened it to brushing one's teeth.      -MEDICATIONS: No changes in medications today  Discussed multiple medication options today with patient. Discussed risks, side effects, and proper use of medications. Patient verbalized understanding.    -INTERVENTIONS: Order placed for C7-T1 interlaminar epidural steroid injection to see if we can get further relief of cervical radiculopathy.  She does have RIGHT foraminal stenosis at C6-7.  Discussed risks and benefits of injections with patient today.  --Discussed potentially trialing a right L4-5 and L5-S1 TFESI as well pending imaging review.  She would be open to this.  Again she does have spondylolisthesis L4-5 with L5 compression right greater than left.  --Recommend Valium prior to injection for preprocedure anxiety.Prescription sent to the pharmacy and patient is aware that provider will call before the injection to obtain consent.    -PATIENT EDUCATION: Total time of 46 minutes, on the day of service, spent with the patient, reviewing the chart, placing orders, and documenting.     -FOLLOW-UP:   Follow-up with the spine center for both injection and EMG with Dr. Doe and then 2 weeks postinjection for postinjection check-in and MRI review of the lumbar spine    Advised patient to call the Spine Center if symptoms worsen or you have problems controlling bladder and bowel function.   ______________________________________________________________________    SUBJECTIVE:   Diana Salvador  is a 58 year old female who presents today for new patient evaluation of neck pain that has been chronic in nature for many many years however patient is having progressive right-sided neck pain that radiates into the right upper extremity third fourth and fifth fingers with associated numbness and tingling.  She does report this has been worsening for the last couple of months and constant.  She does report that her neck  pain is an 8/10, 10 at its worst, 7 at its best.  She is also endorsing chronic low back pain that is also been progressive RIGHT that radiates into the right posterior lateral thigh with numbness and tingling into the right foot specifically the fifth digit.  She reports this is also been ongoing for years but worsening and she also reports that her low back is currently an 8/10.  Currently denies left arm or left leg symptoms.  Denies any recent trips or falls or balance changes.  Denies bowel or bladder loss control.    -Treatment to Date: No prior spinal surgery.  History ulnar nerve release surgery early 2000's.    No prior spinal injections.    -Medications:  Cyclobenzaprine  Acetaminophen    Hydrocodone prescribed 1/20/2024- 60 tablets given per PCP  Tramadol prescribed by PCP to be filled 3 2/18/2024, and/19/2024-120 tablets per PCP    Prior medications:  Medrol Dosepak prescribed 1/23/2024.  Gabapentin - side effects  Duloxetine - no benefit    Current Outpatient Medications   Medication    acetaminophen (TYLENOL) 325 MG tablet    diazepam (VALIUM) 5 MG tablet    diclofenac (VOLTAREN) 1 % topical gel    HYDROcodone-acetaminophen (NORCO) 5-325 MG tablet    LORazepam (ATIVAN) 0.5 MG tablet    [START ON 3/19/2024] traMADol (ULTRAM) 50 MG tablet    [START ON 2/18/2024] traMADol (ULTRAM) 50 MG tablet    traMADol (ULTRAM) 50 MG tablet    alendronate (FOSAMAX) 35 MG tablet    cyanocobalamin (CYANOCOBALAMIN) 1000 MCG/ML injection    cyclobenzaprine (FLEXERIL) 5 MG tablet    furosemide (LASIX) 20 MG tablet    lurasidone (LATUDA) 40 MG TABS tablet    omeprazole (PRILOSEC) 20 MG DR capsule    potassium chloride ER (K-TAB) 20 MEQ CR tablet    QUEtiapine (SEROQUEL) 25 MG tablet    rOPINIRole (REQUIP) 1 MG tablet    sucralfate (CARAFATE) 1 GM tablet    valACYclovir (VALTREX) 1000 mg tablet    VITAMIN D3 50 MCG (2000 UT) tablet     No current facility-administered medications for this visit.       No Known  Allergies    Past Medical History:   Diagnosis Date    Arthritis     Cancer (H) 2022    Cervical high risk HPV (human papillomavirus) test positive 03/15/2020    See problem list    Depressive disorder     During the death of spouse    Motion sickness         Patient Active Problem List   Diagnosis    Osteopenia of multiple sites    Restless legs syndrome    Lower extremity edema    Sciatica of right side    Chronic, continuous use of opioids    Situational anxiety    Cervical high risk HPV (human papillomavirus) test positive    Malignant neoplasm of upper-inner quadrant of right breast in female, estrogen receptor negative (triple-neg., germline test neg. for BRCA 1/2)    Encounter for antineoplastic chemotherapy    Chemotherapy-induced neutropenia  (H24)    Chemotherapy-induced neuropathy (grade 3, after 3 cycles of Carbo/Taxol/Keytruda)    S/P breast reconstruction, bilateral       Past Surgical History:   Procedure Laterality Date    ARTHROSCOPY SHOULDER ROTATOR CUFF REPAIR Right     ARTHROSCOPY SHOULDER ROTATOR CUFF REPAIR Left     BIOPSY       SECTION      CHOLECYSTECTOMY      COLONOSCOPY      Breast reconstruction    COSMETIC SURGERY  2022    CYST REMOVAL      on chest    DAVINCI BYPASS GASTRIC ROBERT-EN-Y      GENITOURINARY SURGERY  1988    Tubiligation    GI SURGERY      Gastric bypass    HYSTERECTOMY SUPRACERVICAL      INSERT PORT VASCULAR ACCESS Left 2022    Procedure: INSERTION, VASCULAR ACCESS PORT;  Surgeon: Baldemar Marina DO;  Location: [facility]    MASTECTOMY SIMPLE Left 2022    Procedure: MASTECTOMY, SIMPLE, LEFT;  Surgeon: Baldemar Marina DO;  Location: [facility]    MASTECTOMY SIMPLE, SENTINEL NODE, COMBINED Right 2022    Procedure: MASTECTOMY, SIMPLE, WITH SENTINEL LYMPH NODE DISSECTION, RIGHT;  Surgeon: Baldemar Marina DO;  Location: [facility]    PROCURE GRAFT FAT Bilateral 02/10/2023    Procedure: Bilateral breast fat grafting  "from abdomen possible thighs;  Surgeon: ROSEMARIE Nieto MD;  Location: [facility]    RECONSTRUCT BREAST, INSERT TISSUE EXPANDER BILATERAL, COMBINED Bilateral 06/30/2022    Procedure: RECONSTRUCTION, BREAST, BILATERAL, WITH TISSUE EXPANDER INSERTION;  Surgeon: ROSEMARIE Nieto MD;  Location: [facility]    REMOVE AND REPLACE BREAST IMPLANT PROSTHESIS Bilateral 10/07/2022    Procedure: Bilateral breast implant placement and revision;  Surgeon: ROSEMARIE Nieto MD;  Location: [facility] OR    right ulnar nerve repositioning surgery         Family History   Problem Relation Age of Onset    Heart Failure Mother     Hypertension Mother     Anxiety Disorder Mother     Osteoporosis Mother     Obesity Mother     Colon Cancer Maternal Grandmother     Breast Cancer Other        Reviewed past medical, surgical, and family history with patient found on new patient intake packet located in EMR Media tab.     SOCIAL HX: Patient is  and unemployed.  Patient denies smoking/tobacco use.  Does report drinking alcohol 1-2 times a month.  Denies history being a heavy drinker.  Denies recreational drug use.    ROS: Positive for muscle pain, joint pain, muscle fatigue, itching, insomnia, excessive tiredness, anxiety/depression, diarrhea/constipation, reflux.  Specifically negative for bowel/bladder dysfunction, balance changes, headache, dizziness, foot drop, fevers, chills, appetite changes, nausea/vomiting, unexplained weight loss. Otherwise 13 systems reviewed are negative. Please see the patient's intake questionnaire from today for details.    OBJECTIVE:  BP (!) 150/73 (BP Location: Left arm, Patient Position: Sitting, Cuff Size: Adult Regular)   Pulse 79   Ht 5' 7.5\" (1.715 m)   Wt 216 lb 6.4 oz (98.2 kg)   LMP  (LMP Unknown)   BMI 33.39 kg/m      PHYSICAL EXAMINATION:  --CONSTITUTIONAL: Vital signs as above. No acute distress. The patient is well nourished and well groomed.  --PSYCHIATRIC: The patient is awake, alert, " oriented to person, place, time and answering questions appropriately with clear speech. Appropriate mood and affect   --HEENT: Sclera are non-injected. Extraocular muscles are intact. Thyroid moves easily upon swallowing.  Moist oral mucosa.  --SKIN: Skin over the face, bilateral upper extremities, and posterior torso is clean, dry, intact without rashes.  --LYMPH NODES: No palpable or tender anterior/posterior cervical, submandibular, or supraclavicular lymph nodes.    --RESPIRATORY: Normal rhythm and effort. No abnormal accessory muscle breathing patterns noted.   --GROSS MOTOR: Easily arises from a seated position. Toe walking and heel walking are normal.    --CERVICAL SPINE: Inspection reveals no evidence of deformity. Range of motion is not limited in cervical flexion, extension, lateral rotation. No tenderness to palpation cervical spine.  Spurling maneuver n positive on the right.  --SHOULDERS: Full range of motion bilaterally.  --UPPER EXTREMITY MOTOR TESTING:  Wrist flexion left 5/5, right 5/5  Wrist extension left 5/5, right 5/5  Pronators left 5/5, right 5/5  Biceps left 5/5, right 5/5   Triceps left 5/5, right 5/5   Shoulder abduction left 5/5, right 5/5   left 5/5, right 5/5  --NEUROLOGIC: CN III-XII are grossly intact. 2/4 symmetric biceps, brachioradialis, triceps reflexes bilaterally. Sensation to upper extremities is intact.  Negative Pettit's bilaterally.    --VASCULAR: 2/4 radial pulses bilaterally. Warm upper limbs bilaterally. Capillary refill in the upper extremities is less than 1 second.    --STANDING EXAMINATION:  Normal lumbar lordosis noted, no lateral shift.  --MUSCULOSKELETAL: Lumbar spine inspection reveals no evidence of deformity. Range of motion is not limited in lumbar flexion, extension, lateral rotation. No tenderness to palpation. Straight leg raising is positive to radicular pain on the right. Sciatic notch non-tender.  --GROSS MOTOR: Gait is non-antalgic. Easily arises  from a seated position.   --LOWER EXTREMITY MOTOR TESTING:  Plantar flexion left 5/5, right 5/5   Dorsiflexion left 5/5, right 5/5   Great toe MTP extension left 5/5, right 5/5  Knee flexion left 5/5, right 5/5  Knee extension left 5/5, right 5/5   Hip flexion left 5/5, right 5/5  Hip abduction left 5/5, right 5/5  Hip adduction left 5/5, right 5/5   --HIPS: Full range of motion bilaterally.   --NEUROLOGICAL:  2/4 patellar, medial hamstring, and achilles reflexes bilaterally.  Sensation to light touch is intact in the bilateral L4, L5, and S1 dermatomes. Babinski is negative. No clonus.  --VASCULAR:  2/4 dorsalis pedis and posterior tibialsi pulses bilaterally.  Bilateral lower extremities are warm.  There is no pitting edema of the bilateral lower extremities.      RESULTS: Prior medical records from [facility] and [facility]where were reviewed today.     Imaging:  Spine imaging was personally reviewed and interpreted today. The images were shown to the patient and the findings were explained using a spine model.      Reviewed cervical spine MRI from 2024 and lumbar spine MRI from 2021.

## 2024-01-29 NOTE — LETTER
1/29/2024         RE: Diana Salvador  [address] [place] [id number]        Dear Colleague,    Thank you for referring your patient, Diana Salvador, to the [facility]. Please see a copy of my visit note below.    ASSESSMENT: Diana Salvador is a 58 year old female presents for consultation at the request of PCP Quoc Chu, with past medical history significant for osteopenia, restless leg syndrome, lower extremity edema, breast cancer, chemotherapy-induced neuropathy grade 3, situational anxiety, ulnar nerve release surgery 20 years ago who presents today for new patient evaluation of :    -Progressive right cervical radiculopathy.  Cervical spine MRI with severe right foraminal stenosis at C6-7.    -Progressive right low back pain with right lumbar radiculopathy L5 dermatomal pattern    Patient is neurologically intact on exam. No myelopathic or red flag symptoms.          1/2/2019     9:00 AM   OSWESTRY DISABILITY INDEX   Count 10   Sum 19   Oswestry Score (%) 38 %            Diagnoses and all orders for this visit:  Right cervical radiculopathy  -     Spine  Referral  -     Physical Therapy Referral; Future  -     Pain Interlaminar Epidural Strd Inj Cervical; Future  -     EMG; Future  -     diazepam (VALIUM) 5 MG tablet; Valium 5 mg p.o., take 1 tablet 2 hours prior to injection.  Must have .  DDD (degenerative disc disease), cervical  -     Physical Therapy Referral; Future  -     Pain Interlaminar Epidural Strd Inj Cervical; Future  Foraminal stenosis of cervical region  -     Spine  Referral  -     Physical Therapy Referral; Future  -     Pain Interlaminar Epidural Strd Inj Cervical; Future  Cervical stenosis of spinal canal  -     Physical Therapy Referral; Future  Chronic right-sided low back pain with right-sided sciatica  -     MR Lumbar Spine w/o Contrast; Future  -     XR Lumbar Spine G/E 4 Views; Future  -     Physical Therapy  Referral; Future  Right lumbar radiculopathy  -     MR Lumbar Spine w/o Contrast; Future  -     XR Lumbar Spine G/E 4 Views; Future  -     Physical Therapy Referral; Future  -     EMG; Future  Spondylolisthesis of lumbar region  -     MR Lumbar Spine w/o Contrast; Future  -     XR Lumbar Spine G/E 4 Views; Future  -     Physical Therapy Referral; Future  Lumbar foraminal stenosis  -     MR Lumbar Spine w/o Contrast; Future  -     Physical Therapy Referral; Future  Spinal stenosis of lumbar region without neurogenic claudication  -     MR Lumbar Spine w/o Contrast; Future  -     Physical Therapy Referral; Future  History of decompression of ulnar nerve  -     EMG; Future     PLAN:  Reviewed spine anatomy and disease process. Discussed diagnosis and treatment options with the patient today. A shared decision making model was used. The patient's values and choices were respected. The following represents what was discussed and decided upon by the provider and the patient.     -DIAGNOSTIC TESTS:  Images were personally reviewed and interpreted and explained to patient today using spine model.   --Ordered updated lumbar spine MRI to evaluate progressive lumbar radiculopathy.  --Ordered right upper and right lower extremity EMG to evaluate cervical and lumbar radiculopathy with numbness and tingling.  Patient does have a history of ulnar release surgery right upper extremity as well.  -- Cervical spine MRI 1/13/2024 with C6-7 chronic changes with mild spinal stenosis moderate left and severe right foraminal stenosis.  -- Lumbar spine MRI 2021 with L4-5 spondylolisthesis with facet arthropathy with moderate central stenosis with RIGHT disc protrusion right greater than left lateral recess stenosis and foraminal stenosis.    -PHYSICAL THERAPY: Referral placed to physical therapy in [place] to establish home exercises for cervical and lumbar radiculopathy.  Discussed the importance of core strengthening, ROM, stretching  exercises with the patient and how each of these entities is important in decreasing pain.  Explained to the patient that the purpose of physical therapy is to teach the patient a home exercise program.  These exercises need to be performed every day in order to decrease pain and prevent future occurrences of pain.  Likened it to brushing one's teeth.      -MEDICATIONS: No changes in medications today  Discussed multiple medication options today with patient. Discussed risks, side effects, and proper use of medications. Patient verbalized understanding.    -INTERVENTIONS: Order placed for C7-T1 interlaminar epidural steroid injection to see if we can get further relief of cervical radiculopathy.  She does have RIGHT foraminal stenosis at C6-7.  Discussed risks and benefits of injections with patient today.  --Discussed potentially trialing a right L4-5 and L5-S1 TFESI as well pending imaging review.  She would be open to this.  Again she does have spondylolisthesis L4-5 with L5 compression right greater than left.  --Recommend Valium prior to injection for preprocedure anxiety.Prescription sent to the pharmacy and patient is aware that provider will call before the injection to obtain consent.    -PATIENT EDUCATION: Total time of 46 minutes, on the day of service, spent with the patient, reviewing the chart, placing orders, and documenting.     -FOLLOW-UP:   Follow-up with the spine center for both injection and EMG with Dr. Doe and then 2 weeks postinjection for postinjection check-in and MRI review of the lumbar spine    Advised patient to call the Spine Center if symptoms worsen or you have problems controlling bladder and bowel function.   ______________________________________________________________________    SUBJECTIVE:   Diana Salvador  is a 58 year old female who presents today for new patient evaluation of neck pain that has been chronic in nature for many many years however patient is having  progressive right-sided neck pain that radiates into the right upper extremity third fourth and fifth fingers with associated numbness and tingling.  She does report this has been worsening for the last couple of months and constant.  She does report that her neck pain is an 8/10, 10 at its worst, 7 at its best.  She is also endorsing chronic low back pain that is also been progressive RIGHT that radiates into the right posterior lateral thigh with numbness and tingling into the right foot specifically the fifth digit.  She reports this is also been ongoing for years but worsening and she also reports that her low back is currently an 8/10.  Currently denies left arm or left leg symptoms.  Denies any recent trips or falls or balance changes.  Denies bowel or bladder loss control.    -Treatment to Date: No prior spinal surgery.  History ulnar nerve release surgery early 2000's.    No prior spinal injections.    -Medications:  Cyclobenzaprine  Acetaminophen    Hydrocodone prescribed 1/20/2024- 60 tablets given per PCP  Tramadol prescribed by PCP to be filled 3 2/18/2024, and/19/2024-120 tablets per PCP    Prior medications:  Medrol Dosepak prescribed 1/23/2024.  Gabapentin - side effects  Duloxetine - no benefit    Current Outpatient Medications   Medication     acetaminophen (TYLENOL) 325 MG tablet     diazepam (VALIUM) 5 MG tablet     diclofenac (VOLTAREN) 1 % topical gel     HYDROcodone-acetaminophen (NORCO) 5-325 MG tablet     LORazepam (ATIVAN) 0.5 MG tablet     [START ON 3/19/2024] traMADol (ULTRAM) 50 MG tablet     [START ON 2/18/2024] traMADol (ULTRAM) 50 MG tablet     traMADol (ULTRAM) 50 MG tablet     alendronate (FOSAMAX) 35 MG tablet     cyanocobalamin (CYANOCOBALAMIN) 1000 MCG/ML injection     cyclobenzaprine (FLEXERIL) 5 MG tablet     furosemide (LASIX) 20 MG tablet     lurasidone (LATUDA) 40 MG TABS tablet     omeprazole (PRILOSEC) 20 MG DR capsule     potassium chloride ER (K-TAB) 20 MEQ CR tablet      QUEtiapine (SEROQUEL) 25 MG tablet     rOPINIRole (REQUIP) 1 MG tablet     sucralfate (CARAFATE) 1 GM tablet     valACYclovir (VALTREX) 1000 mg tablet     VITAMIN D3 50 MCG (2000) tablet     No current facility-administered medications for this visit.       No Known Allergies    Past Medical History:   Diagnosis Date     Arthritis      Cancer (H) 2022     Cervical high risk HPV (human papillomavirus) test positive 03/15/2020    See problem list     Depressive disorder     During the death of spouse     Motion sickness         Patient Active Problem List   Diagnosis     Osteopenia of multiple sites     Restless legs syndrome     Lower extremity edema     Sciatica of right side     Chronic, continuous use of opioids     Situational anxiety     Cervical high risk HPV (human papillomavirus) test positive     Malignant neoplasm of upper-inner quadrant of right breast in female, estrogen receptor negative (triple-neg., germline test neg. for BRCA 1/2)     Encounter for antineoplastic chemotherapy     Chemotherapy-induced neutropenia  (H24)     Chemotherapy-induced neuropathy (grade 3, after 3 cycles of Carbo/Taxol/Keytruda)     S/P breast reconstruction, bilateral       Past Surgical History:   Procedure Laterality Date     ARTHROSCOPY SHOULDER ROTATOR CUFF REPAIR Right      ARTHROSCOPY SHOULDER ROTATOR CUFF REPAIR Left      BIOPSY        SECTION       CHOLECYSTECTOMY       COLONOSCOPY      Breast reconstruction     COSMETIC SURGERY  2022     CYST REMOVAL      on chest     DAVINCI BYPASS GASTRIC ROBERT-EN-Y       GENITOURINARY SURGERY  1988    Tubiligation     GI SURGERY      Gastric bypass     HYSTERECTOMY SUPRACERVICAL       INSERT PORT VASCULAR ACCESS Left 2022    Procedure: INSERTION, VASCULAR ACCESS PORT;  Surgeon: Baldemar Marina DO;  Location: [facility]     MASTECTOMY SIMPLE Left 2022    Procedure: MASTECTOMY, SIMPLE, LEFT;  Surgeon: Baldemar Marina DO;   Location: [facility]     MASTECTOMY SIMPLE, SENTINEL NODE, COMBINED Right 06/30/2022    Procedure: MASTECTOMY, SIMPLE, WITH SENTINEL LYMPH NODE DISSECTION, RIGHT;  Surgeon: Baldemar Marina DO;  Location: [facility]     PROCURE GRAFT FAT Bilateral 02/10/2023    Procedure: Bilateral breast fat grafting from abdomen possible thighs;  Surgeon: ROSEMARIE Nieto MD;  Location: [facility]     RECONSTRUCT BREAST, INSERT TISSUE EXPANDER BILATERAL, COMBINED Bilateral 06/30/2022    Procedure: RECONSTRUCTION, BREAST, BILATERAL, WITH TISSUE EXPANDER INSERTION;  Surgeon: ROSEMARIE Nieto MD;  Location: [facility]     REMOVE AND REPLACE BREAST IMPLANT PROSTHESIS Bilateral 10/07/2022    Procedure: Bilateral breast implant placement and revision;  Surgeon: ROSEMARIE Nieto MD;  Location: [facility]     right ulnar nerve repositioning surgery         Family History   Problem Relation Age of Onset     Heart Failure Mother      Hypertension Mother      Anxiety Disorder Mother      Osteoporosis Mother      Obesity Mother      Colon Cancer Maternal Grandmother      Breast Cancer Other        Reviewed past medical, surgical, and family history with patient found on new patient intake packet located in EMR Media tab.     SOCIAL HX: Patient is  and unemployed.  Patient denies smoking/tobacco use.  Does report drinking alcohol 1-2 times a month.  Denies history being a heavy drinker.  Denies recreational drug use.    ROS: Positive for muscle pain, joint pain, muscle fatigue, itching, insomnia, excessive tiredness, anxiety/depression, diarrhea/constipation, reflux.  Specifically negative for bowel/bladder dysfunction, balance changes, headache, dizziness, foot drop, fevers, chills, appetite changes, nausea/vomiting, unexplained weight loss. Otherwise 13 systems reviewed are negative. Please see the patient's intake questionnaire from today for details.    OBJECTIVE:  BP (!) 150/73 (BP Location: Left arm, Patient Position: Sitting,  "Cuff Size: Adult Regular)   Pulse 79   Ht 5' 7.5\" (1.715 m)   Wt 216 lb 6.4 oz (98.2 kg)   LMP  (LMP Unknown)   BMI 33.39 kg/m      PHYSICAL EXAMINATION:  --CONSTITUTIONAL: Vital signs as above. No acute distress. The patient is well nourished and well groomed.  --PSYCHIATRIC: The patient is awake, alert, oriented to person, place, time and answering questions appropriately with clear speech. Appropriate mood and affect   --HEENT: Sclera are non-injected. Extraocular muscles are intact. Thyroid moves easily upon swallowing.  Moist oral mucosa.  --SKIN: Skin over the face, bilateral upper extremities, and posterior torso is clean, dry, intact without rashes.  --LYMPH NODES: No palpable or tender anterior/posterior cervical, submandibular, or supraclavicular lymph nodes.    --RESPIRATORY: Normal rhythm and effort. No abnormal accessory muscle breathing patterns noted.   --GROSS MOTOR: Easily arises from a seated position. Toe walking and heel walking are normal.    --CERVICAL SPINE: Inspection reveals no evidence of deformity. Range of motion is not limited in cervical flexion, extension, lateral rotation. No tenderness to palpation cervical spine.  Spurling maneuver n positive on the right.  --SHOULDERS: Full range of motion bilaterally.  --UPPER EXTREMITY MOTOR TESTING:  Wrist flexion left 5/5, right 5/5  Wrist extension left 5/5, right 5/5  Pronators left 5/5, right 5/5  Biceps left 5/5, right 5/5   Triceps left 5/5, right 5/5   Shoulder abduction left 5/5, right 5/5   left 5/5, right 5/5  --NEUROLOGIC: CN III-XII are grossly intact. 2/4 symmetric biceps, brachioradialis, triceps reflexes bilaterally. Sensation to upper extremities is intact.  Negative Pettit's bilaterally.    --VASCULAR: 2/4 radial pulses bilaterally. Warm upper limbs bilaterally. Capillary refill in the upper extremities is less than 1 second.    --STANDING EXAMINATION:  Normal lumbar lordosis noted, no lateral " shift.  --MUSCULOSKELETAL: Lumbar spine inspection reveals no evidence of deformity. Range of motion is not limited in lumbar flexion, extension, lateral rotation. No tenderness to palpation. Straight leg raising is positive to radicular pain on the right. Sciatic notch non-tender.  --GROSS MOTOR: Gait is non-antalgic. Easily arises from a seated position.   --LOWER EXTREMITY MOTOR TESTING:  Plantar flexion left 5/5, right 5/5   Dorsiflexion left 5/5, right 5/5   Great toe MTP extension left 5/5, right 5/5  Knee flexion left 5/5, right 5/5  Knee extension left 5/5, right 5/5   Hip flexion left 5/5, right 5/5  Hip abduction left 5/5, right 5/5  Hip adduction left 5/5, right 5/5   --HIPS: Full range of motion bilaterally.   --NEUROLOGICAL:  2/4 patellar, medial hamstring, and achilles reflexes bilaterally.  Sensation to light touch is intact in the bilateral L4, L5, and S1 dermatomes. Babinski is negative. No clonus.  --VASCULAR:  2/4 dorsalis pedis and posterior tibialsi pulses bilaterally.  Bilateral lower extremities are warm.  There is no pitting edema of the bilateral lower extremities.      RESULTS: Prior medical records from [facility]where were reviewed today.     Imaging:  Spine imaging was personally reviewed and interpreted today. The images were shown to the patient and the findings were explained using a spine model.      Reviewed cervical spine MRI from 2024 and lumbar spine MRI from 2021.         Again, thank you for allowing me to participate in the care of your patient.        Sincerely,        Claire Cardoza, CNP

## 2024-01-29 NOTE — PATIENT INSTRUCTIONS
~Spine Center Scheduling #[phone].  ~Please call our [facility] Spine Nurse Navigation #[phone] with any questions or concerns about your treatment plan, if symptoms worsen and you would like to be seen urgently, or if you have problems controlling bladder and bowel function.  ~For any future flareups or new symptoms, recommend follow-up in clinic or contact the nurse navigator line.  ~Please note that any My Chart messages may take multiple days for a response due to the high volume of patients seen in clinic.  Anything sent Thursday night or after will be answered the following week when able, as Claire Cardoza CNP does not work in clinic on Fridays.   ~Claire Cardoza CNP is at the [facility] on the first and third Tuesdays of the month only, otherwise primarily at the [facility].          ~You have been referred for Physical Therapy to [facility]. They will call you to schedule an appointment.      Scheduling phone number is [phone] for [facility] [place], [place], or [place] location.  If you have not heard from the scheduling office within 2 business days, please call [phone] for ALL other locations.    Discussed the importance of core strengthening, ROM, stretching exercises and how each of these entities is important in decreasing pain and improving long term spine health.  The purpose of physical therapy is to teach you an individualized home exercise program.  These exercises need to be performed every day in order to decrease pain and prevent future occurrences of pain.           Imaging has been ordered. Radiology will call you to schedule. Please call below if you do not hear from them in the next couple of days.     [facility] Radiology Scheduling    Please call [phone] to schedule your image(s) (select option #1). There are 3 different locations, see below.     [facility]  [address]  [facility] [address]    [facility]  [address]   [facility] [address]    [facility]  [address] [id number]       An injection has been ordered today to potentially help with your pain symptoms. These injections do not fix what is going on in your back, therefore they typically do not take away the pain completely, however they can many times help improve symptoms. Injections should always be completed along with other modalities such as physical therapy for the best long term outcomes. If injections alone are done, then pain will likely return.     [facility] Injection Requirements    A  is required for all fluoroscopically-guided injections.  Injection appointments may be cancelled if there are signs/symptoms of an active infection or if the patient is being actively treated with antibiotics for a diagnosed infection.  Patients may have their steroid injection cancelled if they have had another steroid injection within 2 weeks.  Diabetic patients will have their blood glucose levels checked the day of their injection and the appointment will be rescheduled if the blood glucose level is 300 or higher.  Patients with allergies to cortisone, local anesthetics, iodine, or contrast dye should contact the Spine Center to further discuss these considerations.  Patients scheduled for medial branch block diagnostic injections should refrain from taking pain medication the day of the procedure.  The medial branch block injection appointment will be rescheduled if the patient's pain rating is not 5/10 or greater at the time of the procedure.  Patients taking warfarin/Coumadin will have their INR checked the day of the procedure and the procedure may be rescheduled if the INR is greater than 3.0.  Please contact the Spine Center (#[phone]) if you are taking any prescription blood-thinning  medications (warfarin, Plavix, Lovenox, Eliquis, Brilinta, Effient, etc.) as special dosing adjustments may need to be made depending on the type of injection you are scheduled to receive.  It is recommended that you delay having your steroid injection if you have received a flu shot or shingles vaccine within 2 weeks.

## 2024-01-30 ENCOUNTER — TELEPHONE (OUTPATIENT)
Dept: PHYSICAL MEDICINE AND REHAB | Facility: CLINIC | Age: 59
End: 2024-01-30
Payer: OTHER GOVERNMENT

## 2024-01-30 ENCOUNTER — TELEPHONE (OUTPATIENT)
Dept: ONCOLOGY | Facility: HOSPITAL | Age: 59
End: 2024-01-30
Payer: OTHER GOVERNMENT

## 2024-01-30 ENCOUNTER — VIRTUAL VISIT (OUTPATIENT)
Dept: URGENT CARE | Facility: CLINIC | Age: 59
End: 2024-01-30
Payer: OTHER GOVERNMENT

## 2024-01-30 DIAGNOSIS — Z20.818 EXPOSURE TO STREP THROAT: Primary | ICD-10-CM

## 2024-01-30 PROCEDURE — 99213 OFFICE O/P EST LOW 20 MIN: CPT | Mod: 95

## 2024-01-30 RX ORDER — AMOXICILLIN 500 MG/1
500 CAPSULE ORAL 2 TIMES DAILY
Qty: 20 CAPSULE | Refills: 0 | Status: SHIPPED | OUTPATIENT
Start: 2024-01-30 | End: 2024-02-09

## 2024-01-30 NOTE — PROGRESS NOTES
Aide is a 58 year old who is being evaluated via a billable video visit.      How would you like to obtain your AVS? MyChart  If the video visit is dropped, the invitation should be resent by:   Will anyone else be joining your video visit? No          Assessment & Plan     Exposure to strep throat    - amoxicillin (AMOXIL) 500 MG capsule; Take 1 capsule (500 mg) by mouth 2 times daily for 10 days          Return in about 1 week (around 2/6/2024), or if symptoms worsen or fail to improve.    Subjective   Aide is a 58 year old, presenting for the following health issues:  No chief complaint on file.    HPI   Severe ST, with headache for past 24 hours. Babysat grandson 4 days ago and he came down with strep throat 2 days ago.   COVID tests have been negative.   Son who is an RN looked at her throat and told her she probably has strep.           Review of Systems  Constitutional, HEENT, cardiovascular, pulmonary, gi and gu systems are negative, except as otherwise noted.      Objective           Vitals:  No vitals were obtained today due to virtual visit.    Physical Exam   GENERAL: alert and no distress  RESP: No audible wheeze, cough, or visible cyanosis.    PSYCH: Appropriate affect, tone, and pace of words          Video-Visit Details    Type of service:  Video Visit     Originating Location (pt. Location): Home    Distant Location (provider location):  Off-site  Platform used for Video Visit: Enrique  Signed Electronically by: [facility]

## 2024-01-30 NOTE — TELEPHONE ENCOUNTER
Tried to call patient for telephone consent 1/30/2024 9:52 AM and went to voicemail. Left our callback number.

## 2024-01-31 ENCOUNTER — TELEPHONE (OUTPATIENT)
Dept: FAMILY MEDICINE | Facility: CLINIC | Age: 59
End: 2024-01-31
Payer: OTHER GOVERNMENT

## 2024-01-31 NOTE — TELEPHONE ENCOUNTER
Patient Quality Outreach    Patient is due for the following:   Colon Cancer Screening    Next Steps:   Patient needs to complete a colon cancer screening.    Type of outreach:    Sent letter.      Questions for provider review:    None           Stacy Chavez

## 2024-01-31 NOTE — TELEPHONE ENCOUNTER
Attempted to call patient again 1/30/24 11:30am and 1/31/2024 1:04 PM. Went to voicemail, left callback number.

## 2024-01-31 NOTE — LETTER
January 31, 2024      Diana Salvador  [address] [place] [address]        Dear Diana,     Your team at [facility] cares about your health. We have reviewed your chart and based on our findings; we are making the following recommendations to better manage your health.     You are in particular need of attention regarding the following:     Call or MyChart message your clinic to schedule a colonoscopy, schedule/ a FIT Test, or order a Cologuard test. If you are unsure what type of test you need, please call your clinic and speak to clinic staff.   Colon cancer is now the second leading cause of cancer-related deaths in the [place] for both men and women and there are over 130,000 new cases and 50,000 deaths per year from colon cancer. Colonoscopies can prevent 90-95% of these deaths. Problem lesions can be removed before they ever become cancer. This test is not only looking for cancer, but also getting rid of precancerous lesions.     If you have already completed these items, please contact the clinic via phone or   [employer] so your care team can review and update your records. Thank you for   choosing [facility] for your healthcare needs. For any questions,   concerns, or to schedule an appointment please contact our clinic.    Healthy Regards,      Your [facility] Care Team

## 2024-02-05 ENCOUNTER — TELEPHONE (OUTPATIENT)
Dept: PHYSICAL MEDICINE AND REHAB | Facility: CLINIC | Age: 59
End: 2024-02-05
Payer: OTHER GOVERNMENT

## 2024-02-05 NOTE — TELEPHONE ENCOUNTER
Patient is scheduled for a cervical YESSY with our clinic, and due to significant geovanna-procedural anxiety they have been given an oral anxiolytic to take prior to arrival. For that reason, consent for the noted procedure was obtained via phone call on 2/5/2024 at 1:10 PM by Galindo Medina MD.    Patient was advised of potential benefits including possible improvements in pain, functioning, and sleep. Patient was also advised of spinal procedure risks including bleeding, bruising, infection of skin, soft tissue, or spine, temporary or permanent neurologic injury, worsening or unimproved pain.  Patient was also advised of steroid risks including hyperglycemia, increased blood pressure, water retention, hormonal disruption, increased risk of fracture.  Patient was also advised of alternatives to injection such as physical therapy, medication therapy, or surgical consultation.  After reviewing the benefits and risks, patient elected to proceed with the injection..    Patient was given an opportunity to ask questions or clarify anything they did not understand, and all questions were answered to patient's satisfaction. Patient denied any allergies to iodinated contrast, local anesthetics, or steroid preparations. Patient is aware that blood thinners are to be stopped or continued in accordance with clinic protocols. Patient advised to call us if they have changing or worsening symptoms, any signs of infection, or initiation of treatment for an infection. They were also advised that if they have received or will receive a vaccination, no steroid injections should be done 2 weeks before or after the vaccination date.

## 2024-02-12 ENCOUNTER — RADIOLOGY INJECTION OFFICE VISIT (OUTPATIENT)
Dept: PHYSICAL MEDICINE AND REHAB | Facility: CLINIC | Age: 59
End: 2024-02-12
Attending: NURSE PRACTITIONER
Payer: OTHER GOVERNMENT

## 2024-02-12 VITALS
RESPIRATION RATE: 16 BRPM | TEMPERATURE: 98.7 F | SYSTOLIC BLOOD PRESSURE: 108 MMHG | BODY MASS INDEX: 32.74 KG/M2 | WEIGHT: 216 LBS | HEIGHT: 68 IN | OXYGEN SATURATION: 98 % | DIASTOLIC BLOOD PRESSURE: 82 MMHG | HEART RATE: 84 BPM

## 2024-02-12 DIAGNOSIS — M50.30 DDD (DEGENERATIVE DISC DISEASE), CERVICAL: ICD-10-CM

## 2024-02-12 DIAGNOSIS — M54.12 RIGHT CERVICAL RADICULOPATHY: ICD-10-CM

## 2024-02-12 DIAGNOSIS — M48.02 FORAMINAL STENOSIS OF CERVICAL REGION: ICD-10-CM

## 2024-02-12 PROCEDURE — 62321 NJX INTERLAMINAR CRV/THRC: CPT | Performed by: STUDENT IN AN ORGANIZED HEALTH CARE EDUCATION/TRAINING PROGRAM

## 2024-02-12 RX ORDER — LIDOCAINE HYDROCHLORIDE 10 MG/ML
INJECTION, SOLUTION EPIDURAL; INFILTRATION; INTRACAUDAL; PERINEURAL
Status: COMPLETED | OUTPATIENT
Start: 2024-02-12 | End: 2024-02-12

## 2024-02-12 RX ORDER — DEXAMETHASONE SODIUM PHOSPHATE 10 MG/ML
INJECTION, SOLUTION INTRAMUSCULAR; INTRAVENOUS
Status: COMPLETED | OUTPATIENT
Start: 2024-02-12 | End: 2024-02-12

## 2024-02-12 RX ADMIN — LIDOCAINE HYDROCHLORIDE 2 ML: 10 INJECTION, SOLUTION EPIDURAL; INFILTRATION; INTRACAUDAL; PERINEURAL at 13:22

## 2024-02-12 RX ADMIN — DEXAMETHASONE SODIUM PHOSPHATE 10 MG: 10 INJECTION, SOLUTION INTRAMUSCULAR; INTRAVENOUS at 13:22

## 2024-02-12 ASSESSMENT — PAIN SCALES - GENERAL
PAINLEVEL: SEVERE PAIN (7)
PAINLEVEL: MODERATE PAIN (5)

## 2024-02-12 NOTE — PATIENT INSTRUCTIONS
Follow-up visit with Claire Cardoza CNP in 2 weeks to discuss injection outcome and determine care plan going forward.       DISCHARGE INSTRUCTIONS    During office hours (8:00 a.m.- 4:00 p.m.) questions or concerns may be answered  by calling Spine Center Navigation Nurses at  [phone].  Messages received after hours will be returned the following business day.      In the case of an emergency, please dial 911 or seek assistance at the nearest Emergency Room/Urgent Care facility.     All Patients:    You may experience an increase in your symptoms for the first 2 days (It may take anywhere between 2 days- 2 weeks for the steroid to have maximum effect).    You may use ice on the injection site, as frequently as 20 minutes each hour if needed.    You may take your pain medicine.    You may continue taking your regular medication after your injection. If you have had a Medial Branch Block you may resume pain medication once your pain diary is completed.    You may shower. No swimming, tub bath or hot tub for 48 hours.  You may remove your bandaid/bandage as soon as you are home.    You may resume light activities, as tolerated.    Resume your usual diet as tolerated.    It is strongly advised that you do not drive for 1-3 hours post injection.    If you have had oral sedation:  Do not drive for 8 hours post injection.      If you have had IV sedation:  Do not drive for 24 hours post injection.  Do not operate hazardous machinery or make important personal/business decisions for 24 hours.      POSSIBLE STEROID SIDE EFFECTS (If steroid/cortisone was used for your procedure)    -If you experience these symptoms, it should only last for a short period    Swelling of the legs              Skin redness (flushing)     Mouth (oral) irritation   Blood sugar (glucose) levels            Sweats                    Mood changes  Headache  Sleeplessness  Weakened immune system for up to 14 days, which could increase the  risk of yolanda the COVID-19 virus and/or experiencing more severe symptoms of the disease, if exposed.  Decreased effectiveness of the flu vaccine if given within 2 weeks of the steroid.         POSSIBLE PROCEDURE SIDE EFFECTS  -Call the Spine Center if you are concerned  Increased Pain           Increased numbness/tingling      Nausea/Vomiting          Bruising/bleeding at site      Redness or swelling                                              Difficulty walking      Weakness           Fever greater than 100.5    *In the event of a severe headache after an epidural steroid injection that is relieved by lying down, please call the [facility] to speak with a clinical staff member*

## 2024-02-14 ENCOUNTER — INFUSION THERAPY VISIT (OUTPATIENT)
Dept: INFUSION THERAPY | Facility: CLINIC | Age: 59
End: 2024-02-14
Attending: NURSE PRACTITIONER
Payer: OTHER GOVERNMENT

## 2024-02-14 DIAGNOSIS — Z17.1 MALIGNANT NEOPLASM OF UPPER-INNER QUADRANT OF RIGHT BREAST IN FEMALE, ESTROGEN RECEPTOR NEGATIVE (H): Primary | ICD-10-CM

## 2024-02-14 DIAGNOSIS — C50.211 MALIGNANT NEOPLASM OF UPPER-INNER QUADRANT OF RIGHT BREAST IN FEMALE, ESTROGEN RECEPTOR NEGATIVE (H): Primary | ICD-10-CM

## 2024-02-14 PROCEDURE — 96523 IRRIG DRUG DELIVERY DEVICE: CPT

## 2024-02-14 PROCEDURE — 250N000011 HC RX IP 250 OP 636: Performed by: NURSE PRACTITIONER

## 2024-02-14 RX ORDER — HEPARIN SODIUM (PORCINE) LOCK FLUSH IV SOLN 100 UNIT/ML 100 UNIT/ML
5 SOLUTION INTRAVENOUS
Status: DISCONTINUED | OUTPATIENT
Start: 2024-02-14 | End: 2024-02-14 | Stop reason: HOSPADM

## 2024-02-14 RX ORDER — HEPARIN SODIUM,PORCINE 10 UNIT/ML
5 VIAL (ML) INTRAVENOUS
Status: CANCELLED | OUTPATIENT
Start: 2024-02-14

## 2024-02-14 RX ORDER — HEPARIN SODIUM (PORCINE) LOCK FLUSH IV SOLN 100 UNIT/ML 100 UNIT/ML
5 SOLUTION INTRAVENOUS
Status: CANCELLED | OUTPATIENT
Start: 2024-02-14

## 2024-02-14 RX ADMIN — Medication 5 ML: at 14:18

## 2024-02-14 NOTE — PROGRESS NOTES
Infusion Nursing Note:  Diana Salvador presents today for port flush.    Patient seen by provider today: No   present during visit today: Not Applicable.    Note: N/A.      Intravenous Access:  Implanted port.      Post Infusion Assessment:  Blood return noted.  Site patent and intact, free from redness, edema or discomfort.  No evidence of extravasations.  Access discontinued per protocol.       Discharge Plan:   Patient discharged in stable condition accompanied by: self.  Departure Mode: Ambulatory.      Leonila Dunlap RN

## 2024-02-20 ENCOUNTER — TELEPHONE (OUTPATIENT)
Dept: FAMILY MEDICINE | Facility: CLINIC | Age: 59
End: 2024-02-20
Payer: OTHER GOVERNMENT

## 2024-02-20 ENCOUNTER — MYC REFILL (OUTPATIENT)
Dept: FAMILY MEDICINE | Facility: CLINIC | Age: 59
End: 2024-02-20
Payer: OTHER GOVERNMENT

## 2024-02-20 DIAGNOSIS — F41.8 SITUATIONAL ANXIETY: ICD-10-CM

## 2024-02-20 RX ORDER — LORAZEPAM 0.5 MG/1
.5-1 TABLET ORAL EVERY 8 HOURS PRN
Qty: 60 TABLET | Refills: 1 | Status: SHIPPED | OUTPATIENT
Start: 2024-02-20 | End: 2024-05-20

## 2024-02-20 NOTE — TELEPHONE ENCOUNTER
"  Symptoms    Describe your symptoms: Patient states she \"hurts everywhere\", coughing to the point where she can hardly breathe, head pounding-\"the worse it has ever been\", fever of 101 on Saturday and Sunday.     Patient has not tested for Covid     Any pain: Yes:     How long have you been having symptoms: 3 days      Have you been seen for this:  No    Appointment offered?: No    Triage offered?: Yes:     Home remedies tried: Daytime/nighttime cold and flu       Could we send this information to you in [facility] or would you prefer to receive a phone call?:   Patient would prefer a phone call   Okay to leave a detailed message?: Yes at Home number on file [phone] (home)      AUTUMN Arreguin     "

## 2024-02-21 ENCOUNTER — TELEPHONE (OUTPATIENT)
Dept: FAMILY MEDICINE | Facility: CLINIC | Age: 59
End: 2024-02-21
Payer: OTHER GOVERNMENT

## 2024-02-21 NOTE — TELEPHONE ENCOUNTER
"Aide states that she has been sick since Saturday/Sunday with a nonproductive cough which she still has but denies and troubles with breathing or SOB, still has the body aches, a pretty bad headache that is \"brutal\" especially with coughing, and had a fever over the weekend off and on of 101 but has since broke. Advised if she becomes short of breath or troubles with breathing or she starts coughing up green/red/brown, or her fever returns she is to be seen in UC/ER. She expressed understanding.     Jaja Baumann RN    "

## 2024-02-21 NOTE — TELEPHONE ENCOUNTER
Medication Question or Refill    odessa pharmacist with [facility]  called with concerns about patient picking up two controlled substances norco and tramadol yesterday.    She had picked up these meds within days of each other, except yesterday she picked up both meds at the same time. Pharmacist is now looking to include notes on patient profile.  Pharmacist is kims MD to acknowledge patient is taking both meds.     Preferred Pharmacy:    [facility] [address] [place] [facility] [place] [address] [address] [facility] [address]  Phone: [phone] Fax: [phone]

## 2024-02-22 NOTE — TELEPHONE ENCOUNTER
Called pharmacy & spoke with Kathy & read providers message.  She states the directions on the medications do not reflect this & future orders will need to state this.    Routing to provider.    Radha Hernandez RN

## 2024-02-26 ENCOUNTER — OFFICE VISIT (OUTPATIENT)
Dept: PHYSICAL MEDICINE AND REHAB | Facility: CLINIC | Age: 59
End: 2024-02-26
Payer: OTHER GOVERNMENT

## 2024-02-26 DIAGNOSIS — M54.12 RIGHT CERVICAL RADICULOPATHY: Primary | ICD-10-CM

## 2024-02-26 DIAGNOSIS — M48.061 SPINAL STENOSIS OF LUMBAR REGION WITHOUT NEUROGENIC CLAUDICATION: ICD-10-CM

## 2024-02-26 DIAGNOSIS — M48.061 LUMBAR FORAMINAL STENOSIS: ICD-10-CM

## 2024-02-26 DIAGNOSIS — M54.16 RIGHT LUMBAR RADICULOPATHY: ICD-10-CM

## 2024-02-26 DIAGNOSIS — M54.41 CHRONIC RIGHT-SIDED LOW BACK PAIN WITH RIGHT-SIDED SCIATICA: ICD-10-CM

## 2024-02-26 DIAGNOSIS — G89.29 CHRONIC RIGHT-SIDED LOW BACK PAIN WITH RIGHT-SIDED SCIATICA: ICD-10-CM

## 2024-02-26 DIAGNOSIS — M43.16 SPONDYLOLISTHESIS OF LUMBAR REGION: ICD-10-CM

## 2024-02-26 DIAGNOSIS — Z98.890 HISTORY OF DECOMPRESSION OF ULNAR NERVE: ICD-10-CM

## 2024-02-26 DIAGNOSIS — M48.02 CERVICAL STENOSIS OF SPINAL CANAL: ICD-10-CM

## 2024-02-26 DIAGNOSIS — M50.30 DDD (DEGENERATIVE DISC DISEASE), CERVICAL: ICD-10-CM

## 2024-02-26 DIAGNOSIS — M48.02 FORAMINAL STENOSIS OF CERVICAL REGION: ICD-10-CM

## 2024-02-26 PROCEDURE — 99214 OFFICE O/P EST MOD 30 MIN: CPT | Performed by: NURSE PRACTITIONER

## 2024-02-26 NOTE — LETTER
2/26/2024         RE: Diana Salvador  [address] [place] [id number]        Dear Colleague,    Thank you for referring your patient, Diana Salvador, to the [facility]. Please see a copy of my visit note below.      Assessment:     Diagnoses and all orders for this visit:  Right cervical radiculopathy  Foraminal stenosis of cervical region  Cervical stenosis of spinal canal  DDD (degenerative disc disease), cervical  Chronic right-sided low back pain with right-sided sciatica  Right lumbar radiculopathy  Spondylolisthesis of lumbar region  Lumbar foraminal stenosis  Spinal stenosis of lumbar region without neurogenic claudication  History of decompression of ulnar nerve     Diana Salvador is a 58 year old y.o. female with past medical history significant for osteopenia, restless leg syndrome, lower extremity edema, breast cancer, chemotherapy-induced neuropathy grade 3, situational anxiety, ulnar nerve release surgery 20 years ago who presents today for follow-up regarding:     -Right cervical radiculopathy with 60% relief post C7-T1 IL YESSY, patient reports tolerable symptoms currently    -Chronic bilateral low back pain with right lumbar radiculopathy, overall more tolerable    Plan:     A shared decision making plan was used.  The patient's values and choices were respected. Prior medical records were reviewed today. The following represents what was discussed and decided upon by the provider and the patient.     -DIAGNOSTIC TESTS: Images were personally reviewed and interpreted.   --Previously ordered right upper and right lower extremity EMG 1/29/2024, patient has not scheduled.  She denies any current upper and lower extremity weakness however therefore we can hold off on EMGs and trial physical therapy first at this time.  --Previously ordered lumbar spine MRI, patient has not scheduled.  She does report that her back pain is more tolerable and would like to work with  physical therapy first which is reasonable.  -- Cervical spine MRI 1/13/2024 with C6-7 disc bulging with facet arthropathy with mild central stenosis with severe right and moderate left foraminal stenosis.  C5-6 mild right foraminal stenosis.  C4-5 mild left and mild to moderate right foraminal stenosis.   --Cervical spine MRI 1/13/2024 with C6-7 chronic changes with mild spinal stenosis moderate left and severe right foraminal stenosis.  --Lumbar spine MRI 2021 with L4-5 spondylolisthesis with facet arthropathy with moderate central stenosis with RIGHT disc protrusion right greater than left lateral recess stenosis and foraminal stenosis.    -INTERVENTIONS: No further injection recommendations.  Did discuss that if her neck pain worsens in 3 months or after we could repeat C7-T1 IL YESSY.  Currently she is doing well.  Would recommend updated lumbar MRI before considering lumbar spine injections.  Patient wants to hold off on this currently.  **Preprocedure anxiety, patient tolerated injections well with preprocedure Valium    -MEDICATIONS: No changes in medications today  Discussed side effects of medications and proper use. Patient verbalized understanding.    -PHYSICAL THERAPY: Physical therapy referral placed 1/29/2024 advised patient to schedule at least 2-3 physical therapy sessions to establish home exercise program for cervical and lumbar radiculopathy.  Patient is willing to do a couple sessions of PT, however she does have high co-pay and therefore the financial strain is difficult for her which is understandable.  Discussed with her that the goal is to establish again home exercise program that she can continue doing on her own long-term.  Discussed the importance of core strengthening, ROM, stretching exercises with the patient and how each of these entities is important in decreasing pain.  Explained to the patient that the purpose of physical therapy is to teach the patient a home exercise program.   These exercises need to be performed every day in order to decrease pain and prevent future occurrences of pain.        -PATIENT EDUCATION: Total time of 32 minutes, on the day of service, spent with the patient, reviewing the chart, placing orders, and documenting.     -FOLLOW UP: Follow-up as needed  Advised to contact clinic if symptoms worsen or change.    Subjective:     Diana Salvador is a 58 year old female who presents today for follow-up regarding chronic neck pain with previously significant right neck and arm pain in which she received 60% relief with recent C7-T1 interlaminar epidural steroid injection and is very happy with the results.  She does report that her pain is overall very tolerable currently at a 3/10 at its best, and 8 at its worst with too much movement or lack of movement as well as lifting.    She also reports chronic bilateral low back pain rating to the right buttock and lateral posterior thigh, this is slightly improved as well and overall tolerable.    Currently she denies any upper or lower extremity weakness.  Denies any recent trips or falls or balance changes.  Denies bowel or bladder loss control.    -Treatment to Date: No prior spinal surgery.  History ulnar nerve release surgery early 2000's.     C7-T1 IL YESSY 2/12/2024 with 60%.      -Medications:  Cyclobenzaprine  Acetaminophen     Hydrocodone prescribed 1/20/2024- 60 tablets given per PCP  Tramadol prescribed by PCP to be filled 3 2/18/2024, and/19/2024-120 tablets per PCP     Prior medications:  Medrol Dosepak prescribed 1/23/2024.  Gabapentin - side effects  Duloxetine - no benefit    Patient Active Problem List   Diagnosis     Osteopenia of multiple sites     Restless legs syndrome     Lower extremity edema     Sciatica of right side     Chronic, continuous use of opioids     Situational anxiety     Cervical high risk HPV (human papillomavirus) test positive     Malignant neoplasm of upper-inner quadrant of right breast in  female, estrogen receptor negative (triple-neg., germline test neg. for BRCA 1/2)     Encounter for antineoplastic chemotherapy     Chemotherapy-induced neutropenia  (H24)     Chemotherapy-induced neuropathy (grade 3, after 3 cycles of Carbo/Taxol/Keytruda)     S/P breast reconstruction, bilateral       Current Outpatient Medications   Medication     acetaminophen (TYLENOL) 325 MG tablet     alendronate (FOSAMAX) 35 MG tablet     cyanocobalamin (CYANOCOBALAMIN) 1000 MCG/ML injection     cyclobenzaprine (FLEXERIL) 5 MG tablet     diazepam (VALIUM) 5 MG tablet     diclofenac (VOLTAREN) 1 % topical gel     furosemide (LASIX) 20 MG tablet     HYDROcodone-acetaminophen (NORCO) 5-325 MG tablet     LORazepam (ATIVAN) 0.5 MG tablet     lurasidone (LATUDA) 40 MG TABS tablet     omeprazole (PRILOSEC) 20 MG DR capsule     potassium chloride ER (K-TAB) 20 MEQ CR tablet     QUEtiapine (SEROQUEL) 25 MG tablet     rOPINIRole (REQUIP) 1 MG tablet     sucralfate (CARAFATE) 1 GM tablet     [START ON 3/19/2024] traMADol (ULTRAM) 50 MG tablet     traMADol (ULTRAM) 50 MG tablet     traMADol (ULTRAM) 50 MG tablet     valACYclovir (VALTREX) 1000 mg tablet     VITAMIN D3 50 MCG (2000 UT) tablet     No current facility-administered medications for this visit.       No Known Allergies    Past Medical History:   Diagnosis Date     Arthritis      Cancer (H) 02/24/2022     Cervical high risk HPV (human papillomavirus) test positive 03/15/2020    See problem list     Depressive disorder 2014    During the death of spouse     Motion sickness         Review of Systems  ROS: Specifically negative for bowel/bladder dysfunction, balance changes, headache, dizziness, foot drop, fevers, chills, appetite changes, nausea/vomiting, unexplained weight loss. Otherwise 13 systems reviewed are negative. Please see the patient's intake questionnaire from today for details.    Reviewed Social, Family, Past Medical and Past Surgical history with patient, no  significant changes noted since prior visit.     Objective:     LMP  (LMP Unknown)     PHYSICAL EXAMINATION:   --CONSTITUTIONAL: Vital signs as above. No acute distress. The patient is well nourished and well groomed.  --PSYCHIATRIC:  The patient is awake, alert, oriented to person, place, time and answering questions appropriately with clear speech. Appropriate mood and affect   --HEENT: Sclera are non-injected. Extraocular muscles are intact.   --SKIN: Skin over the face, bilateral upper extremities, and posterior torso is clean, dry, intact without rashes.  --RESPIRATORY: Normal rhythm and effort. No abnormal accessory muscle breathing patterns noted.   --GROSS MOTOR: Easily arises from a seated position.   --CERVICAL SPINE: Inspection reveals no evidence of deformity.     Imaging: Spine imaging was reviewed today. The images were shown to the patient and the findings were explained using a spine model.                    Again, thank you for allowing me to participate in the care of your patient.        Sincerely,        Claire Cardoza, CNP

## 2024-02-26 NOTE — PATIENT INSTRUCTIONS
~Spine Center Scheduling #[phone].  ~Please call our [facility] Spine Nurse Navigation #[phone] with any questions or concerns about your treatment plan, if symptoms worsen and you would like to be seen urgently, or if you have problems controlling bladder and bowel function.  ~For any future flareups or new symptoms, recommend follow-up in clinic or contact the nurse navigator line.  ~Please note that any My Chart messages may take multiple days for a response due to the high volume of patients seen in clinic.  Anything sent Thursday night or after will be answered the following week when able, as Claire Cardoza CNP does not work in clinic on Fridays.   ~Claire Cardoza CNP is at the [facility] on the first and third Tuesdays of the month only, otherwise primarily at the [place] [facility].        ~You have been referred for Physical Therapy to [facility]. They will call you to schedule an appointment.      Scheduling phone number is [phone] [facility] [place], [place], or [place] location.  If you have not heard from the scheduling office within 2 business days, please call [phone] for ALL other locations.    Discussed the importance of core strengthening, ROM, stretching exercises and how each of these entities is important in decreasing pain and improving long term spine health.  The purpose of physical therapy is to teach you an individualized home exercise program.  These exercises need to be performed every day in order to decrease pain and prevent future occurrences of pain.

## 2024-02-26 NOTE — PROGRESS NOTES
Assessment:     Diagnoses and all orders for this visit:  Right cervical radiculopathy  Foraminal stenosis of cervical region  Cervical stenosis of spinal canal  DDD (degenerative disc disease), cervical  Chronic right-sided low back pain with right-sided sciatica  Right lumbar radiculopathy  Spondylolisthesis of lumbar region  Lumbar foraminal stenosis  Spinal stenosis of lumbar region without neurogenic claudication  History of decompression of ulnar nerve     Diana Salvador is a 58 year old y.o. female with past medical history significant for osteopenia, restless leg syndrome, lower extremity edema, breast cancer, chemotherapy-induced neuropathy grade 3, situational anxiety, ulnar nerve release surgery 20 years ago who presents today for follow-up regarding:     -Right cervical radiculopathy with 60% relief post C7-T1 IL YESSY, patient reports tolerable symptoms currently    -Chronic bilateral low back pain with right lumbar radiculopathy, overall more tolerable    Plan:     A shared decision making plan was used.  The patient's values and choices were respected. Prior medical records were reviewed today. The following represents what was discussed and decided upon by the provider and the patient.     -DIAGNOSTIC TESTS: Images were personally reviewed and interpreted.   --Previously ordered right upper and right lower extremity EMG 1/29/2024, patient has not scheduled.  She denies any current upper and lower extremity weakness however therefore we can hold off on EMGs and trial physical therapy first at this time.  --Previously ordered lumbar spine MRI, patient has not scheduled.  She does report that her back pain is more tolerable and would like to work with physical therapy first which is reasonable.  -- Cervical spine MRI 1/13/2024 with C6-7 disc bulging with facet arthropathy with mild central stenosis with severe right and moderate left foraminal stenosis.  C5-6 mild right foraminal stenosis.  C4-5 mild  left and mild to moderate right foraminal stenosis.   --Cervical spine MRI 1/13/2024 with C6-7 chronic changes with mild spinal stenosis moderate left and severe right foraminal stenosis.  --Lumbar spine MRI 2021 with L4-5 spondylolisthesis with facet arthropathy with moderate central stenosis with RIGHT disc protrusion right greater than left lateral recess stenosis and foraminal stenosis.    -INTERVENTIONS: No further injection recommendations.  Did discuss that if her neck pain worsens in 3 months or after we could repeat C7-T1 IL YESSY.  Currently she is doing well.  Would recommend updated lumbar MRI before considering lumbar spine injections.  Patient wants to hold off on this currently.  **Preprocedure anxiety, patient tolerated injections well with preprocedure Valium    -MEDICATIONS: No changes in medications today  Discussed side effects of medications and proper use. Patient verbalized understanding.    -PHYSICAL THERAPY: Physical therapy referral placed 1/29/2024 advised patient to schedule at least 2-3 physical therapy sessions to establish home exercise program for cervical and lumbar radiculopathy.  Patient is willing to do a couple sessions of PT, however she does have high co-pay and therefore the financial strain is difficult for her which is understandable.  Discussed with her that the goal is to establish again home exercise program that she can continue doing on her own long-term.  Discussed the importance of core strengthening, ROM, stretching exercises with the patient and how each of these entities is important in decreasing pain.  Explained to the patient that the purpose of physical therapy is to teach the patient a home exercise program.  These exercises need to be performed every day in order to decrease pain and prevent future occurrences of pain.        -PATIENT EDUCATION: Total time of 32 minutes, on the day of service, spent with the patient, reviewing the chart, placing orders, and  documenting.     -FOLLOW UP: Follow-up as needed  Advised to contact clinic if symptoms worsen or change.    Subjective:     Diana Salvador is a 58 year old female who presents today for follow-up regarding chronic neck pain with previously significant right neck and arm pain in which she received 60% relief with recent C7-T1 interlaminar epidural steroid injection and is very happy with the results.  She does report that her pain is overall very tolerable currently at a 3/10 at its best, and 8 at its worst with too much movement or lack of movement as well as lifting.    She also reports chronic bilateral low back pain rating to the right buttock and lateral posterior thigh, this is slightly improved as well and overall tolerable.    Currently she denies any upper or lower extremity weakness.  Denies any recent trips or falls or balance changes.  Denies bowel or bladder loss control.    -Treatment to Date: No prior spinal surgery.  History ulnar nerve release surgery early 2000's.     C7-T1 IL YESSY 2/12/2024 with 60%.      -Medications:  Cyclobenzaprine  Acetaminophen     Hydrocodone prescribed 1/20/2024- 60 tablets given per PCP  Tramadol prescribed by PCP to be filled 3 2/18/2024, and/19/2024-120 tablets per PCP     Prior medications:  Medrol Dosepak prescribed 1/23/2024.  Gabapentin - side effects  Duloxetine - no benefit    Patient Active Problem List   Diagnosis    Osteopenia of multiple sites    Restless legs syndrome    Lower extremity edema    Sciatica of right side    Chronic, continuous use of opioids    Situational anxiety    Cervical high risk HPV (human papillomavirus) test positive    Malignant neoplasm of upper-inner quadrant of right breast in female, estrogen receptor negative (triple-neg., germline test neg. for BRCA 1/2)    Encounter for antineoplastic chemotherapy    Chemotherapy-induced neutropenia  (H24)    Chemotherapy-induced neuropathy (grade 3, after 3 cycles of Carbo/Taxol/Keytruda)    S/P  breast reconstruction, bilateral       Current Outpatient Medications   Medication    acetaminophen (TYLENOL) 325 MG tablet    alendronate (FOSAMAX) 35 MG tablet    cyanocobalamin (CYANOCOBALAMIN) 1000 MCG/ML injection    cyclobenzaprine (FLEXERIL) 5 MG tablet    diazepam (VALIUM) 5 MG tablet    diclofenac (VOLTAREN) 1 % topical gel    furosemide (LASIX) 20 MG tablet    HYDROcodone-acetaminophen (NORCO) 5-325 MG tablet    LORazepam (ATIVAN) 0.5 MG tablet    lurasidone (LATUDA) 40 MG TABS tablet    omeprazole (PRILOSEC) 20 MG DR capsule    potassium chloride ER (K-TAB) 20 MEQ CR tablet    QUEtiapine (SEROQUEL) 25 MG tablet    rOPINIRole (REQUIP) 1 MG tablet    sucralfate (CARAFATE) 1 GM tablet    [START ON 3/19/2024] traMADol (ULTRAM) 50 MG tablet    traMADol (ULTRAM) 50 MG tablet    traMADol (ULTRAM) 50 MG tablet    valACYclovir (VALTREX) 1000 mg tablet    VITAMIN D3 50 MCG (2000 UT) tablet     No current facility-administered medications for this visit.       No Known Allergies    Past Medical History:   Diagnosis Date    Arthritis     Cancer (H) 02/24/2022    Cervical high risk HPV (human papillomavirus) test positive 03/15/2020    See problem list    Depressive disorder 2014    During the death of spouse    Motion sickness         Review of Systems  ROS: Specifically negative for bowel/bladder dysfunction, balance changes, headache, dizziness, foot drop, fevers, chills, appetite changes, nausea/vomiting, unexplained weight loss. Otherwise 13 systems reviewed are negative. Please see the patient's intake questionnaire from today for details.    Reviewed Social, Family, Past Medical and Past Surgical history with patient, no significant changes noted since prior visit.     Objective:     LMP  (LMP Unknown)     PHYSICAL EXAMINATION:   --CONSTITUTIONAL: Vital signs as above. No acute distress. The patient is well nourished and well groomed.  --PSYCHIATRIC:  The patient is awake, alert, oriented to person, place, time  and answering questions appropriately with clear speech. Appropriate mood and affect   --HEENT: Sclera are non-injected. Extraocular muscles are intact.   --SKIN: Skin over the face, bilateral upper extremities, and posterior torso is clean, dry, intact without rashes.  --RESPIRATORY: Normal rhythm and effort. No abnormal accessory muscle breathing patterns noted.   --GROSS MOTOR: Easily arises from a seated position.   --CERVICAL SPINE: Inspection reveals no evidence of deformity.     Imaging: Spine imaging was reviewed today. The images were shown to the patient and the findings were explained using a spine model.

## 2024-03-01 ENCOUNTER — VIRTUAL VISIT (OUTPATIENT)
Dept: ONCOLOGY | Facility: CLINIC | Age: 59
End: 2024-03-01
Attending: PHYSICIAN ASSISTANT
Payer: OTHER GOVERNMENT

## 2024-03-01 VITALS — HEIGHT: 67 IN | WEIGHT: 210 LBS | BODY MASS INDEX: 32.96 KG/M2

## 2024-03-01 DIAGNOSIS — M25.511 ACUTE PAIN OF RIGHT SHOULDER: ICD-10-CM

## 2024-03-01 DIAGNOSIS — Z17.1 MALIGNANT NEOPLASM OF RIGHT BREAST IN FEMALE, ESTROGEN RECEPTOR NEGATIVE, UNSPECIFIED SITE OF BREAST (H): Primary | ICD-10-CM

## 2024-03-01 DIAGNOSIS — R53.81 PHYSICAL DECONDITIONING: ICD-10-CM

## 2024-03-01 DIAGNOSIS — T45.1X5A CHEMOTHERAPY-INDUCED NEUROPATHY (H): ICD-10-CM

## 2024-03-01 DIAGNOSIS — M25.50 ARTHRALGIA, UNSPECIFIED JOINT: ICD-10-CM

## 2024-03-01 DIAGNOSIS — C50.911 MALIGNANT NEOPLASM OF RIGHT BREAST IN FEMALE, ESTROGEN RECEPTOR NEGATIVE, UNSPECIFIED SITE OF BREAST (H): Primary | ICD-10-CM

## 2024-03-01 DIAGNOSIS — G62.0 CHEMOTHERAPY-INDUCED NEUROPATHY (H): ICD-10-CM

## 2024-03-01 DIAGNOSIS — F43.22 ADJUSTMENT DISORDER WITH ANXIOUS MOOD: ICD-10-CM

## 2024-03-01 PROCEDURE — 99215 OFFICE O/P EST HI 40 MIN: CPT | Mod: 95 | Performed by: STUDENT IN AN ORGANIZED HEALTH CARE EDUCATION/TRAINING PROGRAM

## 2024-03-01 PROCEDURE — 99417 PROLNG OP E/M EACH 15 MIN: CPT | Mod: 95 | Performed by: STUDENT IN AN ORGANIZED HEALTH CARE EDUCATION/TRAINING PROGRAM

## 2024-03-01 ASSESSMENT — PAIN SCALES - GENERAL: PAINLEVEL: MODERATE PAIN (5)

## 2024-03-01 NOTE — LETTER
3/1/2024         RE: Diana Salvador  [address] [place] [id number]        Dear Colleague,    Thank you for referring your patient, Diana Salvador, to the [facility]. Please see a copy of my visit note below.    Virtual Visit Details    Type of service:  Video Visit     Originating Location (pt. Location): Home    Distant Location (provider location):  Off-site  Platform used for Video Visit: [facility]           PM&R Clinic Note     Patient Name: Diana Salvador : 1965 Medical Record: [id number]     Requesting Physician/clinician: Sloane Waddell PA-C           History of Present Illness:     Diana Salvador is a 58 year old female with history of right-sided stage IIb breast cancer (ER negative/ID negative, HER2 negative) who presents to PM&R cancer rehab clinic for evaluation of her rehabilitation needs in the setting of chronic fatigue, arthralgias and neuropathy.    Oncology history:  *Diagnosis:2022; 56 years old; palpated a mass; clinical stage IIB (T2N0M0), grade III, triple negative; infiltrating ductal carcinoma  *Chemotherapy: Pembrolizumab/paclitaxel/carboplatin x 3 cycles complicated by grade 3 neuropathy (plan was 4 cycles followed by 4 cycles of  Pembrolizumab/adriamycin/cytoxan). She then had 11 cycles of every 3 week Pembrolizumab, completed 2023.  *Surgery: 2022 Bilateral mastectomy and sentinel lymph node dissection, right. Pathologic complete response.   *Genetic testin negative BRCA1/BRCA2.  -Last saw Sloane Bairon on 2023 for follow-up in survivorship clinic.  She had decreased range of motion in her right shoulder with occasional numbness and tingling in the third through fifth digits, history of right ulnar surgery.  Migratory arthralgias.  Neuropathy on duloxetine.  Fatigue.      Symptoms,  Aide was seen for an initial virtual evaluation today.  She has multiple symptoms that she is struggling with currently.  She complains of  peripheral neuropathy symptoms that started after chemotherapy and are present in both hands and feet and toes and fingertips, very sensitive.  She states that this makes fine motor tasks very difficult, and she gives the example of trying to assemble small items or placing batteries in small toys for her grandchildren being very difficult.  She also has localized right shoulder pain and neck pain that radiated down her right upper extremity into her third through fifth digits.  She has had a interlaminar epidural cervical injection which has significantly helped with the pain in the last 3 weeks.  Injection was on 2/12/2024.  She does have multilevel spondylosis in her cervical spine and severe foraminal stenosis on the right at C6-C7 according to recent cervical MRI.  She also complains of severe generalized weakness that she has had for several months.  She has not been to outpatient physical therapy, and is interested in this.  For her joint aches and pains, she utilizes a pain medication regimen including tramadol and Norco that are prescribed by her primary care physician.  She also uses topical Voltaren gel as needed to help supplement this.  She has tried gabapentin in the past for her neuropathic symptoms, but this did not work very well.  In addition she has tried duloxetine for neuropathy symptoms which did not help well at all.  She has also been struggling with her mental health and has both depression and anxiety.  She is on medications for these diagnoses prescribed by her primary care physician.  She feels that her right shoulder pain is significantly better after the cervical epidural steroid injections that she had recently in the beginning of February.  She complains that her appetite has for a long time not been good.  She is wondering if it is due to her mental health symptoms or chronic fatigue, but some days she just has 1 meal per day.  She states that she does not think that she is getting  enough protein in.  She lastly, she has struggled with chronic fatigue for some time now.  She feels that she cannot get up and do any of her daily activities, and mostly stays home.  She writes lists for herself but is unable to do these tasks.  She is on disability, so not working.  She does go out twice weekly to watch her grandson for her daughter-in-law.      Therapies/HEP,  Not currently participating in therapies.      Functionally,   Independent with mobility, ADLs and IADLs.             Past Medical and Surgical History:     Past Medical History:   Diagnosis Date    Arthritis     Cancer (H) 2022    Cervical high risk HPV (human papillomavirus) test positive 03/15/2020    See problem list    Depressive disorder     During the death of spouse    Motion sickness      Past Surgical History:   Procedure Laterality Date    ARTHROSCOPY SHOULDER ROTATOR CUFF REPAIR Right     ARTHROSCOPY SHOULDER ROTATOR CUFF REPAIR Left     BIOPSY       SECTION      CHOLECYSTECTOMY      COLONOSCOPY      Breast reconstruction    COSMETIC SURGERY  2022    CYST REMOVAL      on chest    DAVINCI BYPASS GASTRIC ROBERT-EN-Y      GENITOURINARY SURGERY  1988    Tubiligation    GI SURGERY      Gastric bypass    HYSTERECTOMY SUPRACERVICAL      INSERT PORT VASCULAR ACCESS Left 2022    Procedure: INSERTION, VASCULAR ACCESS PORT;  Surgeon: Baldemar Marina DO;  Location: [facility]    MASTECTOMY SIMPLE Left 2022    Procedure: MASTECTOMY, SIMPLE, LEFT;  Surgeon: Baldemar Marina DO;  Location: [facility]    MASTECTOMY SIMPLE, SENTINEL NODE, COMBINED Right 2022    Procedure: MASTECTOMY, SIMPLE, WITH SENTINEL LYMPH NODE DISSECTION, RIGHT;  Surgeon: Baldemar Marina DO;  Location: [facility]    PROCURE GRAFT FAT Bilateral 02/10/2023    Procedure: Bilateral breast fat grafting from abdomen possible thighs;  Surgeon: ROSEMARIE Nieto MD;  Location:  OR    RECONSTRUCT BREAST, INSERT  TISSUE EXPANDER BILATERAL, COMBINED Bilateral 2022    Procedure: RECONSTRUCTION, BREAST, BILATERAL, WITH TISSUE EXPANDER INSERTION;  Surgeon: ROSEMARIE Nieto MD;  Location: [facility]    REMOVE AND REPLACE BREAST IMPLANT PROSTHESIS Bilateral 10/07/2022    Procedure: Bilateral breast implant placement and revision;  Surgeon: ROSEMARIE Nieto MD;  Location: [facility] OR    right ulnar nerve repositioning surgery              Social History:     Social History     Tobacco Use    Smoking status: Former     Packs/day: 0.00     Years: 5.00     Additional pack years: 0.00     Total pack years: 0.00     Types: Cigarettes     Start date: 1982     Quit date: 1984     Years since quittin.1     Passive exposure: Past (childhood and adult per pt)    Smokeless tobacco: Never   Substance Use Topics    Alcohol use: Yes     Comment: occ       Living situation: Lives in [place], [place]  Family support: Has 2 sons.    10 years ago.         Functional history:     Diana Salvador is independent with all aspects of her life.    ADLs: Independent  Assistive devices: None  iADLs (medication management and finances): Independent           Family History:     Family History   Problem Relation Age of Onset    Heart Failure Mother     Hypertension Mother     Anxiety Disorder Mother     Osteoporosis Mother     Obesity Mother     Colon Cancer Maternal Grandmother     Breast Cancer Other             Medications:     Current Outpatient Medications   Medication Sig Dispense Refill    acetaminophen (TYLENOL) 325 MG tablet Take 2 tablets (650 mg) by mouth every 4 hours as needed for other (mild pain) 100 tablet 0    alendronate (FOSAMAX) 35 MG tablet TAKE 1 TABLET(35 MG) BY MOUTH EVERY 7 DAYS 12 tablet 3    cyanocobalamin (CYANOCOBALAMIN) 1000 MCG/ML injection Inject 1 mL (1,000 mcg) into the muscle every 30 days 3 mL 3    cyclobenzaprine (FLEXERIL) 5 MG tablet TAKE 1 TABLET (5 MG) BY MOUTH 2 TIMES DAILY AS NEEDED FOR  MUSCLE SPASMS 40 tablet 5    diazepam (VALIUM) 5 MG tablet Valium 5 mg p.o., take 1 tablet 2 hours prior to injection.  Must have . 1 tablet 0    diclofenac (VOLTAREN) 1 % topical gel Place 2 g onto the skin 4 times daily 100 g 3    furosemide (LASIX) 20 MG tablet Take 1 tablet (20 mg) by mouth daily as needed (swelling) 60 tablet 3    HYDROcodone-acetaminophen (NORCO) 5-325 MG tablet Take 1 tablet by mouth 2 times daily as needed for severe pain 60 tablet 0    LORazepam (ATIVAN) 0.5 MG tablet Take 1-2 tablets (0.5-1 mg) by mouth every 8 hours as needed for anxiety 60 tablet 1    lurasidone (LATUDA) 40 MG TABS tablet Take 1 tablet (40 mg) by mouth daily With food (350+soraya) 90 tablet 1    omeprazole (PRILOSEC) 20 MG DR capsule As needed      potassium chloride ER (K-TAB) 20 MEQ CR tablet Take 1 tablet (20 mEq) by mouth daily 90 tablet 3    QUEtiapine (SEROQUEL) 25 MG tablet Take 2 tablets (50 mg) by mouth At Bedtime 180 tablet 3    rOPINIRole (REQUIP) 1 MG tablet Take 1 tablet (1 mg) by mouth at bedtime 90 tablet 1    sucralfate (CARAFATE) 1 GM tablet Take 1 tablet (1 g) by mouth 4 times daily as needed (Abdominal discomfort) 30 tablet 3    [START ON 3/19/2024] traMADol (ULTRAM) 50 MG tablet Take 1 tablet (50 mg) by mouth every 6 hours as needed for severe pain Schedule in clinic follow up. 120 tablet 0    traMADol (ULTRAM) 50 MG tablet Take 1 tablet (50 mg) by mouth every 6 hours as needed for severe pain 120 tablet 0    traMADol (ULTRAM) 50 MG tablet Take 1 tablet (50 mg) by mouth every 6 hours as needed for severe pain 120 tablet 0    valACYclovir (VALTREX) 1000 mg tablet TAKE 1 TABLET(1000 MG) BY MOUTH TWICE DAILY. REPEAT AS NEEDED FOR COLD SORE 16 tablet 3    VITAMIN D3 50 MCG (2000 UT) tablet TAKE 1 TABLET BY MOUTH EVERY DAY 90 tablet 3            Allergies:     No Known Allergies           ROS:     A focused ROS is negative other than the symptoms noted above in the HPI.           Physical Examiniation:  "    VITAL SIGNS: Ht 1.702 m (5' 7\")   Wt 95.3 kg (210 lb)   LMP  (LMP Unknown)   BMI 32.89 kg/m    BMI: Estimated body mass index is 32.89 kg/m  as calculated from the following:    Height as of this encounter: 1.702 m (5' 7\").    Weight as of this encounter: 95.3 kg (210 lb).    Gen: NAD, pleasant and cooperative   HEENT: Atraumatic, normocephalic, extraocular movements appear intact  Pulm: non-labored breathing in room air  Neuro/MSK:   Orientation: Oriented to person, time, place and situation, Exhibits good insight into her condition and ongoing treatment  Motor: Observed moving upper extremities actively against gravity           Laboratory/Imaging:     MR Cervical Spine W/WO Contrast (1/13/24):   IMPRESSION:  1.  No osseous metastases.  2.  No abnormal cord signal. No cord pathologic enhancement.  3.  Multilevel spondylosis described above.  4.  No critical thecal sac stenosis.  5.  C6-C7: Right foraminal entry zone severe stenosis.           Assessment/Plan:   Diana Salvador is a 58 year old female with history of right-sided stage IIb breast cancer (ER negative/[place] negative, HER2 negative) who presents to PM&R cancer rehab clinic for evaluation of her rehabilitation needs in the setting of chronic fatigue, arthralgias and neuropathy.  Multiple rehabilitation considerations were discussed with Aide at today's visit.  She will plan to continue her current pain medication regimen for arthralgias prescribed by her primary care physician.  We discussed that she will talk with her primary care physician about options to help better manage her depression, as she feels that her depressive symptoms are very significant still at times.  We discussed education surrounding cancer related fatigue and her arthralgias.  She would benefit from outpatient physical therapy to help with both targeted strengthening and endurance/balance.  In addition, she would benefit from occupational therapy for fatigue management.  Lastly, " we talked about protein supplementation and recommendations were given to her.  We will plan a return virtual visit in about 3 to 4 months.  Aide is in agreement with this plan.      Patient education: In depth discussion and education was provided about the assessment and implications of each of the below recommendations for management. Patient indicated readiness to learn, all questions were answered and understanding of material presented was confirmed.  Therapy/equipment/braces:  Outpatient physical therapy referral placed for targeted strengthening and work on endurance.  Outpatient occupational therapy referral placed for fatigue management.  Start daily protein supplementation aiming for an extra 30 g of protein daily.  Medications:  Continue current medication regimen per primary care physician for pain control.  Will explore options for better control of depression.  Follow up: 3 to 4 months.    Linsey Verduzco MD  Physical Medicine & Rehabilitation    I appreciate the opportunity to participate in the care of your patient.     70 minutes spent on the date of the encounter doing chart review, history and exam, documentation and further activities as noted above.

## 2024-03-01 NOTE — PATIENT INSTRUCTIONS
It was nice to meet you today.    1.  A referral to physical therapy was placed for targeted strengthening and work on your endurance.  2.  An Occupational Therapy referral was placed for fatigue management.  3.  Start daily protein supplementation, aiming for an extra 30 g of protein daily.  As we discussed, you can try either Premier protein shakes or [employer] fair life core power protein shakes.  4.  Continue your pain medication regimen per your primary care physician.  5.  As we discussed, you will talk with your primary care physician about medication options for better control of your depression.  6.  Follow-up with Dr. Verduzco in 3 to 4 months for return virtual visit.

## 2024-03-01 NOTE — PROGRESS NOTES
[facility]R Clinic Note     Patient Name: Diana Salvador : 1965 Medical Record: [id number]     Requesting Physician/clinician: Sloane Waddell PA-C           History of Present Illness:     Diana Salvador is a 58 year old female with history of right-sided stage IIb breast cancer (ER negative/GA negative, HER2 negative) who presents to PM&R cancer rehab clinic for evaluation of her rehabilitation needs in the setting of chronic fatigue, arthralgias and neuropathy.    Oncology history:  *Diagnosis:2022; 56 years old; palpated a mass; clinical stage IIB (T2N0M0), grade III, triple negative; infiltrating ductal carcinoma  *Chemotherapy: Pembrolizumab/paclitaxel/carboplatin x 3 cycles complicated by grade 3 neuropathy (plan was 4 cycles followed by 4 cycles of  Pembrolizumab/adriamycin/cytoxan). She then had 11 cycles of every 3 week Pembrolizumab, completed 2023.  *Surgery: 2022 Bilateral mastectomy and sentinel lymph node dissection, right. Pathologic complete response.   *Genetic testin negative BRCA1/BRCA2.  -Last saw Sloane Waddell on 2023 for follow-up in survivorship clinic.  She had decreased range of motion in her right shoulder with occasional numbness and tingling in the third through fifth digits, history of right ulnar surgery.  Migratory arthralgias.  Neuropathy on duloxetine.  Fatigue.      Symptoms,  Aide was seen for an initial virtual evaluation today.  She has multiple symptoms that she is struggling with currently.  She complains of peripheral neuropathy symptoms that started after chemotherapy and are present in both hands and feet and toes and fingertips, very sensitive.  She states that this makes fine motor tasks very difficult, and she gives the example of trying to assemble small items or placing batteries in small toys for her grandchildren being very difficult.  She also has localized right shoulder pain and neck pain that radiated down her right upper extremity into  her third through fifth digits.  She has had a interlaminar epidural cervical injection which has significantly helped with the pain in the last 3 weeks.  Injection was on 2/12/2024.  She does have multilevel spondylosis in her cervical spine and severe foraminal stenosis on the right at C6-C7 according to recent cervical MRI.  She also complains of severe generalized weakness that she has had for several months.  She has not been to outpatient physical therapy, and is interested in this.  For her joint aches and pains, she utilizes a pain medication regimen including tramadol and Norco that are prescribed by her primary care physician.  She also uses topical Voltaren gel as needed to help supplement this.  She has tried gabapentin in the past for her neuropathic symptoms, but this did not work very well.  In addition she has tried duloxetine for neuropathy symptoms which did not help well at all.  She has also been struggling with her mental health and has both depression and anxiety.  She is on medications for these diagnoses prescribed by her primary care physician.  She feels that her right shoulder pain is significantly better after the cervical epidural steroid injections that she had recently in the beginning of February.  She complains that her appetite has for a long time not been good.  She is wondering if it is due to her mental health symptoms or chronic fatigue, but some days she just has 1 meal per day.  She states that she does not think that she is getting enough protein in.  She lastly, she has struggled with chronic fatigue for some time now.  She feels that she cannot get up and do any of her daily activities, and mostly stays home.  She writes lists for herself but is unable to do these tasks.  She is on disability, so not working.  She does go out twice weekly to watch her grandson for her daughter-in-law.      Therapies/HEP,  Not currently participating in therapies.      Functionally,    Independent with mobility, ADLs and IADLs.             Past Medical and Surgical History:     Past Medical History:   Diagnosis Date    Arthritis     Cancer (H) 2022    Cervical high risk HPV (human papillomavirus) test positive 03/15/2020    See problem list    Depressive disorder 2014    During the death of spouse    Motion sickness      Past Surgical History:   Procedure Laterality Date    ARTHROSCOPY SHOULDER ROTATOR CUFF REPAIR Right     ARTHROSCOPY SHOULDER ROTATOR CUFF REPAIR Left     BIOPSY       SECTION      CHOLECYSTECTOMY      COLONOSCOPY      Breast reconstruction    COSMETIC SURGERY  2022    CYST REMOVAL      on chest    DAVINCI BYPASS GASTRIC ROBERT-EN-Y      GENITOURINARY SURGERY  1988    Tubiligation    GI SURGERY      Gastric bypass    HYSTERECTOMY SUPRACERVICAL      INSERT PORT VASCULAR ACCESS Left 2022    Procedure: INSERTION, VASCULAR ACCESS PORT;  Surgeon: Baldemar Marina DO;  Location: [facility]    MASTECTOMY SIMPLE Left 2022    Procedure: MASTECTOMY, SIMPLE, LEFT;  Surgeon: Baldemar Marina DO;  Location: [facility]    MASTECTOMY SIMPLE, SENTINEL NODE, COMBINED Right 2022    Procedure: MASTECTOMY, SIMPLE, WITH SENTINEL LYMPH NODE DISSECTION, RIGHT;  Surgeon: Baldemar Marina DO;  Location: [facility]    PROCURE GRAFT FAT Bilateral 02/10/2023    Procedure: Bilateral breast fat grafting from abdomen possible thighs;  Surgeon: ROSEMARIE Nieto MD;  Location:  OR    RECONSTRUCT BREAST, INSERT TISSUE EXPANDER BILATERAL, COMBINED Bilateral 2022    Procedure: RECONSTRUCTION, BREAST, BILATERAL, WITH TISSUE EXPANDER INSERTION;  Surgeon: ROSEMARIE Nieto MD;  Location: [facility]    REMOVE AND REPLACE BREAST IMPLANT PROSTHESIS Bilateral 10/07/2022    Procedure: Bilateral breast implant placement and revision;  Surgeon: ROSEMARIE Nieto MD;  Location:  OR    right ulnar nerve repositioning surgery              Social  History:     Social History     Tobacco Use    Smoking status: Former     Packs/day: 0.00     Years: 5.00     Additional pack years: 0.00     Total pack years: 0.00     Types: Cigarettes     Start date: 1982     Quit date: 1984     Years since quittin.1     Passive exposure: Past (childhood and adult per pt)    Smokeless tobacco: Never   Substance Use Topics    Alcohol use: Yes     Comment: occ       Living situation: Lives in [place], [place]  Family support: Has 2 sons.    10 years ago.         Functional history:     Diana Salvador is independent with all aspects of her life.    ADLs: Independent  Assistive devices: None  iADLs (medication management and finances): Independent           Family History:     Family History   Problem Relation Age of Onset    Heart Failure Mother     Hypertension Mother     Anxiety Disorder Mother     Osteoporosis Mother     Obesity Mother     Colon Cancer Maternal Grandmother     Breast Cancer Other             Medications:     Current Outpatient Medications   Medication Sig Dispense Refill    acetaminophen (TYLENOL) 325 MG tablet Take 2 tablets (650 mg) by mouth every 4 hours as needed for other (mild pain) 100 tablet 0    alendronate (FOSAMAX) 35 MG tablet TAKE 1 TABLET(35 MG) BY MOUTH EVERY 7 DAYS 12 tablet 3    cyanocobalamin (CYANOCOBALAMIN) 1000 MCG/ML injection Inject 1 mL (1,000 mcg) into the muscle every 30 days 3 mL 3    cyclobenzaprine (FLEXERIL) 5 MG tablet TAKE 1 TABLET (5 MG) BY MOUTH 2 TIMES DAILY AS NEEDED FOR MUSCLE SPASMS 40 tablet 5    diazepam (VALIUM) 5 MG tablet Valium 5 mg p.o., take 1 tablet 2 hours prior to injection.  Must have . 1 tablet 0    diclofenac (VOLTAREN) 1 % topical gel Place 2 g onto the skin 4 times daily 100 g 3    furosemide (LASIX) 20 MG tablet Take 1 tablet (20 mg) by mouth daily as needed (swelling) 60 tablet 3    HYDROcodone-acetaminophen (NORCO) 5-325 MG tablet Take 1 tablet by mouth 2 times daily as needed for  "severe pain 60 tablet 0    LORazepam (ATIVAN) 0.5 MG tablet Take 1-2 tablets (0.5-1 mg) by mouth every 8 hours as needed for anxiety 60 tablet 1    lurasidone (LATUDA) 40 MG TABS tablet Take 1 tablet (40 mg) by mouth daily With food (350+soraya) 90 tablet 1    omeprazole (PRILOSEC) 20 MG DR capsule As needed      potassium chloride ER (K-TAB) 20 MEQ CR tablet Take 1 tablet (20 mEq) by mouth daily 90 tablet 3    QUEtiapine (SEROQUEL) 25 MG tablet Take 2 tablets (50 mg) by mouth At Bedtime 180 tablet 3    rOPINIRole (REQUIP) 1 MG tablet Take 1 tablet (1 mg) by mouth at bedtime 90 tablet 1    sucralfate (CARAFATE) 1 GM tablet Take 1 tablet (1 g) by mouth 4 times daily as needed (Abdominal discomfort) 30 tablet 3    [START ON 3/19/2024] traMADol (ULTRAM) 50 MG tablet Take 1 tablet (50 mg) by mouth every 6 hours as needed for severe pain Schedule in clinic follow up. 120 tablet 0    traMADol (ULTRAM) 50 MG tablet Take 1 tablet (50 mg) by mouth every 6 hours as needed for severe pain 120 tablet 0    traMADol (ULTRAM) 50 MG tablet Take 1 tablet (50 mg) by mouth every 6 hours as needed for severe pain 120 tablet 0    valACYclovir (VALTREX) 1000 mg tablet TAKE 1 TABLET(1000 MG) BY MOUTH TWICE DAILY. REPEAT AS NEEDED FOR COLD SORE 16 tablet 3    VITAMIN D3 50 MCG (2000 UT) tablet TAKE 1 TABLET BY MOUTH EVERY DAY 90 tablet 3            Allergies:     No Known Allergies           ROS:     A focused ROS is negative other than the symptoms noted above in the HPI.           Physical Examiniation:     VITAL SIGNS: Ht 1.702 m (5' 7\")   Wt 95.3 kg (210 lb)   LMP  (LMP Unknown)   BMI 32.89 kg/m    BMI: Estimated body mass index is 32.89 kg/m  as calculated from the following:    Height as of this encounter: 1.702 m (5' 7\").    Weight as of this encounter: 95.3 kg (210 lb).    Gen: NAD, pleasant and cooperative   HEENT: Atraumatic, normocephalic, extraocular movements appear intact  Pulm: non-labored breathing in room air  Neuro/MSK: "   Orientation: Oriented to person, time, place and situation, Exhibits good insight into her condition and ongoing treatment  Motor: Observed moving upper extremities actively against gravity           Laboratory/Imaging:     MR Cervical Spine W/WO Contrast (1/13/24):   IMPRESSION:  1.  No osseous metastases.  2.  No abnormal cord signal. No cord pathologic enhancement.  3.  Multilevel spondylosis described above.  4.  No critical thecal sac stenosis.  5.  C6-C7: Right foraminal entry zone severe stenosis.           Assessment/Plan:   Diana Salvador is a 58 year old female with history of right-sided stage IIb breast cancer (ER negative/GA negative, HER2 negative) who presents to PM&R cancer rehab clinic for evaluation of her rehabilitation needs in the setting of chronic fatigue, arthralgias and neuropathy.  Multiple rehabilitation considerations were discussed with Aide at today's visit.  She will plan to continue her current pain medication regimen for arthralgias prescribed by her primary care physician.  We discussed that she will talk with her primary care physician about options to help better manage her depression, as she feels that her depressive symptoms are very significant still at times.  We discussed education surrounding cancer related fatigue and her arthralgias.  She would benefit from outpatient physical therapy to help with both targeted strengthening and endurance/balance.  In addition, she would benefit from occupational therapy for fatigue management.  Lastly, we talked about protein supplementation and recommendations were given to her.  We will plan a return virtual visit in about 3 to 4 months.  Aide is in agreement with this plan.      Patient education: In depth discussion and education was provided about the assessment and implications of each of the below recommendations for management. Patient indicated readiness to learn, all questions were answered and understanding of material  presented was confirmed.  Therapy/equipment/braces:  Outpatient physical therapy referral placed for targeted strengthening and work on endurance.  Outpatient occupational therapy referral placed for fatigue management.  Start daily protein supplementation aiming for an extra 30 g of protein daily.  Medications:  Continue current medication regimen per primary care physician for pain control.  Will explore options for better control of depression.  Follow up: 3 to 4 months.    Linsey Verduzco MD  Physical Medicine & Rehabilitation    I appreciate the opportunity to participate in the care of your patient.     70 minutes spent on the date of the encounter doing chart review, history and exam, documentation and further activities as noted above.

## 2024-03-01 NOTE — PROGRESS NOTES
Virtual Visit Details    Type of service:  Video Visit     Originating Location (pt. Location): Home    Distant Location (provider location):  Off-site  Platform used for Video Visit: Enrique

## 2024-03-01 NOTE — NURSING NOTE
Is the patient currently in the state of [place]? YES    Visit mode:VIDEO    If the visit is dropped, the patient can be reconnected by: VIDEO VISIT: Text to cell phone:   Telephone Information:   Mobile [phone]       Will anyone else be joining the visit? NO  (If patient encounters technical issues they should call [phone]    How would you like to obtain your AVS? MyChart    Are changes needed to the allergy or medication list? Pt stated no med changes    Reason for visit: Consult    No other vitals to report per pt    Mary Maddox VVF

## 2024-03-20 ENCOUNTER — MYC REFILL (OUTPATIENT)
Dept: FAMILY MEDICINE | Facility: CLINIC | Age: 59
End: 2024-03-20
Payer: OTHER GOVERNMENT

## 2024-03-20 DIAGNOSIS — M54.31 SCIATICA OF RIGHT SIDE: ICD-10-CM

## 2024-03-20 DIAGNOSIS — F11.20 OPIOID TYPE DEPENDENCE, CONTINUOUS USE (H): ICD-10-CM

## 2024-03-21 RX ORDER — HYDROCODONE BITARTRATE AND ACETAMINOPHEN 5; 325 MG/1; MG/1
1 TABLET ORAL 2 TIMES DAILY PRN
Qty: 60 TABLET | Refills: 0 | Status: SHIPPED | OUTPATIENT
Start: 2024-03-21 | End: 2024-04-24

## 2024-04-24 ENCOUNTER — MYC REFILL (OUTPATIENT)
Dept: FAMILY MEDICINE | Facility: CLINIC | Age: 59
End: 2024-04-24
Payer: OTHER GOVERNMENT

## 2024-04-24 DIAGNOSIS — M54.31 SCIATICA OF RIGHT SIDE: ICD-10-CM

## 2024-04-24 DIAGNOSIS — F41.9 ANXIETY: ICD-10-CM

## 2024-04-24 DIAGNOSIS — F33.1 MODERATE EPISODE OF RECURRENT MAJOR DEPRESSIVE DISORDER (H): ICD-10-CM

## 2024-04-24 DIAGNOSIS — F11.90 CHRONIC, CONTINUOUS USE OF OPIOIDS: ICD-10-CM

## 2024-04-24 DIAGNOSIS — F11.20 OPIOID TYPE DEPENDENCE, CONTINUOUS USE (H): ICD-10-CM

## 2024-04-24 RX ORDER — TRAMADOL HYDROCHLORIDE 50 MG/1
50 TABLET ORAL EVERY 6 HOURS PRN
Qty: 120 TABLET | Refills: 0 | Status: SHIPPED | OUTPATIENT
Start: 2024-04-24 | End: 2024-05-20

## 2024-04-24 RX ORDER — LURASIDONE HYDROCHLORIDE 40 MG/1
40 TABLET, FILM COATED ORAL DAILY
Qty: 90 TABLET | Refills: 0 | Status: SHIPPED | OUTPATIENT
Start: 2024-04-24 | End: 2024-05-31

## 2024-04-24 RX ORDER — HYDROCODONE BITARTRATE AND ACETAMINOPHEN 5; 325 MG/1; MG/1
1 TABLET ORAL 2 TIMES DAILY PRN
Qty: 60 TABLET | Refills: 0 | Status: SHIPPED | OUTPATIENT
Start: 2024-04-24 | End: 2024-05-20

## 2024-04-24 NOTE — TELEPHONE ENCOUNTER
Pending Prescriptions:                       Disp   Refills    lurasidone (LATUDA) 40 MG TABS tablet     90 tab*1            Sig: Take 1 tablet (40 mg) by mouth daily With food           (350+soraya)    traMADol (ULTRAM) 50 MG tablet            120 ta*0            Sig: Take 1 tablet (50 mg) by mouth every 6 hours as           needed for severe pain Schedule in clinic follow           up.    HYDROcodone-acetaminophen (NORCO) 5-325 M*60 tab*0            Sig: Take 1 tablet by mouth 2 times daily as needed           for severe pain    Routing refill request to provider for review/approval because:  Drug not on the [facility] refill protocol       Niles Langston RN

## 2024-04-29 ENCOUNTER — VIRTUAL VISIT (OUTPATIENT)
Dept: ONCOLOGY | Facility: HOSPITAL | Age: 59
End: 2024-04-29
Attending: PSYCHOLOGIST
Payer: OTHER GOVERNMENT

## 2024-04-29 VITALS — HEIGHT: 67 IN | BODY MASS INDEX: 32.65 KG/M2 | WEIGHT: 208 LBS

## 2024-04-29 DIAGNOSIS — F43.22 ADJUSTMENT DISORDER WITH ANXIOUS MOOD: Primary | ICD-10-CM

## 2024-04-29 PROCEDURE — 90834 PSYTX W PT 45 MINUTES: CPT | Mod: 95 | Performed by: PSYCHOLOGIST

## 2024-04-29 ASSESSMENT — PAIN SCALES - GENERAL: PAINLEVEL: MODERATE PAIN (4)

## 2024-04-29 NOTE — PROGRESS NOTES
[facility] Oncology- Psychotherapy                                     Progress Note     Patient Name: Diana Salvador                     Date:   2024                                          Service Type: Individual                            Session Start Time:  1201               Session End Time:    1246                Session Length:        45 mins     Session #:      2     Attendees:     Client attended alone     Service Modality:  Video Visit:      Provider verified identity through the following two step process.  Patient provided:  Patient      Telemedicine Visit: The patient's condition can be safely assessed and treated via synchronous audio and visual telemedicine encounter.       Reason for Telemedicine Visit: Patient has requested telehealth visit     Originating Site (Patient Location): Patient's home     Distant Site (Provider Location): [facility] [place]     Consent:  The patient/guardian has verbally consented to: the potential risks and benefits of telemedicine (video visit) versus in person care; bill my insurance or make self-payment for services provided; and responsibility for payment of non-covered services.      Patient would like the video invitation sent by:  My Chart     Mode of Communication:  Video Conference via Amwell     Distant Location (Provider):  On-site     As the provider I attest to compliance with applicable laws and regulations related to telemedicine.     DATA  Interactive Complexity: No  Crisis: No                               Progress Since Last Session (Related to Symptoms / Goals / Homework):              Symptoms:  Pt reports dysphoric, sad, and depressed mood. Pt reports panic sx.     Pt reports racing thoughts.     She has a hard time organizing her tasks.                  Pt reports she has no energy.                  Pt reports she has no energy.                  Pt reports she has panic attack right now and we are  working through it.                  Pt reports she is sleeping adequately.     She eats minimally. She reports she eats peanut butter toast.                  Pt reports she has arthritis in her back and has been dx with scoliosis.                  Pt reports she gets angry at herself for not functioning at the level she would like.                 Pt reports breathing exercises are helpful.     Pt reports her dog  and she has not been walking since last month.     Pt is still experiencing grief over loss of spouse.     Pt reports her finances are tough.     Pt reports she needs to talk to the nutritionist.     Pt plans to get a new dog.         Homework:  None                               Episode of Care Goals: Satisfactory progress - ACTION (Actively working towards change); Intervened by reinforcing change plan / affirming steps taken                    Current / Ongoing Stressors and Concerns:  Pt reports she had reconstruction and does not look or feel normal she reports.          Pt reports her breast reconstruction looks differently than original which is upsetting.     Pt reports she does not eat the same. She had dental surgery. She does not like to go out because she feels self conscious.     Pt reports she is doing a craft she has not done before.     Pt has a little dog she walks.     Pt reports she watches the history channel.                 Pt reports she helps out with her grandson so her daughter in law can work.                  Pt reports she cannot keep up with medical bills. She has been sent to collections.                  Pt denies S/I.                  Pt reports she has disability from work and social security disability.                Pt reports her kids are doing well and she loves her grand kids.                     Social Hx:              Pt lives alone, She has had support people come in.     Pt has been dealing with cancer two years. She goes to the [facility].      Pt  "reports she lives alone. Pt was  until her spouse  9 years ago after 31 years together. He was in an MVA in a 4 angel and later had blood clots that led to a pulmonary embolism.                  Pt reports she has two sons and a daughter in law, and grandchildren.                  Son 37 \"Christopher\" he is has PTSD from the [employer]. He is not getting help. He denies an issue.                  Son 35 \"Michael\" is  and they have a boy 2 and a girl 8.                  Both live 9 miles away together.                  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 3 years ago and was dx with cancer two years ago.                  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                  Pt reports her mother  8 mos after her spouse from CHF., her father lives in [place] where she grew up.                     Treatment Objective(s) Addressed in This Session:          identify medical stressors which contribute to feelings of depression  And anxiety.                 Intervention:              CBT: ,  Emotion Focused Therapy: ,  Solution Focused: ,     Assessments completed prior to visit:  None                    ASSESSMENT: Current Emotional / Mental Status (status of significant symptoms):              Risk status (Self / Other harm or suicidal ideation)              Patient denies current fears or concerns for personal safety.              Patient denies current or recent suicidal ideation or behaviors.              Patient denies current or recent homicidal ideation or behaviors.              Patient denies current or recent self injurious behavior or ideation.              Patient denies other safety concerns.              Patient reports there has been no change in risk factors since their last session.                Patient reports there has been no change in protective factors since their last " session.                Recommended that patient call 911 or go to the local ED should there be a change in any of these risk factors.                 Appearance:                            Appropriate               Eye Contact:                           Good               Psychomotor Behavior:          Normal               Attitude:                                   Cooperative               Orientation:                             All              Speech                          Rate / Production:       Normal                           Volume:                       Normal               Mood:                                      Anxious  Depressed  Sad               Affect:                                      Labile               Thought Content:                    Clear               Thought Form:                        Coherent  Logical               Insight:                                     Good                  Medication Review:              No changes to current psychiatric medication(s)                 Medication Compliance:              Yes                 Changes in Health Issues:              Yes: cancer, Associated Psychological Distress                 Chemical Use Review:              Substance Use: Chemical use reviewed, no active concerns identified      Diagnosis:  F43.23 Adjustment D/O with anxiety and depressive sx.      Collateral Reports Completed:              Not Applicable     PLAN: (Patient Tasks / Therapist Tasks / Other)  Return in two weeks           Magan Mendez LP                                                           ______________________________________________________________________     Individual Treatment Plan     Patient's Name: Diana Salvador                  YOB: 1965     Date of Creation: 1/9/2024     Date Treatment Plan Last Reviewed/Revised:         DSM5 Diagnoses: F43.23 Adjustment D/O with anxiety and depressive sx.   Psychosocial /  Contextual Factors: Cancer dx, arthritis.   PROMIS (reviewed every 90 days):      Referral / Collaboration:  Referral to another professional/service is not indicated at this time..     Anticipated number of session for this episode of care: 9-12 sessions  Anticipation frequency of session: Biweekly  Anticipated Duration of each session: 53 or more minutes  Treatment plan will be reviewed in 90 days or when goals have been changed.         MeasurableTreatment Goal(s) related to diagnosis / functional impairment(s)  Goal 1: Patient will reduce anxiety and depressive sx.         Objective #A (Patient Action)                          Patient will identify medical stressors which contribute to feelings of depression.  Status: New - Date: 1/9/24       Intervention(s)  Therapist will teach distraction skills.   .     Objective #B  Patient will identify medical stressors which contribute to feelings of depression and anxiety  Status: New - Date: 1/9/24       Intervention(s)  Therapist will teach emotional recognition/identification.   .     Objective #C  Patient will identify medical stressors which contribute to feelings of depression.and anxiety  Status: New - Date: 1/9/24       Intervention(s)  Therapist will teach emotional regulation skills.   .           Patient has reviewed and agreed to the above plan.        Magan Mendez LP                    4/29/2024

## 2024-04-29 NOTE — NURSING NOTE
Is the patient currently in the state of [place]? YES    Visit mode:VIDEO    If the visit is dropped, the patient can be reconnected by: VIDEO VISIT: Send to e-mail at: [email or web address]    Will anyone else be joining the visit? NO  (If patient encounters technical issues they should call [phone]    How would you like to obtain your AVS? MyChart    Are changes needed to the allergy or medication list? No      Reason for visit: Video Visit (Follow Up )    Genesis QURESHI

## 2024-04-29 NOTE — PROGRESS NOTES
Virtual Visit Details    Type of service:  Video Visit     Originating Location (pt. Location): Home    Distant Location (provider location):  On-site  Platform used for Video Visit: Enrique

## 2024-05-01 ENCOUNTER — PATIENT OUTREACH (OUTPATIENT)
Dept: CARE COORDINATION | Facility: CLINIC | Age: 59
End: 2024-05-01
Payer: OTHER GOVERNMENT

## 2024-05-01 ENCOUNTER — INFUSION THERAPY VISIT (OUTPATIENT)
Dept: INFUSION THERAPY | Facility: CLINIC | Age: 59
End: 2024-05-01
Attending: NURSE PRACTITIONER
Payer: OTHER GOVERNMENT

## 2024-05-01 DIAGNOSIS — C50.211 MALIGNANT NEOPLASM OF UPPER-INNER QUADRANT OF RIGHT BREAST IN FEMALE, ESTROGEN RECEPTOR NEGATIVE (H): Primary | ICD-10-CM

## 2024-05-01 DIAGNOSIS — Z17.1 MALIGNANT NEOPLASM OF UPPER-INNER QUADRANT OF RIGHT BREAST IN FEMALE, ESTROGEN RECEPTOR NEGATIVE (H): Primary | ICD-10-CM

## 2024-05-01 PROCEDURE — 96523 IRRIG DRUG DELIVERY DEVICE: CPT

## 2024-05-01 PROCEDURE — 250N000011 HC RX IP 250 OP 636: Performed by: NURSE PRACTITIONER

## 2024-05-01 RX ORDER — HEPARIN SODIUM (PORCINE) LOCK FLUSH IV SOLN 100 UNIT/ML 100 UNIT/ML
5 SOLUTION INTRAVENOUS
Status: DISCONTINUED | OUTPATIENT
Start: 2024-05-01 | End: 2024-05-01 | Stop reason: HOSPADM

## 2024-05-01 RX ORDER — HEPARIN SODIUM (PORCINE) LOCK FLUSH IV SOLN 100 UNIT/ML 100 UNIT/ML
5 SOLUTION INTRAVENOUS
Status: CANCELLED | OUTPATIENT
Start: 2024-05-01

## 2024-05-01 RX ADMIN — Medication 5 ML: at 14:09

## 2024-05-01 NOTE — PROGRESS NOTES
Infusion Nursing Note:  Diana Salvador presents today for Port flush.    Patient seen by provider today: No   present during visit today: Not Applicable.    Note: N/A.    Intravenous Access:  Implanted Port.    Treatment Conditions:  Not Applicable.    Post Infusion Assessment:  Blood return noted.  Site patent and intact, free from redness, edema or discomfort.  No evidence of extravasations.  Access discontinued per protocol.     Discharge Plan:   Discharge instructions reviewed with: Patient.  Patient and/or family verbalized understanding of discharge instructions and all questions answered.  AVS to patient via [employer].  Patient will return 6/24/2024 for visit with Marilu Vergara NP for next appointment.   Patient discharged in stable condition accompanied by: self.  Departure Mode: Ambulatory.    Farida Romero RN

## 2024-05-01 NOTE — PROGRESS NOTES
Social Work - Distress Screen Intervention  [facility]    Identified Concern and Score from Distress Screenin. How concerned are you about your ability to eat? (!) 10     2. How concerned are you about unintended weight loss or your current weight? (!) 10     3. How concerned are you about feeling depressed or very sad?  (!) 10     4. How concerned are you about feeling anxious or very scared?  (!) 10     5. Do you struggle with the loss of meaning and khadra in your life?  Somewhat     6. How concerned are you about work and home life issues that may be affected by your cancer?  (!) 9     7. How concerned are you about knowing what resources are available to help you?  (!) 10     8. Do you currently have what you would describe as Orthodox or spiritual struggles? Not at all     9. If you want to be contacted by one of our professionals, I can send a message to them right now.  No data recorded     Date of Distress Screen: 24  Data: At time of last visit, patient scored positive on distress screening.  outreached to patient today to follow up on elevated distress and introduce psychosocial services and support. Chart reviewed. Pt follows closely with Wilfrid Mendez and last visit was 24.   Intervention/Education provided: SW did not call patient since she is established with Wilfrid.     Follow-up Required:  will remain available to patient for support as needed    JOSE ANTONIO Brewer, [facility]  Adult Oncology Clinics  [place] (M,W), [place] (T) & [place] (Th)  *I am off Friday  Office: [phone]

## 2024-05-02 ENCOUNTER — TELEPHONE (OUTPATIENT)
Dept: ONCOLOGY | Facility: HOSPITAL | Age: 59
End: 2024-05-02
Payer: OTHER GOVERNMENT

## 2024-05-02 NOTE — TELEPHONE ENCOUNTER
Oncology Distress Screen    Called Aide in regards to her positive oncology nutrition distress screen:    1. How concerned are you about your ability to eat? :  10  2. How concerned are you about unintended weight loss or your current weight? : 10    Called and left voicemail explaining role and reason for call. Call back number provided.      Karen Vasquez MS, RD, LD  [phone]

## 2024-05-14 ENCOUNTER — TELEPHONE (OUTPATIENT)
Dept: ONCOLOGY | Facility: HOSPITAL | Age: 59
End: 2024-05-14
Payer: OTHER GOVERNMENT

## 2024-05-20 ENCOUNTER — MYC REFILL (OUTPATIENT)
Dept: FAMILY MEDICINE | Facility: CLINIC | Age: 59
End: 2024-05-20
Payer: OTHER GOVERNMENT

## 2024-05-20 DIAGNOSIS — F41.8 SITUATIONAL ANXIETY: ICD-10-CM

## 2024-05-20 DIAGNOSIS — F11.90 CHRONIC, CONTINUOUS USE OF OPIOIDS: ICD-10-CM

## 2024-05-20 DIAGNOSIS — M54.31 SCIATICA OF RIGHT SIDE: ICD-10-CM

## 2024-05-20 DIAGNOSIS — F11.20 OPIOID TYPE DEPENDENCE, CONTINUOUS USE (H): ICD-10-CM

## 2024-05-21 RX ORDER — TRAMADOL HYDROCHLORIDE 50 MG/1
50 TABLET ORAL EVERY 6 HOURS PRN
Qty: 120 TABLET | Refills: 0 | Status: SHIPPED | OUTPATIENT
Start: 2024-05-21 | End: 2024-09-25

## 2024-05-21 RX ORDER — HYDROCODONE BITARTRATE AND ACETAMINOPHEN 5; 325 MG/1; MG/1
1 TABLET ORAL 2 TIMES DAILY PRN
Qty: 60 TABLET | Refills: 0 | Status: SHIPPED | OUTPATIENT
Start: 2024-05-21 | End: 2024-05-31

## 2024-05-21 RX ORDER — LORAZEPAM 0.5 MG/1
.5-1 TABLET ORAL EVERY 8 HOURS PRN
Qty: 60 TABLET | Refills: 0 | Status: SHIPPED | OUTPATIENT
Start: 2024-05-21 | End: 2024-05-31

## 2024-05-31 ENCOUNTER — OFFICE VISIT (OUTPATIENT)
Dept: FAMILY MEDICINE | Facility: CLINIC | Age: 59
End: 2024-05-31
Payer: OTHER GOVERNMENT

## 2024-05-31 VITALS
HEIGHT: 67 IN | OXYGEN SATURATION: 97 % | WEIGHT: 214 LBS | TEMPERATURE: 97.4 F | DIASTOLIC BLOOD PRESSURE: 70 MMHG | SYSTOLIC BLOOD PRESSURE: 112 MMHG | HEART RATE: 70 BPM | BODY MASS INDEX: 33.59 KG/M2 | RESPIRATION RATE: 20 BRPM

## 2024-05-31 DIAGNOSIS — K90.9 INTESTINAL MALABSORPTION, UNSPECIFIED TYPE: ICD-10-CM

## 2024-05-31 DIAGNOSIS — F41.9 ANXIETY: ICD-10-CM

## 2024-05-31 DIAGNOSIS — F11.20 OPIOID DEPENDENCE, CONTINUOUS (H): ICD-10-CM

## 2024-05-31 DIAGNOSIS — R60.0 LOWER EXTREMITY EDEMA: ICD-10-CM

## 2024-05-31 DIAGNOSIS — F41.8 SITUATIONAL ANXIETY: ICD-10-CM

## 2024-05-31 DIAGNOSIS — E53.8 VITAMIN B12 DEFICIENCY (NON ANEMIC): ICD-10-CM

## 2024-05-31 DIAGNOSIS — M54.31 SCIATICA OF RIGHT SIDE: ICD-10-CM

## 2024-05-31 DIAGNOSIS — G25.81 RESTLESS LEGS SYNDROME: ICD-10-CM

## 2024-05-31 DIAGNOSIS — Z98.84 HX OF GASTRIC BYPASS: ICD-10-CM

## 2024-05-31 DIAGNOSIS — F11.20 OPIOID TYPE DEPENDENCE, CONTINUOUS USE (H): Primary | ICD-10-CM

## 2024-05-31 DIAGNOSIS — F33.1 MODERATE EPISODE OF RECURRENT MAJOR DEPRESSIVE DISORDER (H): ICD-10-CM

## 2024-05-31 PROCEDURE — G2211 COMPLEX E/M VISIT ADD ON: HCPCS | Performed by: FAMILY MEDICINE

## 2024-05-31 PROCEDURE — 99214 OFFICE O/P EST MOD 30 MIN: CPT | Performed by: FAMILY MEDICINE

## 2024-05-31 PROCEDURE — 96127 BRIEF EMOTIONAL/BEHAV ASSMT: CPT | Performed by: FAMILY MEDICINE

## 2024-05-31 RX ORDER — BUPROPION HYDROCHLORIDE 150 MG/1
150 TABLET ORAL EVERY MORNING
Qty: 90 TABLET | Refills: 3 | Status: SHIPPED | OUTPATIENT
Start: 2024-05-31

## 2024-05-31 RX ORDER — HYDROCODONE BITARTRATE AND ACETAMINOPHEN 5; 325 MG/1; MG/1
1 TABLET ORAL 2 TIMES DAILY PRN
Qty: 60 TABLET | Refills: 0 | Status: SHIPPED | OUTPATIENT
Start: 2024-06-23 | End: 2024-09-25

## 2024-05-31 RX ORDER — LURASIDONE HYDROCHLORIDE 20 MG/1
20 TABLET, FILM COATED ORAL DAILY
Qty: 14 TABLET | Refills: 0 | Status: SHIPPED | OUTPATIENT
Start: 2024-05-31 | End: 2024-06-24

## 2024-05-31 RX ORDER — CYANOCOBALAMIN 1000 UG/ML
1 INJECTION, SOLUTION INTRAMUSCULAR; SUBCUTANEOUS
Qty: 3 ML | Refills: 3 | Status: SHIPPED | OUTPATIENT
Start: 2024-05-31 | End: 2024-09-25

## 2024-05-31 RX ORDER — TRAMADOL HYDROCHLORIDE 50 MG/1
50 TABLET ORAL EVERY 6 HOURS PRN
Qty: 120 TABLET | Refills: 0 | Status: SHIPPED | OUTPATIENT
Start: 2024-08-22 | End: 2024-09-21

## 2024-05-31 RX ORDER — HYDROCODONE BITARTRATE AND ACETAMINOPHEN 5; 325 MG/1; MG/1
1 TABLET ORAL 2 TIMES DAILY PRN
Qty: 60 TABLET | Refills: 0 | Status: SHIPPED | OUTPATIENT
Start: 2024-07-23 | End: 2024-08-22

## 2024-05-31 RX ORDER — TRAMADOL HYDROCHLORIDE 50 MG/1
50 TABLET ORAL EVERY 6 HOURS PRN
Qty: 120 TABLET | Refills: 0 | Status: SHIPPED | OUTPATIENT
Start: 2024-07-23 | End: 2024-09-25

## 2024-05-31 RX ORDER — ROPINIROLE 1 MG/1
1 TABLET, FILM COATED ORAL AT BEDTIME
Qty: 90 TABLET | Refills: 1 | Status: SHIPPED | OUTPATIENT
Start: 2024-05-31

## 2024-05-31 RX ORDER — HYDROCODONE BITARTRATE AND ACETAMINOPHEN 5; 325 MG/1; MG/1
1 TABLET ORAL 2 TIMES DAILY PRN
Qty: 60 TABLET | Refills: 0 | Status: SHIPPED | OUTPATIENT
Start: 2024-08-22 | End: 2024-09-16

## 2024-05-31 RX ORDER — FUROSEMIDE 20 MG
20 TABLET ORAL DAILY PRN
Qty: 60 TABLET | Refills: 3 | Status: SHIPPED | OUTPATIENT
Start: 2024-05-31

## 2024-05-31 RX ORDER — LORAZEPAM 0.5 MG/1
.5-1 TABLET ORAL EVERY 8 HOURS PRN
Qty: 60 TABLET | Refills: 5 | Status: SHIPPED | OUTPATIENT
Start: 2024-05-31

## 2024-05-31 RX ORDER — TRAMADOL HYDROCHLORIDE 50 MG/1
50 TABLET ORAL EVERY 6 HOURS PRN
Qty: 120 TABLET | Refills: 0 | Status: SHIPPED | OUTPATIENT
Start: 2024-06-23 | End: 2024-09-25

## 2024-05-31 ASSESSMENT — ANXIETY QUESTIONNAIRES
4. TROUBLE RELAXING: MORE THAN HALF THE DAYS
2. NOT BEING ABLE TO STOP OR CONTROL WORRYING: NEARLY EVERY DAY
GAD7 TOTAL SCORE: 14
7. FEELING AFRAID AS IF SOMETHING AWFUL MIGHT HAPPEN: NOT AT ALL
IF YOU CHECKED OFF ANY PROBLEMS ON THIS QUESTIONNAIRE, HOW DIFFICULT HAVE THESE PROBLEMS MADE IT FOR YOU TO DO YOUR WORK, TAKE CARE OF THINGS AT HOME, OR GET ALONG WITH OTHER PEOPLE: EXTREMELY DIFFICULT
5. BEING SO RESTLESS THAT IT IS HARD TO SIT STILL: MORE THAN HALF THE DAYS
1. FEELING NERVOUS, ANXIOUS, OR ON EDGE: MORE THAN HALF THE DAYS
7. FEELING AFRAID AS IF SOMETHING AWFUL MIGHT HAPPEN: NOT AT ALL
GAD7 TOTAL SCORE: 14
3. WORRYING TOO MUCH ABOUT DIFFERENT THINGS: NEARLY EVERY DAY
GAD7 TOTAL SCORE: 14
8. IF YOU CHECKED OFF ANY PROBLEMS, HOW DIFFICULT HAVE THESE MADE IT FOR YOU TO DO YOUR WORK, TAKE CARE OF THINGS AT HOME, OR GET ALONG WITH OTHER PEOPLE?: EXTREMELY DIFFICULT
6. BECOMING EASILY ANNOYED OR IRRITABLE: MORE THAN HALF THE DAYS

## 2024-05-31 ASSESSMENT — PATIENT HEALTH QUESTIONNAIRE - PHQ9
SUM OF ALL RESPONSES TO PHQ QUESTIONS 1-9: 16
SUM OF ALL RESPONSES TO PHQ QUESTIONS 1-9: 16
10. IF YOU CHECKED OFF ANY PROBLEMS, HOW DIFFICULT HAVE THESE PROBLEMS MADE IT FOR YOU TO DO YOUR WORK, TAKE CARE OF THINGS AT HOME, OR GET ALONG WITH OTHER PEOPLE: EXTREMELY DIFFICULT

## 2024-05-31 NOTE — PROGRESS NOTES
Assessment & Plan     Vitamin B12 deficiency (non anemic)  Recheck after insurance change to Medicare, I'll put labs in around August after insurance changes and on file.  - cyanocobalamin (CYANOCOBALAMIN) 1000 MCG/ML injection; Inject 1 mL (1,000 mcg) into the muscle every 30 days    Lower extremity edema  Stable, refill  - furosemide (LASIX) 20 MG tablet; Take 1 tablet (20 mg) by mouth daily as needed (swelling)    Sciatica of right side  Ongoing pain waxes and wanes. Takes norco if pain is severe or tramadol for moderate pain.  - HYDROcodone-acetaminophen (NORCO) 5-325 MG tablet; Take 1 tablet by mouth 2 times daily as needed for severe pain  - traMADol (ULTRAM) 50 MG tablet; Take 1 tablet (50 mg) by mouth every 6 hours as needed for severe pain  - traMADol (ULTRAM) 50 MG tablet; Take 1 tablet (50 mg) by mouth every 6 hours as needed for severe pain  - traMADol (ULTRAM) 50 MG tablet; Take 1 tablet (50 mg) by mouth every 6 hours as needed for severe pain  - HYDROcodone-acetaminophen (NORCO) 5-325 MG tablet; Take 1 tablet by mouth 2 times daily as needed for severe pain  - HYDROcodone-acetaminophen (NORCO) 5-325 MG tablet; Take 1 tablet by mouth 2 times daily as needed for severe pain    Opioid type dependence, continuous use (H)  Due to pain.  - HYDROcodone-acetaminophen (NORCO) 5-325 MG tablet; Take 1 tablet by mouth 2 times daily as needed for severe pain  - traMADol (ULTRAM) 50 MG tablet; Take 1 tablet (50 mg) by mouth every 6 hours as needed for severe pain  - traMADol (ULTRAM) 50 MG tablet; Take 1 tablet (50 mg) by mouth every 6 hours as needed for severe pain  - traMADol (ULTRAM) 50 MG tablet; Take 1 tablet (50 mg) by mouth every 6 hours as needed for severe pain  - HYDROcodone-acetaminophen (NORCO) 5-325 MG tablet; Take 1 tablet by mouth 2 times daily as needed for severe pain  - HYDROcodone-acetaminophen (NORCO) 5-325 MG tablet; Take 1 tablet by mouth 2 times daily as needed for severe  "pain    Situational anxiety  Improving slowly after breast cancer treatment and stressors slowly improving.  Using medication about 3 times a week 1mg.  - LORazepam (ATIVAN) 0.5 MG tablet; Take 1-2 tablets (0.5-1 mg) by mouth every 8 hours as needed for anxiety    Anxiety  - lurasidone (LATUDA) 20 MG TABS tablet; Take 1 tablet (20 mg) by mouth daily for 14 days With food (350+soraya). Then stop the medication.  - buPROPion (WELLBUTRIN XL) 150 MG 24 hr tablet; Take 1 tablet (150 mg) by mouth every morning    Moderate episode of recurrent major depressive disorder (H)  Not controlled, as mood worsening with weight gain from latuda, plan to taper off latuda and instead start wellbutrin (bupropion) as this is weight neutral and she had been on it before with good results.  -discussed risks, benefits, and side effects of this new medication. Patient understands and is in agreement.  - lurasidone (LATUDA) 20 MG TABS tablet; Take 1 tablet (20 mg) by mouth daily for 14 days With food (350+soraya). Then stop the medication.  - buPROPion (WELLBUTRIN XL) 150 MG 24 hr tablet; Take 1 tablet (150 mg) by mouth every morning    Restless legs syndrome  [place], refill  - rOPINIRole (REQUIP) 1 MG tablet; Take 1 tablet (1 mg) by mouth at bedtime      The longitudinal plan of care for the diagnosis(es)/condition(s) as documented were addressed during this visit. Due to the added complexity in care, I will continue to support Aide in the subsequent management and with ongoing continuity of care.    BMI  Estimated body mass index is 33.63 kg/m  as calculated from the following:    Height as of this encounter: 1.699 m (5' 6.89\").    Weight as of this encounter: 97.1 kg (214 lb).       Depression Screening Follow Up        Follow Up Actions Taken  Crisis resource information provided in After Visit Summary       See Patient Instructions    Stephie Noble is a 59 year old, presenting for the following health issues:  Recheck Medication      " 5/31/2024     7:22 AM   Additional Questions   Roomed by Stacy Chavez   Accompanied by self         5/31/2024     7:22 AM   Patient Reported Additional Medications   Patient reports taking the following new medications none     History of Present Illness       Back Pain:  She presents for follow up of back pain. Patient's back pain is a recurring problem.  Location of back pain:  Right lower back, left lower back, right middle of back, right side of neck, left side of neck, right shoulder, right buttock and right hip  Description of back pain: burning, sharp and shooting  Back pain spreads: right buttocks, left buttocks, right thigh, right knee, right foot, right shoulder, right side of neck and left side of neck    Since patient first noticed back pain, pain is: unchanged  Does back pain interfere with her job:  Not applicable       Mental Health Follow-up:  Patient presents to follow-up on Depression & Anxiety.Patient's depression since last visit has been:  Medium  The patient is having other symptoms associated with depression.  Patient's anxiety since last visit has been:  Medium  The patient is having other symptoms associated with anxiety.  Any significant life events: job concerns, financial concerns and other  Patient is feeling anxious or having panic attacks.  Patient has no concerns about alcohol or drug use.    Reason for visit:  Labs and routine issues    She eats 0-1 servings of fruits and vegetables daily.She consumes 1 sweetened beverage(s) daily.She exercises with enough effort to increase her heart rate 20 to 29 minutes per day.  She exercises with enough effort to increase her heart rate 7 days per week.   She is taking medications regularly.     Latuda: weight increasing which is bringing mood down.  Previous medication buspirone  Bupropion 2021 was helpful then came off as was doing well.  Duloxetine was no longer helpful.  Had been on celexa prior to that.  Years ago was on sertraline  which quit working or never really worked and lexapro after that which quit working or never did really work  Celexa didn't seem to help.  Quetiapine medication is helpful for restful sleep.  Lorazepam 0-2 per day, depends on the day. Average of 1 pill a day from .  Therapy Dr. Russ    Pain History:  When did you first notice your pain? Ongoing for 20 years   Have you seen this provider for your pain in the past? Yes   Where in your body do you have pain? Mid to low back bilateral, pain radiates into right buttocks and down behind right knee. Neck- bilateral.  Are you seeing anyone else for your pain? No        10/31/2023     5:50 AM 1/23/2024    12:44 PM 5/31/2024     7:12 AM   PHQ-9 SCORE   PHQ-9 Total Score MyChart 13 (Moderate depression) 11 (Moderate depression) 16 (Moderately severe depression)   PHQ-9 Total Score 13 11 16           10/31/2023     5:51 AM 1/23/2024     1:00 PM 5/31/2024     7:13 AM   SHADE-7 SCORE   Total Score 17 (severe anxiety) 15 (severe anxiety) 14 (moderate anxiety)   Total Score 17 15 14           5/19/2023    12:53 PM 1/23/2024    12:55 PM 5/31/2024     7:29 AM   PEG Score   PEG Total Score 7 7.33 9.33           10/31/2023     5:50 AM 1/23/2024    12:44 PM 5/31/2024     7:12 AM   PHQ-9 SCORE   PHQ-9 Total Score MyChart 13 (Moderate depression) 11 (Moderate depression) 16 (Moderately severe depression)   PHQ-9 Total Score 13 11 16           10/31/2023     5:51 AM 1/23/2024     1:00 PM 5/31/2024     7:13 AM   SHADE-7 SCORE   Total Score 17 (severe anxiety) 15 (severe anxiety) 14 (moderate anxiety)   Total Score 17 15 14           5/19/2023    12:53 PM 1/23/2024    12:55 PM 5/31/2024     7:29 AM   PEG Score   PEG Total Score 7 7.33 9.33       Chronic Pain Follow Up:    Location of pain: low back, neck  Analgesia/pain control:    - Recent changes:  waxes and wanes with activity    - Overall control: Tolerable with discomfort    - Current treatments: norco 5/325mg 2 per day  Tramadol 50mg  "4 per day.     Adherence:     - Do you ever take more pain medicine than prescribed? No    - When did you take your last dose of pain medicine?  This morning.   Adverse effects: No   Last fill 5/22/24 norco 5/325 #60, 4/24/24  Tramadol 50mg #120 5/22/24, 4/24/24  Lorazepam 0.5mg #60 5/21/24, 3/20/24  PDMP Review         Value Time User    State PDMP site checked  Yes 1/23/2024  1:38 PM Quoc Chu MD          Last CSA Agreement:   CSA -- Patient Level:     [Media Unavailable] Controlled Substance Agreement - Opioid - Scan on 9/13/2023  9:53 PM   [Media Unavailable] Controlled Substance Agreement - Opioid - Scan on 5/8/2022  1:14 PM   [Media Unavailable] Controlled Substance Agreement - Opioid - Scan on 11/17/2019  3:57 PM       Last UDS:           Objective    /70 (BP Location: Right arm, Patient Position: Sitting, Cuff Size: Adult Large)   Pulse 70   Temp 97.4  F (36.3  C) (Tympanic)   Resp 20   Ht 1.699 m (5' 6.89\")   Wt 97.1 kg (214 lb)   LMP  (LMP Unknown)   SpO2 97%   BMI 33.63 kg/m    Body mass index is 33.63 kg/m .  Physical Exam   GENERAL: alert and no distress  PSYCH: mentation appears normal, affect normal/bright            Signed Electronically by: Quoc Chu MD    "

## 2024-05-31 NOTE — PATIENT INSTRUCTIONS
Taper off the latuda, decrease to the 20mg for 14 days then stop the medication.    At the same time start the bupropion (Wellbutrin) 150mg XR once a day in the morning.  Please let me know sooner if concerns.    Plan urine medication screen with a lab only appt once insurance changes.    Schedule appt in early to mid September

## 2024-06-21 ENCOUNTER — VIRTUAL VISIT (OUTPATIENT)
Dept: ONCOLOGY | Facility: CLINIC | Age: 59
End: 2024-06-21
Attending: STUDENT IN AN ORGANIZED HEALTH CARE EDUCATION/TRAINING PROGRAM
Payer: OTHER GOVERNMENT

## 2024-06-21 VITALS — HEIGHT: 67 IN | WEIGHT: 214 LBS | BODY MASS INDEX: 33.59 KG/M2

## 2024-06-21 DIAGNOSIS — M25.511 ACUTE PAIN OF RIGHT SHOULDER: ICD-10-CM

## 2024-06-21 DIAGNOSIS — C50.911 MALIGNANT NEOPLASM OF RIGHT BREAST IN FEMALE, ESTROGEN RECEPTOR NEGATIVE, UNSPECIFIED SITE OF BREAST (H): Primary | ICD-10-CM

## 2024-06-21 DIAGNOSIS — T45.1X5A CHEMOTHERAPY-INDUCED NEUROPATHY (H): ICD-10-CM

## 2024-06-21 DIAGNOSIS — G62.0 CHEMOTHERAPY-INDUCED NEUROPATHY (H): ICD-10-CM

## 2024-06-21 DIAGNOSIS — R53.81 PHYSICAL DECONDITIONING: ICD-10-CM

## 2024-06-21 DIAGNOSIS — Z17.1 MALIGNANT NEOPLASM OF RIGHT BREAST IN FEMALE, ESTROGEN RECEPTOR NEGATIVE, UNSPECIFIED SITE OF BREAST (H): Primary | ICD-10-CM

## 2024-06-21 PROCEDURE — 99215 OFFICE O/P EST HI 40 MIN: CPT | Mod: 95 | Performed by: STUDENT IN AN ORGANIZED HEALTH CARE EDUCATION/TRAINING PROGRAM

## 2024-06-21 ASSESSMENT — PAIN SCALES - GENERAL: PAINLEVEL: SEVERE PAIN (6)

## 2024-06-21 NOTE — LETTER
2024      Diana Salvador  [address] [place] [id number]      Dear Colleague,    Thank you for referring your patient, Diana Salvador, to the [facility]. Please see a copy of my visit note below.    Virtual Visit Details    Type of service:  Video Visit     Originating Location (pt. Location): Home  Distant Location (provider location):  Off-site  Platform used for Video Visit: [facility]   PM&R clinic note        Interval history:     Diana Salvador presents to clinic today for follow up reg her rehab needs.   She has h/o right-sided stage IIb breast cancer (ER negative/HI negative, HER2 negative) with chronic fatigue, arthralgias and neuropathy.   Was last seen in clinic on 3/1/24.  Recommendations included:  Therapy/equipment/braces:  Outpatient physical therapy referral placed for targeted strengthening and work on endurance.  Outpatient occupational therapy referral placed for fatigue management.  Start daily protein supplementation aiming for an extra 30 g of protein daily.  Medications:  Continue current medication regimen per primary care physician for pain control.  Will explore options for better control of depression.  Follow up: 3 to 4 months.    Oncology history:  *Diagnosis:2022; 56 years old; palpated a mass; clinical stage IIB (T2N0M0), grade III, triple negative; infiltrating ductal carcinoma  *Chemotherapy: Pembrolizumab/paclitaxel/carboplatin x 3 cycles complicated by grade 3 neuropathy (plan was 4 cycles followed by 4 cycles of  Pembrolizumab/adriamycin/cytoxan). She then had 11 cycles of every 3 week Pembrolizumab, completed 2023.  *Surgery: 2022 Bilateral mastectomy and sentinel lymph node dissection, right. Pathologic complete response.   *Genetic testin negative BRCA1/BRCA2.  -Last saw Sloane Waddell on 2023 for follow-up in survivorship clinic.  She had decreased range of motion in her  right shoulder with occasional numbness and tingling in the third through fifth digits, history of right ulnar surgery.  Migratory arthralgias.  Neuropathy on duloxetine.  Fatigue.        Symptoms,  Aide was seen for a return virtual visit today.  She has not been able to do the physical therapy or occupational therapy since her last visit because of lack of insurance coverage for these services. She now has insurance cover that will cover therapy appointments, so is looking forward to hopefully getting these scheduled in the future.  She has had urinary urgency and frequency and has had accidents and this has been present for 3 weeks or so. She denies any burning or painful urination and this doesn't feel like her previous UTIs, but not sure.  She has also been continuing to struggle with depressive symptoms, and has a medication change that she is undergoing by her primary care physician, and will be getting on Wellbutrin now for management and will be picking up the medication today.  She tapered off her previous antidepressant.  She has been getting in protein supplementation occasionally when she can and was able to pick some up from the store after her last visit.  She denies any falls or near falls since her last visit.  Her dog  in February, which was very difficult for her.  She recently obtained a new dog and this has brought her a lot of khadra and happiness.  She has to go out for walks with the dog, which has helped her activity level as she states there are some days that she did not even get out of the house before.      Therapies/HEP,  Not currently participating in therapies.  Was not able to make therapy appointments due to insurance coverage after last visit.  This has changed since.      Functionally,   No changes.      Social history is unchanged.      Medications:  Current Outpatient Medications   Medication Sig Dispense Refill     acetaminophen (TYLENOL) 325 MG tablet Take 2 tablets (650 mg)  by mouth every 4 hours as needed for other (mild pain) 100 tablet 0     alendronate (FOSAMAX) 35 MG tablet TAKE 1 TABLET(35 MG) BY MOUTH EVERY 7 DAYS (Patient not taking: Reported on 5/31/2024) 12 tablet 3     buPROPion (WELLBUTRIN XL) 150 MG 24 hr tablet Take 1 tablet (150 mg) by mouth every morning 90 tablet 3     cyanocobalamin (CYANOCOBALAMIN) 1000 MCG/ML injection Inject 1 mL (1,000 mcg) into the muscle every 30 days 3 mL 3     cyclobenzaprine (FLEXERIL) 5 MG tablet TAKE 1 TABLET (5 MG) BY MOUTH 2 TIMES DAILY AS NEEDED FOR MUSCLE SPASMS 40 tablet 5     diclofenac (VOLTAREN) 1 % topical gel Place 2 g onto the skin 4 times daily 100 g 3     furosemide (LASIX) 20 MG tablet Take 1 tablet (20 mg) by mouth daily as needed (swelling) 60 tablet 3     [START ON 8/22/2024] HYDROcodone-acetaminophen (NORCO) 5-325 MG tablet Take 1 tablet by mouth 2 times daily as needed for severe pain 60 tablet 0     [START ON 7/23/2024] HYDROcodone-acetaminophen (NORCO) 5-325 MG tablet Take 1 tablet by mouth 2 times daily as needed for severe pain 60 tablet 0     [START ON 6/23/2024] HYDROcodone-acetaminophen (NORCO) 5-325 MG tablet Take 1 tablet by mouth 2 times daily as needed for severe pain 60 tablet 0     LORazepam (ATIVAN) 0.5 MG tablet Take 1-2 tablets (0.5-1 mg) by mouth every 8 hours as needed for anxiety 60 tablet 5     lurasidone (LATUDA) 20 MG TABS tablet Take 1 tablet (20 mg) by mouth daily for 14 days With food (350+soraya). Then stop the medication. 14 tablet 0     omeprazole (PRILOSEC) 20 MG DR capsule As needed       potassium chloride ER (K-TAB) 20 MEQ CR tablet Take 1 tablet (20 mEq) by mouth daily 90 tablet 3     QUEtiapine (SEROQUEL) 25 MG tablet Take 2 tablets (50 mg) by mouth At Bedtime 180 tablet 3     rOPINIRole (REQUIP) 1 MG tablet Take 1 tablet (1 mg) by mouth at bedtime 90 tablet 1     sucralfate (CARAFATE) 1 GM tablet Take 1 tablet (1 g) by mouth 4 times daily as needed (Abdominal discomfort) 30 tablet 3      "[START ON 7/23/2024] traMADol (ULTRAM) 50 MG tablet Take 1 tablet (50 mg) by mouth every 6 hours as needed for severe pain 120 tablet 0     [START ON 6/23/2024] traMADol (ULTRAM) 50 MG tablet Take 1 tablet (50 mg) by mouth every 6 hours as needed for severe pain 120 tablet 0     [START ON 8/22/2024] traMADol (ULTRAM) 50 MG tablet Take 1 tablet (50 mg) by mouth every 6 hours as needed for severe pain 120 tablet 0     traMADol (ULTRAM) 50 MG tablet Take 1 tablet (50 mg) by mouth every 6 hours as needed for severe pain Schedule in clinic follow up. 120 tablet 0     valACYclovir (VALTREX) 1000 mg tablet TAKE 1 TABLET(1000 MG) BY MOUTH TWICE DAILY. REPEAT AS NEEDED FOR COLD SORE 16 tablet 3     VITAMIN D3 50 MCG (2000 UT) tablet TAKE 1 TABLET BY MOUTH EVERY DAY 90 tablet 3              Physical Exam:   LMP  (LMP Unknown)   Gen: NAD, pleasant and cooperative   HEENT: Atraumatic, normocephalic, extraocular movements appear intact  Pulm: non-labored breathing in room air  Neuro/MSK:   Orientation: Oriented to person, time, place and situation, Exhibits good insight into her condition and ongoing treatment  Motor: Observed moving upper extremities actively against gravity    Labs/Imaging:  Lab Results   Component Value Date    WBC 6.5 04/05/2023    HGB 11.6 (L) 04/05/2023    HCT 37.6 04/05/2023    MCV 84 04/05/2023     04/05/2023     Lab Results   Component Value Date     04/05/2023    POTASSIUM 4.1 04/05/2023    CHLORIDE 105 04/05/2023    CO2 25 04/05/2023     (H) 04/05/2023     Lab Results   Component Value Date    GFRESTIMATED >90 04/05/2023    GFRESTBLACK >90 08/05/2019     Lab Results   Component Value Date    AST 17 04/05/2023    ALT 19 04/05/2023    ALKPHOS 114 (H) 04/05/2023    BILITOTAL 0.2 04/05/2023     No results found for: \"INR\"  Lab Results   Component Value Date    BUN 9.4 04/05/2023    CR 0.72 04/05/2023              Assessment/Plan   Diana Salvador presents to clinic today for follow up " reg her rehab needs.   She has h/o right-sided stage IIb breast cancer (ER negative/WA negative, HER2 negative) with chronic fatigue, arthralgias and neuropathy. Was last seen in clinic on 3/1/24.  Multiple rehabilitation considerations were discussed with Aide at today's visit.  She was not able to make her therapy appointments after our last visit due to lack of insurance coverage, but now this has changed and she has them covered so we will place new orders for these as she is still in need of both PT and OT for her mobility and ADLs.  She should start the new antidepressant medication as planned to see if this helps better with her mood.  She should continue to follow with the pain medicine team on her current regimen.  We will plan a return virtual visit in 3 to 4 months.  She is in agreement with this plan.      Therapy/equipment/braces:  Physical therapy referral placed.  Occupational Therapy referral placed.  Continue home exercise regimen as tolerated.  Medications:  Keep with plan to change for antidepressant medication per PCP.  Referral / follow up with other providers:  Continue to follow with pain medicine team for management.  Follow up: 3 to 4-month return virtual visit.      Linsey Verduzco MD  Physical Medicine & Rehabilitation      50 minutes spent on the date of the encounter doing chart review, history and exam, documentation and further activities as noted above.               Again, thank you for allowing me to participate in the care of your patient.        Sincerely,        Linsey Verduzco MD

## 2024-06-21 NOTE — PROGRESS NOTES
Virtual Visit Details    Type of service:  Video Visit     Originating Location (pt. Location): Home  Distant Location (provider location):  Off-site  Platform used for Video Visit: [facility]   PM&R clinic note        Interval history:     Diana Salvador presents to clinic today for follow up reg her rehab needs.   She has h/o right-sided stage IIb breast cancer (ER negative/MI negative, HER2 negative) with chronic fatigue, arthralgias and neuropathy.   Was last seen in clinic on 3/1/24.  Recommendations included:  Therapy/equipment/braces:  Outpatient physical therapy referral placed for targeted strengthening and work on endurance.  Outpatient occupational therapy referral placed for fatigue management.  Start daily protein supplementation aiming for an extra 30 g of protein daily.  Medications:  Continue current medication regimen per primary care physician for pain control.  Will explore options for better control of depression.  Follow up: 3 to 4 months.    Oncology history:  *Diagnosis:2022; 56 years old; palpated a mass; clinical stage IIB (T2N0M0), grade III, triple negative; infiltrating ductal carcinoma  *Chemotherapy: Pembrolizumab/paclitaxel/carboplatin x 3 cycles complicated by grade 3 neuropathy (plan was 4 cycles followed by 4 cycles of  Pembrolizumab/adriamycin/cytoxan). She then had 11 cycles of every 3 week Pembrolizumab, completed 2023.  *Surgery: 2022 Bilateral mastectomy and sentinel lymph node dissection, right. Pathologic complete response.   *Genetic testin negative BRCA1/BRCA2.  -Last saw Sloane Waddell on 2023 for follow-up in survivorship clinic.  She had decreased range of motion in her right shoulder with occasional numbness and tingling in the third through fifth digits, history of right ulnar surgery.  Migratory arthralgias.  Neuropathy on duloxetine.  Fatigue.        Symptoms,  Aide was seen for a return virtual visit  today.  She has not been able to do the physical therapy or occupational therapy since her last visit because of lack of insurance coverage for these services. She now has insurance cover that will cover therapy appointments, so is looking forward to hopefully getting these scheduled in the future.  She has had urinary urgency and frequency and has had accidents and this has been present for 3 weeks or so. She denies any burning or painful urination and this doesn't feel like her previous UTIs, but not sure.  She has also been continuing to struggle with depressive symptoms, and has a medication change that she is undergoing by her primary care physician, and will be getting on Wellbutrin now for management and will be picking up the medication today.  She tapered off her previous antidepressant.  She has been getting in protein supplementation occasionally when she can and was able to pick some up from the store after her last visit.  She denies any falls or near falls since her last visit.  Her dog  in February, which was very difficult for her.  She recently obtained a new dog and this has brought her a lot of khadra and happiness.  She has to go out for walks with the dog, which has helped her activity level as she states there are some days that she did not even get out of the house before.      Therapies/HEP,  Not currently participating in therapies.  Was not able to make therapy appointments due to insurance coverage after last visit.  This has changed since.      Functionally,   No changes.      Social history is unchanged.      Medications:  Current Outpatient Medications   Medication Sig Dispense Refill    acetaminophen (TYLENOL) 325 MG tablet Take 2 tablets (650 mg) by mouth every 4 hours as needed for other (mild pain) 100 tablet 0    alendronate (FOSAMAX) 35 MG tablet TAKE 1 TABLET(35 MG) BY MOUTH EVERY 7 DAYS (Patient not taking: Reported on 2024) 12 tablet 3    buPROPion (WELLBUTRIN XL) 150 MG  24 hr tablet Take 1 tablet (150 mg) by mouth every morning 90 tablet 3    cyanocobalamin (CYANOCOBALAMIN) 1000 MCG/ML injection Inject 1 mL (1,000 mcg) into the muscle every 30 days 3 mL 3    cyclobenzaprine (FLEXERIL) 5 MG tablet TAKE 1 TABLET (5 MG) BY MOUTH 2 TIMES DAILY AS NEEDED FOR MUSCLE SPASMS 40 tablet 5    diclofenac (VOLTAREN) 1 % topical gel Place 2 g onto the skin 4 times daily 100 g 3    furosemide (LASIX) 20 MG tablet Take 1 tablet (20 mg) by mouth daily as needed (swelling) 60 tablet 3    [START ON 8/22/2024] HYDROcodone-acetaminophen (NORCO) 5-325 MG tablet Take 1 tablet by mouth 2 times daily as needed for severe pain 60 tablet 0    [START ON 7/23/2024] HYDROcodone-acetaminophen (NORCO) 5-325 MG tablet Take 1 tablet by mouth 2 times daily as needed for severe pain 60 tablet 0    [START ON 6/23/2024] HYDROcodone-acetaminophen (NORCO) 5-325 MG tablet Take 1 tablet by mouth 2 times daily as needed for severe pain 60 tablet 0    LORazepam (ATIVAN) 0.5 MG tablet Take 1-2 tablets (0.5-1 mg) by mouth every 8 hours as needed for anxiety 60 tablet 5    lurasidone (LATUDA) 20 MG TABS tablet Take 1 tablet (20 mg) by mouth daily for 14 days With food (350+soraya). Then stop the medication. 14 tablet 0    omeprazole (PRILOSEC) 20 MG DR capsule As needed      potassium chloride ER (K-TAB) 20 MEQ CR tablet Take 1 tablet (20 mEq) by mouth daily 90 tablet 3    QUEtiapine (SEROQUEL) 25 MG tablet Take 2 tablets (50 mg) by mouth At Bedtime 180 tablet 3    rOPINIRole (REQUIP) 1 MG tablet Take 1 tablet (1 mg) by mouth at bedtime 90 tablet 1    sucralfate (CARAFATE) 1 GM tablet Take 1 tablet (1 g) by mouth 4 times daily as needed (Abdominal discomfort) 30 tablet 3    [START ON 7/23/2024] traMADol (ULTRAM) 50 MG tablet Take 1 tablet (50 mg) by mouth every 6 hours as needed for severe pain 120 tablet 0    [START ON 6/23/2024] traMADol (ULTRAM) 50 MG tablet Take 1 tablet (50 mg) by mouth every 6 hours as needed for severe pain  "120 tablet 0    [START ON 8/22/2024] traMADol (ULTRAM) 50 MG tablet Take 1 tablet (50 mg) by mouth every 6 hours as needed for severe pain 120 tablet 0    traMADol (ULTRAM) 50 MG tablet Take 1 tablet (50 mg) by mouth every 6 hours as needed for severe pain Schedule in clinic follow up. 120 tablet 0    valACYclovir (VALTREX) 1000 mg tablet TAKE 1 TABLET(1000 MG) BY MOUTH TWICE DAILY. REPEAT AS NEEDED FOR COLD SORE 16 tablet 3    VITAMIN D3 50 MCG (2000 UT) tablet TAKE 1 TABLET BY MOUTH EVERY DAY 90 tablet 3              Physical Exam:   LMP  (LMP Unknown)   Gen: NAD, pleasant and cooperative   HEENT: Atraumatic, normocephalic, extraocular movements appear intact  Pulm: non-labored breathing in room air  Neuro/MSK:   Orientation: Oriented to person, time, place and situation, Exhibits good insight into her condition and ongoing treatment  Motor: Observed moving upper extremities actively against gravity    Labs/Imaging:  Lab Results   Component Value Date    WBC 6.5 04/05/2023    HGB 11.6 (L) 04/05/2023    HCT 37.6 04/05/2023    MCV 84 04/05/2023     04/05/2023     Lab Results   Component Value Date     04/05/2023    POTASSIUM 4.1 04/05/2023    CHLORIDE 105 04/05/2023    CO2 25 04/05/2023     (H) 04/05/2023     Lab Results   Component Value Date    GFRESTIMATED >90 04/05/2023    GFRESTBLACK >90 08/05/2019     Lab Results   Component Value Date    AST 17 04/05/2023    ALT 19 04/05/2023    ALKPHOS 114 (H) 04/05/2023    BILITOTAL 0.2 04/05/2023     No results found for: \"INR\"  Lab Results   Component Value Date    BUN 9.4 04/05/2023    CR 0.72 04/05/2023              Assessment/Plan   Diana Salvador presents to clinic today for follow up reg her rehab needs.   She has h/o right-sided stage IIb breast cancer (ER negative/WI negative, HER2 negative) with chronic fatigue, arthralgias and neuropathy. Was last seen in clinic on 3/1/24.  Multiple rehabilitation considerations were discussed with Aide at " today's visit.  She was not able to make her therapy appointments after our last visit due to lack of insurance coverage, but now this has changed and she has them covered so we will place new orders for these as she is still in need of both PT and OT for her mobility and ADLs.  She should start the new antidepressant medication as planned to see if this helps better with her mood.  She should continue to follow with the pain medicine team on her current regimen.  We will plan a return virtual visit in 3 to 4 months.  She is in agreement with this plan.      Therapy/equipment/braces:  Physical therapy referral placed.  Occupational Therapy referral placed.  Continue home exercise regimen as tolerated.  UA ordered to evaluate for a UTI due to her urinary symptoms.  Patient will call and schedule a lab visit.  Medications:  Keep with plan to change for antidepressant medication per PCP.  Referral / follow up with other providers:  Continue to follow with pain medicine team for management.  Follow up: 3 to 4-month return virtual visit.      Linsey Verduzco MD  Physical Medicine & Rehabilitation      50 minutes spent on the date of the encounter doing chart review, history and exam, documentation and further activities as noted above.

## 2024-06-21 NOTE — NURSING NOTE
Is the patient currently in the state of [place]? YES    Visit mode:VIDEO    If the visit is dropped, the patient can be reconnected by: VIDEO VISIT: Send to e-mail at: [email or web address]    Will anyone else be joining the visit? NO  (If patient encounters technical issues they should call [phone])    How would you like to obtain your AVS? MyChart    Are changes needed to the allergy or medication list? No  Reason for visit: Video Visit (Follow Up )    Genesis QURESHI

## 2024-06-21 NOTE — PATIENT INSTRUCTIONS
It was nice to see you again today.    1.  A physical therapy and Occupational Therapy referral order was placed.  2.  Continue a home exercise regimen as tolerated.  3.  Continue protein shakes when you need them to help supplement your diet.  4.  Continue with your plan to change her antidepressant medication according to your primary care provider.  5.  Follow-up with Dr. Verduzco in 3 to 4 months for return virtual visit.

## 2024-06-24 ENCOUNTER — VIRTUAL VISIT (OUTPATIENT)
Dept: ONCOLOGY | Facility: CLINIC | Age: 59
End: 2024-06-24
Attending: NURSE PRACTITIONER
Payer: OTHER GOVERNMENT

## 2024-06-24 VITALS — HEIGHT: 67 IN | WEIGHT: 215 LBS | BODY MASS INDEX: 33.74 KG/M2

## 2024-06-24 DIAGNOSIS — T45.1X5A CHEMOTHERAPY-INDUCED NEUROPATHY (H): ICD-10-CM

## 2024-06-24 DIAGNOSIS — Z17.1 MALIGNANT NEOPLASM OF UPPER-INNER QUADRANT OF RIGHT BREAST IN FEMALE, ESTROGEN RECEPTOR NEGATIVE (H): Primary | ICD-10-CM

## 2024-06-24 DIAGNOSIS — Z95.828 PORT-A-CATH IN PLACE: ICD-10-CM

## 2024-06-24 DIAGNOSIS — G89.29 OTHER CHRONIC PAIN: ICD-10-CM

## 2024-06-24 DIAGNOSIS — F41.8 SITUATIONAL ANXIETY: ICD-10-CM

## 2024-06-24 DIAGNOSIS — C50.211 MALIGNANT NEOPLASM OF UPPER-INNER QUADRANT OF RIGHT BREAST IN FEMALE, ESTROGEN RECEPTOR NEGATIVE (H): Primary | ICD-10-CM

## 2024-06-24 DIAGNOSIS — G62.0 CHEMOTHERAPY-INDUCED NEUROPATHY (H): ICD-10-CM

## 2024-06-24 PROBLEM — Z51.11 ENCOUNTER FOR ANTINEOPLASTIC CHEMOTHERAPY: Status: RESOLVED | Noted: 2022-03-11 | Resolved: 2024-06-24

## 2024-06-24 PROBLEM — D70.1 CHEMOTHERAPY-INDUCED NEUTROPENIA (H): Status: RESOLVED | Noted: 2022-03-11 | Resolved: 2024-06-24

## 2024-06-24 PROCEDURE — 99215 OFFICE O/P EST HI 40 MIN: CPT | Mod: 95 | Performed by: NURSE PRACTITIONER

## 2024-06-24 PROCEDURE — G2211 COMPLEX E/M VISIT ADD ON: HCPCS | Mod: 95 | Performed by: NURSE PRACTITIONER

## 2024-06-24 ASSESSMENT — PAIN SCALES - GENERAL: PAINLEVEL: SEVERE PAIN (6)

## 2024-06-24 NOTE — LETTER
6/24/2024      Diana Salvador  [address] [place] [id number]      Dear Colleague,    Thank you for referring your patient, Diana Salvador, to the [facility] [place]. Please see a copy of my visit note below.    Virtual Visit Details    Type of service:  Video Visit   Start: 1145  End: 1219    Originating Location (pt. Location): Home    Distant Location (provider location):  On-site  Platform used for Video Visit: [facility] Outpatient Progress Note ([place])    Patient: Diana Salvador  MRN: [id number]  Jun 24, 2024            Reason for Visit    Stage IIB triple negative breast cancer    Virtual visit    Assessment/Plan  1.   Stage IIB triple negative breast cancer, ypCR  Aide was diagnosed almost 2.5 years ago and completed all of her adjuvant treatment 1.5 yr ago. She is recovering generally well.     No symptoms concerning for recurrence.     She still has her port in.      Plan:  -Return in 6 mths for in-person exam  -Monitor CBC annually post chemo x 10 years. Repeat at next visit.  -No role for mammograms, post bilateral mastectomy  -Can maintain routine surveillance at [facility]. Since Dr. Diamond has left the practice, will need to align with new primary MD over time/as needed (could see Dr. Lyons at [facility] whom she met before or new MD at [place] sites).  -Will need port flushes every 6-8 weeks until port is removed.  She can have her port removed anytime, and she'd like to plan for October. Referral to Gen Surg placed.     2.    Recurrent hiccups  Appears to possibly be having concurrent URI/allergies (left ear tickle, cough). No evident reflux/abd nor esophageal symptoms.     Plan:  -Monitor for a few weeks. Trial antihistamines and could also trial Pepcid or Omeprazole   -If persistent, follow-up with PCP for exam. If upper respiratory/reflux etiology excluded, may need CMP and abd CT    3.    Chemo-induced neuropathy, gr  1  Neuropathy has improved with time.  She has no persistent numbness but does have intermittent tenderness in the fingertips and toes.    Plan:  -Previously did not tolerate gabapentin and duloxetine did not make a difference  -She was referred to Dr. Verduzco in PM&R, will be starting sessions in near future    4.   Chronic back pain  5.   Cervical spine stenosis with right radicular symptoms  6.   Chronic generalized arthralgias  She has chronic pain (lumbar back and joints), but this was exacerbated on immunotherapy.   She's been off immunotherapy 12 months but there is some low risk (3%) for chronic arthritis following immunotherapy.    More recent (x 1 yr) right shoulder/RUE radicular type discomfort. Bone scan and MRI c-spine negative for mets; severe right lower cervical spine stenosis noted and she underwent steroid injection with benefit.     PMR recommended sessions, but she had to delay for insurance coverage. She plans to initiate soon.     Plan:  -continue follow-up with Dr. Verduzco in PM&R. Planning to pursue recommended sessions now that she's had improvement in her insurance coverage  -Has been on hydrocodone and tramadol long-term. Managed by PCP.    7.  Depression/anxiety, chronic but acutely worsened with cancer diagnosis/treatment  Manged with PCP.  She has been on a number of different antidepressants.  She has been most recently on Latuda + Wellbutrin. Has visited with Onc therapist intermittently.     Plan:  -Continue antidepressant mgmt with PCP.   -Continue Oncology psychotherapy as needed  -Encouraged she stay engaged outside of her home with family/friends/activities.  -Encouraged physical activity    8.   Financial stressors  She continues on disability. Has had to limit appointments to avoid copayments due to limited income, which has been quite stressful for her. She will be starting new insurance this month which will be extremely beneficial to her and ease her out-of-pocket expenses.      ______________________________________________________________________________    History of Present Illness/ Interval History    Ms. Diana Salvador is a 59 year old who completed neoadjvuant treatment with pembrolizumab, carboplatin and taxol (weekly) x 3 cycles of this combo, complicated by grade 3 neuropathy (fingers) and neutropenia so chemo was stopped and she continued Pembro alone for a few more cycles. She then had bilateral mastectomies with pathologic CR. She completed 9 cycles of adjuvant Pembrolizumab every 3 weeks 1/2023 (1.5 yr ago). Now on surveillance. Returns for 5-mth interval visit, virtually.    Chronic low back pain and generalized arthralgias, predating her cancer/treatment.   For the last year she has had more right neck/right posterior shoulder pain with several months of newer intermittent tingling in right pinky/ring finger and hand (mostly from elbow down). Bone scan negative for bone mets. MRI cervical spine was negative for mets; showed C6-7 stenosis and she underwent steroid injection with some benefit but thinks she needs this repeated soon. She had to delay recommended PT due to insurance coverage, but is now going to be starting sessions in near future.     Using hydrocodone or tramadol chronically, managed by PCP.     Ongoing visual acuity changes, will have eye exam later this summer.   No headaches.     Her neuropathy has continued to improve and it is not as painful as it used to be, but persistent. No more numbness. Fingers and toes are still intermittently tingling/sensitive to light touch.       Recurrent hiccups 5-7 times/day (sometimes up to 5-10 min) x 3 weeks. Mild left ear itching, no pain/congestion and mild cough. No other respiratory symptoms. No new medications. No heartburn. No nausea. No epigastric pain. Mild lower gas pains. Bowels regular.     She has struggled with chronic depression/anxiety and low concentration, exacerbated since her cancer diagnosis.  "Working with therapist. On medication management through her PCP, has been on Latuda but is weaning off this and transitioning over to Wellbutrin soon. Using lorazepam as needed. She lost a few close family members last year and she still struggles with body image following her breast surgery and weight gain this year.  She stays pretty isolated as she does not want to leave her home.  No suicidal ideation.    Just got a new dog, \"Buddy\", who is keeping her physically active and helps her mental health.       ECOG Performance    0      Oncology History/Treatment  Diagnosis/Stage:   2/2022: Stage IIB (cT2-cN0-cM0) right breast cancer (triple negative) //ypT0-N0 (CR)  -self-palpated right breast lump  -diagnostic mammo + breast US: 2.4 cm R breast (2:00, lower-inner quad) lump and no axillary nodes detected  -breast biopsy: invasive ductal carcinoma, grade 3. ER neg, WI neg, HER2 neg via FISH  -CA 27.29 WNL (<5)  -Genetic testing negative for germline mutations    -6/30/2022 surgical path = CR after neoadjuvant chemo    Treatment:  3/15/2022 - 6/2022: Neoadjuvant chemo (based off [id number]) Pembrolizumab 200 mg D1 q 21 days + Carboplatin AUC=5 D1 q21 Days + weekly Paclitaxel 80 mg/m  D1, 8, 15  x 12 Weeks (with Filgrastim 5 mcg/kg subcutaneously once daily on days 16, 17, and 18 of cycles 1 through 4)  -->after cycle 3, developed grade 3 neuropathy and delayed neutropenia (despite using Neupogen). Therefore, chemo held with cycle 4 and only received Pembrolizumab through cycle 6.     6/30/2022: bilateral mastectomies with R sLN biopsy and expander placement.  (Elysia Marina + Gerson). Pathologic CR  -later underwent reconstruction surgery    No adjuvant RT recommended    7/22/22 - 1/12/2023: adjuvant Pembrolizumab x 9 additional cycles      Physical Exam    GENERAL: Alert and oriented to time place and person. Seated comfortably.  Alone.  Appears to be in better spirits today.  HEAD: Atraumatic and normocephalic. " No alopecia.  EYES: MELISSA, EOMI. No erythema. No icterus.  BREASTS: Not assessed  LYMPH NODES: Not assessed  CHEST:  Normal respiratory effort  NEURO: No gross deficit noted.      Lab Results    No results found for this or any previous visit (from the past 168 hour(s)).      Imaging    No results found.    Billing  Total time 45 minutes, to include face to face visit, review of EMR, ordering, documentation and coordination of care on date of service.    complexity modifier for longitudinal care.       Signed by: Marilu Vergara NP      Again, thank you for allowing me to participate in the care of your patient.        Sincerely,        Marilu Vergara NP

## 2024-06-24 NOTE — PROGRESS NOTES
Virtual Visit Details    Type of service:  Video Visit   Start: 1145  End: 1219    Originating Location (pt. Location): Home    Distant Location (provider location):  On-site  Platform used for Video Visit: [facility] Hematology and Oncology Outpatient Progress Note ([place])    Patient: Diana Salvador  MRN: [id number]  Jun 24, 2024            Reason for Visit    Stage IIB triple negative breast cancer    Virtual visit    Assessment/Plan  1.   Stage IIB triple negative breast cancer, ypCR  Aide was diagnosed almost 2.5 years ago and completed all of her adjuvant treatment 1.5 yr ago. She is recovering generally well.     No symptoms concerning for recurrence.     She still has her port in.      Plan:  -Return in 6 mths for in-person exam  -Monitor CBC annually post chemo x 10 years. Repeat at next visit.  -No role for mammograms, post bilateral mastectomy  -Can maintain routine surveillance at [facility]. Since Dr. Diamond has left the practice, will need to align with new primary MD over time/as needed (could see Dr. Lyons at [facility] whom she met before or new MD at [place] sites).  -Will need port flushes every 6-8 weeks until port is removed.  She can have her port removed anytime, and she'd like to plan for October. Referral to Gen Surg placed.     2.    Recurrent hiccups  Appears to possibly be having concurrent URI/allergies (left ear tickle, cough). No evident reflux/abd nor esophageal symptoms.     Plan:  -Monitor for a few weeks. Trial antihistamines and could also trial Pepcid or Omeprazole   -If persistent, follow-up with PCP for exam. If upper respiratory/reflux etiology excluded, may need CMP and abd CT    3.    Chemo-induced neuropathy, gr 1  Neuropathy has improved with time.  She has no persistent numbness but does have intermittent tenderness in the fingertips and toes.    Plan:  -Previously did not tolerate gabapentin and duloxetine did not make a difference  -She was  referred to Dr. Verduzco in PM&R, will be starting sessions in near future    4.   Chronic back pain  5.   Cervical spine stenosis with right radicular symptoms  6.   Chronic generalized arthralgias  She has chronic pain (lumbar back and joints), but this was exacerbated on immunotherapy.   She's been off immunotherapy 12 months but there is some low risk (3%) for chronic arthritis following immunotherapy.    More recent (x 1 yr) right shoulder/RUE radicular type discomfort. Bone scan and MRI c-spine negative for mets; severe right lower cervical spine stenosis noted and she underwent steroid injection with benefit.     PMR recommended sessions, but she had to delay for insurance coverage. She plans to initiate soon.     Plan:  -continue follow-up with Dr. Verduzco in PM&R. Planning to pursue recommended sessions now that she's had improvement in her insurance coverage  -Has been on hydrocodone and tramadol long-term. Managed by PCP.    7.  Depression/anxiety, chronic but acutely worsened with cancer diagnosis/treatment  Manged with PCP.  She has been on a number of different antidepressants.  She has been most recently on Latuda + Wellbutrin. Has visited with Onc therapist intermittently.     Plan:  -Continue antidepressant mgmt with PCP.   -Continue Oncology psychotherapy as needed  -Encouraged she stay engaged outside of her home with family/friends/activities.  -Encouraged physical activity    8.   Financial stressors  She continues on disability. Has had to limit appointments to avoid copayments due to limited income, which has been quite stressful for her. She will be starting new insurance this month which will be extremely beneficial to her and ease her out-of-pocket expenses.     ______________________________________________________________________________    History of Present Illness/ Interval History    Ms. Diana Salvador is a 59 year old who completed neoadjvuant treatment with pembrolizumab, carboplatin and  taxol (weekly) x 3 cycles of this combo, complicated by grade 3 neuropathy (fingers) and neutropenia so chemo was stopped and she continued Pembro alone for a few more cycles. She then had bilateral mastectomies with pathologic CR. She completed 9 cycles of adjuvant Pembrolizumab every 3 weeks 1/2023 (1.5 yr ago). Now on surveillance. Returns for 5-mth interval visit, virtually.    Chronic low back pain and generalized arthralgias, predating her cancer/treatment.   For the last year she has had more right neck/right posterior shoulder pain with several months of newer intermittent tingling in right pinky/ring finger and hand (mostly from elbow down). Bone scan negative for bone mets. MRI cervical spine was negative for mets; showed C6-7 stenosis and she underwent steroid injection with some benefit but thinks she needs this repeated soon. She had to delay recommended PT due to insurance coverage, but is now going to be starting sessions in near future.     Using hydrocodone or tramadol chronically, managed by PCP.     Ongoing visual acuity changes, will have eye exam later this summer.   No headaches.     Her neuropathy has continued to improve and it is not as painful as it used to be, but persistent. No more numbness. Fingers and toes are still intermittently tingling/sensitive to light touch.       Recurrent hiccups 5-7 times/day (sometimes up to 5-10 min) x 3 weeks. Mild left ear itching, no pain/congestion and mild cough. No other respiratory symptoms. No new medications. No heartburn. No nausea. No epigastric pain. Mild lower gas pains. Bowels regular.     She has struggled with chronic depression/anxiety and low concentration, exacerbated since her cancer diagnosis. Working with therapist. On medication management through her PCP, has been on Latuda but is weaning off this and transitioning over to Wellbutrin soon. Using lorazepam as needed. She lost a few close family members last year and she still  "struggles with body image following her breast surgery and weight gain this year.  She stays pretty isolated as she does not want to leave her home.  No suicidal ideation.    Just got a new dog, \"Jake\", who is keeping her physically active and helps her mental health.       ECOG Performance    0      Oncology History/Treatment  Diagnosis/Stage:   2/2022: Stage IIB (cT2-cN0-cM0) right breast cancer (triple negative) //ypT0-N0 (CR)  -self-palpated right breast lump  -diagnostic mammo + breast US: 2.4 cm R breast (2:00, lower-inner quad) lump and no axillary nodes detected  -breast biopsy: invasive ductal carcinoma, grade 3. ER neg, [place] neg, HER2 neg via FISH  -CA 27.29 WNL (<5)  -Genetic testing negative for germline mutations    -6/30/2022 surgical path = CR after neoadjuvant chemo    Treatment:  3/15/2022 - 6/2022: Neoadjuvant chemo (based off KEYNOTE-522) Pembrolizumab 200 mg D1 q 21 days + Carboplatin AUC=5 D1 q21 Days + weekly Paclitaxel 80 mg/m  D1, 8, 15  x 12 Weeks (with Filgrastim 5 mcg/kg subcutaneously once daily on days 16, 17, and 18 of cycles 1 through 4)  -->after cycle 3, developed grade 3 neuropathy and delayed neutropenia (despite using Neupogen). Therefore, chemo held with cycle 4 and only received Pembrolizumab through cycle 6.     6/30/2022: bilateral mastectomies with R sLN biopsy and expander placement.  (Elysia Marina + Gerson). Pathologic CR  -later underwent reconstruction surgery    No adjuvant RT recommended    7/22/22 - 1/12/2023: adjuvant Pembrolizumab x 9 additional cycles      Physical Exam    GENERAL: Alert and oriented to time place and person. Seated comfortably.  Alone.  Appears to be in better spirits today.  HEAD: Atraumatic and normocephalic. No alopecia.  EYES: MELISSA, EOMI. No erythema. No icterus.  BREASTS: Not assessed  LYMPH NODES: Not assessed  CHEST:  Normal respiratory effort  NEURO: No gross deficit noted.      Lab Results    No results found for this or any previous " visit (from the past 168 hour(s)).      Imaging    No results found.    Billing  Total time 45 minutes, to include face to face visit, review of EMR, ordering, documentation and coordination of care on date of service.    complexity modifier for longitudinal care.       Signed by: Marilu Vergara NP

## 2024-06-24 NOTE — LETTER
6/24/2024      Diana Salvador  [address] [place] [id number]      Dear Colleague,    Thank you for referring your patient, Diana Salvador, to the [facility] [place]. Please see a copy of my visit note below.    Virtual Visit Details    Type of service:  Video Visit   Start: 1145  End: 1219    Originating Location (pt. Location): Home    Distant Location (provider location):  On-site  Platform used for Video Visit: [facility] Hematology and Oncology Outpatient Progress Note ([place])    Patient: Diana Salvador  MRN: [id number]  Jun 24, 2024            Reason for Visit    Stage IIB triple negative breast cancer    Virtual visit    Assessment/Plan  1.   Stage IIB triple negative breast cancer, ypCR  Aide was diagnosed almost 2.5 years ago and completed all of her adjuvant treatment 1.5 yr ago. She is recovering generally well.     No symptoms concerning for recurrence.     She still has her port in.      Plan:  -Return in 6 mths for in-person exam  -Monitor CBC annually post chemo x 10 years. Repeat at next visit.  -No role for mammograms, post bilateral mastectomy  -Can maintain routine surveillance at [facility]. Since Dr. Diamond has left the practice, will need to align with new primary MD over time/as needed (could see Dr. Lyons at [facility] whom she met before or new MD at [place] sites).  -Will need port flushes every 6-8 weeks until port is removed.  She can have her port removed anytime, and she'd like to plan for October. Referral to Gen Surg placed.     2.    Recurrent hiccups  Appears to possibly be having concurrent URI/allergies (left ear tickle, cough). No evident reflux/abd nor esophageal symptoms.     Plan:  -Monitor for a few weeks. Trial antihistamines and could also trial Pepcid or Omeprazole   -If persistent, follow-up with PCP for exam. If upper respiratory/reflux etiology excluded, may need CMP and abd CT    3.    Chemo-induced neuropathy, gr  1  Neuropathy has improved with time.  She has no persistent numbness but does have intermittent tenderness in the fingertips and toes.    Plan:  -Previously did not tolerate gabapentin and duloxetine did not make a difference  -She was referred to Dr. Verduzco in PM&R, will be starting sessions in near future    4.   Chronic back pain  5.   Cervical spine stenosis with right radicular symptoms  6.   Chronic generalized arthralgias  She has chronic pain (lumbar back and joints), but this was exacerbated on immunotherapy.   She's been off immunotherapy 12 months but there is some low risk (3%) for chronic arthritis following immunotherapy.    More recent (x 1 yr) right shoulder/RUE radicular type discomfort. Bone scan and MRI c-spine negative for mets; severe right lower cervical spine stenosis noted and she underwent steroid injection with benefit.     PMR recommended sessions, but she had to delay for insurance coverage. She plans to initiate soon.     Plan:  -continue follow-up with Dr. Verduzco in PM&R. Planning to pursue recommended sessions now that she's had improvement in her insurance coverage  -Has been on hydrocodone and tramadol long-term. Managed by PCP.    7.  Depression/anxiety, chronic but acutely worsened with cancer diagnosis/treatment  Manged with PCP.  She has been on a number of different antidepressants.  She has been most recently on Latuda + Wellbutrin. Has visited with Onc therapist intermittently.     Plan:  -Continue antidepressant mgmt with PCP.   -Continue Oncology psychotherapy as needed  -Encouraged she stay engaged outside of her home with family/friends/activities.  -Encouraged physical activity    8.   Financial stressors  She continues on disability. Has had to limit appointments to avoid copayments due to limited income, which has been quite stressful for her. She will be starting new insurance this month which will be extremely beneficial to her and ease her out-of-pocket expenses.      ______________________________________________________________________________    History of Present Illness/ Interval History    Ms. Diana Salvador is a 59 year old who completed neoadjvuant treatment with pembrolizumab, carboplatin and taxol (weekly) x 3 cycles of this combo, complicated by grade 3 neuropathy (fingers) and neutropenia so chemo was stopped and she continued Pembro alone for a few more cycles. She then had bilateral mastectomies with pathologic CR. She completed 9 cycles of adjuvant Pembrolizumab every 3 weeks 1/2023 (1.5 yr ago). Now on surveillance. Returns for 5-mth interval visit, virtually.    Chronic low back pain and generalized arthralgias, predating her cancer/treatment.   For the last year she has had more right neck/right posterior shoulder pain with several months of newer intermittent tingling in right pinky/ring finger and hand (mostly from elbow down). Bone scan negative for bone mets. MRI cervical spine was negative for mets; showed C6-7 stenosis and she underwent steroid injection with some benefit but thinks she needs this repeated soon. She had to delay recommended PT due to insurance coverage, but is now going to be starting sessions in near future.     Using hydrocodone or tramadol chronically, managed by PCP.     Ongoing visual acuity changes, will have eye exam later this summer.   No headaches.     Her neuropathy has continued to improve and it is not as painful as it used to be, but persistent. No more numbness. Fingers and toes are still intermittently tingling/sensitive to light touch.       Recurrent hiccups 5-7 times/day (sometimes up to 5-10 min) x 3 weeks. Mild left ear itching, no pain/congestion and mild cough. No other respiratory symptoms. No new medications. No heartburn. No nausea. No epigastric pain. Mild lower gas pains. Bowels regular.     She has struggled with chronic depression/anxiety and low concentration, exacerbated since her cancer diagnosis.  "Working with therapist. On medication management through her PCP, has been on Latuda but is weaning off this and transitioning over to Wellbutrin soon. Using lorazepam as needed. She lost a few close family members last year and she still struggles with body image following her breast surgery and weight gain this year.  She stays pretty isolated as she does not want to leave her home.  No suicidal ideation.    Just got a new dog, \"Buddy\", who is keeping her physically active and helps her mental health.       ECOG Performance    0      Oncology History/Treatment  Diagnosis/Stage:   2/2022: Stage IIB (cT2-cN0-cM0) right breast cancer (triple negative) //ypT0-N0 (CR)  -self-palpated right breast lump  -diagnostic mammo + breast US: 2.4 cm R breast (2:00, lower-inner quad) lump and no axillary nodes detected  -breast biopsy: invasive ductal carcinoma, grade 3. ER neg, [place] neg, HER2 neg via FISH  -CA 27.29 WNL (<5)  -Genetic testing negative for germline mutations    -6/30/2022 surgical path = CR after neoadjuvant chemo    Treatment:  3/15/2022 - 6/2022: Neoadjuvant chemo (based off KEYNOTE-522) Pembrolizumab 200 mg D1 q 21 days + Carboplatin AUC=5 D1 q21 Days + weekly Paclitaxel 80 mg/m  D1, 8, 15  x 12 Weeks (with Filgrastim 5 mcg/kg subcutaneously once daily on days 16, 17, and 18 of cycles 1 through 4)  -->after cycle 3, developed grade 3 neuropathy and delayed neutropenia (despite using Neupogen). Therefore, chemo held with cycle 4 and only received Pembrolizumab through cycle 6.     6/30/2022: bilateral mastectomies with R sLN biopsy and expander placement.  (Elysia Marina + Gerson). Pathologic CR  -later underwent reconstruction surgery    No adjuvant RT recommended    7/22/22 - 1/12/2023: adjuvant Pembrolizumab x 9 additional cycles      Physical Exam    GENERAL: Alert and oriented to time place and person. Seated comfortably.  Alone.  Appears to be in better spirits today.  HEAD: Atraumatic and normocephalic. " No alopecia.  EYES: MELISSA, EOMI. No erythema. No icterus.  BREASTS: Not assessed  LYMPH NODES: Not assessed  CHEST:  Normal respiratory effort  NEURO: No gross deficit noted.      Lab Results    No results found for this or any previous visit (from the past 168 hour(s)).      Imaging    No results found.    Billing  Total time 45 minutes, to include face to face visit, review of EMR, ordering, documentation and coordination of care on date of service.    complexity modifier for longitudinal care.       Signed by: Marilu Vergara NP      Again, thank you for allowing me to participate in the care of your patient.        Sincerely,        Marilu Vergara NP

## 2024-06-24 NOTE — NURSING NOTE
Is the patient currently in the state of [place]? YES    Visit mode:VIDEO    If the visit is dropped, the patient can be reconnected by: VIDEO VISIT: Text to cell phone:   Telephone Information:   Mobile [phone]       Will anyone else be joining the visit? NO  (If patient encounters technical issues they should call [phone])    How would you like to obtain your AVS? MyChart    Are changes needed to the allergy or medication list? Pt stated no changes to allergies and Pt stated no med changes    Are refills needed on medications prescribed by this physician? NO    Reason for visit: SUMA QURESHI

## 2024-06-25 ENCOUNTER — TELEPHONE (OUTPATIENT)
Dept: ONCOLOGY | Facility: CLINIC | Age: 59
End: 2024-06-25
Payer: OTHER GOVERNMENT

## 2024-06-25 NOTE — PROGRESS NOTES
CLINICAL NUTRITION SERVICES     Reason for Contact: Questions from Oncology Distress Screening  1. How concerned are you about your ability to eat? :  10  2. How concerned are you about unintended weight loss or your current weight? : 10    Action: RD called patient indicating reason for phone call. Left a VM with a return call back number.     Follow up: Wait for a return phone call.    Marjorie Rios RD,   [phone]

## 2024-06-30 DIAGNOSIS — G47.00 INSOMNIA, UNSPECIFIED TYPE: ICD-10-CM

## 2024-07-02 RX ORDER — QUETIAPINE FUMARATE 25 MG/1
50 TABLET, FILM COATED ORAL AT BEDTIME
Qty: 180 TABLET | Refills: 0 | Status: SHIPPED | OUTPATIENT
Start: 2024-07-02 | End: 2024-10-01

## 2024-07-03 ENCOUNTER — TELEPHONE (OUTPATIENT)
Dept: FAMILY MEDICINE | Facility: CLINIC | Age: 59
End: 2024-07-03

## 2024-07-03 ENCOUNTER — INFUSION THERAPY VISIT (OUTPATIENT)
Dept: INFUSION THERAPY | Facility: CLINIC | Age: 59
End: 2024-07-03
Attending: NURSE PRACTITIONER
Payer: OTHER GOVERNMENT

## 2024-07-03 VITALS
OXYGEN SATURATION: 100 % | SYSTOLIC BLOOD PRESSURE: 145 MMHG | DIASTOLIC BLOOD PRESSURE: 92 MMHG | RESPIRATION RATE: 16 BRPM | TEMPERATURE: 98.9 F | HEART RATE: 73 BPM

## 2024-07-03 DIAGNOSIS — Z17.1 MALIGNANT NEOPLASM OF UPPER-INNER QUADRANT OF RIGHT BREAST IN FEMALE, ESTROGEN RECEPTOR NEGATIVE (H): Primary | ICD-10-CM

## 2024-07-03 DIAGNOSIS — C50.211 MALIGNANT NEOPLASM OF UPPER-INNER QUADRANT OF RIGHT BREAST IN FEMALE, ESTROGEN RECEPTOR NEGATIVE (H): Primary | ICD-10-CM

## 2024-07-03 PROCEDURE — 96523 IRRIG DRUG DELIVERY DEVICE: CPT

## 2024-07-03 PROCEDURE — 250N000011 HC RX IP 250 OP 636: Performed by: NURSE PRACTITIONER

## 2024-07-03 RX ORDER — HEPARIN SODIUM (PORCINE) LOCK FLUSH IV SOLN 100 UNIT/ML 100 UNIT/ML
5 SOLUTION INTRAVENOUS
Status: DISCONTINUED | OUTPATIENT
Start: 2024-07-03 | End: 2024-07-03 | Stop reason: HOSPADM

## 2024-07-03 RX ORDER — HEPARIN SODIUM (PORCINE) LOCK FLUSH IV SOLN 100 UNIT/ML 100 UNIT/ML
5 SOLUTION INTRAVENOUS
Status: CANCELLED | OUTPATIENT
Start: 2024-07-03

## 2024-07-03 RX ADMIN — Medication 5 ML: at 12:32

## 2024-07-03 NOTE — TELEPHONE ENCOUNTER
Patient Quality Outreach    Patient is due for the following:   Colon Cancer Screening    Next Steps:   Patient has upcoming appointment, these items will be addressed at that time.  - Patient's upcoming appointment is 9/25/2024.    Type of outreach:    Chart review performed, no outreach needed.      Questions for provider review:    None           Stacy Chavez

## 2024-07-03 NOTE — PROGRESS NOTES
Infusion Nursing Note:  Diana KIET Johnsonluis presents today for PAC flush.    Patient seen by provider today: No   present during visit today: Not Applicable.    Note: Port is tilted and facing into the sternum slightly. Easily accessed once assessed. Flushed, good blood return, and hep locked.       Intravenous Access:  Implanted Port.    Treatment Conditions:  Not Applicable.      Post Infusion Assessment:  Blood return noted pre and post infusion.  Site patent and intact, free from redness, edema or discomfort.  No evidence of extravasations.  Access discontinued per protocol.       Discharge Plan:   Discharge instructions reviewed with: Patient.  Patient and/or family verbalized understanding of discharge instructions and all questions answered.  Patient discharged in stable condition accompanied by: self.  Departure Mode: Ambulatory.      Demi Petit RN

## 2024-07-18 DIAGNOSIS — M54.41 ACUTE RIGHT-SIDED LOW BACK PAIN WITH RIGHT-SIDED SCIATICA: ICD-10-CM

## 2024-07-18 RX ORDER — CYCLOBENZAPRINE HCL 5 MG
5 TABLET ORAL 2 TIMES DAILY PRN
Qty: 40 TABLET | Refills: 1 | Status: SHIPPED | OUTPATIENT
Start: 2024-07-18 | End: 2024-09-06

## 2024-08-02 ENCOUNTER — THERAPY VISIT (OUTPATIENT)
Dept: PHYSICAL THERAPY | Facility: CLINIC | Age: 59
End: 2024-08-02
Attending: STUDENT IN AN ORGANIZED HEALTH CARE EDUCATION/TRAINING PROGRAM
Payer: COMMERCIAL

## 2024-08-02 DIAGNOSIS — C50.911 MALIGNANT NEOPLASM OF RIGHT BREAST IN FEMALE, ESTROGEN RECEPTOR NEGATIVE, UNSPECIFIED SITE OF BREAST (H): ICD-10-CM

## 2024-08-02 DIAGNOSIS — Z17.1 MALIGNANT NEOPLASM OF RIGHT BREAST IN FEMALE, ESTROGEN RECEPTOR NEGATIVE, UNSPECIFIED SITE OF BREAST (H): ICD-10-CM

## 2024-08-02 DIAGNOSIS — T45.1X5A CHEMOTHERAPY-INDUCED NEUROPATHY (H): ICD-10-CM

## 2024-08-02 DIAGNOSIS — G62.0 CHEMOTHERAPY-INDUCED NEUROPATHY (H): ICD-10-CM

## 2024-08-02 DIAGNOSIS — R53.81 PHYSICAL DECONDITIONING: ICD-10-CM

## 2024-08-02 DIAGNOSIS — M25.511 ACUTE PAIN OF RIGHT SHOULDER: ICD-10-CM

## 2024-08-02 PROCEDURE — 97110 THERAPEUTIC EXERCISES: CPT | Mod: GP | Performed by: PHYSICAL THERAPIST

## 2024-08-02 PROCEDURE — 97535 SELF CARE MNGMENT TRAINING: CPT | Mod: GP | Performed by: PHYSICAL THERAPIST

## 2024-08-02 PROCEDURE — 97162 PT EVAL MOD COMPLEX 30 MIN: CPT | Mod: GP | Performed by: PHYSICAL THERAPIST

## 2024-08-02 NOTE — PROGRESS NOTES
PHYSICAL THERAPY EVALUATION  Type of Visit: Evaluation       Fall Risk Screen:  Have you fallen 2 or more times in the past year?: Yes  Have you fallen and had an injury in the past year?: No  Timed Up and Go score (seconds): 10.2  Is patient a fall risk?: No    Subjective       Presenting condition or subjective complaint: Breast Cancer Recovery.  Pt diagnosed w/ breast cancer 2/22/22.  Pt had 14 cycles of chemo followed by double mastectomy and reconstruction.  Currently:  Pt is not in treatment currently.  Pt has no energy to do anything.  Pt is waking 2-3X/night and then struggles but is able to get back to sleep.  Pt notes both arms feel heavy and has limited ROM.  Pt had neck injection in Jan which helped for a while.   Pt is not carrying laundry up/ down stairs other than very light loads.   Pt notes some days she does not get dressed due to fatigue --will sit on the couch.  Pt now got a dog is taking dog for walk around large block. Pt has neuropathy in finger tips and toes.  Pt notes R 4th and 5th finger will go numb --related to neck.  Unable to do bra posteriorly.    Date of onset: 06/21/24    Relevant medical history: Arthritis; Cancer; Depression; Fibromyalgia; Implanted device; Menopause; Neck injury; Osteoporosis; Overweight; Pain at night or rest; Significant weakness; Vision problems   Dates & types of surgery: Breast reconstruction    Prior diagnostic imaging/testing results: Other Chemo and Reconstruction  1/13/24 MRI neck :  IMPRESSION:  1.  No osseous metastases.     2.  No abnormal cord signal. No cord pathologic enhancement.     3.  Multilevel spondylosis described above.     4.  No critical thecal sac stenosis.     5.  C6-C7: Right foraminal entry zone severe stenosis.  Prior therapy history for the same diagnosis, illness or injury: Yes      Prior Level of Function  Transfers: Independent  Ambulation: Independent  ADL: Independent  IADL: Housekeeping has to break it up.      Living  Environment  Social support: Alone   Type of home: House; Basement   Stairs to enter the home: Yes       Ramp: No   Stairs inside the home: Yes 14 Is there a railing: Yes     Help at home: None  Equipment owned:       Employment: No   on social security disability.   Hobbies/Interests:  to spend time w/ grandchildren.  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  Camping--has motorhome    Patient goals for therapy: regain endurance and range of motion and strength         Objective      Vitals O2 96%,  HR 66,  /91  FACIT:  6/52.    POSTURE: mild FB posture w/ gait    RANGE OF MOTION:   (Degrees) Left AROM Left PROM Right AROM  Right PROM   Shoulder Flexion 118  108    Shoulder scaption 105  90    Shoulder Internal Rotation L1  L1    Shoulder External Rotation 65  47    Elbow Extension       Elbow Flexion           GAIT:   No limp, however mild guarding and decreased R > L arm swing.  TUG 10.2   6 min walk test 1342 feet (O2 before 98, after 99;  HR before 83, after 88 bpm).  Pt above expected range of 1035 to 1294 feet.     STRENGTH:    R 45,40,40 = 42#,  L 35, 30, 32 = 32#    Assessment & Plan   CLINICAL IMPRESSIONS  Medical Diagnosis: R breast cancer, Physical deconditioning, neuropathy, R shoulder pain    Treatment Diagnosis: fatigue/ deconditioned/ limited shoulder motion   Impression/Assessment: Patient is a 59 year old female with decreased arm motion/ strength, fatigue complaints.  The following significant findings have been identified: Pain, Decreased ROM/flexibility, Decreased strength, Impaired gait, Impaired muscle performance, Decreased activity tolerance, Impaired posture, and fatigue . These impairments interfere with their ability to perform self care tasks, recreational activities, household chores, and community mobility as compared to previous level of function.     Clinical Decision Making (Complexity):  Clinical Presentation: Evolving/Changing  Clinical Presentation Rationale: based on  medical and personal factors listed in PT evaluation  Clinical Decision Making (Complexity): Moderate complexity    PLAN OF CARE  Treatment Interventions:  Interventions: Manual Therapy, Neuromuscular Re-education, Therapeutic Activity, Therapeutic Exercise, Self-Care/Home Management    Long Term Goals     PT Goal 1  Goal Identifier: 1.  Goal Description: Pt will have decreased fatigue noted by FACIT score of 15/52 and get dressed daily  Target Date: 09/06/24  PT Goal 2  Goal Identifier: 2.  Goal Description: Pt will have improved AROM to 130* flex for improved tolerance for reaching w/ ADL's  Target Date: 09/27/24  PT Goal 3  Goal Identifier: 3.  Goal Description: Pt will have improved FACIT score to 30/52  Target Date: 10/11/24  PT Goal 4  Goal Identifier: 4.  Goal Description: Pt will have improved strength in order to carry her laundry basket w/ minimal difficulty (average loads)  Target Date: 10/11/24  PT Goal 5  Goal Identifier: 5.  Goal Description: Pt will be independent and consistent w/HEP to manage symptoms  Target Date: 10/11/24      Frequency of Treatment: 2X/week X 6 weeks then 1X/week X 4 weeks  Duration of Treatment: 10 weeks    Recommended Referrals to Other Professionals:  none  Education Assessment:   Learner/Method: Patient;Listening;Reading;Demonstration;Pictures/Video;No Barriers to Learning    Risks and benefits of evaluation/treatment have been explained.   Patient/Family/caregiver agrees with Plan of Care.     Evaluation Time:     PT Eval, Moderate Complexity Minutes (83960): 30       Signing Clinician: Sofya Dickinson, PT        [facility]                                                                                   OUTPATIENT PHYSICAL THERAPY      PLAN OF TREATMENT FOR OUTPATIENT REHABILITATION   Patient's Last Name, First Name, Diana Crane YOB: 1965   Provider's Name   [facility]   Medical Record  No.  [id number]     Onset Date: 06/21/24  Start of Care Date: 08/02/24     Medical Diagnosis:  R breast cancer, Physical deconditioning, neuropathy, R shoulder pain      PT Treatment Diagnosis:  fatigue/ deconditioned/ limited shoulder motion Plan of Treatment  Frequency/Duration: 2X/week X 6 weeks then 1X/week X 4 weeks/ 10 weeks    Certification date from 08/02/24 to 10/11/24         See note for plan of treatment details and functional goals     Sofya Dickinson, PT                         I CERTIFY THE NEED FOR THESE SERVICES FURNISHED UNDER        THIS PLAN OF TREATMENT AND WHILE UNDER MY CARE     (Physician attestation of this document indicates review and certification of the therapy plan).              Referring Provider:  Linsey Verduzco    Initial Assessment  See Epic Evaluation- Start of Care Date: 08/02/24

## 2024-08-05 ENCOUNTER — THERAPY VISIT (OUTPATIENT)
Dept: PHYSICAL THERAPY | Facility: CLINIC | Age: 59
End: 2024-08-05
Attending: STUDENT IN AN ORGANIZED HEALTH CARE EDUCATION/TRAINING PROGRAM
Payer: COMMERCIAL

## 2024-08-05 DIAGNOSIS — M25.511 ACUTE PAIN OF RIGHT SHOULDER: ICD-10-CM

## 2024-08-05 DIAGNOSIS — R53.81 PHYSICAL DECONDITIONING: Primary | ICD-10-CM

## 2024-08-05 PROCEDURE — 97110 THERAPEUTIC EXERCISES: CPT | Mod: GP | Performed by: PHYSICAL THERAPIST

## 2024-08-08 ENCOUNTER — VIRTUAL VISIT (OUTPATIENT)
Dept: ONCOLOGY | Facility: HOSPITAL | Age: 59
End: 2024-08-08
Attending: PSYCHOLOGIST
Payer: COMMERCIAL

## 2024-08-08 VITALS — HEIGHT: 67 IN | BODY MASS INDEX: 31.39 KG/M2 | WEIGHT: 200 LBS

## 2024-08-08 DIAGNOSIS — F43.22 ADJUSTMENT DISORDER WITH ANXIOUS MOOD: Primary | ICD-10-CM

## 2024-08-08 PROCEDURE — 90832 PSYTX W PT 30 MINUTES: CPT | Mod: 95 | Performed by: PSYCHOLOGIST

## 2024-08-08 ASSESSMENT — PAIN SCALES - GENERAL: PAINLEVEL: MODERATE PAIN (4)

## 2024-08-08 NOTE — NURSING NOTE
Is the patient currently in the state of [place]? YES    Visit mode:VIDEO    If the visit is dropped, the patient can be reconnected by: VIDEO VISIT: Send to e-mail at: [email or web address]    Will anyone else be joining the visit? NO  (If patient encounters technical issues they should call [phone])    How would you like to obtain your AVS? MyChart    Are changes needed to the allergy or medication list? No    Rooming Documentation:  Questionnaire(s) not pre-assigned      Reason for visit: Video Visit (Follow UP )    Genesis QURESHI

## 2024-08-08 NOTE — PROGRESS NOTES
[facility] Oncology- Psychotherapy                                     Progress Note     Patient Name: Diana Salvador                     Date:   2024                                           Service Type: Individual                            Session Start Time:  1300               Session End Time:  1336                Session Length:   36 mins          Session #:      3     Attendees:     Client attended alone     Service Modality:  Video Visit:      Provider verified identity through the following two step process.  Patient provided:  Patient      Telemedicine Visit: The patient's condition can be safely assessed and treated via synchronous audio and visual telemedicine encounter.       Reason for Telemedicine Visit: Patient has requested telehealth visit     Originating Site (Patient Location): Patient's home     Distant Site (Provider Location): [facility] [place]     Consent:  The patient/guardian has verbally consented to: the potential risks and benefits of telemedicine (video visit) versus in person care; bill my insurance or make self-payment for services provided; and responsibility for payment of non-covered services.      Patient would like the video invitation sent by:  My Chart     Mode of Communication:  Video Conference via [employer]     Distant Location (Provider):  On-site     As the provider I attest to compliance with applicable laws and regulations related to telemedicine.     DATA  Interactive Complexity: No  Crisis: No                               Progress Since Last Session (Related to Symptoms / Goals / Homework):              Symptoms:      Pt reports dysphoric, sad, and depressed mood. Pt reports panic sx.     Pt reports a lot of body issues secondary to the reconstruction.     Pt reports she is tired and hurts all the time.     Pt reports racing thoughts made her exhausted.      She has a hard time organizing her tasks. She reports she  cannot concentrate.     Pt reports she has no energy.     Pt reports she has panic attack right now and we are working through it.     Pt reports she is sleeping adequately.      She eats minimally. She reports she eats peanut butter toast.     Pt reports she has arthritis in her back and has been dx with scoliosis.     Pt reports she gets angry at herself for not functioning at the level she would like.     Pt reports breathing exercises are helpful.      Pt reports her dog  and she has not been walking since last month.      Pt is still experiencing grief over loss of spouse.      Pt reports her finances are tough.       Pt plans to get a new dog.         Homework:  None                               Episode of Care Goals: Satisfactory progress - ACTION (Actively working towards change); Intervened by reinforcing change plan / affirming steps taken                    Current / Ongoing Stressors and Concerns:  Pt reports she had reconstruction and does not look or feel normal she reports. Pt reports her breast reconstruction looks differently than original which is upsetting.     Pt reports she had a trip to [place] and had a panic attack in the tunnel to [place]. She was able to talk herself through the issue.      Pt reports she does not eat the same. She had dental surgery. She does not like to go out because she feels self conscious.     Pt reports she is working with a PT and needs to leave the house twice per week for that.      Pt reports she is doing a craft she has not done before.      Pt has a little dog she walks.      Pt reports she watches the history channel.     Pt reports she helps out with her grandson a few days per week so her daughter in law can work.     Pt reports she cannot keep up with medical bills. She has been sent to collections.     Pt denies S/I.     Pt reports she has disability from work and social security disability.     Pt reports her kids are doing well and she loves  "her grand kids.     Pt reports her son and daughter in law invite her over to eat but she does not like to leave her home so she does not go.      Pt reports she is going camping this weekend in her RV. Pt is going to [facility].                     Social Hx:              Pt lives alone, She has had support people come in.      Pt has been dealing with cancer two years. She goes to the [facility].      Pt reports she lives alone. Pt was  until her spouse  9 years ago after 31 years together. He was in an MVA in a 4 angel and later had blood clots that led to a pulmonary embolism.                  Pt reports she has two sons and a daughter in law, and grandchildren.                  Son 37 \"Christopher\" he is has PTSD from the [employer]. He is not getting help. He denies an issue.                  Son 35 \"Michael\" is  and they have a boy 2 and a girl 8.                  Both live 9 miles away together.                  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 3 years ago and was dx with cancer two years ago.                  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                  Pt reports her mother  8 mos after her spouse from CHF., her father lives in [place] where she grew up.                     Treatment Objective(s) Addressed in This Session:          identify medical stressors which contribute to feelings of depression  And anxiety.                 Intervention:              CBT: ,  Emotion Focused Therapy: ,  Solution Focused: ,     Assessments completed prior to visit:  None                    ASSESSMENT: Current Emotional / Mental Status (status of significant symptoms):              Risk status (Self / Other harm or suicidal ideation)              Patient denies current fears or concerns for personal safety.              Patient denies current or recent suicidal ideation " or behaviors.              Patient denies current or recent homicidal ideation or behaviors.              Patient denies current or recent self injurious behavior or ideation.              Patient denies other safety concerns.              Patient reports there has been no change in risk factors since their last session.                Patient reports there has been no change in protective factors since their last session.                Recommended that patient call 911 or go to the local ED should there be a change in any of these risk factors.                 Appearance:                            Appropriate               Eye Contact:                           Good               Psychomotor Behavior:          Normal               Attitude:                                   Cooperative               Orientation:                             All              Speech                          Rate / Production:       Normal                           Volume:                       Normal               Mood:                                      Anxious  Depressed  Sad               Affect:                                      Labile               Thought Content:                    Clear               Thought Form:                        Coherent  Logical               Insight:                                     Good                  Medication Review:              No changes to current psychiatric medication(s)                 Medication Compliance:              Yes                 Changes in Health Issues:              Yes: cancer, Associated Psychological Distress                 Chemical Use Review:              Substance Use: Chemical use reviewed, no active concerns identified      Diagnosis:  F43.23 Adjustment D/O with anxiety and depressive sx.      Collateral Reports Completed:              Not Applicable     PLAN: (Patient Tasks / Therapist Tasks / Other)  Return in two weeks           Magan Mendez,  LP                                                           ______________________________________________________________________     Individual Treatment Plan     Patient's Name: Diana Salvador                  YOB: 1965     Date of Creation: 1/9/2024     Date Treatment Plan Last Reviewed/Revised:         DSM5 Diagnoses: F43.23 Adjustment D/O with anxiety and depressive sx.   Psychosocial / Contextual Factors: Cancer dx, arthritis.   PROMIS (reviewed every 90 days):      Referral / Collaboration:  Referral to another professional/service is not indicated at this time..     Anticipated number of session for this episode of care: 9-12 sessions  Anticipation frequency of session: Biweekly  Anticipated Duration of each session: 53 or more minutes  Treatment plan will be reviewed in 90 days or when goals have been changed.         MeasurableTreatment Goal(s) related to diagnosis / functional impairment(s)  Goal 1: Patient will reduce anxiety and depressive sx.         Objective #A (Patient Action)                          Patient will identify medical stressors which contribute to feelings of depression.  Status: New - Date: 1/9/24       Intervention(s)  Therapist will teach distraction skills.   .     Objective #B  Patient will identify medical stressors which contribute to feelings of depression and anxiety  Status: New - Date: 1/9/24       Intervention(s)  Therapist will teach emotional recognition/identification.   .     Objective #C  Patient will identify medical stressors which contribute to feelings of depression.and anxiety  Status: New - Date: 1/9/24       Intervention(s)  Therapist will teach emotional regulation skills.   .           Patient has reviewed and agreed to the above plan.        Magan Mendez LP                    4/29/2024

## 2024-08-08 NOTE — PROGRESS NOTES
Virtual Visit Details    Type of service:  Video Visit   Video Start Time:  1300  Video End Time:1:37 PM    Originating Location (pt. Location): Home    Distant Location (provider location):  On-site  Platform used for Video Visit: Enrique

## 2024-08-13 ENCOUNTER — THERAPY VISIT (OUTPATIENT)
Dept: PHYSICAL THERAPY | Facility: CLINIC | Age: 59
End: 2024-08-13
Attending: STUDENT IN AN ORGANIZED HEALTH CARE EDUCATION/TRAINING PROGRAM
Payer: COMMERCIAL

## 2024-08-13 DIAGNOSIS — R53.81 PHYSICAL DECONDITIONING: Primary | ICD-10-CM

## 2024-08-13 PROCEDURE — 97110 THERAPEUTIC EXERCISES: CPT | Mod: GP | Performed by: PHYSICAL THERAPIST

## 2024-08-19 ENCOUNTER — THERAPY VISIT (OUTPATIENT)
Dept: PHYSICAL THERAPY | Facility: CLINIC | Age: 59
End: 2024-08-19
Attending: STUDENT IN AN ORGANIZED HEALTH CARE EDUCATION/TRAINING PROGRAM
Payer: COMMERCIAL

## 2024-08-19 DIAGNOSIS — R53.81 PHYSICAL DECONDITIONING: Primary | ICD-10-CM

## 2024-08-19 PROCEDURE — 97110 THERAPEUTIC EXERCISES: CPT | Mod: GP | Performed by: PHYSICAL THERAPIST

## 2024-08-21 ENCOUNTER — INFUSION THERAPY VISIT (OUTPATIENT)
Dept: INFUSION THERAPY | Facility: CLINIC | Age: 59
End: 2024-08-21
Attending: NURSE PRACTITIONER
Payer: COMMERCIAL

## 2024-08-21 DIAGNOSIS — C50.211 MALIGNANT NEOPLASM OF UPPER-INNER QUADRANT OF RIGHT BREAST IN FEMALE, ESTROGEN RECEPTOR NEGATIVE (H): Primary | ICD-10-CM

## 2024-08-21 DIAGNOSIS — Z17.1 MALIGNANT NEOPLASM OF UPPER-INNER QUADRANT OF RIGHT BREAST IN FEMALE, ESTROGEN RECEPTOR NEGATIVE (H): Primary | ICD-10-CM

## 2024-08-21 PROCEDURE — 96523 IRRIG DRUG DELIVERY DEVICE: CPT

## 2024-08-21 RX ORDER — HEPARIN SODIUM (PORCINE) LOCK FLUSH IV SOLN 100 UNIT/ML 100 UNIT/ML
5 SOLUTION INTRAVENOUS
Status: DISCONTINUED | OUTPATIENT
Start: 2024-08-21 | End: 2024-08-23 | Stop reason: HOSPADM

## 2024-08-21 RX ORDER — HEPARIN SODIUM (PORCINE) LOCK FLUSH IV SOLN 100 UNIT/ML 100 UNIT/ML
5 SOLUTION INTRAVENOUS
Status: CANCELLED | OUTPATIENT
Start: 2024-08-21

## 2024-08-26 ENCOUNTER — MYC REFILL (OUTPATIENT)
Dept: FAMILY MEDICINE | Facility: CLINIC | Age: 59
End: 2024-08-26
Payer: COMMERCIAL

## 2024-08-26 DIAGNOSIS — M54.31 SCIATICA OF RIGHT SIDE: ICD-10-CM

## 2024-08-26 DIAGNOSIS — F11.20 OPIOID TYPE DEPENDENCE, CONTINUOUS USE (H): ICD-10-CM

## 2024-08-27 RX ORDER — TRAMADOL HYDROCHLORIDE 50 MG/1
50 TABLET ORAL EVERY 6 HOURS PRN
Qty: 120 TABLET | Refills: 0 | OUTPATIENT
Start: 2024-08-27

## 2024-08-29 ENCOUNTER — THERAPY VISIT (OUTPATIENT)
Dept: OCCUPATIONAL THERAPY | Facility: CLINIC | Age: 59
End: 2024-08-29
Attending: STUDENT IN AN ORGANIZED HEALTH CARE EDUCATION/TRAINING PROGRAM
Payer: COMMERCIAL

## 2024-08-29 DIAGNOSIS — Z17.1 MALIGNANT NEOPLASM OF RIGHT BREAST IN FEMALE, ESTROGEN RECEPTOR NEGATIVE, UNSPECIFIED SITE OF BREAST (H): ICD-10-CM

## 2024-08-29 DIAGNOSIS — M25.511 ACUTE PAIN OF RIGHT SHOULDER: ICD-10-CM

## 2024-08-29 DIAGNOSIS — T45.1X5A CHEMOTHERAPY-INDUCED NEUROPATHY (H): ICD-10-CM

## 2024-08-29 DIAGNOSIS — R53.81 PHYSICAL DECONDITIONING: ICD-10-CM

## 2024-08-29 DIAGNOSIS — C50.911 MALIGNANT NEOPLASM OF RIGHT BREAST IN FEMALE, ESTROGEN RECEPTOR NEGATIVE, UNSPECIFIED SITE OF BREAST (H): ICD-10-CM

## 2024-08-29 DIAGNOSIS — G62.0 CHEMOTHERAPY-INDUCED NEUROPATHY (H): ICD-10-CM

## 2024-08-29 PROCEDURE — 97165 OT EVAL LOW COMPLEX 30 MIN: CPT | Mod: GO | Performed by: OCCUPATIONAL THERAPIST

## 2024-08-29 PROCEDURE — 97535 SELF CARE MNGMENT TRAINING: CPT | Mod: GO | Performed by: OCCUPATIONAL THERAPIST

## 2024-08-29 NOTE — PROGRESS NOTES
OCCUPATIONAL THERAPY EVALUATION  Type of Visit: Evaluation       Fall Risk Screen:  Have you fallen 2 or more times in the past year?: Yes  Have you fallen and had an injury in the past year?: No  Timed Up and Go score (seconds): 10.2  Is patient a fall risk?: No    Subjective      Presenting condition or subjective complaint: Breast Cancer Recovery  Date of onset: 06/21/24    Relevant medical history: Arthritis; Cancer; Depression; Fibromyalgia; Implanted device; Menopause; Neck injury; Osteoporosis; Overweight; Pain at night or rest; Significant weakness; Vision problems   Dates & types of surgery: Breast reconstruction    Prior diagnostic imaging/testing results: Other Chemo and Reconstruction   Prior therapy history for the same diagnosis, illness or injury: Yes      Prior Level of Function  Transfers: Independent  Ambulation: Independent  ADL: Independent  IADL: Childcare, Driving, Finances, Housekeeping, Laundry, Meal preparation, Medication management, Work, Yard work    Living Environment  Social support: Alone   Type of home: House; Basement   Stairs to enter the home: Yes       Ramp: No   Stairs inside the home: Yes 14 Is there a railing: Yes     Help at home: None  Equipment owned:       Employment: No    Hobbies/Interests:      Patient goals for therapy: regain endurance and range of motion    Pain assessment:  Aide reports chronic pain all over     Objective     Cognitive Status Examination  Orientation: Oriented to person, place and time   Level of Consciousness: Alert, Emotionally labile  Follows Commands and Answers Questions: 100% of the time, Follows single step instructions, struggling with multi-step directions per report  Personal Safety and Judgement: Intact  Memory:  Aide voices concerns with her memory - further assess  Attention:  further assess  Organization/Problem Solving:  further assess  Executive Function:  further assess    VISUAL SKILLS  Visual Acuity: Wears glasses    SENSATION:   tingling in finger tips and toes (improving), heightened sensitivity in toes    RANGE OF MOTION:  significant limitation in bilateral shoulders, PT working on this  STRENGTH:  UE strength fatigues rapidly, working with PT for strengthening  MUSCLE TONE: WFL  COORDINATION: WFL  BALANCE:  working with PT    FUNCTIONAL MOBILITY  Assistive Device(s): None  Ambulation: ambulatory - working with PT    BED MOBILITY: WNL    TRANSFERS: WFL- uses arm rests to assist    BATHING:  fatigues rapidly.    Equipment: Hand-held shower head    UPPER BODY DRESSING: WFL, steps into bra and pulls up    LOWER BODY DRESSING: WFL, difficulty pulling on boots in winter time    TOILETING: WNL    GROOMING: WFL, does not put make up every day    EATING/SELF FEEDING:  limited meal prep, hard to cook for one      ACTIVITY TOLERANCE: very limited activity, tries to walk dog daily    INSTRUMENTAL ACTIVITIES OF DAILY LIVING (IADL): Pt reports she completes IADL's independently, however voices her fatigue will interfere with timely completion  Meal Planning/Prep: orders groceries on line and they deliver.    Home/Financial Management: independent  Communication/Computer Use: independent  Community Mobility: drives  Care of Others:- spends time with grandchildren 2 days a week 9-1    See PT note of FACIT scoring      Assessment & Plan   CLINICAL IMPRESSIONS  Medical Diagnosis: Malignant neoplasm of right breast in female, estrogen receptor negative, unspecified site of breast, Acute pain of right shoulder, chemotherapy-induced neuropathy, physical deconditioning    Treatment Diagnosis: Fatigue and cognitive impairment impacting daily function    Impression/Assessment: Pt is a 59 year old female presenting to Occupational Therapy due to significant fatigue post breast cancer and treatment.  The following significant findings have been identified: Impaired activity tolerance, Impaired balance, Impaired cognition, Impaired mobility, Impaired ROM,  Impaired sensation, Impaired strength, Pain, and Impaired sensory feedback.  These identified deficits interfere with their ability to perform self care tasks, recreational activities, household chores, driving , community mobility,  yard work, care of others, and meal planning and preparation as compared to previous level of function.   Patient's limitations or Problem List includes: Pain, Decreased ROM/motion, Weakness, Tightness in musculature, and Adherence in connective tissue of the bilateral shoulder which interferes with the patient's ability to perform Self Care Tasks (dressing, eating, bathing), Sleep Patterns, Recreational Activities, Household Chores, and Driving  as compared to previous level of function.    Clinical Decision Making (Complexity):  Assessment of Occupational Performance: 5 or more Performance Deficits  Occupational Performance Limitations: bathing/showering, dressing, feeding, functional mobility, driving and community mobility, health management and maintenance, home establishment and management, meal preparation and cleanup, shopping, sleep, leisure activities, and social participation  Clinical Decision Making (Complexity): Low complexity    PLAN OF CARE  Treatment Interventions:  Interventions: Cognitive Skills, Self-Care/Home Management, Therapeutic Activity, Therapeutic Exercise, Standardized Testing    Long Term Goals   OT Goal 1  Goal Identifier: Energy Conservation  Goal Description: Pt will verbalize and utilize an energy conservation strategy for dressing, bathing, home making and errand running to aide in increased toleration of day to day function  Rationale: In order to maximize safety and independence with performance of self-care activities  Target Date: 10/24/24  OT Goal 2  Goal Identifier: Daily planning  Goal Description: Pt will set up and implement with completion of daily plan of activity while engaging in work simplification, energy conservation and pacing  strategies 5/7 days a week  Rationale: In order to maximize safety and independence with performance of self-care activities  Target Date: 10/24/24  OT Goal 3  Goal Identifier: Memory  Goal Description: Pt will verbalize and engage use of 3 memory strategies to recall happenings in day and conversations  Rationale: In order to maximize safety and independence with performance of self-care activities  Target Date: 10/24/24  OT Goal 4  Goal Identifier: Pacing Strategies  Goal Description: Pt will verbalize an understanding and implementation of pacing strategies during day aiding her ability to engage in activity of khadra  Rationale: In order to maximize safety and independence with performance of self-care activities  Target Date: 10/24/24      Frequency of Treatment: 1x week  Duration of Treatment: 8 weeks     Recommended Referrals to Other Professionals: Occupational Therapy, Physical Therapy  Education Assessment: Learner/Method: Patient;Listening;Demonstration  Education Comments: as noted above     Risks and benefits of evaluation/treatment have been explained.   Patient/Family/caregiver agrees with Plan of Care.     Evaluation Time:    OT Eval, Low Complexity Minutes (40891): 15   Present: Not applicable     Signing Clinician: Mansoor Pete OT        [facility]                                                                                   OUTPATIENT OCCUPATIONAL THERAPY      PLAN OF TREATMENT FOR OUTPATIENT REHABILITATION   Patient's Last Name, First Name, Diana Crane YOB: 1965   Provider's Name   [facility]   Medical Record No.  [id number]     Onset Date: 06/21/24 Start of Care Date: 08/29/24     Medical Diagnosis:  Malignant neoplasm of right breast in female, estrogen receptor negative, unspecified site of breast, Acute pain of right shoulder, chemotherapy-induced neuropathy, physical deconditioning      OT  Treatment Diagnosis:  Fatigue and cognitive impairment impacting daily function Plan of Treatment  Frequency/Duration:1x week/8 weeks    Certification date from 08/29/24   To 10/24/24        See note for plan of treatment details and functional goals     Mansoor Pete OT                         I CERTIFY THE NEED FOR THESE SERVICES FURNISHED UNDER        THIS PLAN OF TREATMENT AND WHILE UNDER MY CARE     (Physician attestation of this document indicates review and certification of the therapy plan).              Referring Provider:  Linsey Verduzco    Initial Assessment  See Epic Evaluation- 08/29/24

## 2024-09-03 ENCOUNTER — THERAPY VISIT (OUTPATIENT)
Dept: PHYSICAL THERAPY | Facility: CLINIC | Age: 59
End: 2024-09-03
Attending: STUDENT IN AN ORGANIZED HEALTH CARE EDUCATION/TRAINING PROGRAM
Payer: COMMERCIAL

## 2024-09-03 DIAGNOSIS — R53.81 PHYSICAL DECONDITIONING: Primary | ICD-10-CM

## 2024-09-03 PROCEDURE — 97110 THERAPEUTIC EXERCISES: CPT | Mod: GP | Performed by: PHYSICAL THERAPIST

## 2024-09-06 ENCOUNTER — THERAPY VISIT (OUTPATIENT)
Dept: OCCUPATIONAL THERAPY | Facility: CLINIC | Age: 59
End: 2024-09-06
Attending: STUDENT IN AN ORGANIZED HEALTH CARE EDUCATION/TRAINING PROGRAM
Payer: COMMERCIAL

## 2024-09-06 DIAGNOSIS — Z17.1 MALIGNANT NEOPLASM OF UPPER-INNER QUADRANT OF RIGHT BREAST IN FEMALE, ESTROGEN RECEPTOR NEGATIVE (H): Primary | ICD-10-CM

## 2024-09-06 DIAGNOSIS — C50.211 MALIGNANT NEOPLASM OF UPPER-INNER QUADRANT OF RIGHT BREAST IN FEMALE, ESTROGEN RECEPTOR NEGATIVE (H): Primary | ICD-10-CM

## 2024-09-06 DIAGNOSIS — M54.41 ACUTE RIGHT-SIDED LOW BACK PAIN WITH RIGHT-SIDED SCIATICA: ICD-10-CM

## 2024-09-06 PROCEDURE — 97535 SELF CARE MNGMENT TRAINING: CPT | Mod: GO | Performed by: OCCUPATIONAL THERAPIST

## 2024-09-06 PROCEDURE — 97530 THERAPEUTIC ACTIVITIES: CPT | Mod: GO | Performed by: OCCUPATIONAL THERAPIST

## 2024-09-06 RX ORDER — CYCLOBENZAPRINE HCL 5 MG
5 TABLET ORAL 2 TIMES DAILY PRN
Qty: 40 TABLET | Refills: 1 | Status: SHIPPED | OUTPATIENT
Start: 2024-09-06

## 2024-09-06 NOTE — PROGRESS NOTES
Cognistat    SUMMARY OF TEST:    The Cognistat is designed to assess functioning in five major areas:  Language, Constructions, Memory, Calculations and Reasoning.  Attention, Level of Consciousness and Orientation are assessed independently.  Scores are plotted on a profile which illustrates the overall pattern of abilities and disabilities.  Scores can be compared to norms and/or representative profiles for various populations.    DATE OF TESTING:    RESULTS OF TESTING:    This patient scores in the average range for Level of Consciousness, Orientation, Attention, Language, Constructions, Memory, Calculations, and Reasoning    This patient scores in the mildly impaired for  Registration (naming of words - 2x in a row (needing 4 trials)    This patient scores in the moderately impaired for  n/a    This patient scores in the severely impaired for  n/a    Test results comments:  Aide was unable to state the date, but able to state the year, day, approximate time.  She completed digit repetition of 6 digits accurately.  She demonstrated all 3 step directions.  Repetition of sentence failed by metric completed successfully.  Demonstrated visual processing and design, recalled 3 words and 4th word with category prompt.  Accurate calculation and reasoning    INTERPRETATION OF TEST RESULTS:  Overall demonstrating average range cognitive strategies, noting distractions impact the registration of information and can impact memory      Factors affecting performance:  Decreased UE strength/coordination  Limited Endurance  Emotional Status  New or complex medical issue    Recommendations: Ongoing OT and PT, she may benefit from support groups for emotional well being.  Pacing, work simplification/energy conservation and memory strategies recommended    TIME ADMINISTERING TEST: 8    TIME FOR INTERPRETATION AND PREPARATION OF REPORT: 2    TOTAL TIME: 10

## 2024-09-11 ENCOUNTER — VIRTUAL VISIT (OUTPATIENT)
Dept: ONCOLOGY | Facility: HOSPITAL | Age: 59
End: 2024-09-11
Attending: PSYCHOLOGIST
Payer: COMMERCIAL

## 2024-09-11 DIAGNOSIS — F43.22 ADJUSTMENT DISORDER WITH ANXIOUS MOOD: Primary | ICD-10-CM

## 2024-09-11 PROCEDURE — 90834 PSYTX W PT 45 MINUTES: CPT | Mod: 95 | Performed by: PSYCHOLOGIST

## 2024-09-11 NOTE — NURSING NOTE
Current patient location: [address] [place] [id number]    Is the patient currently in the state of [place]? YES    Visit mode:VIDEO    If the visit is dropped, the patient can be reconnected by: VIDEO VISIT: Text to cell phone:   Telephone Information:   Mobile [phone]       Will anyone else be joining the visit? NO  (If patient encounters technical issues they should call [phone]    How would you like to obtain your AVS? MyChart    Are changes needed to the allergy or medication list? Pt stated no changes to allergies and Pt stated no med changes    Are refills needed on medications prescribed by this physician? NO    Rooming Documentation:  Questionnaire(s) completed      Reason for visit: RECHECK    Aron QURESHI

## 2024-09-11 NOTE — PROGRESS NOTES
Virtual Visit Details    Type of service:  Video Visit   Video Start Time:  1454  Video End Time:3:40 PM    Originating Location (pt. Location): Home    Distant Location (provider location):  On-site  Platform used for Video Visit: Enrique

## 2024-09-11 NOTE — PROGRESS NOTES
"           [facility] Oncology- Psychotherapy                                     Progress Note     Patient Name: Diana Salvador                     Date:   2024                                           Service Type: Individual                            Session Start Time:                 Session End Time:  154                Session Length:   46 mins          Session #:      4     Attendees:     Client attended alone     Service Modality:  Video Visit:      Provider verified identity through the following two step process.  Patient provided:  Patient      Telemedicine Visit: The patient's condition can be safely assessed and treated via synchronous audio and visual telemedicine encounter.       Reason for Telemedicine Visit: Patient has requested telehealth visit     Originating Site (Patient Location): Patient's home     Distant Site (Provider Location): [facility] [place]     Consent:  The patient/guardian has verbally consented to: the potential risks and benefits of telemedicine (video visit) versus in person care; bill my insurance or make self-payment for services provided; and responsibility for payment of non-covered services.      Patient would like the video invitation sent by:  My Chart     Mode of Communication:  Video Conference via Amwell     Distant Location (Provider):  On-site     As the provider I attest to compliance with applicable laws and regulations related to telemedicine.     DATA  Interactive Complexity: No  Crisis: No                               Progress Since Last Session (Related to Symptoms / Goals / Homework):              Symptoms:  Pt reports she has \"rough patches\" ongoing. Pt reports dysphoric, sad, and depressed mood. Pt reports panic sx. Pt reports she spends all day thinking about what she wants to do.       Pt reports she is all done with treatments. Pt reports she sees oncology next week.     Pt reports she has an overdue water " "bill. She just needs to write the check.     Pt reports she feels overwhelmed. She gets anxious she reports.     Pt reports Seroquel helps her sleep.     Pt reports limited energy.      Homework:  None                               Episode of Care Goals: Satisfactory progress - ACTION (Actively working towards change); Intervened by reinforcing change plan / affirming steps taken                    Current / Ongoing Stressors and Concerns:  Pt reports she has disability from work and social security disability.      Pt reports her kids are doing well and she loves her grand kids. She watches a grand child twice per week.     Pt reports she loves her little dog. She takes her on walks.     Pt reports her anxiety keeps her from getting out of the house.     Pt reports her son Mati is doing OK. Pt reports she always worries.     Pt reports her son Michael is doing well.      Pt reports limited energy.     Pt reports her income is OK.     Pt reports her port comes out 24. She has had that since 3/2022.     Pt reports she only goes to therapy and to her kids' houses.                     Social Hx:              Pt lives alone, She has had support people come in.      Pt has been dealing with cancer two years. She goes to the [facility].      Pt reports she lives alone. Pt was  until her spouse  9 years ago after 31 years together. He was in an MVA in a 4 nagel and later had blood clots that led to a pulmonary embolism. He  14.                  Pt reports she has two sons and a daughter in law, and grandchildren.                  Son 37 \"Christopher\" he is has PTSD from the [employer]. He is not getting help. He denies an issue.                  Son 35 \"Michael\" is  and they have a boy 2 and a girl 8. He is the RN at the [employer] retirement.                  Both live 9 miles away together.                  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 3 years ago " and was dx with cancer two years ago.                  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                  Pt reports her mother  8 mos after her spouse from CHF., her father lives in [place] where she grew up.                     Treatment Objective(s) Addressed in This Session:          identify medical stressors which contribute to feelings of depression  And anxiety.                 Intervention:              CBT: ,  Emotion Focused Therapy: ,  Solution Focused: ,     Assessments completed prior to visit:  None                    ASSESSMENT: Current Emotional / Mental Status (status of significant symptoms):              Risk status (Self / Other harm or suicidal ideation)              Patient denies current fears or concerns for personal safety.              Patient denies current or recent suicidal ideation or behaviors.              Patient denies current or recent homicidal ideation or behaviors.              Patient denies current or recent self injurious behavior or ideation.              Patient denies other safety concerns.              Patient reports there has been no change in risk factors since their last session.                Patient reports there has been no change in protective factors since their last session.                Recommended that patient call 911 or go to the local ED should there be a change in any of these risk factors.                 Appearance:                            Appropriate               Eye Contact:                           Good               Psychomotor Behavior:          Normal               Attitude:                                   Cooperative               Orientation:                             All              Speech                          Rate / Production:       Normal                           Volume:                       Normal               Mood:                                       Anxious  Depressed  Sad               Affect:                                      Labile               Thought Content:                    Clear               Thought Form:                        Coherent  Logical               Insight:                                     Good                  Medication Review:              No changes to current psychiatric medication(s)                 Medication Compliance:              Yes                 Changes in Health Issues:              Yes: cancer, Associated Psychological Distress                 Chemical Use Review:              Substance Use: Chemical use reviewed, no active concerns identified      Diagnosis:  F43.23 Adjustment D/O with anxiety and depressive sx.      Collateral Reports Completed:              Not Applicable     PLAN: (Patient Tasks / Therapist Tasks / Other)  Return in two weeks           Magan Mendez LP                                                           ______________________________________________________________________     Individual Treatment Plan     Patient's Name: Diana Salvador                  YOB: 1965     Date of Creation: 1/9/24     Date Treatment Plan Last Reviewed/Revised: 9/11/24        DSM5 Diagnoses: F43.23 Adjustment D/O with anxiety and depressive sx.   Psychosocial / Contextual Factors: Cancer dx, arthritis.   PROMIS (reviewed every 90 days):      Referral / Collaboration:  Referral to another professional/service is not indicated at this time..     Anticipated number of session for this episode of care: 9-12 sessions  Anticipation frequency of session: Biweekly  Anticipated Duration of each session: 53 or more minutes  Treatment plan will be reviewed in 90 days or when goals have been changed.         MeasurableTreatment Goal(s) related to diagnosis / functional impairment(s)  Goal 1: Patient will reduce anxiety and depressive sx.         Objective #A (Patient Action)                           Patient will identify medical stressors which contribute to feelings of depression.  Status: Reviewed 9/11/24.      Intervention(s)  Therapist will teach distraction skills.   .     Objective #B  Patient will identify medical stressors which contribute to feelings of depression and anxiety  Status: Reviewed 9/11/24.      Intervention(s)  Therapist will teach emotional recognition/identification.   .     Objective #C  Patient will identify medical stressors which contribute to feelings of depression.and anxiety  Status: Reviewed 9/11/24.      Intervention(s)  Therapist will teach emotional regulation skills.   .           Patient has reviewed and agreed to the above plan.        Magan Mendez LP                    9/11/2024

## 2024-09-12 ENCOUNTER — PATIENT OUTREACH (OUTPATIENT)
Dept: CARE COORDINATION | Facility: CLINIC | Age: 59
End: 2024-09-12
Payer: COMMERCIAL

## 2024-09-12 NOTE — PROGRESS NOTES
Social Work - Distress Screen Intervention  [facility]    Identified Concern and Score from Distress Screenin. How concerned are you about your ability to eat? 5     2. How concerned are you about unintended weight loss or your current weight? (!) 8     3. How concerned are you about feeling depressed or very sad?  (!) 8     4. How concerned are you about feeling anxious or very scared?  (!) 8     5. Do you struggle with the loss of meaning and khadra in your life?  Quite a bit     6. How concerned are you about work and home life issues that may be affected by your cancer?  6     7. How concerned are you about knowing what resources are available to help you?  0     8. Do you currently have what you would describe as Holiness or spiritual struggles? Not at all     9. If you want to be contacted by one of our professionals, I can send a message to them right now.  No data recorded     Date of Distress Screen: 24  Data: At time of last visit, patient scored positive on distress screening. Pt saw Wilfrid Vanessa same day and follows regularly with him.   Intervention/Education provided: NA  Follow-up Required:  will remain available to patient for support as needed.    JOSE ANTONIO Brewer, [facility]  Adult Oncology Clinics  [place] (M,W), [place] (T) & [place] ()  *I am off Friday  Office: [phone]

## 2024-09-13 ENCOUNTER — THERAPY VISIT (OUTPATIENT)
Dept: OCCUPATIONAL THERAPY | Facility: CLINIC | Age: 59
End: 2024-09-13
Attending: STUDENT IN AN ORGANIZED HEALTH CARE EDUCATION/TRAINING PROGRAM
Payer: COMMERCIAL

## 2024-09-13 DIAGNOSIS — C50.211 MALIGNANT NEOPLASM OF UPPER-INNER QUADRANT OF RIGHT BREAST IN FEMALE, ESTROGEN RECEPTOR NEGATIVE (H): Primary | ICD-10-CM

## 2024-09-13 DIAGNOSIS — Z17.1 MALIGNANT NEOPLASM OF UPPER-INNER QUADRANT OF RIGHT BREAST IN FEMALE, ESTROGEN RECEPTOR NEGATIVE (H): Primary | ICD-10-CM

## 2024-09-13 PROCEDURE — 97535 SELF CARE MNGMENT TRAINING: CPT | Mod: GO | Performed by: OCCUPATIONAL THERAPIST

## 2024-09-16 ENCOUNTER — MYC REFILL (OUTPATIENT)
Dept: FAMILY MEDICINE | Facility: CLINIC | Age: 59
End: 2024-09-16
Payer: COMMERCIAL

## 2024-09-16 DIAGNOSIS — F11.20 OPIOID TYPE DEPENDENCE, CONTINUOUS USE (H): ICD-10-CM

## 2024-09-16 DIAGNOSIS — M54.31 SCIATICA OF RIGHT SIDE: ICD-10-CM

## 2024-09-16 RX ORDER — HYDROCODONE BITARTRATE AND ACETAMINOPHEN 5; 325 MG/1; MG/1
1 TABLET ORAL 2 TIMES DAILY PRN
Qty: 60 TABLET | Refills: 0 | Status: SHIPPED | OUTPATIENT
Start: 2024-09-16

## 2024-09-19 ENCOUNTER — VIRTUAL VISIT (OUTPATIENT)
Dept: ONCOLOGY | Facility: CLINIC | Age: 59
End: 2024-09-19
Attending: STUDENT IN AN ORGANIZED HEALTH CARE EDUCATION/TRAINING PROGRAM
Payer: COMMERCIAL

## 2024-09-19 VITALS — WEIGHT: 209 LBS | BODY MASS INDEX: 32.8 KG/M2 | HEIGHT: 67 IN

## 2024-09-19 DIAGNOSIS — C50.211 MALIGNANT NEOPLASM OF UPPER-INNER QUADRANT OF RIGHT BREAST IN FEMALE, ESTROGEN RECEPTOR NEGATIVE (H): Primary | ICD-10-CM

## 2024-09-19 DIAGNOSIS — G62.0 CHEMOTHERAPY-INDUCED NEUROPATHY (H): ICD-10-CM

## 2024-09-19 DIAGNOSIS — Z17.1 MALIGNANT NEOPLASM OF UPPER-INNER QUADRANT OF RIGHT BREAST IN FEMALE, ESTROGEN RECEPTOR NEGATIVE (H): Primary | ICD-10-CM

## 2024-09-19 DIAGNOSIS — T45.1X5A CHEMOTHERAPY-INDUCED NEUROPATHY (H): ICD-10-CM

## 2024-09-19 DIAGNOSIS — R53.81 PHYSICAL DECONDITIONING: ICD-10-CM

## 2024-09-19 PROCEDURE — 99215 OFFICE O/P EST HI 40 MIN: CPT | Mod: 95 | Performed by: STUDENT IN AN ORGANIZED HEALTH CARE EDUCATION/TRAINING PROGRAM

## 2024-09-19 ASSESSMENT — PAIN SCALES - GENERAL: PAINLEVEL: SEVERE PAIN (7)

## 2024-09-19 NOTE — NURSING NOTE
Current patient location: [address] [place] [id number]    Is the patient currently in the state of [place]? YES    Visit mode:VIDEO    If the visit is dropped, the patient can be reconnected by: VIDEO VISIT: Text to cell phone:   Telephone Information:   Mobile [phone]       Will anyone else be joining the visit? NO  (If patient encounters technical issues they should call [phone])    How would you like to obtain your AVS? MyChart    Are changes needed to the allergy or medication list? Pt stated no changes to allergies and Pt stated no med changes    Are refills needed on medications prescribed by this physician? NO    Rooming Documentation:  Not applicable      Reason for visit: SUMA QURESHI

## 2024-09-19 NOTE — LETTER
2024      Diana Salvador  [address] [place] [id number]      Dear Colleague,    Thank you for referring your patient, Diana Salvador, to the [facility]. Please see a copy of my visit note below.    Virtual Visit Details    Type of service:  Video Visit     Originating Location (pt. Location): Home    Distant Location (provider location):  on site  Platform used for Video Visit: [facility]   PM&R clinic note        Interval history:     Diana Salvador presents to clinic today for follow up reg his/her rehab needs.   She has h/o  right-sided stage IIb breast cancer (ER negative/MS negative, HER2 negative) with chronic fatigue, arthralgias and neuropathy.   Was last seen in clinic on 24.  Recommendations included:  Therapy/equipment/braces:  Physical therapy referral placed.  Occupational Therapy referral placed.  Continue home exercise regimen as tolerated.  UA ordered to evaluate for a UTI due to her urinary symptoms.  Patient will call and schedule a lab visit.  Medications:  Keep with plan to change for antidepressant medication per PCP.  Referral / follow up with other providers:  Continue to follow with pain medicine team for management.  Follow up: 3 to 4-month return virtual visit.    Oncology history:  *Diagnosis:2022; 56 years old; palpated a mass; clinical stage IIB (T2N0M0), grade III, triple negative; infiltrating ductal carcinoma  *Chemotherapy: Pembrolizumab/paclitaxel/carboplatin x 3 cycles complicated by grade 3 neuropathy (plan was 4 cycles followed by 4 cycles of  Pembrolizumab/adriamycin/cytoxan). She then had 11 cycles of every 3 week Pembrolizumab, completed 2023.  *Surgery: 2022 Bilateral mastectomy and sentinel lymph node dissection, right. Pathologic complete response.   *Genetic testin negative BRCA1/BRCA2.  -Last saw Sloane Bairon on 2023 for follow-up in survivorship clinic.   She had decreased range of motion in her right shoulder with occasional numbness and tingling in the third through fifth digits, history of right ulnar surgery.  Migratory arthralgias.  Neuropathy on duloxetine.  Fatigue.      Symptoms,  Aide presented for return visit today.  She switched her antidepressant back to Wellbutrin and it seems to be helping well.   She does have an appointment with her PCP next week to [facility] due to some bladder urgency. She does not leave home that much and is something that she is working on with Magan Mendez. She has been learning some good strategies to cope with her anxiety about going out.   She is going to PT and OT now that she has medicare on top of her [employer] insurance which has helped with her copays.   She feels like therapies are really helping now.   In terms of her pain management, she has it pretty much controlled with what she is doing with the pain team.  She is trying to be more mobile now that she has a dog and he likes to go for walks. One of her therapists told her that she can walk the gym at the local elementary school which is a close distance from her home.  She has been struggling with her appetite and has protein drinks that she takes in daily.        Therapies/HEP,  Continues with outpatient physical therapy and Occupational Therapy, started recently due to previous insurance issues.      Functionally,   No changes.      Social history is unchanged.      Medications:  Current Outpatient Medications   Medication Sig Dispense Refill     acetaminophen (TYLENOL) 325 MG tablet Take 2 tablets (650 mg) by mouth every 4 hours as needed for other (mild pain) 100 tablet 0     alendronate (FOSAMAX) 35 MG tablet TAKE 1 TABLET(35 MG) BY MOUTH EVERY 7 DAYS 12 tablet 3     buPROPion (WELLBUTRIN XL) 150 MG 24 hr tablet Take 1 tablet (150 mg) by mouth every morning 90 tablet 3     cyanocobalamin (CYANOCOBALAMIN) 1000 MCG/ML injection Inject 1 mL (1,000 mcg) into the  "muscle every 30 days 3 mL 3     cyclobenzaprine (FLEXERIL) 5 MG tablet TAKE 1 TABLET (5 MG) BY MOUTH 2 TIMES DAILY AS NEEDED FOR MUSCLE SPASMS 40 tablet 1     diclofenac (VOLTAREN) 1 % topical gel Place 2 g onto the skin 4 times daily 100 g 3     furosemide (LASIX) 20 MG tablet Take 1 tablet (20 mg) by mouth daily as needed (swelling) 60 tablet 3     HYDROcodone-acetaminophen (NORCO) 5-325 MG tablet Take 1 tablet by mouth 2 times daily as needed for severe pain. 60 tablet 0     LORazepam (ATIVAN) 0.5 MG tablet Take 1-2 tablets (0.5-1 mg) by mouth every 8 hours as needed for anxiety 60 tablet 5     omeprazole (PRILOSEC) 20 MG DR capsule As needed       potassium chloride ER (K-TAB) 20 MEQ CR tablet Take 1 tablet (20 mEq) by mouth daily 90 tablet 3     QUEtiapine (SEROQUEL) 25 MG tablet TAKE 2 TABLETS BY MOUTH AT BEDTIME 180 tablet 0     rOPINIRole (REQUIP) 1 MG tablet Take 1 tablet (1 mg) by mouth at bedtime 90 tablet 1     sucralfate (CARAFATE) 1 GM tablet Take 1 tablet (1 g) by mouth 4 times daily as needed (Abdominal discomfort) 30 tablet 3     traMADol (ULTRAM) 50 MG tablet Take 1 tablet (50 mg) by mouth every 6 hours as needed for severe pain 120 tablet 0     traMADol (ULTRAM) 50 MG tablet Take 1 tablet (50 mg) by mouth every 6 hours as needed for severe pain Schedule in clinic follow up. 120 tablet 0     valACYclovir (VALTREX) 1000 mg tablet TAKE 1 TABLET(1000 MG) BY MOUTH TWICE DAILY. REPEAT AS NEEDED FOR COLD SORE 16 tablet 3     VITAMIN D3 50 MCG (2000 UT) tablet TAKE 1 TABLET BY MOUTH EVERY DAY 90 tablet 3              Physical Exam:   Ht 1.702 m (5' 7\")   Wt 94.8 kg (209 lb)   LMP  (LMP Unknown)   BMI 32.73 kg/m    Gen: NAD, pleasant and cooperative   HEENT: Atraumatic, normocephalic, extraocular movements appear intact  Pulm: non-labored breathing in room air  Neuro/MSK:   Orientation: Oriented to person, time, place and situation, Exhibits good insight into her condition and ongoing treatment  Motor: " "Observed moving upper extremities actively against gravity    Labs/Imaging:  Lab Results   Component Value Date    WBC 6.5 04/05/2023    HGB 11.6 (L) 04/05/2023    HCT 37.6 04/05/2023    MCV 84 04/05/2023     04/05/2023     Lab Results   Component Value Date     04/05/2023    POTASSIUM 4.1 04/05/2023    CHLORIDE 105 04/05/2023    CO2 25 04/05/2023     (H) 04/05/2023     Lab Results   Component Value Date    GFRESTIMATED >90 04/05/2023    GFRESTBLACK >90 08/05/2019     Lab Results   Component Value Date    AST 17 04/05/2023    ALT 19 04/05/2023    ALKPHOS 114 (H) 04/05/2023    BILITOTAL 0.2 04/05/2023     No results found for: \"INR\"  Lab Results   Component Value Date    BUN 9.4 04/05/2023    CR 0.72 04/05/2023              Assessment/Plan   Diana Salvador presents to clinic today for follow up reg his/her rehab needs.   She has h/o  right-sided stage IIb breast cancer (ER negative/[place] negative, HER2 negative) with chronic fatigue, arthralgias and neuropathy. Was last seen in clinic on 6/21/24.  Multiple rehabilitation considerations were discussed with Diana at today's visit.  Overall, she is doing significantly better from a rehabilitation perspective now that she is in outpatient therapies and should continue these.  In addition, she should continue follow-up with her PCP as her antidepressants which has been working well.  She should also continue following with psychology.  We will plan a return virtual visit in 6 months.      Therapy/equipment/braces:  Continue outpatient occupational therapy and physical therapy.  Continue home exercise regimen as tolerated.  Medications:  Continue follow-up with PCP.  Antidepressants which has been working well, and she feels better.  Referral / follow up with other providers:  Continue to follow with pain medicine team for management.  Continue follow-up with oncology psychology.  Follow up: 6-month return virtual visit.      Linsey Verduzco MD  Physical " Medicine & Rehabilitation      50 minutes spent on the date of the encounter doing chart review, history and exam, documentation and further activities as noted above.               Again, thank you for allowing me to participate in the care of your patient.        Sincerely,        Linsey Verduzco MD

## 2024-09-19 NOTE — PROGRESS NOTES
[facility]   PM&R clinic note        Interval history:     Diana Salvador presents to clinic today for follow up reg his/her rehab needs.   She has h/o  right-sided stage IIb breast cancer (ER negative/DE negative, HER2 negative) with chronic fatigue, arthralgias and neuropathy.   Was last seen in clinic on 24.  Recommendations included:  Therapy/equipment/braces:  Physical therapy referral placed.  Occupational Therapy referral placed.  Continue home exercise regimen as tolerated.  UA ordered to evaluate for a UTI due to her urinary symptoms.  Patient will call and schedule a lab visit.  Medications:  Keep with plan to change for antidepressant medication per PCP.  Referral / follow up with other providers:  Continue to follow with pain medicine team for management.  Follow up: 3 to 4-month return virtual visit.    Oncology history:  *Diagnosis:2022; 56 years old; palpated a mass; clinical stage IIB (T2N0M0), grade III, triple negative; infiltrating ductal carcinoma  *Chemotherapy: Pembrolizumab/paclitaxel/carboplatin x 3 cycles complicated by grade 3 neuropathy (plan was 4 cycles followed by 4 cycles of  Pembrolizumab/adriamycin/cytoxan). She then had 11 cycles of every 3 week Pembrolizumab, completed 2023.  *Surgery: 2022 Bilateral mastectomy and sentinel lymph node dissection, right. Pathologic complete response.   *Genetic testin negative BRCA1/BRCA2.  -Last saw Sloane Waddell on 2023 for follow-up in survivorship clinic.  She had decreased range of motion in her right shoulder with occasional numbness and tingling in the third through fifth digits, history of right ulnar surgery.  Migratory arthralgias.  Neuropathy on duloxetine.  Fatigue.      Symptoms,  Aide presented for return visit today.  She switched her antidepressant back to Wellbutrin and it seems to be helping well.   She does have an appointment with her PCP next week to touch base due  to some bladder urgency. She does not leave home that much and is something that she is working on with Magan Mendez. She has been learning some good strategies to cope with her anxiety about going out.   She is going to PT and OT now that she has medicare on top of her  insurance which has helped with her copays.   She feels like therapies are really helping now.   In terms of her pain management, she has it pretty much controlled with what she is doing with the pain team.  She is trying to be more mobile now that she has a dog and he likes to go for walks. One of her therapists told her that she can walk the gym at the local elementary school which is a close distance from her home.  She has been struggling with her appetite and has protein drinks that she takes in daily.        Therapies/HEP,  Continues with outpatient physical therapy and Occupational Therapy, started recently due to previous insurance issues.      Functionally,   No changes.      Social history is unchanged.      Medications:  Current Outpatient Medications   Medication Sig Dispense Refill    acetaminophen (TYLENOL) 325 MG tablet Take 2 tablets (650 mg) by mouth every 4 hours as needed for other (mild pain) 100 tablet 0    alendronate (FOSAMAX) 35 MG tablet TAKE 1 TABLET(35 MG) BY MOUTH EVERY 7 DAYS 12 tablet 3    buPROPion (WELLBUTRIN XL) 150 MG 24 hr tablet Take 1 tablet (150 mg) by mouth every morning 90 tablet 3    cyanocobalamin (CYANOCOBALAMIN) 1000 MCG/ML injection Inject 1 mL (1,000 mcg) into the muscle every 30 days 3 mL 3    cyclobenzaprine (FLEXERIL) 5 MG tablet TAKE 1 TABLET (5 MG) BY MOUTH 2 TIMES DAILY AS NEEDED FOR MUSCLE SPASMS 40 tablet 1    diclofenac (VOLTAREN) 1 % topical gel Place 2 g onto the skin 4 times daily 100 g 3    furosemide (LASIX) 20 MG tablet Take 1 tablet (20 mg) by mouth daily as needed (swelling) 60 tablet 3    HYDROcodone-acetaminophen (NORCO) 5-325 MG tablet Take 1 tablet by mouth 2 times daily as  "needed for severe pain. 60 tablet 0    LORazepam (ATIVAN) 0.5 MG tablet Take 1-2 tablets (0.5-1 mg) by mouth every 8 hours as needed for anxiety 60 tablet 5    omeprazole (PRILOSEC) 20 MG DR capsule As needed      potassium chloride ER (K-TAB) 20 MEQ CR tablet Take 1 tablet (20 mEq) by mouth daily 90 tablet 3    QUEtiapine (SEROQUEL) 25 MG tablet TAKE 2 TABLETS BY MOUTH AT BEDTIME 180 tablet 0    rOPINIRole (REQUIP) 1 MG tablet Take 1 tablet (1 mg) by mouth at bedtime 90 tablet 1    sucralfate (CARAFATE) 1 GM tablet Take 1 tablet (1 g) by mouth 4 times daily as needed (Abdominal discomfort) 30 tablet 3    traMADol (ULTRAM) 50 MG tablet Take 1 tablet (50 mg) by mouth every 6 hours as needed for severe pain 120 tablet 0    traMADol (ULTRAM) 50 MG tablet Take 1 tablet (50 mg) by mouth every 6 hours as needed for severe pain Schedule in clinic follow up. 120 tablet 0    valACYclovir (VALTREX) 1000 mg tablet TAKE 1 TABLET(1000 MG) BY MOUTH TWICE DAILY. REPEAT AS NEEDED FOR COLD SORE 16 tablet 3    VITAMIN D3 50 MCG (2000 UT) tablet TAKE 1 TABLET BY MOUTH EVERY DAY 90 tablet 3              Physical Exam:   Ht 1.702 m (5' 7\")   Wt 94.8 kg (209 lb)   LMP  (LMP Unknown)   BMI 32.73 kg/m    Gen: NAD, pleasant and cooperative   HEENT: Atraumatic, normocephalic, extraocular movements appear intact  Pulm: non-labored breathing in room air  Neuro/MSK:   Orientation: Oriented to person, time, place and situation, Exhibits good insight into her condition and ongoing treatment  Motor: Observed moving upper extremities actively against gravity    Labs/Imaging:  Lab Results   Component Value Date    WBC 6.5 04/05/2023    HGB 11.6 (L) 04/05/2023    HCT 37.6 04/05/2023    MCV 84 04/05/2023     04/05/2023     Lab Results   Component Value Date     04/05/2023    POTASSIUM 4.1 04/05/2023    CHLORIDE 105 04/05/2023    CO2 25 04/05/2023     (H) 04/05/2023     Lab Results   Component Value Date    GFRESTIMATED >90 " "04/05/2023    GFRESTBLACK >90 08/05/2019     Lab Results   Component Value Date    AST 17 04/05/2023    ALT 19 04/05/2023    ALKPHOS 114 (H) 04/05/2023    BILITOTAL 0.2 04/05/2023     No results found for: \"INR\"  Lab Results   Component Value Date    BUN 9.4 04/05/2023    CR 0.72 04/05/2023              Assessment/Plan   Diana Salvador presents to clinic today for follow up reg his/her rehab needs.   She has h/o  right-sided stage IIb breast cancer (ER negative/[place] negative, HER2 negative) with chronic fatigue, arthralgias and neuropathy. Was last seen in clinic on 6/21/24.  Multiple rehabilitation considerations were discussed with Diana at today's visit.  Overall, she is doing significantly better from a rehabilitation perspective now that she is in outpatient therapies and should continue these.  In addition, she should continue follow-up with her PCP as her antidepressants which has been working well.  She should also continue following with psychology.  We will plan a return virtual visit in 6 months.      Therapy/equipment/braces:  Continue outpatient occupational therapy and physical therapy.  Continue home exercise regimen as tolerated.  Medications:  Continue follow-up with PCP.  Antidepressants which has been working well, and she feels better.  Referral / follow up with other providers:  Continue to follow with pain medicine team for management.  Continue follow-up with oncology psychology.  Follow up: 6-month return virtual visit.      Linsey Verduzco MD  Physical Medicine & Rehabilitation      50 minutes spent on the date of the encounter doing chart review, history and exam, documentation and further activities as noted above.           "

## 2024-09-19 NOTE — PROGRESS NOTES
Virtual Visit Details    Type of service:  Video Visit     Originating Location (pt. Location): Home    Distant Location (provider location):  on site  Platform used for Video Visit: [employer]

## 2024-09-21 NOTE — PATIENT INSTRUCTIONS
It was nice to see you again today.    1.  Continue outpatient physical therapy and Occupational Therapy.  2.  Continue working with your primary care physician for any adjustments to your antidepressant medication.  Glad to hear that it has been working well.  3.  Continue following with psychology.  4.  Follow-up with Dr. Verduzco in 6 months for return virtual visit.

## 2024-09-25 ENCOUNTER — INFUSION THERAPY VISIT (OUTPATIENT)
Dept: INFUSION THERAPY | Facility: CLINIC | Age: 59
End: 2024-09-25
Attending: NURSE PRACTITIONER
Payer: COMMERCIAL

## 2024-09-25 ENCOUNTER — OFFICE VISIT (OUTPATIENT)
Dept: FAMILY MEDICINE | Facility: CLINIC | Age: 59
End: 2024-09-25
Payer: COMMERCIAL

## 2024-09-25 VITALS
DIASTOLIC BLOOD PRESSURE: 88 MMHG | BODY MASS INDEX: 31.86 KG/M2 | TEMPERATURE: 98.9 F | WEIGHT: 203 LBS | RESPIRATION RATE: 16 BRPM | HEART RATE: 76 BPM | OXYGEN SATURATION: 98 % | SYSTOLIC BLOOD PRESSURE: 128 MMHG | HEIGHT: 67 IN

## 2024-09-25 DIAGNOSIS — Z23 NEED FOR IMMUNIZATION AGAINST INFLUENZA: ICD-10-CM

## 2024-09-25 DIAGNOSIS — Z23 NEED FOR COVID-19 VACCINE: ICD-10-CM

## 2024-09-25 DIAGNOSIS — K90.9 INTESTINAL MALABSORPTION, UNSPECIFIED TYPE: ICD-10-CM

## 2024-09-25 DIAGNOSIS — E53.8 VITAMIN B12 DEFICIENCY (NON ANEMIC): ICD-10-CM

## 2024-09-25 DIAGNOSIS — Z13.1 ENCOUNTER FOR SCREENING FOR DIABETES MELLITUS: ICD-10-CM

## 2024-09-25 DIAGNOSIS — Z17.1 MALIGNANT NEOPLASM OF UPPER-INNER QUADRANT OF RIGHT BREAST IN FEMALE, ESTROGEN RECEPTOR NEGATIVE (H): Primary | ICD-10-CM

## 2024-09-25 DIAGNOSIS — R74.8 ABNORMAL LEVELS OF OTHER SERUM ENZYMES: ICD-10-CM

## 2024-09-25 DIAGNOSIS — C50.211 MALIGNANT NEOPLASM OF UPPER-INNER QUADRANT OF RIGHT BREAST IN FEMALE, ESTROGEN RECEPTOR NEGATIVE (H): Primary | ICD-10-CM

## 2024-09-25 DIAGNOSIS — F41.8 SITUATIONAL ANXIETY: ICD-10-CM

## 2024-09-25 DIAGNOSIS — F11.20 OPIOID TYPE DEPENDENCE, CONTINUOUS USE (H): Primary | ICD-10-CM

## 2024-09-25 DIAGNOSIS — M54.31 SCIATICA OF RIGHT SIDE: ICD-10-CM

## 2024-09-25 DIAGNOSIS — Z79.891 LONG TERM (CURRENT) USE OF OPIATE ANALGESIC: ICD-10-CM

## 2024-09-25 LAB
ALBUMIN SERPL BCG-MCNC: 4.1 G/DL (ref 3.5–5.2)
ALP SERPL-CCNC: 113 U/L (ref 40–150)
ALT SERPL W P-5'-P-CCNC: 11 U/L (ref 0–50)
ANION GAP SERPL CALCULATED.3IONS-SCNC: 13 MMOL/L (ref 7–15)
AST SERPL W P-5'-P-CCNC: 16 U/L (ref 0–45)
BILIRUB SERPL-MCNC: 0.2 MG/DL
BUN SERPL-MCNC: 13.6 MG/DL (ref 8–23)
CALCIUM SERPL-MCNC: 9.1 MG/DL (ref 8.8–10.4)
CHLORIDE SERPL-SCNC: 102 MMOL/L (ref 98–107)
CHOLEST SERPL-MCNC: 229 MG/DL
CREAT SERPL-MCNC: 0.77 MG/DL (ref 0.51–0.95)
CREAT UR-MCNC: 260 MG/DL
EGFRCR SERPLBLD CKD-EPI 2021: 88 ML/MIN/1.73M2
EST. AVERAGE GLUCOSE BLD GHB EST-MCNC: 126 MG/DL
FASTING STATUS PATIENT QL REPORTED: NO
FASTING STATUS PATIENT QL REPORTED: NO
FERRITIN SERPL-MCNC: 17 NG/ML (ref 11–328)
FOLATE SERPL-MCNC: 12.3 NG/ML (ref 4.6–34.8)
GLUCOSE SERPL-MCNC: 100 MG/DL (ref 70–99)
HBA1C MFR BLD: 6 % (ref 0–5.6)
HCO3 SERPL-SCNC: 24 MMOL/L (ref 22–29)
HDLC SERPL-MCNC: 50 MG/DL
HGB BLD-MCNC: 13.5 G/DL (ref 11.7–15.7)
LDLC SERPL CALC-MCNC: 146 MG/DL
NONHDLC SERPL-MCNC: 179 MG/DL
POTASSIUM SERPL-SCNC: 3.9 MMOL/L (ref 3.4–5.3)
PROT SERPL-MCNC: 7.1 G/DL (ref 6.4–8.3)
PTH-INTACT SERPL-MCNC: 111 PG/ML (ref 15–65)
SODIUM SERPL-SCNC: 139 MMOL/L (ref 135–145)
TRIGL SERPL-MCNC: 167 MG/DL
TSH SERPL DL<=0.005 MIU/L-ACNC: 1.1 UIU/ML (ref 0.3–4.2)

## 2024-09-25 PROCEDURE — 96523 IRRIG DRUG DELIVERY DEVICE: CPT

## 2024-09-25 PROCEDURE — G0481 DRUG TEST DEF 8-14 CLASSES: HCPCS | Performed by: FAMILY MEDICINE

## 2024-09-25 PROCEDURE — 91320 SARSCV2 VAC 30MCG TRS-SUC IM: CPT | Performed by: FAMILY MEDICINE

## 2024-09-25 PROCEDURE — 84590 ASSAY OF VITAMIN A: CPT | Mod: 90 | Performed by: FAMILY MEDICINE

## 2024-09-25 PROCEDURE — 83970 ASSAY OF PARATHORMONE: CPT | Performed by: FAMILY MEDICINE

## 2024-09-25 PROCEDURE — 84630 ASSAY OF ZINC: CPT | Mod: 90 | Performed by: FAMILY MEDICINE

## 2024-09-25 PROCEDURE — 84425 ASSAY OF VITAMIN B-1: CPT | Mod: 90 | Performed by: FAMILY MEDICINE

## 2024-09-25 PROCEDURE — 90480 ADMN SARSCOV2 VAC 1/ONLY CMP: CPT | Performed by: FAMILY MEDICINE

## 2024-09-25 PROCEDURE — 84443 ASSAY THYROID STIM HORMONE: CPT | Performed by: FAMILY MEDICINE

## 2024-09-25 PROCEDURE — 83036 HEMOGLOBIN GLYCOSYLATED A1C: CPT | Performed by: FAMILY MEDICINE

## 2024-09-25 PROCEDURE — 82746 ASSAY OF FOLIC ACID SERUM: CPT | Performed by: FAMILY MEDICINE

## 2024-09-25 PROCEDURE — G0008 ADMIN INFLUENZA VIRUS VAC: HCPCS | Performed by: FAMILY MEDICINE

## 2024-09-25 PROCEDURE — 99214 OFFICE O/P EST MOD 30 MIN: CPT | Mod: 25 | Performed by: FAMILY MEDICINE

## 2024-09-25 PROCEDURE — 82607 VITAMIN B-12: CPT | Performed by: FAMILY MEDICINE

## 2024-09-25 PROCEDURE — 82525 ASSAY OF COPPER: CPT | Mod: 90 | Performed by: FAMILY MEDICINE

## 2024-09-25 PROCEDURE — 36415 COLL VENOUS BLD VENIPUNCTURE: CPT | Performed by: FAMILY MEDICINE

## 2024-09-25 PROCEDURE — 99000 SPECIMEN HANDLING OFFICE-LAB: CPT | Performed by: FAMILY MEDICINE

## 2024-09-25 PROCEDURE — 82306 VITAMIN D 25 HYDROXY: CPT | Performed by: FAMILY MEDICINE

## 2024-09-25 PROCEDURE — 85018 HEMOGLOBIN: CPT | Performed by: FAMILY MEDICINE

## 2024-09-25 PROCEDURE — 80053 COMPREHEN METABOLIC PANEL: CPT | Performed by: FAMILY MEDICINE

## 2024-09-25 PROCEDURE — 250N000011 HC RX IP 250 OP 636: Performed by: NURSE PRACTITIONER

## 2024-09-25 PROCEDURE — 82728 ASSAY OF FERRITIN: CPT | Performed by: FAMILY MEDICINE

## 2024-09-25 PROCEDURE — 90673 RIV3 VACCINE NO PRESERV IM: CPT | Performed by: FAMILY MEDICINE

## 2024-09-25 PROCEDURE — 80061 LIPID PANEL: CPT | Performed by: FAMILY MEDICINE

## 2024-09-25 RX ORDER — CYANOCOBALAMIN 1000 UG/ML
1 INJECTION, SOLUTION INTRAMUSCULAR; SUBCUTANEOUS
Qty: 3 ML | Refills: 3 | Status: SHIPPED | OUTPATIENT
Start: 2024-09-25

## 2024-09-25 RX ORDER — HEPARIN SODIUM (PORCINE) LOCK FLUSH IV SOLN 100 UNIT/ML 100 UNIT/ML
5 SOLUTION INTRAVENOUS
Status: DISCONTINUED | OUTPATIENT
Start: 2024-09-25 | End: 2024-09-25 | Stop reason: HOSPADM

## 2024-09-25 RX ORDER — HYDROCODONE BITARTRATE AND ACETAMINOPHEN 5; 325 MG/1; MG/1
1 TABLET ORAL 2 TIMES DAILY PRN
Qty: 60 TABLET | Refills: 0 | Status: SHIPPED | OUTPATIENT
Start: 2024-11-15 | End: 2024-12-15

## 2024-09-25 RX ORDER — HYDROCODONE BITARTRATE AND ACETAMINOPHEN 5; 325 MG/1; MG/1
1 TABLET ORAL 2 TIMES DAILY PRN
Qty: 60 TABLET | Refills: 0 | Status: SHIPPED | OUTPATIENT
Start: 2024-12-15 | End: 2025-01-14

## 2024-09-25 RX ORDER — DIAZEPAM 5 MG
5 TABLET ORAL
Qty: 1 TABLET | Refills: 0 | Status: SHIPPED | OUTPATIENT
Start: 2024-09-25

## 2024-09-25 RX ORDER — HYDROCODONE BITARTRATE AND ACETAMINOPHEN 5; 325 MG/1; MG/1
1 TABLET ORAL 2 TIMES DAILY PRN
Qty: 60 TABLET | Refills: 0 | Status: CANCELLED | OUTPATIENT
Start: 2024-09-25

## 2024-09-25 RX ORDER — HYDROCODONE BITARTRATE AND ACETAMINOPHEN 5; 325 MG/1; MG/1
1 TABLET ORAL 2 TIMES DAILY PRN
Qty: 60 TABLET | Refills: 0 | Status: SHIPPED | OUTPATIENT
Start: 2024-10-16 | End: 2024-11-15

## 2024-09-25 RX ORDER — HEPARIN SODIUM (PORCINE) LOCK FLUSH IV SOLN 100 UNIT/ML 100 UNIT/ML
5 SOLUTION INTRAVENOUS
OUTPATIENT
Start: 2024-09-25

## 2024-09-25 RX ORDER — TRAMADOL HYDROCHLORIDE 50 MG/1
50 TABLET ORAL EVERY 6 HOURS PRN
Qty: 120 TABLET | Refills: 0 | Status: SHIPPED | OUTPATIENT
Start: 2024-11-26 | End: 2024-12-26

## 2024-09-25 RX ORDER — TRAMADOL HYDROCHLORIDE 50 MG/1
50 TABLET ORAL EVERY 6 HOURS PRN
Qty: 120 TABLET | Refills: 0 | Status: SHIPPED | OUTPATIENT
Start: 2024-09-27 | End: 2024-10-27

## 2024-09-25 RX ORDER — TRAMADOL HYDROCHLORIDE 50 MG/1
50 TABLET ORAL EVERY 6 HOURS PRN
Qty: 120 TABLET | Refills: 0 | Status: SHIPPED | OUTPATIENT
Start: 2024-10-27 | End: 2024-11-26

## 2024-09-25 RX ADMIN — Medication 5 ML: at 14:04

## 2024-09-25 ASSESSMENT — ANXIETY QUESTIONNAIRES
GAD7 TOTAL SCORE: 19
GAD7 TOTAL SCORE: 19
8. IF YOU CHECKED OFF ANY PROBLEMS, HOW DIFFICULT HAVE THESE MADE IT FOR YOU TO DO YOUR WORK, TAKE CARE OF THINGS AT HOME, OR GET ALONG WITH OTHER PEOPLE?: VERY DIFFICULT
7. FEELING AFRAID AS IF SOMETHING AWFUL MIGHT HAPPEN: NEARLY EVERY DAY
GAD7 TOTAL SCORE: 19

## 2024-09-25 ASSESSMENT — PATIENT HEALTH QUESTIONNAIRE - PHQ9
SUM OF ALL RESPONSES TO PHQ QUESTIONS 1-9: 21
10. IF YOU CHECKED OFF ANY PROBLEMS, HOW DIFFICULT HAVE THESE PROBLEMS MADE IT FOR YOU TO DO YOUR WORK, TAKE CARE OF THINGS AT HOME, OR GET ALONG WITH OTHER PEOPLE: VERY DIFFICULT
SUM OF ALL RESPONSES TO PHQ QUESTIONS 1-9: 21

## 2024-09-25 ASSESSMENT — PAIN SCALES - GENERAL: PAINLEVEL: SEVERE PAIN (7)

## 2024-09-25 NOTE — PATIENT INSTRUCTIONS
Flu and COVID    Bladder training:  Urinate every 2-3 hours during the day before you really need to go.  Avoid bladder irritants: caffeine, coffee, carbonated beverages, strong citrus drinks or foods, alcohol, smoking.  Relax to pee  Pelvic Floor exercises:   [employer]/[id number]-[id number]-workout  [email or web address]    Contact insurance about SHingrix immunization, cost? And where to get pharmacy?    Seroquel (quetiapine) plan to try one pill at bedtime and the other if you wake in middle of night. If you are too sleepy in the morning then, try 1/2 pill in middle of night

## 2024-09-25 NOTE — LETTER

## 2024-09-25 NOTE — PROGRESS NOTES
Assessment & Plan     Opioid type dependence, continuous use (H)  Due to chronic pain, sciatica, right cervical radiculopathy  Discussed medical marijuana, currently using CBD gummies which help somewhat too for pain and anxiety.  Tramadol during the day #120 per month  Norco PRN night or if severe pain #60 per month  - [id number] - Urine Drug Confirmation Panel (Comprehensive); Future  - HYDROcodone-acetaminophen (NORCO) 5-325 MG tablet; Take 1 tablet by mouth 2 times daily as needed for severe pain.  - traMADol (ULTRAM) 50 MG tablet; Take 1 tablet (50 mg) by mouth every 6 hours as needed for severe pain.  - traMADol (ULTRAM) 50 MG tablet; Take 1 tablet (50 mg) by mouth every 6 hours as needed for severe pain.  - HYDROcodone-acetaminophen (NORCO) 5-325 MG tablet; Take 1 tablet by mouth 2 times daily as needed for severe pain.  - HYDROcodone-acetaminophen (NORCO) 5-325 MG tablet; Take 1 tablet by mouth 2 times daily as needed for severe pain.  - [id number] - Urine Drug Confirmation Panel (Comprehensive)  - traMADol (ULTRAM) 50 MG tablet; Take 1 tablet (50 mg) by mouth every 6 hours as needed for severe pain.    Long term (current) use of opiate analgesic  - TSH with free T4 reflex; Future  - TSH with free T4 reflex    Situational anxiety  For lumbar MRI  - diazepam (VALIUM) 5 MG tablet; Take 1 tablet (5 mg) by mouth once as needed for anxiety (take 2 hours prior to procedure).    Sciatica of right side  Causing chronic pain, is following with spine clinic, had cervical injection, having upcoming lumbar MRI but needed Rx for Diazepam prior to scheduling.  - HYDROcodone-acetaminophen (NORCO) 5-325 MG tablet; Take 1 tablet by mouth 2 times daily as needed for severe pain.  - traMADol (ULTRAM) 50 MG tablet; Take 1 tablet (50 mg) by mouth every 6 hours as needed for severe pain.  - traMADol (ULTRAM) 50 MG tablet; Take 1 tablet (50 mg) by mouth every 6 hours as needed for severe pain.  - HYDROcodone-acetaminophen (NORCO)  "5-325 MG tablet; Take 1 tablet by mouth 2 times daily as needed for severe pain.  - traMADol (ULTRAM) 50 MG tablet; Take 1 tablet (50 mg) by mouth every 6 hours as needed for severe pain.  - HYDROcodone-acetaminophen (NORCO) 5-325 MG tablet; Take 1 tablet by mouth 2 times daily as needed for severe pain.      Intestinal malabsorption, unspecified type  History of gastric bypass, yearly labs, labs not done since 2021 due to insurance loss/cost, but now has insurance again.  - Comprehensive metabolic panel; Future  - Vitamin B12; Future  - Vitamin D Deficiency; Future  - Ferritin; Future  - Lipid panel reflex to direct LDL Non-fasting; Future  - TSH with free T4 reflex; Future  - Parathyroid Hormone Intact; Future  - Vitamin A; Future  - Vitamin B1 whole blood; Future  - Copper level; Future  - Zinc; Future  - Hemoglobin; Future  - Folate; Future  - Hemoglobin A1c; Future  - Comprehensive metabolic panel  - Vitamin B12  - Vitamin D Deficiency  - Ferritin  - Lipid panel reflex to direct LDL Non-fasting  - TSH with free T4 reflex  - Parathyroid Hormone Intact  - Vitamin A  - Vitamin B1 whole blood  - Copper level  - Zinc  - Hemoglobin  - Folate  - Hemoglobin A1c    Vitamin B12 deficiency (non anemic)  Needing needles/syringe unable to find order for these so sent pharmacy note to dispense.  - cyanocobalamin (CYANOCOBALAMIN) 1000 mcg/mL injection; Inject 1 mL (1,000 mcg) into the muscle every 30 days.    Need for immunization against influenza  - INFLUENZA VACCINE TRIVALENT(FLUBLOK)    Abnormal levels of other serum enzymes  - Copper level; Future  - Copper level    Need for COVID-19 vaccine  - COVID-19 12+ (PFIZER)    Encounter for screening for diabetes mellitus  - Hemoglobin A1c; Future  - Hemoglobin A1c      Bee sting: improving, discussed conservative management     BMI  Estimated body mass index is 31.79 kg/m  as calculated from the following:    Height as of this encounter: 1.702 m (5' 7\").    Weight as of this " encounter: 92.1 kg (203 lb).   Weight management plan: Discussed healthy diet and exercise guidelines    Depression Screening Follow Up        9/25/2024     2:18 PM   PHQ   PHQ-9 Total Score 21   Q9: Thoughts of better off dead/self-harm past 2 weeks Not at all         Follow Up Actions Taken  Crisis resource information provided in After Visit Summary   Seeing therapy which is going well.    See Patient Instructions    Stephie Noble is a 59 year old, presenting for the following health issues:  Pain        9/25/2024     2:31 PM   Additional Questions   Roomed by berenice   Accompanied by self     History of Present Illness       Back Pain:  She presents for follow up of back pain. Patient's back pain is a chronic problem.  Location of back pain:  Right lower back, left lower back, right middle of back, right side of neck, left side of neck, right shoulder, left shoulder, right buttock, right hip and other  Description of back pain: burning, sharp and shooting  Back pain spreads: right buttocks, right thigh and right knee    Since patient first noticed back pain, pain is: gradually worsening  Does back pain interfere with her job:  Not applicable       Mental Health Follow-up:  Patient presents to follow-up on Depression & Anxiety.Patient's depression since last visit has been:  Better  The patient is having other symptoms associated with depression.  Patient's anxiety since last visit has been:  No change  The patient is having other symptoms associated with anxiety.  Any significant life events: relationship concerns, job concerns, financial concerns, grief or loss and health concerns  Patient is feeling anxious or having panic attacks.  Patient has no concerns about alcohol or drug use.    She eats 0-1 servings of fruits and vegetables daily.She consumes 1 sweetened beverage(s) daily.She exercises with enough effort to increase her heart rate 9 or less minutes per day.  She exercises with enough effort to  increase her heart rate 3 or less days per week.   She is taking medications regularly.  At last appt in May added bupropion 150mg XL daily and tapered off latuda.  Seeing therapy.  Seroquel helps with getting to sleep but if wakes up like to get to bathroom then has trouble falling back to sleep.     Urinary stress and urge incontinence. Worsening in the last few months needing pads.   Caffeine: one coffee cup and one coke zero      Needs needle and syringe order for at home B 12 injections       Pain History:  When did you first notice your pain? Ongoing for 20 years   Have you seen this provider for your pain in the past? Yes   Where in your body do you have pain? Mid to low back bilateral, pain radiates into right buttocks and down behind right knee. Neck- bilateral.   Are you seeing anyone else for your pain? No        1/23/2024    12:44 PM 5/31/2024     7:12 AM 9/25/2024     2:18 PM   PHQ-9 SCORE   PHQ-9 Total Score MyChart 11 (Moderate depression) 16 (Moderately severe depression) 21 (Severe depression)   PHQ-9 Total Score 11 16 21           1/23/2024     1:00 PM 5/31/2024     7:13 AM 9/25/2024     2:17 PM   SHADE-7 SCORE   Total Score 15 (severe anxiety) 14 (moderate anxiety) 19 (severe anxiety)   Total Score 15 14 19             1/23/2024    12:55 PM 5/31/2024     7:29 AM 9/25/2024     3:18 PM   PEG Score   PEG Total Score 7.33 9.33 5.67       Chronic Pain Follow Up:    Location of pain: mid to low back down the right leg to knee, neck pain down the right and now left arm, thinking of doing another injection again.  Analgesia/pain control:    - Recent changes:  working on Physical therapy and OT    - Overall control: Tolerable with discomfort    - Current treatments: Tramadol 50mg during day if needed, usually one in morning and one in afternoon and then one norco in the evening 6 hours after the tramadol. Rare use of norco during the day if not driving and severe pain.   Adherence:     - Do you ever take  "more pain medicine than prescribed? No    - When did you take your last dose of pain medicine?  10am this morning.   Adverse effects: No     History:  Right cervical YESSY with PMR on 2/12/24 with 60% relief  [place]MP: last fill lorazepam 0.5mg #60 was 9/16/24 and 8/19/24 and 7/19/24  Norco #60 was 9/16/24, 8/20/24, 7/21/24  Tramadol 50mg #120 8/28/24, 7/31/24, 6/21/24  PDMP Review         Value Time User    State PDMP site checked  Yes 5/31/2024  7:40 AM Quoc Chu MD          Last CSA Agreement:   CSA -- Patient Level:     [Media Unavailable] Controlled Substance Agreement - Opioid - Scan on 9/13/2023  9:53 PM   [Media Unavailable] Controlled Substance Agreement - Opioid - Scan on 5/8/2022  1:14 PM   [Media Unavailable] Controlled Substance Agreement - Opioid - Scan on 11/17/2019  3:57 PM       Last UDS:       Review of Systems  Constitutional, HEENT, cardiovascular, pulmonary, gi and gu systems are negative, except as otherwise noted.      Objective    /88   Pulse 76   Temp 98.9  F (37.2  C) (Tympanic)   Resp 16   Ht 1.702 m (5' 7\")   Wt 92.1 kg (203 lb)   LMP  (LMP Unknown)   SpO2 98%   BMI 31.79 kg/m    Body mass index is 31.79 kg/m .  Physical Exam   GENERAL: alert and no distress  RESP: lungs clear to auscultation - no rales, rhonchi or wheezes  CV: regular rate and rhythm, normal S1 S2, no S3 or S4, no murmur, click or rub, no peripheral edema  MS: no gross musculoskeletal defects noted, no edema  SKIN: left upper inner arm erythema 3 inches diameter (likely from bee sting)          Signed Electronically by: Quoc Chu MD    "

## 2024-09-26 ENCOUNTER — TELEPHONE (OUTPATIENT)
Dept: ONCOLOGY | Facility: HOSPITAL | Age: 59
End: 2024-09-26
Payer: COMMERCIAL

## 2024-09-26 LAB
VIT B12 SERPL-MCNC: 297 PG/ML (ref 232–1245)
VIT D+METAB SERPL-MCNC: 20 NG/ML (ref 20–50)

## 2024-09-26 NOTE — TELEPHONE ENCOUNTER
Oncology Distress Screen    Called Aide in regards to her positive oncology nutrition distress screen:    2. How concerned are you about unintended weight loss or your current weight? : 8    Aide reports low energy to prepare meals and also lives on her own. She did get a sample of [employer] meals to try. Reviewed other options for home delivery meals that may be covered by insurance. Sent referral to Michelle to see if oral nutrition supplements may be covered by insurance. Also send written tips to Aide via email.      Karen Vasquez, MS, RD, LD  [phone]

## 2024-09-27 LAB
7AMINOCLONAZEPAM UR QL CFM: PRESENT
AMPHET UR CFM-MCNC: 325 NG/ML
AMPHET/CREAT UR: 125 NG/MG {CREAT}
COPPER SERPL-MCNC: 117.8 UG/DL
DHC UR CFM-MCNC: 2080 NG/ML
DHC/CREAT UR: 800 NG/MG {CREAT}
HYDROCODONE UR CFM-MCNC: >9000 NG/ML
HYDROCODONE/CREAT UR: ABNORMAL
HYDROMORPHONE UR CFM-MCNC: 421 NG/ML
HYDROMORPHONE/CREAT UR: 162 NG/MG {CREAT}
LORAZEPAM UR QL CFM: PRESENT
N-NORTRAMADOL/CREAT UR CFM: 4015 NG/MG {CREAT}
O-NORTRAMADOL UR CFM-MCNC: ABNORMAL NG/ML
TRAMADOL CTO UR CFM-MCNC: ABNORMAL NG/ML
TRAMADOL/CREAT UR: ABNORMAL
ZINC SERPL-MCNC: 70.2 UG/DL

## 2024-09-29 DIAGNOSIS — G47.00 INSOMNIA, UNSPECIFIED TYPE: ICD-10-CM

## 2024-09-29 LAB
ANNOTATION COMMENT IMP: NORMAL
RETINYL PALMITATE SERPL-MCNC: <0.02 MG/L
VIT A SERPL-MCNC: 0.38 MG/L

## 2024-09-30 LAB — VIT B1 PYROPHOSHATE BLD-SCNC: 149 NMOL/L

## 2024-10-01 ENCOUNTER — MYC MEDICAL ADVICE (OUTPATIENT)
Dept: FAMILY MEDICINE | Facility: CLINIC | Age: 59
End: 2024-10-01
Payer: COMMERCIAL

## 2024-10-01 DIAGNOSIS — G47.00 INSOMNIA, UNSPECIFIED TYPE: ICD-10-CM

## 2024-10-01 RX ORDER — QUETIAPINE FUMARATE 25 MG/1
50 TABLET, FILM COATED ORAL AT BEDTIME
Qty: 180 TABLET | Refills: 0 | Status: SHIPPED | OUTPATIENT
Start: 2024-10-01

## 2024-10-02 ENCOUNTER — THERAPY VISIT (OUTPATIENT)
Dept: PHYSICAL THERAPY | Facility: CLINIC | Age: 59
End: 2024-10-02
Attending: STUDENT IN AN ORGANIZED HEALTH CARE EDUCATION/TRAINING PROGRAM
Payer: COMMERCIAL

## 2024-10-02 DIAGNOSIS — R53.81 PHYSICAL DECONDITIONING: Primary | ICD-10-CM

## 2024-10-02 PROCEDURE — 97110 THERAPEUTIC EXERCISES: CPT | Mod: GP | Performed by: PHYSICAL THERAPIST

## 2024-10-03 RX ORDER — QUETIAPINE FUMARATE 25 MG/1
50 TABLET, FILM COATED ORAL AT BEDTIME
Qty: 180 TABLET | Refills: 3 | OUTPATIENT
Start: 2024-10-03

## 2024-10-09 ENCOUNTER — VIRTUAL VISIT (OUTPATIENT)
Dept: ONCOLOGY | Facility: HOSPITAL | Age: 59
End: 2024-10-09
Attending: PSYCHOLOGIST
Payer: COMMERCIAL

## 2024-10-09 VITALS — HEIGHT: 67 IN | BODY MASS INDEX: 31.79 KG/M2

## 2024-10-09 DIAGNOSIS — F43.22 ADJUSTMENT DISORDER WITH ANXIOUS MOOD: Primary | ICD-10-CM

## 2024-10-09 PROCEDURE — 90832 PSYTX W PT 30 MINUTES: CPT | Mod: 95 | Performed by: PSYCHOLOGIST

## 2024-10-09 ASSESSMENT — PAIN SCALES - GENERAL: PAINLEVEL: SEVERE PAIN (6)

## 2024-10-09 NOTE — PROGRESS NOTES
Virtual Visit Details    Type of service:  Video Visit   Video Start Time:  1400  Video End Time:2:30 PM    Originating Location (pt. Location): Home    Distant Location (provider location):  On-site  Platform used for Video Visit: Enrique

## 2024-10-09 NOTE — NURSING NOTE
Current patient location: [address] [place] [id number]    Is the patient currently in the state of [place]? YES    Visit mode:VIDEO    If the visit is dropped, the patient can be reconnected by: VIDEO VISIT: Text to cell phone:   Telephone Information:   Mobile [phone]       Will anyone else be joining the visit? NO  (If patient encounters technical issues they should call [phone]    Are changes needed to the allergy or medication list? No, Pt stated no changes to allergies, and Pt stated no med changes    Are refills needed on medications prescribed by this physician? NO    Rooming Documentation:  Questionnaire(s) not done per department protocol    Reason for visit: RECHECK (Return CCSL)    Mellissa QURESHI

## 2024-10-09 NOTE — PROGRESS NOTES
[facility] Oncology- Psychotherapy                                     Progress Note     Patient Name: Diana Salvador                     Date:   10/9/2024                                         Service Type: Individual                            Session Start Time:  1400               Session End Time:  1430                Session Length:   30 mins          Session #:      5     Attendees:     Client attended alone     Service Modality:  Video Visit:      Provider verified identity through the following two step process.  Patient provided:  Patient      Telemedicine Visit: The patient's condition can be safely assessed and treated via synchronous audio and visual telemedicine encounter.       Reason for Telemedicine Visit: Patient has requested telehealth visit     Originating Site (Patient Location): Patient's home     Distant Site (Provider Location): [facility]     Consent:  The patient/guardian has verbally consented to: the potential risks and benefits of telemedicine (video visit) versus in person care; bill my insurance or make self-payment for services provided; and responsibility for payment of non-covered services.      Patient would like the video invitation sent by:  My Chart     Mode of Communication:  Video Conference via Amwell     Distant Location (Provider):  On-site     As the provider I attest to compliance with applicable laws and regulations related to telemedicine.     DATA  Interactive Complexity: No  Crisis: No                               Progress Since Last Session (Related to Symptoms / Goals / Homework):              Symptoms:  Pt reports she is homebound mostly. She has trouble leaving and can only do it to take care of her grand kids and take her dog for a walk. She feels lonely but avoids people.     Pt reports she is attempting to meditate and is having trouble getting through even a short one.     Pt reports she spends all day  thinking about what she wants to do. She reports trouble making decisions.        Pt reports she had an overdue water bill and was able to write the check which took a lot of effort.      Pt reports she feels overwhelmed.     Pt reports Seroquel helps her sleep.      Pt reports limited energy.     Pt reports she avoids neighborhood people and meeting them out causes too much anxiety. She controls her anxiety by not running into them. Pt will utilize a program to work on panic issues related to leaving the house.     Homework:  None                             Episode of Care Goals: Satisfactory progress - ACTION (Actively working towards change); Intervened by reinforcing change plan / affirming steps taken                    Current / Ongoing Stressors and Concerns:  Pt reports she went to her son's house a few days last week and had fun doing that.      Pt reports she loves her little dog. She takes her on walks. She plans to take her dog on a walk with a walking meditation planned today.      Pt reports her anxiety limits her getting out of the house.      Pt reports her son Mait is doing OK. He is going to help her with her property taxes - helping her to recall to pay them.       Pt reports she has joined a home meal program and it is not a good option for her. She cannot get through the food before it goes bad. Her daughter in law is going to save food in containers for her that are leftovers for them. Pt has also bought protein shakes she is eating.     Pt reports she worries about returning to work because of memory issues. She is not sure she can handle work -like what she used to do-right now due to stress.                        Social Hx:             Pt reports she lives alone. Pt was  until her spouse  9 years ago after 31 years together. He was in an MVA in a 4 angel and later had blood clots that led to a pulmonary embolism. He  14.                  Pt reports she has two  "sons and a daughter in law, and grandchildren.                  Son 37 \"Christopher\" he is has PTSD from the [employer]. He is not getting help. He denies an issue.                  Son 35 \"Michael\" is  and they have a boy 3 and a girl 9. He is the RN at the [employer].                  Both live 9 miles away together.                  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 3 years ago and was dx with cancer two years ago.                  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                  Pt reports her mother  8 mos after her spouse from CHF., her father lives in [place] where she grew up.     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 She was let go.                     Treatment Objective(s) Addressed in This Session:          identify medical stressors which contribute to feelings of depression  And anxiety.                 Intervention:              CBT: ,  Emotion Focused Therapy: ,  Solution Focused: ,     Assessments completed prior to visit:  None                    ASSESSMENT: Current Emotional / Mental Status (status of significant symptoms):              Risk status (Self / Other harm or suicidal ideation)              Patient denies current fears or concerns for personal safety.              Patient denies current or recent suicidal ideation or behaviors.              Patient denies current or recent homicidal ideation or behaviors.              Patient denies current or recent self injurious behavior or ideation.              Patient denies other safety concerns.              Patient reports there has been no change in risk factors since their last session.                Patient reports there has been no change in protective factors since their last session.                Recommended that patient call 911 or go to the local ED should there be a change in any of " these risk factors.                 Appearance:                            Appropriate               Eye Contact:                           Good               Psychomotor Behavior:          Normal               Attitude:                                   Cooperative               Orientation:                             All              Speech                          Rate / Production:       Normal                           Volume:                       Normal               Mood:                                      Anxious  Depressed  Sad               Affect:                                      Labile               Thought Content:                    Clear               Thought Form:                        Coherent  Logical               Insight:                                     Good                  Medication Review:              No changes to current psychiatric medication(s)                 Medication Compliance:              Yes                 Changes in Health Issues:              Yes: cancer, Associated Psychological Distress                 Chemical Use Review:              Substance Use: Chemical use reviewed, no active concerns identified      Diagnosis:  F43.23 Adjustment D/O with anxiety and depressive sx.      Collateral Reports Completed:              Not Applicable     PLAN: (Patient Tasks / Therapist Tasks / Other)  Return in two weeks           Magan Mendez LP                                                           ______________________________________________________________________     Individual Treatment Plan     Patient's Name: Diana Salvador                  YOB: 1965     Date of Creation: 1/9/24     Date Treatment Plan Last Reviewed/Revised: 9/11/24        DSM5 Diagnoses: F43.23 Adjustment D/O with anxiety and depressive sx.   Psychosocial / Contextual Factors: Cancer dx, arthritis.   PROMIS (reviewed every 90 days):      Referral / Collaboration:  Referral  to another professional/service is not indicated at this time..     Anticipated number of session for this episode of care: 9-12 sessions  Anticipation frequency of session: Biweekly  Anticipated Duration of each session: 53 or more minutes  Treatment plan will be reviewed in 90 days or when goals have been changed.         MeasurableTreatment Goal(s) related to diagnosis / functional impairment(s)  Goal 1: Patient will reduce anxiety and depressive sx.         Objective #A (Patient Action)                          Patient will identify medical stressors which contribute to feelings of depression.  Status: Reviewed 9/11/24.      Intervention(s)  Therapist will teach distraction skills.   .     Objective #B  Patient will identify medical stressors which contribute to feelings of depression and anxiety  Status: Reviewed 9/11/24.      Intervention(s)  Therapist will teach emotional recognition/identification.   .     Objective #C  Patient will identify medical stressors which contribute to feelings of depression.and anxiety  Status: Reviewed 9/11/24.      Intervention(s)  Therapist will teach emotional regulation skills.   .           Patient has reviewed and agreed to the above plan.        Magan Mendez LP                    10/9/2024

## 2024-10-11 ENCOUNTER — THERAPY VISIT (OUTPATIENT)
Dept: OCCUPATIONAL THERAPY | Facility: CLINIC | Age: 59
End: 2024-10-11
Attending: STUDENT IN AN ORGANIZED HEALTH CARE EDUCATION/TRAINING PROGRAM
Payer: COMMERCIAL

## 2024-10-11 DIAGNOSIS — C50.211 MALIGNANT NEOPLASM OF UPPER-INNER QUADRANT OF RIGHT BREAST IN FEMALE, ESTROGEN RECEPTOR NEGATIVE (H): Primary | ICD-10-CM

## 2024-10-11 DIAGNOSIS — Z17.1 MALIGNANT NEOPLASM OF UPPER-INNER QUADRANT OF RIGHT BREAST IN FEMALE, ESTROGEN RECEPTOR NEGATIVE (H): Primary | ICD-10-CM

## 2024-10-11 PROCEDURE — 97535 SELF CARE MNGMENT TRAINING: CPT | Mod: GO | Performed by: OCCUPATIONAL THERAPIST

## 2024-10-16 ENCOUNTER — LAB (OUTPATIENT)
Dept: LAB | Facility: CLINIC | Age: 59
End: 2024-10-16
Payer: COMMERCIAL

## 2024-10-16 ENCOUNTER — THERAPY VISIT (OUTPATIENT)
Dept: PHYSICAL THERAPY | Facility: CLINIC | Age: 59
End: 2024-10-16
Attending: STUDENT IN AN ORGANIZED HEALTH CARE EDUCATION/TRAINING PROGRAM
Payer: COMMERCIAL

## 2024-10-16 DIAGNOSIS — R53.81 PHYSICAL DECONDITIONING: Primary | ICD-10-CM

## 2024-10-16 DIAGNOSIS — F11.20 OPIOID TYPE DEPENDENCE, CONTINUOUS USE (H): ICD-10-CM

## 2024-10-16 LAB — CREAT UR-MCNC: 50 MG/DL

## 2024-10-16 PROCEDURE — 97110 THERAPEUTIC EXERCISES: CPT | Mod: GP | Performed by: PHYSICAL THERAPIST

## 2024-10-16 PROCEDURE — G0481 DRUG TEST DEF 8-14 CLASSES: HCPCS

## 2024-10-16 NOTE — PROGRESS NOTES
10/16/24 0500   Appointment Info   Signing clinician's name / credentials Sofya Dickinson, PT   Visits Used 7   Medical Diagnosis R breast cancer, Physical deconditioning, neuropathy, R shoulder pain   PT Tx Diagnosis fatigue/ deconditioned/ limited shoulder motion   Progress Note/Certification   Start of Care Date 08/02/24   Onset of illness/injury or Date of Surgery 06/21/24   Therapy Frequency 1X/week   Predicted Duration 10 weeks   Certification date from 10/11/24   Certification date to 12/20/24   Progress Note Due Date 12/20/24   Progress Note Completed Date 10/16/24   GOALS   PT Goals 2;3;4;5   PT Goal 1   Goal Identifier 1.   Goal Description Pt will have decreased fatigue noted by FACIT score of 15/52 and get dressed daily   Target Date 09/06/24   Date Met 10/16/24   PT Goal 2   Goal Identifier 2.   Goal Description Pt will have improved AROM to 130* flex for improved tolerance for reaching w/ ADL's   Goal Progress 10/16/24 ongoing goal   Target Date 12/20/24   PT Goal 3   Goal Identifier 3.   Goal Description Pt will have improved FACIT score to 30/52   Goal Progress 10/16/24 ongoing goal   Target Date 12/20/24   PT Goal 4   Goal Identifier 4.   Goal Description Pt will have improved strength in order to carry her laundry basket w/ minimal difficulty (average loads)   Goal Progress 10/16/24 pt is not carrying laundry basket just dumps it over the side.   Target Date 12/20/24   PT Goal 5   Goal Identifier 5.   Goal Description Pt will be independent and consistent w/HEP to manage symptoms   Target Date 12/20/24   Subjective Report   Subjective Report Pt states she is about the same but can carry purse better and is reaching out to open doors w/ R hand more than L.  Pt is finally better after 3 week illness.  Pt states she feels a fullness in R shoulder and armpit.   Objective Measures    vitals before ex: O2 98%, HR 76, /84    shoulder AROM flex R 115*,  L 123*,  scaption R 95*, L 115*,  ER R 65*, L  75*,  IR R to L 2 L to T12    Shoulder AAROM flex R 125*, L  153*, Scaption R 120*, L 163*  ER R 73* in 40* ABD, L 80*;  IR R 60* in 40* ABD, L 75    FACIT 16   Treatment Interventions (PT)   Interventions Therapeutic Procedure/Exercise;Self Care/Home Management;Manual Therapy   Therapeutic Procedure/Exercise   Ther Proc 1 - Details Scifit seat 5 (window) L 1 X 4  min (fwd 2 retro 2 min).  LE's:  L 3 X 6.  pulley flex/ scaption X 10 each B. Wall flex X 5 B (cuing to step fwd).    Wand ER standing X 5 B.  Wand IR standing X 5--pt notes pain--did IR w/ hand on counter tolerated better.  .  Supine shoulder flex X 5 B.  Reverse pendulums B.  SL shoulder ABD X 5 B.    P to AAROM shoulder flex /scaption and ER  w/ diaphragmatic breathing--X 5 each B.   Sit to stand from chair w/o UE assist x 10.   Skilled Intervention ex to improve endurance, motion, strength to improve daily function   Patient Response/Progress HR range : 60%= 96,  70% = 112 bpm.  Pt reports increased pain w/ lowering R arm.   Manual Therapy   Manual Therapy 1 - Details R pec STM w/ AAROM, rib rocking   Skilled Intervention to Improve mobility   Patient Response/Progress pec tightness noted   Education   Learner/Method Patient;Listening;Reading;Demonstration;Pictures/Video;No Barriers to Learning   Plan   Home program per PTRX; pin to phone   Plan cont to work on B shoulder pain/ stiffness,  conditoning and strengthening.   Comments   Comments Inbasket to MD re: lymphedema consult for R shoulder fullness. Pt has completed chemo and B mastectomy/ reconstruction.  Port will be removed 11/14/24.         [facility]                                                                                   OUTPATIENT PHYSICAL THERAPY    PLAN OF TREATMENT FOR OUTPATIENT REHABILITATION   Patient's Last Name, First Name, Diana Crane YOB: 1965   Provider's Name   [facility]   Medical  Record No.  [id number]     Onset Date: 06/21/24  Start of Care Date: 08/02/24     Medical Diagnosis:  R breast cancer, Physical deconditioning, neuropathy, R shoulder pain      PT Treatment Diagnosis:  fatigue/ deconditioned/ limited shoulder motion Plan of Treatment  Frequency/Duration: 1X/week/ 10 weeks    Certification date from 10/11/24 to 12/20/24         See note for plan of treatment details and functional goals     Sofya Dickinson, PT                         I CERTIFY THE NEED FOR THESE SERVICES FURNISHED UNDER        THIS PLAN OF TREATMENT AND WHILE UNDER MY CARE     (Physician attestation of this document indicates review and certification of the therapy plan).              Referring Provider:  Linsey Verduzco    Initial Assessment  See Epic Evaluation- Start of Care Date: 08/02/24            PLAN  Continue therapy per current plan of care.    Beginning/End Dates of Progress Note Reporting Period:  8/2/24 to 10/16/2024    Referring Provider:  Linsey Verduzco

## 2024-10-18 ENCOUNTER — THERAPY VISIT (OUTPATIENT)
Dept: OCCUPATIONAL THERAPY | Facility: CLINIC | Age: 59
End: 2024-10-18
Attending: STUDENT IN AN ORGANIZED HEALTH CARE EDUCATION/TRAINING PROGRAM
Payer: COMMERCIAL

## 2024-10-18 DIAGNOSIS — C50.211 MALIGNANT NEOPLASM OF UPPER-INNER QUADRANT OF RIGHT BREAST IN FEMALE, ESTROGEN RECEPTOR NEGATIVE (H): Primary | ICD-10-CM

## 2024-10-18 DIAGNOSIS — Z17.1 MALIGNANT NEOPLASM OF UPPER-INNER QUADRANT OF RIGHT BREAST IN FEMALE, ESTROGEN RECEPTOR NEGATIVE (H): Primary | ICD-10-CM

## 2024-10-18 LAB
DHC UR CFM-MCNC: 317 NG/ML
DHC/CREAT UR: 634 NG/MG {CREAT}
HYDROCODONE UR CFM-MCNC: 1160 NG/ML
HYDROCODONE/CREAT UR: 2320 NG/MG {CREAT}
HYDROMORPHONE UR CFM-MCNC: 63 NG/ML
HYDROMORPHONE/CREAT UR: 126 NG/MG {CREAT}
LORAZEPAM UR QL CFM: PRESENT
N-NORTRAMADOL/CREAT UR CFM: ABNORMAL NG/MG {CREAT}
O-NORTRAMADOL UR CFM-MCNC: 8340 NG/ML
TRAMADOL CTO UR CFM-MCNC: 6980 NG/ML
TRAMADOL/CREAT UR: ABNORMAL NG/MG {CREAT}

## 2024-10-18 PROCEDURE — 97530 THERAPEUTIC ACTIVITIES: CPT | Mod: GO | Performed by: OCCUPATIONAL THERAPIST

## 2024-10-23 ENCOUNTER — THERAPY VISIT (OUTPATIENT)
Dept: PHYSICAL THERAPY | Facility: CLINIC | Age: 59
End: 2024-10-23
Attending: STUDENT IN AN ORGANIZED HEALTH CARE EDUCATION/TRAINING PROGRAM
Payer: COMMERCIAL

## 2024-10-23 ENCOUNTER — INFUSION THERAPY VISIT (OUTPATIENT)
Dept: INFUSION THERAPY | Facility: CLINIC | Age: 59
End: 2024-10-23
Attending: NURSE PRACTITIONER
Payer: COMMERCIAL

## 2024-10-23 DIAGNOSIS — R53.81 PHYSICAL DECONDITIONING: Primary | ICD-10-CM

## 2024-10-23 DIAGNOSIS — C50.211 MALIGNANT NEOPLASM OF UPPER-INNER QUADRANT OF RIGHT BREAST IN FEMALE, ESTROGEN RECEPTOR NEGATIVE (H): Primary | ICD-10-CM

## 2024-10-23 DIAGNOSIS — Z17.1 MALIGNANT NEOPLASM OF UPPER-INNER QUADRANT OF RIGHT BREAST IN FEMALE, ESTROGEN RECEPTOR NEGATIVE (H): Primary | ICD-10-CM

## 2024-10-23 PROCEDURE — 96523 IRRIG DRUG DELIVERY DEVICE: CPT

## 2024-10-23 PROCEDURE — 250N000011 HC RX IP 250 OP 636: Performed by: NURSE PRACTITIONER

## 2024-10-23 PROCEDURE — 97110 THERAPEUTIC EXERCISES: CPT | Mod: GP | Performed by: PHYSICAL THERAPIST

## 2024-10-23 RX ORDER — HEPARIN SODIUM (PORCINE) LOCK FLUSH IV SOLN 100 UNIT/ML 100 UNIT/ML
5 SOLUTION INTRAVENOUS
OUTPATIENT
Start: 2024-10-23

## 2024-10-23 RX ORDER — HEPARIN SODIUM (PORCINE) LOCK FLUSH IV SOLN 100 UNIT/ML 100 UNIT/ML
5 SOLUTION INTRAVENOUS
Status: DISCONTINUED | OUTPATIENT
Start: 2024-10-23 | End: 2024-10-23 | Stop reason: HOSPADM

## 2024-10-23 RX ADMIN — Medication 5 ML: at 14:45

## 2024-10-25 ENCOUNTER — THERAPY VISIT (OUTPATIENT)
Dept: OCCUPATIONAL THERAPY | Facility: CLINIC | Age: 59
End: 2024-10-25
Attending: STUDENT IN AN ORGANIZED HEALTH CARE EDUCATION/TRAINING PROGRAM
Payer: COMMERCIAL

## 2024-10-25 DIAGNOSIS — Z17.1 MALIGNANT NEOPLASM OF UPPER-INNER QUADRANT OF RIGHT BREAST IN FEMALE, ESTROGEN RECEPTOR NEGATIVE (H): Primary | ICD-10-CM

## 2024-10-25 DIAGNOSIS — C50.211 MALIGNANT NEOPLASM OF UPPER-INNER QUADRANT OF RIGHT BREAST IN FEMALE, ESTROGEN RECEPTOR NEGATIVE (H): Primary | ICD-10-CM

## 2024-10-25 PROCEDURE — 97530 THERAPEUTIC ACTIVITIES: CPT | Mod: GO | Performed by: OCCUPATIONAL THERAPIST

## 2024-11-12 ENCOUNTER — MYC MEDICAL ADVICE (OUTPATIENT)
Dept: FAMILY MEDICINE | Facility: CLINIC | Age: 59
End: 2024-11-12
Payer: COMMERCIAL

## 2024-11-12 DIAGNOSIS — M54.31 SCIATICA OF RIGHT SIDE: ICD-10-CM

## 2024-11-12 DIAGNOSIS — F11.20 OPIOID TYPE DEPENDENCE, CONTINUOUS USE (H): ICD-10-CM

## 2024-11-12 DIAGNOSIS — M54.41 ACUTE RIGHT-SIDED LOW BACK PAIN WITH RIGHT-SIDED SCIATICA: ICD-10-CM

## 2024-11-12 RX ORDER — CYCLOBENZAPRINE HCL 5 MG
5 TABLET ORAL 2 TIMES DAILY PRN
Qty: 40 TABLET | Refills: 1 | Status: SHIPPED | OUTPATIENT
Start: 2024-11-12

## 2024-11-13 RX ORDER — HYDROCODONE BITARTRATE AND ACETAMINOPHEN 5; 325 MG/1; MG/1
1 TABLET ORAL 2 TIMES DAILY PRN
Qty: 60 TABLET | Refills: 0 | Status: SHIPPED | OUTPATIENT
Start: 2024-11-15

## 2024-11-14 ENCOUNTER — OFFICE VISIT (OUTPATIENT)
Dept: SURGERY | Facility: CLINIC | Age: 59
End: 2024-11-14
Payer: COMMERCIAL

## 2024-11-14 VITALS
WEIGHT: 203 LBS | DIASTOLIC BLOOD PRESSURE: 95 MMHG | HEIGHT: 67 IN | OXYGEN SATURATION: 99 % | SYSTOLIC BLOOD PRESSURE: 141 MMHG | TEMPERATURE: 98.8 F | BODY MASS INDEX: 31.86 KG/M2 | HEART RATE: 68 BPM

## 2024-11-14 DIAGNOSIS — Z98.890 S/P BREAST RECONSTRUCTION, BILATERAL: Primary | ICD-10-CM

## 2024-11-14 PROCEDURE — 36590 REMOVAL TUNNELED CV CATH: CPT | Performed by: SURGERY

## 2024-11-14 ASSESSMENT — PAIN SCALES - GENERAL: PAINLEVEL_OUTOF10: NO PAIN (0)

## 2024-11-14 NOTE — PROGRESS NOTES
Procedure Date: 11/14/24     PREOPERATIVE DIAGNOSIS: 1. Breast cancer    POSTOPERATIVE DIAGNOSIS: Same    PROCEDURE: Removal of left subclavian infusion port    ATTENDING SURGEON: Baldemar Marina DO    ESTIMATED BLOOD LOSS: 3 mL    INDICATIONS: Diana Salvador presents with a history of breast cancer who has now completed treatment. She presents for elective removal of her infusion port, and was counseled about the indications, risks, benefits and alternatives to surgery. She understood the information given, and wishes to proceed.     DESCRIPTION OF PROCEDURE: The patient was brought to the procedure room and placed supine on the operating room table.  The left chest was then prepped and draped in the usual sterile fashion. Local anesthetic was delivered at the left chest wall at the site of the infusion port reservoir, and an incision created over the existing scar. The tissue was dissected using a 15-blade scalpel, and two points of fixation each released sequentially, and all suture material removed. The port reservoir and catheter were then removed as a single unit, and the catheter inspected carefully, and appeared wholly intact and unremarkable. Hemostasis was assured at the surgical field/wound. An instrument count was conducted, and included laparotomy pads, needles and sponges, and was found to be correct. The subcutaneous tissue was closed with interrupted 3-0 Vicryl. Skin edges were re-approximated using 4-0 Monocryl, and the wound was cleansed and dried prior to application of sterile glue.    The patient tolerated the procedure well.    Baldemar Marina DO on 11/14/2024 at 1:42 PM

## 2024-11-14 NOTE — LETTER
11/14/2024      Diana Salvador  [address] [place] [id number]      Dear Colleague,    Thank you for referring your patient, Diana Salvador, to the [facility]. Please see a copy of my visit note below.    Procedure Date: 11/14/24     PREOPERATIVE DIAGNOSIS: 1. Breast cancer    POSTOPERATIVE DIAGNOSIS: Same    PROCEDURE: Removal of left subclavian infusion port    ATTENDING SURGEON: Baldemar Marina DO    ESTIMATED BLOOD LOSS: 3 mL    INDICATIONS: Diana Salvador presents with a history of breast cancer who has now completed treatment. She presents for elective removal of her infusion port, and was counseled about the indications, risks, benefits and alternatives to surgery. She understood the information given, and wishes to proceed.     DESCRIPTION OF PROCEDURE: The patient was brought to the procedure room and placed supine on the operating room table.  The left chest was then prepped and draped in the usual sterile fashion. Local anesthetic was delivered at the left chest wall at the site of the infusion port reservoir, and an incision created over the existing scar. The tissue was dissected using a 15-blade scalpel, and two points of fixation each released sequentially, and all suture material removed. The port reservoir and catheter were then removed as a single unit, and the catheter inspected carefully, and appeared wholly intact and unremarkable. Hemostasis was assured at the surgical field/wound. An instrument count was conducted, and included laparotomy pads, needles and sponges, and was found to be correct. The subcutaneous tissue was closed with interrupted 3-0 Vicryl. Skin edges were re-approximated using 4-0 Monocryl, and the wound was cleansed and dried prior to application of sterile glue.    The patient tolerated the procedure well.    Baldemar Marina,  on 11/14/2024 at 1:42 PM      Again, thank you for allowing me to participate in the care of your patient.         Sincerely,        Baldemar aMrina, DO

## 2024-11-25 ENCOUNTER — THERAPY VISIT (OUTPATIENT)
Dept: PHYSICAL THERAPY | Facility: CLINIC | Age: 59
End: 2024-11-25
Attending: STUDENT IN AN ORGANIZED HEALTH CARE EDUCATION/TRAINING PROGRAM
Payer: COMMERCIAL

## 2024-11-25 DIAGNOSIS — R53.81 PHYSICAL DECONDITIONING: Primary | ICD-10-CM

## 2024-11-25 PROCEDURE — 97110 THERAPEUTIC EXERCISES: CPT | Mod: GP | Performed by: PHYSICAL THERAPIST

## 2024-12-01 ENCOUNTER — HEALTH MAINTENANCE LETTER (OUTPATIENT)
Age: 59
End: 2024-12-01

## 2024-12-04 DIAGNOSIS — F41.8 SITUATIONAL ANXIETY: ICD-10-CM

## 2024-12-05 RX ORDER — LORAZEPAM 0.5 MG/1
.5-1 TABLET ORAL EVERY 8 HOURS PRN
Qty: 60 TABLET | Refills: 0 | Status: SHIPPED | OUTPATIENT
Start: 2024-12-05

## 2024-12-09 DIAGNOSIS — E87.6 HYPOKALEMIA: ICD-10-CM

## 2024-12-10 RX ORDER — POTASSIUM CHLORIDE 1500 MG/1
20 TABLET, EXTENDED RELEASE ORAL DAILY
Qty: 90 TABLET | Refills: 2 | Status: SHIPPED | OUTPATIENT
Start: 2024-12-10

## 2024-12-10 NOTE — TELEPHONE ENCOUNTER
Prescription approved per [facility] Refill Protocol.   Erythromycin Counseling:  I discussed with the patient the risks of erythromycin including but not limited to GI upset, allergic reaction, drug rash, diarrhea, increase in liver enzymes, and yeast infections.

## 2024-12-12 ENCOUNTER — ONCOLOGY VISIT (OUTPATIENT)
Dept: ONCOLOGY | Facility: CLINIC | Age: 59
End: 2024-12-12
Attending: NURSE PRACTITIONER
Payer: COMMERCIAL

## 2024-12-12 ENCOUNTER — LAB (OUTPATIENT)
Dept: LAB | Facility: CLINIC | Age: 59
End: 2024-12-12
Payer: OTHER GOVERNMENT

## 2024-12-12 VITALS
OXYGEN SATURATION: 95 % | TEMPERATURE: 99 F | SYSTOLIC BLOOD PRESSURE: 139 MMHG | BODY MASS INDEX: 33.12 KG/M2 | WEIGHT: 211 LBS | HEART RATE: 74 BPM | DIASTOLIC BLOOD PRESSURE: 97 MMHG | RESPIRATION RATE: 16 BRPM | HEIGHT: 67 IN

## 2024-12-12 DIAGNOSIS — M25.511 CHRONIC PAIN OF BOTH SHOULDERS: ICD-10-CM

## 2024-12-12 DIAGNOSIS — M25.512 CHRONIC PAIN OF BOTH SHOULDERS: ICD-10-CM

## 2024-12-12 DIAGNOSIS — F41.8 SITUATIONAL ANXIETY: ICD-10-CM

## 2024-12-12 DIAGNOSIS — G62.0 CHEMOTHERAPY-INDUCED NEUROPATHY (H): ICD-10-CM

## 2024-12-12 DIAGNOSIS — T45.1X5A CHEMOTHERAPY-INDUCED NEUROPATHY (H): ICD-10-CM

## 2024-12-12 DIAGNOSIS — C50.211 MALIGNANT NEOPLASM OF UPPER-INNER QUADRANT OF RIGHT BREAST IN FEMALE, ESTROGEN RECEPTOR NEGATIVE (H): Primary | ICD-10-CM

## 2024-12-12 DIAGNOSIS — G89.29 CHRONIC PAIN OF BOTH SHOULDERS: ICD-10-CM

## 2024-12-12 DIAGNOSIS — Z17.1 MALIGNANT NEOPLASM OF UPPER-INNER QUADRANT OF RIGHT BREAST IN FEMALE, ESTROGEN RECEPTOR NEGATIVE (H): Primary | ICD-10-CM

## 2024-12-12 DIAGNOSIS — R53.81 PHYSICAL DECONDITIONING: ICD-10-CM

## 2024-12-12 LAB
BASOPHILS # BLD AUTO: 0 10E3/UL (ref 0–0.2)
BASOPHILS NFR BLD AUTO: 1 %
EOSINOPHIL # BLD AUTO: 0.1 10E3/UL (ref 0–0.7)
EOSINOPHIL NFR BLD AUTO: 2 %
ERYTHROCYTE [DISTWIDTH] IN BLOOD BY AUTOMATED COUNT: 14.3 % (ref 10–15)
HCT VFR BLD AUTO: 44.3 % (ref 35–47)
HGB BLD-MCNC: 13.8 G/DL (ref 11.7–15.7)
HOLD SPECIMEN: NORMAL
IMM GRANULOCYTES # BLD: 0 10E3/UL
IMM GRANULOCYTES NFR BLD: 0 %
LYMPHOCYTES # BLD AUTO: 2.1 10E3/UL (ref 0.8–5.3)
LYMPHOCYTES NFR BLD AUTO: 36 %
MCH RBC QN AUTO: 27.4 PG (ref 26.5–33)
MCHC RBC AUTO-ENTMCNC: 31.2 G/DL (ref 31.5–36.5)
MCV RBC AUTO: 88 FL (ref 78–100)
MONOCYTES # BLD AUTO: 0.4 10E3/UL (ref 0–1.3)
MONOCYTES NFR BLD AUTO: 6 %
NEUTROPHILS # BLD AUTO: 3.2 10E3/UL (ref 1.6–8.3)
NEUTROPHILS NFR BLD AUTO: 56 %
NRBC # BLD AUTO: 0 10E3/UL
NRBC BLD AUTO-RTO: 0 /100
PLATELET # BLD AUTO: 371 10E3/UL (ref 150–450)
RBC # BLD AUTO: 5.04 10E6/UL (ref 3.8–5.2)
WBC # BLD AUTO: 5.7 10E3/UL (ref 4–11)

## 2024-12-12 PROCEDURE — 85048 AUTOMATED LEUKOCYTE COUNT: CPT

## 2024-12-12 PROCEDURE — G0463 HOSPITAL OUTPT CLINIC VISIT: HCPCS | Performed by: NURSE PRACTITIONER

## 2024-12-12 PROCEDURE — 36415 COLL VENOUS BLD VENIPUNCTURE: CPT

## 2024-12-12 PROCEDURE — 85004 AUTOMATED DIFF WBC COUNT: CPT

## 2024-12-12 ASSESSMENT — PAIN SCALES - GENERAL: PAINLEVEL_OUTOF10: EXTREME PAIN (8)

## 2024-12-12 NOTE — LETTER
12/12/2024      Diana Salvador  [address] [place] [id number]      Dear Colleague,    Thank you for referring your patient, Diana Salvador, to the [facility] [place]. Please see a copy of my visit note below.    [facility] Hematology and Oncology Outpatient Progress Note ([place])    Patient: Diana Salvador  MRN: [id number]  Dec 12, 2024            Reason for Visit    Stage IIB triple negative breast cancer    Virtual visit    Assessment/Plan  1.   Stage IIB triple negative breast cancer, ypCR  Aide was diagnosed 2.5-3 years ago and completed all of her adjuvant treatment almost 2 yr ago. She is recovering generally well.     She has pre-existing chronic pain, depression/anxiety that have been exacerbated post cancer-diagnosis, treatment and into Survivorship.    No symptoms concerning for recurrence.     She had her port removed last month.    CBC WNL.     Plan:  -Return in 6 mths for in-person exam  -Monitor CBC annually post chemo x 10 years.  -No role for mammograms, post bilateral mastectomy  -Can maintain routine surveillance at [facility]. Since Dr. Diamond has left the practice, will need to align with new primary MD over time/as needed (could see Dr. Lyons at [facility] whom she met before or new MD at [place] sites).    2.    Recurrent hiccups  Appears to possibly be having concurrent URI/allergies (left ear tickle, cough). No evident reflux/abd nor esophageal symptoms.     Plan:  -Has addressed with PCP    3.    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  She has no intermittent numbness but does have intermittent tenderness in the fingertips and toes.     Previously did not tolerate gabapentin and duloxetine did not make a difference    Plan:  -Has been evaluated by Dr. Verduzco in PM&R, undergoing PT    4.   Chronic back pain  5.   Cervical spine stenosis with right radicular symptoms  6.   Bilateral shoulder discomfort and  immobility  7.   Chronic generalized arthralgias  She has pre-existing chronic pain (lumbar back and joints), exacerbated while on immunotherapy. She's been off immunotherapy 2 yrs but there is some low risk (3%) for chronic arthritis following immunotherapy.    More recent (x 1 yr) right shoulder/RUE radicular type discomfort. Bone scan and MRI c-spine negative for mets; severe right lower cervical spine stenosis noted and she underwent steroid injection with benefit.     She has noted bilateral shoulder discomfort with restricted ROM, PT has been working on this with her. Etiology not clear, possibly post-mastectomy/reconstruction related +/- lymphedema (right shoulder/axilla). Lymphedema +/- Ortho consults have been recommended as considerations by PT.    Continues weekly PT.    Chronic opioids, managed by PCP. Using medical cannabis as well.     Has been on work disability since prior to start of her cancer treatment. She doesn't feel able to return to work due to her current shoulder symptoms (having trouble dressing herself, doing ADLs, housework, etc). Dr. Verduzco has extended her Disability x 1, but has told pt she would not typically oversee decisions long-term on this.     Plan:  -continue follow-up with Dr. Verduzco in PM&R in efforts to optimize post-cancer treatment and recovery.  -Some issues chronic/predated cancer diagnosis/treatment  -Will reach out to Dr. Verduzco on status of referrals to Lymphedema + Ortho  -Has been on hydrocodone and tramadol long-term. Managed by PCP.    7.  Depression/anxiety, chronic but acutely worsened with cancer diagnosis/treatment  Manged with PCP.  She has been on a number of different antidepressants. Currently, on Wellbutrin with partial benefit. Working with Oncology therapist.   She is still struggling with socially isolation due to what sounds like social anxiety in leaving home + chronic pain/mobility constraints.    Plan:  -Continue antidepressant mgmt with PCP.    -Continue Oncology psychotherapy as needed  -Encouraged she stay engaged outside of her home with family/friends/activities.  -Encouraged physical activity    8.   Financial stressors  She continues on disability. Has insurance. Has visited with Onc SW in past, can utilize her as resource as needed.     Plan:  -Not sure who will be best in assessing her disability criteria in future. Will need to work with Dr. Verduzco and PCP on this as needed    ______________________________________________________________________________    History of Present Illness/ Interval History    Ms. Diana Salvador is a 59 year old who completed neoadjvuant treatment with pembrolizumab, carboplatin and taxol (weekly) x 3 cycles of this combo, complicated by grade 3 neuropathy (fingers) and neutropenia so chemo was stopped and she continued Pembro alone for a few more cycles. She then had bilateral mastectomies with pathologic CR. She completed 9 cycles of adjuvant Pembrolizumab every 3 weeks 1/2023 (2yr ago). Now on surveillance. Returns for 6-mth visit.    Chronic low back pain and generalized arthralgias, predating her cancer/treatment.     For the last 1+ year, she has had right neck/right posterior shoulder pain with several months of newer intermittent tingling in right pinky/ring finger and hand (mostly from elbow down). Bone scan negative for bone mets. MRI cervical spine was negative for mets; showed C6-7 stenosis and she underwent steroid injection with some transient benefit.    Over last several months, has had bilateral shoulder discomfort and decreased ROM despite PT. Still having some bilateral shoulder immobility issues, hard to put clothes on overhead, etc. PT assessed possible post-mastectomy/reconstruction related +/- lymphedema component in R shoulder/axilla.     PT has recommended consideration of Lymphedema consult for the R shoulder, considering this  She remains on Disability and will need reassessment for this in  "near future.     Using hydrocodone or tramadol chronically, managed by PCP.     No headaches.     Her neuropathy is improved and it is not as painful as it used to be, but persistent sensitivity in toes and fingertips. Intermittent numbness.      Recurrent hiccups x months. No respiratory symptoms. No new medications. No heartburn. No nausea. No epigastric pain. Mild lower gas pains. Bowels regular.     She has struggled with chronic depression/anxiety. Since her cancer diagnosis/treatment has had worsening low concentration and low energy/lack of motivation further exacerbated by mobility issues.  The holiday season is hard for her since her   10 yrs ago around this time of year. Working with psychotherapist. On medication management through her PCP, has trialed several various antidepressants. Currently on Wellbutrin with some benefit. Using lorazepam as needed. She still feels socially isolated due to not desiring leaving the home due to anxiety + her chronic pain/immobility.    Just got a dog this year, \"Buddy\", who is keeping her physically active and helps her mental health.       ECOG Performance    0      Oncology History/Treatment  Diagnosis/Stage:   2022: Stage IIB (cT2-cN0-cM0) right breast cancer (triple negative) //ypT0-N0 (CR)  -self-palpated right breast lump  -diagnostic mammo + breast US: 2.4 cm R breast (2:00, lower-inner quad) lump and no axillary nodes detected  -breast biopsy: invasive ductal carcinoma, grade 3. ER neg, TN neg, HER2 neg via FISH  -CA 27.29 WNL (<5)  -Genetic testing negative for germline mutations    -2022 surgical path = CR after neoadjuvant chemo    Treatment:  3/15/2022 - 2022: Neoadjuvant chemo (based off [id number]) Pembrolizumab 200 mg D1 q 21 days + Carboplatin AUC=5 D1 q21 Days + weekly Paclitaxel 80 mg/m  D1, 8, 15  x 12 Weeks (with Filgrastim 5 mcg/kg subcutaneously once daily on days 16, 17, and 18 of cycles 1 through 4)  -->after cycle 3, " developed grade 3 neuropathy and delayed neutropenia (despite using Neupogen). Therefore, chemo held with cycle 4 and only received Pembrolizumab through cycle 6.     6/30/2022: bilateral mastectomies with R sLN biopsy and expander placement.  (Elysia Marina + Gerson). Pathologic CR  -later underwent reconstruction surgery    No adjuvant RT recommended    7/22/22 - 1/12/2023: adjuvant Pembrolizumab x 9 additional cycles      Physical Exam    GENERAL: Alert and oriented to time place and person. Seated comfortably.  Alone.  Intermittently tearful.  HEAD: Atraumatic and normocephalic. No alopecia.  EYES: MELISSA, EOMI. No erythema. No icterus.  BREASTS: Reconstructed. No palpable masses. No chest wall masses.   LYMPH NODES: No axillary adenopathy.   CHEST:  Clear lung sounds bilaterally. RRR  ABD: Soft, non-distended  EXTREMITIES: Restricted shoulder ROM bringing arms overhead. No significant lymphedema noted.   NEURO: No gross deficit noted.      Lab Results    Recent Results (from the past week)   CBC with platelets and differential   Result Value Ref Range    WBC Count 5.7 4.0 - 11.0 10e3/uL    RBC Count 5.04 3.80 - 5.20 10e6/uL    Hemoglobin 13.8 11.7 - 15.7 g/dL    Hematocrit 44.3 35.0 - 47.0 %    MCV 88 78 - 100 fL    MCH 27.4 26.5 - 33.0 pg    MCHC 31.2 (L) 31.5 - 36.5 g/dL    RDW 14.3 10.0 - 15.0 %    Platelet Count 371 150 - 450 10e3/uL    % Neutrophils 56 %    % Lymphocytes 36 %    % Monocytes 6 %    % Eosinophils 2 %    % Basophils 1 %    % Immature Granulocytes 0 %    NRBCs per 100 WBC 0 <1 /100    Absolute Neutrophils 3.2 1.6 - 8.3 10e3/uL    Absolute Lymphocytes 2.1 0.8 - 5.3 10e3/uL    Absolute Monocytes 0.4 0.0 - 1.3 10e3/uL    Absolute Eosinophils 0.1 0.0 - 0.7 10e3/uL    Absolute Basophils 0.0 0.0 - 0.2 10e3/uL    Absolute Immature Granulocytes 0.0 <=0.4 10e3/uL    Absolute NRBCs 0.0 10e3/uL   Extra Green Top (Lithium Heparin) Tube   Result Value Ref Range    Hold Specimen JIC          Imaging    No  "results found.    Billing  Total time 40 minutes, to include face to face visit, review of EMR, ordering, documentation and coordination of care on date of service.    complexity modifier for longitudinal care.       Signed by: Marilu Vergara NP      Oncology Rooming Note    December 12, 2024 1:12 PM   Diana Salvador is a 59 year old female who presents for:    Chief Complaint   Patient presents with     Oncology Clinic Visit     Malignant neoplasm of right breast in female, estrogen receptor negative, unspecified site of breast - Labs and provider visits     Initial Vitals: BP (!) 139/97 (BP Location: Right arm, Patient Position: Sitting, Cuff Size: Adult Large)   Pulse 74   Temp 99  F (37.2  C) (Tympanic)   Resp 16   Ht 1.702 m (5' 7\")   Wt 95.7 kg (211 lb)   LMP  (LMP Unknown)   SpO2 95%   BMI 33.05 kg/m   Estimated body mass index is 33.05 kg/m  as calculated from the following:    Height as of this encounter: 1.702 m (5' 7\").    Weight as of this encounter: 95.7 kg (211 lb). Body surface area is 2.13 meters squared.  Extreme Pain (8) Comment: Data Unavailable   No LMP recorded (lmp unknown). Patient has had a hysterectomy.  Allergies reviewed: Yes  Medications reviewed: Yes    Medications: Medication refills not needed today.  Pharmacy name entered into EPIC:    [facility] - [facility], [place] - [address] [facility] [address] [place], [place] - [address] [place] [address]    Frailty Screening:   Is the patient here for a new oncology consult visit in cancer care? 2. No      Clinical concerns: None today.      Emy Hollingsworth CMA                Again, thank you for allowing me to participate in the care of your patient.        Sincerely,        Marilu Vergara NP  "

## 2024-12-12 NOTE — PROGRESS NOTES
"Oncology Rooming Note    December 12, 2024 1:12 PM   Diana Salvador is a 59 year old female who presents for:    Chief Complaint   Patient presents with    Oncology Clinic Visit     Malignant neoplasm of right breast in female, estrogen receptor negative, unspecified site of breast - Labs and provider visits     Initial Vitals: BP (!) 139/97 (BP Location: Right arm, Patient Position: Sitting, Cuff Size: Adult Large)   Pulse 74   Temp 99  F (37.2  C) (Tympanic)   Resp 16   Ht 1.702 m (5' 7\")   Wt 95.7 kg (211 lb)   LMP  (LMP Unknown)   SpO2 95%   BMI 33.05 kg/m   Estimated body mass index is 33.05 kg/m  as calculated from the following:    Height as of this encounter: 1.702 m (5' 7\").    Weight as of this encounter: 95.7 kg (211 lb). Body surface area is 2.13 meters squared.  Extreme Pain (8) Comment: Data Unavailable   No LMP recorded (lmp unknown). Patient has had a hysterectomy.  Allergies reviewed: Yes  Medications reviewed: Yes    Medications: Medication refills not needed today.  Pharmacy name entered into EPIC:    [facility] - [facility], [place] - [address] [facility] [address] [place], [place] - [facility] [address] [facility]    Frailty Screening:   Is the patient here for a new oncology consult visit in cancer care? 2. No      Clinical concerns: None today.      Emy Hollingsworth CMA              "

## 2024-12-12 NOTE — PROGRESS NOTES
[facility] Outpatient Progress Note ([place])    Patient: Diana Salvador  MRN: [id number]  Dec 12, 2024            Reason for Visit    Stage IIB triple negative breast cancer    Virtual visit    Assessment/Plan  1.   Stage IIB triple negative breast cancer, ypCR  Aide was diagnosed 2.5-3 years ago and completed all of her adjuvant treatment almost 2 yr ago. She is recovering generally well.     She has pre-existing chronic pain, depression/anxiety that have been exacerbated post cancer-diagnosis, treatment and into Survivorship.    No symptoms concerning for recurrence.     She had her port removed last month.    CBC WNL.     Plan:  -Return in 6 mths for in-person exam  -Monitor CBC annually post chemo x 10 years.  -No role for mammograms, post bilateral mastectomy  -Can maintain routine surveillance at [facility]. Since Dr. Diamond has left the practice, will need to align with new primary MD over time/as needed (could see Dr. Lyons at [facility] whom she met before or new MD at [place] sites).    2.    Recurrent hiccups  Appears to possibly be having concurrent URI/allergies (left ear tickle, cough). No evident reflux/abd nor esophageal symptoms.     Plan:  -Has addressed with PCP    3.    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  She has no intermittent numbness but does have intermittent tenderness in the fingertips and toes.     Previously did not tolerate gabapentin and duloxetine did not make a difference    Plan:  -Has been evaluated by Dr. Verduzco in PM&R, undergoing PT    4.   Chronic back pain  5.   Cervical spine stenosis with right radicular symptoms  6.   Bilateral shoulder discomfort and immobility  7.   Chronic generalized arthralgias  She has pre-existing chronic pain (lumbar back and joints), exacerbated while on immunotherapy. She's been off immunotherapy 2 yrs but there is some low risk (3%) for chronic arthritis following  immunotherapy.    More recent (x 1 yr) right shoulder/RUE radicular type discomfort. Bone scan and MRI c-spine negative for mets; severe right lower cervical spine stenosis noted and she underwent steroid injection with benefit.     She has noted bilateral shoulder discomfort with restricted ROM, PT has been working on this with her. Etiology not clear, possibly post-mastectomy/reconstruction related +/- lymphedema (right shoulder/axilla). Lymphedema +/- Ortho consults have been recommended as considerations by PT.    Continues weekly PT.    Chronic opioids, managed by PCP. Using medical cannabis as well.     Has been on work disability since prior to start of her cancer treatment. She doesn't feel able to return to work due to her current shoulder symptoms (having trouble dressing herself, doing ADLs, housework, etc). Dr. Verduzco has extended her Disability x 1, but has told pt she would not typically oversee decisions long-term on this.     Plan:  -continue follow-up with Dr. Verduzco in PM&R in efforts to optimize post-cancer treatment and recovery.  -Some issues chronic/predated cancer diagnosis/treatment  -Will reach out to Dr. Verduzco on status of referrals to Lymphedema + Ortho  -Has been on hydrocodone and tramadol long-term. Managed by PCP.    7.  Depression/anxiety, chronic but acutely worsened with cancer diagnosis/treatment  Manged with PCP.  She has been on a number of different antidepressants. Currently, on Wellbutrin with partial benefit. Working with Oncology therapist.   She is still struggling with socially isolation due to what sounds like social anxiety in leaving home + chronic pain/mobility constraints.    Plan:  -Continue antidepressant mgmt with PCP.   -Continue Oncology psychotherapy as needed  -Encouraged she stay engaged outside of her home with family/friends/activities.  -Encouraged physical activity    8.   Financial stressors  She continues on disability. Has insurance. Has visited with Onc SW  in past, can utilize her as resource as needed.     Plan:  -Not sure who will be best in assessing her disability criteria in future. Will need to work with Dr. Verduzco and PCP on this as needed    ______________________________________________________________________________    History of Present Illness/ Interval History    Ms. Diana Salvador is a 59 year old who completed neoadjvuant treatment with pembrolizumab, carboplatin and taxol (weekly) x 3 cycles of this combo, complicated by grade 3 neuropathy (fingers) and neutropenia so chemo was stopped and she continued Pembro alone for a few more cycles. She then had bilateral mastectomies with pathologic CR. She completed 9 cycles of adjuvant Pembrolizumab every 3 weeks 1/2023 (2yr ago). Now on surveillance. Returns for 6-mth visit.    Chronic low back pain and generalized arthralgias, predating her cancer/treatment.     For the last 1+ year, she has had right neck/right posterior shoulder pain with several months of newer intermittent tingling in right pinky/ring finger and hand (mostly from elbow down). Bone scan negative for bone mets. MRI cervical spine was negative for mets; showed C6-7 stenosis and she underwent steroid injection with some transient benefit.    Over last several months, has had bilateral shoulder discomfort and decreased ROM despite PT. Still having some bilateral shoulder immobility issues, hard to put clothes on overhead, etc. PT assessed possible post-mastectomy/reconstruction related +/- lymphedema component in R shoulder/axilla.     PT has recommended consideration of Lymphedema consult for the R shoulder, considering this  She remains on Disability and will need reassessment for this in near future.     Using hydrocodone or tramadol chronically, managed by PCP.     No headaches.     Her neuropathy is improved and it is not as painful as it used to be, but persistent sensitivity in toes and fingertips. Intermittent numbness.      Recurrent  "hiccups x months. No respiratory symptoms. No new medications. No heartburn. No nausea. No epigastric pain. Mild lower gas pains. Bowels regular.     She has struggled with chronic depression/anxiety. Since her cancer diagnosis/treatment has had worsening low concentration and low energy/lack of motivation further exacerbated by mobility issues.  The holiday season is hard for her since her   10 yrs ago around this time of year. Working with psychotherapist. On medication management through her PCP, has trialed several various antidepressants. Currently on Wellbutrin with some benefit. Using lorazepam as needed. She still feels socially isolated due to not desiring leaving the home due to anxiety + her chronic pain/immobility.    Just got a dog this year, \"Buddy\", who is keeping her physically active and helps her mental health.       ECOG Performance    0      Oncology History/Treatment  Diagnosis/Stage:   2022: Stage IIB (cT2-cN0-cM0) right breast cancer (triple negative) //ypT0-N0 (CR)  -self-palpated right breast lump  -diagnostic mammo + breast US: 2.4 cm R breast (2:00, lower-inner quad) lump and no axillary nodes detected  -breast biopsy: invasive ductal carcinoma, grade 3. ER neg, NE neg, HER2 neg via FISH  -CA 27.29 WNL (<5)  -Genetic testing negative for germline mutations    -2022 surgical path = CR after neoadjuvant chemo    Treatment:  3/15/2022 - 2022: Neoadjuvant chemo (based off [id number]) Pembrolizumab 200 mg D1 q 21 days + Carboplatin AUC=5 D1 q21 Days + weekly Paclitaxel 80 mg/m  D1, 8, 15  x 12 Weeks (with Filgrastim 5 mcg/kg subcutaneously once daily on days 16, 17, and 18 of cycles 1 through 4)  -->after cycle 3, developed grade 3 neuropathy and delayed neutropenia (despite using Neupogen). Therefore, chemo held with cycle 4 and only received Pembrolizumab through cycle 6.     2022: bilateral mastectomies with R sLN biopsy and expander placement.  (Elysia Marina + " Gerson). Pathologic CR  -later underwent reconstruction surgery    No adjuvant RT recommended    7/22/22 - 1/12/2023: adjuvant Pembrolizumab x 9 additional cycles      Physical Exam    GENERAL: Alert and oriented to time place and person. Seated comfortably.  Alone.  Intermittently tearful.  HEAD: Atraumatic and normocephalic. No alopecia.  EYES: MELISSA, EOMI. No erythema. No icterus.  BREASTS: Reconstructed. No palpable masses. No chest wall masses.   LYMPH NODES: No axillary adenopathy.   CHEST:  Clear lung sounds bilaterally. RRR  ABD: Soft, non-distended  EXTREMITIES: Restricted shoulder ROM bringing arms overhead. No significant lymphedema noted.   NEURO: No gross deficit noted.      Lab Results    Recent Results (from the past week)   CBC with platelets and differential   Result Value Ref Range    WBC Count 5.7 4.0 - 11.0 10e3/uL    RBC Count 5.04 3.80 - 5.20 10e6/uL    Hemoglobin 13.8 11.7 - 15.7 g/dL    Hematocrit 44.3 35.0 - 47.0 %    MCV 88 78 - 100 fL    MCH 27.4 26.5 - 33.0 pg    MCHC 31.2 (L) 31.5 - 36.5 g/dL    RDW 14.3 10.0 - 15.0 %    Platelet Count 371 150 - 450 10e3/uL    % Neutrophils 56 %    % Lymphocytes 36 %    % Monocytes 6 %    % Eosinophils 2 %    % Basophils 1 %    % Immature Granulocytes 0 %    NRBCs per 100 WBC 0 <1 /100    Absolute Neutrophils 3.2 1.6 - 8.3 10e3/uL    Absolute Lymphocytes 2.1 0.8 - 5.3 10e3/uL    Absolute Monocytes 0.4 0.0 - 1.3 10e3/uL    Absolute Eosinophils 0.1 0.0 - 0.7 10e3/uL    Absolute Basophils 0.0 0.0 - 0.2 10e3/uL    Absolute Immature Granulocytes 0.0 <=0.4 10e3/uL    Absolute NRBCs 0.0 10e3/uL   Extra Green Top (Lithium Heparin) Tube   Result Value Ref Range    Hold Specimen JIC          Imaging    No results found.    Billing  Total time 40 minutes, to include face to face visit, review of EMR, ordering, documentation and coordination of care on date of service.    complexity modifier for longitudinal care.       Signed by: Marilu Vergara NP

## 2024-12-17 DIAGNOSIS — M25.512 CHRONIC PAIN OF BOTH SHOULDERS: ICD-10-CM

## 2024-12-17 DIAGNOSIS — G89.29 CHRONIC PAIN OF BOTH SHOULDERS: ICD-10-CM

## 2024-12-17 DIAGNOSIS — M25.511 CHRONIC PAIN OF BOTH SHOULDERS: ICD-10-CM

## 2024-12-17 DIAGNOSIS — C50.211 MALIGNANT NEOPLASM OF UPPER-INNER QUADRANT OF RIGHT BREAST IN FEMALE, ESTROGEN RECEPTOR NEGATIVE (H): Primary | ICD-10-CM

## 2024-12-17 DIAGNOSIS — I89.0 LYMPHEDEMA: ICD-10-CM

## 2024-12-17 DIAGNOSIS — Z17.1 MALIGNANT NEOPLASM OF UPPER-INNER QUADRANT OF RIGHT BREAST IN FEMALE, ESTROGEN RECEPTOR NEGATIVE (H): Primary | ICD-10-CM

## 2024-12-18 ENCOUNTER — VIRTUAL VISIT (OUTPATIENT)
Dept: FAMILY MEDICINE | Facility: CLINIC | Age: 59
End: 2024-12-18
Payer: COMMERCIAL

## 2024-12-18 ENCOUNTER — PATIENT OUTREACH (OUTPATIENT)
Dept: CARE COORDINATION | Facility: CLINIC | Age: 59
End: 2024-12-18

## 2024-12-18 DIAGNOSIS — G89.29 CHRONIC PAIN IN LEFT SHOULDER: ICD-10-CM

## 2024-12-18 DIAGNOSIS — Z98.890 S/P BREAST RECONSTRUCTION, BILATERAL: Primary | ICD-10-CM

## 2024-12-18 DIAGNOSIS — F41.9 ANXIETY: ICD-10-CM

## 2024-12-18 DIAGNOSIS — M25.511 RIGHT SHOULDER PAIN, UNSPECIFIED CHRONICITY: ICD-10-CM

## 2024-12-18 DIAGNOSIS — B00.1 COLD SORE: ICD-10-CM

## 2024-12-18 DIAGNOSIS — G25.81 RESTLESS LEGS SYNDROME: ICD-10-CM

## 2024-12-18 DIAGNOSIS — E55.9 VITAMIN D DEFICIENCY: ICD-10-CM

## 2024-12-18 DIAGNOSIS — R10.13 ABDOMINAL PAIN, EPIGASTRIC: ICD-10-CM

## 2024-12-18 DIAGNOSIS — G47.00 INSOMNIA, UNSPECIFIED TYPE: ICD-10-CM

## 2024-12-18 DIAGNOSIS — M54.31 SCIATICA OF RIGHT SIDE: ICD-10-CM

## 2024-12-18 DIAGNOSIS — M25.512 CHRONIC PAIN IN LEFT SHOULDER: ICD-10-CM

## 2024-12-18 DIAGNOSIS — M54.41 ACUTE RIGHT-SIDED LOW BACK PAIN WITH RIGHT-SIDED SCIATICA: ICD-10-CM

## 2024-12-18 DIAGNOSIS — E87.6 HYPOKALEMIA: ICD-10-CM

## 2024-12-18 DIAGNOSIS — F41.8 SITUATIONAL ANXIETY: ICD-10-CM

## 2024-12-18 DIAGNOSIS — Z98.84 HISTORY OF ROUX-EN-Y GASTRIC BYPASS: ICD-10-CM

## 2024-12-18 DIAGNOSIS — K21.9 GASTROESOPHAGEAL REFLUX DISEASE WITHOUT ESOPHAGITIS: ICD-10-CM

## 2024-12-18 DIAGNOSIS — F33.1 MODERATE EPISODE OF RECURRENT MAJOR DEPRESSIVE DISORDER (H): ICD-10-CM

## 2024-12-18 DIAGNOSIS — F11.20 OPIOID TYPE DEPENDENCE, CONTINUOUS USE (H): ICD-10-CM

## 2024-12-18 DIAGNOSIS — I89.0 LYMPHEDEMA OF RIGHT ARM: ICD-10-CM

## 2024-12-18 DIAGNOSIS — E53.8 VITAMIN B12 DEFICIENCY (NON ANEMIC): ICD-10-CM

## 2024-12-18 PROCEDURE — 99214 OFFICE O/P EST MOD 30 MIN: CPT | Mod: 95 | Performed by: FAMILY MEDICINE

## 2024-12-18 PROCEDURE — G2211 COMPLEX E/M VISIT ADD ON: HCPCS | Mod: 95 | Performed by: FAMILY MEDICINE

## 2024-12-18 RX ORDER — HYDROCODONE BITARTRATE AND ACETAMINOPHEN 5; 325 MG/1; MG/1
1 TABLET ORAL 2 TIMES DAILY PRN
Qty: 60 TABLET | Refills: 0 | Status: SHIPPED | OUTPATIENT
Start: 2024-12-18

## 2024-12-18 RX ORDER — TRAMADOL HYDROCHLORIDE 50 MG/1
50 TABLET ORAL EVERY 6 HOURS PRN
Qty: 120 TABLET | Refills: 0 | Status: SHIPPED | OUTPATIENT
Start: 2025-02-24 | End: 2025-03-26

## 2024-12-18 RX ORDER — CYANOCOBALAMIN 1000 UG/ML
1 INJECTION, SOLUTION INTRAMUSCULAR; SUBCUTANEOUS
Qty: 3 ML | Refills: 3 | Status: SHIPPED | OUTPATIENT
Start: 2024-12-18

## 2024-12-18 RX ORDER — QUETIAPINE FUMARATE 25 MG/1
50 TABLET, FILM COATED ORAL AT BEDTIME
Qty: 180 TABLET | Refills: 3 | Status: SHIPPED | OUTPATIENT
Start: 2024-12-18

## 2024-12-18 RX ORDER — ROPINIROLE 1 MG/1
1 TABLET, FILM COATED ORAL AT BEDTIME
Qty: 90 TABLET | Refills: 3 | Status: SHIPPED | OUTPATIENT
Start: 2024-12-18

## 2024-12-18 RX ORDER — TRAMADOL HYDROCHLORIDE 50 MG/1
50 TABLET ORAL EVERY 6 HOURS PRN
Qty: 120 TABLET | Refills: 0 | Status: SHIPPED | OUTPATIENT
Start: 2024-12-26 | End: 2025-01-25

## 2024-12-18 RX ORDER — VALACYCLOVIR HYDROCHLORIDE 1 G/1
TABLET, FILM COATED ORAL
Qty: 16 TABLET | Refills: 3 | Status: SHIPPED | OUTPATIENT
Start: 2024-12-18

## 2024-12-18 RX ORDER — CYCLOBENZAPRINE HCL 5 MG
5 TABLET ORAL 2 TIMES DAILY PRN
Qty: 40 TABLET | Refills: 1 | Status: SHIPPED | OUTPATIENT
Start: 2024-12-18

## 2024-12-18 RX ORDER — SUCRALFATE 1 G/1
1 TABLET ORAL 4 TIMES DAILY PRN
Qty: 30 TABLET | Refills: 3 | Status: SHIPPED | OUTPATIENT
Start: 2024-12-18

## 2024-12-18 RX ORDER — LORAZEPAM 0.5 MG/1
.5-1 TABLET ORAL 2 TIMES DAILY PRN
Qty: 60 TABLET | Refills: 3 | Status: SHIPPED | OUTPATIENT
Start: 2024-12-18

## 2024-12-18 RX ORDER — BUPROPION HYDROCHLORIDE 150 MG/1
150 TABLET ORAL EVERY MORNING
Qty: 90 TABLET | Refills: 3 | Status: SHIPPED | OUTPATIENT
Start: 2024-12-18

## 2024-12-18 RX ORDER — CHOLECALCIFEROL (VITAMIN D3) 50 MCG
1 TABLET ORAL DAILY
Qty: 90 TABLET | Refills: 3 | Status: SHIPPED | OUTPATIENT
Start: 2024-12-18

## 2024-12-18 RX ORDER — TRAMADOL HYDROCHLORIDE 50 MG/1
50 TABLET ORAL EVERY 6 HOURS PRN
Qty: 120 TABLET | Refills: 0 | Status: SHIPPED | OUTPATIENT
Start: 2025-01-25 | End: 2025-02-24

## 2024-12-18 RX ORDER — NEEDLES, SAFETY 18GX1 1/2"
1 NEEDLE, DISPOSABLE MISCELLANEOUS
Qty: 12 EACH | Refills: 0 | Status: SHIPPED | OUTPATIENT
Start: 2024-12-18

## 2024-12-18 ASSESSMENT — PATIENT HEALTH QUESTIONNAIRE - PHQ9: SUM OF ALL RESPONSES TO PHQ QUESTIONS 1-9: 16

## 2024-12-18 NOTE — PROGRESS NOTES
Aide is a 59 year old who is being evaluated via a billable video visit.    What phone number would you like to be contacted at? [phone]  How would you like to obtain your AVS? MyChart      Assessment & Plan     Vitamin D deficiency  - Vitamin D3 50 mcg (2000 units) tablet; Take 1 tablet (50 mcg) by mouth daily.    Cold sore  Refill to use as needed.  - valACYclovir (VALTREX) 1000 mg tablet; TAKE 1 TABLET(1000 MG) BY MOUTH TWICE DAILY. REPEAT AS NEEDED FOR COLD SORE    Sciatica of right side  Stable, refill  - traMADol (ULTRAM) 50 MG tablet; Take 1 tablet (50 mg) by mouth every 6 hours as needed for severe pain.  - HYDROcodone-acetaminophen (NORCO) 5-325 MG tablet; Take 1 tablet by mouth 2 times daily as needed for severe pain.  - HYDROcodone-acetaminophen (NORCO) 5-325 MG tablet; Take 1 tablet by mouth 2 times daily as needed for severe pain.  - HYDROcodone-acetaminophen (NORCO) 5-325 MG tablet; Take 1 tablet by mouth 2 times daily as needed for severe pain.  - traMADol (ULTRAM) 50 MG tablet; Take 1 tablet (50 mg) by mouth every 6 hours as needed for severe pain.  - traMADol (ULTRAM) 50 MG tablet; Take 1 tablet (50 mg) by mouth every 6 hours as needed for severe pain.    Opioid type dependence, continuous use (H)  Due to pain, now bilateral shoulder causing pain too, going to see lymphedema and ortho.  - traMADol (ULTRAM) 50 MG tablet; Take 1 tablet (50 mg) by mouth every 6 hours as needed for severe pain.  - HYDROcodone-acetaminophen (NORCO) 5-325 MG tablet; Take 1 tablet by mouth 2 times daily as needed for severe pain.  - HYDROcodone-acetaminophen (NORCO) 5-325 MG tablet; Take 1 tablet by mouth 2 times daily as needed for severe pain.  - HYDROcodone-acetaminophen (NORCO) 5-325 MG tablet; Take 1 tablet by mouth 2 times daily as needed for severe pain.  - traMADol (ULTRAM) 50 MG tablet; Take 1 tablet (50 mg) by mouth every 6 hours as needed for severe pain.  - traMADol (ULTRAM) 50 MG tablet; Take 1 tablet (50  "mg) by mouth every 6 hours as needed for severe pain.    Abdominal pain, epigastric  Stable, refill to use as needed  - sucralfate (CARAFATE) 1 GM tablet; Take 1 tablet (1 g) by mouth 4 times daily as needed (Abdominal discomfort).    Gastroesophageal reflux disease without esophagitis  Stable, refill to use as needed  - sucralfate (CARAFATE) 1 GM tablet; Take 1 tablet (1 g) by mouth 4 times daily as needed (Abdominal discomfort).    Restless legs syndrome  Stable, discussed antihistamines like nighttime cold medications can worsen.  Ok to try me a little earlier so it is effective by bedtime too.  - rOPINIRole (REQUIP) 1 MG tablet; Take 1 tablet (1 mg) by mouth at bedtime. Take 2-3 hours prior to bedtime. If needed ok to take additional pill 2 hours after bedtime.    Insomnia, unspecified type  Stable, refill  - QUEtiapine (SEROQUEL) 25 MG tablet; Take 2 tablets (50 mg) by mouth at bedtime.    Hypokalemia      Situational anxiety  Stable, working to wean this medication down too.  - LORazepam (ATIVAN) 0.5 MG tablet; Take 1-2 tablets (0.5-1 mg) by mouth 2 times daily as needed for anxiety.    Anxiety  Stable, refill  - buPROPion (WELLBUTRIN XL) 150 MG 24 hr tablet; Take 1 tablet (150 mg) by mouth every morning.    Moderate episode of recurrent major depressive disorder (H)  Stable, refill  - buPROPion (WELLBUTRIN XL) 150 MG 24 hr tablet; Take 1 tablet (150 mg) by mouth every morning.    Vitamin B12 deficiency (non anemic)  - cyanocobalamin (CYANOCOBALAMIN) 1000 mcg/mL injection; Inject 1 mL (1,000 mcg) into the muscle every 30 days.  - Needle, Disp, (BD ECLIPSE NEEDLE) 25G X 5/8\" MISC; 1 Syringe every 30 days. And 1mL syringe To inject B12.    Acute right-sided low back pain with right-sided sciatica  Stable, refill  - cyclobenzaprine (FLEXERIL) 5 MG tablet; Take 1 tablet (5 mg) by mouth 2 times daily as needed for muscle spasms.    S/P breast reconstruction, bilateral  Has referral for lymphedema    Lymphedema of " right arm  Has referral for lymphedema    Right shoulder pain, unspecified chronicity  Has referral to ortho    Chronic pain in left shoulder  Has referral to ortho    History of Orestes-en-Y gastric bypass  - Vitamin D3 50 mcg (2000 units) tablet; Take 1 tablet (50 mcg) by mouth daily.    The longitudinal plan of care for the diagnosis(es)/condition(s) as documented were addressed during this visit. Due to the added complexity in care, I will continue to support Aide in the subsequent management and with ongoing continuity of care.      Depression Screening Follow Up        12/18/2024     1:21 PM   PHQ   PHQ-9 Total Score 16   Q9: Thoughts of better off dead/self-harm past 2 weeks Not at all           Follow Up Actions Taken  Crisis resource information provided in After Visit Summary       See Patient Instructions    Stephie Noble is a 59 year old, presenting for the following health issues:  Pain (Shoulders, neck and back pain.)      12/18/2024     1:19 PM   Additional Questions   Roomed by Dee Maldonado   Accompanied by self         12/18/2024     1:19 PM   Patient Reported Additional Medications   Patient reports taking the following new medications oncology dr said to start taking fosamax again.     Video Start Time: 5:28 PM    Pain  This is a chronic problem. The current episode started more than 1 year ago. The problem occurs constantly. The problem has been unchanged. The symptoms are aggravated by bending, standing, twisting, walking and stress. She has tried acetaminophen, heat and NSAIDs for the symptoms. The treatment provided mild relief.   History of Present Illness       Reason for visit:  Chronic back, shoulder, neck pain   She is taking medications regularly.   Physical therapy: working on shoulders which are a little more painful.   Some fullness in the right armpit.  Trouble getting dress    Last fill 12/13/24 norco #60 takes one in morning and even 8-9pm  12/5/24 lorazepam 0.5mg #60  Tramadol 50mg  #120 11/26/24 during day taking 1-2 pills at a time, usually one pill about every 4 hours.              Objective    Vitals - Patient Reported  Pain Score: Severe Pain (7)  Pain Loc: Mid Back        Physical Exam   GENERAL: alert and no distress  EYES: Eyes grossly normal to inspection.  No discharge or erythema, or obvious scleral/conjunctival abnormalities.  RESP: No audible wheeze, cough, or visible cyanosis.    SKIN: Visible skin clear. No significant rash, abnormal pigmentation or lesions.  NEURO: Cranial nerves grossly intact.  Mentation and speech appropriate for age.  PSYCH: Appropriate affect, tone, and pace of words        Video-Visit Details    Type of service:  Video Visit   Video End Time:5:58 PM  Originating Location (pt. Location): Home    Distant Location (provider location):  On-site  Platform used for Video Visit: Enrique  Signed Electronically by: Quoc Chu MD

## 2024-12-30 ENCOUNTER — THERAPY VISIT (OUTPATIENT)
Dept: PHYSICAL THERAPY | Facility: CLINIC | Age: 59
End: 2024-12-30
Attending: STUDENT IN AN ORGANIZED HEALTH CARE EDUCATION/TRAINING PROGRAM
Payer: COMMERCIAL

## 2024-12-30 DIAGNOSIS — R53.81 PHYSICAL DECONDITIONING: Primary | ICD-10-CM

## 2024-12-30 PROCEDURE — 97110 THERAPEUTIC EXERCISES: CPT | Mod: GP | Performed by: PHYSICAL THERAPIST

## 2024-12-30 NOTE — PROGRESS NOTES
12/30/24 0500   Appointment Info   Signing clinician's name / credentials Sofya Dickinson, PT   Visits Used 12   Medical Diagnosis R breast cancer, Physical deconditioning, neuropathy, R shoulder pain   PT Tx Diagnosis fatigue/ deconditioned/ limited shoulder motion   Progress Note/Certification   Start of Care Date 08/02/24   Onset of illness/injury or Date of Surgery 06/21/24   Therapy Frequency 1X/week   Predicted Duration 10 weeks   Certification date from 12/20/24   Certification date to 02/28/25   Progress Note Due Date 02/28/25   Progress Note Completed Date 12/30/24   [facility] Authorization Information   Condition type Chronic (continuous duration <3 months)   Cause of current episode Post Surgical  (cancer related)   Type of surgery 6- Other (specify in comments)  (double mastectomy and reconstruction)   Nature of treatment Rehabilitative   Functional ability Severe functional limitations   Documented POC (choose all that apply) Measurable short and long term/discharge treatment goals related to physical and functional deficits.;Frequency of treatment visits and treatment activities to address deficit areas.;Patient agrees to program participation including home program   Briefly describe symptoms ongoing limitations w/ shoulder mobility affecting daily tasks. Pt notes ongoing fatigue   How did the symptoms start due to surgery   Last 24H: avg pain/symptom intensity  8/10   Past wk: avg pain/symptom intensity 8/10   Frequency of Symptoms Constantly (% of the time)   Symptom impact on ADLs Quite a bit   Condition change since eval A little better   General health reported by patient Good   GOALS   PT Goals 2;3;4;5   PT Goal 1   Goal Identifier 1.   Goal Description Pt will have decreased fatigue noted by FACIT score of 15/52 and get dressed daily   Goal Progress 12/30/24 previously had this met but has recently declined   Target Date 01/31/25   PT Goal 2   Goal Identifier 2.   Goal Description Pt will  have improved AROM to 130* flex for improved tolerance for reaching w/ ADL's   Goal Progress 10/16/24 ongoing goal.  12/30/24 making gradual gains   Target Date 02/28/25   PT Goal 3   Goal Identifier 3.   Goal Description Pt will have improved FACIT score to 30/52   Goal Progress 10/16/24 ongoing goal.  12/30/24 ongoing goal   Target Date 02/28/25   PT Goal 4   Goal Identifier 4.   Goal Description Pt will have improved strength in order to carry her laundry basket w/ minimal difficulty (average loads)   Goal Progress 10/16/24 pt is not carrying laundry basket just dumps it over the side.   Target Date 02/28/25   PT Goal 5   Goal Identifier 5.   Goal Description Pt will be independent and consistent w/HEP to manage symptoms   Goal Progress 12/30/24 semiconsistent w/ current program   Target Date 02/28/25   Subjective Report   Subjective Report Pt in tears frustrated w/ how limited she remains.  Pt got order to see ortho regarding the shoulder tightness.Pt notes she overdid for the holidays and is more sore today.  Pt notes she also needs to talk to psychologist,  her meds for pain control.  pt reports she is down w/ holidays, felt they were a chore also dealing w/ 10th anniversary of spouses death.  Pt notes clicking in L shoulder.  Pt has ongoing ROM limitations.   Pt notes washing her hair is getting easier.   Objective Measures    vitals before ex: O2 100%, HR 72,  /90    shoulder AROM flex R 122*,  L 126*,  scaption R 105*, L 110*,  ER R 55*, L 60*,  IR R to T9,  L to T10.    Shoulder AAROM flex R 140*, L  155*, Scaption R 140*, L 160*  ER R 80* in 40* ABD, L 80*;  IR R 70* in 40* ABD, L 75    FACIT 4   Treatment Interventions (PT)   Interventions Therapeutic Procedure/Exercise;Self Care/Home Management;Manual Therapy   Therapeutic Procedure/Exercise   Ther Proc 1 - Details Scifit seat 5 (window) L 2 X 2 min (fwd).  LE's:  L 3.5 X 8  (mild SOB at the end but no fatigue).  pulley flex/ scaption X 10  each B. Wall flex X 5 B .  AAROM B shoulder X 4 directions X 5 each.  Supine shoulder flex X 10 B.  Sit to stand from chair w/o UE assist holding X 15   Skilled Intervention ex to improve endurance, motion, strength to improve daily function   Patient Response/Progress HR range : 60%= 96,  70% = 112 bpm.  Primarily worked on UE ROM   Manual Therapy   Manual Therapy 1 - Details R pec STM w/ AAROM, rib rocking   Skilled Intervention to Improve mobility   Patient Response/Progress pec tightness noted   Education   Learner/Method Patient;Listening;Reading;Demonstration;Pictures/Video;No Barriers to Learning   Plan   Home program per PTRX; pin to phone   Plan for next session cont to work on B shoulder pain/ stiffness,  conditoning and overall strengthening.   Comments   Comments Pt to schedule lymphedema and ortho appts.  Pt cont to work towards goals           [facility]                                                                                   OUTPATIENT PHYSICAL THERAPY    PLAN OF TREATMENT FOR OUTPATIENT REHABILITATION   Patient's Last Name, First Name, Diana Crane YOB: 1965   Provider's Name   [facility]   Medical Record No.  [id number]     Onset Date: 06/21/24  Start of Care Date: 08/02/24     Medical Diagnosis:  R breast cancer, Physical deconditioning, neuropathy, R shoulder pain      PT Treatment Diagnosis:  fatigue/ deconditioned/ limited shoulder motion Plan of Treatment  Frequency/Duration: 1X/week/ 10 weeks    Certification date from 12/20/24 to 02/28/25         See note for plan of treatment details and functional goals     Sofya Dickinson, PT                         I CERTIFY THE NEED FOR THESE SERVICES FURNISHED UNDER        THIS PLAN OF TREATMENT AND WHILE UNDER MY CARE     (Physician attestation of this document indicates review and certification of the therapy plan).              Referring Provider:  Linsey  Salvador Verduzco    Initial Assessment  See Epic Evaluation- Start of Care Date: 08/02/24         PLAN  Continue therapy per current plan of care.    Beginning/End Dates of Progress Note Reporting Period:  10/16/24 to 12/30/2024    Referring Provider:  Linsey Verduzco
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
## 2025-01-06 ENCOUNTER — OFFICE VISIT (OUTPATIENT)
Dept: ORTHOPEDICS | Facility: CLINIC | Age: 60
End: 2025-01-06
Payer: COMMERCIAL

## 2025-01-06 ENCOUNTER — ANCILLARY PROCEDURE (OUTPATIENT)
Dept: GENERAL RADIOLOGY | Facility: CLINIC | Age: 60
End: 2025-01-06
Attending: STUDENT IN AN ORGANIZED HEALTH CARE EDUCATION/TRAINING PROGRAM
Payer: COMMERCIAL

## 2025-01-06 DIAGNOSIS — M25.512 CHRONIC PAIN OF BOTH SHOULDERS: Primary | ICD-10-CM

## 2025-01-06 DIAGNOSIS — M25.511 CHRONIC PAIN OF BOTH SHOULDERS: Primary | ICD-10-CM

## 2025-01-06 DIAGNOSIS — M25.512 CHRONIC PAIN OF BOTH SHOULDERS: ICD-10-CM

## 2025-01-06 DIAGNOSIS — M25.511 CHRONIC PAIN OF BOTH SHOULDERS: ICD-10-CM

## 2025-01-06 DIAGNOSIS — G89.29 CHRONIC PAIN OF BOTH SHOULDERS: Primary | ICD-10-CM

## 2025-01-06 DIAGNOSIS — G89.29 CHRONIC PAIN OF BOTH SHOULDERS: ICD-10-CM

## 2025-01-06 PROCEDURE — 73030 X-RAY EXAM OF SHOULDER: CPT | Mod: TC | Performed by: RADIOLOGY

## 2025-01-06 PROCEDURE — 99214 OFFICE O/P EST MOD 30 MIN: CPT | Performed by: STUDENT IN AN ORGANIZED HEALTH CARE EDUCATION/TRAINING PROGRAM

## 2025-01-06 NOTE — PATIENT INSTRUCTIONS
# Bilateral shoulder pain: Diana Salvador  was seen today for bilateral shoulder pain. Symptoms had been going on for 2 years. On examination there are positive findings of pain with all passive and active ranges of motion and limited range of motion with flexion/ abduction. Imaging findings showed moderate glenohumeral osteoarthritis on the right, mild on the left.     Likely cause of patient's condition due to osteoarthritis with rotator cuff/ biceps tendinopathy secondarily. Has restricted forward flexion, so may be a developing frozen shoulder, but external rotation is not significantly limited today. Also has history of cervical radiculopathy which may be contributing to symptom, but given degree of mechanical symptoms on shoulder exam today, this seems to be the predominant issue for her shoulder pain. Counseled patient on nature of condition and treatment options.    - Activity: activity as tolerated and continue OT/ PT  - Ice, heat, massage as needed  - Medications:      - diclofenac (voltaren) gel three times daily for 1-2 weeks.      - Okay to take tylenol 1000mg three times daily as needed in addition to these.  - Injections: patient would like to proceed with bilateral GH cortisone injections but would like to do this another day, will call us to schedule after looking at calendar    Follow-up: at your convenience for bilateral GH cortisone injections    Please call [phone] and ask for my team if you have any questions or concerns.

## 2025-01-08 ENCOUNTER — THERAPY VISIT (OUTPATIENT)
Dept: PHYSICAL THERAPY | Facility: CLINIC | Age: 60
End: 2025-01-08
Attending: STUDENT IN AN ORGANIZED HEALTH CARE EDUCATION/TRAINING PROGRAM
Payer: COMMERCIAL

## 2025-01-08 DIAGNOSIS — R53.81 PHYSICAL DECONDITIONING: Primary | ICD-10-CM

## 2025-01-08 PROCEDURE — 97110 THERAPEUTIC EXERCISES: CPT | Mod: GP | Performed by: PHYSICAL THERAPIST

## 2025-01-15 ENCOUNTER — THERAPY VISIT (OUTPATIENT)
Dept: PHYSICAL THERAPY | Facility: CLINIC | Age: 60
End: 2025-01-15
Attending: STUDENT IN AN ORGANIZED HEALTH CARE EDUCATION/TRAINING PROGRAM
Payer: COMMERCIAL

## 2025-01-15 ENCOUNTER — TELEPHONE (OUTPATIENT)
Dept: FAMILY MEDICINE | Facility: CLINIC | Age: 60
End: 2025-01-15
Payer: COMMERCIAL

## 2025-01-15 DIAGNOSIS — R53.81 PHYSICAL DECONDITIONING: Primary | ICD-10-CM

## 2025-01-15 PROCEDURE — 97110 THERAPEUTIC EXERCISES: CPT | Mod: GP | Performed by: PHYSICAL THERAPIST

## 2025-01-15 NOTE — TELEPHONE ENCOUNTER
Patient Quality Outreach    Patient is due for the following:   Colon Cancer Screening  Physical Annual Wellness Visit    Action(s) Taken:   Schedule a Adult Preventative    Type of outreach:    Sent [employer] message.    Questions for provider review:    None           Stacy Chavez

## 2025-01-17 NOTE — PROGRESS NOTES
ASSESSMENT & PLAN    Aide was seen today for pain and pain.    Diagnoses and all orders for this visit:    Osteoarthritis of bilateral glenohumeral joints  -     Large Joint Injection/Arthocentesis: bilateral glenohumeral    Chronic pain of both shoulders        Diana Salvador was seen today in request for a bilateral GH cortisone injection for previously diagnosed OA, possibly early frozen shoulder.    Patient was independently assessed by me and was deemed appropriate for this procedure. Risks and benefits were discussed with good understanding including, but not limited to, the risks of bleeding, reaction to the medication, infection, potential delay of any surgical intervention by 3 months, temporary elevation of blood sugars, worsening or rapid acceleration of arthritis, and the possibility of the treatment being ineffective. The patient tolerated the procedure well with no complications.    After visit care instructions were discussed in person and provided in AVS.    Follow-up:   - Could repeat injection after 3-4 months if injection was helpful but pain returns  - if no or minimal relief after 4 weeks, let me know and we could consider a different injection or an MRI     Barry Meek MD  [facility] [place]    SUBJECTIVE- January 17, 2025    Chief Complaint   Patient presents with    Right Shoulder - Pain    Left Shoulder - Pain       Diana Salvador is a 59 year old female who is seen for bilateral GH cortisone injections.    Referred by: Barry Meek MD  Previous Diagnosis: Chronic pain of both shoulders    Previous injections: none for shoulders  Other treatments tried: physical therapy (many visits), home exercises, diclofenac gel      REVIEW OF SYSTEMS:  Negative other than the symptoms noted above in the HPI.      OBJECTIVE:  LMP  (LMP Unknown)    General: healthy, alert and in no distress  Skin: no suspicious lesions or rash noted near area of injection   CV:  distal perfusion intact near area of injection  Neuro: Normal light sensory exam near area of injection    RADIOLOGY:  I independently reviewed and agree with the final results and radiologist's interpretation from the imaging relevant to today's visit, available in the [facility] health record.         Hemoglobin A1C   Date Value Ref Range Status   09/25/2024 6.0 (H) 0.0 - 5.6 % Final     Comment:     Normal <5.7%   Prediabetes 5.7-6.4%    Diabetes 6.5% or higher     Note: Adopted from ADA consensus guidelines.     Patient on blood thinners: No    Large Joint Injection/Arthocentesis: bilateral glenohumeral    Date/Time: 1/20/2025 9:12 AM    Performed by: Barry Meek MD  Authorized by: Barry Meek MD    Indications:  Pain and osteoarthritis  Needle Size:  21 G  Guidance: ultrasound    Approach:  Posterior  Location:  Shoulder  Laterality:  Bilateral      Site:  Bilateral glenohumeral  Medications (Right):  6 mg betamethasone acet & sod phos 6 (3-3) MG/ML; 4 mL ROPivacaine 5 MG/ML  Medications (Left):  6 mg betamethasone acet & sod phos 6 (3-3) MG/ML; 4 mL ROPivacaine 5 MG/ML  Outcome:  Tolerated well, no immediate complications  Procedure discussed: discussed risks, benefits, and alternatives    Consent Given by:  Patient  Timeout: timeout called immediately prior to procedure    Prep: patient was prepped and draped in usual sterile fashion     Patient tolerated bilateral GH cortisone injections. Ultrasound guidance was required to ensure correct needle placement for injection and avoid vital surrounding structures. Ultrasound guided images were permanently stored. Aftercare instructions given to patient. Plan to follow-up as above.     Barry Meek MD

## 2025-01-20 ENCOUNTER — OFFICE VISIT (OUTPATIENT)
Dept: ORTHOPEDICS | Facility: CLINIC | Age: 60
End: 2025-01-20
Payer: COMMERCIAL

## 2025-01-20 DIAGNOSIS — M19.012 OSTEOARTHRITIS OF BILATERAL GLENOHUMERAL JOINTS: Primary | ICD-10-CM

## 2025-01-20 DIAGNOSIS — M19.011 OSTEOARTHRITIS OF BILATERAL GLENOHUMERAL JOINTS: Primary | ICD-10-CM

## 2025-01-20 DIAGNOSIS — M25.511 CHRONIC PAIN OF BOTH SHOULDERS: ICD-10-CM

## 2025-01-20 DIAGNOSIS — G89.29 CHRONIC PAIN OF BOTH SHOULDERS: ICD-10-CM

## 2025-01-20 DIAGNOSIS — M25.512 CHRONIC PAIN OF BOTH SHOULDERS: ICD-10-CM

## 2025-01-20 PROCEDURE — 99207 PR DROP WITH A PROCEDURE: CPT | Performed by: STUDENT IN AN ORGANIZED HEALTH CARE EDUCATION/TRAINING PROGRAM

## 2025-01-20 PROCEDURE — 20611 DRAIN/INJ JOINT/BURSA W/US: CPT | Mod: 50 | Performed by: STUDENT IN AN ORGANIZED HEALTH CARE EDUCATION/TRAINING PROGRAM

## 2025-01-20 RX ORDER — ROPIVACAINE HYDROCHLORIDE 5 MG/ML
4 INJECTION, SOLUTION EPIDURAL; INFILTRATION; PERINEURAL
Status: COMPLETED | OUTPATIENT
Start: 2025-01-20 | End: 2025-01-20

## 2025-01-20 RX ORDER — BETAMETHASONE SODIUM PHOSPHATE AND BETAMETHASONE ACETATE 3; 3 MG/ML; MG/ML
6 INJECTION, SUSPENSION INTRA-ARTICULAR; INTRALESIONAL; INTRAMUSCULAR; SOFT TISSUE
Status: COMPLETED | OUTPATIENT
Start: 2025-01-20 | End: 2025-01-20

## 2025-01-20 RX ADMIN — BETAMETHASONE SODIUM PHOSPHATE AND BETAMETHASONE ACETATE 6 MG: 3; 3 INJECTION, SUSPENSION INTRA-ARTICULAR; INTRALESIONAL; INTRAMUSCULAR; SOFT TISSUE at 09:12

## 2025-01-20 RX ADMIN — ROPIVACAINE HYDROCHLORIDE 4 ML: 5 INJECTION, SOLUTION EPIDURAL; INFILTRATION; PERINEURAL at 09:12

## 2025-01-20 NOTE — PATIENT INSTRUCTIONS
[facility] Injection Discharge Instructions    Procedure: bilateral glenohumeral (shoulder joint) cortisone injections    You may shower, however avoid swimming, tub baths or hot tubs for 24 hours following your procedure  You may have a mild to moderate increase in pain for several days following the injection.  It may take up to 14 days for the steroid medication to start working although you may feel the effect as early as a few days after the procedure.  You may use ice packs for 10-15 minutes, 3 to 4 times a day at the injection site for comfort  You may use anti-inflammatory medications (such as Ibuprofen or Aleve or Advil) or Tylenol for pain control if necessary and allowed to by your regular doctor  If you were fasting, you may resume your normal diet and medications after the procedure  If you have diabetes, check your blood sugar more frequently than usual as your blood sugar may be higher than normal for 10-14 days following a steroid injection. Contact your doctor who manages your diabetes if your blood sugar is higher than usual    If you experience any of the following, call [facility] @ [phone] or [phone]  - Fever over 100 degree F  - Swelling, bleeding, redness, drainage, warmth at the injection site  - New or worsening pain    Follow-up:   - Could repeat injection after 3-4 months if injection was helpful but pain returns  - if no or minimal relief after 4 weeks, let me know and we could consider a different injection or an MRI

## 2025-01-20 NOTE — LETTER
1/20/2025      Diana Salvador  [address] [place] [id number]      Dear Colleague,    Thank you for referring your patient, Diana Salvador, to the [facility]. Please see a copy of my visit note below.    ASSESSMENT & PLAN    Aide was seen today for pain and pain.    Diagnoses and all orders for this visit:    Osteoarthritis of bilateral glenohumeral joints  -     Large Joint Injection/Arthocentesis: bilateral glenohumeral    Chronic pain of both shoulders        Diana Salvador was seen today in request for a bilateral GH cortisone injection for previously diagnosed OA, possibly early frozen shoulder.    Patient was independently assessed by me and was deemed appropriate for this procedure. Risks and benefits were discussed with good understanding including, but not limited to, the risks of bleeding, reaction to the medication, infection, potential delay of any surgical intervention by 3 months, temporary elevation of blood sugars, worsening or rapid acceleration of arthritis, and the possibility of the treatment being ineffective. The patient tolerated the procedure well with no complications.    After visit care instructions were discussed in person and provided in AVS.    Follow-up:   - Could repeat injection after 3-4 months if injection was helpful but pain returns  - if no or minimal relief after 4 weeks, let me know and we could consider a different injection or an MRI     Barry Meek MD  [facility]    SUBJECTIVE- January 17, 2025    Chief Complaint   Patient presents with     Right Shoulder - Pain     Left Shoulder - Pain       Diana Salvador is a 59 year old female who is seen for bilateral GH cortisone injections.    Referred by: Barry Meek MD  Previous Diagnosis: Chronic pain of both shoulders    Previous injections: none for shoulders  Other treatments tried: physical therapy (many visits), home exercises,  diclofenac gel      REVIEW OF SYSTEMS:  Negative other than the symptoms noted above in the HPI.      OBJECTIVE:  LMP  (LMP Unknown)    General: healthy, alert and in no distress  Skin: no suspicious lesions or rash noted near area of injection   CV: distal perfusion intact near area of injection  Neuro: Normal light sensory exam near area of injection    RADIOLOGY:  I independently reviewed and agree with the final results and radiologist's interpretation from the imaging relevant to today's visit, available in the [facility] health record.         Hemoglobin A1C   Date Value Ref Range Status   09/25/2024 6.0 (H) 0.0 - 5.6 % Final     Comment:     Normal <5.7%   Prediabetes 5.7-6.4%    Diabetes 6.5% or higher     Note: Adopted from ADA consensus guidelines.     Patient on blood thinners: No    Large Joint Injection/Arthocentesis: bilateral glenohumeral    Date/Time: 1/20/2025 9:12 AM    Performed by: Barry Meek MD  Authorized by: Barry Meek MD    Indications:  Pain and osteoarthritis  Needle Size:  21 G  Guidance: ultrasound    Approach:  Posterior  Location:  Shoulder  Laterality:  Bilateral      Site:  Bilateral glenohumeral  Medications (Right):  6 mg betamethasone acet & sod phos 6 (3-3) MG/ML; 4 mL ROPivacaine 5 MG/ML  Medications (Left):  6 mg betamethasone acet & sod phos 6 (3-3) MG/ML; 4 mL ROPivacaine 5 MG/ML  Outcome:  Tolerated well, no immediate complications  Procedure discussed: discussed risks, benefits, and alternatives    Consent Given by:  Patient  Timeout: timeout called immediately prior to procedure    Prep: patient was prepped and draped in usual sterile fashion     Patient tolerated bilateral GH cortisone injections. Ultrasound guidance was required to ensure correct needle placement for injection and avoid vital surrounding structures. Ultrasound guided images were permanently stored. Aftercare instructions given to patient. Plan to follow-up as above.     Barry Meek  MD            Again, thank you for allowing me to participate in the care of your patient.        Sincerely,        Barry Meek MD    Electronically signed

## 2025-02-23 DIAGNOSIS — M54.41 ACUTE RIGHT-SIDED LOW BACK PAIN WITH RIGHT-SIDED SCIATICA: ICD-10-CM

## 2025-02-24 RX ORDER — CYCLOBENZAPRINE HCL 5 MG
5 TABLET ORAL 2 TIMES DAILY PRN
Qty: 40 TABLET | Refills: 1 | Status: SHIPPED | OUTPATIENT
Start: 2025-02-24

## 2025-03-05 ENCOUNTER — HOSPITAL ENCOUNTER (OUTPATIENT)
Dept: MRI IMAGING | Facility: CLINIC | Age: 60
Discharge: HOME | End: 2025-03-05
Attending: PEDIATRICS
Payer: COMMERCIAL

## 2025-03-05 DIAGNOSIS — M25.512 CHRONIC PAIN OF BOTH SHOULDERS: ICD-10-CM

## 2025-03-05 DIAGNOSIS — G89.29 CHRONIC PAIN OF BOTH SHOULDERS: ICD-10-CM

## 2025-03-05 DIAGNOSIS — M25.511 CHRONIC PAIN OF BOTH SHOULDERS: ICD-10-CM

## 2025-03-05 PROCEDURE — 73221 MRI JOINT UPR EXTREM W/O DYE: CPT | Mod: LT

## 2025-03-05 PROCEDURE — 73221 MRI JOINT UPR EXTREM W/O DYE: CPT | Mod: 26 | Performed by: RADIOLOGY

## 2025-03-10 ENCOUNTER — THERAPY VISIT (OUTPATIENT)
Dept: PHYSICAL THERAPY | Facility: CLINIC | Age: 60
End: 2025-03-10
Attending: STUDENT IN AN ORGANIZED HEALTH CARE EDUCATION/TRAINING PROGRAM
Payer: COMMERCIAL

## 2025-03-10 DIAGNOSIS — R53.81 PHYSICAL DECONDITIONING: Primary | ICD-10-CM

## 2025-03-10 PROCEDURE — 97110 THERAPEUTIC EXERCISES: CPT | Mod: GP | Performed by: PHYSICAL THERAPIST
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
## 2025-03-17 ENCOUNTER — THERAPY VISIT (OUTPATIENT)
Dept: PHYSICAL THERAPY | Facility: CLINIC | Age: 60
End: 2025-03-17
Attending: STUDENT IN AN ORGANIZED HEALTH CARE EDUCATION/TRAINING PROGRAM
Payer: COMMERCIAL

## 2025-03-17 DIAGNOSIS — R53.81 PHYSICAL DECONDITIONING: Primary | ICD-10-CM

## 2025-03-17 PROCEDURE — 97110 THERAPEUTIC EXERCISES: CPT | Mod: GP | Performed by: PHYSICAL THERAPIST

## 2025-03-20 ENCOUNTER — TELEPHONE (OUTPATIENT)
Dept: ONCOLOGY | Facility: CLINIC | Age: 60
End: 2025-03-20
Payer: COMMERCIAL

## 2025-03-20 ENCOUNTER — MYC REFILL (OUTPATIENT)
Dept: FAMILY MEDICINE | Facility: CLINIC | Age: 60
End: 2025-03-20
Payer: COMMERCIAL

## 2025-03-20 DIAGNOSIS — M54.31 SCIATICA OF RIGHT SIDE: ICD-10-CM

## 2025-03-20 DIAGNOSIS — F11.20 OPIOID TYPE DEPENDENCE, CONTINUOUS USE (H): ICD-10-CM

## 2025-03-20 NOTE — PROGRESS NOTES
CLINICAL NUTRITION SERVICES- ONCOLOGY DISTRESS SCREENING     Identified Concern and Score From Distress Screenin. How concerned are you about your ability to eat? :  5  2. How concerned are you about unintended weight loss or your current weight? : 8     Date of Distress Screening: 3/14     Findings: Phone call with Aide. Reports that her biggest difficulty for nutrition is cooking, doesn't like to cook for only 1 person and also eats smaller portions due to history of gastric bypass. Currently eats frozen meals ([employer]) and also makes protein shakes.      Intervention: Discussed current intake, meals and snacks that minimize time and space to preapre, and frozen foods.      Follow-up Required: As needed.     Marjorie Rios RD, LD  [phone]

## 2025-03-22 DIAGNOSIS — F11.20 OPIOID TYPE DEPENDENCE, CONTINUOUS USE (H): ICD-10-CM

## 2025-03-22 DIAGNOSIS — M54.31 SCIATICA OF RIGHT SIDE: ICD-10-CM

## 2025-03-24 ENCOUNTER — PATIENT OUTREACH (OUTPATIENT)
Dept: CARE COORDINATION | Facility: CLINIC | Age: 60
End: 2025-03-24
Payer: COMMERCIAL

## 2025-03-24 RX ORDER — TRAMADOL HYDROCHLORIDE 50 MG/1
50 TABLET ORAL EVERY 6 HOURS PRN
Qty: 120 TABLET | Refills: 0 | Status: SHIPPED | OUTPATIENT
Start: 2025-03-24

## 2025-03-24 RX ORDER — TRAMADOL HYDROCHLORIDE 50 MG/1
TABLET ORAL
Qty: 120 TABLET | Refills: 0 | OUTPATIENT
Start: 2025-03-24

## 2025-03-24 NOTE — PROGRESS NOTES
Social Work - Distress Screen Intervention  [facility]    Identified Concern and Score from Distress Screenin. How concerned are you about your ability to eat? 5     2. How concerned are you about unintended weight loss or your current weight? (!) 8     3. How concerned are you about feeling depressed or very sad?  (!) 9     4. How concerned are you about feeling anxious or very scared?  (!) 8     5. Do you struggle with the loss of meaning and khadra in your life?  (!) A great deal     6. How concerned are you about work and home life issues that may be affected by your cancer?  0     7. How concerned are you about knowing what resources are available to help you?  2     8. Do you currently have what you would describe as Sikhism or spiritual struggles? Not at all     9. If you want to be contacted by one of our professionals, I can send a message to them right now.  No data recorded     Date of Distress Screen: 3/14/2025  Data: At time of last visit, patient scored positive on distress screening.  outreached to patient today to follow up on elevated distress and introduce psychosocial services and support.  Intervention/Education provided:  contacted patient by phone to discuss distress screening results.  Patient reports that she is doing well right now, and doesn't need any additional resources. SW provided with contact information to reach out as needed.   Follow-up Required:  will remain available to patient for support as needed    JOSE ANTONIO Cortez, YOEL  Clinical , Adult Oncology  [facility]   [address] [place] [id number]  [email or web address]  Office Phone: [phone]  Support Groups at [facility]: Social Work Services for Cancer Patients ([email or web address])

## 2025-03-31 ENCOUNTER — THERAPY VISIT (OUTPATIENT)
Dept: PHYSICAL THERAPY | Facility: CLINIC | Age: 60
End: 2025-03-31
Attending: STUDENT IN AN ORGANIZED HEALTH CARE EDUCATION/TRAINING PROGRAM
Payer: COMMERCIAL

## 2025-03-31 DIAGNOSIS — R53.81 PHYSICAL DECONDITIONING: Primary | ICD-10-CM

## 2025-03-31 PROCEDURE — 97110 THERAPEUTIC EXERCISES: CPT | Mod: GP | Performed by: PHYSICAL THERAPIST
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
## 2025-04-01 ENCOUNTER — OFFICE VISIT (OUTPATIENT)
Dept: ORTHOPEDICS | Facility: CLINIC | Age: 60
End: 2025-04-01
Payer: COMMERCIAL

## 2025-04-01 DIAGNOSIS — M25.512 CHRONIC PAIN OF BOTH SHOULDERS: ICD-10-CM

## 2025-04-01 DIAGNOSIS — M19.012 OSTEOARTHRITIS OF BILATERAL GLENOHUMERAL JOINTS: Primary | ICD-10-CM

## 2025-04-01 DIAGNOSIS — G89.29 CHRONIC PAIN OF BOTH SHOULDERS: ICD-10-CM

## 2025-04-01 DIAGNOSIS — M67.912 TENDINOPATHY OF LEFT ROTATOR CUFF: ICD-10-CM

## 2025-04-01 DIAGNOSIS — M25.511 CHRONIC PAIN OF BOTH SHOULDERS: ICD-10-CM

## 2025-04-01 DIAGNOSIS — M19.011 OSTEOARTHRITIS OF BILATERAL GLENOHUMERAL JOINTS: Primary | ICD-10-CM

## 2025-04-01 PROCEDURE — 99214 OFFICE O/P EST MOD 30 MIN: CPT | Performed by: STUDENT IN AN ORGANIZED HEALTH CARE EDUCATION/TRAINING PROGRAM
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
## 2025-04-02 ENCOUNTER — PATIENT OUTREACH (OUTPATIENT)
Dept: CARE COORDINATION | Facility: CLINIC | Age: 60
End: 2025-04-02
Payer: COMMERCIAL
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
## 2025-04-08 ENCOUNTER — ORDERS ONLY (AUTO-RELEASED) (OUTPATIENT)
Dept: FAMILY MEDICINE | Facility: CLINIC | Age: 60
End: 2025-04-08

## 2025-04-08 ENCOUNTER — OFFICE VISIT (OUTPATIENT)
Dept: FAMILY MEDICINE | Facility: CLINIC | Age: 60
End: 2025-04-08
Attending: FAMILY MEDICINE
Payer: COMMERCIAL

## 2025-04-08 VITALS
DIASTOLIC BLOOD PRESSURE: 66 MMHG | HEIGHT: 67 IN | BODY MASS INDEX: 34.53 KG/M2 | OXYGEN SATURATION: 97 % | WEIGHT: 220 LBS | SYSTOLIC BLOOD PRESSURE: 98 MMHG | RESPIRATION RATE: 20 BRPM | HEART RATE: 66 BPM | TEMPERATURE: 98.3 F

## 2025-04-08 DIAGNOSIS — Z00.00 ENCOUNTER FOR MEDICARE ANNUAL WELLNESS EXAM: Primary | ICD-10-CM

## 2025-04-08 DIAGNOSIS — M85.89 OSTEOPENIA OF MULTIPLE SITES: ICD-10-CM

## 2025-04-08 DIAGNOSIS — Z23 NEED FOR PROPHYLACTIC VACCINATION AGAINST HEPATITIS A: ICD-10-CM

## 2025-04-08 DIAGNOSIS — Z23 NEED FOR TDAP VACCINATION: ICD-10-CM

## 2025-04-08 DIAGNOSIS — Z78.0 ASYMPTOMATIC MENOPAUSAL STATE: ICD-10-CM

## 2025-04-08 DIAGNOSIS — R60.0 LOWER EXTREMITY EDEMA: ICD-10-CM

## 2025-04-08 DIAGNOSIS — Z29.11 NEED FOR VACCINATION AGAINST RESPIRATORY SYNCYTIAL VIRUS: ICD-10-CM

## 2025-04-08 DIAGNOSIS — G89.29 CHRONIC RIGHT-SIDED LOW BACK PAIN WITH RIGHT-SIDED SCIATICA: ICD-10-CM

## 2025-04-08 DIAGNOSIS — F33.1 MODERATE EPISODE OF RECURRENT MAJOR DEPRESSIVE DISORDER (H): ICD-10-CM

## 2025-04-08 DIAGNOSIS — Z12.11 SCREEN FOR COLON CANCER: ICD-10-CM

## 2025-04-08 DIAGNOSIS — Z23 NEED FOR SHINGLES VACCINE: ICD-10-CM

## 2025-04-08 DIAGNOSIS — M54.41 CHRONIC RIGHT-SIDED LOW BACK PAIN WITH RIGHT-SIDED SCIATICA: ICD-10-CM

## 2025-04-08 DIAGNOSIS — F11.20 CONTINUOUS OPIOID DEPENDENCE (H): ICD-10-CM

## 2025-04-08 RX ORDER — HYDROCODONE BITARTRATE AND ACETAMINOPHEN 5; 325 MG/1; MG/1
1 TABLET ORAL 2 TIMES DAILY PRN
Qty: 60 TABLET | Refills: 0 | Status: SHIPPED | OUTPATIENT
Start: 2025-06-07 | End: 2025-07-07

## 2025-04-08 RX ORDER — TRAMADOL HYDROCHLORIDE 50 MG/1
50 TABLET ORAL EVERY 6 HOURS PRN
Qty: 120 TABLET | Refills: 0 | Status: SHIPPED | OUTPATIENT
Start: 2025-04-23 | End: 2025-05-23

## 2025-04-08 RX ORDER — TRAMADOL HYDROCHLORIDE 50 MG/1
50 TABLET ORAL EVERY 6 HOURS PRN
Qty: 120 TABLET | Refills: 0 | Status: SHIPPED | OUTPATIENT
Start: 2025-05-23 | End: 2025-06-22

## 2025-04-08 RX ORDER — TRAMADOL HYDROCHLORIDE 50 MG/1
50 TABLET ORAL EVERY 6 HOURS PRN
Qty: 120 TABLET | Refills: 0 | Status: SHIPPED | OUTPATIENT
Start: 2025-06-22

## 2025-04-08 RX ORDER — ALENDRONATE SODIUM 35 MG/1
TABLET ORAL
Qty: 12 TABLET | Refills: 3 | Status: CANCELLED | OUTPATIENT
Start: 2025-04-08

## 2025-04-08 RX ORDER — FUROSEMIDE 20 MG/1
20 TABLET ORAL DAILY PRN
Qty: 60 TABLET | Refills: 3 | Status: SHIPPED | OUTPATIENT
Start: 2025-04-08

## 2025-04-08 RX ORDER — HYDROCODONE BITARTRATE AND ACETAMINOPHEN 5; 325 MG/1; MG/1
1 TABLET ORAL 2 TIMES DAILY PRN
Qty: 60 TABLET | Refills: 0 | Status: SHIPPED | OUTPATIENT
Start: 2025-05-08 | End: 2025-06-07

## 2025-04-08 RX ORDER — HYDROCODONE BITARTRATE AND ACETAMINOPHEN 5; 325 MG/1; MG/1
1 TABLET ORAL 2 TIMES DAILY PRN
Qty: 60 TABLET | Refills: 0 | Status: SHIPPED | OUTPATIENT
Start: 2025-04-08 | End: 2025-05-08

## 2025-04-08 SDOH — HEALTH STABILITY: PHYSICAL HEALTH: ON AVERAGE, HOW MANY DAYS PER WEEK DO YOU ENGAGE IN MODERATE TO STRENUOUS EXERCISE (LIKE A BRISK WALK)?: 3 DAYS

## 2025-04-08 ASSESSMENT — ANXIETY QUESTIONNAIRES
5. BEING SO RESTLESS THAT IT IS HARD TO SIT STILL: NOT AT ALL
1. FEELING NERVOUS, ANXIOUS, OR ON EDGE: MORE THAN HALF THE DAYS
3. WORRYING TOO MUCH ABOUT DIFFERENT THINGS: NEARLY EVERY DAY
2. NOT BEING ABLE TO STOP OR CONTROL WORRYING: NEARLY EVERY DAY
GAD7 TOTAL SCORE: 13
IF YOU CHECKED OFF ANY PROBLEMS ON THIS QUESTIONNAIRE, HOW DIFFICULT HAVE THESE PROBLEMS MADE IT FOR YOU TO DO YOUR WORK, TAKE CARE OF THINGS AT HOME, OR GET ALONG WITH OTHER PEOPLE: VERY DIFFICULT
7. FEELING AFRAID AS IF SOMETHING AWFUL MIGHT HAPPEN: MORE THAN HALF THE DAYS
8. IF YOU CHECKED OFF ANY PROBLEMS, HOW DIFFICULT HAVE THESE MADE IT FOR YOU TO DO YOUR WORK, TAKE CARE OF THINGS AT HOME, OR GET ALONG WITH OTHER PEOPLE?: VERY DIFFICULT
GAD7 TOTAL SCORE: 13
4. TROUBLE RELAXING: MORE THAN HALF THE DAYS
GAD7 TOTAL SCORE: 13
6. BECOMING EASILY ANNOYED OR IRRITABLE: SEVERAL DAYS
7. FEELING AFRAID AS IF SOMETHING AWFUL MIGHT HAPPEN: MORE THAN HALF THE DAYS

## 2025-04-08 ASSESSMENT — PATIENT HEALTH QUESTIONNAIRE - PHQ9
10. IF YOU CHECKED OFF ANY PROBLEMS, HOW DIFFICULT HAVE THESE PROBLEMS MADE IT FOR YOU TO DO YOUR WORK, TAKE CARE OF THINGS AT HOME, OR GET ALONG WITH OTHER PEOPLE: VERY DIFFICULT
SUM OF ALL RESPONSES TO PHQ QUESTIONS 1-9: 17
SUM OF ALL RESPONSES TO PHQ QUESTIONS 1-9: 17

## 2025-04-08 ASSESSMENT — SOCIAL DETERMINANTS OF HEALTH (SDOH): HOW OFTEN DO YOU GET TOGETHER WITH FRIENDS OR RELATIVES?: ONCE A WEEK

## 2025-04-08 ASSESSMENT — PAIN SCALES - GENERAL: PAINLEVEL_OUTOF10: MODERATE PAIN (5)
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
## 2025-04-08 NOTE — PATIENT INSTRUCTIONS
Safe to take 3000mg of tyelnol if needed, not daily.     First do the bone density test to see if you do need to take the fosamax.        Patient Education   Preventive Care Advice   This is general advice given by our system to help you stay healthy. However, your care team may have specific advice just for you. Please talk to your care team about your preventive care needs.  Nutrition  Eat 5 or more servings of fruits and vegetables each day.  Try wheat bread, brown rice and whole grain pasta (instead of white bread, rice, and pasta).  Get enough calcium and vitamin D. Check the label on foods and aim for 100% of the RDA (recommended daily allowance).  Lifestyle  Exercise at least 150 minutes each week  (30 minutes a day, 5 days a week).  Do muscle strengthening activities 2 days a week. These help control your weight and prevent disease.  No smoking.  Wear sunscreen to prevent skin cancer.  Have a dental exam and cleaning every 6 months.  Yearly exams  See your health care team every year to talk about:  Any changes in your health.  Any medicines your care team has prescribed.  Preventive care, family planning, and ways to prevent chronic diseases.  Shots (vaccines)   HPV shots (up to age 26), if you've never had them before.  Hepatitis B shots (up to age 59), if you've never had them before.  COVID-19 shot: Get this shot when it's due.  Flu shot: Get a flu shot every year.  Tetanus shot: Get a tetanus shot every 10 years.  Pneumococcal, hepatitis A, and RSV shots: Ask your care team if you need these based on your risk.  Shingles shot (for age 50 and up)  General health tests  Diabetes screening:  Starting at age 35, Get screened for diabetes at least every 3 years.  If you are younger than age 35, ask your care team if you should be screened for diabetes.  Cholesterol test: At age 39, start having a cholesterol test every 5 years, or more often if advised.  Bone density scan (DEXA): At age 50, ask your care  team if you should have this scan for osteoporosis (brittle bones).  Hepatitis C: Get tested at least once in your life.  STIs (sexually transmitted infections)  Before age 24: Ask your care team if you should be screened for STIs.  After age 24: Get screened for STIs if you're at risk. You are at risk for STIs (including HIV) if:  You are sexually active with more than one person.  You don't use condoms every time.  You or a partner was diagnosed with a sexually transmitted infection.  If you are at risk for HIV, ask about PrEP medicine to prevent HIV.  Get tested for HIV at least once in your life, whether you are at risk for HIV or not.  Cancer screening tests  Cervical cancer screening: If you have a cervix, begin getting regular cervical cancer screening tests starting at age 21.  Breast cancer scan (mammogram): If you've ever had breasts, begin having regular mammograms starting at age 40. This is a scan to check for breast cancer.  Colon cancer screening: It is important to start screening for colon cancer at age 45.  Have a colonoscopy test every 10 years (or more often if you're at risk) Or, ask your provider about stool tests like a FIT test every year or Cologuard test every 3 years.  To learn more about your testing options, visit:   .  For help making a decision, visit:   https://bit.ly/bs08400.  Prostate cancer screening test: If you have a prostate, ask your care team if a prostate cancer screening test (PSA) at age 55 is right for you.  Lung cancer screening: If you are a current or former smoker ages 50 to 80, ask your care team if ongoing lung cancer screenings are right for you.  For informational purposes only. Not to replace the advice of your health care provider. Copyright   2023 [employer]. All rights reserved. Clinically reviewed by the [facility] Transitions Program. TripMark [id number] - REV 01/24.  Learning About Activities of Daily Living  What are activities of daily  living?     Activities of daily living (ADLs) are the basic self-care tasks you do every day. These include eating, bathing, dressing, and moving around.  As you age, and if you have health problems, you may find that it's harder to do some of these tasks. If so, your doctor can suggest ideas that may help.  To measure what kind of help you may need, your doctor will ask how well you are able to do ADLs. Let your doctor know if there are any tasks that you are having trouble doing. This is an important first step to getting help. And when you have the help you need, you can stay as independent as possible.  How will a doctor assess your ADLs?  Asking about ADLs is part of a routine health checkup your doctor will likely do as you age. Your health check might be done in a doctor's office, in your home, or at a hospital. The goal is to find out if you are having any problems that could make it hard to care for yourself or that make it unsafe for you to be on your own.  To measure your ADLs, your doctor will ask how hard it is for you to do routine tasks. Your doctor may also want to know if you have changed the way you do a task because of a health problem. Your doctor may watch how you:  Walk back and forth.  Keep your balance while you stand or walk.  Move from sitting to standing or from a bed to a chair.  Button or unbutton a shirt or sweater.  Remove and put on your shoes.  It's common to feel a little worried or anxious if you find you can't do all the things you used to be able to do. Talking with your doctor about ADLs is a way to make sure you're as safe as possible and able to care for yourself as well as you can. You may want to bring a caregiver, friend, or family member to your checkup. They can help you talk to your doctor.  Follow-up care is a key part of your treatment and safety. Be sure to make and go to all appointments, and call your doctor if you are having problems. It's also a good idea to know  your test results and keep a list of the medicines you take.  Current as of: October 24, 2024  Content Version: 14.4    2371-2381 [employer].   Care instructions adapted under license by your healthcare professional. If you have questions about a medical condition or this instruction, always ask your healthcare professional. [employer] disclaims any warranty or liability for your use of this information.    Preventing Falls: Care Instructions  Injuries and health problems such as trouble walking or poor eyesight can increase your risk of falling. So can some medicines. But there are things you can do to help prevent falls. You can exercise to get stronger. You can also arrange your home to make it safer.    Talk to your doctor about the medicines you take. Ask if any of them increase the risk of falls and whether they can be changed or stopped.   Try to exercise regularly. It can help improve your strength and balance. This can help lower your risk of falling.         Practice fall safety and prevention.   Wear low-heeled shoes that fit well and give your feet good support. Talk to your doctor if you have foot problems that make this hard.  Carry a cellphone or wear a medical alert device that you can use to call for help.  Use stepladders instead of chairs to reach high objects. Don't climb if you're at risk for falls. Ask for help, if needed.  Wear the correct eyeglasses, if you need them.        Make your home safer.   Remove rugs, cords, clutter, and furniture from walkways.  Keep your house well lit. Use night-lights in hallways and bathrooms.  Install and use sturdy handrails on stairways.  Wear nonskid footwear, even inside. Don't walk barefoot or in socks without shoes.        Be safe outside.   Use handrails, curb cuts, and ramps whenever possible.  Keep your hands free by using a shoulder bag or backpack.  Try to walk in well-lit areas. Watch out for uneven ground, changes in  "pavement, and debris.  Be careful in the winter. Walk on the grass or gravel when sidewalks are slippery. Use de-icer on steps and walkways. Add non-slip devices to shoes.    Put grab bars and nonskid mats in your shower or tub and near the toilet. Try to use a shower chair or bath bench when bathing.   Get into a tub or shower by putting in your weaker leg first. Get out with your strong side first. Have a phone or medical alert device in the bathroom with you.   Where can you learn more?  Go to https://www.my4oneone.BioDerm/patiented  Enter G117 in the search box to learn more about \"Preventing Falls: Care Instructions.\"  Current as of: July 31, 2024  Content Version: 14.4    3037-4945 boolino.   Care instructions adapted under license by your healthcare professional. If you have questions about a medical condition or this instruction, always ask your healthcare professional. [employer]ino disclaims any warranty or liability for your use of this information.    Learning About Stress  What is stress?     Stress is your body's response to a hard situation. Your body can have a physical, emotional, or mental response. Stress is a fact of life for most people, and it affects everyone differently. What causes stress for you may not be stressful for someone else.  A lot of things can cause stress. You may feel stress when you go on a job interview, take a test, or run a race. This kind of short-term stress is normal and even useful. It can help you if you need to work hard or react quickly. For example, stress can help you finish an important job on time.  Long-term stress is caused by ongoing stressful situations or events. Examples of long-term stress include long-term health problems, ongoing problems at work, or conflicts in your family. Long-term stress can harm your health.  How does stress affect your health?  When you are stressed, your body responds as though you are in danger. It makes " hormones that speed up your heart, make you breathe faster, and give you a burst of energy. This is called the fight-or-flight stress response. If the stress is over quickly, your body goes back to normal and no harm is done.  But if stress happens too often or lasts too long, it can have bad effects. Long-term stress can make you more likely to get sick, and it can make symptoms of some diseases worse. If you tense up when you are stressed, you may develop neck, shoulder, or low back pain. Stress is linked to high blood pressure and heart disease.  Stress also harms your emotional health. It can make you espinosa, tense, or depressed. Your relationships may suffer, and you may not do well at work or school.  What can you do to manage stress?  You can try these things to help manage stress:   Do something active. Exercise or activity can help reduce stress. Walking is a great way to get started. Even everyday activities such as housecleaning or yard work can help.  Try yoga or moira chi. These techniques combine exercise and meditation. You may need some training at first to learn them.  Do something you enjoy. For example, listen to music or go to a movie. Practice your hobby or do volunteer work.  Meditate. This can help you relax, because you are not worrying about what happened before or what may happen in the future.  Do guided imagery. Imagine yourself in any setting that helps you feel calm. You can use online videos, books, or a teacher to guide you.  Do breathing exercises. For example:  From a standing position, bend forward from the waist with your knees slightly bent. Let your arms dangle close to the floor.  Breathe in slowly and deeply as you return to a standing position. Roll up slowly and lift your head last.  Hold your breath for just a few seconds in the standing position.  Breathe out slowly and bend forward from the waist.  Let your feelings out. Talk, laugh, cry, and express anger when you need to.  "Talking with supportive friends or family, a counselor, or a alana leader about your feelings is a healthy way to relieve stress. Avoid discussing your feelings with people who make you feel worse.  Write. It may help to write about things that are bothering you. This helps you find out how much stress you feel and what is causing it. When you know this, you can find better ways to cope.  What can you do to prevent stress?  You might try some of these things to help prevent stress:  Manage your time. This helps you find time to do the things you want and need to do.  Get enough sleep. Your body recovers from the stresses of the day while you are sleeping.  Get support. Your family, friends, and community can make a difference in how you experience stress.  Limit your news feed. Avoid or limit time on social media or news that may make you feel stressed.  Do something active. Exercise or activity can help reduce stress. Walking is a great way to get started.  Where can you learn more?  Go to https://www.Gidsy.net/patiented  Enter N032 in the search box to learn more about \"Learning About Stress.\"  Current as of: October 24, 2024  Content Version: 14.4    6772-7799 Trak.   Care instructions adapted under license by your healthcare professional. If you have questions about a medical condition or this instruction, always ask your healthcare professional. [employer] disclaims any warranty or liability for your use of this information.    Learning About Sleeping Well  What does sleeping well mean?     Sleeping well means getting enough sleep to feel good and stay healthy. How much sleep is enough varies among people.  The number of hours you sleep and how you feel when you wake up are both important. If you do not feel refreshed, you probably need more sleep. Another sign of not getting enough sleep is feeling tired during the day.  Experts recommend that adults get at least 7 or more " "hours of sleep per day. Children and older adults need more sleep.  Why is getting enough sleep important?  Getting enough quality sleep is a basic part of good health. When your sleep suffers, your physical health, mood, and your thoughts can suffer too. You may find yourself feeling more grumpy or stressed. Not getting enough sleep also can lead to serious problems, including injury, accidents, anxiety, and depression.  What might cause poor sleeping?  Many things can cause sleep problems, including:  Changes to your sleep schedule.  Stress. Stress can be caused by fear about a single event, such as giving a speech. Or you may have ongoing stress, such as worry about work or school.  Depression, anxiety, and other mental or emotional conditions.  Changes in your sleep habits or surroundings. This includes changes that happen where you sleep, such as noise, light, or sleeping in a different bed. It also includes changes in your sleep pattern, such as having jet lag or working a late shift.  Health problems, such as pain, breathing problems, and restless legs syndrome.  Lack of regular exercise.  Using alcohol, nicotine, or caffeine before bed.  How can you help yourself?  Here are some tips that may help you sleep more soundly and wake up feeling more refreshed.  Your sleeping area   Use your bedroom only for sleeping and sex. A bit of light reading may help you fall asleep. But if it doesn't, do your reading elsewhere in the house. Try not to use your TV, computer, smartphone, or tablet while you are in bed.  Be sure your bed is big enough to stretch out comfortably, especially if you have a sleep partner.  Keep your bedroom quiet, dark, and cool. Use curtains, blinds, or a sleep mask to block out light. To block out noise, use earplugs, soothing music, or a \"white noise\" machine.  Your evening and bedtime routine   Create a relaxing bedtime routine. You might want to take a warm shower or bath, or listen to " "soothing music.  Go to bed at the same time every night. And get up at the same time every morning, even if you feel tired.  What to avoid   Limit caffeine (coffee, tea, caffeinated sodas) during the day, and don't have any for at least 6 hours before bedtime.  Avoid drinking alcohol before bedtime. Alcohol can cause you to wake up more often during the night.  Try not to smoke or use tobacco, especially in the evening. Nicotine can keep you awake.  Limit naps during the day, especially close to bedtime.  Avoid lying in bed awake for too long. If you can't fall asleep or if you wake up in the middle of the night and can't get back to sleep within about 20 minutes, get out of bed and go to another room until you feel sleepy.  Avoid taking medicine right before bed that may keep you awake or make you feel hyper or energized. Your doctor can tell you if your medicine may do this and if you can take it earlier in the day.  If you can't sleep   Imagine yourself in a peaceful, pleasant scene. Focus on the details and feelings of being in a place that is relaxing.  Get up and do a quiet or boring activity until you feel sleepy.  Avoid drinking any liquids before going to bed to help prevent waking up often to use the bathroom.  Where can you learn more?  Go to https://www.Fortem.net/patiented  Enter J942 in the search box to learn more about \"Learning About Sleeping Well.\"  Current as of: July 31, 2024  Content Version: 14.4    9357-7860 [employer] Platform.   Care instructions adapted under license by your healthcare professional. If you have questions about a medical condition or this instruction, always ask your healthcare professional. [employer] disclaims any warranty or liability for your use of this information.    Bladder Training: Care Instructions  Your Care Instructions     Bladder training is used to treat urge incontinence and stress incontinence. Urge incontinence means that the need to " urinate comes on so fast that you can't get to a toilet in time. Stress incontinence means that you leak urine because of pressure on your bladder. For example, it may happen when you laugh, cough, or lift something heavy.  Bladder training can increase how long you can wait before you have to urinate. It can also help your bladder hold more urine. And it can give you better control over the urge to urinate.  It is important to remember that bladder training takes a few weeks to a few months to make a difference. You may not see results right away, but don't give up.  Follow-up care is a key part of your treatment and safety. Be sure to make and go to all appointments, and call your doctor if you are having problems. It's also a good idea to know your test results and keep a list of the medicines you take.  How can you care for yourself at home?  Work with your doctor to come up with a bladder training program that is right for you. You may use one or more of the following methods.  Delayed urination  In the beginning, try to keep from urinating for 5 minutes after you first feel the need to go.  While you wait, take deep, slow breaths to relax. Kegel exercises can also help you delay the need to go to the bathroom.  After some practice, when you can easily wait 5 minutes to urinate, try to wait 10 minutes before you urinate.  Slowly increase the waiting period until you are able to control when you have to urinate.  Scheduled urination  Empty your bladder when you first wake up in the morning.  Schedule times throughout the day when you will urinate.  Start by going to the bathroom every hour, even if you don't need to go.  Slowly increase the time between trips to the bathroom.  When you have found a schedule that works well for you, keep doing it.  If you wake up during the night and have to urinate, do it. Apply your schedule to waking hours only.  Kegel exercises  These tighten and strengthen pelvic muscles,  "which can help you control the flow of urine. (If doing these exercises causes pain, stop doing them and talk with your doctor.) To do Kegel exercises:  Squeeze your muscles as if you were trying not to pass gas. Or squeeze your muscles as if you were stopping the flow of urine. Your belly, legs, and buttocks shouldn't move.  Hold the squeeze for 3 seconds, then relax for 5 to 10 seconds.  Start with 3 seconds, then add 1 second each week until you are able to squeeze for 10 seconds.  Repeat the exercise 10 times a session. Do 3 to 8 sessions a day.  When should you call for help?  Watch closely for changes in your health, and be sure to contact your doctor if:    Your incontinence is getting worse.     You do not get better as expected.   Where can you learn more?  Go to https://www.Avocadoâ„¢.Showcase Gig/patiented  Enter V684 in the search box to learn more about \"Bladder Training: Care Instructions.\"  Current as of: April 30, 2024  Content Version: 14.4    2426-3297 [employer]er[employer].   Care instructions adapted under license by your healthcare professional. If you have questions about a medical condition or this instruction, always ask your healthcare professional. [employer] disclaims any warranty or liability for your use of this information.    Learning About Depression Screening  What is depression screening?  Depression screening is a way to see if you have depression symptoms. It may be done by a doctor or counselor. It's often part of a routine checkup. That's because your mental health is just as important as your physical health.  Depression is a mental health condition that affects how you feel, think, and act. You may:  Have less energy.  Lose interest in your daily activities.  Feel sad and grouchy for a long time.  Depression is very common. It affects people of all ages.  Many things can lead to depression. Some people become depressed after they have a stroke or find out they have a major " "illness like cancer or heart disease. The death of a loved one or a breakup may lead to depression. It can run in families. Most experts believe that a combination of inherited genes and stressful life events can cause it.  What happens during screening?  You may be asked to fill out a form about your depression symptoms. You and the doctor will discuss your answers. The doctor may ask you more questions to learn more about how you think, act, and feel.  What happens after screening?  If you have symptoms of depression, your doctor will talk to you about your options.  Doctors usually treat depression with medicines or counseling. Often, combining the two works best. Many people don't get help because they think that they'll get over the depression on their own. But people with depression may not get better unless they get treatment.  The cause of depression is not well understood. There may be many factors involved. But if you have depression, it's not your fault.  A serious symptom of depression is thinking about death or suicide. If you or someone you care about talks about this or about feeling hopeless, get help right away.  It's important to know that depression can be treated. Medicine, counseling, and self-care may help.  Where can you learn more?  Go to https://www.[employer].net/patiented  Enter T185 in the search box to learn more about \"Learning About Depression Screening.\"  Current as of: July 31, 2024  Content Version: 14.4    0787-3258 [employer].   Care instructions adapted under license by your healthcare professional. If you have questions about a medical condition or this instruction, always ask your healthcare professional. [employer] disclaims any warranty or liability for your use of this information.    Chronic Pain: Care Instructions  Your Care Instructions     Chronic pain is pain that lasts a long time (months or even years) and may or may not have a clear cause. It " is different from acute pain, which usually does have a clear cause--like an injury or illness--and gets better over time. Chronic pain:  Lasts over time but may vary from day to day.  Does not go away despite efforts to end it.  May disrupt your sleep and lead to fatigue.  May cause depression or anxiety.  May make your muscles tense, causing more pain.  Can disrupt your work, hobbies, home life, and relationships with friends and family.  Chronic pain is a very real condition. It is not just in your head. Treatment can help and usually includes several methods used together, such as medicines, physical therapy, exercise, and other treatments. Learning how to relax and changing negative thought patterns can also help you cope.  Chronic pain is complex. Taking an active role in your treatment will help you better manage your pain. Tell your doctor if you have trouble dealing with your pain. You may have to try several things before you find what works best for you.  Follow-up care is a key part of your treatment and safety. Be sure to make and go to all appointments, and call your doctor if you are having problems. It's also a good idea to know your test results and keep a list of the medicines you take.  How can you care for yourself at home?  Pace yourself. Break up large jobs into smaller tasks. Save harder tasks for days when you have less pain, or go back and forth between hard tasks and easier ones. Take rest breaks.  Relax, and reduce stress. Relaxation techniques such as deep breathing or meditation can help.  Keep moving. Gentle, daily exercise can help reduce pain over the long run. Try low- or no-impact exercises such as walking, swimming, and stationary biking. Do stretches to stay flexible.  Try heat, cold packs, and massage.  Get enough sleep. Chronic pain can make you tired and drain your energy. Talk with your doctor if you have trouble sleeping because of pain.  Think positive. Your thoughts can  affect your pain level. Do things that you enjoy to distract yourself when you have pain instead of focusing on the pain. See a movie, read a book, listen to music, or spend time with a friend.  If you think you are depressed, talk to your doctor about treatment.  Keep a daily pain diary. Record how your moods, thoughts, sleep patterns, activities, and medicine affect your pain. You may find that your pain is worse during or after certain activities or when you are feeling a certain emotion. Having a record of your pain can help you and your doctor find the best ways to treat your pain.  Take pain medicines exactly as directed.  If the doctor gave you a prescription medicine for pain, take it as prescribed.  If you are not taking a prescription pain medicine, ask your doctor if you can take an over-the-counter medicine.  Reducing constipation caused by pain medicine  Talk to your doctor about a laxative. If a laxative doesn't work, your doctor may suggest a prescription medicine.  Include fruits, vegetables, beans, and whole grains in your diet each day. These foods are high in fiber.  If your doctor recommends it, get more exercise. Walking is a good choice. Bit by bit, increase the amount you walk every day. Try for at least 30 minutes on most days of the week.  Schedule time each day for a bowel movement. A daily routine may help. Take your time and do not strain when having a bowel movement.  When should you call for help?   Call your doctor now or seek immediate medical care if:    Your pain gets worse or is out of control.     You feel down or blue, or you do not enjoy things like you once did. You may be depressed, which is common in people with chronic pain. Depression can be treated.     You have vomiting or cramps for more than 2 hours.   Watch closely for changes in your health, and be sure to contact your doctor if:    You cannot sleep because of pain.     You are very worried or anxious about your pain.  "    You have trouble taking your pain medicine.     You have any concerns about your pain medicine.     You have trouble with bowel movements, such as:  No bowel movement in 3 days.  Blood in the anal area, in your stool, or on the toilet paper.  Diarrhea for more than 24 hours.   Where can you learn more?  Go to https://www.[employer].net/patiented  Enter N004 in the search box to learn more about \"Chronic Pain: Care Instructions.\"  Current as of: July 31, 2024  Content Version: 14.4    6213-3485 [employer].   Care instructions adapted under license by your healthcare professional. If you have questions about a medical condition or this instruction, always ask your healthcare professional. [employer] disclaims any warranty or liability for your use of this information.    Eating Healthy Foods: Care Instructions  With every meal, you can make healthy food choices. Try to eat a variety of fruits, vegetables, whole grains, lean proteins, and low-fat dairy products. This can help you get the right balance of nutrients, including vitamins and minerals. Small changes add up over time. You can start by adding one healthy food to your meals each day.    Try to make half your plate fruits and vegetables, one-fourth whole grains, and one-fourth lean proteins. Try including dairy with your meals.   Eat more fruits and vegetables. Try to have them with most meals and snacks.   Foods for healthy eating        Fruits   These can be fresh, frozen, canned, or dried.  Try to choose whole fruit rather than fruit juice.  Eat a variety of colors.        Vegetables   These can be fresh, frozen, canned, or dried.  Beans, peas, and lentils count too.        Whole grains   Choose whole-grain breads, cereals, and noodles.  Try brown rice.        Lean proteins   These can include lean meat, poultry, fish, and eggs.  You can also have tofu, beans, peas, lentils, nuts, and seeds.        Dairy   Try milk, yogurt, and " "cheese.  Choose low-fat or fat-free when you can.  If you need to, use lactose-free milk or fortified plant-based milk products, such as soy milk.        Water   Drink water when you're thirsty.  Limit sugar-sweetened drinks, including soda, fruit drinks, and sports drinks.  Where can you learn more?  Go to https://www.[employer].net/patiented  Enter T756 in the search box to learn more about \"Eating Healthy Foods: Care Instructions.\"  Current as of: October 7, 2024  Content Version: 14.4    3146-2369 [employer].   Care instructions adapted under license by your healthcare professional. If you have questions about a medical condition or this instruction, always ask your healthcare professional. [employer] disclaims any warranty or liability for your use of this information.       "

## 2025-04-09 ENCOUNTER — OFFICE VISIT (OUTPATIENT)
Dept: ORTHOPEDICS | Facility: CLINIC | Age: 60
End: 2025-04-09
Attending: STUDENT IN AN ORGANIZED HEALTH CARE EDUCATION/TRAINING PROGRAM
Payer: COMMERCIAL

## 2025-04-09 VITALS — HEIGHT: 67 IN | BODY MASS INDEX: 33.59 KG/M2 | WEIGHT: 214 LBS

## 2025-04-09 DIAGNOSIS — M67.912 TENDINOPATHY OF LEFT ROTATOR CUFF: ICD-10-CM

## 2025-04-09 DIAGNOSIS — M75.42 IMPINGEMENT SYNDROME OF LEFT SHOULDER: Primary | ICD-10-CM

## 2025-04-09 PROCEDURE — 1125F AMNT PAIN NOTED PAIN PRSNT: CPT | Performed by: ORTHOPAEDIC SURGERY

## 2025-04-09 PROCEDURE — 20610 DRAIN/INJ JOINT/BURSA W/O US: CPT | Mod: LT | Performed by: ORTHOPAEDIC SURGERY

## 2025-04-09 PROCEDURE — 99203 OFFICE O/P NEW LOW 30 MIN: CPT | Mod: 25 | Performed by: ORTHOPAEDIC SURGERY

## 2025-04-09 RX ORDER — TRIAMCINOLONE ACETONIDE 40 MG/ML
80 INJECTION, SUSPENSION INTRA-ARTICULAR; INTRAMUSCULAR
Status: COMPLETED | OUTPATIENT
Start: 2025-04-09 | End: 2025-04-09

## 2025-04-09 RX ORDER — BUPIVACAINE HYDROCHLORIDE 2.5 MG/ML
4 INJECTION, SOLUTION INFILTRATION; PERINEURAL
Status: COMPLETED | OUTPATIENT
Start: 2025-04-09 | End: 2025-04-09

## 2025-04-09 RX ADMIN — BUPIVACAINE HYDROCHLORIDE 4 ML: 2.5 INJECTION, SOLUTION INFILTRATION; PERINEURAL at 16:26

## 2025-04-09 RX ADMIN — TRIAMCINOLONE ACETONIDE 80 MG: 40 INJECTION, SUSPENSION INTRA-ARTICULAR; INTRAMUSCULAR at 16:26

## 2025-04-09 ASSESSMENT — PAIN SCALES - GENERAL: PAINLEVEL_OUTOF10: MODERATE PAIN (6)
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
## 2025-04-16 ENCOUNTER — PATIENT OUTREACH (OUTPATIENT)
Dept: CARE COORDINATION | Facility: CLINIC | Age: 60
End: 2025-04-16
Payer: COMMERCIAL

## 2025-04-22 ENCOUNTER — VIRTUAL VISIT (OUTPATIENT)
Dept: PSYCHOLOGY | Facility: CLINIC | Age: 60
End: 2025-04-22
Attending: STUDENT IN AN ORGANIZED HEALTH CARE EDUCATION/TRAINING PROGRAM
Payer: MEDICARE

## 2025-04-22 DIAGNOSIS — T45.1X5A CHEMOTHERAPY-INDUCED NEUROPATHY: ICD-10-CM

## 2025-04-22 DIAGNOSIS — Z17.1 MALIGNANT NEOPLASM OF UPPER-INNER QUADRANT OF RIGHT BREAST IN FEMALE, ESTROGEN RECEPTOR NEGATIVE (H): ICD-10-CM

## 2025-04-22 DIAGNOSIS — R53.81 PHYSICAL DECONDITIONING: ICD-10-CM

## 2025-04-22 DIAGNOSIS — M25.511 CHRONIC PAIN OF BOTH SHOULDERS: ICD-10-CM

## 2025-04-22 DIAGNOSIS — G89.29 CHRONIC PAIN OF BOTH SHOULDERS: ICD-10-CM

## 2025-04-22 DIAGNOSIS — C50.211 MALIGNANT NEOPLASM OF UPPER-INNER QUADRANT OF RIGHT BREAST IN FEMALE, ESTROGEN RECEPTOR NEGATIVE (H): ICD-10-CM

## 2025-04-22 DIAGNOSIS — M25.512 CHRONIC PAIN OF BOTH SHOULDERS: ICD-10-CM

## 2025-04-22 DIAGNOSIS — G62.0 CHEMOTHERAPY-INDUCED NEUROPATHY: ICD-10-CM

## 2025-04-22 PROCEDURE — 90791 PSYCH DIAGNOSTIC EVALUATION: CPT | Mod: 95 | Performed by: PSYCHOLOGIST
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
## 2025-04-23 ENCOUNTER — TELEPHONE (OUTPATIENT)
Dept: FAMILY MEDICINE | Facility: CLINIC | Age: 60
End: 2025-04-23
Payer: COMMERCIAL

## 2025-04-23 NOTE — TELEPHONE ENCOUNTER
Patient Quality Outreach    Patient is due for the following:   Colon Cancer Screening    Action(s) Taken:   Patient has upcoming appointment, these items will be addressed at that time.  Patients appointment is 8/2025.    Type of outreach:    Chart review performed, no outreach needed.    Questions for provider review:    None         Stacy Chavez  Chart routed to None.

## 2025-04-29 ENCOUNTER — VIRTUAL VISIT (OUTPATIENT)
Dept: PSYCHOLOGY | Facility: CLINIC | Age: 60
End: 2025-04-29
Attending: PSYCHOLOGIST
Payer: MEDICARE

## 2025-04-29 DIAGNOSIS — F43.20 ADJUSTMENT DISORDER, UNSPECIFIED TYPE: Primary | ICD-10-CM

## 2025-04-29 NOTE — PROGRESS NOTES
PSYCHOLOGY PROCESS NOTEbased on a virtual encounter.  Call and billing for call consented.  Provider called client at the scheduled 5-5:50 time.  Client did NOT answer at the time of either call.  A message radha left on her voice mail at the time of both calls.  [place] STRESS  S    - - - - - -  O  'Please leave a message.. .'  Called twice, left message each time.  Chart reviewed.  A   Not available today.  GOAL - - - - - -  PLAN  Follow up scheduled for May 6, 2025  NOTE.  No direct contact with client today.  TOTAL TIME:  12 minutes.

## 2025-05-05 ENCOUNTER — MYC MEDICAL ADVICE (OUTPATIENT)
Dept: FAMILY MEDICINE | Facility: CLINIC | Age: 60
End: 2025-05-05
Payer: COMMERCIAL

## 2025-05-05 NOTE — TELEPHONE ENCOUNTER
Please advise if you approve early  3 days early so patient can pick all medications up at once.    Karishma Iglesias RN on 5/5/2025 at 10:51 AM

## 2025-05-12 ENCOUNTER — VIRTUAL VISIT (OUTPATIENT)
Dept: PSYCHOLOGY | Facility: CLINIC | Age: 60
End: 2025-05-12
Attending: PSYCHOLOGIST
Payer: COMMERCIAL

## 2025-05-12 DIAGNOSIS — F43.20 ADJUSTMENT DISORDER, UNSPECIFIED TYPE: Primary | ICD-10-CM

## 2025-05-12 NOTE — PROGRESS NOTES
"PSYCHOLOGY PROCESS NOTE  based on a virtual encounter.  Call and billing for call consented.  Provider called client at her home during the prescheduled time (11-11:50am).  Client did not answer the phone either time.  2 messages were left.  MS STRESS  S    - - - - - -  O  \"Please leave your message for. . (Answering machine)'  Provider called client twice, no answer, a message was left on her phone at both times.  The 2nd message included how she could make a follow up appt if she would like - she did not answered at the time of the last scheduled appt as well.  A  - - - - - -  GOAL. - - - - - -  PLAN  No additional contact planned at this time.  She is welcome to make a follow appt.  A message was left on her MyChart, including how she could make a follow up appointment were she wanting to do that.  NOTE.  No direct contact with client today.  TOTAL TIME.  9 minutes  "

## 2025-06-23 ENCOUNTER — HOSPITAL ENCOUNTER (OUTPATIENT)
Dept: BONE DENSITY | Facility: CLINIC | Age: 60
Discharge: HOME | End: 2025-06-23
Attending: FAMILY MEDICINE | Admitting: FAMILY MEDICINE
Payer: COMMERCIAL

## 2025-06-23 DIAGNOSIS — M85.89 OSTEOPENIA OF MULTIPLE SITES: ICD-10-CM

## 2025-06-23 DIAGNOSIS — Z78.0 ASYMPTOMATIC MENOPAUSAL STATE: ICD-10-CM

## 2025-06-23 PROCEDURE — 77080 DXA BONE DENSITY AXIAL: CPT

## 2025-06-25 ENCOUNTER — RESULTS FOLLOW-UP (OUTPATIENT)
Dept: FAMILY MEDICINE | Facility: CLINIC | Age: 60
End: 2025-06-25

## 2025-07-03 ENCOUNTER — MYC REFILL (OUTPATIENT)
Dept: FAMILY MEDICINE | Facility: CLINIC | Age: 60
End: 2025-07-03
Payer: COMMERCIAL

## 2025-07-03 DIAGNOSIS — M54.41 CHRONIC RIGHT-SIDED LOW BACK PAIN WITH RIGHT-SIDED SCIATICA: ICD-10-CM

## 2025-07-03 DIAGNOSIS — G89.29 CHRONIC RIGHT-SIDED LOW BACK PAIN WITH RIGHT-SIDED SCIATICA: ICD-10-CM

## 2025-07-03 DIAGNOSIS — F11.20 CONTINUOUS OPIOID DEPENDENCE (H): ICD-10-CM

## 2025-07-07 DIAGNOSIS — F41.8 SITUATIONAL ANXIETY: ICD-10-CM

## 2025-07-08 ENCOUNTER — MYC REFILL (OUTPATIENT)
Dept: FAMILY MEDICINE | Facility: CLINIC | Age: 60
End: 2025-07-08
Payer: COMMERCIAL

## 2025-07-08 DIAGNOSIS — F11.20 CONTINUOUS OPIOID DEPENDENCE (H): ICD-10-CM

## 2025-07-08 DIAGNOSIS — G89.29 CHRONIC RIGHT-SIDED LOW BACK PAIN WITH RIGHT-SIDED SCIATICA: ICD-10-CM

## 2025-07-08 DIAGNOSIS — M54.41 CHRONIC RIGHT-SIDED LOW BACK PAIN WITH RIGHT-SIDED SCIATICA: ICD-10-CM

## 2025-07-09 RX ORDER — HYDROCODONE BITARTRATE AND ACETAMINOPHEN 5; 325 MG/1; MG/1
1 TABLET ORAL 2 TIMES DAILY PRN
Qty: 60 TABLET | Refills: 0 | Status: SHIPPED | OUTPATIENT
Start: 2025-07-09

## 2025-07-09 RX ORDER — TRAMADOL HYDROCHLORIDE 50 MG/1
50 TABLET ORAL EVERY 6 HOURS PRN
Qty: 120 TABLET | Refills: 0 | Status: SHIPPED | OUTPATIENT
Start: 2025-07-09

## 2025-07-10 RX ORDER — LORAZEPAM 0.5 MG/1
.5-1 TABLET ORAL 2 TIMES DAILY PRN
Qty: 60 TABLET | Refills: 0 | Status: SHIPPED | OUTPATIENT
Start: 2025-07-10

## 2025-07-10 NOTE — TELEPHONE ENCOUNTER
Last refill ordered 6/7/25.  This was not refilled and resulted in a need for refill.  I have reordered this for her.  PDMP checked and last  was in April 2025.  Smitha Mazariegos NP on 7/10/2025 at 11:51 AM

## 2025-07-17 ENCOUNTER — ONCOLOGY VISIT (OUTPATIENT)
Dept: ONCOLOGY | Facility: CLINIC | Age: 60
End: 2025-07-17
Attending: NURSE PRACTITIONER
Payer: COMMERCIAL

## 2025-07-17 VITALS
OXYGEN SATURATION: 98 % | RESPIRATION RATE: 18 BRPM | HEART RATE: 67 BPM | DIASTOLIC BLOOD PRESSURE: 88 MMHG | SYSTOLIC BLOOD PRESSURE: 142 MMHG | BODY MASS INDEX: 33.32 KG/M2 | HEIGHT: 67 IN | TEMPERATURE: 98.5 F | WEIGHT: 212.3 LBS

## 2025-07-17 DIAGNOSIS — T45.1X5A CHEMOTHERAPY-INDUCED NEUROPATHY: ICD-10-CM

## 2025-07-17 DIAGNOSIS — Z17.1 MALIGNANT NEOPLASM OF UPPER-INNER QUADRANT OF RIGHT BREAST IN FEMALE, ESTROGEN RECEPTOR NEGATIVE (H): Primary | ICD-10-CM

## 2025-07-17 DIAGNOSIS — G62.0 CHEMOTHERAPY-INDUCED NEUROPATHY: ICD-10-CM

## 2025-07-17 DIAGNOSIS — C50.211 MALIGNANT NEOPLASM OF UPPER-INNER QUADRANT OF RIGHT BREAST IN FEMALE, ESTROGEN RECEPTOR NEGATIVE (H): Primary | ICD-10-CM

## 2025-07-17 PROCEDURE — G0463 HOSPITAL OUTPT CLINIC VISIT: HCPCS | Performed by: NURSE PRACTITIONER

## 2025-07-17 ASSESSMENT — PAIN SCALES - GENERAL: PAINLEVEL_OUTOF10: MODERATE PAIN (6)

## 2025-07-17 NOTE — LETTER
7/17/2025      Diana Salvador  [address] [place] [id number]      Dear Colleague,    Thank you for referring your patient, Diana Salvador, to the [facility] [place]. Please see a copy of my visit note below.    [facility] Hematology and Oncology Outpatient Progress Note ([place])    Patient: Diana Salvador  MRN: [id number]  Jul 17, 2025            Reason for Visit    Stage IIB triple negative breast cancer    Virtual visit    Assessment/Plan  1.   Stage IIB triple negative breast cancer, ypCR  Aide was diagnosed just over 3 years ago and completed all of her adjuvant treatment 2.5yr ago. She is recovering generally well.     She has pre-existing chronic pain, depression/anxiety that have been exacerbated post cancer-diagnosis, treatment and into Survivorship.    No symptoms concerning for recurrence.     She has had some shifting in her right breast implant and has discomfort with that.     Plan:  -Return in 6 mths for annual CBC and exam  -Monitor CBC annually post chemo x 10 years.  -No role for mammograms, post bilateral mastectomy  -Follow-up with Plastic Surgery re: R implant changes (Dr. Nieto); inbasket sent to his team  -Can maintain routine surveillance at [facility]. Since Dr. Diamond has left the practice, will need to align with new primary MD over time/as needed (could see Dr. Lyons at [facility] whom she met before or new MD at [place] sites).    2.    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 Largely improved/near resolved in fingers. Still bothered by sensitivity with intermittent numbness in toes.     Previously did not tolerate gabapentin and duloxetine did not make a difference    Plan:  -Has been evaluated by Dr. Verduzco in PM&R, completed PT    4.   Chronic back pain  5.   Cervical spine stenosis with right radicular symptoms  6.   Bilateral shoulder discomfort and immobility; l rotator cuff tendinopathy  7.   Chronic  Last visit 9/15/21  Future 11/8/21 generalized arthralgias  She has pre-existing chronic pain (lumbar back and joints), exacerbated while on immunotherapy. She's been off immunotherapy 2 yrs but there is some low risk (3%) for chronic arthritis following immunotherapy.    More recent (x 1 yr) right shoulder/RUE radicular type discomfort. Bone scan and MRI c-spine negative for mets; severe right lower cervical spine stenosis noted and she underwent steroid injection with benefit.     She has noted bilateral shoulder discomfort with restricted ROM. Has rotator cuff tendonopathy/impingement symptoms of L shoulder. Found benefit in recent injection. Following Ortho.     Completed PT.    Chronic opioids, managed by PCP. Using medical cannabis as well.     Has been on work disability since prior to start of her cancer treatment. She doesn't feel able to return to work due to her current shoulder symptoms (having trouble dressing herself, doing ADLs, housework, etc). Dr. Verduzco had extended her Disability x 1, but has told pt she would not typically oversee decisions long-term on this.     Plan:  -continue follow-up with Dr. Verduzco in PM&R in efforts to optimize post-cancer treatment and recovery.  -Some issues chronic/predated cancer diagnosis/treatment  -Has been on hydrocodone and tramadol long-term. Managed by PCP.  -Continue mgmt with Ortho    7.  Depression/anxiety, chronic but acutely worsened with cancer diagnosis/treatment  Manged with PCP.  She has been on a number of different antidepressants. Most recently, was on Wellbutrin with partial benefit. However, bothered with weight gain so stopped cold turkey this week and is having more depression/anxiety.   Working with Oncology therapist.   She is still struggling with socially isolation due to what sounds like social anxiety in leaving home + chronic pain/mobility constraints.    Plan:  -Recommend resuming Wellbutrin every other day until reassessed with PCP in a few weeks. Continue antidepressant  mgmt with PCP.   -Continue Oncology psychotherapy as needed  -Encouraged she stay engaged outside of her home with family/friends/activities.  -Encouraged physical activity    8.   Financial stressors  She continues on disability. Has insurance. Has visited with Onc SW in past, can utilize her as resource as needed.     Plan:  -Per Dr. Verduzco (Cancer Rehab),  future long-term disability requests should go through occupational medicine physician. There may not be a provider in our system doing this currently, so historically have placed referrals to [facility] as they have some providers there.     ______________________________________________________________________________    History of Present Illness/ Interval History    Ms. Diana Salvador is a 60 year old who completed neoadjvuant treatment with pembrolizumab, carboplatin and taxol (weekly) x 3 cycles of this combo, complicated by grade 3 neuropathy (fingers) and neutropenia so chemo was stopped and she continued Pembro alone for a few more cycles. She then had bilateral mastectomies with pathologic CR. She completed 9 cycles of adjuvant Pembrolizumab every 3 weeks 1/2023 (2yr ago). Now on surveillance. Returns for 8-mth visit.    Chronic low back pain and generalized arthralgias, predating her cancer/treatment.     For the last 1.5+ year, she has had right neck/right posterior shoulder pain with several months of newer intermittent tingling in right pinky/ring finger and hand (mostly from elbow down). Bone scan negative for bone mets. MRI cervical spine was negative for mets; showed C6-7 stenosis and she underwent steroid injection with benefit.    She saw Ortho for L shoulder pain in April, diagnosed with rotator cuff tendinopathy and impingement symptoms. MRI showed no bone mets. Steroid injection wasn't helpful, but pain injection was. Conservative measures and PT was recommended.     Possible post-mastectomy/reconstruction related +/- lymphedema  component in R shoulder/axilla. Lymphedema consult previously recommended but she has not seen them.     Right breast implant seems shifted/misplaced  x 1 yr. Plastic Surgeon suggested massage, but not improving. No masses/lumps along chest wall or axilla.    Using hydrocodone or tramadol chronically, managed by PCP.     No headaches.     Her neuropathy is improved and it is not as painful as it used to be, but persistent sensitivity in tips of toes Intermittent numbness. Fingertips notably improved.       Recurrent hiccups x 1+ yr, usually one hungry, alleviates after eating. No respiratory symptoms. No new medications. No heartburn. No nausea. No epigastric pain. Bowels regular.     She has struggled with chronic depression/anxiety. Since her cancer diagnosis/treatment has had worsening low concentration and low energy/lack of motivation further exacerbated by mobility issues.  Working with psychotherapist. On medication management through her PCP, has trialed several various antidepressants. Most recently been on Wellbutrin with some benefit, but was concerned with weight gain on this so stopped cold turkey in the past week and feels her depression is worse. Using lorazepam as needed. She still feels socially isolated due to not desiring leaving the home due to anxiety + her chronic pain/immobility.      ECOG Performance    0      Oncology History/Treatment  Diagnosis/Stage:   2/2022: Stage IIB (cT2-cN0-cM0) right breast cancer (triple negative) //ypT0-N0 (CR)  -self-palpated right breast lump  -diagnostic mammo + breast US: 2.4 cm R breast (2:00, lower-inner quad) lump and no axillary nodes detected  -breast biopsy: invasive ductal carcinoma, grade 3. ER neg, IA neg, HER2 neg via FISH  -CA 27.29 WNL (<5)  -Genetic testing negative for germline mutations    -6/30/2022 surgical path = CR after neoadjuvant chemo    Treatment:  3/15/2022 - 6/2022: Neoadjuvant chemo (based off [id number]) Pembrolizumab 200 mg D1 q  "21 days + Carboplatin AUC=5 D1 q21 Days + weekly Paclitaxel 80 mg/m  D1, 8, 15  x 12 Weeks (with Filgrastim 5 mcg/kg subcutaneously once daily on days 16, 17, and 18 of cycles 1 through 4)  -->after cycle 3, developed grade 3 neuropathy and delayed neutropenia (despite using Neupogen). Therefore, chemo held with cycle 4 and only received Pembrolizumab through cycle 6.     6/30/2022: bilateral mastectomies with R sLN biopsy and expander placement.  (Elysia Marina + Gerson). Pathologic CR  -later underwent reconstruction surgery    No adjuvant RT recommended    7/22/22 - 1/12/2023: adjuvant Pembrolizumab x 9 additional cycles      Physical Exam    GENERAL: Alert and oriented to time place and person. Seated comfortably.  Alone.  Intermittently tearful.  HEAD: Atraumatic and normocephalic. No alopecia.  EYES: MELISSA, EOMI. No erythema. No icterus.  BREASTS: Reconstructed. Medial R breast indentation along implant; \"pocket\"-like area palpated over upper outer quad implant. No palpable masses. No chest wall masses.   LYMPH NODES: No axillary adenopathy.   CHEST:  Clear lung sounds bilaterally. RRR  ABD: Soft, non-distended  EXTREMITIES: Restricted shoulder ROM bringing arms overhead. No significant lymphedema noted.   NEURO: No gross deficit noted.      Lab Results    No results found for this or any previous visit (from the past week).        Imaging    DX Bone Density  Result Date: 6/24/2025  EXAM: DX AXIAL HIPS/SPINE LOCATION: [facility] DATE: 6/23/2025 INDICATION: BMD screening, baseline exam. DEMOGRAPHICS: Age- 60 years. Gender- Female. Menopausal status- Postmenopausal. COMPARISON: No prior studies available on the current scanner. TECHNIQUE: Dual-energy x-ray absorptiometry (DXA) performed with routine technique. FINDINGS: DXA RESULTS -Lumbar Spine: L1-L4: BMD: 0.980 g/cm2. T-score: -1.7. Z-score: -1.5. -RIGHT Hip Total: BMD: 0.703 g/cm2. T-score: -2.4. Z-score: -2.2. -RIGHT Hip Femoral " neck: BMD: 0.780 g/cm2. T-score: -1.9. Z-score: -1.2. -LEFT Hip Total: BMD: 0.723 g/cm2. T-score: -2.3. Z-score: -2.0. -LEFT Hip Femoral neck: BMD: 0.761 g/cm2. T-score: -2.0. Z-score: -1.4. WHO T-SCORE CRITERIA -Normal: T score at or above -1 SD -Osteopenia: T score between -1 and -2.5 SD -Osteoporosis: T score at or below -2.5 SD The World Health Organization (WHO) criteria is applicable to perimenopausal females, postmenopausal females, and men aged 50 years or older. INTERVAL CHANGE -No prior studies available on the current scanner for comparison.  FRACTURE RISK -FRAX Results: The 10 year probability of major osteoporotic fracture is 20.1%, and of hip fracture is 3.0%, based on left femoral neck BMD. RECOMMENDATIONS The current National Osteoporosis Foundation Guide recommends that FDA-approved medical therapies be considered if the 10 year probability of major osteoporotic fracture risk is greater than or equal to 20%, or if the hip fracture risk is greater than or  equal to 3%. Please note all treatment decisions require clinical judgement and consideration of individual patient factors, including patient preferences, comorbidities, previous drug use, risk factors not captured in the FRAX model (e.g. frailty, falls, vitamin D deficiency, increased bone turnover, interval significant decline in bone density) and possible under or over-estimation of fracture risk by FRAX.     IMPRESSION: Low bone density (OSTEOPENIA). T score meets the WHO criteria for low bone density (osteopenia) at one or more measured sites. The risk of osteoporotic fracture increases approximately two-fold for each standard deviation decrease in T-score.      Billing  Total time 35 minutes, to include face to face visit, review of EMR, ordering, documentation and coordination of care on date of service.    complexity modifier for longitudinal care.       Signed by: Marilu Vergara NP      Oncology Rooming Note    July 17, 2025 2:50 PM  "  Diana Salvador is a 60 year old female who presents for:    Chief Complaint   Patient presents with     Oncology Clinic Visit     Breast cancer - provider visit only     Initial Vitals: BP (!) 142/88 (BP Location: Right arm, Patient Position: Sitting, Cuff Size: Adult Regular)   Pulse 67   Temp 98.5  F (36.9  C) (Tympanic)   Resp 18   Ht 1.695 m (5' 6.73\")   Wt 96.3 kg (212 lb 4.8 oz)   LMP  (LMP Unknown)   SpO2 98%   BMI 33.52 kg/m   Estimated body mass index is 33.52 kg/m  as calculated from the following:    Height as of this encounter: 1.695 m (5' 6.73\").    Weight as of this encounter: 96.3 kg (212 lb 4.8 oz). Body surface area is 2.13 meters squared.  Moderate Pain (6) Comment: Data Unavailable   No LMP recorded (lmp unknown). Patient has had a hysterectomy.  Allergies reviewed: Yes  Medications reviewed: Yes    Medications: Medication refills not needed today.  Pharmacy name entered into EPIC:    [facility] - [facility], [place] - [address] [facility] [address] [place], [place] - [address] [place] [address]/PHARMACY #[id number] [place], [place] - [address] [place] [address] AT [facility]    PHQ9:  Did this patient require a PHQ9?: No      Clinical concerns: None today.        Nikki Beverly MA                Again, thank you for allowing me to participate in the care of your patient.        Sincerely,        Marilu Vergara NP    Electronically signed"

## 2025-07-17 NOTE — PROGRESS NOTES
"Oncology Rooming Note    July 17, 2025 2:50 PM   Diana Salvador is a 60 year old female who presents for:    Chief Complaint   Patient presents with    Oncology Clinic Visit     Breast cancer - provider visit only     Initial Vitals: BP (!) 142/88 (BP Location: Right arm, Patient Position: Sitting, Cuff Size: Adult Regular)   Pulse 67   Temp 98.5  F (36.9  C) (Tympanic)   Resp 18   Ht 1.695 m (5' 6.73\")   Wt 96.3 kg (212 lb 4.8 oz)   LMP  (LMP Unknown)   SpO2 98%   BMI 33.52 kg/m   Estimated body mass index is 33.52 kg/m  as calculated from the following:    Height as of this encounter: 1.695 m (5' 6.73\").    Weight as of this encounter: 96.3 kg (212 lb 4.8 oz). Body surface area is 2.13 meters squared.  Moderate Pain (6) Comment: Data Unavailable   No LMP recorded (lmp unknown). Patient has had a hysterectomy.  Allergies reviewed: Yes  Medications reviewed: Yes    Medications: Medication refills not needed today.  Pharmacy name entered into EPIC:    [facility] - [facility] [place] - [address] [facility] [address] [place] [facility] [place], [place] - [facility] [address] [facility]/PHARMACY #[id number] - [place], [place] - [address] [place] [address] AT [facility]    PHQ9:  Did this patient require a PHQ9?: No      Clinical concerns: None today.        Nikki Beverly MA              "

## 2025-07-17 NOTE — PROGRESS NOTES
[facility] Outpatient Progress Note ([place])    Patient: Diana Salvador  MRN: [id number]  Jul 17, 2025            Reason for Visit    Stage IIB triple negative breast cancer    Virtual visit    Assessment/Plan  1.   Stage IIB triple negative breast cancer, ypCR  Aide was diagnosed just over 3 years ago and completed all of her adjuvant treatment 2.5yr ago. She is recovering generally well.     She has pre-existing chronic pain, depression/anxiety that have been exacerbated post cancer-diagnosis, treatment and into Survivorship.    No symptoms concerning for recurrence.     She has had some shifting in her right breast implant and has discomfort with that.     Plan:  -Return in 6 mths for annual CBC and exam  -Monitor CBC annually post chemo x 10 years.  -No role for mammograms, post bilateral mastectomy  -Follow-up with Plastic Surgery re: R implant changes (Dr. Nieto); inbasket sent to his team  -Can maintain routine surveillance at [facility]. Since Dr. Diamond has left the practice, will need to align with new primary MD over time/as needed (could see Dr. Lyons at [facility] whom she met before or new MD at [place] sites).    2.    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 Largely improved/near resolved in fingers. Still bothered by sensitivity with intermittent numbness in toes.     Previously did not tolerate gabapentin and duloxetine did not make a difference    Plan:  -Has been evaluated by Dr. Verduzco in PM&R, completed PT    4.   Chronic back pain  5.   Cervical spine stenosis with right radicular symptoms  6.   Bilateral shoulder discomfort and immobility; l rotator cuff tendinopathy  7.   Chronic generalized arthralgias  She has pre-existing chronic pain (lumbar back and joints), exacerbated while on immunotherapy. She's been off immunotherapy 2 yrs but there is some low risk (3%) for chronic arthritis following  immunotherapy.    More recent (x 1 yr) right shoulder/RUE radicular type discomfort. Bone scan and MRI c-spine negative for mets; severe right lower cervical spine stenosis noted and she underwent steroid injection with benefit.     She has noted bilateral shoulder discomfort with restricted ROM. Has rotator cuff tendonopathy/impingement symptoms of L shoulder. Found benefit in recent injection. Following Ortho.     Completed PT.    Chronic opioids, managed by PCP. Using medical cannabis as well.     Has been on work disability since prior to start of her cancer treatment. She doesn't feel able to return to work due to her current shoulder symptoms (having trouble dressing herself, doing ADLs, housework, etc). Dr. Verduzco had extended her Disability x 1, but has told pt she would not typically oversee decisions long-term on this.     Plan:  -continue follow-up with Dr. Verduzco in PM&R in efforts to optimize post-cancer treatment and recovery.  -Some issues chronic/predated cancer diagnosis/treatment  -Has been on hydrocodone and tramadol long-term. Managed by PCP.  -Continue mgmt with Ortho    7.  Depression/anxiety, chronic but acutely worsened with cancer diagnosis/treatment  Manged with PCP.  She has been on a number of different antidepressants. Most recently, was on Wellbutrin with partial benefit. However, bothered with weight gain so stopped cold turkey this week and is having more depression/anxiety.   Working with Oncology therapist.   She is still struggling with socially isolation due to what sounds like social anxiety in leaving home + chronic pain/mobility constraints.    Plan:  -Recommend resuming Wellbutrin every other day until reassessed with PCP in a few weeks. Continue antidepressant mgmt with PCP.   -Continue Oncology psychotherapy as needed  -Encouraged she stay engaged outside of her home with family/friends/activities.  -Encouraged physical activity    8.   Financial stressors  She continues on  disability. Has insurance. Has visited with Onc SW in past, can utilize her as resource as needed.     Plan:  -Per Dr. Verduzco (Cancer Rehab),  future long-term disability requests should go through occupational medicine physician. There may not be a provider in our system doing this currently, so historically have placed referrals to [facility] as they have some providers there.     ______________________________________________________________________________    History of Present Illness/ Interval History    Ms. Diana Salvador is a 60 year old who completed neoadjvuant treatment with pembrolizumab, carboplatin and taxol (weekly) x 3 cycles of this combo, complicated by grade 3 neuropathy (fingers) and neutropenia so chemo was stopped and she continued Pembro alone for a few more cycles. She then had bilateral mastectomies with pathologic CR. She completed 9 cycles of adjuvant Pembrolizumab every 3 weeks 1/2023 (2yr ago). Now on surveillance. Returns for 8-mth visit.    Chronic low back pain and generalized arthralgias, predating her cancer/treatment.     For the last 1.5+ year, she has had right neck/right posterior shoulder pain with several months of newer intermittent tingling in right pinky/ring finger and hand (mostly from elbow down). Bone scan negative for bone mets. MRI cervical spine was negative for mets; showed C6-7 stenosis and she underwent steroid injection with benefit.    She saw Ortho for L shoulder pain in April, diagnosed with rotator cuff tendinopathy and impingement symptoms. MRI showed no bone mets. Steroid injection wasn't helpful, but pain injection was. Conservative measures and PT was recommended.     Possible post-mastectomy/reconstruction related +/- lymphedema component in R shoulder/axilla. Lymphedema consult previously recommended but she has not seen them.     Right breast implant seems shifted/misplaced  x 1 yr. Plastic Surgeon suggested massage, but not improving. No  masses/lumps along chest wall or axilla.    Using hydrocodone or tramadol chronically, managed by PCP.     No headaches.     Her neuropathy is improved and it is not as painful as it used to be, but persistent sensitivity in tips of toes Intermittent numbness. Fingertips notably improved.       Recurrent hiccups x 1+ yr, usually one hungry, alleviates after eating. No respiratory symptoms. No new medications. No heartburn. No nausea. No epigastric pain. Bowels regular.     She has struggled with chronic depression/anxiety. Since her cancer diagnosis/treatment has had worsening low concentration and low energy/lack of motivation further exacerbated by mobility issues.  Working with psychotherapist. On medication management through her PCP, has trialed several various antidepressants. Most recently been on Wellbutrin with some benefit, but was concerned with weight gain on this so stopped cold turkey in the past week and feels her depression is worse. Using lorazepam as needed. She still feels socially isolated due to not desiring leaving the home due to anxiety + her chronic pain/immobility.      ECOG Performance    0      Oncology History/Treatment  Diagnosis/Stage:   2/2022: Stage IIB (cT2-cN0-cM0) right breast cancer (triple negative) //ypT0-N0 (CR)  -self-palpated right breast lump  -diagnostic mammo + breast US: 2.4 cm R breast (2:00, lower-inner quad) lump and no axillary nodes detected  -breast biopsy: invasive ductal carcinoma, grade 3. ER neg, SD neg, HER2 neg via FISH  -CA 27.29 WNL (<5)  -Genetic testing negative for germline mutations    -6/30/2022 surgical path = CR after neoadjuvant chemo    Treatment:  3/15/2022 - 6/2022: Neoadjuvant chemo (based off [id number]) Pembrolizumab 200 mg D1 q 21 days + Carboplatin AUC=5 D1 q21 Days + weekly Paclitaxel 80 mg/m  D1, 8, 15  x 12 Weeks (with Filgrastim 5 mcg/kg subcutaneously once daily on days 16, 17, and 18 of cycles 1 through 4)  -->after cycle 3, developed  "grade 3 neuropathy and delayed neutropenia (despite using Neupogen). Therefore, chemo held with cycle 4 and only received Pembrolizumab through cycle 6.     6/30/2022: bilateral mastectomies with R sLN biopsy and expander placement.  (Elysia Marina + Gerson). Pathologic CR  -later underwent reconstruction surgery    No adjuvant RT recommended    7/22/22 - 1/12/2023: adjuvant Pembrolizumab x 9 additional cycles      Physical Exam    GENERAL: Alert and oriented to time place and person. Seated comfortably.  Alone.  Intermittently tearful.  HEAD: Atraumatic and normocephalic. No alopecia.  EYES: MELISSA, EOMI. No erythema. No icterus.  BREASTS: Reconstructed. Medial R breast indentation along implant; \"pocket\"-like area palpated over upper outer quad implant. No palpable masses. No chest wall masses.   LYMPH NODES: No axillary adenopathy.   CHEST:  Clear lung sounds bilaterally. RRR  ABD: Soft, non-distended  EXTREMITIES: Restricted shoulder ROM bringing arms overhead. No significant lymphedema noted.   NEURO: No gross deficit noted.      Lab Results    No results found for this or any previous visit (from the past week).        Imaging    DX Bone Density  Result Date: 6/24/2025  EXAM: DX AXIAL HIPS/SPINE LOCATION: [facility] DATE: 6/23/2025 INDICATION: BMD screening, baseline exam. DEMOGRAPHICS: Age- 60 years. Gender- Female. Menopausal status- Postmenopausal. COMPARISON: No prior studies available on the current scanner. TECHNIQUE: Dual-energy x-ray absorptiometry (DXA) performed with routine technique. FINDINGS: DXA RESULTS -Lumbar Spine: L1-L4: BMD: 0.980 g/cm2. T-score: -1.7. Z-score: -1.5. -RIGHT Hip Total: BMD: 0.703 g/cm2. T-score: -2.4. Z-score: -2.2. -RIGHT Hip Femoral neck: BMD: 0.780 g/cm2. T-score: -1.9. Z-score: -1.2. -LEFT Hip Total: BMD: 0.723 g/cm2. T-score: -2.3. Z-score: -2.0. -LEFT Hip Femoral neck: BMD: 0.761 g/cm2. T-score: -2.0. Z-score: -1.4. WHO T-SCORE CRITERIA " -Normal: T score at or above -1 SD -Osteopenia: T score between -1 and -2.5 SD -Osteoporosis: T score at or below -2.5 SD The World Health Organization (WHO) criteria is applicable to perimenopausal females, postmenopausal females, and men aged 50 years or older. INTERVAL CHANGE -No prior studies available on the current scanner for comparison.  FRACTURE RISK -FRAX Results: The 10 year probability of major osteoporotic fracture is 20.1%, and of hip fracture is 3.0%, based on left femoral neck BMD. RECOMMENDATIONS The current National Osteoporosis Foundation Guide recommends that FDA-approved medical therapies be considered if the 10 year probability of major osteoporotic fracture risk is greater than or equal to 20%, or if the hip fracture risk is greater than or  equal to 3%. Please note all treatment decisions require clinical judgement and consideration of individual patient factors, including patient preferences, comorbidities, previous drug use, risk factors not captured in the FRAX model (e.g. frailty, falls, vitamin D deficiency, increased bone turnover, interval significant decline in bone density) and possible under or over-estimation of fracture risk by FRAX.     IMPRESSION: Low bone density (OSTEOPENIA). T score meets the WHO criteria for low bone density (osteopenia) at one or more measured sites. The risk of osteoporotic fracture increases approximately two-fold for each standard deviation decrease in T-score.      Billing  Total time 35 minutes, to include face to face visit, review of EMR, ordering, documentation and coordination of care on date of service.    complexity modifier for longitudinal care.       Signed by: Marilu Vergara NP

## 2025-08-05 ASSESSMENT — ANXIETY QUESTIONNAIRES
1. FEELING NERVOUS, ANXIOUS, OR ON EDGE: NEARLY EVERY DAY
7. FEELING AFRAID AS IF SOMETHING AWFUL MIGHT HAPPEN: MORE THAN HALF THE DAYS
5. BEING SO RESTLESS THAT IT IS HARD TO SIT STILL: SEVERAL DAYS
4. TROUBLE RELAXING: NEARLY EVERY DAY
8. IF YOU CHECKED OFF ANY PROBLEMS, HOW DIFFICULT HAVE THESE MADE IT FOR YOU TO DO YOUR WORK, TAKE CARE OF THINGS AT HOME, OR GET ALONG WITH OTHER PEOPLE?: VERY DIFFICULT
IF YOU CHECKED OFF ANY PROBLEMS ON THIS QUESTIONNAIRE, HOW DIFFICULT HAVE THESE PROBLEMS MADE IT FOR YOU TO DO YOUR WORK, TAKE CARE OF THINGS AT HOME, OR GET ALONG WITH OTHER PEOPLE: VERY DIFFICULT
6. BECOMING EASILY ANNOYED OR IRRITABLE: NEARLY EVERY DAY
3. WORRYING TOO MUCH ABOUT DIFFERENT THINGS: NEARLY EVERY DAY
GAD7 TOTAL SCORE: 18
7. FEELING AFRAID AS IF SOMETHING AWFUL MIGHT HAPPEN: MORE THAN HALF THE DAYS
GAD7 TOTAL SCORE: 18
GAD7 TOTAL SCORE: 18
2. NOT BEING ABLE TO STOP OR CONTROL WORRYING: NEARLY EVERY DAY

## 2025-08-05 ASSESSMENT — PAIN SCALES - PAIN ENJOYMENT GENERAL ACTIVITY SCALE (PEG)
INTERFERED_ENJOYMENT_LIFE: 7
AVG_PAIN_PASTWEEK: 6
INTERFERED_GENERAL_ACTIVITY: 7
INTERFERED_ENJOYMENT_LIFE: 7
PEG_TOTALSCORE: 6.67
AVG_PAIN_PASTWEEK: 6
INTERFERED_GENERAL_ACTIVITY: 7
PEG_TOTALSCORE: 6.67

## 2025-08-05 ASSESSMENT — PATIENT HEALTH QUESTIONNAIRE - PHQ9
10. IF YOU CHECKED OFF ANY PROBLEMS, HOW DIFFICULT HAVE THESE PROBLEMS MADE IT FOR YOU TO DO YOUR WORK, TAKE CARE OF THINGS AT HOME, OR GET ALONG WITH OTHER PEOPLE: VERY DIFFICULT
SUM OF ALL RESPONSES TO PHQ QUESTIONS 1-9: 15
SUM OF ALL RESPONSES TO PHQ QUESTIONS 1-9: 15

## 2025-08-06 ENCOUNTER — OFFICE VISIT (OUTPATIENT)
Dept: FAMILY MEDICINE | Facility: CLINIC | Age: 60
End: 2025-08-06
Payer: COMMERCIAL

## 2025-08-06 VITALS
SYSTOLIC BLOOD PRESSURE: 130 MMHG | DIASTOLIC BLOOD PRESSURE: 86 MMHG | HEIGHT: 67 IN | HEART RATE: 64 BPM | RESPIRATION RATE: 17 BRPM | OXYGEN SATURATION: 96 % | TEMPERATURE: 99.5 F | WEIGHT: 207 LBS | BODY MASS INDEX: 32.49 KG/M2

## 2025-08-06 DIAGNOSIS — F11.20 CONTINUOUS OPIOID DEPENDENCE (H): ICD-10-CM

## 2025-08-06 DIAGNOSIS — M54.41 ACUTE RIGHT-SIDED LOW BACK PAIN WITH RIGHT-SIDED SCIATICA: Primary | ICD-10-CM

## 2025-08-06 DIAGNOSIS — F41.8 SITUATIONAL ANXIETY: ICD-10-CM

## 2025-08-06 DIAGNOSIS — Z91.89 FRACTURE RISK ASSESSMENT SCORE (FRAX) INDICATING GREATER THAN 20% RISK FOR MAJOR OSTEOPOROSIS-RELATED FRACTURE: ICD-10-CM

## 2025-08-06 DIAGNOSIS — G89.29 CHRONIC RIGHT-SIDED LOW BACK PAIN WITH RIGHT-SIDED SCIATICA: ICD-10-CM

## 2025-08-06 DIAGNOSIS — M54.41 CHRONIC RIGHT-SIDED LOW BACK PAIN WITH RIGHT-SIDED SCIATICA: ICD-10-CM

## 2025-08-06 PROCEDURE — 1125F AMNT PAIN NOTED PAIN PRSNT: CPT | Performed by: FAMILY MEDICINE

## 2025-08-06 PROCEDURE — 3079F DIAST BP 80-89 MM HG: CPT | Performed by: FAMILY MEDICINE

## 2025-08-06 PROCEDURE — 99214 OFFICE O/P EST MOD 30 MIN: CPT | Performed by: FAMILY MEDICINE

## 2025-08-06 PROCEDURE — G2211 COMPLEX E/M VISIT ADD ON: HCPCS | Performed by: FAMILY MEDICINE

## 2025-08-06 PROCEDURE — 3075F SYST BP GE 130 - 139MM HG: CPT | Performed by: FAMILY MEDICINE

## 2025-08-06 RX ORDER — TRAMADOL HYDROCHLORIDE 50 MG/1
50 TABLET ORAL EVERY 6 HOURS PRN
Qty: 120 TABLET | Refills: 0 | Status: SHIPPED | OUTPATIENT
Start: 2025-09-14 | End: 2025-10-14

## 2025-08-06 RX ORDER — HYDROCODONE BITARTRATE AND ACETAMINOPHEN 5; 325 MG/1; MG/1
1 TABLET ORAL 2 TIMES DAILY PRN
Qty: 60 TABLET | Refills: 0 | Status: SHIPPED | OUTPATIENT
Start: 2025-08-08 | End: 2025-09-07

## 2025-08-06 RX ORDER — HYDROCODONE BITARTRATE AND ACETAMINOPHEN 5; 325 MG/1; MG/1
1 TABLET ORAL 2 TIMES DAILY PRN
Qty: 60 TABLET | Refills: 0 | Status: SHIPPED | OUTPATIENT
Start: 2025-09-07 | End: 2025-10-07

## 2025-08-06 RX ORDER — ALENDRONATE SODIUM 70 MG/1
70 TABLET ORAL
Qty: 12 TABLET | Refills: 3 | Status: SHIPPED | OUTPATIENT
Start: 2025-08-06

## 2025-08-06 RX ORDER — HYDROCODONE BITARTRATE AND ACETAMINOPHEN 5; 325 MG/1; MG/1
1 TABLET ORAL 2 TIMES DAILY PRN
Qty: 60 TABLET | Refills: 0 | Status: SHIPPED | OUTPATIENT
Start: 2025-10-07 | End: 2025-11-06

## 2025-08-06 RX ORDER — TRAMADOL HYDROCHLORIDE 50 MG/1
50 TABLET ORAL EVERY 6 HOURS PRN
Qty: 120 TABLET | Refills: 0 | Status: SHIPPED | OUTPATIENT
Start: 2025-10-14

## 2025-08-06 RX ORDER — LORAZEPAM 0.5 MG/1
.5-1 TABLET ORAL DAILY PRN
Qty: 30 TABLET | Refills: 0 | Status: SHIPPED | OUTPATIENT
Start: 2025-08-06

## 2025-08-06 RX ORDER — CYCLOBENZAPRINE HCL 5 MG
5 TABLET ORAL 2 TIMES DAILY PRN
Qty: 40 TABLET | Refills: 1 | Status: SHIPPED | OUTPATIENT
Start: 2025-08-06

## 2025-08-06 RX ORDER — TRAMADOL HYDROCHLORIDE 50 MG/1
50 TABLET ORAL EVERY 6 HOURS PRN
Qty: 120 TABLET | Refills: 0 | Status: SHIPPED | OUTPATIENT
Start: 2025-08-16 | End: 2025-09-15

## 2025-08-06 ASSESSMENT — PAIN SCALES - GENERAL: PAINLEVEL_OUTOF10: SEVERE PAIN (7)

## 2025-09-02 ENCOUNTER — TELEPHONE (OUTPATIENT)
Dept: PLASTIC SURGERY | Facility: CLINIC | Age: 60
End: 2025-09-02
Payer: COMMERCIAL

## (undated) DEVICE — DRAPE C-ARM 60X42" 1013

## (undated) DEVICE — DRAPE STOCKINETTE IMPERVIOUS 08" LATEX 1586

## (undated) DEVICE — GLOVE PROTEXIS W/NEU-THERA 7.5  2D73TE75

## (undated) DEVICE — BLADE KNIFE SURG 11 371111

## (undated) DEVICE — GLOVE EXAM NITRILE MED

## (undated) DEVICE — SU PLAIN FAST ABSORB 5-0 PC-1 18" 1915G

## (undated) DEVICE — BLADE KNIFE SURG 15 371115

## (undated) DEVICE — DRSG PRIMAPORE 04 3/4X13 3/4" 66007140

## (undated) DEVICE — PACK MINOR SBA15MIFSE

## (undated) DEVICE — DECANTER BAG 2002S

## (undated) DEVICE — NDL COUNTER 20CT 31142493

## (undated) DEVICE — NDL 22GA 1.5"

## (undated) DEVICE — BNDG COBAN 4"X5YDS STERILE

## (undated) DEVICE — SYR 10ML FINGER CONTROL W/O NDL 309695

## (undated) DEVICE — DECANTER TRANSFER DEVICE 2008S

## (undated) DEVICE — PROBE COVER ULTRASOUND 6X96"

## (undated) DEVICE — Device

## (undated) DEVICE — SU STRATAFIX MONOCRYL 3-0 SPIRAL PS-2 30CM SXMP1B106

## (undated) DEVICE — MARKER SKIN DOUBLE TIP W/FLEXI-RULER W/LABELS

## (undated) DEVICE — ESU ELEC BLADE HEX-LOCKING 2.5" E1450X

## (undated) DEVICE — TUBING SUCTION 12"X1/4" N612

## (undated) DEVICE — 3-0 STRATAFIX

## (undated) DEVICE — PACK LAP TRANSVERSE STD

## (undated) DEVICE — GLOVE PROTEXIS W/NEU-THERA 6.5  2D73TE65

## (undated) DEVICE — ESU ELEC BLADE 2.75" COATED/INSULATED E1455

## (undated) DEVICE — ADH SKIN CLOSURE PREMIERPRO EXOFIN 1.0ML 3470

## (undated) DEVICE — SPONGE LAP 18X18" 1515

## (undated) DEVICE — GLOVE PROTEXIS W/NEU-THERA 7.0  2D73TE70

## (undated) DEVICE — DECANTER VIAL 2006S

## (undated) DEVICE — DRAPE SHEET HALF 40X60" 9358

## (undated) DEVICE — SUCTION TUBING 10' N1010

## (undated) DEVICE — SOL NACL 0.9% IRRIG 1000ML BOTTLE 07138-09

## (undated) DEVICE — SUCTION TIP YANKAUER STR K87

## (undated) DEVICE — SU VICRYL 4-0 PS-2 18" UND J496G

## (undated) DEVICE — TUBING INFUSION INFILTRATION LIPOSUCTION 156" 24-6008

## (undated) DEVICE — DRSG KERLIX 4 1/2"X4YDS ROLL 6715

## (undated) DEVICE — SPONGE LAP 18X18" X8435

## (undated) DEVICE — DRSG DRAIN 4X4" 7086

## (undated) DEVICE — SYR BULB IRRIG DOVER 60 ML LATEX FREE 67000

## (undated) DEVICE — LABEL MEDICATION SYSTEM  3304

## (undated) DEVICE — PREP CHLORAPREP 26ML TINTED ORANGE  260815

## (undated) DEVICE — DRAPE STERI INCISE 24X14" 1040

## (undated) DEVICE — BASIN SET MINOR DISP

## (undated) DEVICE — DRSG KERLIX FLUFFS X5

## (undated) DEVICE — DRAPE SPLIT EENT 76X124" 3X28" 9447

## (undated) DEVICE — SU PROLENE 0 CT-1 30" 8424H

## (undated) DEVICE — COLLECTION DEVICE SYSTEM TISSUE REVOLVE RV0001 2 PACK RV0002

## (undated) DEVICE — NDL 19GA 1.5"

## (undated) DEVICE — DRAPE IOBAN INCISE 23X17" 6650EZ

## (undated) DEVICE — SU VICRYL 3-0 SH 27" UND J416H

## (undated) DEVICE — GOWN XLG DISP 9545

## (undated) DEVICE — ESU PENCIL SMOKE EVAC W/ROCKER SWITCH 0703-047-000

## (undated) DEVICE — SU MONOCRYL 4-0 PS-2 18" UND Y496G

## (undated) DEVICE — SU ETHIBOND 2-0 MO-7 CR 8X18" CX42D

## (undated) DEVICE — SU MONOCRYL 2-0 SH 27" UND Y417H

## (undated) DEVICE — BNDG ABDOMINAL BINDER 9X30-45" 79-89070

## (undated) DEVICE — SU ETHILON 2-0 FS 18" 664G

## (undated) DEVICE — GLOVE PROTEXIS BLUE W/NEU-THERA 7.0  2D73EB70

## (undated) DEVICE — GLOVE PROTEXIS BLUE W/NEU-THERA 6.5  2D73EB65

## (undated) DEVICE — STPL SKIN 35W 054887

## (undated) DEVICE — SUCTION MANIFOLD NEPTUNE 2 SYS 1 PORT 702-025-000

## (undated) DEVICE — STPL SKIN 35W ROTATING HEAD PRW35

## (undated) DEVICE — SU STRATAFIX 3-0 MH 12" PS-2 SXMD1B103

## (undated) DEVICE — DRAIN JACKSON PRATT 15FR ROUND SU130-1323

## (undated) DEVICE — SPONGE RAY-TEC 4X8" 7318

## (undated) DEVICE — GOWN LG DISP 9515

## (undated) DEVICE — SYR 50ML CATH TIP W/O NDL 309620

## (undated) DEVICE — DRAPE U SPLIT 74X120" 29440

## (undated) DEVICE — SU SILK 2-0 FS-1 18" 685G

## (undated) DEVICE — GLOVE PROTEXIS BLUE W/NEU-THERA 8.0  2D73EB80

## (undated) DEVICE — DRAPE SHEET REV FOLD 3/4 9349

## (undated) DEVICE — DRAPE MAYO STAND 23X54 8337

## (undated) DEVICE — LIGHT HANDLE X2

## (undated) DEVICE — SUCTION TIP YANKAUER W/O VENT K86

## (undated) DEVICE — SYR 10ML LL W/O NDL

## (undated) DEVICE — STOCKING SLEEVE COMPRESSION CALF LG

## (undated) DEVICE — CLIP APPLIER 11" MED LIGACLIP MCM20

## (undated) DEVICE — SU VICRYL 2-0 SH 27" UND J417H

## (undated) DEVICE — SOL WATER IRRIG 1000ML BOTTLE 07139-09

## (undated) DEVICE — SU ETHILON 3-0 PS-2 18" 1669H

## (undated) DEVICE — DRAPE IOBAN LG .375X23.5" 6648EZ

## (undated) DEVICE — ESU PENCIL W/COATED BLADE E2450H

## (undated) DEVICE — BLADE KNIFE SURG 10 371110

## (undated) DEVICE — DRAIN JACKSON PRATT RESERVOIR 100ML SU130-1305

## (undated) RX ORDER — CEFAZOLIN SODIUM 1 G/3ML
INJECTION, POWDER, FOR SOLUTION INTRAMUSCULAR; INTRAVENOUS
Status: DISPENSED
Start: 2023-02-10

## (undated) RX ORDER — BUPIVACAINE HYDROCHLORIDE 5 MG/ML
INJECTION, SOLUTION EPIDURAL; INTRACAUDAL
Status: DISPENSED
Start: 2022-06-30

## (undated) RX ORDER — EPHEDRINE SULFATE 50 MG/ML
INJECTION, SOLUTION INTRAMUSCULAR; INTRAVENOUS; SUBCUTANEOUS
Status: DISPENSED
Start: 2023-02-10

## (undated) RX ORDER — LIDOCAINE HYDROCHLORIDE 10 MG/ML
INJECTION, SOLUTION EPIDURAL; INFILTRATION; INTRACAUDAL; PERINEURAL
Status: DISPENSED
Start: 2022-03-09

## (undated) RX ORDER — PHENYLEPHRINE HYDROCHLORIDE 10 MG/ML
INJECTION INTRAVENOUS
Status: DISPENSED
Start: 2022-10-07

## (undated) RX ORDER — GABAPENTIN 300 MG/1
CAPSULE ORAL
Status: DISPENSED
Start: 2022-03-09

## (undated) RX ORDER — ACETAMINOPHEN 325 MG/1
TABLET ORAL
Status: DISPENSED
Start: 2022-06-30

## (undated) RX ORDER — FENTANYL CITRATE 50 UG/ML
INJECTION, SOLUTION INTRAMUSCULAR; INTRAVENOUS
Status: DISPENSED
Start: 2022-10-07

## (undated) RX ORDER — PROPOFOL 10 MG/ML
INJECTION, EMULSION INTRAVENOUS
Status: DISPENSED
Start: 2022-03-09

## (undated) RX ORDER — BUPIVACAINE HYDROCHLORIDE 5 MG/ML
INJECTION, SOLUTION PERINEURAL
Status: DISPENSED
Start: 2022-03-09

## (undated) RX ORDER — OXYCODONE HYDROCHLORIDE 5 MG/1
TABLET ORAL
Status: DISPENSED
Start: 2023-02-10

## (undated) RX ORDER — PROPOFOL 10 MG/ML
INJECTION, EMULSION INTRAVENOUS
Status: DISPENSED
Start: 2022-06-30

## (undated) RX ORDER — LIDOCAINE HYDROCHLORIDE 10 MG/ML
INJECTION, SOLUTION EPIDURAL; INFILTRATION; INTRACAUDAL; PERINEURAL
Status: DISPENSED
Start: 2022-06-30

## (undated) RX ORDER — LIDOCAINE HYDROCHLORIDE 10 MG/ML
INJECTION, SOLUTION EPIDURAL; INFILTRATION; INTRACAUDAL; PERINEURAL
Status: DISPENSED
Start: 2023-02-10

## (undated) RX ORDER — ONDANSETRON 2 MG/ML
INJECTION INTRAMUSCULAR; INTRAVENOUS
Status: DISPENSED
Start: 2022-06-30

## (undated) RX ORDER — MAGNESIUM SULFATE HEPTAHYDRATE 40 MG/ML
INJECTION, SOLUTION INTRAVENOUS
Status: DISPENSED
Start: 2022-06-30

## (undated) RX ORDER — LIDOCAINE HYDROCHLORIDE AND EPINEPHRINE 10; 10 MG/ML; UG/ML
INJECTION, SOLUTION INFILTRATION; PERINEURAL
Status: DISPENSED
Start: 2022-03-09

## (undated) RX ORDER — ONDANSETRON 2 MG/ML
INJECTION INTRAMUSCULAR; INTRAVENOUS
Status: DISPENSED
Start: 2022-03-09

## (undated) RX ORDER — FENTANYL CITRATE-0.9 % NACL/PF 10 MCG/ML
PLASTIC BAG, INJECTION (ML) INTRAVENOUS
Status: DISPENSED
Start: 2023-02-10

## (undated) RX ORDER — FENTANYL CITRATE-0.9 % NACL/PF 10 MCG/ML
PLASTIC BAG, INJECTION (ML) INTRAVENOUS
Status: DISPENSED
Start: 2022-06-30

## (undated) RX ORDER — PROPOFOL 10 MG/ML
INJECTION, EMULSION INTRAVENOUS
Status: DISPENSED
Start: 2022-10-07

## (undated) RX ORDER — HYDRALAZINE HYDROCHLORIDE 20 MG/ML
INJECTION INTRAMUSCULAR; INTRAVENOUS
Status: DISPENSED
Start: 2022-06-30

## (undated) RX ORDER — OXYCODONE HYDROCHLORIDE 5 MG/1
TABLET ORAL
Status: DISPENSED
Start: 2022-10-07

## (undated) RX ORDER — HYDROXYZINE HYDROCHLORIDE 25 MG/1
TABLET, FILM COATED ORAL
Status: DISPENSED
Start: 2022-06-30

## (undated) RX ORDER — LIDOCAINE HYDROCHLORIDE AND EPINEPHRINE 10; 10 MG/ML; UG/ML
INJECTION, SOLUTION INFILTRATION; PERINEURAL
Status: DISPENSED
Start: 2022-06-30

## (undated) RX ORDER — METHYLPREDNISOLONE ACETATE 40 MG/ML
INJECTION, SUSPENSION INTRA-ARTICULAR; INTRALESIONAL; INTRAMUSCULAR; SOFT TISSUE
Status: DISPENSED
Start: 2019-03-29

## (undated) RX ORDER — EPINEPHRINE 1 MG/ML
INJECTION, SOLUTION INTRAMUSCULAR; SUBCUTANEOUS
Status: DISPENSED
Start: 2023-02-10

## (undated) RX ORDER — CEFAZOLIN SODIUM 1 G/3ML
INJECTION, POWDER, FOR SOLUTION INTRAMUSCULAR; INTRAVENOUS
Status: DISPENSED
Start: 2022-10-07

## (undated) RX ORDER — FENTANYL CITRATE 50 UG/ML
INJECTION, SOLUTION INTRAMUSCULAR; INTRAVENOUS
Status: DISPENSED
Start: 2023-02-10

## (undated) RX ORDER — CEFAZOLIN SODIUM 1 G/3ML
INJECTION, POWDER, FOR SOLUTION INTRAMUSCULAR; INTRAVENOUS
Status: DISPENSED
Start: 2022-06-30

## (undated) RX ORDER — PROPOFOL 10 MG/ML
INJECTION, EMULSION INTRAVENOUS
Status: DISPENSED
Start: 2023-02-10

## (undated) RX ORDER — BUPIVACAINE HYDROCHLORIDE 2.5 MG/ML
INJECTION, SOLUTION INFILTRATION; PERINEURAL
Status: DISPENSED
Start: 2023-02-10

## (undated) RX ORDER — FENTANYL CITRATE 50 UG/ML
INJECTION, SOLUTION INTRAMUSCULAR; INTRAVENOUS
Status: DISPENSED
Start: 2022-06-30

## (undated) RX ORDER — VANCOMYCIN HYDROCHLORIDE 1 G/20ML
INJECTION, POWDER, LYOPHILIZED, FOR SOLUTION INTRAVENOUS
Status: DISPENSED
Start: 2022-10-07

## (undated) RX ORDER — BUPIVACAINE HYDROCHLORIDE 2.5 MG/ML
INJECTION, SOLUTION INFILTRATION; PERINEURAL
Status: DISPENSED
Start: 2022-10-07

## (undated) RX ORDER — BUPIVACAINE HYDROCHLORIDE 5 MG/ML
INJECTION, SOLUTION EPIDURAL; INTRACAUDAL
Status: DISPENSED
Start: 2022-10-07

## (undated) RX ORDER — GABAPENTIN 300 MG/1
CAPSULE ORAL
Status: DISPENSED
Start: 2022-06-30

## (undated) RX ORDER — EPHEDRINE SULFATE 50 MG/ML
INJECTION, SOLUTION INTRAMUSCULAR; INTRAVENOUS; SUBCUTANEOUS
Status: DISPENSED
Start: 2022-06-30

## (undated) RX ORDER — EPHEDRINE SULFATE 50 MG/ML
INJECTION, SOLUTION INTRAMUSCULAR; INTRAVENOUS; SUBCUTANEOUS
Status: DISPENSED
Start: 2022-10-07

## (undated) RX ORDER — DEXMEDETOMIDINE HYDROCHLORIDE 100 UG/ML
INJECTION, SOLUTION INTRAVENOUS
Status: DISPENSED
Start: 2022-06-30

## (undated) RX ORDER — HYDROMORPHONE HCL IN WATER/PF 6 MG/30 ML
PATIENT CONTROLLED ANALGESIA SYRINGE INTRAVENOUS
Status: DISPENSED
Start: 2022-06-30

## (undated) RX ORDER — FENTANYL CITRATE 50 UG/ML
INJECTION, SOLUTION INTRAMUSCULAR; INTRAVENOUS
Status: DISPENSED
Start: 2022-03-09

## (undated) RX ORDER — GINSENG 100 MG
CAPSULE ORAL
Status: DISPENSED
Start: 2023-02-10

## (undated) RX ORDER — GENTAMICIN 40 MG/ML
INJECTION, SOLUTION INTRAMUSCULAR; INTRAVENOUS
Status: DISPENSED
Start: 2022-10-07

## (undated) RX ORDER — LIDOCAINE HYDROCHLORIDE 20 MG/ML
INJECTION, SOLUTION INFILTRATION; PERINEURAL
Status: DISPENSED
Start: 2022-10-07

## (undated) RX ORDER — KETOROLAC TROMETHAMINE 30 MG/ML
INJECTION, SOLUTION INTRAMUSCULAR; INTRAVENOUS
Status: DISPENSED
Start: 2022-06-30

## (undated) RX ORDER — CEFAZOLIN SODIUM 2 G/100ML
INJECTION, SOLUTION INTRAVENOUS
Status: DISPENSED
Start: 2022-03-09

## (undated) RX ORDER — DEXAMETHASONE SODIUM PHOSPHATE 4 MG/ML
INJECTION, SOLUTION INTRA-ARTICULAR; INTRALESIONAL; INTRAMUSCULAR; INTRAVENOUS; SOFT TISSUE
Status: DISPENSED
Start: 2022-10-07

## (undated) RX ORDER — DEXAMETHASONE SODIUM PHOSPHATE 10 MG/ML
INJECTION, SOLUTION INTRAMUSCULAR; INTRAVENOUS
Status: DISPENSED
Start: 2022-03-09

## (undated) RX ORDER — DEXAMETHASONE SODIUM PHOSPHATE 4 MG/ML
INJECTION, SOLUTION INTRA-ARTICULAR; INTRALESIONAL; INTRAMUSCULAR; INTRAVENOUS; SOFT TISSUE
Status: DISPENSED
Start: 2022-06-30

## (undated) RX ORDER — ACETAMINOPHEN 325 MG/1
TABLET ORAL
Status: DISPENSED
Start: 2023-02-10

## (undated) RX ORDER — KETOROLAC TROMETHAMINE 30 MG/ML
INJECTION, SOLUTION INTRAMUSCULAR; INTRAVENOUS
Status: DISPENSED
Start: 2022-03-09

## (undated) RX ORDER — ACETAMINOPHEN 325 MG/1
TABLET ORAL
Status: DISPENSED
Start: 2022-03-09

## (undated) RX ORDER — ACETAMINOPHEN 325 MG/1
TABLET ORAL
Status: DISPENSED
Start: 2022-10-07